# Patient Record
Sex: FEMALE | Race: WHITE | NOT HISPANIC OR LATINO
[De-identification: names, ages, dates, MRNs, and addresses within clinical notes are randomized per-mention and may not be internally consistent; named-entity substitution may affect disease eponyms.]

---

## 2017-01-11 ENCOUNTER — MEDICATION RENEWAL (OUTPATIENT)
Age: 82
End: 2017-01-11

## 2017-02-08 ENCOUNTER — MEDICATION RENEWAL (OUTPATIENT)
Age: 82
End: 2017-02-08

## 2017-04-28 ENCOUNTER — RX RENEWAL (OUTPATIENT)
Age: 82
End: 2017-04-28

## 2017-06-14 ENCOUNTER — APPOINTMENT (OUTPATIENT)
Dept: CARDIOLOGY | Facility: CLINIC | Age: 82
End: 2017-06-14

## 2017-06-14 ENCOUNTER — NON-APPOINTMENT (OUTPATIENT)
Age: 82
End: 2017-06-14

## 2017-06-14 VITALS — HEART RATE: 100 BPM

## 2017-06-14 VITALS
HEIGHT: 66 IN | DIASTOLIC BLOOD PRESSURE: 63 MMHG | HEART RATE: 107 BPM | BODY MASS INDEX: 22.34 KG/M2 | OXYGEN SATURATION: 99 % | SYSTOLIC BLOOD PRESSURE: 107 MMHG | WEIGHT: 139 LBS

## 2017-06-15 LAB
ALBUMIN SERPL ELPH-MCNC: 4.2 G/DL
ALP BLD-CCNC: 56 U/L
ALT SERPL-CCNC: 43 U/L
ANION GAP SERPL CALC-SCNC: 21 MMOL/L
AST SERPL-CCNC: 34 U/L
BASOPHILS # BLD AUTO: 0.02 K/UL
BASOPHILS NFR BLD AUTO: 0.2 %
BILIRUB SERPL-MCNC: 0.4 MG/DL
BUN SERPL-MCNC: 26 MG/DL
CALCIUM SERPL-MCNC: 10.2 MG/DL
CHLORIDE SERPL-SCNC: 104 MMOL/L
CHOLEST SERPL-MCNC: 124 MG/DL
CHOLEST/HDLC SERPL: 1.9 RATIO
CO2 SERPL-SCNC: 21 MMOL/L
CREAT SERPL-MCNC: 1.4 MG/DL
EOSINOPHIL # BLD AUTO: 0.05 K/UL
EOSINOPHIL NFR BLD AUTO: 0.6 %
ESTIMATED AVERAGE GLUCOSE: 131 MG/DL
GLUCOSE SERPL-MCNC: 108 MG/DL
HBA1C MFR BLD HPLC: 6.2 %
HCT VFR BLD CALC: 42 %
HDLC SERPL-MCNC: 65 MG/DL
HGB BLD-MCNC: 12.3 G/DL
IMM GRANULOCYTES NFR BLD AUTO: 0 %
LDLC SERPL CALC-MCNC: 37 MG/DL
LYMPHOCYTES # BLD AUTO: 1.65 K/UL
LYMPHOCYTES NFR BLD AUTO: 18.3 %
MAN DIFF?: NORMAL
MCHC RBC-ENTMCNC: 28.1 PG
MCHC RBC-ENTMCNC: 29.3 GM/DL
MCV RBC AUTO: 95.9 FL
MONOCYTES # BLD AUTO: 0.45 K/UL
MONOCYTES NFR BLD AUTO: 5 %
NEUTROPHILS # BLD AUTO: 6.86 K/UL
NEUTROPHILS NFR BLD AUTO: 75.9 %
PLATELET # BLD AUTO: 272 K/UL
POTASSIUM SERPL-SCNC: 4.5 MMOL/L
PROT SERPL-MCNC: 6.9 G/DL
RBC # BLD: 4.38 M/UL
RBC # FLD: 15.5 %
SODIUM SERPL-SCNC: 146 MMOL/L
T4 SERPL-MCNC: 8.8 UG/DL
TRIGL SERPL-MCNC: 110 MG/DL
TSH SERPL-ACNC: 2.74 UIU/ML
URATE SERPL-MCNC: 5.8 MG/DL
WBC # FLD AUTO: 9.03 K/UL

## 2017-09-27 ENCOUNTER — NON-APPOINTMENT (OUTPATIENT)
Age: 82
End: 2017-09-27

## 2017-09-27 ENCOUNTER — APPOINTMENT (OUTPATIENT)
Dept: CARDIOLOGY | Facility: CLINIC | Age: 82
End: 2017-09-27
Payer: MEDICARE

## 2017-09-27 VITALS
BODY MASS INDEX: 21.63 KG/M2 | OXYGEN SATURATION: 98 % | HEART RATE: 98 BPM | WEIGHT: 134 LBS | SYSTOLIC BLOOD PRESSURE: 134 MMHG | DIASTOLIC BLOOD PRESSURE: 70 MMHG

## 2017-09-27 PROCEDURE — 99214 OFFICE O/P EST MOD 30 MIN: CPT

## 2017-09-27 PROCEDURE — 93000 ELECTROCARDIOGRAM COMPLETE: CPT

## 2017-11-29 ENCOUNTER — MEDICATION RENEWAL (OUTPATIENT)
Age: 82
End: 2017-11-29

## 2017-12-05 ENCOUNTER — NON-APPOINTMENT (OUTPATIENT)
Age: 82
End: 2017-12-05

## 2017-12-05 ENCOUNTER — APPOINTMENT (OUTPATIENT)
Dept: CARDIOLOGY | Facility: CLINIC | Age: 82
End: 2017-12-05
Payer: MEDICARE

## 2017-12-05 VITALS
DIASTOLIC BLOOD PRESSURE: 74 MMHG | WEIGHT: 135 LBS | SYSTOLIC BLOOD PRESSURE: 161 MMHG | HEART RATE: 103 BPM | HEIGHT: 66 IN | OXYGEN SATURATION: 100 % | BODY MASS INDEX: 21.69 KG/M2

## 2017-12-05 VITALS — DIASTOLIC BLOOD PRESSURE: 78 MMHG | HEART RATE: 88 BPM | SYSTOLIC BLOOD PRESSURE: 148 MMHG

## 2017-12-05 PROCEDURE — 93000 ELECTROCARDIOGRAM COMPLETE: CPT

## 2017-12-05 PROCEDURE — 90662 IIV NO PRSV INCREASED AG IM: CPT

## 2017-12-05 PROCEDURE — G0008: CPT

## 2017-12-05 PROCEDURE — 99214 OFFICE O/P EST MOD 30 MIN: CPT

## 2017-12-06 LAB
ALBUMIN SERPL ELPH-MCNC: 4.3 G/DL
ALP BLD-CCNC: 50 U/L
ALT SERPL-CCNC: 18 U/L
ANION GAP SERPL CALC-SCNC: 18 MMOL/L
AST SERPL-CCNC: 22 U/L
BASOPHILS # BLD AUTO: 0.02 K/UL
BASOPHILS NFR BLD AUTO: 0.3 %
BILIRUB SERPL-MCNC: 0.4 MG/DL
BUN SERPL-MCNC: 24 MG/DL
CALCIUM SERPL-MCNC: 9.8 MG/DL
CHLORIDE SERPL-SCNC: 100 MMOL/L
CHOLEST SERPL-MCNC: 130 MG/DL
CHOLEST/HDLC SERPL: 1.7 RATIO
CO2 SERPL-SCNC: 26 MMOL/L
CREAT SERPL-MCNC: 0.87 MG/DL
EOSINOPHIL # BLD AUTO: 0.05 K/UL
EOSINOPHIL NFR BLD AUTO: 0.9 %
ESTIMATED AVERAGE GLUCOSE: 120 MG/DL
GLUCOSE SERPL-MCNC: 98 MG/DL
HBA1C MFR BLD HPLC: 5.8 %
HCT VFR BLD CALC: 38.1 %
HDLC SERPL-MCNC: 76 MG/DL
HGB BLD-MCNC: 11.6 G/DL
IMM GRANULOCYTES NFR BLD AUTO: 0 %
LDLC SERPL CALC-MCNC: 37 MG/DL
LYMPHOCYTES # BLD AUTO: 1.17 K/UL
LYMPHOCYTES NFR BLD AUTO: 19.9 %
MAN DIFF?: NORMAL
MCHC RBC-ENTMCNC: 29.7 PG
MCHC RBC-ENTMCNC: 30.4 GM/DL
MCV RBC AUTO: 97.7 FL
MONOCYTES # BLD AUTO: 0.39 K/UL
MONOCYTES NFR BLD AUTO: 6.6 %
NEUTROPHILS # BLD AUTO: 4.24 K/UL
NEUTROPHILS NFR BLD AUTO: 72.3 %
PLATELET # BLD AUTO: 226 K/UL
POTASSIUM SERPL-SCNC: 4.4 MMOL/L
PROT SERPL-MCNC: 7 G/DL
RBC # BLD: 3.9 M/UL
RBC # FLD: 15.3 %
SODIUM SERPL-SCNC: 144 MMOL/L
T4 SERPL-MCNC: 8.4 UG/DL
TRIGL SERPL-MCNC: 84 MG/DL
TSH SERPL-ACNC: 1.33 UIU/ML
URATE SERPL-MCNC: 5.4 MG/DL
WBC # FLD AUTO: 5.87 K/UL

## 2018-04-17 ENCOUNTER — APPOINTMENT (OUTPATIENT)
Dept: CARDIOLOGY | Facility: CLINIC | Age: 83
End: 2018-04-17
Payer: MEDICARE

## 2018-04-17 ENCOUNTER — NON-APPOINTMENT (OUTPATIENT)
Age: 83
End: 2018-04-17

## 2018-04-17 VITALS
HEART RATE: 96 BPM | BODY MASS INDEX: 21.21 KG/M2 | OXYGEN SATURATION: 99 % | WEIGHT: 132 LBS | HEIGHT: 66 IN | DIASTOLIC BLOOD PRESSURE: 70 MMHG | SYSTOLIC BLOOD PRESSURE: 144 MMHG

## 2018-04-17 PROCEDURE — 99214 OFFICE O/P EST MOD 30 MIN: CPT

## 2018-04-17 PROCEDURE — 93000 ELECTROCARDIOGRAM COMPLETE: CPT

## 2018-04-18 LAB
ALBUMIN SERPL ELPH-MCNC: 3.9 G/DL
ALP BLD-CCNC: 58 U/L
ALT SERPL-CCNC: 23 U/L
ANION GAP SERPL CALC-SCNC: 13 MMOL/L
AST SERPL-CCNC: 28 U/L
BASOPHILS # BLD AUTO: 0.02 K/UL
BASOPHILS NFR BLD AUTO: 0.3 %
BILIRUB SERPL-MCNC: 0.4 MG/DL
BUN SERPL-MCNC: 26 MG/DL
CALCIUM SERPL-MCNC: 10.4 MG/DL
CHLORIDE SERPL-SCNC: 103 MMOL/L
CHOLEST SERPL-MCNC: 121 MG/DL
CHOLEST/HDLC SERPL: 1.8 RATIO
CO2 SERPL-SCNC: 27 MMOL/L
CREAT SERPL-MCNC: 1.36 MG/DL
EOSINOPHIL # BLD AUTO: 0.11 K/UL
EOSINOPHIL NFR BLD AUTO: 1.7 %
ESTIMATED AVERAGE GLUCOSE: 128 MG/DL
GLUCOSE SERPL-MCNC: 85 MG/DL
HBA1C MFR BLD HPLC: 6.1 %
HCT VFR BLD CALC: 37.2 %
HDLC SERPL-MCNC: 67 MG/DL
HGB BLD-MCNC: 11.4 G/DL
IMM GRANULOCYTES NFR BLD AUTO: 0.2 %
LDLC SERPL CALC-MCNC: 40 MG/DL
LYMPHOCYTES # BLD AUTO: 1.22 K/UL
LYMPHOCYTES NFR BLD AUTO: 19.2 %
MAN DIFF?: NORMAL
MCHC RBC-ENTMCNC: 28.1 PG
MCHC RBC-ENTMCNC: 30.6 GM/DL
MCV RBC AUTO: 91.6 FL
MONOCYTES # BLD AUTO: 0.29 K/UL
MONOCYTES NFR BLD AUTO: 4.6 %
NEUTROPHILS # BLD AUTO: 4.71 K/UL
NEUTROPHILS NFR BLD AUTO: 74 %
PLATELET # BLD AUTO: 241 K/UL
POTASSIUM SERPL-SCNC: 5.1 MMOL/L
PROT SERPL-MCNC: 7.2 G/DL
RBC # BLD: 4.06 M/UL
RBC # FLD: 16 %
SODIUM SERPL-SCNC: 143 MMOL/L
T4 SERPL-MCNC: 7.8 UG/DL
TRIGL SERPL-MCNC: 72 MG/DL
TSH SERPL-ACNC: 4.44 UIU/ML
URATE SERPL-MCNC: 4.8 MG/DL
WBC # FLD AUTO: 6.36 K/UL

## 2018-07-28 ENCOUNTER — RX RENEWAL (OUTPATIENT)
Age: 83
End: 2018-07-28

## 2018-08-14 ENCOUNTER — NON-APPOINTMENT (OUTPATIENT)
Age: 83
End: 2018-08-14

## 2018-08-14 ENCOUNTER — APPOINTMENT (OUTPATIENT)
Dept: CARDIOLOGY | Facility: CLINIC | Age: 83
End: 2018-08-14
Payer: MEDICARE

## 2018-08-14 VITALS
HEIGHT: 66 IN | WEIGHT: 130 LBS | HEART RATE: 110 BPM | BODY MASS INDEX: 20.89 KG/M2 | DIASTOLIC BLOOD PRESSURE: 57 MMHG | SYSTOLIC BLOOD PRESSURE: 110 MMHG | OXYGEN SATURATION: 100 %

## 2018-08-14 VITALS — HEART RATE: 98 BPM

## 2018-08-14 PROCEDURE — 93000 ELECTROCARDIOGRAM COMPLETE: CPT

## 2018-08-14 PROCEDURE — 99214 OFFICE O/P EST MOD 30 MIN: CPT

## 2018-12-11 ENCOUNTER — APPOINTMENT (OUTPATIENT)
Dept: CARDIOLOGY | Facility: CLINIC | Age: 83
End: 2018-12-11
Payer: MEDICARE

## 2018-12-11 ENCOUNTER — NON-APPOINTMENT (OUTPATIENT)
Age: 83
End: 2018-12-11

## 2018-12-11 VITALS
BODY MASS INDEX: 19.61 KG/M2 | HEART RATE: 96 BPM | SYSTOLIC BLOOD PRESSURE: 137 MMHG | OXYGEN SATURATION: 96 % | WEIGHT: 122 LBS | HEIGHT: 66 IN | DIASTOLIC BLOOD PRESSURE: 75 MMHG

## 2018-12-11 PROCEDURE — 93000 ELECTROCARDIOGRAM COMPLETE: CPT

## 2018-12-11 PROCEDURE — 99214 OFFICE O/P EST MOD 30 MIN: CPT

## 2018-12-11 NOTE — REASON FOR VISIT
[FreeTextEntry1] : The patient comes in for followup of her asthma, hypertension, diabetes, hyperlipidemia, and mitral regurgitation. She has no new complaints. She denies any chest pains, shortness of breath, or palpitations. She does complain of feeling tired.

## 2018-12-11 NOTE — DISCUSSION/SUMMARY
[FreeTextEntry1] : The patient was examined. Her blood pressure was 137/75 and her pulse was 96. Her oxygen saturation was 96%. Her lungs were clear to auscultation. Cardiac exam was negative for  rubs or gallops.There was a 3/6 holosystolic murmur heard at the apex. Her EKG showed normal sinus rhythm, 1 APC,and poor R-wave progression. No acute changes were seen. She'll return in 4 months , or earlier if needed. She'll continue her current medications.

## 2018-12-11 NOTE — PHYSICAL EXAM
[General Appearance - Well Developed] : well developed [Normal Appearance] : normal appearance [Well Groomed] : well groomed [General Appearance - Well Nourished] : well nourished [No Deformities] : no deformities [General Appearance - In No Acute Distress] : no acute distress [Normal Conjunctiva] : the conjunctiva exhibited no abnormalities [Eyelids - No Xanthelasma] : the eyelids demonstrated no xanthelasmas [Normal Oral Mucosa] : normal oral mucosa [No Oral Pallor] : no oral pallor [No Oral Cyanosis] : no oral cyanosis [Normal Jugular Venous A Waves Present] : normal jugular venous A waves present [Normal Jugular Venous V Waves Present] : normal jugular venous V waves present [No Jugular Venous Rivera A Waves] : no jugular venous rivera A waves [Respiration, Rhythm And Depth] : normal respiratory rhythm and effort [Exaggerated Use Of Accessory Muscles For Inspiration] : no accessory muscle use [Auscultation Breath Sounds / Voice Sounds] : lungs were clear to auscultation bilaterally [Heart Rate And Rhythm] : heart rate and rhythm were normal [Heart Sounds] : normal S1 and S2 [Abdomen Soft] : soft [Abdomen Tenderness] : non-tender [Abdomen Mass (___ Cm)] : no abdominal mass palpated [Abnormal Walk] : normal gait [Gait - Sufficient For Exercise Testing] : the gait was sufficient for exercise testing [Nail Clubbing] : no clubbing of the fingernails [Cyanosis, Localized] : no localized cyanosis [Petechial Hemorrhages (___cm)] : no petechial hemorrhages [Skin Color & Pigmentation] : normal skin color and pigmentation [] : no rash [No Venous Stasis] : no venous stasis [Skin Lesions] : no skin lesions [No Skin Ulcers] : no skin ulcer [No Xanthoma] : no  xanthoma was observed [Oriented To Time, Place, And Person] : oriented to person, place, and time [Affect] : the affect was normal [Mood] : the mood was normal [No Anxiety] : not feeling anxious [FreeTextEntry1] : Incisional hernia left flank

## 2018-12-11 NOTE — REVIEW OF SYSTEMS
[Feeling Fatigued] : feeling fatigued [Incontinence] : incontinence [Tremor] : a tremor was seen [see HPI] : see HPI [Memory Lapses Or Loss] : memory lapses or loss [Negative] : Heme/Lymph [Shortness Of Breath] : no shortness of breath [Chest Pain] : no chest pain [Palpitations] : no palpitations

## 2019-04-29 ENCOUNTER — RX RENEWAL (OUTPATIENT)
Age: 84
End: 2019-04-29

## 2019-05-14 ENCOUNTER — APPOINTMENT (OUTPATIENT)
Dept: CARDIOLOGY | Facility: CLINIC | Age: 84
End: 2019-05-14
Payer: MEDICARE

## 2019-05-14 ENCOUNTER — NON-APPOINTMENT (OUTPATIENT)
Age: 84
End: 2019-05-14

## 2019-05-14 VITALS
BODY MASS INDEX: 19.61 KG/M2 | WEIGHT: 122 LBS | HEIGHT: 66 IN | DIASTOLIC BLOOD PRESSURE: 77 MMHG | HEART RATE: 90 BPM | SYSTOLIC BLOOD PRESSURE: 147 MMHG | OXYGEN SATURATION: 98 %

## 2019-05-14 DIAGNOSIS — Z85.3 PERSONAL HISTORY OF MALIGNANT NEOPLASM OF BREAST: ICD-10-CM

## 2019-05-14 DIAGNOSIS — Z87.39 PERSONAL HISTORY OF OTHER DISEASES OF THE MUSCULOSKELETAL SYSTEM AND CONNECTIVE TISSUE: ICD-10-CM

## 2019-05-14 DIAGNOSIS — N32.81 OVERACTIVE BLADDER: ICD-10-CM

## 2019-05-14 DIAGNOSIS — H91.93 UNSPECIFIED HEARING LOSS, BILATERAL: ICD-10-CM

## 2019-05-14 DIAGNOSIS — R26.89 OTHER ABNORMALITIES OF GAIT AND MOBILITY: ICD-10-CM

## 2019-05-14 PROCEDURE — 36415 COLL VENOUS BLD VENIPUNCTURE: CPT

## 2019-05-14 PROCEDURE — 99214 OFFICE O/P EST MOD 30 MIN: CPT

## 2019-05-14 PROCEDURE — 93000 ELECTROCARDIOGRAM COMPLETE: CPT

## 2019-05-14 NOTE — REASON FOR VISIT
[FreeTextEntry1] : The patient comes in for followup of her asthma, hypertension, diabetes, hyperlipidemia, and mitral regurgitation. She has no new complaints. She denies any chest pains, shortness of breath, or palpitations. She does complain of feeling tired. \par May 14, 2019: The patient today is complaining of problems with swallowing her metformin pill. She cuts it in half. I told her to put it in applesauce. She also is complaining of dry skin. She does use Dove soap. She denies any chest pains, shortness of breath, or palpitations

## 2019-05-14 NOTE — REVIEW OF SYSTEMS
[Feeling Fatigued] : feeling fatigued [Incontinence] : incontinence [see HPI] : see HPI [Tremor] : a tremor was seen [Memory Lapses Or Loss] : memory lapses or loss [Negative] : Heme/Lymph [Shortness Of Breath] : no shortness of breath [Chest Pain] : no chest pain [Palpitations] : no palpitations

## 2019-05-14 NOTE — PHYSICAL EXAM
[General Appearance - Well Developed] : well developed [Well Groomed] : well groomed [Normal Appearance] : normal appearance [General Appearance - Well Nourished] : well nourished [General Appearance - In No Acute Distress] : no acute distress [No Deformities] : no deformities [Eyelids - No Xanthelasma] : the eyelids demonstrated no xanthelasmas [Normal Conjunctiva] : the conjunctiva exhibited no abnormalities [No Oral Pallor] : no oral pallor [Normal Oral Mucosa] : normal oral mucosa [No Oral Cyanosis] : no oral cyanosis [Normal Jugular Venous A Waves Present] : normal jugular venous A waves present [Normal Jugular Venous V Waves Present] : normal jugular venous V waves present [No Jugular Venous Rivera A Waves] : no jugular venous rivera A waves [Exaggerated Use Of Accessory Muscles For Inspiration] : no accessory muscle use [Respiration, Rhythm And Depth] : normal respiratory rhythm and effort [Auscultation Breath Sounds / Voice Sounds] : lungs were clear to auscultation bilaterally [Heart Rate And Rhythm] : heart rate and rhythm were normal [Heart Sounds] : normal S1 and S2 [Abdomen Soft] : soft [Abdomen Tenderness] : non-tender [Abdomen Mass (___ Cm)] : no abdominal mass palpated [Abnormal Walk] : normal gait [Gait - Sufficient For Exercise Testing] : the gait was sufficient for exercise testing [Nail Clubbing] : no clubbing of the fingernails [Cyanosis, Localized] : no localized cyanosis [Petechial Hemorrhages (___cm)] : no petechial hemorrhages [Skin Color & Pigmentation] : normal skin color and pigmentation [] : no rash [No Venous Stasis] : no venous stasis [Skin Lesions] : no skin lesions [No Xanthoma] : no  xanthoma was observed [No Skin Ulcers] : no skin ulcer [Mood] : the mood was normal [Oriented To Time, Place, And Person] : oriented to person, place, and time [Affect] : the affect was normal [No Anxiety] : not feeling anxious [FreeTextEntry1] : Incisional hernia left flank

## 2019-05-15 LAB
ALBUMIN SERPL ELPH-MCNC: 4.2 G/DL
ALP BLD-CCNC: 60 U/L
ALT SERPL-CCNC: 16 U/L
ANION GAP SERPL CALC-SCNC: 14 MMOL/L
AST SERPL-CCNC: 18 U/L
BASOPHILS # BLD AUTO: 0.02 K/UL
BASOPHILS NFR BLD AUTO: 0.3 %
BILIRUB SERPL-MCNC: 0.4 MG/DL
BUN SERPL-MCNC: 28 MG/DL
CALCIUM SERPL-MCNC: 10 MG/DL
CHLORIDE SERPL-SCNC: 103 MMOL/L
CHOLEST SERPL-MCNC: 129 MG/DL
CHOLEST/HDLC SERPL: 1.9 RATIO
CO2 SERPL-SCNC: 27 MMOL/L
CREAT SERPL-MCNC: 1.09 MG/DL
EOSINOPHIL # BLD AUTO: 0 K/UL
EOSINOPHIL NFR BLD AUTO: 0 %
ESTIMATED AVERAGE GLUCOSE: 128 MG/DL
GLUCOSE SERPL-MCNC: 114 MG/DL
HBA1C MFR BLD HPLC: 6.1 %
HCT VFR BLD CALC: 37.7 %
HDLC SERPL-MCNC: 68 MG/DL
HGB BLD-MCNC: 11.2 G/DL
IMM GRANULOCYTES NFR BLD AUTO: 0.2 %
LDLC SERPL CALC-MCNC: 46 MG/DL
LYMPHOCYTES # BLD AUTO: 1.2 K/UL
LYMPHOCYTES NFR BLD AUTO: 19.5 %
MAN DIFF?: NORMAL
MCHC RBC-ENTMCNC: 27.8 PG
MCHC RBC-ENTMCNC: 29.7 GM/DL
MCV RBC AUTO: 93.5 FL
MONOCYTES # BLD AUTO: 0.32 K/UL
MONOCYTES NFR BLD AUTO: 5.2 %
NEUTROPHILS # BLD AUTO: 4.59 K/UL
NEUTROPHILS NFR BLD AUTO: 74.8 %
PLATELET # BLD AUTO: 234 K/UL
POTASSIUM SERPL-SCNC: 4.1 MMOL/L
PROT SERPL-MCNC: 6.8 G/DL
RBC # BLD: 4.03 M/UL
RBC # FLD: 14.9 %
SODIUM SERPL-SCNC: 144 MMOL/L
T4 SERPL-MCNC: 7.7 UG/DL
TRIGL SERPL-MCNC: 77 MG/DL
TSH SERPL-ACNC: 3.4 UIU/ML
WBC # FLD AUTO: 6.14 K/UL

## 2019-09-17 ENCOUNTER — NON-APPOINTMENT (OUTPATIENT)
Age: 84
End: 2019-09-17

## 2019-09-17 ENCOUNTER — APPOINTMENT (OUTPATIENT)
Dept: CARDIOLOGY | Facility: CLINIC | Age: 84
End: 2019-09-17
Payer: MEDICARE

## 2019-09-17 VITALS
OXYGEN SATURATION: 100 % | SYSTOLIC BLOOD PRESSURE: 112 MMHG | RESPIRATION RATE: 17 BRPM | DIASTOLIC BLOOD PRESSURE: 69 MMHG | WEIGHT: 115 LBS | BODY MASS INDEX: 18.48 KG/M2 | HEIGHT: 66 IN | HEART RATE: 89 BPM | TEMPERATURE: 98.1 F

## 2019-09-17 PROCEDURE — 99214 OFFICE O/P EST MOD 30 MIN: CPT

## 2019-09-17 PROCEDURE — 93000 ELECTROCARDIOGRAM COMPLETE: CPT

## 2019-09-17 NOTE — PHYSICAL EXAM
[General Appearance - Well Developed] : well developed [Well Groomed] : well groomed [Normal Appearance] : normal appearance [General Appearance - Well Nourished] : well nourished [No Deformities] : no deformities [Normal Conjunctiva] : the conjunctiva exhibited no abnormalities [General Appearance - In No Acute Distress] : no acute distress [Eyelids - No Xanthelasma] : the eyelids demonstrated no xanthelasmas [Normal Oral Mucosa] : normal oral mucosa [No Oral Pallor] : no oral pallor [No Oral Cyanosis] : no oral cyanosis [Normal Jugular Venous A Waves Present] : normal jugular venous A waves present [No Jugular Venous Rivera A Waves] : no jugular venous rivera A waves [Normal Jugular Venous V Waves Present] : normal jugular venous V waves present [Respiration, Rhythm And Depth] : normal respiratory rhythm and effort [Exaggerated Use Of Accessory Muscles For Inspiration] : no accessory muscle use [Auscultation Breath Sounds / Voice Sounds] : lungs were clear to auscultation bilaterally [Heart Rate And Rhythm] : heart rate and rhythm were normal [Heart Sounds] : normal S1 and S2 [Abdomen Soft] : soft [Abdomen Tenderness] : non-tender [Abdomen Mass (___ Cm)] : no abdominal mass palpated [Abnormal Walk] : normal gait [Gait - Sufficient For Exercise Testing] : the gait was sufficient for exercise testing [Cyanosis, Localized] : no localized cyanosis [Nail Clubbing] : no clubbing of the fingernails [Petechial Hemorrhages (___cm)] : no petechial hemorrhages [No Venous Stasis] : no venous stasis [Skin Color & Pigmentation] : normal skin color and pigmentation [] : no rash [No Skin Ulcers] : no skin ulcer [Skin Lesions] : no skin lesions [No Xanthoma] : no  xanthoma was observed [Affect] : the affect was normal [Oriented To Time, Place, And Person] : oriented to person, place, and time [Mood] : the mood was normal [No Anxiety] : not feeling anxious [FreeTextEntry1] : Incisional hernia left flank

## 2019-09-17 NOTE — DISCUSSION/SUMMARY
[FreeTextEntry1] : The patient was examined. Her blood pressure was 112/69 and her pulse was 89. Her oxygen saturation was 100%. Her lungs were clear to auscultation. Cardiac exam was negative for  rubs or gallops.There was a 3/6 holosystolic murmur heard at the apex. Her EKG showed normal sinus rhythm, and low voltage in the limb leads.  No acute changes were seen. She'll return in 4 months , or earlier if needed. She'll continue her current medications.  She will try Biotene for the dry mouth.  She was advised to get a humidifier in the winter months when she is in [place].  Is not necessary in [place].  She was told to try boost or Ensure.

## 2019-09-17 NOTE — REASON FOR VISIT
[FreeTextEntry1] : The patient comes in for followup of her asthma, hypertension, diabetes, hyperlipidemia, and mitral regurgitation. She has no new complaints. She denies any chest pains, shortness of breath, or palpitations. She does complain of feeling tired. \par May 14, 2019: The patient today is complaining of problems with swallowing her metformin pill. She cuts it in half. I told her to put it in applesauce. She also is complaining of dry skin. She does use Dove soap. She denies any chest pains, shortness of breath, or palpitations\par September 17, 2019: Patient complains of feeling fatigued and not sleeping well.  Complains of dry mouth.  She has a hard time walking.  She is losing weight.  Daughter reports that she does not eat well.  She denies any chest pains, shortness of breath, or palpitations.

## 2019-12-17 ENCOUNTER — NON-APPOINTMENT (OUTPATIENT)
Age: 84
End: 2019-12-17

## 2019-12-17 ENCOUNTER — APPOINTMENT (OUTPATIENT)
Dept: CARDIOLOGY | Facility: CLINIC | Age: 84
End: 2019-12-17
Payer: MEDICARE

## 2019-12-17 VITALS
HEIGHT: 66 IN | SYSTOLIC BLOOD PRESSURE: 164 MMHG | HEART RATE: 86 BPM | WEIGHT: 119 LBS | BODY MASS INDEX: 19.13 KG/M2 | DIASTOLIC BLOOD PRESSURE: 74 MMHG | OXYGEN SATURATION: 97 %

## 2019-12-17 PROCEDURE — G0008: CPT

## 2019-12-17 PROCEDURE — 93000 ELECTROCARDIOGRAM COMPLETE: CPT

## 2019-12-17 PROCEDURE — 90662 IIV NO PRSV INCREASED AG IM: CPT

## 2019-12-17 PROCEDURE — 99214 OFFICE O/P EST MOD 30 MIN: CPT

## 2019-12-17 NOTE — DISCUSSION/SUMMARY
[FreeTextEntry1] : The patient was examined. Her blood pressure was 164/74 and her pulse was 86. Her oxygen saturation was 97%. Her lungs were clear to auscultation. Cardiac exam was negative for  rubs or gallops.There was a 3/6 holosystolic murmur heard at the apex. Her EKG showed normal sinus rhythm, a left anterior margo block, and nonspecific ST and T wave changes.  No acute changes were seen. She'll return in 4 months , or earlier if needed. She'll continue her current medications., and we will add indapamide 1.25 mg She will try Biotene for the dry mouth.  She was advised to get a humidifier in the winter months when she is in [place].  Is not necessary in [place].

## 2019-12-17 NOTE — REASON FOR VISIT
[FreeTextEntry1] : The patient comes in for followup of her asthma, hypertension, diabetes, hyperlipidemia, and mitral regurgitation. She has no new complaints. She denies any chest pains, shortness of breath, or palpitations. She does complain of feeling tired. \par May 14, 2019: The patient today is complaining of problems with swallowing her metformin pill. She cuts it in half. I told her to put it in applesauce. She also is complaining of dry skin. She does use Dove soap. She denies any chest pains, shortness of breath, or palpitations\par September 17, 2019: Patient complains of feeling fatigued and not sleeping well.  Complains of dry mouth.  She has a hard time walking.  She is losing weight.  Daughter reports that she does not eat well.  She denies any chest pains, shortness of breath, or palpitations.\par December 17, 2019: The patient's biggest complaint is that she is tired. She denies any chest pains, shortness of breath, or palpitations. She does not do a lot of walking. Sometimes her ankles are swollen.

## 2019-12-17 NOTE — PHYSICAL EXAM
[Normal Appearance] : normal appearance [General Appearance - Well Developed] : well developed [Well Groomed] : well groomed [General Appearance - Well Nourished] : well nourished [No Deformities] : no deformities [General Appearance - In No Acute Distress] : no acute distress [Normal Conjunctiva] : the conjunctiva exhibited no abnormalities [Eyelids - No Xanthelasma] : the eyelids demonstrated no xanthelasmas [No Oral Pallor] : no oral pallor [Normal Oral Mucosa] : normal oral mucosa [No Oral Cyanosis] : no oral cyanosis [Normal Jugular Venous A Waves Present] : normal jugular venous A waves present [Normal Jugular Venous V Waves Present] : normal jugular venous V waves present [No Jugular Venous Rivera A Waves] : no jugular venous rivera A waves [Respiration, Rhythm And Depth] : normal respiratory rhythm and effort [Exaggerated Use Of Accessory Muscles For Inspiration] : no accessory muscle use [Auscultation Breath Sounds / Voice Sounds] : lungs were clear to auscultation bilaterally [Heart Rate And Rhythm] : heart rate and rhythm were normal [Heart Sounds] : normal S1 and S2 [Abdomen Soft] : soft [Abdomen Tenderness] : non-tender [Abdomen Mass (___ Cm)] : no abdominal mass palpated [Abnormal Walk] : normal gait [Gait - Sufficient For Exercise Testing] : the gait was sufficient for exercise testing [Nail Clubbing] : no clubbing of the fingernails [Cyanosis, Localized] : no localized cyanosis [Petechial Hemorrhages (___cm)] : no petechial hemorrhages [] : no rash [Skin Color & Pigmentation] : normal skin color and pigmentation [No Venous Stasis] : no venous stasis [No Skin Ulcers] : no skin ulcer [Skin Lesions] : no skin lesions [No Xanthoma] : no  xanthoma was observed [Oriented To Time, Place, And Person] : oriented to person, place, and time [Affect] : the affect was normal [No Anxiety] : not feeling anxious [Mood] : the mood was normal [FreeTextEntry1] : Incisional hernia left flank

## 2019-12-18 LAB
ALBUMIN SERPL ELPH-MCNC: 3.9 G/DL
ALP BLD-CCNC: 59 U/L
ALT SERPL-CCNC: 21 U/L
ANION GAP SERPL CALC-SCNC: 12 MMOL/L
AST SERPL-CCNC: 20 U/L
BASOPHILS # BLD AUTO: 0.03 K/UL
BASOPHILS NFR BLD AUTO: 0.5 %
BILIRUB SERPL-MCNC: 0.3 MG/DL
BUN SERPL-MCNC: 22 MG/DL
CALCIUM SERPL-MCNC: 9.8 MG/DL
CHLORIDE SERPL-SCNC: 103 MMOL/L
CHOLEST SERPL-MCNC: 114 MG/DL
CHOLEST/HDLC SERPL: 1.6 RATIO
CO2 SERPL-SCNC: 27 MMOL/L
CREAT SERPL-MCNC: 1.05 MG/DL
EOSINOPHIL # BLD AUTO: 0.17 K/UL
EOSINOPHIL NFR BLD AUTO: 2.6 %
ESTIMATED AVERAGE GLUCOSE: 134 MG/DL
GLUCOSE SERPL-MCNC: 96 MG/DL
HBA1C MFR BLD HPLC: 6.3 %
HCT VFR BLD CALC: 34.7 %
HDLC SERPL-MCNC: 71 MG/DL
HGB BLD-MCNC: 9.9 G/DL
IMM GRANULOCYTES NFR BLD AUTO: 0.3 %
LDLC SERPL CALC-MCNC: 31 MG/DL
LYMPHOCYTES # BLD AUTO: 1.21 K/UL
LYMPHOCYTES NFR BLD AUTO: 18.2 %
MAN DIFF?: NORMAL
MCHC RBC-ENTMCNC: 28 PG
MCHC RBC-ENTMCNC: 28.5 GM/DL
MCV RBC AUTO: 98 FL
MONOCYTES # BLD AUTO: 0.44 K/UL
MONOCYTES NFR BLD AUTO: 6.6 %
NEUTROPHILS # BLD AUTO: 4.77 K/UL
NEUTROPHILS NFR BLD AUTO: 71.8 %
PLATELET # BLD AUTO: 234 K/UL
POTASSIUM SERPL-SCNC: 4.3 MMOL/L
PROT SERPL-MCNC: 6.2 G/DL
RBC # BLD: 3.54 M/UL
RBC # FLD: 14.5 %
SODIUM SERPL-SCNC: 142 MMOL/L
T4 SERPL-MCNC: 7.9 UG/DL
TRIGL SERPL-MCNC: 59 MG/DL
TSH SERPL-ACNC: 1.43 UIU/ML
WBC # FLD AUTO: 6.64 K/UL

## 2020-05-19 ENCOUNTER — APPOINTMENT (OUTPATIENT)
Dept: CARDIOLOGY | Facility: CLINIC | Age: 85
End: 2020-05-19

## 2020-05-22 ENCOUNTER — RX RENEWAL (OUTPATIENT)
Age: 85
End: 2020-05-22

## 2020-06-11 ENCOUNTER — NON-APPOINTMENT (OUTPATIENT)
Age: 85
End: 2020-06-11

## 2020-06-11 ENCOUNTER — APPOINTMENT (OUTPATIENT)
Dept: CARDIOLOGY | Facility: CLINIC | Age: 85
End: 2020-06-11
Payer: MEDICARE

## 2020-06-11 VITALS
SYSTOLIC BLOOD PRESSURE: 130 MMHG | BODY MASS INDEX: 16.39 KG/M2 | WEIGHT: 102 LBS | HEIGHT: 66 IN | DIASTOLIC BLOOD PRESSURE: 60 MMHG | HEART RATE: 99 BPM | OXYGEN SATURATION: 97 %

## 2020-06-11 PROCEDURE — 93000 ELECTROCARDIOGRAM COMPLETE: CPT

## 2020-06-11 PROCEDURE — 99214 OFFICE O/P EST MOD 30 MIN: CPT

## 2020-06-11 NOTE — PHYSICAL EXAM
[Normal Appearance] : normal appearance [General Appearance - Well Developed] : well developed [Well Groomed] : well groomed [General Appearance - Well Nourished] : well nourished [General Appearance - In No Acute Distress] : no acute distress [Normal Conjunctiva] : the conjunctiva exhibited no abnormalities [No Deformities] : no deformities [Eyelids - No Xanthelasma] : the eyelids demonstrated no xanthelasmas [Normal Oral Mucosa] : normal oral mucosa [No Oral Pallor] : no oral pallor [Normal Jugular Venous V Waves Present] : normal jugular venous V waves present [Normal Jugular Venous A Waves Present] : normal jugular venous A waves present [No Oral Cyanosis] : no oral cyanosis [No Jugular Venous Rivera A Waves] : no jugular venous rivera A waves [Exaggerated Use Of Accessory Muscles For Inspiration] : no accessory muscle use [Respiration, Rhythm And Depth] : normal respiratory rhythm and effort [Auscultation Breath Sounds / Voice Sounds] : lungs were clear to auscultation bilaterally [Heart Sounds] : normal S1 and S2 [Abdomen Soft] : soft [Heart Rate And Rhythm] : heart rate and rhythm were normal [Abdomen Mass (___ Cm)] : no abdominal mass palpated [Abdomen Tenderness] : non-tender [Gait - Sufficient For Exercise Testing] : the gait was sufficient for exercise testing [Abnormal Walk] : normal gait [Nail Clubbing] : no clubbing of the fingernails [Cyanosis, Localized] : no localized cyanosis [Petechial Hemorrhages (___cm)] : no petechial hemorrhages [Skin Color & Pigmentation] : normal skin color and pigmentation [] : no rash [No Venous Stasis] : no venous stasis [Skin Lesions] : no skin lesions [Oriented To Time, Place, And Person] : oriented to person, place, and time [No Skin Ulcers] : no skin ulcer [No Xanthoma] : no  xanthoma was observed [No Anxiety] : not feeling anxious [Mood] : the mood was normal [Affect] : the affect was normal [FreeTextEntry1] : 3/6 holosystolic murmur at the apex

## 2020-06-11 NOTE — REASON FOR VISIT
[FreeTextEntry1] : The patient comes in for followup of her asthma, hypertension, diabetes, hyperlipidemia, and mitral regurgitation. She has no new complaints. She denies any chest pains, shortness of breath, or palpitations. She does complain of feeling tired. \par May 14, 2019: The patient today is complaining of problems with swallowing her metformin pill. She cuts it in half. I told her to put it in applesauce. She also is complaining of dry skin. She does use Dove soap. She denies any chest pains, shortness of breath, or palpitations\par September 17, 2019: Patient complains of feeling fatigued and not sleeping well.  Complains of dry mouth.  She has a hard time walking.  She is losing weight.  Daughter reports that she does not eat well.  She denies any chest pains, shortness of breath, or palpitations.\par December 17, 2019: The patient's biggest complaint is that she is tired. She denies any chest pains, shortness of breath, or palpitations. She does not do a lot of walking. Sometimes her ankles are swollen.\par June 11, 2020: The patient still has complaints of dyspnea on exertion fatigue and not sleeping well.  She denies any chest pain shortness of breath at rest or palpitations.  She has urinary incontinence and she is on a medicine for overactive bladder.  I discussed with the daughter about stopping the trapezium medication.  She does get muscle aches and I discussed with the daughter about stopping the rosuvastatin temporarily for 2 weeks to see if the muscle aches get better.  She does take an Advil twice a day for arthritis pains.  It does not seem to bother her stomach.

## 2020-06-11 NOTE — DISCUSSION/SUMMARY
[FreeTextEntry1] : The patient was examined. Her blood pressure was 130/60 and her pulse was 99. Her oxygen saturation was 97%. Her lungs were clear to auscultation. Cardiac exam was negative for  rubs or gallops.There was a 3/6 holosystolic murmur heard at the apex. Her EKG showed normal sinus rhythm, a left anterior margo block, and nonspecific ST and T wave changes.  No acute changes were seen. She'll return in 4 months , or earlier if needed. She'll continue her current medications, but she will stop the trospium chloride.

## 2020-06-17 ENCOUNTER — RX RENEWAL (OUTPATIENT)
Age: 85
End: 2020-06-17

## 2020-08-08 ENCOUNTER — NON-APPOINTMENT (OUTPATIENT)
Age: 85
End: 2020-08-08

## 2020-08-08 ENCOUNTER — RX RENEWAL (OUTPATIENT)
Age: 85
End: 2020-08-08

## 2020-10-08 ENCOUNTER — APPOINTMENT (OUTPATIENT)
Dept: CARDIOLOGY | Facility: CLINIC | Age: 85
End: 2020-10-08
Payer: MEDICARE

## 2020-10-08 ENCOUNTER — INPATIENT (INPATIENT)
Facility: HOSPITAL | Age: 85
LOS: 4 days | Discharge: HOME | DRG: 682 | End: 2020-10-13
Attending: INTERNAL MEDICINE | Admitting: INTERNAL MEDICINE
Payer: MEDICARE

## 2020-10-08 ENCOUNTER — NON-APPOINTMENT (OUTPATIENT)
Age: 85
End: 2020-10-08

## 2020-10-08 ENCOUNTER — LABORATORY RESULT (OUTPATIENT)
Age: 85
End: 2020-10-08

## 2020-10-08 VITALS
DIASTOLIC BLOOD PRESSURE: 78 MMHG | TEMPERATURE: 97.2 F | HEIGHT: 66 IN | HEART RATE: 78 BPM | SYSTOLIC BLOOD PRESSURE: 130 MMHG | WEIGHT: 101.4 LBS | OXYGEN SATURATION: 100 % | BODY MASS INDEX: 16.29 KG/M2 | RESPIRATION RATE: 17 BRPM

## 2020-10-08 VITALS
WEIGHT: 100.09 LBS | SYSTOLIC BLOOD PRESSURE: 145 MMHG | OXYGEN SATURATION: 98 % | HEIGHT: 63 IN | DIASTOLIC BLOOD PRESSURE: 78 MMHG | TEMPERATURE: 97 F | HEART RATE: 85 BPM | RESPIRATION RATE: 18 BRPM

## 2020-10-08 DIAGNOSIS — R55 SYNCOPE AND COLLAPSE: ICD-10-CM

## 2020-10-08 LAB
ALBUMIN SERPL ELPH-MCNC: 3.9 G/DL — SIGNIFICANT CHANGE UP (ref 3.3–5)
ALP SERPL-CCNC: 48 U/L — SIGNIFICANT CHANGE UP (ref 40–120)
ALT FLD-CCNC: 33 U/L — SIGNIFICANT CHANGE UP (ref 10–45)
ANION GAP SERPL CALC-SCNC: 16 MMOL/L — SIGNIFICANT CHANGE UP (ref 5–17)
APPEARANCE UR: ABNORMAL
AST SERPL-CCNC: 37 U/L — SIGNIFICANT CHANGE UP (ref 10–40)
BACTERIA # UR AUTO: ABNORMAL
BASOPHILS # BLD AUTO: 0.02 K/UL — SIGNIFICANT CHANGE UP (ref 0–0.2)
BASOPHILS NFR BLD AUTO: 0.5 % — SIGNIFICANT CHANGE UP (ref 0–2)
BILIRUB SERPL-MCNC: 0.3 MG/DL — SIGNIFICANT CHANGE UP (ref 0.2–1.2)
BILIRUB UR-MCNC: NEGATIVE — SIGNIFICANT CHANGE UP
BUN SERPL-MCNC: 54 MG/DL — HIGH (ref 7–23)
CALCIUM SERPL-MCNC: 10 MG/DL — SIGNIFICANT CHANGE UP (ref 8.4–10.5)
CHLORIDE SERPL-SCNC: 100 MMOL/L — SIGNIFICANT CHANGE UP (ref 96–108)
CO2 SERPL-SCNC: 23 MMOL/L — SIGNIFICANT CHANGE UP (ref 22–31)
COLOR SPEC: YELLOW — SIGNIFICANT CHANGE UP
CREAT SERPL-MCNC: 1.98 MG/DL — HIGH (ref 0.5–1.3)
DIFF PNL FLD: ABNORMAL
EOSINOPHIL # BLD AUTO: 0 K/UL — SIGNIFICANT CHANGE UP (ref 0–0.5)
EOSINOPHIL NFR BLD AUTO: 0 % — SIGNIFICANT CHANGE UP (ref 0–6)
EPI CELLS # UR: 9 /HPF — HIGH
GLUCOSE SERPL-MCNC: 125 MG/DL — HIGH (ref 70–99)
GLUCOSE UR QL: NEGATIVE — SIGNIFICANT CHANGE UP
HCT VFR BLD CALC: 30.9 % — LOW (ref 34.5–45)
HGB BLD-MCNC: 9.4 G/DL — LOW (ref 11.5–15.5)
HYALINE CASTS # UR AUTO: 2 /LPF — SIGNIFICANT CHANGE UP (ref 0–2)
IMM GRANULOCYTES NFR BLD AUTO: 0.5 % — SIGNIFICANT CHANGE UP (ref 0–1.5)
KETONES UR-MCNC: NEGATIVE — SIGNIFICANT CHANGE UP
LEUKOCYTE ESTERASE UR-ACNC: ABNORMAL
LYMPHOCYTES # BLD AUTO: 0.6 K/UL — LOW (ref 1–3.3)
LYMPHOCYTES # BLD AUTO: 14.5 % — SIGNIFICANT CHANGE UP (ref 13–44)
MCHC RBC-ENTMCNC: 30.4 GM/DL — LOW (ref 32–36)
MCHC RBC-ENTMCNC: 30.5 PG — SIGNIFICANT CHANGE UP (ref 27–34)
MCV RBC AUTO: 100.3 FL — HIGH (ref 80–100)
MONOCYTES # BLD AUTO: 0.25 K/UL — SIGNIFICANT CHANGE UP (ref 0–0.9)
MONOCYTES NFR BLD AUTO: 6 % — SIGNIFICANT CHANGE UP (ref 2–14)
NEUTROPHILS # BLD AUTO: 3.25 K/UL — SIGNIFICANT CHANGE UP (ref 1.8–7.4)
NEUTROPHILS NFR BLD AUTO: 78.5 % — HIGH (ref 43–77)
NITRITE UR-MCNC: POSITIVE
NRBC # BLD: 0 /100 WBCS — SIGNIFICANT CHANGE UP (ref 0–0)
PH UR: 6.5 — SIGNIFICANT CHANGE UP (ref 5–8)
PLATELET # BLD AUTO: 147 K/UL — LOW (ref 150–400)
POTASSIUM SERPL-MCNC: 4.3 MMOL/L — SIGNIFICANT CHANGE UP (ref 3.5–5.3)
POTASSIUM SERPL-SCNC: 4.3 MMOL/L — SIGNIFICANT CHANGE UP (ref 3.5–5.3)
PROT SERPL-MCNC: 6.4 G/DL — SIGNIFICANT CHANGE UP (ref 6–8.3)
PROT UR-MCNC: ABNORMAL
RBC # BLD: 3.08 M/UL — LOW (ref 3.8–5.2)
RBC # FLD: 13.5 % — SIGNIFICANT CHANGE UP (ref 10.3–14.5)
RBC CASTS # UR COMP ASSIST: 5 /HPF — HIGH (ref 0–4)
SODIUM SERPL-SCNC: 139 MMOL/L — SIGNIFICANT CHANGE UP (ref 135–145)
SP GR SPEC: 1.01 — SIGNIFICANT CHANGE UP (ref 1.01–1.02)
TROPONIN T, HIGH SENSITIVITY RESULT: 46 NG/L — SIGNIFICANT CHANGE UP (ref 0–51)
TROPONIN T, HIGH SENSITIVITY RESULT: 50 NG/L — SIGNIFICANT CHANGE UP (ref 0–51)
TSH SERPL-MCNC: 4.18 UIU/ML — SIGNIFICANT CHANGE UP (ref 0.27–4.2)
UROBILINOGEN FLD QL: NEGATIVE — SIGNIFICANT CHANGE UP
WBC # BLD: 4.14 K/UL — SIGNIFICANT CHANGE UP (ref 3.8–10.5)
WBC # FLD AUTO: 4.14 K/UL — SIGNIFICANT CHANGE UP (ref 3.8–10.5)
WBC UR QL: 11 /HPF — HIGH (ref 0–5)

## 2020-10-08 PROCEDURE — 99497 ADVNCD CARE PLAN 30 MIN: CPT | Mod: 25

## 2020-10-08 PROCEDURE — 70450 CT HEAD/BRAIN W/O DYE: CPT | Mod: 26

## 2020-10-08 PROCEDURE — 93010 ELECTROCARDIOGRAM REPORT: CPT

## 2020-10-08 PROCEDURE — 72170 X-RAY EXAM OF PELVIS: CPT | Mod: 26

## 2020-10-08 PROCEDURE — 73130 X-RAY EXAM OF HAND: CPT | Mod: 26,LT

## 2020-10-08 PROCEDURE — 73080 X-RAY EXAM OF ELBOW: CPT | Mod: 26,LT

## 2020-10-08 PROCEDURE — 71045 X-RAY EXAM CHEST 1 VIEW: CPT | Mod: 26

## 2020-10-08 PROCEDURE — 73060 X-RAY EXAM OF HUMERUS: CPT | Mod: 26,LT

## 2020-10-08 PROCEDURE — 73090 X-RAY EXAM OF FOREARM: CPT | Mod: 26,LT

## 2020-10-08 PROCEDURE — 72125 CT NECK SPINE W/O DYE: CPT | Mod: 26

## 2020-10-08 PROCEDURE — 99214 OFFICE O/P EST MOD 30 MIN: CPT | Mod: PD

## 2020-10-08 PROCEDURE — 99223 1ST HOSP IP/OBS HIGH 75: CPT

## 2020-10-08 PROCEDURE — 93000 ELECTROCARDIOGRAM COMPLETE: CPT | Mod: PD

## 2020-10-08 PROCEDURE — 99285 EMERGENCY DEPT VISIT HI MDM: CPT | Mod: CS

## 2020-10-08 PROCEDURE — 76377 3D RENDER W/INTRP POSTPROCES: CPT | Mod: 26

## 2020-10-08 PROCEDURE — 70486 CT MAXILLOFACIAL W/O DYE: CPT | Mod: 26

## 2020-10-08 RX ORDER — TETANUS TOXOID, REDUCED DIPHTHERIA TOXOID AND ACELLULAR PERTUSSIS VACCINE, ADSORBED 5; 2.5; 8; 8; 2.5 [IU]/.5ML; [IU]/.5ML; UG/.5ML; UG/.5ML; UG/.5ML
0.5 SUSPENSION INTRAMUSCULAR ONCE
Refills: 0 | Status: COMPLETED | OUTPATIENT
Start: 2020-10-08 | End: 2020-10-08

## 2020-10-08 RX ORDER — TROSPIUM CHLORIDE 20 MG/1
1 TABLET, FILM COATED ORAL
Qty: 0 | Refills: 0 | DISCHARGE

## 2020-10-08 RX ORDER — CEFTRIAXONE 500 MG/1
1000 INJECTION, POWDER, FOR SOLUTION INTRAMUSCULAR; INTRAVENOUS ONCE
Refills: 0 | Status: COMPLETED | OUTPATIENT
Start: 2020-10-08 | End: 2020-10-08

## 2020-10-08 RX ORDER — SODIUM CHLORIDE 9 MG/ML
1000 INJECTION INTRAMUSCULAR; INTRAVENOUS; SUBCUTANEOUS ONCE
Refills: 0 | Status: COMPLETED | OUTPATIENT
Start: 2020-10-08 | End: 2020-10-08

## 2020-10-08 RX ORDER — ACETAMINOPHEN 500 MG
975 TABLET ORAL ONCE
Refills: 0 | Status: COMPLETED | OUTPATIENT
Start: 2020-10-08 | End: 2020-10-08

## 2020-10-08 RX ADMIN — Medication 975 MILLIGRAM(S): at 16:00

## 2020-10-08 RX ADMIN — TETANUS TOXOID, REDUCED DIPHTHERIA TOXOID AND ACELLULAR PERTUSSIS VACCINE, ADSORBED 0.5 MILLILITER(S): 5; 2.5; 8; 8; 2.5 SUSPENSION INTRAMUSCULAR at 15:26

## 2020-10-08 RX ADMIN — SODIUM CHLORIDE 1000 MILLILITER(S): 9 INJECTION INTRAMUSCULAR; INTRAVENOUS; SUBCUTANEOUS at 18:34

## 2020-10-08 RX ADMIN — Medication 975 MILLIGRAM(S): at 15:26

## 2020-10-08 RX ADMIN — CEFTRIAXONE 100 MILLIGRAM(S): 500 INJECTION, POWDER, FOR SOLUTION INTRAMUSCULAR; INTRAVENOUS at 21:23

## 2020-10-08 NOTE — ED ADULT NURSE NOTE - DRUG PRE-SCREENING (DAST -1)
Reviewed picture of incision with Park Rodgers PA-C  Incision looked appropriate  Called patient to gather more details  Patient denies any fevers, chills, or drainage from incision  Patient will continue to follow up with Dr Jasmin Chacko on 9/24/18 at the [facility]  Patient is aware to call the office if she develops a fever, any drainage, dehiscence, and/or if the erythema spreads  Reviewed incision care with the patient  She was appreciative for the call  Statement Selected

## 2020-10-08 NOTE — H&P ADULT - NSICDXPASTSURGICALHX_GEN_ALL_CORE_FT
PAST SURGICAL HISTORY:  Renal calculi s/p stone extraction 57 yrs ago    S/P bladder repair Interstim device placement 2010    S/P lumpectomy of breast right breast with node removal

## 2020-10-08 NOTE — H&P ADULT - PROBLEM/PLAN-1
Arterial Line Procedure Note Risks, benefits, alternatives explained and patient agrees to proceed. Sterile prep with Chlorhexidine. Local with 0.5 mL 1% Lidocaine placed at insertion site. Right radial artery cannulated utilizing the sterile Seldinger technique. Catheter secured. Sterile dressing placed. DISPLAY PLAN FREE TEXT

## 2020-10-08 NOTE — ED ADULT NURSE REASSESSMENT NOTE - NS ED NURSE REASSESS COMMENT FT1
Received report from Lorna PADILLA in [facility]. Pt resting comfortably in bed, no c/o pain or discomfort. Comfort and safety measures maintained.

## 2020-10-08 NOTE — REVIEW OF SYSTEMS
[Feeling Fatigued] : feeling fatigued [Incontinence] : incontinence [Tremor] : a tremor was seen [Memory Lapses Or Loss] : memory lapses or loss [Negative] : Heme/Lymph [see HPI] : see HPI [Dyspnea on exertion] : dyspnea during exertion [Shortness Of Breath] : no shortness of breath [Chest Pain] : no chest pain [Palpitations] : no palpitations

## 2020-10-08 NOTE — H&P ADULT - NSHPLABSRESULTS_GEN_ALL_CORE
Labs personally reviewed : no leukocytosis, anemia hgb 9.4, thrombocytopenia plt 147. HSt delta neg 46 --> 50. CMP with jagruti scr 1.9 prev 0.9.   Imaging personally reviewed CXR KAYLIE.  XR pelvis no fx. XR elbow/humerus/forearm/hand no fx. CTH +supraorbital meningioma, +microvasc ischemic changes no acute bleed. CT c spine no fx/subluxation. CT max/fac: no fx.  EKG personally reviewed

## 2020-10-08 NOTE — H&P ADULT - PROBLEM SELECTOR PROBLEM 6
Type 2 diabetes mellitus without complication, without long-term current use of insulin Macrocytic anemia

## 2020-10-08 NOTE — H&P ADULT - NSHPSOCIALHISTORY_GEN_ALL_CORE
Social History:      Occupation: homemaker  Lives with: (  ) alone  ( x ) children   (  ) spouse   (  ) parents  (  ) other    Substance Use (street drugs): (x  ) never used  (  ) other:  Tobacco Usage:  ( x  ) never smoked   (   ) former smoker   (   ) current smoker  (     ) pack year  (        ) last cigarette date  Alcohol Usage: denies

## 2020-10-08 NOTE — H&P ADULT - PROBLEM SELECTOR PLAN 2
-s/p IVF in ER, repeat orthostatics improved  -c/w LR 75 cc/hr x 10 hrs and reassess in am  -encourage PO intake  -fall prec

## 2020-10-08 NOTE — REASON FOR VISIT
[FreeTextEntry1] : The patient comes in for followup of her asthma, hypertension, diabetes, hyperlipidemia, and mitral regurgitation. She has no new complaints. She denies any chest pains, shortness of breath, or palpitations. She does complain of feeling tired. \par May 14, 2019: The patient today is complaining of problems with swallowing her metformin pill. She cuts it in half. I told her to put it in applesauce. She also is complaining of dry skin. She does use Dove soap. She denies any chest pains, shortness of breath, or palpitations\par September 17, 2019: Patient complains of feeling fatigued and not sleeping well.  Complains of dry mouth.  She has a hard time walking.  She is losing weight.  Daughter reports that she does not eat well.  She denies any chest pains, shortness of breath, or palpitations.\par December 17, 2019: The patient's biggest complaint is that she is tired. She denies any chest pains, shortness of breath, or palpitations. She does not do a lot of walking. Sometimes her ankles are swollen.\par June 11, 2020: The patient still has complaints of dyspnea on exertion fatigue and not sleeping well.  She denies any chest pain shortness of breath at rest or palpitations.  She has urinary incontinence and she is on a medicine for overactive bladder.  I discussed with the daughter about stopping the trapezium medication.  She does get muscle aches and I discussed with the daughter about stopping the rosuvastatin temporarily for 2 weeks to see if the muscle aches get better.  She does take an Advil twice a day for arthritis pains.  It does not seem to bother her stomach.\par October 8, 2020: Patient complains of pains when she walks especially down the left leg

## 2020-10-08 NOTE — ED PROVIDER NOTE - PROGRESS NOTE DETAILS
Chaim PGY2: patient feeling better, imaging negative. Orthostatic hypotension (+). Awaiting UA result then likely dc.

## 2020-10-08 NOTE — H&P ADULT - PROBLEM SELECTOR PLAN 4
-in setting of infection/orthostatic hypotension, suspecting dehydration/pre-renal azotemia  -s/p IVF, c/w mgt noted above with IVF  -trend bmp  -avoid nephrotoxins  -renally dose meds.  -nonoliguric per pt.  -temporarily holding home ace-i, diuretics -in setting of infection/orthostatic hypotension, suspecting dehydration/pre-renal azotemia vs nsaid exposure  -s/p IVF, c/w mgt noted above with IVF  -trend bmp  -avoid nephrotoxins  -renally dose meds.  -nonoliguric per pt.  -temporarily holding home ace-i, diuretics

## 2020-10-08 NOTE — H&P ADULT - NSICDXPASTMEDICALHX_GEN_ALL_CORE_FT
PAST MEDICAL HISTORY:  Arthritis     Breast cancer     Diabetes mellitus type 2, noninsulin dependent     Diverticulitis     GERD (gastroesophageal reflux disease)     Glaucoma     Heart murmur     HTN (hypertension)     Hyperlipidemia     Renal calculus or stone     Urinary incontinence

## 2020-10-08 NOTE — H&P ADULT - ATTENDING COMMENTS
Care to be assumed by Dr. Sheng Baker at 8 am.  This patient was assigned to me by the hospitalist in charge; my involvement in this case has consisted of the initial history, physical, chart review, and management plan.  This patient was previously unknown to me.

## 2020-10-08 NOTE — ED PROVIDER NOTE - PMH
Arthritis    Breast cancer    Diabetes mellitus type 2, noninsulin dependent    Diverticulitis    GERD (gastroesophageal reflux disease)    Glaucoma    Heart murmur    HTN (hypertension)    Hyperlipidemia    Renal calculus or stone    Urinary incontinence

## 2020-10-08 NOTE — ED ADULT NURSE NOTE - OBJECTIVE STATEMENT
89 y.o F with PMH of heart murmur, arthritis, Breast CA, HLD, diverticulitis, HTN, DM, presents to the ED via EMS c.o fall. Pt. reports she was at doctors office waiting for daughter to pick her up when she tripped and fell with walker. Pt. has skin tear to L wrist, elbow and L shoulder. Pt. endorses slight pain to skin tears and L rib region. Dried blood noted to nostrils, and abrasion to R side of face. Pt. takes baby asp daily. Denies HA, CP, SOB, dizziness, urinary symptoms, weakness, numbness/tingling sensation. Pos. strength to all extremities. Pt. awake, alert and speaking in full coherent sentences. Safety and comfort provided. Family at bedside. EKG done. IV access obtained and pt. placed on CM.

## 2020-10-08 NOTE — ED PROVIDER NOTE - OBJECTIVE STATEMENT
89yo female HTN, DM on metformin, HLD, hypothyroid, depression p/w fall at doctor's office.  . Takes 81 mg aspirin    PCP: Veto Flores 89yo female HTN, DM on metformin, HLD, hypothyroid, depression p/w unwitnessed fall. Patient does not remember falling and is unsure if she had LOC. Has skin abrasions to L arm. Denies any pain, change in vision, weakness, numbness, abdominal pain, chest pain, headache. Takes 81 mg aspirin    PCP: Veto Flores (cards)

## 2020-10-08 NOTE — DISCUSSION/SUMMARY
[FreeTextEntry1] : The patient was examined. Her blood pressure was 130/78 and her pulse was 78. Her oxygen saturation was 100%. Her lungs were clear to auscultation. Cardiac exam was negative for rubs or gallops.There was a 3/6 holosystolic murmur heard at the apex. Her EKG showed normal sinus rhythm, a left anterior margo block, and nonspecific ST and T wave changes.  No acute changes were seen. She'll return in 4 months , or earlier if needed. She'll continue her current medications.

## 2020-10-08 NOTE — H&P ADULT - HISTORY OF PRESENT ILLNESS
89 F PMH T2DM, HTN, HLD, hypothyroidism, depression, p/w unwitnessed fall.    VS: 98.0, 79, 121/70, 18, 99% RA.  Orthostatic positive in ER.  Labs: no leukocytosis, anemia hgb 9.4, thrombocytopenia plt 147. HSt delta neg 46 --> 50. CMP with jagruti scr 1.9 prev 0.9. CXR KAYLIE.  XR pelvis no fx. XR elbow/humerus/forearm/hand no fx. CTH +supraorbital meningioma, +microvasc ischemic changes no acute bleed. CT c spine no fx/subluxation. CT max/fac: no fx. IN ER pt received IV ctx + IVF, tylenol prior to medicine team involvement. 89 F PMH T2DM, HTN, HLD, hypothyroidism, depression, p/w unwitnessed fall.  Pt states she was leaning on a wall, waiting for her daughter to pick her up.  She moved aside for someone to pass, and she lost her balance and fell to her L side.  She thinks she may have passed out, doesn't recall actually hitting the ground.  She denies cp, sob, f/c, n/v/d, dysuria, cough, palpitations.  She denies prodromal sx, denies dizziness or LH with standing. +bruising on Left arm/shoulder/side after fall, +facial bruising on L side. Denies visual changes.     VS: 98.0, 79, 121/70, 18, 99% RA.  Orthostatic positive in ER.  Labs: no leukocytosis, anemia hgb 9.4, thrombocytopenia plt 147. HSt delta neg 46 --> 50. CMP with jagruti scr 1.9 prev 0.9. CXR KAYLIE.  XR pelvis no fx. XR elbow/humerus/forearm/hand no fx. CTH +supraorbital meningioma, +microvasc ischemic changes no acute bleed. CT c spine no fx/subluxation. CT max/fac: no fx. IN ER pt received IV ctx + IVF, tylenol prior to medicine team involvement. 89 F PMH T2DM, HTN, HLD, hypothyroidism, depression, p/w unwitnessed fall.  Pt states she was leaning on a wall, waiting for her daughter to pick her up.  She moved aside for someone to pass, and she lost her balance and fell to her L side.  She thinks she may have passed out, doesn't recall actually hitting the ground.  She denies cp, sob, f/c, n/v/d, dysuria, cough, palpitations.  She denies prodromal sx, denies dizziness or LH with standing. +bruising on Left arm/shoulder/side after fall, +facial bruising on L side. Denies visual changes.  Call placed to prasanth Russo overnight, no answer.    VS: 98.0, 79, 121/70, 18, 99% RA.  Orthostatic positive in ER.  Labs: no leukocytosis, anemia hgb 9.4, thrombocytopenia plt 147. HSt delta neg 46 --> 50. CMP with jagruti scr 1.9 prev 0.9. CXR KAYLIE.  XR pelvis no fx. XR elbow/humerus/forearm/hand no fx. CTH +supraorbital meningioma, +microvasc ischemic changes no acute bleed. CT c spine no fx/subluxation. CT max/fac: no fx. IN ER pt received IV ctx + IVF, tylenol prior to medicine team involvement.

## 2020-10-08 NOTE — ED PROVIDER NOTE - PHYSICAL EXAMINATION
Physical Exam:  Gen: NAD, AOx3, non-toxic appearing  Head: mild bruising below L eye  HEENT: EOMI, PEERLA, normal conjunctiva, tongue midline, oral mucosa moist  Lung: CTAB, no respiratory distress, no wheezes/rhonchi/rales B/L, speaking in full sentences  CV: RRR, no murmurs, rubs or gallops, distal pulses 2+ b/l  Abd: soft, NT, ND, no guarding, no rigidity, no rebound tenderness, no CVA tenderness   MSK: no visible deformities, ROM normal in UE/LE, no back TTP  Neuro: CN II-XII intact, 5/5 strength b/l, no focal sensory or motor deficits  Skin: Warm, well perfused, no rash, no leg swelling, sheared skin injury to L hand, elbow and shoulder  Psych: normal affect, calm

## 2020-10-08 NOTE — ED PROVIDER NOTE - PSH
Renal calculi  s/p stone extraction 57 yrs ago  S/P bladder repair  Interstim device placement 2010  S/P lumpectomy of breast  right breast with node removal

## 2020-10-08 NOTE — H&P ADULT - ASSESSMENT
89 F PMH T2DM, HTN, HLD, hypothyroidism, depression, p/w unwitnessed fall, f/w orthostatic hypotension and ELISA in setting of UTI.

## 2020-10-08 NOTE — H&P ADULT - PROBLEM SELECTOR PLAN 3
-fall in setting of infection/orthostatic hypotension/dehydration/jagruti  -c/w mgt noted above  -trauma imaging reviewed; no acute fx  -asp, fall prec  -PT eval

## 2020-10-08 NOTE — ED PROVIDER NOTE - CLINICAL SUMMARY MEDICAL DECISION MAKING FREE TEXT BOX
89yo female multiple medical problems p/w unwitnessed fall and possible LOC while leaving MD's office. Non-focal neuro exam, skin latonia to L arm and bruising below L eye. EKG unremarkable. Concern for vasovagal syncope vs orthostatic hypotension. Low suspicion for cardiac vs infectious cause. CT, labs and speak with Dr. Flores concerning dispo. ZR

## 2020-10-08 NOTE — H&P ADULT - PROBLEM SELECTOR PLAN 6
-pt with mild anemia/thrombocytopenia  -denies bleeding per hx  -not on a/c or antiplatelet agents at home  -ddx includes but not limited to vitamin def, possible marrow issue such as ?MDS  -check b12, folate; tsh wnl  -consider nonurgent heme/onc eval if hgb/plt downtrends. can be performed in outpatient setting. need to further d/w family, call placed overnight without answer.

## 2020-10-08 NOTE — H&P ADULT - PROBLEM SELECTOR PLAN 1
-pt with +UA, denying sx now but with orthostatic hypotension/dehdyration/ELISA and fall seems reasonable to treat, fab given prior UTI  -s/p ceftriaxone empirically in ER.  prior ucx in our system from 2012 shows pan-sens ecoli/kleb, will c/w IV ctx 1 gram qdaily, f/u ucx

## 2020-10-08 NOTE — H&P ADULT - NSHPPHYSICALEXAM_GEN_ALL_CORE
PHYSICAL EXAM:  Vital Signs Last 24 Hrs  T(C): 36.7 (08 Oct 2020 22:30), Max: 36.8 (08 Oct 2020 15:08)  T(F): 98 (08 Oct 2020 22:30), Max: 98.3 (08 Oct 2020 15:08)  HR: 79 (08 Oct 2020 22:30) (75 - 85)  BP: 121/70 (08 Oct 2020 22:30) (121/70 - 156/88)  BP(mean): --  RR: 18 (08 Oct 2020 22:30) (12 - 20)  SpO2: 99% (08 Oct 2020 22:30) (97% - 99%)  GENERAL: NAD, elderly, frail.  HEAD:  +L facial ecchymosis.  EYES: EOMI, PERRLA, conjunctiva and sclera clear  ENMT: No tonsillar erythema, exudates, or enlargement; dry mucous membranes, Good dentition, No lesions  NECK: Supple, No JVD, Normal thyroid  CHEST/LUNG: Clear to percussion bilaterally; No rales, rhonchi, wheezing, or rubs no resp distress or acc. muscle usage.   HEART: Regular rate and rhythm; No murmurs, rubs, or gallops, no sig LE edema.   ABDOMEN: Soft, Nontender, Nondistended; Bowel sounds present, no rebound/guarding.  EXTREMITIES:  2+ Peripheral Pulses, No clubbing, cyanosis  LYMPH: No lymphadenopathy noted, no lymphangitis  SKIN: No rashes or lesions, +extensive L sided ecchymosis mostly on arms  NERVOUS SYSTEM:  Alert & Oriented X2-3, Good concentration; Motor Strength 5/5 B/L upper and lower extremities. no facial droop or dysarthria  PSYCH: no si no hi normal affect.

## 2020-10-09 DIAGNOSIS — E86.0 DEHYDRATION: ICD-10-CM

## 2020-10-09 DIAGNOSIS — N39.0 URINARY TRACT INFECTION, SITE NOT SPECIFIED: ICD-10-CM

## 2020-10-09 DIAGNOSIS — I95.1 ORTHOSTATIC HYPOTENSION: ICD-10-CM

## 2020-10-09 DIAGNOSIS — E11.9 TYPE 2 DIABETES MELLITUS WITHOUT COMPLICATIONS: ICD-10-CM

## 2020-10-09 DIAGNOSIS — Z71.89 OTHER SPECIFIED COUNSELING: ICD-10-CM

## 2020-10-09 DIAGNOSIS — R53.81 OTHER MALAISE: ICD-10-CM

## 2020-10-09 DIAGNOSIS — N17.9 ACUTE KIDNEY FAILURE, UNSPECIFIED: ICD-10-CM

## 2020-10-09 DIAGNOSIS — D53.9 NUTRITIONAL ANEMIA, UNSPECIFIED: ICD-10-CM

## 2020-10-09 DIAGNOSIS — Z29.9 ENCOUNTER FOR PROPHYLACTIC MEASURES, UNSPECIFIED: ICD-10-CM

## 2020-10-09 LAB
A1C WITH ESTIMATED AVERAGE GLUCOSE RESULT: 5.7 % — HIGH (ref 4–5.6)
ALBUMIN SERPL ELPH-MCNC: 3.4 G/DL — SIGNIFICANT CHANGE UP (ref 3.3–5)
ALBUMIN SERPL ELPH-MCNC: 4.3 G/DL
ALP BLD-CCNC: 54 U/L
ALP SERPL-CCNC: 40 U/L — SIGNIFICANT CHANGE UP (ref 40–120)
ALT FLD-CCNC: 30 U/L — SIGNIFICANT CHANGE UP (ref 10–45)
ALT SERPL-CCNC: 34 U/L
ANION GAP SERPL CALC-SCNC: 12 MMOL/L — SIGNIFICANT CHANGE UP (ref 5–17)
ANION GAP SERPL CALC-SCNC: 16 MMOL/L
AST SERPL-CCNC: 32 U/L — SIGNIFICANT CHANGE UP (ref 10–40)
AST SERPL-CCNC: 37 U/L
BASOPHILS # BLD AUTO: 0.01 K/UL — SIGNIFICANT CHANGE UP (ref 0–0.2)
BASOPHILS # BLD AUTO: 0.03 K/UL
BASOPHILS NFR BLD AUTO: 0.2 % — SIGNIFICANT CHANGE UP (ref 0–2)
BASOPHILS NFR BLD AUTO: 0.7 %
BILIRUB SERPL-MCNC: 0.2 MG/DL — SIGNIFICANT CHANGE UP (ref 0.2–1.2)
BILIRUB SERPL-MCNC: 0.3 MG/DL
BUN SERPL-MCNC: 45 MG/DL — HIGH (ref 7–23)
BUN SERPL-MCNC: 55 MG/DL
CALCIUM SERPL-MCNC: 9.4 MG/DL — SIGNIFICANT CHANGE UP (ref 8.4–10.5)
CALCIUM SERPL-MCNC: 9.9 MG/DL
CHLORIDE SERPL-SCNC: 104 MMOL/L — SIGNIFICANT CHANGE UP (ref 96–108)
CHLORIDE SERPL-SCNC: 97 MMOL/L
CHOLEST SERPL-MCNC: 92 MG/DL
CHOLEST/HDLC SERPL: 2.2 RATIO
CO2 SERPL-SCNC: 24 MMOL/L
CO2 SERPL-SCNC: 25 MMOL/L — SIGNIFICANT CHANGE UP (ref 22–31)
CREAT SERPL-MCNC: 1.81 MG/DL — HIGH (ref 0.5–1.3)
CREAT SERPL-MCNC: 2.05 MG/DL
EOSINOPHIL # BLD AUTO: 0 K/UL — SIGNIFICANT CHANGE UP (ref 0–0.5)
EOSINOPHIL # BLD AUTO: 0.05 K/UL
EOSINOPHIL NFR BLD AUTO: 0 % — SIGNIFICANT CHANGE UP (ref 0–6)
EOSINOPHIL NFR BLD AUTO: 1.2 %
ESTIMATED AVERAGE GLUCOSE: 114 MG/DL
ESTIMATED AVERAGE GLUCOSE: 117 MG/DL — HIGH (ref 68–114)
FOLATE SERPL-MCNC: 17 NG/ML — SIGNIFICANT CHANGE UP
GLUCOSE BLDC GLUCOMTR-MCNC: 100 MG/DL — HIGH (ref 70–99)
GLUCOSE BLDC GLUCOMTR-MCNC: 124 MG/DL — HIGH (ref 70–99)
GLUCOSE BLDC GLUCOMTR-MCNC: 164 MG/DL — HIGH (ref 70–99)
GLUCOSE BLDC GLUCOMTR-MCNC: 77 MG/DL — SIGNIFICANT CHANGE UP (ref 70–99)
GLUCOSE SERPL-MCNC: 76 MG/DL — SIGNIFICANT CHANGE UP (ref 70–99)
GLUCOSE SERPL-MCNC: 94 MG/DL
HBA1C MFR BLD HPLC: 5.6 %
HCT VFR BLD CALC: 27.2 % — LOW (ref 34.5–45)
HCT VFR BLD CALC: 33 %
HDLC SERPL-MCNC: 43 MG/DL
HGB BLD-MCNC: 10 G/DL
HGB BLD-MCNC: 8.6 G/DL — LOW (ref 11.5–15.5)
IMM GRANULOCYTES NFR BLD AUTO: 0.2 %
IMM GRANULOCYTES NFR BLD AUTO: 0.2 % — SIGNIFICANT CHANGE UP (ref 0–1.5)
LDLC SERPL CALC-MCNC: 28 MG/DL
LYMPHOCYTES # BLD AUTO: 0.63 K/UL — LOW (ref 1–3.3)
LYMPHOCYTES # BLD AUTO: 0.93 K/UL
LYMPHOCYTES # BLD AUTO: 12.8 % — LOW (ref 13–44)
LYMPHOCYTES NFR BLD AUTO: 22.7 %
MAGNESIUM SERPL-MCNC: 1.8 MG/DL — SIGNIFICANT CHANGE UP (ref 1.6–2.6)
MAN DIFF?: NORMAL
MCHC RBC-ENTMCNC: 30.3 GM/DL
MCHC RBC-ENTMCNC: 31.1 PG
MCHC RBC-ENTMCNC: 31.4 PG — SIGNIFICANT CHANGE UP (ref 27–34)
MCHC RBC-ENTMCNC: 31.6 GM/DL — LOW (ref 32–36)
MCV RBC AUTO: 102.5 FL
MCV RBC AUTO: 99.3 FL — SIGNIFICANT CHANGE UP (ref 80–100)
MONOCYTES # BLD AUTO: 0.26 K/UL
MONOCYTES # BLD AUTO: 0.34 K/UL — SIGNIFICANT CHANGE UP (ref 0–0.9)
MONOCYTES NFR BLD AUTO: 6.3 %
MONOCYTES NFR BLD AUTO: 6.9 % — SIGNIFICANT CHANGE UP (ref 2–14)
NEUTROPHILS # BLD AUTO: 2.82 K/UL
NEUTROPHILS # BLD AUTO: 3.92 K/UL — SIGNIFICANT CHANGE UP (ref 1.8–7.4)
NEUTROPHILS NFR BLD AUTO: 68.9 %
NEUTROPHILS NFR BLD AUTO: 79.9 % — HIGH (ref 43–77)
NRBC # BLD: 0 /100 WBCS — SIGNIFICANT CHANGE UP (ref 0–0)
PHOSPHATE SERPL-MCNC: 3.6 MG/DL — SIGNIFICANT CHANGE UP (ref 2.5–4.5)
PLATELET # BLD AUTO: 148 K/UL — LOW (ref 150–400)
PLATELET # BLD AUTO: 199 K/UL
POTASSIUM SERPL-MCNC: 3.7 MMOL/L — SIGNIFICANT CHANGE UP (ref 3.5–5.3)
POTASSIUM SERPL-SCNC: 3.7 MMOL/L — SIGNIFICANT CHANGE UP (ref 3.5–5.3)
POTASSIUM SERPL-SCNC: 4.3 MMOL/L
PROT SERPL-MCNC: 5.8 G/DL — LOW (ref 6–8.3)
PROT SERPL-MCNC: 6.4 G/DL
RBC # BLD: 2.74 M/UL — LOW (ref 3.8–5.2)
RBC # BLD: 3.22 M/UL
RBC # FLD: 13.3 % — SIGNIFICANT CHANGE UP (ref 10.3–14.5)
RBC # FLD: 13.4 %
SARS-COV-2 IGG SERPL QL IA: NEGATIVE — SIGNIFICANT CHANGE UP
SARS-COV-2 IGM SERPL IA-ACNC: <0.1 INDEX — SIGNIFICANT CHANGE UP
SARS-COV-2 RNA SPEC QL NAA+PROBE: SIGNIFICANT CHANGE UP
SODIUM SERPL-SCNC: 137 MMOL/L
SODIUM SERPL-SCNC: 141 MMOL/L — SIGNIFICANT CHANGE UP (ref 135–145)
T4 SERPL-MCNC: 8.5 UG/DL
TRIGL SERPL-MCNC: 109 MG/DL
TSH SERPL-ACNC: 6.08 UIU/ML
VIT B12 SERPL-MCNC: 288 PG/ML — SIGNIFICANT CHANGE UP (ref 232–1245)
WBC # BLD: 4.91 K/UL — SIGNIFICANT CHANGE UP (ref 3.8–10.5)
WBC # FLD AUTO: 4.1 K/UL
WBC # FLD AUTO: 4.91 K/UL — SIGNIFICANT CHANGE UP (ref 3.8–10.5)

## 2020-10-09 PROCEDURE — 93306 TTE W/DOPPLER COMPLETE: CPT | Mod: 26

## 2020-10-09 RX ORDER — CEFTRIAXONE 500 MG/1
1000 INJECTION, POWDER, FOR SOLUTION INTRAMUSCULAR; INTRAVENOUS EVERY 24 HOURS
Refills: 0 | Status: DISCONTINUED | OUTPATIENT
Start: 2020-10-09 | End: 2020-10-13

## 2020-10-09 RX ORDER — INSULIN LISPRO 100/ML
VIAL (ML) SUBCUTANEOUS AT BEDTIME
Refills: 0 | Status: DISCONTINUED | OUTPATIENT
Start: 2020-10-09 | End: 2020-10-13

## 2020-10-09 RX ORDER — GLUCAGON INJECTION, SOLUTION 0.5 MG/.1ML
1 INJECTION, SOLUTION SUBCUTANEOUS ONCE
Refills: 0 | Status: DISCONTINUED | OUTPATIENT
Start: 2020-10-09 | End: 2020-10-13

## 2020-10-09 RX ORDER — DEXTROSE 50 % IN WATER 50 %
25 SYRINGE (ML) INTRAVENOUS ONCE
Refills: 0 | Status: DISCONTINUED | OUTPATIENT
Start: 2020-10-09 | End: 2020-10-13

## 2020-10-09 RX ORDER — VENLAFAXINE HCL 75 MG
75 CAPSULE, EXT RELEASE 24 HR ORAL DAILY
Refills: 0 | Status: DISCONTINUED | OUTPATIENT
Start: 2020-10-09 | End: 2020-10-13

## 2020-10-09 RX ORDER — ROSUVASTATIN CALCIUM 5 MG/1
20 TABLET ORAL AT BEDTIME
Refills: 0 | Status: DISCONTINUED | OUTPATIENT
Start: 2020-10-09 | End: 2020-10-13

## 2020-10-09 RX ORDER — DEXTROSE 50 % IN WATER 50 %
15 SYRINGE (ML) INTRAVENOUS ONCE
Refills: 0 | Status: DISCONTINUED | OUTPATIENT
Start: 2020-10-09 | End: 2020-10-13

## 2020-10-09 RX ORDER — LACTOBACILLUS ACIDOPHILUS 100MM CELL
1 CAPSULE ORAL DAILY
Refills: 0 | Status: DISCONTINUED | OUTPATIENT
Start: 2020-10-09 | End: 2020-10-13

## 2020-10-09 RX ORDER — INSULIN LISPRO 100/ML
VIAL (ML) SUBCUTANEOUS
Refills: 0 | Status: DISCONTINUED | OUTPATIENT
Start: 2020-10-09 | End: 2020-10-13

## 2020-10-09 RX ORDER — ACETAMINOPHEN 500 MG
650 TABLET ORAL EVERY 6 HOURS
Refills: 0 | Status: DISCONTINUED | OUTPATIENT
Start: 2020-10-09 | End: 2020-10-13

## 2020-10-09 RX ORDER — CHOLECALCIFEROL (VITAMIN D3) 125 MCG
1000 CAPSULE ORAL DAILY
Refills: 0 | Status: DISCONTINUED | OUTPATIENT
Start: 2020-10-09 | End: 2020-10-13

## 2020-10-09 RX ORDER — LEVOTHYROXINE SODIUM 125 MCG
25 TABLET ORAL DAILY
Refills: 0 | Status: DISCONTINUED | OUTPATIENT
Start: 2020-10-09 | End: 2020-10-13

## 2020-10-09 RX ORDER — INFLUENZA VIRUS VACCINE 15; 15; 15; 15 UG/.5ML; UG/.5ML; UG/.5ML; UG/.5ML
0.5 SUSPENSION INTRAMUSCULAR ONCE
Refills: 0 | Status: COMPLETED | OUTPATIENT
Start: 2020-10-09 | End: 2020-10-13

## 2020-10-09 RX ORDER — DEXTROSE 50 % IN WATER 50 %
12.5 SYRINGE (ML) INTRAVENOUS ONCE
Refills: 0 | Status: DISCONTINUED | OUTPATIENT
Start: 2020-10-09 | End: 2020-10-13

## 2020-10-09 RX ORDER — SODIUM CHLORIDE 9 MG/ML
1000 INJECTION, SOLUTION INTRAVENOUS
Refills: 0 | Status: DISCONTINUED | OUTPATIENT
Start: 2020-10-09 | End: 2020-10-13

## 2020-10-09 RX ADMIN — Medication 75 MILLIGRAM(S): at 12:10

## 2020-10-09 RX ADMIN — SODIUM CHLORIDE 75 MILLILITER(S): 9 INJECTION, SOLUTION INTRAVENOUS at 02:45

## 2020-10-09 RX ADMIN — Medication 1 TABLET(S): at 12:10

## 2020-10-09 RX ADMIN — Medication 1000 UNIT(S): at 12:10

## 2020-10-09 RX ADMIN — CEFTRIAXONE 100 MILLIGRAM(S): 500 INJECTION, POWDER, FOR SOLUTION INTRAMUSCULAR; INTRAVENOUS at 21:59

## 2020-10-09 RX ADMIN — Medication 25 MICROGRAM(S): at 05:08

## 2020-10-09 RX ADMIN — ROSUVASTATIN CALCIUM 20 MILLIGRAM(S): 5 TABLET ORAL at 21:58

## 2020-10-09 RX ADMIN — Medication 1: at 17:59

## 2020-10-09 NOTE — PROGRESS NOTE ADULT - PROBLEM SELECTOR PLAN 6
-pt with mild anemia/thrombocytopenia  -denies bleeding per hx  -not on a/c or antiplatelet agents at home  -ddx includes but not limited to vitamin def, possible marrow issue such as ?MDS  -check b12, folate; tsh wnl  -consider nonurgent heme/onc eval if hgb/plt downtrends. can be performed in outpatient setting. need to further d/w family, -pt with mild anemia/thrombocytopenia  -denies bleeding per hx  -not on a/c or antiplatelet agents at home  -ddx includes but not limited to vitamin def, possible marrow issue such as ?MDS  -check b12, folate; tsh wnl  -consider nonurgent heme/onc eval if hgb/plt downtrends. can be performed in outpatient setting. need to further d/w family,  check guaiac

## 2020-10-09 NOTE — PHYSICAL THERAPY INITIAL EVALUATION ADULT - PRECAUTIONS/LIMITATIONS, REHAB EVAL
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 Can confirm with MR as clinically warranted. No intracranial hemorrhage/no known precautions/limitations

## 2020-10-09 NOTE — PHYSICAL THERAPY INITIAL EVALUATION ADULT - PERTINENT HX OF CURRENT PROBLEM, REHAB EVAL
Pt is a 89 y/o F with PMH of T2DM, HTN, & depression, presented to [facility] with unwitnessed fall. Pt stated she was leaning on a wall, waiting for daughter when someone passed by her and she lost her balance and fell to her L side. She thinks she may have passed out, doesn't recall actually hitting the ground. Presented with +bruising on Left arm/shoulder/side after fall, +facial bruising on L side. Orthostatic positive in ER.

## 2020-10-09 NOTE — CONSULT NOTE ADULT - ASSESSMENT
89 F PMH T2DM, HTN, HLD, hypothyroidism, depression, p/w unwitnessed fall. she lost her balance and fell to her L side.  She thinks she may have passed out, doesn't recall actually hitting the ground. denies dizziness or LH with standing. +bruising on Left arm/shoulder/side after fall, +facial bruising on L side. Denies visual changes.  XR elbow/humerus/forearm/hand no fx. CTH +supraorbital meningioma, +microvasc ischemic changes no acute bleed. CT c spine no fx/subluxation. CT max/fac: no fx. also with abn creatinine    1- ELISA suspected  2- HTN   3- anemia  4- hx renal calculi  5- UTI     elisa in setting of infection and orthostatic hypotension   ivf hydration   renal sono   rocephin 1 g iv qd   ucx   to have spep/ipep/bence jones

## 2020-10-09 NOTE — PROGRESS NOTE ADULT - SUBJECTIVE AND OBJECTIVE BOX
KOREY DIAZ  89y Female  MRN:[id number]    Patient is a 89y old  Female who presents with a chief complaint of fall, orthostatic hypotension, UTI (08 Oct 2020 23:02)    HPI:  89 F PMH T2DM, HTN, HLD, hypothyroidism, depression, p/w unwitnessed fall.  Pt states she was leaning on a wall, waiting for her daughter to pick her up.  She moved aside for someone to pass, and she lost her balance and fell to her L side.  She thinks she may have passed out, doesn't recall actually hitting the ground.  She denies cp, sob, f/c, n/v/d, dysuria, cough, palpitations.  She denies prodromal sx, denies dizziness or LH with standing. +bruising on Left arm/shoulder/side after fall, +facial bruising on L side. Denies visual changes.  Call placed to prasanth Russo overnight, no answer.    VS: 98.0, 79, 121/70, 18, 99% RA.  Orthostatic positive in ER.  Labs: no leukocytosis, anemia hgb 9.4, thrombocytopenia plt 147. HSt delta neg 46 --> 50. CMP with jagruti scr 1.9 prev 0.9. CXR KAYLIE.  XR pelvis no fx. XR elbow/humerus/forearm/hand no fx. CTH +supraorbital meningioma, +microvasc ischemic changes no acute bleed. CT c spine no fx/subluxation. CT max/fac: no fx. IN ER pt received IV ctx + IVF, tylenol prior to medicine team involvement.  (08 Oct 2020 23:02)      Patient seen and evaluated at bedside. No acute events overnight except as noted.    Interval HPI:  no acute events o/n     PAST MEDICAL & SURGICAL HISTORY:  Heart murmur    Arthritis    Renal calculus or stone    Urinary incontinence    Breast cancer    Glaucoma    GERD (gastroesophageal reflux disease)    Hyperlipidemia    Diverticulitis    Diabetes mellitus type 2, noninsulin dependent    HTN (hypertension)    S/P bladder repair  Interstim device placement     S/P lumpectomy of breast  right breast with node removal    Renal calculi  s/p stone extraction 57 yrs ago        REVIEW OF SYSTEMS:  as per hpi     VITALS:  Vital Signs Last 24 Hrs  T(C): 36.6 (09 Oct 2020 04:31), Max: 36.8 (08 Oct 2020 15:08)  T(F): 97.8 (09 Oct 2020 04:31), Max: 98.3 (08 Oct 2020 15:08)  HR: 82 (09 Oct 2020 04:31) (75 - 85)  BP: 133/69 (09 Oct 2020 04:31) (121/70 - 156/88)  BP(mean): --  RR: 18 (09 Oct 2020 04:31) (12 - 20)  SpO2: 96% (09 Oct 2020 04:31) (96% - 99%)  CAPILLARY BLOOD GLUCOSE      POCT Blood Glucose.: 77 mg/dL (09 Oct 2020 07:52)    I&O's Summary    08 Oct 2020 07:01  -  09 Oct 2020 07:00  --------------------------------------------------------  IN: 375 mL / OUT: 500 mL / NET: -125 mL    09 Oct 2020 07:01  -  09 Oct 2020 11:50  --------------------------------------------------------  IN: 360 mL / OUT: 0 mL / NET: 360 mL        PHYSICAL EXAM:  GENERAL: NAD, well-developed  HEAD:  Atraumatic, Normocephalic. +facial ecchymosis  EYES: EOMI, PERRLA, conjunctiva and sclera clear  NECK: Supple, No JVD  CHEST/LUNG: Clear to auscultation bilaterally; No wheeze  HEART: S1, S2;    ABDOMEN: Soft, Nontender, Nondistended; Bowel sounds present  EXTREMITIES:  2+ Peripheral Pulses, No clubbing, cyanosis, or edema  PSYCH: Normal affect  NEUROLOGY: AAOX3; non-focal  SKIN: No rashes or lesions.  +Skin tears     Consultant(s) Notes Reviewed:  [x ] YES  [ ] NO  Care Discussed with Consultants/Other Providers [ x] YES  [ ] NO    MEDS:  MEDICATIONS  (STANDING):  cefTRIAXone   IVPB 1000 milliGRAM(s) IV Intermittent every 24 hours  cholecalciferol 1000 Unit(s) Oral daily  dextrose 5%. 1000 milliLiter(s) (50 mL/Hr) IV Continuous <Continuous>  dextrose 50% Injectable 12.5 Gram(s) IV Push once  dextrose 50% Injectable 25 Gram(s) IV Push once  dextrose 50% Injectable 25 Gram(s) IV Push once  influenza   Vaccine 0.5 milliLiter(s) IntraMuscular once  insulin lispro (HumaLOG) corrective regimen sliding scale   SubCutaneous three times a day before meals  insulin lispro (HumaLOG) corrective regimen sliding scale   SubCutaneous at bedtime  lactated ringers. 1000 milliLiter(s) (75 mL/Hr) IV Continuous <Continuous>  lactobacillus acidophilus 1 Tablet(s) Oral daily  levothyroxine 25 MICROGram(s) Oral daily  rosuvastatin 20 milliGRAM(s) Oral at bedtime  venlafaxine XR. 75 milliGRAM(s) Oral daily    MEDICATIONS  (PRN):  acetaminophen   Tablet .. 650 milliGRAM(s) Oral every 6 hours PRN Temp greater or equal to 38C (100.4F), Mild Pain (1 - 3), Moderate Pain (4 - 6), Severe Pain (7 - 10)  dextrose 40% Gel 15 Gram(s) Oral once PRN Blood Glucose LESS THAN 70 milliGRAM(s)/deciliter  glucagon  Injectable 1 milliGRAM(s) IntraMuscular once PRN Glucose LESS THAN 70 milligrams/deciliter    ALLERGIES:  Keflex (Rash; Hives)      LABS:                        8.6    4.91  )-----------( 148      ( 09 Oct 2020 05:44 )             27.2     10-09    141  |  104  |  45<H>  ----------------------------<  76  3.7   |  25  |  1.81<H>    Ca    9.4      09 Oct 2020 05:45  Phos  3.6     10-09  Mg     1.8     10-09    TPro  5.8<L>  /  Alb  3.4  /  TBili  0.2  /  DBili  x   /  AST  32  /  ALT  30  /  AlkPhos  40  10-09          LIVER FUNCTIONS - ( 09 Oct 2020 05:45 )  Alb: 3.4 g/dL / Pro: 5.8 g/dL / ALK PHOS: 40 U/L / ALT: 30 U/L / AST: 32 U/L / GGT: x           Urinalysis Basic - ( 08 Oct 2020 18:28 )    Color: Yellow / Appearance: Slightly Turbid / S.014 / pH: x  Gluc: x / Ketone: Negative  / Bili: Negative / Urobili: Negative   Blood: x / Protein: 30 mg/dL / Nitrite: Positive   Leuk Esterase: Moderate / RBC: 5 /hpf / WBC 11 /HPF   Sq Epi: x / Non Sq Epi: 9 /hpf / Bacteria: Many      TSH: Thyroid Stimulating Hormone, Serum: 4.18 uIU/mL (10-08 @ 21:03)

## 2020-10-09 NOTE — PROGRESS NOTE ADULT - PROBLEM SELECTOR PLAN 2
-s/p IVF   monitor repeat  -encourage PO intake  -fall prec    syncope  head CT w/o acute pathology  tele  echo

## 2020-10-09 NOTE — PHYSICAL THERAPY INITIAL EVALUATION ADULT - ADDITIONAL COMMENTS
Pt reports living in a private house with her daughter. There are +2 steps to enter with +HR with a first floor set up. PTA, pt ambulates with a rolling walker and required some assistance at home. Pt states she is able to bath, go to the bathroom, & dress herself. Requires assistance with cooking, cleaning, shopping, etc.

## 2020-10-09 NOTE — PROGRESS NOTE ADULT - PROBLEM SELECTOR PLAN 8
Advanced care planning was discussed with patient and family.  Advanced care planning forms were reviewed and discussed as appropriate.  Differential diagnosis and plan of care discussed with patient after the evaluation.   Pain assessed and judicious use of narcotics when appropriate was discussed.  Importance of Fall prevention discussed.  Counseling on Smoking and Alcohol cessation was offered when appropriate.  Counseling on Diet, exercise, and medication compliance was done.   Approx 45 minutes spent.

## 2020-10-09 NOTE — PHYSICAL THERAPY INITIAL EVALUATION ADULT - DISCHARGE DISPOSITION, PT EVAL
Pt stated she was here Sunday 03/11 for head trauma from a fall-pt now has unexplained bruising around left eye that began tuesday-pt is on blood thinners     Lacey Hutson  03/14/18 [id number]     TBD pending functional evaluation

## 2020-10-09 NOTE — CONSULT NOTE ADULT - SUBJECTIVE AND OBJECTIVE BOX
[facility]  [phone]  NEPHROLOGY      INITIAL CONSULT NOTE  --------------------------------------------------------------------------------  HPI:      89 F PMH T2DM, HTN, HLD, hypothyroidism, depression, p/w unwitnessed fall. she lost her balance and fell to her L side.  She thinks she may have passed out, doesn't recall actually hitting the ground. denies dizziness or LH with standing. +bruising on Left arm/shoulder/side after fall, +facial bruising on L side. Denies visual changes.  XR elbow/humerus/forearm/hand no fx. CTH +supraorbital meningioma, +microvasc ischemic changes no acute bleed. CT c spine no fx/subluxation. CT max/fac: no fx. also with abn creatinine renal consult called.       PAST HISTORY  --------------------------------------------------------------------------------  PAST MEDICAL & SURGICAL HISTORY:  Heart murmur    Arthritis    Renal calculus or stone    Urinary incontinence    Breast cancer    Glaucoma    GERD (gastroesophageal reflux disease)    Hyperlipidemia    Diverticulitis    Diabetes mellitus type 2, noninsulin dependent    HTN (hypertension)    S/P bladder repair  Interstim device placement 2010    S/P lumpectomy of breast  right breast with node removal    Renal calculi  s/p stone extraction 57 yrs ago      FAMILY HISTORY:  No pertinent family history in first degree relatives of ckd       PAST SOCIAL HISTORY:  no tobacco   ALLERGIES & MEDICATIONS  --------------------------------------------------------------------------------  Allergies    Keflex (Rash; Hives)    Intolerances      Standing Inpatient Medications  cefTRIAXone   IVPB 1000 milliGRAM(s) IV Intermittent every 24 hours  cholecalciferol 1000 Unit(s) Oral daily  dextrose 5%. 1000 milliLiter(s) IV Continuous <Continuous>  dextrose 50% Injectable 12.5 Gram(s) IV Push once  dextrose 50% Injectable 25 Gram(s) IV Push once  dextrose 50% Injectable 25 Gram(s) IV Push once  influenza   Vaccine 0.5 milliLiter(s) IntraMuscular once  insulin lispro (HumaLOG) corrective regimen sliding scale   SubCutaneous three times a day before meals  insulin lispro (HumaLOG) corrective regimen sliding scale   SubCutaneous at bedtime  lactated ringers. 1000 milliLiter(s) IV Continuous <Continuous>  lactobacillus acidophilus 1 Tablet(s) Oral daily  levothyroxine 25 MICROGram(s) Oral daily  rosuvastatin 20 milliGRAM(s) Oral at bedtime  venlafaxine XR. 75 milliGRAM(s) Oral daily    PRN Inpatient Medications  acetaminophen   Tablet .. 650 milliGRAM(s) Oral every 6 hours PRN  dextrose 40% Gel 15 Gram(s) Oral once PRN  glucagon  Injectable 1 milliGRAM(s) IntraMuscular once PRN      REVIEW OF SYSTEMS  --------------------------------------------------------------------------------  Gen: No  fevers/chills   Skin: No rashes  Head/Eyes/Ears/Mouth: No headache; Normal hearing;  No sinus pain/discomfort  Respiratory: No dyspnea, cough, wheezing, hemoptysis  CV: No chest pain, or palp   GI: No abdominal pain, diarrhea, nausea, vomiting, melena  : No dysuria, decrease urination or hesitancy urinating  hematuria, nocturia  MSK:  +  joint pain/swelling; no back pain  Neuro: No dizziness/lightheadedness  also with no edema     All other systems were reviewed and are negative, except as noted.    VITALS/PHYSICAL EXAM  --------------------------------------------------------------------------------  T(C): 36.8 (10-09-20 @ 20:12), Max: 36.8 (10-09-20 @ 20:12)  HR: 75 (10-09-20 @ 20:12) (75 - 82)  BP: 145/67 (10-09-20 @ 20:12) (121/70 - 145/67)  RR: 18 (10-09-20 @ 20:12) (18 - 18)  SpO2: 97% (10-09-20 @ 20:12) (96% - 99%)  Wt(kg): --  Height (cm): 160 (10-08-20 @ 14:40)  Weight (kg): 45.4 (10-08-20 @ 14:40)  BMI (kg/m2): 17.7 (10-08-20 @ 14:40)  BSA (m2): 1.44 (10-08-20 @ 14:40)      10-08-20 @ 07:01  -  10-09-20 @ 07:00  --------------------------------------------------------  IN: 375 mL / OUT: 500 mL / NET: -125 mL    10-09-20 @ 07:01  -  10-09-20 @ 21:15  --------------------------------------------------------  IN: 720 mL / OUT: 850 mL / NET: -130 mL      Physical Exam:  	Gen: Non toxic comfortable appearing  [place]   	no jvd periorbital left ecchymosis   	Pulm: decrease bs  no rales or ronchi or wheezing  	CV: RRR, S1S2; no rub  	Back: No CVA tenderness  	Abd: +BS, soft, nontender/nondistended  	: No suprapubic tenderness  	UE: Warm, no cyanosis  no clubbing,  no edema  	LE: Warm, + areas of dressing and ecchymosis  	Neuro: alert and oriented. speech coherent   	Skin: Warm, no decrease skin turgor but ecchymosis multiple areas of body       LABS/STUDIES  --------------------------------------------------------------------------------              8.6    4.91  >-----------<  148      [10-09-20 @ 05:44]              27.2     141  |  104  |  45  ----------------------------<  76      [10-09-20 @ 05:45]  3.7   |  25  |  1.81        Ca     9.4     [10-09-20 @ 05:45]      Mg     1.8     [10-09-20 @ 05:45]      Phos  3.6     [10-09-20 @ 05:45]    TPro  5.8  /  Alb  3.4  /  TBili  0.2  /  DBili  x   /  AST  32  /  ALT  30  /  AlkPhos  40  [10-09-20 @ 05:45]          Creatinine Trend:  SCr 1.81 [10-09 @ 05:45]  SCr 1.98 [10-08 @ 15:55]    Urinalysis - [10-08-20 @ 18:28]      Color Yellow / Appearance Slightly Turbid / SG 1.014 / pH 6.5      Gluc Negative / Ketone Negative  / Bili Negative / Urobili Negative       Blood Small / Protein 30 mg/dL / Leuk Est Moderate / Nitrite Positive      RBC 5 / WBC 11 / Hyaline 2 / Gran  / Sq Epi  / Non Sq Epi 9 / Bacteria Many      TSH 4.18      [10-08-20 @ 21:03]

## 2020-10-09 NOTE — PROGRESS NOTE ADULT - PROBLEM SELECTOR PLAN 4
-in setting of infection/orthostatic hypotension, suspecting dehydration/pre-renal azotemia vs nsaid exposure  -s/p IVF, c/w mgt noted above with IVF  -trend bmp  -avoid nephrotoxins  -renally dose meds.  -nonoliguric per pt.  -temporarily holding home ace-i, diuretics -in setting of infection/orthostatic hypotension, suspecting dehydration/pre-renal azotemia vs nsaid exposure  -s/p IVF, c/w mgt noted above with IVF  -trend bmp  -avoid nephrotoxins  -renally dose meds.  -nonoliguric per pt.  -temporarily holding home ace-i, diuretics  renal consulted
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
## 2020-10-10 LAB
ANION GAP SERPL CALC-SCNC: 10 MMOL/L — SIGNIFICANT CHANGE UP (ref 5–17)
BUN SERPL-MCNC: 40 MG/DL — HIGH (ref 7–23)
CALCIUM SERPL-MCNC: 9.9 MG/DL — SIGNIFICANT CHANGE UP (ref 8.4–10.5)
CHLORIDE SERPL-SCNC: 103 MMOL/L — SIGNIFICANT CHANGE UP (ref 96–108)
CK SERPL-CCNC: 159 U/L — SIGNIFICANT CHANGE UP (ref 25–170)
CO2 SERPL-SCNC: 26 MMOL/L — SIGNIFICANT CHANGE UP (ref 22–31)
CREAT SERPL-MCNC: 1.98 MG/DL — HIGH (ref 0.5–1.3)
GLUCOSE BLDC GLUCOMTR-MCNC: 132 MG/DL — HIGH (ref 70–99)
GLUCOSE BLDC GLUCOMTR-MCNC: 183 MG/DL — HIGH (ref 70–99)
GLUCOSE BLDC GLUCOMTR-MCNC: 189 MG/DL — HIGH (ref 70–99)
GLUCOSE BLDC GLUCOMTR-MCNC: 196 MG/DL — HIGH (ref 70–99)
GLUCOSE SERPL-MCNC: 116 MG/DL — HIGH (ref 70–99)
HCT VFR BLD CALC: 28.8 % — LOW (ref 34.5–45)
HGB BLD-MCNC: 9 G/DL — LOW (ref 11.5–15.5)
MCHC RBC-ENTMCNC: 30.7 PG — SIGNIFICANT CHANGE UP (ref 27–34)
MCHC RBC-ENTMCNC: 31.3 GM/DL — LOW (ref 32–36)
MCV RBC AUTO: 98.3 FL — SIGNIFICANT CHANGE UP (ref 80–100)
NRBC # BLD: 0 /100 WBCS — SIGNIFICANT CHANGE UP (ref 0–0)
PLATELET # BLD AUTO: 147 K/UL — LOW (ref 150–400)
POTASSIUM SERPL-MCNC: 3.8 MMOL/L — SIGNIFICANT CHANGE UP (ref 3.5–5.3)
POTASSIUM SERPL-SCNC: 3.8 MMOL/L — SIGNIFICANT CHANGE UP (ref 3.5–5.3)
RBC # BLD: 2.93 M/UL — LOW (ref 3.8–5.2)
RBC # FLD: 13.2 % — SIGNIFICANT CHANGE UP (ref 10.3–14.5)
SODIUM SERPL-SCNC: 139 MMOL/L — SIGNIFICANT CHANGE UP (ref 135–145)
WBC # BLD: 4.35 K/UL — SIGNIFICANT CHANGE UP (ref 3.8–10.5)
WBC # FLD AUTO: 4.35 K/UL — SIGNIFICANT CHANGE UP (ref 3.8–10.5)

## 2020-10-10 PROCEDURE — 99223 1ST HOSP IP/OBS HIGH 75: CPT

## 2020-10-10 RX ORDER — SODIUM CHLORIDE 9 MG/ML
1000 INJECTION INTRAMUSCULAR; INTRAVENOUS; SUBCUTANEOUS
Refills: 0 | Status: COMPLETED | OUTPATIENT
Start: 2020-10-10 | End: 2020-10-11

## 2020-10-10 RX ADMIN — CEFTRIAXONE 100 MILLIGRAM(S): 500 INJECTION, POWDER, FOR SOLUTION INTRAMUSCULAR; INTRAVENOUS at 22:04

## 2020-10-10 RX ADMIN — ROSUVASTATIN CALCIUM 20 MILLIGRAM(S): 5 TABLET ORAL at 21:56

## 2020-10-10 RX ADMIN — Medication 1000 UNIT(S): at 11:43

## 2020-10-10 RX ADMIN — SODIUM CHLORIDE 50 MILLILITER(S): 9 INJECTION INTRAMUSCULAR; INTRAVENOUS; SUBCUTANEOUS at 16:55

## 2020-10-10 RX ADMIN — Medication 1 TABLET(S): at 11:43

## 2020-10-10 RX ADMIN — Medication 1: at 16:59

## 2020-10-10 RX ADMIN — Medication 25 MICROGRAM(S): at 05:04

## 2020-10-10 RX ADMIN — Medication 1: at 11:43

## 2020-10-10 RX ADMIN — Medication 75 MILLIGRAM(S): at 11:43

## 2020-10-10 NOTE — CONSULT NOTE ADULT - SUBJECTIVE AND OBJECTIVE BOX
Patient seen and evaluated @ 7am on 2 DSU  Chief Complaint: Fall with facial trauma    HPI:  89 F PMH T2DM, HTN, HLD, hypothyroidism, depression, p/w unwitnessed fall.  Pt states she was leaning on a wall, waiting for her daughter to pick her up.  She moved aside for someone to pass, and she lost her balance and fell to her L side.  She thinks she may have passed out, doesn't recall actually hitting the ground.  She denies cp, sob, f/c, n/v/d, dysuria, cough, palpitations.  She denies prodromal sx, denies dizziness or LH with standing. +bruising on Left arm/shoulder/side after fall, +facial bruising on L side. Denies visual changes.  Call placed to prasanth Russo overnight, no answer.    VS: 98.0, 79, 121/70, 18, 99% RA.  Orthostatic positive in ER.  Labs: no leukocytosis, anemia hgb 9.4, thrombocytopenia plt 147. HSt delta neg 46 --> 50. CMP with jagruti scr 1.9 prev 0.9. CXR KAYLIE.  XR pelvis no fx. XR elbow/humerus/forearm/hand no fx. CTH +supraorbital meningioma, +microvasc ischemic changes no acute bleed. CT c spine no fx/subluxation. CT max/fac: no fx. IN ER pt received IV ctx + IVF, tylenol prior to medicine team involvement.  (08 Oct 2020 23:02)    PMH:   Heart murmur  Arthritis  Renal calculus or stone  Urinary incontinence  Neurogenic bladder  Breast cancer  Glaucoma  GERD (gastroesophageal reflux disease)  Hyperlipidemia  Diverticulitis  Diabetes mellitus type 2, noninsulin dependent  HTN (hypertension)      PSH:   S/P bladder repair  S/P lumpectomy of breast  Renal calculi      Medications:   acetaminophen   Tablet .. 650 milliGRAM(s) Oral every 6 hours PRN  cefTRIAXone   IVPB 1000 milliGRAM(s) IV Intermittent every 24 hours  cholecalciferol 1000 Unit(s) Oral daily  dextrose 40% Gel 15 Gram(s) Oral once PRN  dextrose 5%. 1000 milliLiter(s) IV Continuous <Continuous>  dextrose 50% Injectable 12.5 Gram(s) IV Push once  dextrose 50% Injectable 25 Gram(s) IV Push once  dextrose 50% Injectable 25 Gram(s) IV Push once  glucagon  Injectable 1 milliGRAM(s) IntraMuscular once PRN  influenza   Vaccine 0.5 milliLiter(s) IntraMuscular once  insulin lispro (HumaLOG) corrective regimen sliding scale   SubCutaneous three times a day before meals  insulin lispro (HumaLOG) corrective regimen sliding scale   SubCutaneous at bedtime  lactated ringers. 1000 milliLiter(s) IV Continuous <Continuous>  lactobacillus acidophilus 1 Tablet(s) Oral daily  levothyroxine 25 MICROGram(s) Oral daily  rosuvastatin 20 milliGRAM(s) Oral at bedtime  venlafaxine XR. 75 milliGRAM(s) Oral daily    Allergies:  Keflex (Rash; Hives)    FAMILY HISTORY:  No pertinent family history in first degree relatives      Social History: , lives with adult daughter  Smoking: nonsmoker  Alcohol: no EtOH  Drugs: no illicit drug use    Review of Systems:    Constitutional: No fever, chills, fatigue, or changes in weight  HEENT: No blurry vision, eye pain, headache, runny nose, or sore throat  Respiratory: No shortness of breath, cough, or wheezing  Cardiovascular: No chest pain or palpitations  Gastrointestinal: No nausea, vomiting, diarrhea, or abdominal pain  Genitourinary: No dysuria or incontinence  Extremities: No lower extremity swelling  Neurologic: No focal findings  Lymphatic: No lymphadenopathy   Skin: No rash  Psychiatry: No anxiety or depression  10 point review of systems is otherwise negative except as mentioned above            Physical Exam:  T(C): 36.8 (10-09-20 @ 20:12), Max: 36.8 (10-09-20 @ 20:12)  HR: 76 (10-10-20 @ 05:02) (75 - 81)  BP: 126/70 (10-10-20 @ 05:02) (126/70 - 145/67)  RR: 18 (10-10-20 @ 05:02) (18 - 18)  SpO2: 93% (10-10-20 @ 05:02) (93% - 97%)  Wt(kg): --    10-09 @ 07:01  -  10-10 @ 07:00  --------------------------------------------------------  IN: 720 mL / OUT: 1150 mL / NET: -430 mL    10-10 @ 07:01  -  10-10 @ 11:03  --------------------------------------------------------  IN: 120 mL / OUT: 0 mL / NET: 120 mL      Daily     Daily     Appearance: Normal, well groomed, NAD  Eyes: PERRLA, EOMI, pink conjunctiva, no scleral icterus   HENT: left orbital ecchymosis  Cardiovascular: RRR, S1, S2, III/VI systolic murmur at apex; no edema; no JVD  Respiratory: Clear to auscultation bilaterally  Gastrointestinal: Soft, non-tender, non-distended, BS+  Musculoskeletal: No clubbing or joint deformity   Neurologic: No focal weakness  Lymphatic: No lymphadenopathy  Psychiatry: AAOx3 with appropriate mood and affect  Skin: No rashes, ecchymoses, or cyanosis, +skin tears    Cardiovascular Diagnostic Testing:  ECG: sinus 76 bpm, low voltage in limb leads    Echo: < from: Transthoracic Echocardiogram (10.09.20 @ 15:28) >  ------------------------------------------------------------------------  Dimensions:    Normal Values:  LA:     3.6    2.0 - 4.0 cm  Ao:     2.7    2.0 - 3.8 cm  SEPTUM: 1.0    0.6 - 1.2 cm  PWT:    0.8    0.6 -1.1 cm  LVIDd:  3.9    3.0 - 5.6 cm  LVIDs:  2.4    1.8 - 4.0 cm  Derived variables:  LVMI: 73 g/m2  RWT: 0.41  Fractional short: 38 %  EF (Mroan Rule): 63 %Doppler Peak Velocity (m/sec):  AoV=2.0  ------------------------------------------------------------------------  Observations:  Mitral Valve: Normal mitral valve. Mild mitral  regurgitation.  Aortic Valve/Aorta: Calcified trileaflet aortic valve with  decreased opening. Peak transaortic valve gradient equals  16 mm Hg, mean transaortic valve gradient equals 8 mm Hg,  estimated aortic valve area equals 1.7 sqcm (by continuity  equation), aortic valve velocity time integral equals 43  cm, consistent with mild aortic stenosis. Mild aortic  regurgitation.  Peak left ventricular outflow tract  gradient equals 3 mm Hg, mean gradient is equal to 2 mm Hg,  LVOT velocity time integral equals 19 cm.  Aortic Root: 2.7 cm.  LVOT diameter: 2.2 cm.  Left Atrium: Mildly dilated left atrium.  LA volume index =  41 cc/m2.  Left Ventricle: Normal left ventricular systolic function.  No segmental wall motion abnormalities. Normal left  ventricular internal dimensions and wall thickness.  Moderate diastolic dysfunction (Stage II).  Right Heart: Normal right atrium. Normal right ventricular  size and function. Normal tricuspid valve. Mild tricuspid  regurgitation. Normal pulmonic valve.  Pericardium/Pleura: Normal pericardium with no pericardial  effusion.  Hemodynamic: Estimated right atrial pressure is 8 mm Hg.  Estimated right ventricular systolic pressure equals 44 mm  Hg, assuming right atrial pressure equals 8 mm Hg,  consistent with mild pulmonary hypertension.  ------------------------------------------------------------------------  Conclusions:  1. Calcified trileaflet aortic valve with decreased  opening. Peak transaortic valve gradient equals 16 mm Hg,  mean transaortic valve gradient equals 8 mm Hg, estimated  aortic valve area equals 1.7 sqcm (by continuity equation),  aortic valve velocity time integral equals 43 cm,  consistent with mild aortic stenosis.  2. Mildly dilated left atrium.  LA volume index = 41 cc/m2.  3. Normal left ventricular internal dimensions and wall  thickness.  4. Normal left ventricular systolic function. No segmental  wall motion abnormalities.  5. Moderate diastolic dysfunction (Stage II).  *** No previous Echo exam.  ------------------------------------------------------------------------  Confirmed on  10/9/2020 - 17:47:14 by ERNESTO Felipe  ------------------------------------------------------------------------    < end of copied text >    Interpretation of Telemetry: NSR with APCs, PVCs    Labs:                        9.0    4.35  )-----------( 147      ( 10 Oct 2020 06:01 )             28.8     10-10    139  |  103  |  40<H>  ----------------------------<  116<H>  3.8   |  26  |  1.98<H>    Ca    9.9      10 Oct 2020 06:01  Phos  3.6     10-09  Mg     1.8     10-09    TPro  5.8<L>  /  Alb  3.4  /  TBili  0.2  /  DBili  x   /  AST  32  /  ALT  30  /  AlkPhos  40  10-09      CARDIAC MARKERS ( 10 Oct 2020 06:01 )  x     / x     / 159 U/L / x     / x          Thyroid Stimulating Hormone, Serum: 4.18 uIU/mL (10-08 @ 21:03)

## 2020-10-10 NOTE — PROGRESS NOTE ADULT - SUBJECTIVE AND OBJECTIVE BOX
[facility]   [phone]  Dr. Casey (covering for Dr. Escobar)  RENAL FOLLOW UP NOTE  --------------------------------------------------------------------------------  Patient seen and examined.  OOB to chair.  Denies dizziness/lightheadedness    PAST HISTORY  --------------------------------------------------------------------------------  No significant changes to PMH, PSH, FHx, SHx, unless otherwise noted    ALLERGIES & MEDICATIONS  --------------------------------------------------------------------------------  Allergies    Keflex (Rash; Hives)    Intolerances      Standing Inpatient Medications  cefTRIAXone   IVPB 1000 milliGRAM(s) IV Intermittent every 24 hours  cholecalciferol 1000 Unit(s) Oral daily  dextrose 5%. 1000 milliLiter(s) IV Continuous <Continuous>  dextrose 50% Injectable 12.5 Gram(s) IV Push once  dextrose 50% Injectable 25 Gram(s) IV Push once  dextrose 50% Injectable 25 Gram(s) IV Push once  influenza   Vaccine 0.5 milliLiter(s) IntraMuscular once  insulin lispro (HumaLOG) corrective regimen sliding scale   SubCutaneous three times a day before meals  insulin lispro (HumaLOG) corrective regimen sliding scale   SubCutaneous at bedtime  lactated ringers. 1000 milliLiter(s) IV Continuous <Continuous>  lactobacillus acidophilus 1 Tablet(s) Oral daily  levothyroxine 25 MICROGram(s) Oral daily  rosuvastatin 20 milliGRAM(s) Oral at bedtime  venlafaxine XR. 75 milliGRAM(s) Oral daily    PRN Inpatient Medications  acetaminophen   Tablet .. 650 milliGRAM(s) Oral every 6 hours PRN  dextrose 40% Gel 15 Gram(s) Oral once PRN  glucagon  Injectable 1 milliGRAM(s) IntraMuscular once PRN      FOCUSED REVIEW OF SYSTEMS  --------------------------------------------------------------------------------  denies fevers/rigors  denies CP/palpitations  denies SOB/cough  denies dysuria/hematuria/oliguria  denies N/V/abd pain      VITALS/PHYSICAL EXAM  --------------------------------------------------------------------------------  T(C): 37 (10-10-20 @ 12:44), Max: 37 (10-10-20 @ 12:44)  HR: 83 (10-10-20 @ 12:44) (75 - 83)  BP: 133/71 (10-10-20 @ 12:44) (126/70 - 145/67)  RR: 18 (10-10-20 @ 12:44) (18 - 18)  SpO2: 98% (10-10-20 @ 12:44) (93% - 98%)  Wt(kg): --  Height (cm): 160 (10-08-20 @ 14:40)  Weight (kg): 45.4 (10-08-20 @ 14:40)  BMI (kg/m2): 17.7 (10-08-20 @ 14:40)  BSA (m2): 1.44 (10-08-20 @ 14:40)      10-09-20 @ 07:01  -  10-10-20 @ 07:00  --------------------------------------------------------  IN: 720 mL / OUT: 1150 mL / NET: -430 mL    10-10-20 @ 07:01  -  10-10-20 @ 14:06  --------------------------------------------------------  IN: 357 mL / OUT: 900 mL / NET: -543 mL      Physical Exam:  	Gen: NAD, frail-appearing  	Pulm: CTA B/L  	CV: RRR, S1S2  	Abd: +BS, soft, nontender/nondistended  	: No suprapubic tenderness.  no gong          Extremity: No cyanosis, no edema  	Neuro: A&O x 3      LABS/STUDIES  --------------------------------------------------------------------------------              9.0    4.35  >-----------<  147      [10-10-20 @ 06:01]              28.8     139  |  103  |  40  ----------------------------<  116      [10-10-20 @ 06:01]  3.8   |  26  |  1.98        Ca     9.9     [10-10-20 @ 06:01]      Mg     1.8     [10-09-20 @ 05:45]      Phos  3.6     [10-09-20 @ 05:45]    TPro  5.8  /  Alb  3.4  /  TBili  0.2  /  DBili  x   /  AST  32  /  ALT  30  /  AlkPhos  40  [10-09-20 @ 05:45]              [10-10-20 @ 06:01]    eGFR if Non African American: 22 mL/min/1.73M2 (10-10 @ 06:01)  eGFR if African American: 25 mL/min/1.73M2 (10-10 @ 06:01)    Creatinine Trend:  SCr 1.98 [10-10 @ 06:01]  SCr 1.81 [10-09 @ 05:45]  SCr 1.98 [10-08 @ 15:55]              Urinalysis - [10-08-20 @ 18:28]      Color Yellow / Appearance Slightly Turbid / SG 1.014 / pH 6.5      Gluc Negative / Ketone Negative  / Bili Negative / Urobili Negative       Blood Small / Protein 30 mg/dL / Leuk Est Moderate / Nitrite Positive      RBC 5 / WBC 11 / Hyaline 2 / Gran  / Sq Epi  / Non Sq Epi 9 / Bacteria Many      TSH 4.18      [10-08-20 @ 21:03]

## 2020-10-10 NOTE — PROGRESS NOTE ADULT - SUBJECTIVE AND OBJECTIVE BOX
SUBJECTIVE / OVERNIGHT EVENTS: pt seen and examined       MEDICATIONS  (STANDING):  cefTRIAXone   IVPB 1000 milliGRAM(s) IV Intermittent every 24 hours  cholecalciferol 1000 Unit(s) Oral daily  dextrose 5%. 1000 milliLiter(s) (50 mL/Hr) IV Continuous <Continuous>  dextrose 50% Injectable 12.5 Gram(s) IV Push once  dextrose 50% Injectable 25 Gram(s) IV Push once  dextrose 50% Injectable 25 Gram(s) IV Push once  influenza   Vaccine 0.5 milliLiter(s) IntraMuscular once  insulin lispro (HumaLOG) corrective regimen sliding scale   SubCutaneous three times a day before meals  insulin lispro (HumaLOG) corrective regimen sliding scale   SubCutaneous at bedtime  lactated ringers. 1000 milliLiter(s) (75 mL/Hr) IV Continuous <Continuous>  lactobacillus acidophilus 1 Tablet(s) Oral daily  levothyroxine 25 MICROGram(s) Oral daily  rosuvastatin 20 milliGRAM(s) Oral at bedtime  sodium chloride 0.9%. 1000 milliLiter(s) (50 mL/Hr) IV Continuous <Continuous>  venlafaxine XR. 75 milliGRAM(s) Oral daily    MEDICATIONS  (PRN):  acetaminophen   Tablet .. 650 milliGRAM(s) Oral every 6 hours PRN Temp greater or equal to 38C (100.4F), Mild Pain (1 - 3), Moderate Pain (4 - 6), Severe Pain (7 - 10)  dextrose 40% Gel 15 Gram(s) Oral once PRN Blood Glucose LESS THAN 70 milliGRAM(s)/deciliter  glucagon  Injectable 1 milliGRAM(s) IntraMuscular once PRN Glucose LESS THAN 70 milligrams/deciliter    Vital Signs Last 24 Hrs  T(C): 36.9 (11 Oct 2020 13:06), Max: 36.9 (11 Oct 2020 13:06)  T(F): 98.4 (11 Oct 2020 13:06), Max: 98.4 (11 Oct 2020 13:06)  HR: 76 (11 Oct 2020 13:06) (71 - 83)  BP: 148/71 (11 Oct 2020 13:06) (127/68 - 148/71)  BP(mean): --  RR: 16 (11 Oct 2020 13:06) (16 - 16)  SpO2: 97% (11 Oct 2020 13:06) (97% - 99%)    CAPILLARY BLOOD GLUCOSE      POCT Blood Glucose.: 196 mg/dL (10 Oct 2020 21:28)  POCT Blood Glucose.: 183 mg/dL (10 Oct 2020 16:57)  POCT Blood Glucose.: 189 mg/dL (10 Oct 2020 11:39)  POCT Blood Glucose.: 132 mg/dL (10 Oct 2020 07:41)    I&O's Summary    09 Oct 2020 07:01  -  10 Oct 2020 07:00  --------------------------------------------------------  IN: 720 mL / OUT: 1150 mL / NET: -430 mL    10 Oct 2020 07:01  -  10 Oct 2020 23:14  --------------------------------------------------------  IN: 907 mL / OUT: 900 mL / NET: 7 mL        Constitutional: No fever, fatigue  Skin: No rash.  Eyes: No recent vision problems or eye pain.  ENT: No congestion, ear pain, or sore throat.  Cardiovascular: No chest pain or palpation.  Respiratory: No cough, shortness of breath, congestion, or wheezing.  Gastrointestinal: No abdominal pain, nausea, vomiting, or diarrhea.  Genitourinary: No dysuria.  Musculoskeletal: No joint swelling.  Neurologic: No headache.    PHYSICAL EXAM:  GENERAL: NAD  EYES: EOMI, PERRLA  NECK: Supple, No JVD  CHEST/LUNG: dec breath sounds at bases  HEART:  S1 , S2 +  ABDOMEN: soft , bs+  EXTREMITIES:  no edema  NEUROLOGY:alert awake      LABS:                        9.0    4.35  )-----------( 147      ( 10 Oct 2020 06:01 )             28.8     10-10    139  |  103  |  40<H>  ----------------------------<  116<H>  3.8   |  26  |  1.98<H>    Ca    9.9      10 Oct 2020 06:01  Phos  3.6     10-09  Mg     1.8     10-09    TPro  5.8<L>  /  Alb  3.4  /  TBili  0.2  /  DBili  x   /  AST  32  /  ALT  30  /  AlkPhos  40  10-09      CARDIAC MARKERS ( 10 Oct 2020 06:01 )  x     / x     / 159 U/L / x     / x              RADIOLOGY & ADDITIONAL TESTS:    Imaging Personally Reviewed:    Consultant(s) Notes Reviewed:      Care Discussed with Consultants/Other Providers:

## 2020-10-10 NOTE — PROGRESS NOTE ADULT - PROBLEM SELECTOR PLAN 2
as per patient, she recalls falling but not hitting the ground  ?syncope form orthostasis  patient with continue positive orthostatics after hydration  possibly venlaxifine induced-- will need to address as outpatient if for d/c tomorrow

## 2020-10-10 NOTE — PROGRESS NOTE ADULT - ASSESSMENT
89 F PMH T2DM, HTN, HLD, h/o urinary calculi, hypothyroidism, depression, p/w unwitnessed fall. she lost her balance and fell to her L side, with anemia and ELISA.

## 2020-10-10 NOTE — PROGRESS NOTE ADULT - PROBLEM SELECTOR PLAN 1
azotemia not improving with IVF  also noted with anemia  SPEP/UPEP/Bence Preston/immunofixation pending  renal ultrasound pending  possible UTI, UCx pending  dose meds for eGFR ~20-25  **if patient for discharge tomorrow, she will need close follow-up with Dr. Escobar within 2 weeks of discharge

## 2020-10-10 NOTE — CONSULT NOTE ADULT - ASSESSMENT
89 year-old woman with history as above presents with traumatic mechanical fall as outpatient.  No symptoms of UTI. Can transition to oral antibiotics to complete course.  Patient has low voltage ECG. Would consider TcPYP scan to screen for cardiac amyloidosis, but this can be pursued as outpatient.    Patient has family event and is requesting discharge tomorrow (Sunday, 10/11/2020).  No cardiovascular contraindication to discharge on 10/11/2020.

## 2020-10-11 DIAGNOSIS — R55 SYNCOPE AND COLLAPSE: ICD-10-CM

## 2020-10-11 LAB
-  AMIKACIN: SIGNIFICANT CHANGE UP
-  AMOXICILLIN/CLAVULANIC ACID: SIGNIFICANT CHANGE UP
-  AMPICILLIN/SULBACTAM: SIGNIFICANT CHANGE UP
-  AMPICILLIN: SIGNIFICANT CHANGE UP
-  AZTREONAM: SIGNIFICANT CHANGE UP
-  CEFAZOLIN: SIGNIFICANT CHANGE UP
-  CEFEPIME: SIGNIFICANT CHANGE UP
-  CEFOXITIN: SIGNIFICANT CHANGE UP
-  CEFTRIAXONE: SIGNIFICANT CHANGE UP
-  CIPROFLOXACIN: SIGNIFICANT CHANGE UP
-  ERTAPENEM: SIGNIFICANT CHANGE UP
-  GENTAMICIN: SIGNIFICANT CHANGE UP
-  IMIPENEM: SIGNIFICANT CHANGE UP
-  LEVOFLOXACIN: SIGNIFICANT CHANGE UP
-  MEROPENEM: SIGNIFICANT CHANGE UP
-  NITROFURANTOIN: SIGNIFICANT CHANGE UP
-  PIPERACILLIN/TAZOBACTAM: SIGNIFICANT CHANGE UP
-  TIGECYCLINE: SIGNIFICANT CHANGE UP
-  TOBRAMYCIN: SIGNIFICANT CHANGE UP
-  TRIMETHOPRIM/SULFAMETHOXAZOLE: SIGNIFICANT CHANGE UP
ANION GAP SERPL CALC-SCNC: 11 MMOL/L — SIGNIFICANT CHANGE UP (ref 5–17)
BUN SERPL-MCNC: 36 MG/DL — HIGH (ref 7–23)
CALCIUM SERPL-MCNC: 8.8 MG/DL — SIGNIFICANT CHANGE UP (ref 8.4–10.5)
CHLORIDE SERPL-SCNC: 102 MMOL/L — SIGNIFICANT CHANGE UP (ref 96–108)
CO2 SERPL-SCNC: 27 MMOL/L — SIGNIFICANT CHANGE UP (ref 22–31)
CREAT SERPL-MCNC: 1.64 MG/DL — HIGH (ref 0.5–1.3)
CULTURE RESULTS: SIGNIFICANT CHANGE UP
GLUCOSE BLDC GLUCOMTR-MCNC: 115 MG/DL — HIGH (ref 70–99)
GLUCOSE BLDC GLUCOMTR-MCNC: 158 MG/DL — HIGH (ref 70–99)
GLUCOSE BLDC GLUCOMTR-MCNC: 188 MG/DL — HIGH (ref 70–99)
GLUCOSE BLDC GLUCOMTR-MCNC: 214 MG/DL — HIGH (ref 70–99)
GLUCOSE SERPL-MCNC: 117 MG/DL — HIGH (ref 70–99)
METHOD TYPE: SIGNIFICANT CHANGE UP
ORGANISM # SPEC MICROSCOPIC CNT: SIGNIFICANT CHANGE UP
ORGANISM # SPEC MICROSCOPIC CNT: SIGNIFICANT CHANGE UP
POTASSIUM SERPL-MCNC: 3.1 MMOL/L — LOW (ref 3.5–5.3)
POTASSIUM SERPL-SCNC: 3.1 MMOL/L — LOW (ref 3.5–5.3)
PROT SERPL-MCNC: 5.9 G/DL — LOW (ref 6–8.3)
PROT SERPL-MCNC: 5.9 G/DL — LOW (ref 6–8.3)
SODIUM SERPL-SCNC: 140 MMOL/L — SIGNIFICANT CHANGE UP (ref 135–145)
SPECIMEN SOURCE: SIGNIFICANT CHANGE UP

## 2020-10-11 PROCEDURE — 76770 US EXAM ABDO BACK WALL COMP: CPT | Mod: 26

## 2020-10-11 PROCEDURE — 99233 SBSQ HOSP IP/OBS HIGH 50: CPT

## 2020-10-11 RX ORDER — SODIUM CHLORIDE 9 MG/ML
1000 INJECTION INTRAMUSCULAR; INTRAVENOUS; SUBCUTANEOUS
Refills: 0 | Status: DISCONTINUED | OUTPATIENT
Start: 2020-10-11 | End: 2020-10-13

## 2020-10-11 RX ORDER — POTASSIUM CHLORIDE 20 MEQ
40 PACKET (EA) ORAL ONCE
Refills: 0 | Status: COMPLETED | OUTPATIENT
Start: 2020-10-11 | End: 2020-10-11

## 2020-10-11 RX ORDER — SODIUM CHLORIDE 9 MG/ML
1000 INJECTION INTRAMUSCULAR; INTRAVENOUS; SUBCUTANEOUS
Refills: 0 | Status: DISCONTINUED | OUTPATIENT
Start: 2020-10-11 | End: 2020-10-11

## 2020-10-11 RX ADMIN — Medication 1: at 12:13

## 2020-10-11 RX ADMIN — Medication 40 MILLIEQUIVALENT(S): at 08:12

## 2020-10-11 RX ADMIN — Medication 25 MICROGRAM(S): at 06:39

## 2020-10-11 RX ADMIN — Medication 1: at 17:34

## 2020-10-11 RX ADMIN — ROSUVASTATIN CALCIUM 20 MILLIGRAM(S): 5 TABLET ORAL at 22:25

## 2020-10-11 RX ADMIN — CEFTRIAXONE 100 MILLIGRAM(S): 500 INJECTION, POWDER, FOR SOLUTION INTRAMUSCULAR; INTRAVENOUS at 22:25

## 2020-10-11 RX ADMIN — Medication 1000 UNIT(S): at 12:14

## 2020-10-11 RX ADMIN — Medication 1 TABLET(S): at 12:14

## 2020-10-11 RX ADMIN — Medication 75 MILLIGRAM(S): at 12:14

## 2020-10-11 RX ADMIN — SODIUM CHLORIDE 60 MILLILITER(S): 9 INJECTION INTRAMUSCULAR; INTRAVENOUS; SUBCUTANEOUS at 16:06

## 2020-10-11 NOTE — CHART NOTE - TREATMENT: THE FOLLOWING DIET HAS BEEN RECOMMENDED
Diet, DASH/TLC:   Sodium & Cholesterol Restricted  Consistent Carbohydrate {No Snacks} (CSTCHO) (10-09-20 @ 00:23) [Active]

## 2020-10-11 NOTE — DIETITIAN INITIAL EVALUATION ADULT. - ETIOLOGY
inability to meet estimated nutrient needs, advanced age chronic inability to meet estimated nutrient needs

## 2020-10-11 NOTE — DIETITIAN INITIAL EVALUATION ADULT. - DIET TYPE
Diet, DASH/TLC:   Sodium & Cholesterol Restricted  Consistent Carbohydrate {No Snacks} (CSTCHO) consistent carbohydrate (evening snack)/low sodium

## 2020-10-11 NOTE — DIETITIAN INITIAL EVALUATION ADULT. - OTHER INFO
Noted per chart pt forgetful at times. PTA, pt reports good appetite and PO intake. Reports getting delivery meals 2 x daily, lives with daughter. Reports intake of Ensure supplement 1 x daily. Pt endorses weight loss over the last couple of years, states she used to weight 190 pounds. Reports weight stable ~100 lbs over last year. Per Christin MANUEL pt with wt of 102 lb on 6/11. Endorses use of vitamin D3 PTA. NKFA. No chewing difficulty but c/o occasional choking with liquids - states she has "a bad flap." RN made aware.    Pt with HbA1c of 5.7% suggesting good glycemic control PTA. Confirms use of metformin PTA. States she does not check her FS at home, unsure if she owns a glucometer.    Pt denies any N/V, no documented BM in chart. Pt reports good appetite and PO intake of meals. Per flow sheets pt consuming >75% of meal trays. Pt open to receiving oral nutrition supplements while in house. Encouraged protein intake, reviewed sources.

## 2020-10-11 NOTE — PROGRESS NOTE ADULT - ASSESSMENT
89 year-old woman with history as above presents with traumatic mechanical fall as outpatient.  No symptoms of UTI. Can transition to oral antibiotics to complete course.  Patient has low voltage ECG. Would consider TcPYP scan to screen for cardiac amyloidosis, but this can be pursued as outpatient.  ELISA seen on admission, now with improving renal function with hydration.    Patient has family event and is requesting discharge tomorrow (Monday, 10/12/2020).  If orthostatics improve, and creatinine continues to improve, then no cardiovascular contraindication to discharge on 10/12/2020.

## 2020-10-11 NOTE — PROGRESS NOTE ADULT - SUBJECTIVE AND OBJECTIVE BOX
SUBJECTIVE / OVERNIGHT EVENTS: pt seen and examined     MEDICATIONS  (STANDING):  cefTRIAXone   IVPB 1000 milliGRAM(s) IV Intermittent every 24 hours  cholecalciferol 1000 Unit(s) Oral daily  dextrose 5%. 1000 milliLiter(s) (50 mL/Hr) IV Continuous <Continuous>  dextrose 50% Injectable 12.5 Gram(s) IV Push once  dextrose 50% Injectable 25 Gram(s) IV Push once  dextrose 50% Injectable 25 Gram(s) IV Push once  influenza   Vaccine 0.5 milliLiter(s) IntraMuscular once  insulin lispro (HumaLOG) corrective regimen sliding scale   SubCutaneous three times a day before meals  insulin lispro (HumaLOG) corrective regimen sliding scale   SubCutaneous at bedtime  lactated ringers. 1000 milliLiter(s) (75 mL/Hr) IV Continuous <Continuous>  lactobacillus acidophilus 1 Tablet(s) Oral daily  levothyroxine 25 MICROGram(s) Oral daily  rosuvastatin 20 milliGRAM(s) Oral at bedtime  sodium chloride 0.9%. 1000 milliLiter(s) (60 mL/Hr) IV Continuous <Continuous>  venlafaxine XR. 75 milliGRAM(s) Oral daily    MEDICATIONS  (PRN):  acetaminophen   Tablet .. 650 milliGRAM(s) Oral every 6 hours PRN Temp greater or equal to 38C (100.4F), Mild Pain (1 - 3), Moderate Pain (4 - 6), Severe Pain (7 - 10)  dextrose 40% Gel 15 Gram(s) Oral once PRN Blood Glucose LESS THAN 70 milliGRAM(s)/deciliter  glucagon  Injectable 1 milliGRAM(s) IntraMuscular once PRN Glucose LESS THAN 70 milligrams/deciliter    Vital Signs Last 24 Hrs  T(C): 36.9 (11 Oct 2020 13:06), Max: 36.9 (11 Oct 2020 13:06)  T(F): 98.4 (11 Oct 2020 13:06), Max: 98.4 (11 Oct 2020 13:06)  HR: 76 (11 Oct 2020 13:06) (71 - 83)  BP: 148/71 (11 Oct 2020 13:06) (127/68 - 148/71)  BP(mean): --  RR: 16 (11 Oct 2020 13:06) (16 - 16)  SpO2: 97% (11 Oct 2020 13:06) (97% - 99%)    Constitutional: No fever, fatigue  Skin: No rash.  Eyes: No recent vision problems or eye pain.  ENT: No congestion, ear pain, or sore throat.  Cardiovascular: No chest pain or palpation.  Respiratory: No cough, shortness of breath, congestion, or wheezing.  Gastrointestinal: No abdominal pain, nausea, vomiting, or diarrhea.  Genitourinary: No dysuria.  Musculoskeletal: No joint swelling.  Neurologic: No headache.    PHYSICAL EXAM:  GENERAL: NAD  EYES: EOMI, PERRLA  NECK: Supple, No JVD  CHEST/LUNG: dec breath sounds at bases  HEART:  S1 , S2 +  ABDOMEN: soft , bs+  EXTREMITIES:  no edema  NEUROLOGY:alert awake      LABS:  10-11    140  |  102  |  36<H>  ----------------------------<  117<H>  3.1<L>   |  27  |  1.64<H>    Ca    8.8      11 Oct 2020 05:40    TPro  5.9<L>  /  Alb      /  TBili      /  DBili      /  AST      /  ALT      /  AlkPhos      10-10    Creatinine Trend: 1.64 <--, 1.98 <--, 1.81 <--, 1.98 <--                        9.0    4.35  )-----------( 147      ( 10 Oct 2020 06:01 )             28.8     Urine Studies:  Urinalysis Basic - ( 08 Oct 2020 18:28 )    Color: Yellow / Appearance: Slightly Turbid / S.014 / pH:   Gluc:  / Ketone: Negative  / Bili: Negative / Urobili: Negative   Blood:  / Protein: 30 mg/dL / Nitrite: Positive   Leuk Esterase: Moderate / RBC: 5 /hpf / WBC 11 /HPF   Sq Epi:  / Non Sq Epi: 9 /hpf / Bacteria: Many        CARDIAC MARKERS ( 10 Oct 2020 06:01 )  x     / x     / 159 U/L / x     / x            LIVER FUNCTIONS - ( 10 Oct 2020 10:44 )  Alb: x     / Pro: 5.9 g/dL / ALK PHOS: x     / ALT: x     / AST: x     / GGT: x                     RADIOLOGY & ADDITIONAL TESTS:    Imaging Personally Reviewed:    Consultant(s) Notes Reviewed:      Care Discussed with Consultants/Other Providers:

## 2020-10-11 NOTE — DIETITIAN INITIAL EVALUATION ADULT. - ADD RECOMMEND
Add Glucerna Shake 240mls 2x daily (440kcals, 20g protein). Consider adding multivitamin + vitamin C to promote wound healing.  Malnutrition/BMI alert placed - provider notified. Will continue to monitor PO intake, labs, weights, BM, skin, clinical course.

## 2020-10-11 NOTE — DIETITIAN INITIAL EVALUATION ADULT. - PROBLEM SELECTOR PLAN 4
-in setting of infection/orthostatic hypotension, suspecting dehydration/pre-renal azotemia vs nsaid exposure  -s/p IVF, c/w mgt noted above with IVF  -trend bmp  -avoid nephrotoxins  -renally dose meds.  -nonoliguric per pt.  -temporarily holding home ace-i, diuretics

## 2020-10-11 NOTE — DIETITIAN INITIAL EVALUATION ADULT. - REASON INDICATOR FOR ASSESSMENT
Assessment warranted for BMI<19. Per chart pt is a 89 F PMH T2DM, HTN, HLD, hypothyroidism, depression, p/w unwitnessed fall, f/w orthostatic hypotension and ELISA in setting of UTI.

## 2020-10-11 NOTE — DIETITIAN INITIAL EVALUATION ADULT. - PERTINENT LABORATORY DATA
10-11 Glu 117 mg/dL<H> K+ 3.1 mmol/L<L> Cr  1.64 mg/dL<H> BUN 36 mg/dL<H> HbA1c 5.7%  CAPILLARY BLOOD GLUCOSE    POCT Blood Glucose.: 115 mg/dL (11 Oct 2020 07:39)  POCT Blood Glucose.: 196 mg/dL (10 Oct 2020 21:28)  POCT Blood Glucose.: 183 mg/dL (10 Oct 2020 16:57)  POCT Blood Glucose.: 189 mg/dL (10 Oct 2020 11:39)

## 2020-10-11 NOTE — DIETITIAN INITIAL EVALUATION ADULT. - PHYSICAL APPEARANCE
other (specify) per RN assessment pt with stage I to sacrum.  No noted edema per flow sheets.    Ht: 63 inches Wt: 100 pounds BMI: 17.7 kg/m2 IBW: 115 pounds (+/-10%) %IBW: 87% pt declined nutrition focused physical exam - visually pt presents with severe muscle loss to temples, clavicles. Moderate/severe fat loss to orbital fat pads, buccal area. per RN assessment pt with stage I to sacrum.  No noted edema per flow sheets.    Ht: 63 inches Wt: 100 pounds BMI: 17.7 kg/m2 IBW: 115 pounds (+/-10%) %IBW: 87%

## 2020-10-11 NOTE — PROGRESS NOTE ADULT - SUBJECTIVE AND OBJECTIVE BOX
[facility]   [phone]  Dr. Casey (covering for Dr. Escobar)  RENAL FOLLOW UP NOTE  --------------------------------------------------------------------------------  Patient seen and examined earlier, OOB to chair and eating lunch.  Denies dizziness/lightheadedness    PAST HISTORY  --------------------------------------------------------------------------------  No significant changes to PMH, PSH, FHx, SHx, unless otherwise noted    ALLERGIES & MEDICATIONS  --------------------------------------------------------------------------------  Allergies    Keflex (Rash; Hives)    Intolerances      Standing Inpatient Medications  cefTRIAXone   IVPB 1000 milliGRAM(s) IV Intermittent every 24 hours  cholecalciferol 1000 Unit(s) Oral daily  dextrose 5%. 1000 milliLiter(s) IV Continuous <Continuous>  dextrose 50% Injectable 12.5 Gram(s) IV Push once  dextrose 50% Injectable 25 Gram(s) IV Push once  dextrose 50% Injectable 25 Gram(s) IV Push once  influenza   Vaccine 0.5 milliLiter(s) IntraMuscular once  insulin lispro (HumaLOG) corrective regimen sliding scale   SubCutaneous three times a day before meals  insulin lispro (HumaLOG) corrective regimen sliding scale   SubCutaneous at bedtime  lactated ringers. 1000 milliLiter(s) IV Continuous <Continuous>  lactobacillus acidophilus 1 Tablet(s) Oral daily  levothyroxine 25 MICROGram(s) Oral daily  rosuvastatin 20 milliGRAM(s) Oral at bedtime  sodium chloride 0.9%. 1000 milliLiter(s) IV Continuous <Continuous>  venlafaxine XR. 75 milliGRAM(s) Oral daily    PRN Inpatient Medications  acetaminophen   Tablet .. 650 milliGRAM(s) Oral every 6 hours PRN  dextrose 40% Gel 15 Gram(s) Oral once PRN  glucagon  Injectable 1 milliGRAM(s) IntraMuscular once PRN      FOCUSED REVIEW OF SYSTEMS  --------------------------------------------------------------------------------  denies fevers/rigors  denies CP/palpitations  denies SOB/cough  denies dysuria/hematuria/oliguria  denies N/V/abd pain      VITALS/PHYSICAL EXAM  --------------------------------------------------------------------------------  T(C): 36.9 (10-11-20 @ 13:06), Max: 36.9 (10-11-20 @ 13:06)  HR: 76 (10-11-20 @ 13:06) (71 - 83)  BP: 148/71 (10-11-20 @ 13:06) (127/68 - 148/71)  RR: 16 (10-11-20 @ 13:06) (16 - 16)  SpO2: 97% (10-11-20 @ 13:06) (97% - 99%)  Wt(kg): --        10-10-20 @ 07:01  -  10-11-20 @ 07:00  --------------------------------------------------------  IN: 1217 mL / OUT: 900 mL / NET: 317 mL    10-11-20 @ 07:01  -  10-11-20 @ 13:57  --------------------------------------------------------  IN: 240 mL / OUT: 0 mL / NET: 240 mL      Physical Exam:  	Gen: NAD, frail-appearing  	Pulm: CTA B/L  	CV: RRR, S1S2  	Abd: +BS, soft, nontender/nondistended  	: No suprapubic tenderness.  no gong          Extremity: No cyanosis, no edema  	Neuro: A&O x 3        LABS/STUDIES  --------------------------------------------------------------------------------              9.0    4.35  >-----------<  147      [10-10-20 @ 06:01]              28.8     140  |  102  |  36  ----------------------------<  117      [10-11-20 @ 05:40]  3.1   |  27  |  1.64        Ca     8.8     [10-11-20 @ 05:40]    TPro  5.9  /  Alb  x   /  TBili  x   /  DBili  x   /  AST  x   /  ALT  x   /  AlkPhos  x   [10-10-20 @ 10:44]              [10-10-20 @ 06:01]    eGFR if Non African American: 27 mL/min/1.73M2 (10-11 @ 05:40)  eGFR if African American: 32 mL/min/1.73M2 (10-11 @ 05:40)    Creatinine Trend:  SCr 1.64 [10-11 @ 05:40]  SCr 1.98 [10-10 @ 06:01]  SCr 1.81 [10-09 @ 05:45]  SCr 1.98 [10-08 @ 15:55]              Urinalysis - [10-08-20 @ 18:28]      Color Yellow / Appearance Slightly Turbid / SG 1.014 / pH 6.5      Gluc Negative / Ketone Negative  / Bili Negative / Urobili Negative       Blood Small / Protein 30 mg/dL / Leuk Est Moderate / Nitrite Positive      RBC 5 / WBC 11 / Hyaline 2 / Gran  / Sq Epi  / Non Sq Epi 9 / Bacteria Many    Urine Protein 86      [10-11-20 @ 10:39]    TSH 4.18      [10-08-20 @ 21:03]

## 2020-10-11 NOTE — PROGRESS NOTE ADULT - SUBJECTIVE AND OBJECTIVE BOX
HPI:  Patient seen and examined at bedside on 3 DSU.  Orthostatic vitals positive, although patient asymptomatic.  Interval improvement in serum creatinine, though still above baseline.     Review Of Systems:           Respiratory: No shortness of breath, cough, or wheezing  Cardiovascular: No chest pain or palpitations  10 point review of systems is otherwise negative except as mentioned above        Medications:  acetaminophen   Tablet .. 650 milliGRAM(s) Oral every 6 hours PRN  cefTRIAXone   IVPB 1000 milliGRAM(s) IV Intermittent every 24 hours  cholecalciferol 1000 Unit(s) Oral daily  dextrose 40% Gel 15 Gram(s) Oral once PRN  dextrose 5%. 1000 milliLiter(s) IV Continuous <Continuous>  dextrose 50% Injectable 12.5 Gram(s) IV Push once  dextrose 50% Injectable 25 Gram(s) IV Push once  dextrose 50% Injectable 25 Gram(s) IV Push once  glucagon  Injectable 1 milliGRAM(s) IntraMuscular once PRN  influenza   Vaccine 0.5 milliLiter(s) IntraMuscular once  insulin lispro (HumaLOG) corrective regimen sliding scale   SubCutaneous three times a day before meals  insulin lispro (HumaLOG) corrective regimen sliding scale   SubCutaneous at bedtime  lactated ringers. 1000 milliLiter(s) IV Continuous <Continuous>  lactobacillus acidophilus 1 Tablet(s) Oral daily  levothyroxine 25 MICROGram(s) Oral daily  rosuvastatin 20 milliGRAM(s) Oral at bedtime  sodium chloride 0.9%. 1000 milliLiter(s) IV Continuous <Continuous>  venlafaxine XR. 75 milliGRAM(s) Oral daily    PAST MEDICAL & SURGICAL HISTORY:  Heart murmur    Arthritis    Renal calculus or stone    Urinary incontinence    Breast cancer    Glaucoma    GERD (gastroesophageal reflux disease)    Hyperlipidemia    Diverticulitis    Diabetes mellitus type 2, noninsulin dependent    HTN (hypertension)    S/P bladder repair  Interstim device placement     S/P lumpectomy of breast  right breast with node removal    Renal calculi  s/p stone extraction 57 yrs ago      Vitals:  T(C): 36.8 (10-11-20 @ 05:00), Max: 37 (10-10-20 @ 12:44)  HR: 71 (10-11-20 @ 05:00) (71 - 83)  BP: 127/68 (10-11-20 @ 05:00) (127/68 - 138/67)  BP(mean): --  RR: 16 (10-11-20 @ 05:00) (16 - 18)  SpO2: 99% (10-11-20 @ 05:00) (98% - 99%)  Wt(kg): --  Daily     Daily Weight in k.3 (11 Oct 2020 08:58)  I&O's Summary    10 Oct 2020 07:  -  11 Oct 2020 07:00  --------------------------------------------------------  IN: 1217 mL / OUT: 900 mL / NET: 317 mL    11 Oct 2020 07:  -  11 Oct 2020 12:41  --------------------------------------------------------  IN: 120 mL / OUT: 0 mL / NET: 120 mL        Physical Exam:  Appearance: Normal, well groomed, NAD  Eyes: PERRLA, EOMI, pink conjunctiva, no scleral icterus   HENT: left orbital ecchymosis  Cardiovascular: RRR, S1, S2, III/VI systolic murmur at apex; no edema; no JVD  Respiratory: Clear to auscultation bilaterally  Gastrointestinal: Soft, non-tender, non-distended, BS+  Musculoskeletal: No clubbing or joint deformity   Neurologic: No focal weakness  Lymphatic: No lymphadenopathy  Psychiatry: AAOx3 with appropriate mood and affect  Skin: No rashes, ecchymoses, or cyanosis, +skin tears (largest on left arm)                          9.0    4.35  )-----------( 147      ( 10 Oct 2020 06:01 )             28.8     10-11    140  |  102  |  36<H>  ----------------------------<  117<H>  3.1<L>   |  27  |  1.64<H>    Ca    8.8      11 Oct 2020 05:40    TPro  5.9<L>  /  Alb  x   /  TBili  x   /  DBili  x   /  AST  x   /  ALT  x   /  AlkPhos  x   10-10      CARDIAC MARKERS ( 10 Oct 2020 06:01 )  x     / x     / 159 U/L / x     / x

## 2020-10-11 NOTE — PROGRESS NOTE ADULT - PROBLEM SELECTOR PLAN 1
azotemia starting to improve s/p IVF  also noted with anemia  SPEP/UPEP/Bence Preston/immunofixation pending  renal ultrasound pending-- labs can be followed up as outpatient and renal U/S can also be performed as outpatient  on ceftriaxone for UTI  dose meds for eGFR ~20-25  **if patient for discharge tomorrow, she will need close follow-up with Dr. Escobar within 2 weeks of discharge

## 2020-10-11 NOTE — DIETITIAN INITIAL EVALUATION ADULT. - PROBLEM SELECTOR PLAN 8
-16 minutes spent clarifying advanced directives with patient at bedside  -At this time, patient demonstrates capacity with insight into condition, and chooses to be full code.   -All questions answered at bedside.  Pt will further discuss these issues with her daughter.

## 2020-10-11 NOTE — DIETITIAN INITIAL EVALUATION ADULT. - SIGNS/SYMPTOMS
pt with severe muscle loss, moderate/severe fat losses, increased needs 2/2 pressure injury/wounds BMI 17.7

## 2020-10-12 ENCOUNTER — TRANSCRIPTION ENCOUNTER (OUTPATIENT)
Age: 85
End: 2020-10-12

## 2020-10-12 LAB
% ALBUMIN: 58.5 % — SIGNIFICANT CHANGE UP
% ALPHA 1: 4.3 % — SIGNIFICANT CHANGE UP
% ALPHA 2: 12 % — SIGNIFICANT CHANGE UP
% BETA: 12.2 % — SIGNIFICANT CHANGE UP
% GAMMA: 13 % — SIGNIFICANT CHANGE UP
ALBUMIN SERPL ELPH-MCNC: 3.5 G/DL — LOW (ref 3.6–5.5)
ALBUMIN/GLOB SERPL ELPH: 1.5 RATIO — SIGNIFICANT CHANGE UP
ALPHA1 GLOB SERPL ELPH-MCNC: 0.3 G/DL — SIGNIFICANT CHANGE UP (ref 0.1–0.4)
ALPHA2 GLOB SERPL ELPH-MCNC: 0.7 G/DL — SIGNIFICANT CHANGE UP (ref 0.5–1)
ANION GAP SERPL CALC-SCNC: 19 MMOL/L — HIGH (ref 5–17)
ANION GAP SERPL CALC-SCNC: 9 MMOL/L — SIGNIFICANT CHANGE UP (ref 5–17)
B-GLOBULIN SERPL ELPH-MCNC: 0.7 G/DL — SIGNIFICANT CHANGE UP (ref 0.5–1)
BUN SERPL-MCNC: 34 MG/DL — HIGH (ref 7–23)
BUN SERPL-MCNC: 35 MG/DL — HIGH (ref 7–23)
CALCIUM SERPL-MCNC: 9 MG/DL — SIGNIFICANT CHANGE UP (ref 8.4–10.5)
CALCIUM SERPL-MCNC: 9.3 MG/DL — SIGNIFICANT CHANGE UP (ref 8.4–10.5)
CHLORIDE SERPL-SCNC: 105 MMOL/L — SIGNIFICANT CHANGE UP (ref 96–108)
CHLORIDE SERPL-SCNC: 98 MMOL/L — SIGNIFICANT CHANGE UP (ref 96–108)
CO2 SERPL-SCNC: 27 MMOL/L — SIGNIFICANT CHANGE UP (ref 22–31)
CO2 SERPL-SCNC: 27 MMOL/L — SIGNIFICANT CHANGE UP (ref 22–31)
CREAT SERPL-MCNC: 2.07 MG/DL — HIGH (ref 0.5–1.3)
CREAT SERPL-MCNC: 2.15 MG/DL — HIGH (ref 0.5–1.3)
GAMMA GLOBULIN: 0.8 G/DL — SIGNIFICANT CHANGE UP (ref 0.6–1.6)
GLUCOSE BLDC GLUCOMTR-MCNC: 108 MG/DL — HIGH (ref 70–99)
GLUCOSE BLDC GLUCOMTR-MCNC: 114 MG/DL — HIGH (ref 70–99)
GLUCOSE BLDC GLUCOMTR-MCNC: 156 MG/DL — HIGH (ref 70–99)
GLUCOSE BLDC GLUCOMTR-MCNC: 224 MG/DL — HIGH (ref 70–99)
GLUCOSE SERPL-MCNC: 102 MG/DL — HIGH (ref 70–99)
GLUCOSE SERPL-MCNC: 167 MG/DL — HIGH (ref 70–99)
INTERPRETATION SERPL IFE-IMP: SIGNIFICANT CHANGE UP
POTASSIUM SERPL-MCNC: 3.6 MMOL/L — SIGNIFICANT CHANGE UP (ref 3.5–5.3)
POTASSIUM SERPL-MCNC: 4.3 MMOL/L — SIGNIFICANT CHANGE UP (ref 3.5–5.3)
POTASSIUM SERPL-SCNC: 3.6 MMOL/L — SIGNIFICANT CHANGE UP (ref 3.5–5.3)
POTASSIUM SERPL-SCNC: 4.3 MMOL/L — SIGNIFICANT CHANGE UP (ref 3.5–5.3)
PROT PATTERN SERPL ELPH-IMP: SIGNIFICANT CHANGE UP
SODIUM SERPL-SCNC: 141 MMOL/L — SIGNIFICANT CHANGE UP (ref 135–145)
SODIUM SERPL-SCNC: 144 MMOL/L — SIGNIFICANT CHANGE UP (ref 135–145)

## 2020-10-12 PROCEDURE — 99233 SBSQ HOSP IP/OBS HIGH 50: CPT

## 2020-10-12 RX ADMIN — CEFTRIAXONE 100 MILLIGRAM(S): 500 INJECTION, POWDER, FOR SOLUTION INTRAMUSCULAR; INTRAVENOUS at 21:56

## 2020-10-12 RX ADMIN — ROSUVASTATIN CALCIUM 20 MILLIGRAM(S): 5 TABLET ORAL at 21:58

## 2020-10-12 RX ADMIN — Medication 75 MILLIGRAM(S): at 11:29

## 2020-10-12 RX ADMIN — Medication 1: at 17:25

## 2020-10-12 RX ADMIN — Medication 25 MICROGRAM(S): at 05:32

## 2020-10-12 RX ADMIN — Medication 1 TABLET(S): at 11:29

## 2020-10-12 RX ADMIN — Medication 1000 UNIT(S): at 11:29

## 2020-10-12 NOTE — PROGRESS NOTE ADULT - PROBLEM SELECTOR PLAN 1
iv fluids  monitor cr closely  renal f/u appreac  f/u repeat bmp today  if stable, ok to f/u as outpt

## 2020-10-12 NOTE — PROGRESS NOTE ADULT - SUBJECTIVE AND OBJECTIVE BOX
HPI:  89 F PMH T2DM, HTN, HLD, hypothyroidism, depression, p/w unwitnessed fall.  Pt states she was leaning on a wall, waiting for her daughter to pick her up.  She moved aside for someone to pass, and she lost her balance and fell to her L side.  She thinks she may have passed out, doesn't recall actually hitting the ground.  She denies cp, sob, f/c, n/v/d, dysuria, cough, palpitations.  She denies prodromal sx, denies dizziness or LH with standing. +bruising on Left arm/shoulder/side after fall, +facial bruising on L side. Denies visual changes.  Call placed to prasanth Russo overnight, no answer.    VS: 98.0, 79, 121/70, 18, 99% RA.  Orthostatic positive in ER.  Labs: no leukocytosis, anemia hgb 9.4, thrombocytopenia plt 147. HSt delta neg 46 --> 50. CMP with jagruti scr 1.9 prev 0.9. CXR KAYLIE.  XR pelvis no fx. XR elbow/humerus/forearm/hand no fx. CTH +supraorbital meningioma, +microvasc ischemic changes no acute bleed. CT c spine no fx/subluxation. CT max/fac: no fx. IN ER pt received IV ctx + IVF, tylenol prior to medicine team involvement.  (08 Oct 2020 23:02)    Review Of Systems:           Respiratory: No shortness of breath, cough, or wheezing  Cardiovascular: No chest pain or palpitations  10 point review of systems is otherwise negative except as mentioned above        Medications:  acetaminophen   Tablet .. 650 milliGRAM(s) Oral every 6 hours PRN  cefTRIAXone   IVPB 1000 milliGRAM(s) IV Intermittent every 24 hours  cholecalciferol 1000 Unit(s) Oral daily  dextrose 40% Gel 15 Gram(s) Oral once PRN  dextrose 5%. 1000 milliLiter(s) IV Continuous <Continuous>  dextrose 50% Injectable 12.5 Gram(s) IV Push once  dextrose 50% Injectable 25 Gram(s) IV Push once  dextrose 50% Injectable 25 Gram(s) IV Push once  glucagon  Injectable 1 milliGRAM(s) IntraMuscular once PRN  influenza   Vaccine 0.5 milliLiter(s) IntraMuscular once  insulin lispro (HumaLOG) corrective regimen sliding scale   SubCutaneous three times a day before meals  insulin lispro (HumaLOG) corrective regimen sliding scale   SubCutaneous at bedtime  lactated ringers. 1000 milliLiter(s) IV Continuous <Continuous>  lactobacillus acidophilus 1 Tablet(s) Oral daily  levothyroxine 25 MICROGram(s) Oral daily  rosuvastatin 20 milliGRAM(s) Oral at bedtime  sodium chloride 0.9%. 1000 milliLiter(s) IV Continuous <Continuous>  venlafaxine XR. 75 milliGRAM(s) Oral daily    PAST MEDICAL & SURGICAL HISTORY:  Heart murmur    Arthritis    Renal calculus or stone    Urinary incontinence    Breast cancer    Glaucoma    GERD (gastroesophageal reflux disease)    Hyperlipidemia    Diverticulitis    Diabetes mellitus type 2, noninsulin dependent    HTN (hypertension)    S/P bladder repair  Interstim device placement 2010    S/P lumpectomy of breast  right breast with node removal    Renal calculi  s/p stone extraction 57 yrs ago      Vitals:  T(C): 36.5 (10-12-20 @ 11:34), Max: 37.3 (10-11-20 @ 21:19)  HR: 80 (10-12-20 @ 11:34) (70 - 81)  BP: 164/76 (10-12-20 @ 11:34) (118/65 - 164/76)  BP(mean): --  RR: 18 (10-12-20 @ 11:34) (17 - 18)  SpO2: 99% (10-12-20 @ 11:34) (96% - 99%)  Wt(kg): --  Daily     Daily   I&O's Summary    11 Oct 2020 07:01  -  12 Oct 2020 07:00  --------------------------------------------------------  IN: 1140 mL / OUT: 1150 mL / NET: -10 mL    12 Oct 2020 07:01  -  12 Oct 2020 20:05  --------------------------------------------------------  IN: 660 mL / OUT: 0 mL / NET: 660 mL        Physical Exam:  Appearance: Normal, well groomed, NAD  Eyes: PERRLA, EOMI, pink conjunctiva, no scleral icterus   HENT: left orbital ecchymosis  Cardiovascular: RRR, S1, S2, III/VI systolic murmur at apex; no edema; no JVD  Respiratory: Clear to auscultation bilaterally  Gastrointestinal: Soft, non-tender, non-distended, BS+  Musculoskeletal: No clubbing or joint deformity   Neurologic: No focal weakness  Lymphatic: No lymphadenopathy  Psychiatry: AAOx3 with appropriate mood and affect  Skin: No rashes, ecchymoses, or cyanosis, +skin tears (largest on left arm)      10-12    144  |  98  |  34<H>  ----------------------------<  167<H>  4.3   |  27  |  2.15<H>    Ca    9.3      12 Oct 2020 14:39

## 2020-10-12 NOTE — DISCHARGE NOTE PROVIDER - NSDCFUADDAPPT_GEN_ALL_CORE_FT
- Repeat BMP with Dr. Brayden Flores within 1-3 days of discharge  -Follow-up Calcified supraorbital lesion on the left measuring 1.5 x 1.5 x 0.9 cm ( [id number]) consistent with a meningioma as an outpatient

## 2020-10-12 NOTE — DISCHARGE NOTE PROVIDER - CARE PROVIDER_API CALL
Brayden Flores  CARDIOLOGY  [address], [address]  [place] [address]  Phone: [phone]  Fax: [phone]  Follow Up Time: 1-3 days    Soumya Escobar  NEPHROLOGY  [address], [address]  [place] [address]  Phone: [phone]  Fax: [phone]  Follow Up Time: 1 week

## 2020-10-12 NOTE — DISCHARGE NOTE PROVIDER - HOSPITAL COURSE
89 F PMH T2DM, HTN, HLD, hypothyroidism, depression, p/w unwitnessed fall, f/w orthostatic hypotension and ELISA in setting of UTI.      Problem/Plan - 1:  ·  Problem: ELISA (acute kidney injury).  Plan: iv fluids  monitor cr closely  renal f/u appreac  Creatinine 1.87 (10/13/20)  Follow-up BMP within 3 days of d/c     Problem/Plan - 2:  ·  Problem: Orthostatic hypotension.  Plan: s/p iv fluids,  pt asymptomatic.      Problem/Plan - 3:  ·  Problem: Urinary tract infection without hematuria, site unspecified.  Plan: iv abx   cult noted   Rocephin completed     Problem/Plan - 4:  ·  Problem: Syncope.  Plan: likely sec  to orthostatic hypotension   echo done, f/u with Dr. Brayden Flores an outpatient       Discharge home with outpatient follow-up       89 F PMH T2DM, HTN, HLD, hypothyroidism, depression, p/w unwitnessed fall, f/w orthostatic hypotension and ELISA in setting of UTI.   CTH +supraorbital meningioma (f/u as an outpatient), +microvasc ischemic changes no acute bleed. CT c spine no fx/subluxation. CT max/fac: no fx.     Problem/Plan - 1:  ·  Problem: ELISA (acute kidney injury).  Plan: iv fluids  monitor cr closely  renal f/u appreac  Creatinine 1.87 (10/13/20)  Follow-up BMP within 3 days of d/c     Problem/Plan - 2:  ·  Problem: Orthostatic hypotension.  Plan: s/p iv fluids,  pt asymptomatic.      Problem/Plan - 3:  ·  Problem: Urinary tract infection without hematuria, site unspecified.  Plan: iv abx   cult noted   Rocephin completed     Problem/Plan - 4:  ·  Problem: Syncope.  Plan: likely sec  to orthostatic hypotension   echo done, f/u with Dr. Brayden Flores an outpatient       Discharge home with outpatient follow-up       89 F PMH T2DM, HTN, HLD, hypothyroidism, depression, p/w unwitnessed fall, f/w orthostatic hypotension and ELISA in setting of UTI.   CTH +supraorbital meningioma (f/u as an outpatient), +microvasc ischemic changes no acute bleed. CT c spine no fx/subluxation. CT max/fac: no fx.     Problem/Plan - 1:  ·  Problem: ELISA (acute kidney injury).  Plan: iv fluids  monitor cr closely  renal f/u appreac  Creatinine 1.87 (10/13/20)  Follow-up BMP within 3 days of d/c     Problem/Plan - 2:  ·  Problem: Orthostatic hypotension.  Plan: s/p iv fluids,  pt asymptomatic.      Problem/Plan - 3:  ·  Problem: Urinary tract infection without hematuria, site unspecified.  Plan: iv abx   cult noted   Rocephin completed     Problem/Plan - 4:  ·  Problem: Syncope.  Plan: likely sec  to orthostatic hypotension   echo done, f/u with Dr. Brayden Flores an outpatient       Discharge home with outpatient follow-up        Advanced care planning was discussed with patient and family.  Advanced care planning forms were reviewed and discussed as appropriate.  Differential diagnosis and plan of care discussed with patient after the evaluation.   Pain assessed and judicious use of narcotics when appropriate was discussed.  Importance of Fall prevention discussed.  Counseling on Smoking and Alcohol cessation was offered when appropriate.  Counseling on Diet, exercise, and medication compliance was done.   Approx 65 minutes spent.

## 2020-10-12 NOTE — CHART NOTE - NSCHARTNOTEFT_GEN_A_CORE
Called by RN to report family would like to speak to provider. Spoke with family at bedside CT head discussed and family was not aware of the result. Dr Patricio, attending called to clarify. As per attending report d/w patient in the ED. Family made aware and Dr Patricio will call family.          Phone: [phone]

## 2020-10-12 NOTE — DISCHARGE NOTE NURSING/CASE MANAGEMENT/SOCIAL WORK - PATIENT PORTAL LINK FT
You can access the FollowMyHealth Patient Portal offered by [facility] by registering at the following website: [email or web address]. By joining [employer]’s FollowMyHealth portal, you will also be able to view your health information using other applications (apps) compatible with our system.

## 2020-10-12 NOTE — PROGRESS NOTE ADULT - ASSESSMENT
89 year-old woman with history as above presents with traumatic mechanical fall as outpatient.  No symptoms of UTI. Can transition to oral antibiotics to complete course.  Patient has low voltage ECG. Would consider TcPYP scan to screen for cardiac amyloidosis, but this can be pursued as outpatient.  ELISA seen on admission. Follow-up with nephrology.    If orthostatics improve, and creatinine continues to improve, then no cardiovascular contraindication to discharge on 10/13/2020 with outpatient follow-up in place.

## 2020-10-12 NOTE — PROGRESS NOTE ADULT - SUBJECTIVE AND OBJECTIVE BOX
KOREY DIAZ  89y Female  MRN:[id number]    Patient is a 89y old  Female who presents with a chief complaint of fall, orthostatic hypotension, UTI (11 Oct 2020 13:57)    HPI:  89 F PMH T2DM, HTN, HLD, hypothyroidism, depression, p/w unwitnessed fall.  Pt states she was leaning on a wall, waiting for her daughter to pick her up.  She moved aside for someone to pass, and she lost her balance and fell to her L side.  She thinks she may have passed out, doesn't recall actually hitting the ground.  She denies cp, sob, f/c, n/v/d, dysuria, cough, palpitations.  She denies prodromal sx, denies dizziness or LH with standing. +bruising on Left arm/shoulder/side after fall, +facial bruising on L side. Denies visual changes.  Call placed to prasanth Russo overnight, no answer.    VS: 98.0, 79, 121/70, 18, 99% RA.  Orthostatic positive in ER.  Labs: no leukocytosis, anemia hgb 9.4, thrombocytopenia plt 147. HSt delta neg 46 --> 50. CMP with jagruti scr 1.9 prev 0.9. CXR KAYLIE.  XR pelvis no fx. XR elbow/humerus/forearm/hand no fx. CTH +supraorbital meningioma, +microvasc ischemic changes no acute bleed. CT c spine no fx/subluxation. CT max/fac: no fx. IN ER pt received IV ctx + IVF, tylenol prior to medicine team involvement.  (08 Oct 2020 23:02)      Patient seen and evaluated at bedside. No acute events overnight except as noted.    Interval HPI:  no events o/n    PAST MEDICAL & SURGICAL HISTORY:  Heart murmur    Arthritis    Renal calculus or stone    Urinary incontinence    Breast cancer    Glaucoma    GERD (gastroesophageal reflux disease)    Hyperlipidemia    Diverticulitis    Diabetes mellitus type 2, noninsulin dependent    HTN (hypertension)    S/P bladder repair  Interstim device placement 2010    S/P lumpectomy of breast  right breast with node removal    Renal calculi  s/p stone extraction 57 yrs ago        REVIEW OF SYSTEMS:  as per hpi      VITALS:  Vital Signs Last 24 Hrs  T(C): 36.5 (12 Oct 2020 11:34), Max: 37.3 (11 Oct 2020 21:19)  T(F): 97.7 (12 Oct 2020 11:34), Max: 99.2 (11 Oct 2020 21:19)  HR: 80 (12 Oct 2020 11:34) (70 - 81)  BP: 164/76 (12 Oct 2020 11:34) (118/65 - 164/76)  BP(mean): --  RR: 18 (12 Oct 2020 11:34) (17 - 18)  SpO2: 99% (12 Oct 2020 11:34) (96% - 99%)  CAPILLARY BLOOD GLUCOSE      POCT Blood Glucose.: 114 mg/dL (12 Oct 2020 12:05)  POCT Blood Glucose.: 108 mg/dL (12 Oct 2020 07:45)  POCT Blood Glucose.: 214 mg/dL (11 Oct 2020 21:57)  POCT Blood Glucose.: 188 mg/dL (11 Oct 2020 17:11)    I&O's Summary    11 Oct 2020 07:01  -  12 Oct 2020 07:00  --------------------------------------------------------  IN: 1140 mL / OUT: 1150 mL / NET: -10 mL    12 Oct 2020 07:01  -  12 Oct 2020 14:28  --------------------------------------------------------  IN: 660 mL / OUT: 0 mL / NET: 660 mL        PHYSICAL EXAM:  GENERAL: NAD, well-developed  HEAD:   , Normocephalic +facial ecchymosis   EYES: EOMI, PERRLA, conjunctiva and sclera clear  NECK: Supple, No JVD  CHEST/LUNG: Clear to auscultation bilaterally; No wheeze  HEART: S1, S2;   ABDOMEN: Soft, Nontender, Nondistended; Bowel sounds present  EXTREMITIES:  2+ Peripheral Pulses, No clubbing, cyanosis, or edema  PSYCH: Normal affect  NEUROLOGY: AAOX3; non-focal  SKIN: No rashes or lesions    Consultant(s) Notes Reviewed:  [x ] YES  [ ] NO  Care Discussed with Consultants/Other Providers [ x] YES  [ ] NO    MEDS:  MEDICATIONS  (STANDING):  cefTRIAXone   IVPB 1000 milliGRAM(s) IV Intermittent every 24 hours  cholecalciferol 1000 Unit(s) Oral daily  dextrose 5%. 1000 milliLiter(s) (50 mL/Hr) IV Continuous <Continuous>  dextrose 50% Injectable 12.5 Gram(s) IV Push once  dextrose 50% Injectable 25 Gram(s) IV Push once  dextrose 50% Injectable 25 Gram(s) IV Push once  influenza   Vaccine 0.5 milliLiter(s) IntraMuscular once  insulin lispro (HumaLOG) corrective regimen sliding scale   SubCutaneous three times a day before meals  insulin lispro (HumaLOG) corrective regimen sliding scale   SubCutaneous at bedtime  lactated ringers. 1000 milliLiter(s) (75 mL/Hr) IV Continuous <Continuous>  lactobacillus acidophilus 1 Tablet(s) Oral daily  levothyroxine 25 MICROGram(s) Oral daily  rosuvastatin 20 milliGRAM(s) Oral at bedtime  sodium chloride 0.9%. 1000 milliLiter(s) (60 mL/Hr) IV Continuous <Continuous>  venlafaxine XR. 75 milliGRAM(s) Oral daily    MEDICATIONS  (PRN):  acetaminophen   Tablet .. 650 milliGRAM(s) Oral every 6 hours PRN Temp greater or equal to 38C (100.4F), Mild Pain (1 - 3), Moderate Pain (4 - 6), Severe Pain (7 - 10)  dextrose 40% Gel 15 Gram(s) Oral once PRN Blood Glucose LESS THAN 70 milliGRAM(s)/deciliter  glucagon  Injectable 1 milliGRAM(s) IntraMuscular once PRN Glucose LESS THAN 70 milligrams/deciliter    ALLERGIES:  Keflex (Rash; Hives)      LABS:    10-12    141  |  105  |  35<H>  ----------------------------<  102<H>  3.6   |  27  |  2.07<H>    Ca    9.0      12 Oct 2020 04:58        cCulture Results:   >100,000 CFU/ml Escherichia coli <         from: Transthoracic Echocardiogram (10.09.20 @ 15:28) >  ----------------------------------      Conclusions:  1. Calcified trileaflet aortic valve with decreased  opening. Peak transaortic valve gradient equals 16 mm Hg,  mean transaortic valve gradient equals 8 mm Hg, estimated  aortic valve area equals 1.7 sqcm (by continuity equation),  aortic valve velocity time integral equals 43 cm,  consistent with mild aortic stenosis.  2. Mildly dilated left atrium.  LA volume index = 41 cc/m2.  3. Normal left ventricular internal dimensions and wall  thickness.  4. Normal left ventricular systolic function. No segmental  wall motion abnormalities.  5. Moderate diastolic dysfunction (Stage II).  *** No previous Echo exam.  ------------------------------------    < end of copied text >

## 2020-10-12 NOTE — PROGRESS NOTE ADULT - SUBJECTIVE AND OBJECTIVE BOX
[facility]   [phone]  RENAL FOLLOW UP NOTE  --------------------------------------------------------------------------------  Chief Complaint:    24 hour events/subjective:    seen earlier. states feels better overall no dizziness or lightheadedness     PAST HISTORY  --------------------------------------------------------------------------------  No significant changes to PMH, PSH, FHx, SHx, unless otherwise noted    ALLERGIES & MEDICATIONS  --------------------------------------------------------------------------------  Allergies    Keflex (Rash; Hives)    Intolerances      Standing Inpatient Medications  cefTRIAXone   IVPB 1000 milliGRAM(s) IV Intermittent every 24 hours  cholecalciferol 1000 Unit(s) Oral daily  dextrose 5%. 1000 milliLiter(s) IV Continuous <Continuous>  dextrose 50% Injectable 12.5 Gram(s) IV Push once  dextrose 50% Injectable 25 Gram(s) IV Push once  dextrose 50% Injectable 25 Gram(s) IV Push once  influenza   Vaccine 0.5 milliLiter(s) IntraMuscular once  insulin lispro (HumaLOG) corrective regimen sliding scale   SubCutaneous three times a day before meals  insulin lispro (HumaLOG) corrective regimen sliding scale   SubCutaneous at bedtime  lactated ringers. 1000 milliLiter(s) IV Continuous <Continuous>  lactobacillus acidophilus 1 Tablet(s) Oral daily  levothyroxine 25 MICROGram(s) Oral daily  rosuvastatin 20 milliGRAM(s) Oral at bedtime  sodium chloride 0.9%. 1000 milliLiter(s) IV Continuous <Continuous>  venlafaxine XR. 75 milliGRAM(s) Oral daily    PRN Inpatient Medications  acetaminophen   Tablet .. 650 milliGRAM(s) Oral every 6 hours PRN  dextrose 40% Gel 15 Gram(s) Oral once PRN  glucagon  Injectable 1 milliGRAM(s) IntraMuscular once PRN      REVIEW OF SYSTEMS  --------------------------------------------------------------------------------    Gen: denies  fevers/chills,  CVS: denies chest pain/palpitations  Resp: denies SOB/Cough  GI: Denies N/V/Abd pain  : Denies dysuria    All other systems were reviewed and are negative, except as noted.    VITALS/PHYSICAL EXAM  --------------------------------------------------------------------------------  T(C): 36.8 (10-12-20 @ 21:10), Max: 36.8 (10-12-20 @ 21:10)  HR: 78 (10-12-20 @ 21:10) (70 - 80)  BP: 127/68 (10-12-20 @ 21:10) (127/68 - 164/76)  RR: 18 (10-12-20 @ 21:10) (17 - 18)  SpO2: 90% (10-12-20 @ 21:10) (90% - 99%)  Wt(kg): --        10-11-20 @ 07:01  -  10-12-20 @ 07:00  --------------------------------------------------------  IN: 1140 mL / OUT: 1150 mL / NET: -10 mL    10-12-20 @ 07:01  -  10-12-20 @ 23:06  --------------------------------------------------------  IN: 900 mL / OUT: 0 mL / NET: 900 mL      Physical Exam:  	  Gen: Non toxic comfortable appearing  [place]   	no jvd periorbital left ecchymosis   	Pulm: decrease bs  no rales or ronchi or wheezing  	CV: RRR, S1S2; no rub  	Abd: +BS, soft, nontender/nondistended  	: No suprapubic tenderness  	UE: Warm, no cyanosis  no clubbing,  no edema  	LE: Warm, + areas of dressing and ecchymosis  	Neuro: alert and oriented. speech coherent   	Skin: Warm, no decrease skin turgor but ecchymosis multiple areas of body       LABS/STUDIES  --------------------------------------------------------------------------------    144  |  98  |  34  ----------------------------<  167      [10-12-20 @ 14:39]  4.3   |  27  |  2.15        Ca     9.3     [10-12-20 @ 14:39]            Creatinine Trend:  SCr 2.15 [10-12 @ 14:39]  SCr 2.07 [10-12 @ 04:58]  SCr 1.64 [10-11 @ 05:40]  SCr 1.98 [10-10 @ 06:01]  SCr 1.81 [10-09 @ 05:45]              Urinalysis - [10-08-20 @ 18:28]      Color Yellow / Appearance Slightly Turbid / SG 1.014 / pH 6.5      Gluc Negative / Ketone Negative  / Bili Negative / Urobili Negative       Blood Small / Protein 30 mg/dL / Leuk Est Moderate / Nitrite Positive      RBC 5 / WBC 11 / Hyaline 2 / Gran  / Sq Epi  / Non Sq Epi 9 / Bacteria Many    Urine Protein 86      [10-11-20 @ 10:39]    TSH 4.18      [10-08-20 @ 21:03]    Immunofixation Serum:   No Monoclonal Band Identified    Reference Range: None Detected      [10-10-20 @ 10:44]  SPEP Interpretation: Normal Electrophoresis Pattern      [10-10-20 @ 10:44]

## 2020-10-12 NOTE — DISCHARGE NOTE PROVIDER - NSDCCPCAREPLAN_GEN_ALL_CORE_FT
PRINCIPAL DISCHARGE DIAGNOSIS  Diagnosis: Syncope  Assessment and Plan of Treatment: HOME CARE INSTRUCTIONS  Have someone stay with you until you feel stable.  Do not drive, operate machinery, or play sports until your caregiver says it is okay.  Keep all follow-up appointments as directed by your caregiver.   Lie down right away if you start feeling like you might faint. Breathe deeply and steadily. Wait until all the symptoms have passed.Drink enough fluids to keep your urine clear or pale yellow.  If you are taking blood pressure or heart medicine, get up slowly, taking several minutes to sit and then stand. This can reduce dizziness.  SEEK IMMEDIATE MEDICAL CARE IF:  You have a severe headache.  You have unusual pain in the chest, abdomen, or back.  You are bleeding from the mouth or rectum, or you have black or tarry stool.  You have an irregular or very fast heartbeat.  You have pain with breathing.  You have repeated fainting or seizure-like jerking during an episode.  You faint when sitting or lying down.  You have confusion.  You have difficulty walking.  You have severe weakness.  You have vision problems.  If you fainted, call your local emergency services (_____________________). Do not drive yourself to the hospital        SECONDARY DISCHARGE DIAGNOSES  Diagnosis: ELISA (acute kidney injury)  Assessment and Plan of Treatment: Avoid medications toxic to the kidney   Follow-up with your Kidney Doctor    Diagnosis: Urinary tract infection without hematuria, site unspecified  Assessment and Plan of Treatment: HOME CARE INSTRUCTIONS  f you were prescribed antibiotics, take them exactly as your caregiver instructs you. Finish the medication even if you feel better after you have only taken some of the medication.  Drink enough water and fluids to keep your urine clear or pale yellow.  Avoid caffeine, tea, and carbonated beverages. They tend to irritate your bladder.  Empty your bladder often. Avoid holding urine for long periods of time.  Empty your bladder before and after sexual intercourse.  After a bowel movement, women should cleanse from front to back. Use each tissue only once.  SEEK MEDICAL CARE IF:  You have back pain.  You develop a fever.  Your symptoms do not begin to resolve within 3 days.  SEEK IMMEDIATE MEDICAL CARE IF:  You have severe back pain or lower abdominal pain.  You develop chills.  You have nausea or vomiting.  You have continued burning or discomfort with urination.

## 2020-10-12 NOTE — PROGRESS NOTE ADULT - ASSESSMENT
89 F PMH T2DM, HTN, HLD, hypothyroidism, depression, p/w unwitnessed fall. she lost her balance and fell to her L side.  She thinks she may have passed out, doesn't recall actually hitting the ground. denies dizziness or LH with standing. +bruising on Left arm/shoulder/side after fall, +facial bruising on L side. Denies visual changes.  XR elbow/humerus/forearm/hand no fx. CTH +supraorbital meningioma, +microvasc ischemic changes no acute bleed. CT c spine no fx/subluxation. CT max/fac: no fx. also with abn creatinine    1- ELISA suspected  2- HTN   3- anemia  4- hx renal calculi  5- UTI     elisa in setting of infection and orthostatic hypotension   ivf hydration   cr is still elevated  renal sono reveals atrophic kidney   rocephin 1 g iv qd   d/w pt son at bedside

## 2020-10-12 NOTE — DISCHARGE NOTE PROVIDER - NSDCMRMEDTOKEN_GEN_ALL_CORE_FT
benazepril 20 mg oral tablet: 1 tab(s) orally once a day  Crestor 20 mg oral tablet: 1 tab(s) orally once a day  Effexor XR 75 mg oral capsule, extended release: 1 cap(s) orally once a day  ibuprofen 200 mg oral capsule: 1 cap(s) orally every 6 hours, As Needed  indapamide 1.25 mg oral tablet: 1 tab(s) orally once a day (in the morning)  metFORMIN 1000 mg oral tablet: 1 tab(s) orally 2 times a day  Probiotic Formula oral capsule: 1 cap(s) orally once a day  Synthroid 25 mcg (0.025 mg) oral tablet: 1 tab(s) orally once a day  Vitamin D3 1000 intl units (25 mcg) oral capsule: 1 tab(s) orally once a day   Crestor 20 mg oral tablet: 1 tab(s) orally once a day  Effexor XR 75 mg oral capsule, extended release: 1 cap(s) orally once a day  Probiotic Formula oral capsule: 1 cap(s) orally once a day  Synthroid 25 mcg (0.025 mg) oral tablet: 1 tab(s) orally once a day  Vitamin D3 1000 intl units (25 mcg) oral capsule: 1 tab(s) orally once a day

## 2020-10-13 ENCOUNTER — TRANSCRIPTION ENCOUNTER (OUTPATIENT)
Age: 85
End: 2020-10-13

## 2020-10-13 VITALS — OXYGEN SATURATION: 98 % | TEMPERATURE: 98 F | HEART RATE: 82 BPM | RESPIRATION RATE: 18 BRPM

## 2020-10-13 LAB
ANION GAP SERPL CALC-SCNC: 9 MMOL/L — SIGNIFICANT CHANGE UP (ref 5–17)
BUN SERPL-MCNC: 37 MG/DL — HIGH (ref 7–23)
CALCIUM SERPL-MCNC: 9.2 MG/DL — SIGNIFICANT CHANGE UP (ref 8.4–10.5)
CHLORIDE SERPL-SCNC: 104 MMOL/L — SIGNIFICANT CHANGE UP (ref 96–108)
CO2 SERPL-SCNC: 26 MMOL/L — SIGNIFICANT CHANGE UP (ref 22–31)
CREAT SERPL-MCNC: 1.87 MG/DL — HIGH (ref 0.5–1.3)
GLUCOSE BLDC GLUCOMTR-MCNC: 151 MG/DL — HIGH (ref 70–99)
GLUCOSE BLDC GLUCOMTR-MCNC: 98 MG/DL — SIGNIFICANT CHANGE UP (ref 70–99)
GLUCOSE SERPL-MCNC: 86 MG/DL — SIGNIFICANT CHANGE UP (ref 70–99)
HCT VFR BLD CALC: 29.4 % — LOW (ref 34.5–45)
HGB BLD-MCNC: 9.5 G/DL — LOW (ref 11.5–15.5)
M PROTEIN 24H UR ELPH-MRATE: SIGNIFICANT CHANGE UP
MCHC RBC-ENTMCNC: 31.8 PG — SIGNIFICANT CHANGE UP (ref 27–34)
MCHC RBC-ENTMCNC: 32.3 GM/DL — SIGNIFICANT CHANGE UP (ref 32–36)
MCV RBC AUTO: 98.3 FL — SIGNIFICANT CHANGE UP (ref 80–100)
NRBC # BLD: 0 /100 WBCS — SIGNIFICANT CHANGE UP (ref 0–0)
PLATELET # BLD AUTO: 155 K/UL — SIGNIFICANT CHANGE UP (ref 150–400)
POTASSIUM SERPL-MCNC: 3.8 MMOL/L — SIGNIFICANT CHANGE UP (ref 3.5–5.3)
POTASSIUM SERPL-SCNC: 3.8 MMOL/L — SIGNIFICANT CHANGE UP (ref 3.5–5.3)
RBC # BLD: 2.99 M/UL — LOW (ref 3.8–5.2)
RBC # FLD: 13.6 % — SIGNIFICANT CHANGE UP (ref 10.3–14.5)
SODIUM SERPL-SCNC: 139 MMOL/L — SIGNIFICANT CHANGE UP (ref 135–145)
WBC # BLD: 5.36 K/UL — SIGNIFICANT CHANGE UP (ref 3.8–10.5)
WBC # FLD AUTO: 5.36 K/UL — SIGNIFICANT CHANGE UP (ref 3.8–10.5)

## 2020-10-13 PROCEDURE — 90471 IMMUNIZATION ADMIN: CPT

## 2020-10-13 PROCEDURE — 73090 X-RAY EXAM OF FOREARM: CPT

## 2020-10-13 PROCEDURE — 82607 VITAMIN B-12: CPT

## 2020-10-13 PROCEDURE — 72125 CT NECK SPINE W/O DYE: CPT

## 2020-10-13 PROCEDURE — 81001 URINALYSIS AUTO W/SCOPE: CPT

## 2020-10-13 PROCEDURE — 99285 EMERGENCY DEPT VISIT HI MDM: CPT | Mod: 25

## 2020-10-13 PROCEDURE — 90715 TDAP VACCINE 7 YRS/> IM: CPT

## 2020-10-13 PROCEDURE — 90686 IIV4 VACC NO PRSV 0.5 ML IM: CPT

## 2020-10-13 PROCEDURE — 73130 X-RAY EXAM OF HAND: CPT

## 2020-10-13 PROCEDURE — 76377 3D RENDER W/INTRP POSTPROCES: CPT

## 2020-10-13 PROCEDURE — 70450 CT HEAD/BRAIN W/O DYE: CPT

## 2020-10-13 PROCEDURE — 82962 GLUCOSE BLOOD TEST: CPT

## 2020-10-13 PROCEDURE — 70486 CT MAXILLOFACIAL W/O DYE: CPT

## 2020-10-13 PROCEDURE — 86334 IMMUNOFIX E-PHORESIS SERUM: CPT

## 2020-10-13 PROCEDURE — 80048 BASIC METABOLIC PNL TOTAL CA: CPT

## 2020-10-13 PROCEDURE — 83735 ASSAY OF MAGNESIUM: CPT

## 2020-10-13 PROCEDURE — 84165 PROTEIN E-PHORESIS SERUM: CPT

## 2020-10-13 PROCEDURE — 93306 TTE W/DOPPLER COMPLETE: CPT

## 2020-10-13 PROCEDURE — 87086 URINE CULTURE/COLONY COUNT: CPT

## 2020-10-13 PROCEDURE — 97116 GAIT TRAINING THERAPY: CPT

## 2020-10-13 PROCEDURE — 73080 X-RAY EXAM OF ELBOW: CPT

## 2020-10-13 PROCEDURE — 84443 ASSAY THYROID STIM HORMONE: CPT

## 2020-10-13 PROCEDURE — 71045 X-RAY EXAM CHEST 1 VIEW: CPT

## 2020-10-13 PROCEDURE — U0003: CPT

## 2020-10-13 PROCEDURE — 86325 OTHER IMMUNOELECTROPHORESIS: CPT

## 2020-10-13 PROCEDURE — 99233 SBSQ HOSP IP/OBS HIGH 50: CPT

## 2020-10-13 PROCEDURE — 80053 COMPREHEN METABOLIC PANEL: CPT

## 2020-10-13 PROCEDURE — 86769 SARS-COV-2 COVID-19 ANTIBODY: CPT

## 2020-10-13 PROCEDURE — 73060 X-RAY EXAM OF HUMERUS: CPT

## 2020-10-13 PROCEDURE — 93005 ELECTROCARDIOGRAM TRACING: CPT

## 2020-10-13 PROCEDURE — 76770 US EXAM ABDO BACK WALL COMP: CPT

## 2020-10-13 PROCEDURE — 84100 ASSAY OF PHOSPHORUS: CPT

## 2020-10-13 PROCEDURE — 84156 ASSAY OF PROTEIN URINE: CPT

## 2020-10-13 PROCEDURE — 84155 ASSAY OF PROTEIN SERUM: CPT

## 2020-10-13 PROCEDURE — 85027 COMPLETE CBC AUTOMATED: CPT

## 2020-10-13 PROCEDURE — 82746 ASSAY OF FOLIC ACID SERUM: CPT

## 2020-10-13 PROCEDURE — 82550 ASSAY OF CK (CPK): CPT

## 2020-10-13 PROCEDURE — 85025 COMPLETE CBC W/AUTO DIFF WBC: CPT

## 2020-10-13 PROCEDURE — 97161 PT EVAL LOW COMPLEX 20 MIN: CPT

## 2020-10-13 PROCEDURE — 87186 SC STD MICRODIL/AGAR DIL: CPT

## 2020-10-13 PROCEDURE — 84484 ASSAY OF TROPONIN QUANT: CPT

## 2020-10-13 PROCEDURE — 72170 X-RAY EXAM OF PELVIS: CPT

## 2020-10-13 PROCEDURE — 83036 HEMOGLOBIN GLYCOSYLATED A1C: CPT

## 2020-10-13 RX ORDER — BENAZEPRIL HYDROCHLORIDE 40 MG/1
1 TABLET ORAL
Qty: 0 | Refills: 0 | DISCHARGE

## 2020-10-13 RX ORDER — METFORMIN HYDROCHLORIDE 850 MG/1
1 TABLET ORAL
Qty: 0 | Refills: 0 | DISCHARGE

## 2020-10-13 RX ORDER — INDAPAMIDE 1.25 MG
1 TABLET ORAL
Qty: 0 | Refills: 0 | DISCHARGE

## 2020-10-13 RX ORDER — IBUPROFEN 200 MG
1 TABLET ORAL
Qty: 0 | Refills: 0 | DISCHARGE

## 2020-10-13 RX ADMIN — Medication 1: at 12:04

## 2020-10-13 RX ADMIN — Medication 75 MILLIGRAM(S): at 11:12

## 2020-10-13 RX ADMIN — INFLUENZA VIRUS VACCINE 0.5 MILLILITER(S): 15; 15; 15; 15 SUSPENSION INTRAMUSCULAR at 13:18

## 2020-10-13 RX ADMIN — Medication 1 TABLET(S): at 11:12

## 2020-10-13 RX ADMIN — Medication 1000 UNIT(S): at 11:11

## 2020-10-13 RX ADMIN — Medication 25 MICROGRAM(S): at 05:25

## 2020-10-13 NOTE — PROGRESS NOTE ADULT - PROBLEM SELECTOR PLAN 4
likely sec  to orthostatic hypotension   echo done  cards f/u apprec   PT    brain CT noted. incidental finding of meningioma. pt w/o any neuro c/o. no HA.  d/w pt and son at bedside.   no further w/u warranted at this time given pt w/o symptoms and declining any nsx intervention.    advised if develop HA or neuro complaints to have mri as outpt

## 2020-10-13 NOTE — PROGRESS NOTE ADULT - ATTENDING COMMENTS
pt stable for dc home today  outpt f/u with pmd 1 week    Advanced care planning was discussed with patient and family.  Advanced care planning forms were reviewed and discussed as appropriate.  Differential diagnosis and plan of care discussed with patient after the evaluation.   Pain assessed and judicious use of narcotics when appropriate was discussed.  Importance of Fall prevention discussed.  Counseling on Smoking and Alcohol cessation was offered when appropriate.  Counseling on Diet, exercise, and medication compliance was done.   Approx 45 minutes spent.

## 2020-10-13 NOTE — PROGRESS NOTE ADULT - SUBJECTIVE AND OBJECTIVE BOX
HPI:  Patient seen and examined at bedside on 2 DSU.    Review Of Systems:           Respiratory: No shortness of breath, cough, or wheezing  Cardiovascular: No chest pain or palpitations  10 point review of systems is otherwise negative except as mentioned above        Medications:  acetaminophen   Tablet .. 650 milliGRAM(s) Oral every 6 hours PRN  cholecalciferol 1000 Unit(s) Oral daily  dextrose 40% Gel 15 Gram(s) Oral once PRN  dextrose 5%. 1000 milliLiter(s) IV Continuous <Continuous>  dextrose 50% Injectable 12.5 Gram(s) IV Push once  dextrose 50% Injectable 25 Gram(s) IV Push once  dextrose 50% Injectable 25 Gram(s) IV Push once  glucagon  Injectable 1 milliGRAM(s) IntraMuscular once PRN  insulin lispro (HumaLOG) corrective regimen sliding scale   SubCutaneous three times a day before meals  insulin lispro (HumaLOG) corrective regimen sliding scale   SubCutaneous at bedtime  lactated ringers. 1000 milliLiter(s) IV Continuous <Continuous>  lactobacillus acidophilus 1 Tablet(s) Oral daily  levothyroxine 25 MICROGram(s) Oral daily  rosuvastatin 20 milliGRAM(s) Oral at bedtime  sodium chloride 0.9%. 1000 milliLiter(s) IV Continuous <Continuous>  venlafaxine XR. 75 milliGRAM(s) Oral daily    PAST MEDICAL & SURGICAL HISTORY:  Heart murmur    Arthritis    Renal calculus or stone    Urinary incontinence    Breast cancer    Glaucoma    GERD (gastroesophageal reflux disease)    Hyperlipidemia    Diverticulitis    Diabetes mellitus type 2, noninsulin dependent    HTN (hypertension)    S/P bladder repair  Interstim device placement     S/P lumpectomy of breast  right breast with node removal    Renal calculi  s/p stone extraction 57 yrs ago      Vitals:  T(C): 36.9 (10-13-20 @ 12:22), Max: 36.9 (10-13-20 @ 12:22)  HR: 82 (10-13-20 @ 12:22) (72 - 82)  BP: 153/77 (10-13-20 @ 05:05) (153/77 - 153/77)  BP(mean): --  RR: 18 (10-13-20 @ 12:22) (18 - 18)  SpO2: 98% (10-13-20 @ 12:22) (94% - 98%)  Wt(kg): --  Daily     Daily Weight in k.9 (13 Oct 2020 08:29)  I&O's Summary    12 Oct 2020 07:01  -  13 Oct 2020 07:00  --------------------------------------------------------  IN: 950 mL / OUT: 0 mL / NET: 950 mL    13 Oct 2020 07:01  -  13 Oct 2020 23:02  --------------------------------------------------------  IN: 177 mL / OUT: 800 mL / NET: -623 mL        Physical Exam:  Appearance: Normal, well groomed, NAD  Eyes: PERRLA, EOMI, pink conjunctiva, no scleral icterus   HENT: left orbital ecchymosis  Cardiovascular: RRR, S1, S2, III/VI systolic murmur at apex; no edema; no JVD  Respiratory: Clear to auscultation bilaterally  Gastrointestinal: Soft, non-tender, non-distended, BS+  Musculoskeletal: No clubbing or joint deformity   Neurologic: No focal weakness  Lymphatic: No lymphadenopathy  Psychiatry: AAOx3 with appropriate mood and affect  Skin: No rashes, ecchymoses, or cyanosis, +skin tears (largest on left arm)                          9.5    5.36  )-----------( 155      ( 13 Oct 2020 06:14 )             29.4     10-13    139  |  104  |  37<H>  ----------------------------<  86  3.8   |  26  |  1.87<H>    Ca    9.2      13 Oct 2020 06:14

## 2020-10-13 NOTE — PROGRESS NOTE ADULT - ASSESSMENT
89 year-old woman with history as above presents with traumatic mechanical fall as outpatient.  No symptoms of UTI. Can transition to oral antibiotics to complete course.  Patient has low voltage ECG. Would consider TcPYP scan to screen for cardiac amyloidosis, but this can be pursued as outpatient.  ELISA seen on admission. Follow-up with nephrology.    If orthostatics improve, and creatinine continues to improve, then no cardiovascular contraindication to discharge today with outpatient follow-up in place.

## 2020-10-13 NOTE — CHART NOTE - NSCHARTNOTEFT_GEN_A_CORE
Discharge Note:    Requested by Dr. Patricio to facilitate d/c home. V/s, labs, diagnostics reviewed, pt stable to d/c now. Medication reconciliation reviewed, revised, and resolved with Dr. Patricio who medically cleared w/ f/u as directed. Please refer to d/c note for hospital details.    Debbie Kwok, NP-c  [id number]

## 2020-10-13 NOTE — PROGRESS NOTE ADULT - REASON FOR ADMISSION
fall, orthostatic hypotension, UTI

## 2020-10-13 NOTE — PROGRESS NOTE ADULT - SUBJECTIVE AND OBJECTIVE BOX
KOREY DIAZ  89y Female  MRN:[id number]    Patient is a 89y old  Female who presents with a chief complaint of fall, orthostatic hypotension, UTI (11 Oct 2020 13:57)    HPI:  89 F PMH T2DM, HTN, HLD, hypothyroidism, depression, p/w unwitnessed fall.  Pt states she was leaning on a wall, waiting for her daughter to pick her up.  She moved aside for someone to pass, and she lost her balance and fell to her L side.  She thinks she may have passed out, doesn't recall actually hitting the ground.  She denies cp, sob, f/c, n/v/d, dysuria, cough, palpitations.  She denies prodromal sx, denies dizziness or LH with standing. +bruising on Left arm/shoulder/side after fall, +facial bruising on L side. Denies visual changes.  Call placed to prasanth Russo overnight, no answer.    VS: 98.0, 79, 121/70, 18, 99% RA.  Orthostatic positive in ER.  Labs: no leukocytosis, anemia hgb 9.4, thrombocytopenia plt 147. HSt delta neg 46 --> 50. CMP with jagruti scr 1.9 prev 0.9. CXR KAYLIE.  XR pelvis no fx. XR elbow/humerus/forearm/hand no fx. CTH +supraorbital meningioma, +microvasc ischemic changes no acute bleed. CT c spine no fx/subluxation. CT max/fac: no fx. IN ER pt received IV ctx + IVF, tylenol prior to medicine team involvement.  (08 Oct 2020 23:02)      Patient seen and evaluated at bedside. No acute events overnight except as noted.    Interval HPI:  no events o/n    PAST MEDICAL & SURGICAL HISTORY:  Heart murmur    Arthritis    Renal calculus or stone    Urinary incontinence    Breast cancer    Glaucoma    GERD (gastroesophageal reflux disease)    Hyperlipidemia    Diverticulitis    Diabetes mellitus type 2, noninsulin dependent    HTN (hypertension)    S/P bladder repair  Interstim device placement 2010    S/P lumpectomy of breast  right breast with node removal    Renal calculi  s/p stone extraction 57 yrs ago        REVIEW OF SYSTEMS:  as per hpi      VITALS:   Vital Signs Last 24 Hrs  T(C): 36.9 (13 Oct 2020 12:22), Max: 36.9 (13 Oct 2020 12:22)  T(F): 98.4 (13 Oct 2020 12:22), Max: 98.4 (13 Oct 2020 12:22)  HR: 82 (13 Oct 2020 12:22) (72 - 82)  BP: 153/77 (13 Oct 2020 05:05) (127/68 - 153/77)  BP(mean): --  RR: 18 (13 Oct 2020 12:22) (18 - 18)  SpO2: 98% (13 Oct 2020 12:22) (90% - 98%)      PHYSICAL EXAM:  GENERAL: NAD, well-developed  HEAD:   , Normocephalic +facial ecchymosis   EYES: EOMI, PERRLA, conjunctiva and sclera clear  NECK: Supple, No JVD  CHEST/LUNG: Clear to auscultation bilaterally; No wheeze  HEART: S1, S2;   ABDOMEN: Soft, Nontender, Nondistended; Bowel sounds present  EXTREMITIES:  2+ Peripheral Pulses, No clubbing, cyanosis, or edema  PSYCH: Normal affect  NEUROLOGY: AAOX3; non-focal  SKIN: No rashes or lesions    Consultant(s) Notes Reviewed:  [x ] YES  [ ] NO  Care Discussed with Consultants/Other Providers [ x] YES  [ ] NO    MEDS:   MEDICATIONS  (STANDING):  cholecalciferol 1000 Unit(s) Oral daily  dextrose 5%. 1000 milliLiter(s) (50 mL/Hr) IV Continuous <Continuous>  dextrose 50% Injectable 12.5 Gram(s) IV Push once  dextrose 50% Injectable 25 Gram(s) IV Push once  dextrose 50% Injectable 25 Gram(s) IV Push once  insulin lispro (HumaLOG) corrective regimen sliding scale   SubCutaneous three times a day before meals  insulin lispro (HumaLOG) corrective regimen sliding scale   SubCutaneous at bedtime  lactated ringers. 1000 milliLiter(s) (75 mL/Hr) IV Continuous <Continuous>  lactobacillus acidophilus 1 Tablet(s) Oral daily  levothyroxine 25 MICROGram(s) Oral daily  rosuvastatin 20 milliGRAM(s) Oral at bedtime  sodium chloride 0.9%. 1000 milliLiter(s) (60 mL/Hr) IV Continuous <Continuous>  venlafaxine XR. 75 milliGRAM(s) Oral daily    MEDICATIONS  (PRN):  acetaminophen   Tablet .. 650 milliGRAM(s) Oral every 6 hours PRN Temp greater or equal to 38C (100.4F), Mild Pain (1 - 3), Moderate Pain (4 - 6), Severe Pain (7 - 10)  dextrose 40% Gel 15 Gram(s) Oral once PRN Blood Glucose LESS THAN 70 milliGRAM(s)/deciliter  glucagon  Injectable 1 milliGRAM(s) IntraMuscular once PRN Glucose LESS THAN 70 milligrams/deciliter        ALLERGIES:  Keflex (Rash; Hives)      LABS:                         9.5    5.36  )-----------( 155      ( 13 Oct 2020 06:14 )             29.4   10-13    139  |  104  |  37<H>  ----------------------------<  86  3.8   |  26  |  1.87<H>    Ca    9.2      13 Oct 2020 06:14        cCulture Results:   >100,000 CFU/ml Escherichia coli <         from: Transthoracic Echocardiogram (10.09.20 @ 15:28) >  ----------------------------------      Conclusions:  1. Calcified trileaflet aortic valve with decreased  opening. Peak transaortic valve gradient equals 16 mm Hg,  mean transaortic valve gradient equals 8 mm Hg, estimated  aortic valve area equals 1.7 sqcm (by continuity equation),  aortic valve velocity time integral equals 43 cm,  consistent with mild aortic stenosis.  2. Mildly dilated left atrium.  LA volume index = 41 cc/m2.  3. Normal left ventricular internal dimensions and wall  thickness.  4. Normal left ventricular systolic function. No segmental  wall motion abnormalities.  5. Moderate diastolic dysfunction (Stage II).  *** No previous Echo exam.  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 Can confirm with MR as clinically warranted. No intracranial hemorrhage  MAXILLOFACIAL CT: No evidence of displaced fracture.  CERVICAL SPINE CT: No evidence of fracture or traumatic subluxation    < end of copied text >

## 2020-10-13 NOTE — PROGRESS NOTE ADULT - NUTRITIONAL ASSESSMENT
This patient has been assessed with a concern for Malnutrition and has been determined to have a diagnosis/diagnoses of Severe protein-calorie malnutrition and Underweight/BMI < 19.    This patient is being managed with:   Diet DASH/TLC-  Sodium & Cholesterol Restricted  Consistent Carbohydrate {No Snacks} (CSTCHO)  Entered: Oct  9 2020 12:18AM    

## 2020-10-15 ENCOUNTER — APPOINTMENT (OUTPATIENT)
Dept: CARDIOLOGY | Facility: CLINIC | Age: 85
End: 2020-10-15
Payer: MEDICARE

## 2020-10-15 ENCOUNTER — NON-APPOINTMENT (OUTPATIENT)
Age: 85
End: 2020-10-15

## 2020-10-15 VITALS
TEMPERATURE: 97.6 F | DIASTOLIC BLOOD PRESSURE: 78 MMHG | HEIGHT: 66 IN | RESPIRATION RATE: 16 BRPM | BODY MASS INDEX: 17.04 KG/M2 | OXYGEN SATURATION: 97 % | WEIGHT: 106 LBS | HEART RATE: 83 BPM | SYSTOLIC BLOOD PRESSURE: 132 MMHG

## 2020-10-15 DIAGNOSIS — J45.909 UNSPECIFIED ASTHMA, UNCOMPLICATED: ICD-10-CM

## 2020-10-15 PROCEDURE — 99214 OFFICE O/P EST MOD 30 MIN: CPT

## 2020-10-15 PROCEDURE — 93000 ELECTROCARDIOGRAM COMPLETE: CPT

## 2020-10-15 NOTE — REASON FOR VISIT
[FreeTextEntry1] : The patient comes in for followup of her asthma, hypertension, diabetes, hyperlipidemia, and mitral regurgitation. She has no new complaints. She denies any chest pains, shortness of breath, or palpitations. She does complain of feeling tired. \par May 14, 2019: The patient today is complaining of problems with swallowing her metformin pill. She cuts it in half. I told her to put it in applesauce. She also is complaining of dry skin. She does use Dove soap. She denies any chest pains, shortness of breath, or palpitations\par September 17, 2019: Patient complains of feeling fatigued and not sleeping well.  Complains of dry mouth.  She has a hard time walking.  She is losing weight.  Daughter reports that she does not eat well.  She denies any chest pains, shortness of breath, or palpitations.\par December 17, 2019: The patient's biggest complaint is that she is tired. She denies any chest pains, shortness of breath, or palpitations. She does not do a lot of walking. Sometimes her ankles are swollen.\par June 11, 2020: The patient still has complaints of dyspnea on exertion fatigue and not sleeping well.  She denies any chest pain shortness of breath at rest or palpitations.  She has urinary incontinence and she is on a medicine for overactive bladder.  I discussed with the daughter about stopping the trapezium medication.  She does get muscle aches and I discussed with the daughter about stopping the rosuvastatin temporarily for 2 weeks to see if the muscle aches get better.  She does take an Advil twice a day for arthritis pains.  It does not seem to bother her stomach.\par October 8, 2020: Patient complains of pains when she walks especially down the left leg\par October 15, 2020: The patient after leaving the office 1 week ago tripped and fell and lost consciousness.  He was not clear whether she had a syncopal attack.  She was hospitalized.  They found an E. coli UTI.  They found that she had orthostatic hypotension and stopped her blood pressure medicines.  She now comes for follow-up.  She is tired.  She denies any chest pains, shortness of breath, or palpitations.  She is still not taking any blood pressure medicines.  She is following up with a nephrologist.

## 2020-10-15 NOTE — PHYSICAL EXAM
[General Appearance - Well Developed] : well developed [Normal Appearance] : normal appearance [Well Groomed] : well groomed [General Appearance - Well Nourished] : well nourished [General Appearance - In No Acute Distress] : no acute distress [No Deformities] : no deformities [Normal Conjunctiva] : the conjunctiva exhibited no abnormalities [Eyelids - No Xanthelasma] : the eyelids demonstrated no xanthelasmas [No Oral Pallor] : no oral pallor [Normal Oral Mucosa] : normal oral mucosa [Normal Jugular Venous A Waves Present] : normal jugular venous A waves present [No Oral Cyanosis] : no oral cyanosis [Normal Jugular Venous V Waves Present] : normal jugular venous V waves present [No Jugular Venous Rivera A Waves] : no jugular venous rivera A waves [Exaggerated Use Of Accessory Muscles For Inspiration] : no accessory muscle use [Respiration, Rhythm And Depth] : normal respiratory rhythm and effort [Auscultation Breath Sounds / Voice Sounds] : lungs were clear to auscultation bilaterally [Heart Rate And Rhythm] : heart rate and rhythm were normal [Heart Sounds] : normal S1 and S2 [Abdomen Soft] : soft [Abdomen Tenderness] : non-tender [Abdomen Mass (___ Cm)] : no abdominal mass palpated [Nail Clubbing] : no clubbing of the fingernails [Abnormal Walk] : normal gait [Gait - Sufficient For Exercise Testing] : the gait was sufficient for exercise testing [Cyanosis, Localized] : no localized cyanosis [Petechial Hemorrhages (___cm)] : no petechial hemorrhages [] : no rash [Skin Color & Pigmentation] : normal skin color and pigmentation [No Venous Stasis] : no venous stasis [No Skin Ulcers] : no skin ulcer [Skin Lesions] : no skin lesions [Oriented To Time, Place, And Person] : oriented to person, place, and time [No Xanthoma] : no  xanthoma was observed [No Anxiety] : not feeling anxious [Mood] : the mood was normal [Affect] : the affect was normal [FreeTextEntry1] : Incisional hernia left flank

## 2020-10-15 NOTE — REVIEW OF SYSTEMS
[Feeling Fatigued] : feeling fatigued [Dyspnea on exertion] : dyspnea during exertion [Incontinence] : incontinence [see HPI] : see HPI [Tremor] : a tremor was seen [Memory Lapses Or Loss] : memory lapses or loss [Negative] : Heme/Lymph [Shortness Of Breath] : no shortness of breath [Chest Pain] : no chest pain [Palpitations] : no palpitations

## 2020-10-15 NOTE — DISCUSSION/SUMMARY
[FreeTextEntry1] : The patient was examined. Her blood pressure was 132/78 and her pulse was 83. Her oxygen saturation was 97%. Her lungs were clear to auscultation. Cardiac exam was negative for rubs or gallops.There was a 3/6 holosystolic murmur heard at the apex. Her EKG showed normal sinus rhythm, a first-degree heart block, and poor R wave progression.  No acute changes were seen. She'll return in 1 month , or earlier if needed. She'll continue her current medications.

## 2020-11-13 NOTE — DIETITIAN INITIAL EVALUATION ADULT. - PROBLEM SELECTOR PROBLEM 7
This nurse notified Dr. Soares of the new consult and he states he is already
aware of this patient at this time. Type 2 diabetes mellitus without complication, without long-term current use of insulin

## 2020-11-18 ENCOUNTER — RX RENEWAL (OUTPATIENT)
Age: 85
End: 2020-11-18

## 2020-11-19 ENCOUNTER — NON-APPOINTMENT (OUTPATIENT)
Age: 85
End: 2020-11-19

## 2020-11-19 ENCOUNTER — APPOINTMENT (OUTPATIENT)
Dept: CARDIOLOGY | Facility: CLINIC | Age: 85
End: 2020-11-19
Payer: MEDICARE

## 2020-11-19 VITALS
WEIGHT: 103 LBS | HEIGHT: 66 IN | RESPIRATION RATE: 17 BRPM | TEMPERATURE: 97.7 F | DIASTOLIC BLOOD PRESSURE: 76 MMHG | SYSTOLIC BLOOD PRESSURE: 157 MMHG | BODY MASS INDEX: 16.55 KG/M2 | HEART RATE: 82 BPM

## 2020-11-19 DIAGNOSIS — L89.90 PRESSURE ULCER OF UNSPECIFIED SITE, UNSPECIFIED STAGE: ICD-10-CM

## 2020-11-19 PROCEDURE — 99214 OFFICE O/P EST MOD 30 MIN: CPT

## 2020-11-19 PROCEDURE — 93000 ELECTROCARDIOGRAM COMPLETE: CPT

## 2020-11-21 NOTE — REASON FOR VISIT
[FreeTextEntry1] : The patient comes in for followup of her asthma, hypertension, diabetes, hyperlipidemia, and mitral regurgitation. She has no new complaints. She denies any chest pains, shortness of breath, or palpitations. She does complain of feeling tired. \par May 14, 2019: The patient today is complaining of problems with swallowing her metformin pill. She cuts it in half. I told her to put it in applesauce. She also is complaining of dry skin. She does use Dove soap. She denies any chest pains, shortness of breath, or palpitations\par September 17, 2019: Patient complains of feeling fatigued and not sleeping well.  Complains of dry mouth.  She has a hard time walking.  She is losing weight.  Daughter reports that she does not eat well.  She denies any chest pains, shortness of breath, or palpitations.\par December 17, 2019: The patient's biggest complaint is that she is tired. She denies any chest pains, shortness of breath, or palpitations. She does not do a lot of walking. Sometimes her ankles are swollen.\par June 11, 2020: The patient still has complaints of dyspnea on exertion fatigue and not sleeping well.  She denies any chest pain shortness of breath at rest or palpitations.  She has urinary incontinence and she is on a medicine for overactive bladder.  I discussed with the daughter about stopping the trapezium medication.  She does get muscle aches and I discussed with the daughter about stopping the rosuvastatin temporarily for 2 weeks to see if the muscle aches get better.  She does take an Advil twice a day for arthritis pains.  It does not seem to bother her stomach.\par October 8, 2020: Patient complains of pains when she walks especially down the left leg\par October 15, 2020: The patient after leaving the office 1 week ago tripped and fell and lost consciousness.  He was not clear whether she had a syncopal attack.  She was hospitalized.  They found an E. coli UTI.  They found that she had orthostatic hypotension and stopped her blood pressure medicines.  She now comes for follow-up.  She is tired.  She denies any chest pains, shortness of breath, or palpitations.  She is still not taking any blood pressure medicines.  She is following up with a nephrologist.\par November 19, 2020: The patient's daughter reports that she has the beginnings of a pressure sore that is slightly pussy.  The patient herself has no specific complaints referable to that.  She has no chest pains, shortness of breath, or palpitations.  Blood pressures measured at home have been generally lower than the blood pressure here.  She is not yet on any antihypertensives.  She has had no further near syncope or syncope episodes and no further falls.  She was getting physical therapy and is doing her own exercises now.

## 2020-11-21 NOTE — DISCUSSION/SUMMARY
[FreeTextEntry1] : The patient was examined. Her blood pressure was 157/76 and her pulse was 82. Her oxygen saturation was 97%. Her lungs were clear to auscultation. Cardiac exam was negative for rubs or gallops.There was a 3/6 holosystolic murmur heard at the apex. Her EKG showed normal sinus rhythm, a first-degree heart block, and poor R wave progression.  No acute changes were seen. She'll return in 2 months , or earlier if needed. She'll continue her current medications.  Because of the decubitus ulcer we will refer the patient to wound care specialist.  The patient's daughter was advised to get her an inner tube to sit on.

## 2020-11-21 NOTE — PHYSICAL EXAM
[General Appearance - Well Developed] : well developed [Normal Appearance] : normal appearance [Well Groomed] : well groomed [General Appearance - Well Nourished] : well nourished [No Deformities] : no deformities [General Appearance - In No Acute Distress] : no acute distress [Normal Conjunctiva] : the conjunctiva exhibited no abnormalities [Eyelids - No Xanthelasma] : the eyelids demonstrated no xanthelasmas [Normal Oral Mucosa] : normal oral mucosa [No Oral Pallor] : no oral pallor [No Oral Cyanosis] : no oral cyanosis [Normal Jugular Venous A Waves Present] : normal jugular venous A waves present [Normal Jugular Venous V Waves Present] : normal jugular venous V waves present [No Jugular Venous Rivera A Waves] : no jugular venous rivera A waves [Respiration, Rhythm And Depth] : normal respiratory rhythm and effort [Exaggerated Use Of Accessory Muscles For Inspiration] : no accessory muscle use [Auscultation Breath Sounds / Voice Sounds] : lungs were clear to auscultation bilaterally [Heart Rate And Rhythm] : heart rate and rhythm were normal [Heart Sounds] : normal S1 and S2 [Abdomen Soft] : soft [Abdomen Tenderness] : non-tender [Abdomen Mass (___ Cm)] : no abdominal mass palpated [Abnormal Walk] : normal gait [Gait - Sufficient For Exercise Testing] : the gait was sufficient for exercise testing [Nail Clubbing] : no clubbing of the fingernails [Cyanosis, Localized] : no localized cyanosis [Petechial Hemorrhages (___cm)] : no petechial hemorrhages [Skin Color & Pigmentation] : normal skin color and pigmentation [] : no rash [No Venous Stasis] : no venous stasis [Skin Lesions] : no skin lesions [No Skin Ulcers] : no skin ulcer [No Xanthoma] : no  xanthoma was observed [Oriented To Time, Place, And Person] : oriented to person, place, and time [Affect] : the affect was normal [Mood] : the mood was normal [No Anxiety] : not feeling anxious [FreeTextEntry1] : pressure sore

## 2020-11-21 NOTE — REVIEW OF SYSTEMS
[Feeling Fatigued] : feeling fatigued [Dyspnea on exertion] : dyspnea during exertion [Incontinence] : incontinence [Tremor] : a tremor was seen [see HPI] : see HPI [Memory Lapses Or Loss] : memory lapses or loss [Negative] : Heme/Lymph [Shortness Of Breath] : no shortness of breath [Chest Pain] : no chest pain [Palpitations] : no palpitations

## 2021-01-21 ENCOUNTER — EMERGENCY (EMERGENCY)
Facility: HOSPITAL | Age: 86
LOS: 1 days | Discharge: HOME | End: 2021-01-21
Attending: STUDENT IN AN ORGANIZED HEALTH CARE EDUCATION/TRAINING PROGRAM
Payer: MEDICARE

## 2021-01-21 VITALS
SYSTOLIC BLOOD PRESSURE: 174 MMHG | RESPIRATION RATE: 17 BRPM | DIASTOLIC BLOOD PRESSURE: 74 MMHG | TEMPERATURE: 99 F | HEART RATE: 85 BPM | OXYGEN SATURATION: 100 %

## 2021-01-21 VITALS
HEART RATE: 86 BPM | DIASTOLIC BLOOD PRESSURE: 86 MMHG | SYSTOLIC BLOOD PRESSURE: 168 MMHG | TEMPERATURE: 98 F | OXYGEN SATURATION: 99 % | HEIGHT: 63 IN | RESPIRATION RATE: 18 BRPM

## 2021-01-21 PROCEDURE — 73562 X-RAY EXAM OF KNEE 3: CPT | Mod: 26,LT

## 2021-01-21 PROCEDURE — 73562 X-RAY EXAM OF KNEE 3: CPT

## 2021-01-21 PROCEDURE — 70450 CT HEAD/BRAIN W/O DYE: CPT | Mod: 26,MH

## 2021-01-21 PROCEDURE — 73552 X-RAY EXAM OF FEMUR 2/>: CPT | Mod: 26,LT

## 2021-01-21 PROCEDURE — 12001 RPR S/N/AX/GEN/TRNK 2.5CM/<: CPT | Mod: GC

## 2021-01-21 PROCEDURE — 90471 IMMUNIZATION ADMIN: CPT

## 2021-01-21 PROCEDURE — 72125 CT NECK SPINE W/O DYE: CPT

## 2021-01-21 PROCEDURE — 73552 X-RAY EXAM OF FEMUR 2/>: CPT

## 2021-01-21 PROCEDURE — 73502 X-RAY EXAM HIP UNI 2-3 VIEWS: CPT | Mod: 26,LT

## 2021-01-21 PROCEDURE — 90715 TDAP VACCINE 7 YRS/> IM: CPT

## 2021-01-21 PROCEDURE — 99284 EMERGENCY DEPT VISIT MOD MDM: CPT | Mod: 25

## 2021-01-21 PROCEDURE — 70450 CT HEAD/BRAIN W/O DYE: CPT

## 2021-01-21 PROCEDURE — 73502 X-RAY EXAM HIP UNI 2-3 VIEWS: CPT

## 2021-01-21 PROCEDURE — 72125 CT NECK SPINE W/O DYE: CPT | Mod: 26,MH

## 2021-01-21 PROCEDURE — 99284 EMERGENCY DEPT VISIT MOD MDM: CPT | Mod: 25,GC

## 2021-01-21 PROCEDURE — 12001 RPR S/N/AX/GEN/TRNK 2.5CM/<: CPT

## 2021-01-21 RX ORDER — TETANUS TOXOID, REDUCED DIPHTHERIA TOXOID AND ACELLULAR PERTUSSIS VACCINE, ADSORBED 5; 2.5; 8; 8; 2.5 [IU]/.5ML; [IU]/.5ML; UG/.5ML; UG/.5ML; UG/.5ML
0.5 SUSPENSION INTRAMUSCULAR ONCE
Refills: 0 | Status: COMPLETED | OUTPATIENT
Start: 2021-01-21 | End: 2021-01-21

## 2021-01-21 RX ADMIN — TETANUS TOXOID, REDUCED DIPHTHERIA TOXOID AND ACELLULAR PERTUSSIS VACCINE, ADSORBED 0.5 MILLILITER(S): 5; 2.5; 8; 8; 2.5 SUSPENSION INTRAMUSCULAR at 06:53

## 2021-01-21 NOTE — ED PROVIDER NOTE - NSFOLLOWUPINSTRUCTIONS_ED_ALL_ED_FT
Laceration    A laceration is a cut that goes through all of the layers of the skin and into the tissue that is right under the skin. Some lacerations heal on their own. Others need to be closed with skin adhesive strips, skin glue, stitches (sutures), or staples. Proper laceration care minimizes the risk of infection and helps the laceration to heal better.  If non-absorbable stitches or staples have been placed, they must be taken out within the time frame instructed by your healthcare provider.    Take the staples on your head out in 7-10 days. Take the stitches in your arm out in 5-7 days. You can go to any urgent care, ER, or primary doctor to have them removed.    SEEK IMMEDIATE MEDICAL CARE IF YOU HAVE ANY OF THE FOLLOWING SYMPTOMS: swelling around the wound, worsening pain, drainage from the wound, red streaking going away from your wound, inability to move finger or toe near the laceration, or discoloration of skin near the laceration.

## 2021-01-21 NOTE — ED PROCEDURE NOTE - PROCEDURE DATE TIME, MLM
21-Jan-2021 07:24 A&P:   Labor: admit to L&D for scheduled rLTCS  -Pepcid/reglan/bicitra  -routine labs  -EFM/toco  -NPO, IV hydration  Fetal: cat 1 tracing, fetal status reassuring  GBS: neg  Anesthesia consult      plan per OR schedule  LOUISE Villasenor, PGY-1

## 2021-01-21 NOTE — ED PROVIDER NOTE - PHYSICAL EXAMINATION
gen: well appearing  Mentation: AAO x 3  psych: mood appropriate  ENT: airway patent  Eyes: conjunctivae clear bilaterally  Cardio: RRR, no m/r/g  Resp: normal BS b/l  GI: s/nt/nd  : no CVA tenderness  Neuro: sensation and motor function intact, CN 2-12 intact  Skin: +1 cm lac over left occipital region, 2 cm lack over left lateral forearm with adjacent skin tear  MSK: normal movement of all extremities  Lymph/Vasc: no LE edema gen: well appearing  Mentation: AAO x 3  psych: mood appropriate  ENT: airway patent  Eyes: conjunctivae clear bilaterally  Cardio: RRR, no m/r/g  Resp: normal BS b/l  GI: s/nt/nd  : no CVA tenderness  Neuro: sensation and motor function intact, CN 2-12 intact  Skin: +1 cm lac over left occipital region, 4 cm lack over left lateral forearm with adjacent skin tear  MSK: normal movement of all extremities  Lymph/Vasc: no LE edema

## 2021-01-21 NOTE — ED PROCEDURE NOTE - PROCEDURE ADDITIONAL DETAILS
2 staples in 2cm  left occipital scalp lac  3 stitches in 4 cm left forearm lac, 4 cc of 1% lido, 4-0 nylon stitches; devitalized skin avulsion trimmed off and placed xeroform dressing over area    both irrigated with NS

## 2021-01-21 NOTE — ED PROVIDER NOTE - OBJECTIVE STATEMENT
89 y/o HTN, HLD, DM. hypothyroidism, depression BIBEMS s/p mechanical fall. Patient baseline ambulatory with walker but this AM woke up to go to bathroom without walker and lost balance and fell. Hit back of head and side of body. No LOC or preceding symptoms. At this time not complaining of any pain. No HA, n/v, vision changes, light sensitivity

## 2021-01-21 NOTE — ED PROVIDER NOTE - CLINICAL SUMMARY MEDICAL DECISION MAKING FREE TEXT BOX
89 y/o F presenting s/p mechanical fall. CT head/neck to eval for ICH and c-spine fx. No midline tenderness, FROM of all extremities, but patient with slight pain with ROM of left leg. XR left hip, femur, knee although fx unlikely. Lac repair for forearm and scalp. Tdap if not up to date

## 2021-01-21 NOTE — ED PROVIDER NOTE - PROGRESS NOTE DETAILS
Felicitas Juarez, PGY-1: pt able to ambulate without difficulty; walks at home with walker. ready for DC home.

## 2021-01-21 NOTE — ED PROCEDURE NOTE - ATTENDING CONTRIBUTION TO CARE
Left forearm lacerations repaired with staples and sutures. Pt tolerated the procedure well. No complications.

## 2021-01-21 NOTE — ED PROVIDER NOTE - CARE PLAN
Principal Discharge DX:	Laceration of scalp, initial encounter  Secondary Diagnosis:	Laceration of left forearm, initial encounter

## 2021-01-21 NOTE — ED ADULT NURSE NOTE - OBJECTIVE STATEMENT
89y female presents to the ED via EMS, complaining of Fall. AOx4; gross neuro intact, PERRL. Patient endorses slipping in her house when going to the bathroom this morning, patient fell and hit her head and left arm on the floor. Denies LOC. Upon assessment, patient has a small non bleeding lac to the L side of her head and a non bleeding lac on her L forearm. Denies SOB, chest pain, dizziness, N/V/D, tingling, visual changes. Denies neck pain/tenderness on palpation but placed in C collar by EMS. Well appearing and denies any complaints at this time.

## 2021-01-21 NOTE — ED PROVIDER NOTE - PATIENT PORTAL LINK FT
You can access the FollowMyHealth Patient Portal offered by [facility] by registering at the following website: [email or web address]. By joining [facility]’s FollowMyHealth portal, you will also be able to view your health information using other applications (apps) compatible with our system.

## 2021-02-11 ENCOUNTER — APPOINTMENT (OUTPATIENT)
Dept: CARDIOLOGY | Facility: CLINIC | Age: 86
End: 2021-02-11

## 2021-04-15 ENCOUNTER — APPOINTMENT (OUTPATIENT)
Dept: CARDIOLOGY | Facility: CLINIC | Age: 86
End: 2021-04-15
Payer: MEDICARE

## 2021-04-15 ENCOUNTER — NON-APPOINTMENT (OUTPATIENT)
Age: 86
End: 2021-04-15

## 2021-04-15 VITALS
BODY MASS INDEX: 16.46 KG/M2 | TEMPERATURE: 97.8 F | SYSTOLIC BLOOD PRESSURE: 128 MMHG | WEIGHT: 102 LBS | HEART RATE: 97 BPM | OXYGEN SATURATION: 97 % | DIASTOLIC BLOOD PRESSURE: 70 MMHG

## 2021-04-15 PROCEDURE — 99214 OFFICE O/P EST MOD 30 MIN: CPT

## 2021-04-15 PROCEDURE — 93000 ELECTROCARDIOGRAM COMPLETE: CPT

## 2021-04-15 NOTE — REASON FOR VISIT
[FreeTextEntry1] : The patient comes in for followup of her asthma, hypertension, diabetes, hyperlipidemia, and mitral regurgitation. She has no new complaints. She denies any chest pains, shortness of breath, or palpitations. She does complain of feeling tired. \par May 14, 2019: The patient today is complaining of problems with swallowing her metformin pill. She cuts it in half. I told her to put it in applesauce. She also is complaining of dry skin. She does use Dove soap. She denies any chest pains, shortness of breath, or palpitations\par September 17, 2019: Patient complains of feeling fatigued and not sleeping well.  Complains of dry mouth.  She has a hard time walking.  She is losing weight.  Daughter reports that she does not eat well.  She denies any chest pains, shortness of breath, or palpitations.\par December 17, 2019: The patient's biggest complaint is that she is tired. She denies any chest pains, shortness of breath, or palpitations. She does not do a lot of walking. Sometimes her ankles are swollen.\par June 11, 2020: The patient still has complaints of dyspnea on exertion fatigue and not sleeping well.  She denies any chest pain shortness of breath at rest or palpitations.  She has urinary incontinence and she is on a medicine for overactive bladder.  I discussed with the daughter about stopping the trapezium medication.  She does get muscle aches and I discussed with the daughter about stopping the rosuvastatin temporarily for 2 weeks to see if the muscle aches get better.  She does take an Advil twice a day for arthritis pains.  It does not seem to bother her stomach.\par October 8, 2020: Patient complains of pains when she walks especially down the left leg\par October 15, 2020: The patient after leaving the office 1 week ago tripped and fell and lost consciousness.  He was not clear whether she had a syncopal attack.  She was hospitalized.  They found an E. coli UTI.  They found that she had orthostatic hypotension and stopped her blood pressure medicines.  She now comes for follow-up.  She is tired.  She denies any chest pains, shortness of breath, or palpitations.  She is still not taking any blood pressure medicines.  She is following up with a nephrologist.\par November 19, 2020: The patient's daughter reports that she has the beginnings of a pressure sore that is slightly pussy.  The patient herself has no specific complaints referable to that.  She has no chest pains, shortness of breath, or palpitations.  Blood pressures measured at home have been generally lower than the blood pressure here.  She is not yet on any antihypertensives.  She has had no further near syncope or syncope episodes and no further falls.  She was getting physical therapy and is doing her own exercises now.\par April 15, 2021: On January 21, 2021 the patient fell and hit her head.  She went to the [facility] emergency room and got stitches.  She eventually was cleared to go down to [place] and went down to [place] for 2 months.  She was confused at first but they may have forgotten to give her her antidepressants down there.  She is back to baseline now.  The daughter has noticed that she sweats around her head and neck after sleeping.  She also has a dry cough in the middle of the night.  She does have dyspnea on exertion but she is much less active.  She does walk with a walker.

## 2021-04-15 NOTE — PHYSICAL EXAM
[General Appearance - Well Developed] : well developed [Normal Appearance] : normal appearance [Well Groomed] : well groomed [General Appearance - Well Nourished] : well nourished [No Deformities] : no deformities [General Appearance - In No Acute Distress] : no acute distress [Normal Conjunctiva] : the conjunctiva exhibited no abnormalities [Eyelids - No Xanthelasma] : the eyelids demonstrated no xanthelasmas [Normal Oral Mucosa] : normal oral mucosa [No Oral Pallor] : no oral pallor [No Oral Cyanosis] : no oral cyanosis [Normal Jugular Venous A Waves Present] : normal jugular venous A waves present [Normal Jugular Venous V Waves Present] : normal jugular venous V waves present [No Jugular Venous Rivera A Waves] : no jugular venous rivera A waves [Respiration, Rhythm And Depth] : normal respiratory rhythm and effort [Exaggerated Use Of Accessory Muscles For Inspiration] : no accessory muscle use [Auscultation Breath Sounds / Voice Sounds] : lungs were clear to auscultation bilaterally [Heart Rate And Rhythm] : heart rate and rhythm were normal [Heart Sounds] : normal S1 and S2 [Abdomen Soft] : soft [Abdomen Tenderness] : non-tender [Abdomen Mass (___ Cm)] : no abdominal mass palpated [Nail Clubbing] : no clubbing of the fingernails [Cyanosis, Localized] : no localized cyanosis [Petechial Hemorrhages (___cm)] : no petechial hemorrhages [Skin Color & Pigmentation] : normal skin color and pigmentation [] : no rash [No Venous Stasis] : no venous stasis [Skin Lesions] : no skin lesions [No Skin Ulcers] : no skin ulcer [No Xanthoma] : no  xanthoma was observed [Oriented To Time, Place, And Person] : oriented to person, place, and time [Affect] : the affect was normal [Mood] : the mood was normal [No Anxiety] : not feeling anxious [FreeTextEntry1] : Slow, unsteady gait requiring a walker

## 2021-04-15 NOTE — DISCUSSION/SUMMARY
[FreeTextEntry1] : The patient was examined. Her blood pressure was 128/70 and her pulse was 97. Her oxygen saturation was 97%. Her lungs were clear to auscultation. Cardiac exam was negative for rubs or gallops.There was a 3/6 holosystolic murmur heard at the apex. Her EKG showed normal sinus rhythm, and a left anterior hemiblock..  No acute changes were seen. She'll return in 4 months , or earlier if needed. She'll continue her current medications.  Total time spent on the day of the encounter was 36  minutes which include face-to-face and non face-to-face times personally spent by the physician preparing to see the patient, obtaining  separately obtained history, performing a medically appropriate exam and evaluation, counseling, educating, talking to the family or caregivers, ordering medicines, ordering tests or procedures, referring and communicating with other healthcare foods professionals, and documenting clinical information in the electronic health record.

## 2021-05-14 ENCOUNTER — TRANSCRIPTION ENCOUNTER (OUTPATIENT)
Age: 86
End: 2021-05-14

## 2021-05-19 ENCOUNTER — EMERGENCY (EMERGENCY)
Facility: HOSPITAL | Age: 86
LOS: 1 days | Discharge: HOME | End: 2021-05-19
Attending: EMERGENCY MEDICINE
Payer: MEDICARE

## 2021-05-19 VITALS
TEMPERATURE: 98 F | RESPIRATION RATE: 20 BRPM | SYSTOLIC BLOOD PRESSURE: 172 MMHG | HEART RATE: 74 BPM | DIASTOLIC BLOOD PRESSURE: 88 MMHG | WEIGHT: 169.98 LBS | OXYGEN SATURATION: 97 % | HEIGHT: 63 IN

## 2021-05-19 LAB
ALBUMIN SERPL ELPH-MCNC: 4.2 G/DL — SIGNIFICANT CHANGE UP (ref 3.3–5)
ALP SERPL-CCNC: 67 U/L — SIGNIFICANT CHANGE UP (ref 40–120)
ALT FLD-CCNC: 85 U/L — HIGH (ref 10–45)
ANION GAP SERPL CALC-SCNC: 15 MMOL/L — SIGNIFICANT CHANGE UP (ref 5–17)
AST SERPL-CCNC: 89 U/L — HIGH (ref 10–40)
BASOPHILS # BLD AUTO: 0.02 K/UL — SIGNIFICANT CHANGE UP (ref 0–0.2)
BASOPHILS NFR BLD AUTO: 0.4 % — SIGNIFICANT CHANGE UP (ref 0–2)
BILIRUB SERPL-MCNC: 0.2 MG/DL — SIGNIFICANT CHANGE UP (ref 0.2–1.2)
BUN SERPL-MCNC: 33 MG/DL — HIGH (ref 7–23)
CALCIUM SERPL-MCNC: 10.1 MG/DL — SIGNIFICANT CHANGE UP (ref 8.4–10.5)
CHLORIDE SERPL-SCNC: 97 MMOL/L — SIGNIFICANT CHANGE UP (ref 96–108)
CO2 SERPL-SCNC: 24 MMOL/L — SIGNIFICANT CHANGE UP (ref 22–31)
CREAT SERPL-MCNC: 1.62 MG/DL — HIGH (ref 0.5–1.3)
EOSINOPHIL # BLD AUTO: 0 K/UL — SIGNIFICANT CHANGE UP (ref 0–0.5)
EOSINOPHIL NFR BLD AUTO: 0 % — SIGNIFICANT CHANGE UP (ref 0–6)
GLUCOSE SERPL-MCNC: 111 MG/DL — HIGH (ref 70–99)
HCT VFR BLD CALC: 32.8 % — LOW (ref 34.5–45)
HGB BLD-MCNC: 10 G/DL — LOW (ref 11.5–15.5)
IMM GRANULOCYTES NFR BLD AUTO: 0.2 % — SIGNIFICANT CHANGE UP (ref 0–1.5)
LYMPHOCYTES # BLD AUTO: 0.91 K/UL — LOW (ref 1–3.3)
LYMPHOCYTES # BLD AUTO: 16.5 % — SIGNIFICANT CHANGE UP (ref 13–44)
MAGNESIUM SERPL-MCNC: 1.8 MG/DL — SIGNIFICANT CHANGE UP (ref 1.6–2.6)
MCHC RBC-ENTMCNC: 28.9 PG — SIGNIFICANT CHANGE UP (ref 27–34)
MCHC RBC-ENTMCNC: 30.5 GM/DL — LOW (ref 32–36)
MCV RBC AUTO: 94.8 FL — SIGNIFICANT CHANGE UP (ref 80–100)
MONOCYTES # BLD AUTO: 0.36 K/UL — SIGNIFICANT CHANGE UP (ref 0–0.9)
MONOCYTES NFR BLD AUTO: 6.5 % — SIGNIFICANT CHANGE UP (ref 2–14)
NEUTROPHILS # BLD AUTO: 4.2 K/UL — SIGNIFICANT CHANGE UP (ref 1.8–7.4)
NEUTROPHILS NFR BLD AUTO: 76.4 % — SIGNIFICANT CHANGE UP (ref 43–77)
NRBC # BLD: 0 /100 WBCS — SIGNIFICANT CHANGE UP (ref 0–0)
PHOSPHATE SERPL-MCNC: 3.6 MG/DL — SIGNIFICANT CHANGE UP (ref 2.5–4.5)
PLATELET # BLD AUTO: 198 K/UL — SIGNIFICANT CHANGE UP (ref 150–400)
POTASSIUM SERPL-MCNC: 4.4 MMOL/L — SIGNIFICANT CHANGE UP (ref 3.5–5.3)
POTASSIUM SERPL-SCNC: 4.4 MMOL/L — SIGNIFICANT CHANGE UP (ref 3.5–5.3)
PROT SERPL-MCNC: 6.7 G/DL — SIGNIFICANT CHANGE UP (ref 6–8.3)
RBC # BLD: 3.46 M/UL — LOW (ref 3.8–5.2)
RBC # FLD: 15.2 % — HIGH (ref 10.3–14.5)
SODIUM SERPL-SCNC: 136 MMOL/L — SIGNIFICANT CHANGE UP (ref 135–145)
WBC # BLD: 5.5 K/UL — SIGNIFICANT CHANGE UP (ref 3.8–10.5)
WBC # FLD AUTO: 5.5 K/UL — SIGNIFICANT CHANGE UP (ref 3.8–10.5)

## 2021-05-19 PROCEDURE — 99284 EMERGENCY DEPT VISIT MOD MDM: CPT | Mod: GC

## 2021-05-19 RX ORDER — SODIUM CHLORIDE 9 MG/ML
1000 INJECTION, SOLUTION INTRAVENOUS ONCE
Refills: 0 | Status: COMPLETED | OUTPATIENT
Start: 2021-05-19 | End: 2021-05-19

## 2021-05-19 RX ADMIN — SODIUM CHLORIDE 1000 MILLILITER(S): 9 INJECTION, SOLUTION INTRAVENOUS at 23:00

## 2021-05-19 NOTE — ED ADULT TRIAGE NOTE - DOMESTIC TRAVEL HIGH RISK QUESTION
Patient was on a medication called Semprex -D 60 mg taken twice daily.  This medication has been discontinued.  She would like to know if you know of anything that would be similar to this either by prescription or over the counter that she could take in place of this.  This medication also helped her with her vertigo.  She can be reached at [phone].  Thank you  
Please advise    
Spoke with PT   
There is no exact substitute- would recommend she switch to Claritin- D or Zyrtec- D available OTC  
No

## 2021-05-19 NOTE — ED PROVIDER NOTE - NSFOLLOWUPINSTRUCTIONS_ED_ALL_ED_FT
You came to the emergency department with confusion. CT scan of your head and blood work did not show a cause of your symptoms, however urine tests shows signs of bacteria. You are being sent home with an antibiotics to treat for urinary tract infection. Should you develop fevers, back pain, or increased confusion please return to the emergency department immediately.

## 2021-05-19 NOTE — ED PROVIDER NOTE - PATIENT PORTAL LINK FT
You can access the FollowMyHealth Patient Portal offered by [facility] by registering at the following website: [email or web address]. By joining [employer]’s FollowMyHealth portal, you will also be able to view your health information using other applications (apps) compatible with our system.

## 2021-05-19 NOTE — ED PROVIDER NOTE - CARE PROVIDER_API CALL
Veto Flores (MD)  Cardiology; Internal Medicine  [address], [facility] [address]  [place] [address]  Phone: [phone]  Fax: [phone]  Follow Up Time:

## 2021-05-19 NOTE — ED ADULT NURSE REASSESSMENT NOTE - NS ED NURSE REASSESS COMMENT FT1
Straight catheterization procedure completed; patient tolerated procedure well. Sterility maintained throughout procedure. 400 ml of urine in the drainage bag. Samples collected and sent to lab.

## 2021-05-19 NOTE — ED PROVIDER NOTE - CONSTITUTIONAL, MLM
normal... Frail appearing, awake, alert, oriented to person, place, time/situation and in no apparent distress.

## 2021-05-19 NOTE — ED PROVIDER NOTE - CLINICAL SUMMARY MEDICAL DECISION MAKING FREE TEXT BOX
89F Hx of T2DM on metformin, HTN, HLD, hypothyroidism, and depression p/w AMS since last night. Daughter reports Sx similar to prior UTI. Labs, CT head

## 2021-05-19 NOTE — ED PROVIDER NOTE - ATTENDING CONTRIBUTION TO CARE
Patient is a 89 yo F with history of type 2 DM, HTN, hyperlipidemia, hypothyroidism, depression here for evaluation of AMS. Daughter at bedside, states patient is confused since last night. She did not know what to do when given her medications. Patient is alert and able to answer questions. Denies any chest pain, shortness of breath, nausea, vomiting, abdominal pain.     VS noted  Gen. elderly, no acute distress, Non toxic   HEENT: EOMI, mmm  Lungs: CTAB/L no C/ W /R   CVS: RRR   Abd; Soft non tender, non distended   Ext: no edema  Skin: no rash  Neuro AAOx3 non focal clear speech  a/p: confusion - r/o metabolic derangement, UTI. Plan for labs, u/a. Will check CT Head.   - Silvia ROBERSON

## 2021-05-19 NOTE — ED PROVIDER NOTE - OBJECTIVE STATEMENT
Pt is a 89 y/o F with PMHx of T2DM on metformin, HTN no longer on meds, HLD, hypothyroidism, and depression p/w AMS. As per daughter pt has not been herself since last night. Last night she was confused, asking what to do when her evening meds were given to her. Pt told her daughter she felt "foggy" today and daughter noticed she appeared weak with decreased PO intake. Pt is a 89 y/o F with PMHx of T2DM on metformin, HTN no longer on meds, HLD, hypothyroidism, and depression p/w AMS. As per daughter pt has not been herself since last night. Last night she was confused, asking what to do when her evening meds were given to her. Pt told her daughter she felt "foggy" today and daughter noticed she appeared weak with decreased PO intake. At baseline pt is AAOx3. Pt denies any symptoms at this time, but is a poor historian. Daughter endorses concern pt's symptoms could be dementia.

## 2021-05-20 VITALS
SYSTOLIC BLOOD PRESSURE: 179 MMHG | RESPIRATION RATE: 16 BRPM | OXYGEN SATURATION: 100 % | DIASTOLIC BLOOD PRESSURE: 82 MMHG | HEART RATE: 70 BPM

## 2021-05-20 LAB
APPEARANCE UR: CLEAR — SIGNIFICANT CHANGE UP
BACTERIA # UR AUTO: ABNORMAL
BILIRUB UR-MCNC: NEGATIVE — SIGNIFICANT CHANGE UP
COLOR SPEC: SIGNIFICANT CHANGE UP
DIFF PNL FLD: NEGATIVE — SIGNIFICANT CHANGE UP
EPI CELLS # UR: 0 /HPF — SIGNIFICANT CHANGE UP
GLUCOSE UR QL: NEGATIVE — SIGNIFICANT CHANGE UP
HYALINE CASTS # UR AUTO: 0 /LPF — SIGNIFICANT CHANGE UP (ref 0–2)
KETONES UR-MCNC: NEGATIVE — SIGNIFICANT CHANGE UP
LEUKOCYTE ESTERASE UR-ACNC: NEGATIVE — SIGNIFICANT CHANGE UP
NITRITE UR-MCNC: POSITIVE
PH UR: 7 — SIGNIFICANT CHANGE UP (ref 5–8)
PROT UR-MCNC: ABNORMAL
RBC CASTS # UR COMP ASSIST: 3 /HPF — SIGNIFICANT CHANGE UP (ref 0–4)
SP GR SPEC: 1.01 — LOW (ref 1.01–1.02)
UROBILINOGEN FLD QL: NEGATIVE — SIGNIFICANT CHANGE UP
WBC UR QL: 0 /HPF — SIGNIFICANT CHANGE UP (ref 0–5)

## 2021-05-20 PROCEDURE — 83735 ASSAY OF MAGNESIUM: CPT

## 2021-05-20 PROCEDURE — 36000 PLACE NEEDLE IN VEIN: CPT

## 2021-05-20 PROCEDURE — G1004: CPT

## 2021-05-20 PROCEDURE — 84100 ASSAY OF PHOSPHORUS: CPT

## 2021-05-20 PROCEDURE — 99284 EMERGENCY DEPT VISIT MOD MDM: CPT | Mod: 25

## 2021-05-20 PROCEDURE — 70450 CT HEAD/BRAIN W/O DYE: CPT | Mod: 26,ME

## 2021-05-20 PROCEDURE — 80053 COMPREHEN METABOLIC PANEL: CPT

## 2021-05-20 PROCEDURE — 87077 CULTURE AEROBIC IDENTIFY: CPT

## 2021-05-20 PROCEDURE — 87186 SC STD MICRODIL/AGAR DIL: CPT

## 2021-05-20 PROCEDURE — 87086 URINE CULTURE/COLONY COUNT: CPT

## 2021-05-20 PROCEDURE — 85025 COMPLETE CBC W/AUTO DIFF WBC: CPT

## 2021-05-20 PROCEDURE — 82962 GLUCOSE BLOOD TEST: CPT

## 2021-05-20 PROCEDURE — 70450 CT HEAD/BRAIN W/O DYE: CPT

## 2021-05-20 PROCEDURE — 81001 URINALYSIS AUTO W/SCOPE: CPT

## 2021-05-20 RX ADMIN — Medication 1 TABLET(S): at 01:46

## 2021-05-20 NOTE — ED ADULT NURSE NOTE - OBJECTIVE STATEMENT
89 y.o F presents to the ED from home for confusion. Patient is accompanied by daughter- as per daughter, the patient can normally get around the house with her walker but today she seemed weak; also the patient was slow to answer questions and more forgetful and confused than usual. Daughter states "when this happened in the past it was due to dehydration and UTI." Patient is awake and alert and speaking coherently- A&Ox3 upon assessment but states she has been feeling "foggy" and confused. Patient denies headache, dizziness, chest pain, abdominal pain, n/v/d, hematuria and dysuria but is also incontinent.

## 2021-06-24 NOTE — ED ADULT NURSE NOTE - NSFALLRSKASSESSDT_ED_ALL_ED
Epidural Block    Patient location during procedure: pre-op  Time Called: 2/18/2019 12:19 AM  Reason for Block:labor epidural  Staffing:  Performing  Anesthesiologist: Kenny Conklin MD  Preanesthetic Checklist  Completed: patient identified, risks, benefits, and alternatives discussed, timeout performed, consent obtained, airway assessed, oxygen available, suction available, emergency drugs available and hand hygiene performed  Procedure  Patient position: sitting  Prep: ChloraPrep  Patient monitoring: continuous pulse oximetry, heart rate and blood pressure  Approach: midline  Location: L3-L4  Injection technique: ANIRUDH saline  Number of Attempts:1  Needle  Needle type: Darin   Needle gauge: 18 G     Catheter in Space: 5  Assessment  Sensory level:  No complications               20-May-2021 00:25

## 2021-07-21 RX ORDER — LEVOTHYROXINE SODIUM 25 UG/1
25 TABLET ORAL DAILY
Qty: 90 | Refills: 3 | Status: ACTIVE | COMMUNITY
Start: 2017-12-05 | End: 1900-01-01

## 2021-07-31 ENCOUNTER — RX RENEWAL (OUTPATIENT)
Age: 86
End: 2021-07-31

## 2021-09-15 ENCOUNTER — LABORATORY RESULT (OUTPATIENT)
Age: 86
End: 2021-09-15

## 2021-09-15 ENCOUNTER — APPOINTMENT (OUTPATIENT)
Dept: CARDIOLOGY | Facility: CLINIC | Age: 86
End: 2021-09-15
Payer: MEDICARE

## 2021-09-15 ENCOUNTER — NON-APPOINTMENT (OUTPATIENT)
Age: 86
End: 2021-09-15

## 2021-09-15 VITALS
DIASTOLIC BLOOD PRESSURE: 60 MMHG | SYSTOLIC BLOOD PRESSURE: 99 MMHG | WEIGHT: 100 LBS | BODY MASS INDEX: 16.14 KG/M2 | OXYGEN SATURATION: 100 % | HEART RATE: 92 BPM

## 2021-09-15 PROCEDURE — 93000 ELECTROCARDIOGRAM COMPLETE: CPT

## 2021-09-15 PROCEDURE — 99214 OFFICE O/P EST MOD 30 MIN: CPT

## 2021-09-15 RX ORDER — INDAPAMIDE 1.25 MG/1
1.25 TABLET, FILM COATED ORAL DAILY
Qty: 90 | Refills: 3 | Status: DISCONTINUED | COMMUNITY
Start: 2019-12-17 | End: 2021-09-15

## 2021-09-15 NOTE — REVIEW OF SYSTEMS
[Feeling Fatigued] : feeling fatigued [Negative] : Heme/Lymph [SOB] : no shortness of breath [Chest Discomfort] : no chest discomfort [Palpitations] : no palpitations

## 2021-09-15 NOTE — PHYSICAL EXAM
[General Appearance - Well Developed] : well developed [Normal Appearance] : normal appearance [Well Groomed] : well groomed [General Appearance - Well Nourished] : well nourished [No Deformities] : no deformities [General Appearance - In No Acute Distress] : no acute distress [Normal Conjunctiva] : the conjunctiva exhibited no abnormalities [Eyelids - No Xanthelasma] : the eyelids demonstrated no xanthelasmas [Normal Oral Mucosa] : normal oral mucosa [No Oral Pallor] : no oral pallor [No Oral Cyanosis] : no oral cyanosis [Normal Jugular Venous A Waves Present] : normal jugular venous A waves present [Normal Jugular Venous V Waves Present] : normal jugular venous V waves present [No Jugular Venous Rivear A Waves] : no jugular venous rivera A waves [Respiration, Rhythm And Depth] : normal respiratory rhythm and effort [Exaggerated Use Of Accessory Muscles For Inspiration] : no accessory muscle use [Auscultation Breath Sounds / Voice Sounds] : lungs were clear to auscultation bilaterally [Heart Rate And Rhythm] : heart rate and rhythm were normal [Heart Sounds] : normal S1 and S2 [Abdomen Soft] : soft [Abdomen Tenderness] : non-tender [Abdomen Mass (___ Cm)] : no abdominal mass palpated [Nail Clubbing] : no clubbing of the fingernails [Cyanosis, Localized] : no localized cyanosis [Petechial Hemorrhages (___cm)] : no petechial hemorrhages [Skin Color & Pigmentation] : normal skin color and pigmentation [] : no rash [No Venous Stasis] : no venous stasis [Skin Lesions] : no skin lesions [No Skin Ulcers] : no skin ulcer [No Xanthoma] : no  xanthoma was observed [Oriented To Time, Place, And Person] : oriented to person, place, and time [Affect] : the affect was normal [Mood] : the mood was normal [No Anxiety] : not feeling anxious [FreeTextEntry1] : Slow, unsteady gait requiring a walker

## 2021-09-15 NOTE — REASON FOR VISIT
[FreeTextEntry1] : The patient comes in for followup of her asthma, hypertension, diabetes, hyperlipidemia, and mitral regurgitation. She has no new complaints. She denies any chest pains, shortness of breath, or palpitations. She does complain of feeling tired. \par May 14, 2019: The patient today is complaining of problems with swallowing her metformin pill. She cuts it in half. I told her to put it in applesauce. She also is complaining of dry skin. She does use Dove soap. She denies any chest pains, shortness of breath, or palpitations\par September 17, 2019: Patient complains of feeling fatigued and not sleeping well.  Complains of dry mouth.  She has a hard time walking.  She is losing weight.  Daughter reports that she does not eat well.  She denies any chest pains, shortness of breath, or palpitations.\par December 17, 2019: The patient's biggest complaint is that she is tired. She denies any chest pains, shortness of breath, or palpitations. She does not do a lot of walking. Sometimes her ankles are swollen.\par June 11, 2020: The patient still has complaints of dyspnea on exertion fatigue and not sleeping well.  She denies any chest pain shortness of breath at rest or palpitations.  She has urinary incontinence and she is on a medicine for overactive bladder.  I discussed with the daughter about stopping the trapezium medication.  She does get muscle aches and I discussed with the daughter about stopping the rosuvastatin temporarily for 2 weeks to see if the muscle aches get better.  She does take an Advil twice a day for arthritis pains.  It does not seem to bother her stomach.\par October 8, 2020: Patient complains of pains when she walks especially down the left leg\par October 15, 2020: The patient after leaving the office 1 week ago tripped and fell and lost consciousness.  He was not clear whether she had a syncopal attack.  She was hospitalized.  They found an E. coli UTI.  They found that she had orthostatic hypotension and stopped her blood pressure medicines.  She now comes for follow-up.  She is tired.  She denies any chest pains, shortness of breath, or palpitations.  She is still not taking any blood pressure medicines.  She is following up with a nephrologist.\par November 19, 2020: The patient's daughter reports that she has the beginnings of a pressure sore that is slightly pussy.  The patient herself has no specific complaints referable to that.  She has no chest pains, shortness of breath, or palpitations.  Blood pressures measured at home have been generally lower than the blood pressure here.  She is not yet on any antihypertensives.  She has had no further near syncope or syncope episodes and no further falls.  She was getting physical therapy and is doing her own exercises now.\par April 15, 2021: On January 21, 2021 the patient fell and hit her head.  She went to the [facility] emergency room and got stitches.  She eventually was cleared to go down to [place] and went down to [place] for 2 months.  She was confused at first but they may have forgotten to give her her antidepressants down there.  She is back to baseline now.  The daughter has noticed that she sweats around her head and neck after sleeping.  She also has a dry cough in the middle of the night.  She does have dyspnea on exertion but she is much less active.  She does walk with a walker.\par September 15, 2021: The patient complains of fatigue and poor memory.  She denies any chest pains, shortness of breath, or palpitations.  She did have a UTI which caused her to have increased confusion and she was hospitalized for time.  She is no longer taking anything for blood pressure.  Daughter reports that she has poor nutritional intake.

## 2021-09-15 NOTE — DISCUSSION/SUMMARY
[FreeTextEntry1] : The patient was examined. Her blood pressure was 99/60 and her pulse was 92. Her oxygen saturation was 100%. Her lungs were clear to auscultation. Cardiac exam was negative for rubs or gallops.There was a 3/6 holosystolic murmur heard at the apex. Her EKG showed normal sinus rhythm, and a left anterior hemiblock..  No acute changes were seen. She'll return in 4 months , or earlier if needed. She'll continue her current medications.  Total time spent on the day of the encounter was 36  minutes which include face-to-face and non face-to-face times personally spent by the physician preparing to see the patient, obtaining  separately obtained history, performing a medically appropriate exam and evaluation, counseling, educating, talking to the family or caregivers, ordering medicines, ordering tests or procedures, referring and communicating with other healthcare foods professionals, and documenting clinical information in the electronic health record.

## 2021-09-16 LAB
ALBUMIN SERPL ELPH-MCNC: 4.2 G/DL
ALP BLD-CCNC: 76 U/L
ALT SERPL-CCNC: 110 U/L
ANION GAP SERPL CALC-SCNC: 14 MMOL/L
AST SERPL-CCNC: 106 U/L
BASOPHILS # BLD AUTO: 0.03 K/UL
BASOPHILS NFR BLD AUTO: 0.5 %
BILIRUB SERPL-MCNC: 0.2 MG/DL
BUN SERPL-MCNC: 35 MG/DL
CALCIUM SERPL-MCNC: 10.3 MG/DL
CHLORIDE SERPL-SCNC: 101 MMOL/L
CHOLEST SERPL-MCNC: 96 MG/DL
CO2 SERPL-SCNC: 25 MMOL/L
CREAT SERPL-MCNC: 1.6 MG/DL
EOSINOPHIL # BLD AUTO: 0.16 K/UL
EOSINOPHIL NFR BLD AUTO: 2.4 %
ESTIMATED AVERAGE GLUCOSE: 117 MG/DL
GLUCOSE SERPL-MCNC: 194 MG/DL
HBA1C MFR BLD HPLC: 5.7 %
HCT VFR BLD CALC: 34.6 %
HDLC SERPL-MCNC: 49 MG/DL
HGB BLD-MCNC: 10.2 G/DL
IMM GRANULOCYTES NFR BLD AUTO: 0.3 %
LDLC SERPL CALC-MCNC: 25 MG/DL
LYMPHOCYTES # BLD AUTO: 1.85 K/UL
LYMPHOCYTES NFR BLD AUTO: 27.8 %
MAN DIFF?: NORMAL
MCHC RBC-ENTMCNC: 28.4 PG
MCHC RBC-ENTMCNC: 29.5 GM/DL
MCV RBC AUTO: 96.4 FL
MONOCYTES # BLD AUTO: 0.45 K/UL
MONOCYTES NFR BLD AUTO: 6.8 %
NEUTROPHILS # BLD AUTO: 4.14 K/UL
NEUTROPHILS NFR BLD AUTO: 62.2 %
NONHDLC SERPL-MCNC: 47 MG/DL
PLATELET # BLD AUTO: 220 K/UL
POTASSIUM SERPL-SCNC: 4.5 MMOL/L
PROT SERPL-MCNC: 6.4 G/DL
RBC # BLD: 3.59 M/UL
RBC # FLD: 14.8 %
SODIUM SERPL-SCNC: 140 MMOL/L
T3RU NFR SERPL: 1 TBI
T4 SERPL-MCNC: 9 UG/DL
TRIGL SERPL-MCNC: 113 MG/DL
TSH SERPL-ACNC: 4.72 UIU/ML
WBC # FLD AUTO: 6.65 K/UL

## 2021-10-28 ENCOUNTER — RX RENEWAL (OUTPATIENT)
Age: 86
End: 2021-10-28

## 2021-11-18 ENCOUNTER — APPOINTMENT (OUTPATIENT)
Dept: ORTHOPEDIC SURGERY | Facility: CLINIC | Age: 86
End: 2021-11-18
Payer: MEDICARE

## 2021-11-18 VITALS
OXYGEN SATURATION: 96 % | HEART RATE: 86 BPM | WEIGHT: 100 LBS | BODY MASS INDEX: 17.07 KG/M2 | HEIGHT: 64 IN | DIASTOLIC BLOOD PRESSURE: 68 MMHG | SYSTOLIC BLOOD PRESSURE: 104 MMHG

## 2021-11-18 DIAGNOSIS — M66.241 SPONTANEOUS RUPTURE OF EXTENSOR TENDONS, RIGHT HAND: ICD-10-CM

## 2021-11-18 DIAGNOSIS — M66.242 SPONTANEOUS RUPTURE OF EXTENSOR TENDONS, LEFT HAND: ICD-10-CM

## 2021-11-18 PROCEDURE — 99204 OFFICE O/P NEW MOD 45 MIN: CPT

## 2022-01-11 ENCOUNTER — LABORATORY RESULT (OUTPATIENT)
Age: 87
End: 2022-01-11

## 2022-01-11 DIAGNOSIS — N39.0 URINARY TRACT INFECTION, SITE NOT SPECIFIED: ICD-10-CM

## 2022-01-12 LAB
APPEARANCE: CLEAR
BILIRUBIN URINE: NEGATIVE
BLOOD URINE: NEGATIVE
COLOR: NORMAL
GLUCOSE QUALITATIVE U: NEGATIVE
KETONES URINE: NEGATIVE
LEUKOCYTE ESTERASE URINE: NEGATIVE
NITRITE URINE: NEGATIVE
PH URINE: 6.5
PROTEIN URINE: ABNORMAL
SPECIFIC GRAVITY URINE: >=1.03
UROBILINOGEN URINE: NORMAL

## 2022-01-13 LAB — BACTERIA UR CULT: NORMAL

## 2022-01-15 LAB — BACTERIA UR CULT: NORMAL

## 2022-01-19 ENCOUNTER — APPOINTMENT (OUTPATIENT)
Dept: CARDIOLOGY | Facility: CLINIC | Age: 87
End: 2022-01-19
Payer: MEDICARE

## 2022-01-19 ENCOUNTER — NON-APPOINTMENT (OUTPATIENT)
Age: 87
End: 2022-01-19

## 2022-01-19 VITALS
SYSTOLIC BLOOD PRESSURE: 207 MMHG | OXYGEN SATURATION: 97 % | BODY MASS INDEX: 18.61 KG/M2 | HEIGHT: 64 IN | HEART RATE: 76 BPM | DIASTOLIC BLOOD PRESSURE: 85 MMHG | WEIGHT: 109 LBS

## 2022-01-19 VITALS — SYSTOLIC BLOOD PRESSURE: 152 MMHG | DIASTOLIC BLOOD PRESSURE: 64 MMHG

## 2022-01-19 PROCEDURE — G0009: CPT

## 2022-01-19 PROCEDURE — 99214 OFFICE O/P EST MOD 30 MIN: CPT

## 2022-01-19 PROCEDURE — 90732 PPSV23 VACC 2 YRS+ SUBQ/IM: CPT

## 2022-01-19 PROCEDURE — 93000 ELECTROCARDIOGRAM COMPLETE: CPT

## 2022-01-19 RX ORDER — PNEUMOCOCCAL VACCINE POLYVALENT 25; 25; 25; 25; 25; 25; 25; 25; 25; 25; 25; 25; 25; 25; 25; 25; 25; 25; 25; 25; 25; 25; 25 UG/.5ML; UG/.5ML; UG/.5ML; UG/.5ML; UG/.5ML; UG/.5ML; UG/.5ML; UG/.5ML; UG/.5ML; UG/.5ML; UG/.5ML; UG/.5ML; UG/.5ML; UG/.5ML; UG/.5ML; UG/.5ML; UG/.5ML; UG/.5ML; UG/.5ML; UG/.5ML; UG/.5ML; UG/.5ML; UG/.5ML
25 INJECTION, SOLUTION INTRAMUSCULAR; SUBCUTANEOUS
Qty: 1 | Refills: 0 | Status: DISCONTINUED | COMMUNITY
Start: 2022-01-19 | End: 2022-01-19

## 2022-01-19 NOTE — DISCUSSION/SUMMARY
[FreeTextEntry1] : The patient was examined. Her blood pressure was 152/64 and her pulse was 76. Her oxygen saturation was 97%. Her lungs were clear to auscultation. Cardiac exam was negative for rubs or gallops.There was a 3/6 holosystolic murmur heard at the apex. Her EKG showed normal sinus rhythm, and a left anterior hemiblock..  No acute changes were seen. She'll return in 4 months , or earlier if needed. She'll continue her current medications.  Total time spent on the day of the encounter was 36 minutes which include face-to-face and non face-to-face times personally spent by the physician preparing to see the patient, obtaining  separately obtained history, performing a medically appropriate exam and evaluation, counseling, educating, talking to the family or caregivers, ordering medicines, ordering tests or procedures, referring and communicating with other healthcare professionals, and documenting clinical information in the electronic health record.  A pneumococcal twenty-three vaccine was administered today.

## 2022-01-19 NOTE — REASON FOR VISIT
[FreeTextEntry1] : The patient comes in for followup of her asthma, hypertension, diabetes, hyperlipidemia, and mitral regurgitation. She has no new complaints. She denies any chest pains, shortness of breath, or palpitations. She does complain of feeling tired. \par May 14, 2019: The patient today is complaining of problems with swallowing her metformin pill. She cuts it in half. I told her to put it in applesauce. She also is complaining of dry skin. She does use Dove soap. She denies any chest pains, shortness of breath, or palpitations\par September 17, 2019: Patient complains of feeling fatigued and not sleeping well.  Complains of dry mouth.  She has a hard time walking.  She is losing weight.  Daughter reports that she does not eat well.  She denies any chest pains, shortness of breath, or palpitations.\par December 17, 2019: The patient's biggest complaint is that she is tired. She denies any chest pains, shortness of breath, or palpitations. She does not do a lot of walking. Sometimes her ankles are swollen.\par June 11, 2020: The patient still has complaints of dyspnea on exertion fatigue and not sleeping well.  She denies any chest pain shortness of breath at rest or palpitations.  She has urinary incontinence and she is on a medicine for overactive bladder.  I discussed with the daughter about stopping the trapezium medication.  She does get muscle aches and I discussed with the daughter about stopping the rosuvastatin temporarily for 2 weeks to see if the muscle aches get better.  She does take an Advil twice a day for arthritis pains.  It does not seem to bother her stomach.\par October 8, 2020: Patient complains of pains when she walks especially down the left leg\par October 15, 2020: The patient after leaving the office 1 week ago tripped and fell and lost consciousness.  He was not clear whether she had a syncopal attack.  She was hospitalized.  They found an E. coli UTI.  They found that she had orthostatic hypotension and stopped her blood pressure medicines.  She now comes for follow-up.  She is tired.  She denies any chest pains, shortness of breath, or palpitations.  She is still not taking any blood pressure medicines.  She is following up with a nephrologist.\par November 19, 2020: The patient's daughter reports that she has the beginnings of a pressure sore that is slightly pussy.  The patient herself has no specific complaints referable to that.  She has no chest pains, shortness of breath, or palpitations.  Blood pressures measured at home have been generally lower than the blood pressure here.  She is not yet on any antihypertensives.  She has had no further near syncope or syncope episodes and no further falls.  She was getting physical therapy and is doing her own exercises now.\par April 15, 2021: On January 21, 2021 the patient fell and hit her head.  She went to the [facility] emergency room and got stitches.  She eventually was cleared to go down to [place] and went down to [place] for 2 months.  She was confused at first but they may have forgotten to give her her antidepressants down there.  She is back to baseline now.  The daughter has noticed that she sweats around her head and neck after sleeping.  She also has a dry cough in the middle of the night.  She does have dyspnea on exertion but she is much less active.  She does walk with a walker.\par September 15, 2021: The patient complains of fatigue and poor memory.  She denies any chest pains, shortness of breath, or palpitations.  She did have a UTI which caused her to have increased confusion and she was hospitalized for time.  She is no longer taking anything for blood pressure.  Daughter reports that she has poor nutritional intake.\par January 19, 2022: The patient had a change in mental status and was determined to have a urinary tract infection.  The first urine culture was polymicrobial and was felt to be contaminated.  She took Cipro for 5 days.  On day three she had a repeat culture which was negative.  She is much better.\par

## 2022-03-03 RX ORDER — AZTREONAM 2 G
1 VIAL (EA) INJECTION
Qty: 0 | Refills: 0 | DISCHARGE
Start: 2022-03-03 | End: 2022-03-09

## 2022-03-09 ENCOUNTER — INPATIENT (INPATIENT)
Facility: HOSPITAL | Age: 87
LOS: 6 days | Discharge: HOME HEALTH | DRG: 682 | End: 2022-03-16
Attending: INTERNAL MEDICINE | Admitting: INTERNAL MEDICINE
Payer: MEDICARE

## 2022-03-09 VITALS
RESPIRATION RATE: 16 BRPM | TEMPERATURE: 97 F | SYSTOLIC BLOOD PRESSURE: 119 MMHG | OXYGEN SATURATION: 98 % | WEIGHT: 100.09 LBS | DIASTOLIC BLOOD PRESSURE: 73 MMHG | HEIGHT: 62 IN | HEART RATE: 66 BPM

## 2022-03-09 DIAGNOSIS — N17.9 ACUTE KIDNEY FAILURE, UNSPECIFIED: ICD-10-CM

## 2022-03-09 LAB
ALBUMIN SERPL ELPH-MCNC: 4.2 G/DL — SIGNIFICANT CHANGE UP (ref 3.3–5)
ALP SERPL-CCNC: 88 U/L — SIGNIFICANT CHANGE UP (ref 40–120)
ALT FLD-CCNC: 43 U/L — SIGNIFICANT CHANGE UP (ref 10–45)
ANION GAP SERPL CALC-SCNC: 16 MMOL/L — SIGNIFICANT CHANGE UP (ref 5–17)
ANION GAP SERPL CALC-SCNC: 18 MMOL/L — HIGH (ref 5–17)
APPEARANCE UR: CLEAR — SIGNIFICANT CHANGE UP
AST SERPL-CCNC: 51 U/L — HIGH (ref 10–40)
BACTERIA # UR AUTO: NEGATIVE — SIGNIFICANT CHANGE UP
BASE EXCESS BLDV CALC-SCNC: -10.4 MMOL/L — LOW (ref -2–2)
BASE EXCESS BLDV CALC-SCNC: -7 MMOL/L — LOW (ref -2–2)
BASOPHILS # BLD AUTO: 0.03 K/UL — SIGNIFICANT CHANGE UP (ref 0–0.2)
BASOPHILS NFR BLD AUTO: 0.4 % — SIGNIFICANT CHANGE UP (ref 0–2)
BILIRUB SERPL-MCNC: 0.1 MG/DL — LOW (ref 0.2–1.2)
BILIRUB UR-MCNC: NEGATIVE — SIGNIFICANT CHANGE UP
BUN SERPL-MCNC: 53 MG/DL — HIGH (ref 7–23)
BUN SERPL-MCNC: 56 MG/DL — HIGH (ref 7–23)
CA-I SERPL-SCNC: 1.31 MMOL/L — SIGNIFICANT CHANGE UP (ref 1.15–1.33)
CA-I SERPL-SCNC: 1.45 MMOL/L — HIGH (ref 1.15–1.33)
CALCIUM SERPL-MCNC: 10.7 MG/DL — HIGH (ref 8.4–10.5)
CALCIUM SERPL-MCNC: 9.6 MG/DL — SIGNIFICANT CHANGE UP (ref 8.4–10.5)
CHLORIDE BLDV-SCNC: 101 MMOL/L — SIGNIFICANT CHANGE UP (ref 96–108)
CHLORIDE BLDV-SCNC: 94 MMOL/L — LOW (ref 96–108)
CHLORIDE SERPL-SCNC: 93 MMOL/L — LOW (ref 96–108)
CHLORIDE SERPL-SCNC: 99 MMOL/L — SIGNIFICANT CHANGE UP (ref 96–108)
CO2 BLDV-SCNC: 18 MMOL/L — LOW (ref 22–26)
CO2 BLDV-SCNC: 23 MMOL/L — SIGNIFICANT CHANGE UP (ref 22–26)
CO2 SERPL-SCNC: 16 MMOL/L — LOW (ref 22–31)
CO2 SERPL-SCNC: 19 MMOL/L — LOW (ref 22–31)
COLOR SPEC: SIGNIFICANT CHANGE UP
CREAT SERPL-MCNC: 3.05 MG/DL — HIGH (ref 0.5–1.3)
CREAT SERPL-MCNC: 3.3 MG/DL — HIGH (ref 0.5–1.3)
DIFF PNL FLD: ABNORMAL
EGFR: 13 ML/MIN/1.73M2 — LOW
EGFR: 14 ML/MIN/1.73M2 — LOW
EOSINOPHIL # BLD AUTO: 0 K/UL — SIGNIFICANT CHANGE UP (ref 0–0.5)
EOSINOPHIL NFR BLD AUTO: 0 % — SIGNIFICANT CHANGE UP (ref 0–6)
EPI CELLS # UR: 2 /HPF — SIGNIFICANT CHANGE UP
GAS PNL BLDV: 128 MMOL/L — LOW (ref 136–145)
GAS PNL BLDV: 128 MMOL/L — LOW (ref 136–145)
GAS PNL BLDV: SIGNIFICANT CHANGE UP
GLUCOSE BLDC GLUCOMTR-MCNC: 60 MG/DL — LOW (ref 70–99)
GLUCOSE BLDC GLUCOMTR-MCNC: 62 MG/DL — LOW (ref 70–99)
GLUCOSE BLDV-MCNC: 127 MG/DL — HIGH (ref 70–99)
GLUCOSE BLDV-MCNC: 78 MG/DL — SIGNIFICANT CHANGE UP (ref 70–99)
GLUCOSE SERPL-MCNC: 134 MG/DL — HIGH (ref 70–99)
GLUCOSE SERPL-MCNC: 58 MG/DL — LOW (ref 70–99)
GLUCOSE UR QL: NEGATIVE — SIGNIFICANT CHANGE UP
HCO3 BLDV-SCNC: 17 MMOL/L — LOW (ref 22–29)
HCO3 BLDV-SCNC: 21 MMOL/L — LOW (ref 22–29)
HCT VFR BLD CALC: 34.7 % — SIGNIFICANT CHANGE UP (ref 34.5–45)
HCT VFR BLDA CALC: 27 % — LOW (ref 34.5–46.5)
HCT VFR BLDA CALC: 32 % — LOW (ref 34.5–46.5)
HGB BLD CALC-MCNC: 10.6 G/DL — LOW (ref 11.7–16.1)
HGB BLD CALC-MCNC: 8.9 G/DL — LOW (ref 11.7–16.1)
HGB BLD-MCNC: 10.6 G/DL — LOW (ref 11.5–15.5)
HYALINE CASTS # UR AUTO: 0 /LPF — SIGNIFICANT CHANGE UP (ref 0–2)
IMM GRANULOCYTES NFR BLD AUTO: 0.4 % — SIGNIFICANT CHANGE UP (ref 0–1.5)
KETONES UR-MCNC: NEGATIVE — SIGNIFICANT CHANGE UP
LACTATE BLDV-MCNC: 4.3 MMOL/L — CRITICAL HIGH (ref 0.7–2)
LACTATE BLDV-MCNC: 4.6 MMOL/L — CRITICAL HIGH (ref 0.7–2)
LEUKOCYTE ESTERASE UR-ACNC: NEGATIVE — SIGNIFICANT CHANGE UP
LYMPHOCYTES # BLD AUTO: 1.67 K/UL — SIGNIFICANT CHANGE UP (ref 1–3.3)
LYMPHOCYTES # BLD AUTO: 22.9 % — SIGNIFICANT CHANGE UP (ref 13–44)
MAGNESIUM SERPL-MCNC: 2.2 MG/DL — SIGNIFICANT CHANGE UP (ref 1.6–2.6)
MCHC RBC-ENTMCNC: 28.6 PG — SIGNIFICANT CHANGE UP (ref 27–34)
MCHC RBC-ENTMCNC: 30.5 GM/DL — LOW (ref 32–36)
MCV RBC AUTO: 93.5 FL — SIGNIFICANT CHANGE UP (ref 80–100)
MONOCYTES # BLD AUTO: 0.46 K/UL — SIGNIFICANT CHANGE UP (ref 0–0.9)
MONOCYTES NFR BLD AUTO: 6.3 % — SIGNIFICANT CHANGE UP (ref 2–14)
NEUTROPHILS # BLD AUTO: 5.11 K/UL — SIGNIFICANT CHANGE UP (ref 1.8–7.4)
NEUTROPHILS NFR BLD AUTO: 70 % — SIGNIFICANT CHANGE UP (ref 43–77)
NITRITE UR-MCNC: NEGATIVE — SIGNIFICANT CHANGE UP
NRBC # BLD: 0 /100 WBCS — SIGNIFICANT CHANGE UP (ref 0–0)
OSMOLALITY UR: 272 MOS/KG — LOW (ref 300–900)
PCO2 BLDV: 42 MMHG — SIGNIFICANT CHANGE UP (ref 39–42)
PCO2 BLDV: 54 MMHG — HIGH (ref 39–42)
PH BLDV: 7.2 — LOW (ref 7.32–7.43)
PH BLDV: 7.21 — LOW (ref 7.32–7.43)
PH UR: 7 — SIGNIFICANT CHANGE UP (ref 5–8)
PHOSPHATE SERPL-MCNC: 4.4 MG/DL — SIGNIFICANT CHANGE UP (ref 2.5–4.5)
PLATELET # BLD AUTO: 228 K/UL — SIGNIFICANT CHANGE UP (ref 150–400)
PO2 BLDV: 31 MMHG — SIGNIFICANT CHANGE UP (ref 25–45)
PO2 BLDV: 47 MMHG — HIGH (ref 25–45)
POTASSIUM BLDV-SCNC: 4.3 MMOL/L — SIGNIFICANT CHANGE UP (ref 3.5–5.1)
POTASSIUM BLDV-SCNC: 5.2 MMOL/L — HIGH (ref 3.5–5.1)
POTASSIUM SERPL-MCNC: 4.4 MMOL/L — SIGNIFICANT CHANGE UP (ref 3.5–5.3)
POTASSIUM SERPL-MCNC: 4.8 MMOL/L — SIGNIFICANT CHANGE UP (ref 3.5–5.3)
POTASSIUM SERPL-SCNC: 4.4 MMOL/L — SIGNIFICANT CHANGE UP (ref 3.5–5.3)
POTASSIUM SERPL-SCNC: 4.8 MMOL/L — SIGNIFICANT CHANGE UP (ref 3.5–5.3)
POTASSIUM UR-SCNC: 32 MMOL/L — SIGNIFICANT CHANGE UP
PROT SERPL-MCNC: 7.1 G/DL — SIGNIFICANT CHANGE UP (ref 6–8.3)
PROT UR-MCNC: 100 — SIGNIFICANT CHANGE UP
RBC # BLD: 3.71 M/UL — LOW (ref 3.8–5.2)
RBC # FLD: 14.8 % — HIGH (ref 10.3–14.5)
RBC CASTS # UR COMP ASSIST: 5 /HPF — HIGH (ref 0–4)
SAO2 % BLDV: 44.4 % — LOW (ref 67–88)
SAO2 % BLDV: 78.7 % — SIGNIFICANT CHANGE UP (ref 67–88)
SARS-COV-2 RNA SPEC QL NAA+PROBE: SIGNIFICANT CHANGE UP
SODIUM SERPL-SCNC: 130 MMOL/L — LOW (ref 135–145)
SODIUM SERPL-SCNC: 131 MMOL/L — LOW (ref 135–145)
SODIUM UR-SCNC: 40 MMOL/L — SIGNIFICANT CHANGE UP
SP GR SPEC: 1.01 — SIGNIFICANT CHANGE UP (ref 1.01–1.02)
UROBILINOGEN FLD QL: NEGATIVE — SIGNIFICANT CHANGE UP
WBC # BLD: 7.3 K/UL — SIGNIFICANT CHANGE UP (ref 3.8–10.5)
WBC # FLD AUTO: 7.3 K/UL — SIGNIFICANT CHANGE UP (ref 3.8–10.5)
WBC UR QL: 1 /HPF — SIGNIFICANT CHANGE UP (ref 0–5)

## 2022-03-09 PROCEDURE — 70450 CT HEAD/BRAIN W/O DYE: CPT | Mod: 26,MA

## 2022-03-09 PROCEDURE — 71045 X-RAY EXAM CHEST 1 VIEW: CPT | Mod: 26

## 2022-03-09 PROCEDURE — 71250 CT THORAX DX C-: CPT | Mod: 26,MA

## 2022-03-09 PROCEDURE — 99285 EMERGENCY DEPT VISIT HI MDM: CPT

## 2022-03-09 PROCEDURE — 74176 CT ABD & PELVIS W/O CONTRAST: CPT | Mod: 26,MA

## 2022-03-09 RX ORDER — SODIUM CHLORIDE 9 MG/ML
500 INJECTION INTRAMUSCULAR; INTRAVENOUS; SUBCUTANEOUS ONCE
Refills: 0 | Status: COMPLETED | OUTPATIENT
Start: 2022-03-09 | End: 2022-03-09

## 2022-03-09 RX ORDER — SODIUM CHLORIDE 9 MG/ML
1000 INJECTION, SOLUTION INTRAVENOUS
Refills: 0 | Status: DISCONTINUED | OUTPATIENT
Start: 2022-03-09 | End: 2022-03-16

## 2022-03-09 RX ORDER — PIPERACILLIN AND TAZOBACTAM 4; .5 G/20ML; G/20ML
3.38 INJECTION, POWDER, LYOPHILIZED, FOR SOLUTION INTRAVENOUS EVERY 12 HOURS
Refills: 0 | Status: COMPLETED | OUTPATIENT
Start: 2022-03-09 | End: 2022-03-16

## 2022-03-09 RX ORDER — HEPARIN SODIUM 5000 [USP'U]/ML
5000 INJECTION INTRAVENOUS; SUBCUTANEOUS EVERY 12 HOURS
Refills: 0 | Status: DISCONTINUED | OUTPATIENT
Start: 2022-03-09 | End: 2022-03-16

## 2022-03-09 RX ORDER — INSULIN LISPRO 100/ML
VIAL (ML) SUBCUTANEOUS
Refills: 0 | Status: DISCONTINUED | OUTPATIENT
Start: 2022-03-09 | End: 2022-03-10

## 2022-03-09 RX ORDER — DEXTROSE 50 % IN WATER 50 %
25 SYRINGE (ML) INTRAVENOUS ONCE
Refills: 0 | Status: DISCONTINUED | OUTPATIENT
Start: 2022-03-09 | End: 2022-03-16

## 2022-03-09 RX ORDER — ROSUVASTATIN CALCIUM 5 MG/1
1 TABLET ORAL
Qty: 0 | Refills: 0 | DISCHARGE

## 2022-03-09 RX ORDER — VENLAFAXINE HCL 75 MG
75 CAPSULE, EXT RELEASE 24 HR ORAL DAILY
Refills: 0 | Status: DISCONTINUED | OUTPATIENT
Start: 2022-03-09 | End: 2022-03-16

## 2022-03-09 RX ORDER — GLUCAGON INJECTION, SOLUTION 0.5 MG/.1ML
1 INJECTION, SOLUTION SUBCUTANEOUS ONCE
Refills: 0 | Status: DISCONTINUED | OUTPATIENT
Start: 2022-03-09 | End: 2022-03-16

## 2022-03-09 RX ORDER — DEXTROSE 50 % IN WATER 50 %
15 SYRINGE (ML) INTRAVENOUS ONCE
Refills: 0 | Status: DISCONTINUED | OUTPATIENT
Start: 2022-03-09 | End: 2022-03-16

## 2022-03-09 RX ORDER — DEXTROSE 50 % IN WATER 50 %
12.5 SYRINGE (ML) INTRAVENOUS ONCE
Refills: 0 | Status: DISCONTINUED | OUTPATIENT
Start: 2022-03-09 | End: 2022-03-16

## 2022-03-09 RX ORDER — CEFTRIAXONE 500 MG/1
1000 INJECTION, POWDER, FOR SOLUTION INTRAMUSCULAR; INTRAVENOUS ONCE
Refills: 0 | Status: COMPLETED | OUTPATIENT
Start: 2022-03-09 | End: 2022-03-09

## 2022-03-09 RX ORDER — L.ACIDOPH/B.ANIMALIS/B.LONGUM 15B CELL
1 CAPSULE ORAL
Qty: 0 | Refills: 0 | DISCHARGE

## 2022-03-09 RX ORDER — ATORVASTATIN CALCIUM 80 MG/1
40 TABLET, FILM COATED ORAL AT BEDTIME
Refills: 0 | Status: DISCONTINUED | OUTPATIENT
Start: 2022-03-09 | End: 2022-03-16

## 2022-03-09 RX ORDER — PIPERACILLIN AND TAZOBACTAM 4; .5 G/20ML; G/20ML
3.38 INJECTION, POWDER, LYOPHILIZED, FOR SOLUTION INTRAVENOUS ONCE
Refills: 0 | Status: COMPLETED | OUTPATIENT
Start: 2022-03-09 | End: 2022-03-09

## 2022-03-09 RX ORDER — LEVOTHYROXINE SODIUM 125 MCG
25 TABLET ORAL DAILY
Refills: 0 | Status: DISCONTINUED | OUTPATIENT
Start: 2022-03-09 | End: 2022-03-16

## 2022-03-09 RX ORDER — SODIUM CHLORIDE 9 MG/ML
1000 INJECTION INTRAMUSCULAR; INTRAVENOUS; SUBCUTANEOUS ONCE
Refills: 0 | Status: COMPLETED | OUTPATIENT
Start: 2022-03-09 | End: 2022-03-09

## 2022-03-09 RX ORDER — CHOLECALCIFEROL (VITAMIN D3) 125 MCG
1 CAPSULE ORAL
Qty: 0 | Refills: 0 | DISCHARGE

## 2022-03-09 RX ADMIN — SODIUM CHLORIDE 1000 MILLILITER(S): 9 INJECTION INTRAMUSCULAR; INTRAVENOUS; SUBCUTANEOUS at 15:55

## 2022-03-09 RX ADMIN — CEFTRIAXONE 100 MILLIGRAM(S): 500 INJECTION, POWDER, FOR SOLUTION INTRAMUSCULAR; INTRAVENOUS at 14:58

## 2022-03-09 RX ADMIN — HEPARIN SODIUM 5000 UNIT(S): 5000 INJECTION INTRAVENOUS; SUBCUTANEOUS at 22:29

## 2022-03-09 RX ADMIN — PIPERACILLIN AND TAZOBACTAM 200 GRAM(S): 4; .5 INJECTION, POWDER, LYOPHILIZED, FOR SOLUTION INTRAVENOUS at 15:55

## 2022-03-09 RX ADMIN — SODIUM CHLORIDE 500 MILLILITER(S): 9 INJECTION INTRAMUSCULAR; INTRAVENOUS; SUBCUTANEOUS at 14:16

## 2022-03-09 NOTE — ED PROVIDER NOTE - PROGRESS NOTE DETAILS
case endorsed to  for admission. uptrending lactate 4.3>4.6 unclear origin. pt is well appearing with no change in mental status, abd soft NT ND, bili wnl, afebrile. Dr. Patricio will contact private GI pt has seen previously. pt stable for admission - Luis F Ugarte PA-C

## 2022-03-09 NOTE — ED ADULT NURSE REASSESSMENT NOTE - NS ED NURSE REASSESS COMMENT FT1
straight cath completed per MD order, sterile technique maintained, 2 RNs at bedside, patient tolerated well, successful on first attempt.

## 2022-03-09 NOTE — ED PROVIDER NOTE - CLINICAL SUMMARY MEDICAL DECISION MAKING FREE TEXT BOX
Attending MD Rodriguez: 89y F pmhx HTN, HLD, HLD, glaucoma, breast cancer, diverticulitis p/w urinary symptoms. pt presents accompanied by her daughter for generalized weakness and confusion.  Patient denies fever, chills, nausea, vomiting, or diarrhea. Patient denies chest pain, palpitations, SOB, or diaphoresis. Lungs clear, abd soft with mild lower abd tenderness to palpation, NTND, no LE edema.  Plan: infectious workup, IVF, labs, EKG, CT head and abd, CXR and re-eval.

## 2022-03-09 NOTE — H&P ADULT - HISTORY OF PRESENT ILLNESS
89y F pmhx HTN, HLD, DM, glaucoma, breast cancer, diverticulitis p/w urinary symptoms. pt presents accompanied by her daughter for generalized weakness and confusion (to year). pt had a UTI diagnosed last week, completed a 7-day course of bactrim. pt denies urinary sx but per daughter pt has frequent UTIs presenting with AMS. pt had several episodes of soft diarrhea daily this week, per daughter she appears dehydrated. pt denies fever, chills, abdominal pain, nausea, vomiting, cough, rhinorrhea, CP, SOB.   found to have ELISA

## 2022-03-09 NOTE — ED PROVIDER NOTE - OBJECTIVE STATEMENT
89y F pmhx HTN, HLD, HLD, glaucoma, breast cancer, diverticulitis p/w urinary symptoms. pt presents accompanied by her daughter for generalized weakness and confusion (to year). pt had a UTI diagnosed last week, completed a 7-day course of bactrim. pt denies urinary sx but per daughter pt has frequent UTIs presenting with AMS. pt had several episodes of soft diarrhea daily this week, per daughter she appears dehydrated. pt denies fever, chills, abdominal pain, nausea, vomiting, cough, rhinorrhea, CP, SOB  PMD Veto Flores

## 2022-03-09 NOTE — ED PROVIDER NOTE - ATTENDING CONTRIBUTION TO CARE
Attending MD Rodriguez:   I personally have seen and examined this patient.  Physician assistant note reviewed and agree on plan of care and except where noted.

## 2022-03-09 NOTE — ED ADULT NURSE NOTE - OBJECTIVE STATEMENT
89 y.o female A&Ox2 oriented to self and place, PMH type 2 DM, HLD, breast cancer left lymph node removal, HTN, pt presents to ED c/o weakness. per pt daughter at bedside pt was diagnosed with a UTI a week ago pt was taking bactrim and finished the medication this morning. per daughter pt was experiencing altered mental status and weakness that has not improved. as of the last few days the pt had increasing weakness, diarrhea and decreased PO intake. pt denies fevers, chills, headaches, N/V, sob, chest pain. upon assessment pt has blanchable red area to the sacral area. IV access established, cardiac monitor placed, safety and comfort provided.

## 2022-03-09 NOTE — H&P ADULT - NSHPPHYSICALEXAM_GEN_ALL_CORE
Vital Signs Last 24 Hrs  T(C): 36.4 (09 Mar 2022 18:24), Max: 36.4 (09 Mar 2022 14:17)  T(F): 97.6 (09 Mar 2022 18:24), Max: 97.6 (09 Mar 2022 18:24)  HR: 64 (09 Mar 2022 18:24) (60 - 66)  BP: 151/71 (09 Mar 2022 18:24) (119/73 - 161/60)  BP(mean): --  RR: 18 (09 Mar 2022 18:24) (16 - 18)  SpO2: 100% (09 Mar 2022 18:24) (98% - 100%)    PHYSICAL EXAM:  GENERAL: NAD, frail   HEAD:  Atraumatic, Normocephalic  EYES: EOMI, PERRLA, conjunctiva and sclera clear  NECK: Supple, No JVD  CHEST/LUNG: Clear to auscultation bilaterally; No wheeze  HEART: Regular rate and rhythm; No murmurs, rubs, or gallops  ABDOMEN: Soft, Nontender, Nondistended; Bowel sounds present  EXTREMITIES:  2+ Peripheral Pulses, No clubbing, cyanosis, or edema  PSYCH: AAOx2-3  NEUROLOGY: non-focal  SKIN: No rashes or lesions

## 2022-03-10 LAB
A1C WITH ESTIMATED AVERAGE GLUCOSE RESULT: 5.7 % — HIGH (ref 4–5.6)
ALBUMIN SERPL ELPH-MCNC: 3.7 G/DL — SIGNIFICANT CHANGE UP (ref 3.3–5)
ALP SERPL-CCNC: 72 U/L — SIGNIFICANT CHANGE UP (ref 40–120)
ALT FLD-CCNC: 40 U/L — SIGNIFICANT CHANGE UP (ref 10–45)
ANION GAP SERPL CALC-SCNC: 12 MMOL/L — SIGNIFICANT CHANGE UP (ref 5–17)
AST SERPL-CCNC: 42 U/L — HIGH (ref 10–40)
BASE EXCESS BLDV CALC-SCNC: -6.6 MMOL/L — LOW (ref -2–2)
BASE EXCESS BLDV CALC-SCNC: -8 MMOL/L — LOW (ref -2–2)
BILIRUB SERPL-MCNC: 0.2 MG/DL — SIGNIFICANT CHANGE UP (ref 0.2–1.2)
BUN SERPL-MCNC: 51 MG/DL — HIGH (ref 7–23)
CA-I SERPL-SCNC: 1.35 MMOL/L — HIGH (ref 1.15–1.33)
CALCIUM SERPL-MCNC: 9.5 MG/DL — SIGNIFICANT CHANGE UP (ref 8.4–10.5)
CHLORIDE BLDV-SCNC: 101 MMOL/L — SIGNIFICANT CHANGE UP (ref 96–108)
CHLORIDE SERPL-SCNC: 99 MMOL/L — SIGNIFICANT CHANGE UP (ref 96–108)
CO2 BLDV-SCNC: 20 MMOL/L — LOW (ref 22–26)
CO2 BLDV-SCNC: 22 MMOL/L — SIGNIFICANT CHANGE UP (ref 22–26)
CO2 SERPL-SCNC: 19 MMOL/L — LOW (ref 22–31)
CREAT SERPL-MCNC: 3.16 MG/DL — HIGH (ref 0.5–1.3)
CULTURE RESULTS: NO GROWTH — SIGNIFICANT CHANGE UP
EGFR: 13 ML/MIN/1.73M2 — LOW
ESTIMATED AVERAGE GLUCOSE: 117 MG/DL — HIGH (ref 68–114)
GAS PNL BLDV: 130 MMOL/L — LOW (ref 136–145)
GAS PNL BLDV: SIGNIFICANT CHANGE UP
GAS PNL BLDV: SIGNIFICANT CHANGE UP
GLUCOSE BLDC GLUCOMTR-MCNC: 166 MG/DL — HIGH (ref 70–99)
GLUCOSE BLDC GLUCOMTR-MCNC: 72 MG/DL — SIGNIFICANT CHANGE UP (ref 70–99)
GLUCOSE BLDC GLUCOMTR-MCNC: 75 MG/DL — SIGNIFICANT CHANGE UP (ref 70–99)
GLUCOSE BLDC GLUCOMTR-MCNC: 78 MG/DL — SIGNIFICANT CHANGE UP (ref 70–99)
GLUCOSE BLDC GLUCOMTR-MCNC: 80 MG/DL — SIGNIFICANT CHANGE UP (ref 70–99)
GLUCOSE BLDC GLUCOMTR-MCNC: 81 MG/DL — SIGNIFICANT CHANGE UP (ref 70–99)
GLUCOSE BLDC GLUCOMTR-MCNC: 83 MG/DL — SIGNIFICANT CHANGE UP (ref 70–99)
GLUCOSE BLDC GLUCOMTR-MCNC: 86 MG/DL — SIGNIFICANT CHANGE UP (ref 70–99)
GLUCOSE BLDC GLUCOMTR-MCNC: 86 MG/DL — SIGNIFICANT CHANGE UP (ref 70–99)
GLUCOSE BLDC GLUCOMTR-MCNC: 95 MG/DL — SIGNIFICANT CHANGE UP (ref 70–99)
GLUCOSE BLDV-MCNC: 85 MG/DL — SIGNIFICANT CHANGE UP (ref 70–99)
GLUCOSE SERPL-MCNC: 117 MG/DL — HIGH (ref 70–99)
HCO3 BLDV-SCNC: 18 MMOL/L — LOW (ref 22–29)
HCO3 BLDV-SCNC: 21 MMOL/L — LOW (ref 22–29)
HCT VFR BLD CALC: 32.7 % — LOW (ref 34.5–45)
HCT VFR BLDA CALC: 29 % — LOW (ref 34.5–46.5)
HGB BLD CALC-MCNC: 9.5 G/DL — LOW (ref 11.7–16.1)
HGB BLD-MCNC: 9.8 G/DL — LOW (ref 11.5–15.5)
LACTATE BLDV-MCNC: 2.2 MMOL/L — HIGH (ref 0.7–2)
LACTATE SERPL-SCNC: 2.3 MMOL/L — HIGH (ref 0.7–2)
MCHC RBC-ENTMCNC: 27.7 PG — SIGNIFICANT CHANGE UP (ref 27–34)
MCHC RBC-ENTMCNC: 30 GM/DL — LOW (ref 32–36)
MCV RBC AUTO: 92.4 FL — SIGNIFICANT CHANGE UP (ref 80–100)
NRBC # BLD: 0 /100 WBCS — SIGNIFICANT CHANGE UP (ref 0–0)
PCO2 BLDV: 40 MMHG — SIGNIFICANT CHANGE UP (ref 39–42)
PCO2 BLDV: 48 MMHG — HIGH (ref 39–42)
PH BLDV: 7.24 — LOW (ref 7.32–7.43)
PH BLDV: 7.27 — LOW (ref 7.32–7.43)
PLATELET # BLD AUTO: 186 K/UL — SIGNIFICANT CHANGE UP (ref 150–400)
PO2 BLDV: 37 MMHG — SIGNIFICANT CHANGE UP (ref 25–45)
PO2 BLDV: 43 MMHG — SIGNIFICANT CHANGE UP (ref 25–45)
POTASSIUM BLDV-SCNC: 4.1 MMOL/L — SIGNIFICANT CHANGE UP (ref 3.5–5.1)
POTASSIUM SERPL-MCNC: 4.2 MMOL/L — SIGNIFICANT CHANGE UP (ref 3.5–5.3)
POTASSIUM SERPL-SCNC: 4.2 MMOL/L — SIGNIFICANT CHANGE UP (ref 3.5–5.3)
PROT SERPL-MCNC: 6.4 G/DL — SIGNIFICANT CHANGE UP (ref 6–8.3)
RBC # BLD: 3.54 M/UL — LOW (ref 3.8–5.2)
RBC # FLD: 14.7 % — HIGH (ref 10.3–14.5)
SAO2 % BLDV: 59.9 % — LOW (ref 67–88)
SAO2 % BLDV: 77.9 % — SIGNIFICANT CHANGE UP (ref 67–88)
SODIUM SERPL-SCNC: 130 MMOL/L — LOW (ref 135–145)
SPECIMEN SOURCE: SIGNIFICANT CHANGE UP
WBC # BLD: 6.08 K/UL — SIGNIFICANT CHANGE UP (ref 3.8–10.5)
WBC # FLD AUTO: 6.08 K/UL — SIGNIFICANT CHANGE UP (ref 3.8–10.5)

## 2022-03-10 PROCEDURE — 99223 1ST HOSP IP/OBS HIGH 75: CPT

## 2022-03-10 PROCEDURE — 99222 1ST HOSP IP/OBS MODERATE 55: CPT | Mod: GC

## 2022-03-10 RX ORDER — DEXTROSE 10 % IN WATER 10 %
250 INTRAVENOUS SOLUTION INTRAVENOUS ONCE
Refills: 0 | Status: COMPLETED | OUTPATIENT
Start: 2022-03-10 | End: 2022-03-10

## 2022-03-10 RX ORDER — INSULIN LISPRO 100/ML
VIAL (ML) SUBCUTANEOUS EVERY 6 HOURS
Refills: 0 | Status: DISCONTINUED | OUTPATIENT
Start: 2022-03-10 | End: 2022-03-11

## 2022-03-10 RX ORDER — DEXTROSE 50 % IN WATER 50 %
25 SYRINGE (ML) INTRAVENOUS ONCE
Refills: 0 | Status: COMPLETED | OUTPATIENT
Start: 2022-03-10 | End: 2022-03-10

## 2022-03-10 RX ORDER — INSULIN LISPRO 100/ML
VIAL (ML) SUBCUTANEOUS
Refills: 0 | Status: DISCONTINUED | OUTPATIENT
Start: 2022-03-10 | End: 2022-03-10

## 2022-03-10 RX ORDER — DEXTROSE 50 % IN WATER 50 %
15 SYRINGE (ML) INTRAVENOUS ONCE
Refills: 0 | Status: COMPLETED | OUTPATIENT
Start: 2022-03-10 | End: 2022-03-10

## 2022-03-10 RX ORDER — SODIUM CHLORIDE 9 MG/ML
1000 INJECTION INTRAMUSCULAR; INTRAVENOUS; SUBCUTANEOUS
Refills: 0 | Status: DISCONTINUED | OUTPATIENT
Start: 2022-03-10 | End: 2022-03-11

## 2022-03-10 RX ADMIN — HEPARIN SODIUM 5000 UNIT(S): 5000 INJECTION INTRAVENOUS; SUBCUTANEOUS at 22:23

## 2022-03-10 RX ADMIN — HEPARIN SODIUM 5000 UNIT(S): 5000 INJECTION INTRAVENOUS; SUBCUTANEOUS at 11:22

## 2022-03-10 RX ADMIN — Medication 15 GRAM(S): at 01:50

## 2022-03-10 RX ADMIN — PIPERACILLIN AND TAZOBACTAM 25 GRAM(S): 4; .5 INJECTION, POWDER, LYOPHILIZED, FOR SOLUTION INTRAVENOUS at 22:22

## 2022-03-10 RX ADMIN — Medication 500 MILLILITER(S): at 03:59

## 2022-03-10 RX ADMIN — SODIUM CHLORIDE 100 MILLILITER(S): 9 INJECTION INTRAMUSCULAR; INTRAVENOUS; SUBCUTANEOUS at 11:47

## 2022-03-10 RX ADMIN — Medication 15 GRAM(S): at 02:38

## 2022-03-10 RX ADMIN — PIPERACILLIN AND TAZOBACTAM 25 GRAM(S): 4; .5 INJECTION, POWDER, LYOPHILIZED, FOR SOLUTION INTRAVENOUS at 11:22

## 2022-03-10 RX ADMIN — ATORVASTATIN CALCIUM 40 MILLIGRAM(S): 80 TABLET, FILM COATED ORAL at 22:23

## 2022-03-10 RX ADMIN — Medication 75 MILLIGRAM(S): at 11:29

## 2022-03-10 RX ADMIN — PIPERACILLIN AND TAZOBACTAM 25 GRAM(S): 4; .5 INJECTION, POWDER, LYOPHILIZED, FOR SOLUTION INTRAVENOUS at 00:30

## 2022-03-10 NOTE — SWALLOW BEDSIDE ASSESSMENT ADULT - ASR SWALLOW ASPIRATION MONITOR
Monitor for s/s aspiration/laryngeal penetration. If noted:  D/C p.o. intake, provide non-oral nutrition/hydration/meds, and contact this service @ [phone]/change of breathing pattern/cough/gurgly voice/fever/pneumonia/throat clearing/upper respiratory infection

## 2022-03-10 NOTE — PATIENT PROFILE ADULT - FALL HARM RISK - HARM RISK INTERVENTIONS

## 2022-03-10 NOTE — PHYSICAL THERAPY INITIAL EVALUATION ADULT - DISCHARGE DISPOSITION, PT EVAL
for balance, gait and strengthening, and assistance for functional activities, pt has HHA 6h/7d, and dtr assists with ADLs/home w/ home PT

## 2022-03-10 NOTE — CONSULT NOTE ADULT - ATTENDING COMMENTS
As above  89F with UTI/encephalopathy  Incidental finding of CDL without obstructive signs or symptoms  Discussed role for elective ERCP with stone removal with family, they would like to weight risks/benefits before making a decision    Thank you for this interesting consult.  Please call the advanced GI service with any questions or concerns.

## 2022-03-10 NOTE — SWALLOW BEDSIDE ASSESSMENT ADULT - ORAL PHASE
variable ~6-10s on cup sips; 1-2 on straw sips/Delayed oral transit time repetitive lingual movements; 5-6s/Delayed oral transit time

## 2022-03-10 NOTE — PATIENT PROFILE ADULT - HOME ACCESSIBILITY CONCERNS
stairs to enter home/stairs within home O-Z Flap Text: The defect edges were debeveled with a #15 scalpel blade.  Given the location of the defect, shape of the defect and the proximity to free margins an O-Z flap was deemed most appropriate.  Using a sterile surgical marker, an appropriate transposition flap was drawn incorporating the defect and placing the expected incisions within the relaxed skin tension lines where possible. The area thus outlined was incised deep to adipose tissue with a #15 scalpel blade.  The skin margins were undermined to an appropriate distance in all directions utilizing iris scissors.

## 2022-03-10 NOTE — CONSULT NOTE ADULT - ASSESSMENT
89y F pmhx HTN, HLD, DM, glaucoma, breast cancer, diverticulitis p/w urinary symptoms and encephalopathy - incidentally found to have choledocholithiasis on imaging.    Impression:  #Choledocholithiasis - without s/s of obstruction or cholangitis. Discussed risks/benefits with daughter and son at bedside including risk of stone causing pain, jaundice and infection.    Recommendation:  - family would like to f/u US that is ordered to re-eval GB and ducts and discuss regarding ERCP for stone removal  - no plan at this time pending family decision  - diet as tolerated  - trend CMP    Note not finalized until signed by attending.     Camelia Wong PGY-5  Gastroenterology Fellow  Pager #[phone] ([email or web address]) or [phone] (NS)  Available on [employer] Teams.  Please contact on-call GI fellow via answering service ([phone]) after 5pm and before 8am, and on weekends.           
89y F pmhx HTN, HLD, DM, glaucoma, breast cancer, diverticulitis p/w urinary symptoms. pt brought to ER by family for generalized weakness and confusion (to year)/AMS.  Incidentally noted to have CBD stone on CT with normal LFTs, mild tenderness in RUQ on exam    COVID negative    #AMS  -follow up cultures, on empiric Zosyn  -monitor clinically    #CBD stone without jaundice/hyperbilirubinemia or significant LFT elevation, though with mild tenderness in RUQ.  No evidence of GB inflammation on CT  -await US  -follow up cultures  -discussed with son at bedside, he would like to have Advanced Endoscopy input. Though given advanced age and clinically not cholangitic, expressing reluctance to have pt undergo ERCP which would require general anesthesia  -NO GI objection to PO diet after US (scheduled for today), monitor tolerance    Discussed with family at bedside, all questions answered  Discussed with Cardiology and Medicine attendings    Thank you for the courtesy of this consult.    Marek Cormier PA-C    [facility]  [phone]  After hours and weekend coverage [phone]    
89 year-old woman with recent UTI, treated with Bactrim, now admitted with altered mental status, seen to have ELISA, elevated lactate level, but clean UA.  Failure to thrive.  Poor PO intake.    Appreciate nephrology consultation.  GI consult - CBD stone noted, but unlikely that this is etiology of deterioration.     Discussed with patient's son at bedside.
89 year old Female pmhx HTN, HLD, DM, glaucoma, breast cancer, diverticulitis p/w AMS, weakness. PTA patient was in [place] with family and went to urgent care for confusion and noticed to have UTI, she was treated with 7 day course of bactrim, last dose was Saturday. Family also states she has had 4 UTI's over the last year. She was noticed with an abnormal creatinine, renal consult called.       1- ELISA on CKD  2- infection  3- lactic acidosis      ELISA suspected in setting of infection/interstitial nephritis secondary to bactrim.   lactic acidosis, received 1.5L, continue IVF NS @ 100 cc/hr  repeat VBG with lytes, improving. repeat 8pm  continue zosyn BID  blood and urine cx pending   CT A/P: Atrophic left kidney. A few small nonobstructing, bilateral renal calculi. No hydronephrosis. Bilateral renal cysts including a large parapelvic cyst on the right.  to have complete abd US  strict I/O  trend creatinine and electrolytes

## 2022-03-10 NOTE — CONSULT NOTE ADULT - ATTENDING COMMENTS
Seen, examined, and  agree with above as scribed by NP Ashok    pt with jagruti in setting of infection +/- bactrim   has + heart M as well   IVF today and then taper in am   cont abx as above   renal/abd sono in setting of renal calculi and large cyst to evaluate if uti due to calculi vs infected cyst  d/w pt family at bedside

## 2022-03-10 NOTE — PHYSICAL THERAPY INITIAL EVALUATION ADULT - ADDITIONAL COMMENTS
contd from above: pt had several episodes of soft diarrhea daily this week, per daughter she appears dehydrated. pt denies fever, chills, abdominal pain, nausea, vomiting, cough, rhinorrhea, CP, SOB; CT head: (-); CT chest: (-); CT abd: (-)    social history: son provided history, pt lives with daughter in private house, no stairs. pt ambulates with rolling walker; pt has HHA 6h/7 days a week, pt owns wheelchair; dtr assists with ADLs, stand by assist for ambulation

## 2022-03-10 NOTE — SWALLOW BEDSIDE ASSESSMENT ADULT - COMMENTS
>ELISA, likely 2/2 dehydrtaion->iv fluids; renal sono; bladder scan pending; renal consulted  recent UTI->UA neg; f/u cultures  >choledocholithiasis on CT; abd exam benign; no n/v; gi consulted; cont zosyn; US abd  >metabolic encephalopathy, likely 2/2 elisa; treat uti; supportive care; monitor  >DM; hypoglycemic->IV dextrose bolus administered    Dysphagia hx: unknown to this service; per d/w daughter Brittneyjarrett Hermosillo, patient is typically on regular diet; decreased intake when she has UTIs; for past 3 days has been refusing food due to mood and nausea; daughter has been giving her Boost and liquids x 3 days; new difficulty swallowing pills "not able to wash down"; also has had episodes of choking on liquids, especially water--as per daughter, MD said her "a flap in her throat is likely stuck and just blow out, don't inhale when that happens". Daughter denies pt h/o pneumonia. >ELISA, likely 2/2 dehydration->iv fluids; renal sono; bladder scan pending; renal consulted  recent UTI->UA neg; f/u cultures  >choledocholithiasis on CT; abd exam benign; no n/v; gi consulted; cont zosyn; US abd  >metabolic encephalopathy, likely 2/2 elisa; treat uti; supportive care; monitor  >DM; hypoglycemic->IV dextrose bolus administered  CT Brain: No acute hemorrhage, mass effect or extra-axial collections. Unchanged left frontal lobe extra-axial mass, likely representing a meningioma.  CT a/p and chest: No acute urinary tract pathology. Mild intra and extrahepatic biliary ductal dilatation with distal choledocholithiasis; lungs clear    Dysphagia hx: Unknown to this service; per d/w daughter Brittney Hermosillo, patient is typically on regular diet; decreased intake when she has UTIs; for past 3 days has been refusing food due to mood and nausea; daughter has been giving her Boost and liquids x 3 days; new difficulty swallowing pills "not able to wash down"; also has had episodes of choking on liquids, especially water--as per daughter, MD said her "a flap in her throat is likely stuck and just blow out, don't inhale when that happens". Daughter denies pt h/o pneumonia. On further d/w son at bedside, he states that patient has long standing h/o coughing when eating/drinking (years) and recent difficulty with pills.

## 2022-03-10 NOTE — SWALLOW BEDSIDE ASSESSMENT ADULT - SWALLOW EVAL: DIAGNOSIS
89 yo with recent UTI, ELISA, likely due to dehydration; AMS, decreased po intake, new difficulty swallowing pills. Patient AA+Ox2, some confusion, easily distracted; presents with mild oropharyngeal dysphagia which appears primarily due to cognitive impairment. Oral holding and piecemeal deglutition is evident on thin liquids. Mildly prolonged oral prep of food textures however functional for soft bite sized solids; repetitive lingual movements/tongue pumping is noted. No overt signs/symptoms of laryngeal penetration/aspiration across textures trialed. Given oral holding in setting of altered mental status, there may be increased risk for episodic penetration/aspiration. Full assist is recommended during all po intake to provide adequate verbal and tactile cueing to facilitate timely A-P transfer and swallow. Pills crushed or provided whole in applesauce, or alternatively in suspension form may be considered to facilitate medication administration.

## 2022-03-10 NOTE — SWALLOW BEDSIDE ASSESSMENT ADULT - SLP PERTINENT HISTORY OF CURRENT PROBLEM
89y F pmhx HTN, HLD, DM, glaucoma, breast cancer, diverticulitis p/w urinary symptoms. pt presents accompanied by her daughter for generalized weakness and confusion (to year). pt had a UTI diagnosed last week, completed a 7-day course of bactrim. pt denies urinary sx but per daughter pt has frequent UTIs presenting with AMS. pt had several episodes of soft diarrhea daily this week, per daughter she appears dehydrated. pt denies fever, chills, abdominal pain, nausea, vomiting, cough, rhinorrhea, CP, SOB.

## 2022-03-10 NOTE — CONSULT NOTE ADULT - SUBJECTIVE AND OBJECTIVE BOX
Chief Complaint:  Patient is a 89y old  Female who presents with a chief complaint of AMS (10 Mar 2022 12:58)      HPI: 89y F pmhx HTN, HLD, DM, glaucoma, breast cancer, diverticulitis p/w urinary symptoms. Patient presents accompanied by her daughter for generalized weakness and confusion. Per family at bedside, also with hallucinations. Patient had a UTI diagnosed last week, completed a 7-day course of bactrim. Patient currently denies any abd pain. Found to have ELISA. CT done and incidentally noted to have choledocholithiasis. Per family at bedside, stone is known however no abd imaging previously in system.      Allergies:  Keflex (Rash; Hives)      Home Medications:    Hospital Medications:  atorvastatin 40 milliGRAM(s) Oral at bedtime  dextrose 40% Gel 15 Gram(s) Oral once  dextrose 5%. 1000 milliLiter(s) IV Continuous <Continuous>  dextrose 5%. 1000 milliLiter(s) IV Continuous <Continuous>  dextrose 50% Injectable 25 Gram(s) IV Push once  dextrose 50% Injectable 12.5 Gram(s) IV Push once  dextrose 50% Injectable 25 Gram(s) IV Push once  dextrose 50% Injectable 12.5 Gram(s) IV Push once  glucagon  Injectable 1 milliGRAM(s) IntraMuscular once  heparin   Injectable 5000 Unit(s) SubCutaneous every 12 hours  insulin lispro (ADMELOG) corrective regimen sliding scale   SubCutaneous every 6 hours  levothyroxine 25 MICROGram(s) Oral daily  piperacillin/tazobactam IVPB.. 3.375 Gram(s) IV Intermittent every 12 hours  sodium chloride 0.9%. 1000 milliLiter(s) IV Continuous <Continuous>  venlafaxine XR. 75 milliGRAM(s) Oral daily      PMHX/PSHX:  HTN (hypertension)    Diabetes mellitus type 2, noninsulin dependent    Diverticulitis    Hyperlipidemia    GERD (gastroesophageal reflux disease)    Glaucoma    Breast cancer    Neurogenic bladder    Urinary incontinence    Renal calculus or stone    Arthritis    Heart murmur    Renal calculi    S/P lumpectomy of breast    S/P bladder repair        Family history:  No pertinent family history in first degree relatives        Social History:     ROS:     General:  No weight loss, fevers, chills, night sweats, fatigue  Eyes:  No vision changes, no yellowing of eyes   ENT:  No throat pain, runny nose  CV:  No chest pain, palpitations  Resp:  No SOB, cough, wheezing  GI:  See HPI  :  No burning with urination, no hematuria   Muscle:  No muscle pain, weakness  Neuro:  No numbness/tingling, memory problems  Psych:  No fatigue, insomnia, mood problems  Heme:  No easy bruisability  Skin:  No rash, itching       PHYSICAL EXAM:     GENERAL:  elderly, thin, no distress  HEENT:  NC/AT,  conjunctivae clear and pink  CHEST:  Full & symmetric excursion, no increased effort  HEART:  Regular rate  ABDOMEN:  Soft, non-tender, non-distended  EXTREMITIES:  no cyanosis, clubbing or edema  SKIN:  No rash/erythema  NEURO:  Alert, orientedx1    Vital Signs:  Vital Signs Last 24 Hrs  T(C): 37 (10 Mar 2022 12:48), Max: 37 (10 Mar 2022 12:48)  T(F): 98.6 (10 Mar 2022 12:48), Max: 98.6 (10 Mar 2022 12:48)  HR: 85 (10 Mar 2022 12:48) (64 - 85)  BP: 145/66 (10 Mar 2022 12:48) (135/79 - 151/78)  BP(mean): --  RR: 17 (10 Mar 2022 12:48) (17 - 18)  SpO2: 96% (10 Mar 2022 12:48) (93% - 100%)  Daily     Daily     LABS:                        9.8    6.08  )-----------( 186      ( 10 Mar 2022 06:52 )             32.7     03-10    130<L>  |  99  |  51<H>  ----------------------------<  117<H>  4.2   |  19<L>  |  3.16<H>    Ca    9.5      10 Mar 2022 06:46  Phos  4.4     03-09  Mg     2.2     03-09    TPro  6.4  /  Alb  3.7  /  TBili  0.2  /  DBili  x   /  AST  42<H>  /  ALT  40  /  AlkPhos  72  03-10    LIVER FUNCTIONS - ( 10 Mar 2022 06:46 )  Alb: 3.7 g/dL / Pro: 6.4 g/dL / ALK PHOS: 72 U/L / ALT: 40 U/L / AST: 42 U/L / GGT: x             Urinalysis Basic - ( 09 Mar 2022 14:31 )    Color: Light Yellow / Appearance: Clear / S.013 / pH: x  Gluc: x / Ketone: Negative  / Bili: Negative / Urobili: Negative   Blood: x / Protein: 100 / Nitrite: Negative   Leuk Esterase: Negative / RBC: 5 /hpf / WBC 1 /HPF   Sq Epi: x / Non Sq Epi: 2 /hpf / Bacteria: Negative          Imaging:    < from: CT Abdomen and Pelvis No Cont (22 @ 16:28) >  FINDINGS:  CHEST:  LUNGS AND LARGE AIRWAYS: Patent central airways. No pulmonary nodules or   parenchymal consolidation.  PLEURA: No pleural effusion.  VESSELS: Atherosclerotic changes of the aorta and coronary arteries.  HEART: Heart size is normal. No pericardial effusion.  MEDIASTINUM AND NANETTE: No lymphadenopathy.  CHEST WALL AND LOWER NECK: Within normal limits.    ABDOMEN AND PELVIS:  LIVER: Within normal limits.  BILE DUCTS: Mild intra and extrahepatic biliary ductal dilatation with   distal choledocholithiasis.  GALLBLADDER: Cholelithiasis. No pericholecystic inflammation.  SPLEEN: Within normal limits.  PANCREAS: Within normal limits.  ADRENALS: Within normal limits.  KIDNEYS/URETERS: Atrophic left kidney. A few small nonobstructing   bilateral renal calculi. No hydronephrosis. Bilateral renal cysts   including a large parapelvic cyst on the right.    BLADDER: Within normal limits.  REPRODUCTIVE ORGANS: Uterus and adnexa within normal limits.    BOWEL: No bowel obstruction. Colonic diverticulosis.  PERITONEUM: No ascites.  VESSELS: Atherosclerotic changes.  RETROPERITONEUM/LYMPH NODES: No lymphadenopathy.  ABDOMINAL WALL: Within normal limits.  BONES: Degenerative changes. Old left-sided rib fractures.    IMPRESSION:  No acute urinary tract pathology.    Mild intra and extrahepatic biliary ductal dilatation with distal   choledocholithiasis.    < end of copied text >        
Patient seen and evaluated @ 7am on 2 DSU  Chief Complaint: Altered mental status    HPI:  89y F pmhx HTN, HLD, DM, glaucoma, breast cancer, diverticulitis p/w urinary symptoms. pt presents accompanied by her daughter for generalized weakness and confusion (to year). pt had a UTI diagnosed last week, completed a 7-day course of bactrim. pt denies urinary sx but per daughter pt has frequent UTIs presenting with AMS. pt had several episodes of soft diarrhea daily this week, per daughter she appears dehydrated. pt denies fever, chills, abdominal pain, nausea, vomiting, cough, rhinorrhea, CP, SOB.   found to have ELISA    PMH:   Heart murmur  Arthritis  Renal calculus or stone  Urinary incontinence  Neurogenic bladder  Breast cancer  Glaucoma  GERD (gastroesophageal reflux disease)  Hyperlipidemia  Diverticulitis  Diabetes mellitus type 2, noninsulin dependent  HTN (hypertension)      PSH:   S/P bladder repair  S/P lumpectomy of breast  Renal calculi      Medications:   acetaminophen   Tablet .. 650 milliGRAM(s) Oral every 6 hours PRN  cefTRIAXone   IVPB 1000 milliGRAM(s) IV Intermittent every 24 hours  cholecalciferol 1000 Unit(s) Oral daily  dextrose 40% Gel 15 Gram(s) Oral once PRN  dextrose 5%. 1000 milliLiter(s) IV Continuous <Continuous>  dextrose 50% Injectable 12.5 Gram(s) IV Push once  dextrose 50% Injectable 25 Gram(s) IV Push once  dextrose 50% Injectable 25 Gram(s) IV Push once  glucagon  Injectable 1 milliGRAM(s) IntraMuscular once PRN  influenza   Vaccine 0.5 milliLiter(s) IntraMuscular once  insulin lispro (HumaLOG) corrective regimen sliding scale   SubCutaneous three times a day before meals  insulin lispro (HumaLOG) corrective regimen sliding scale   SubCutaneous at bedtime  lactated ringers. 1000 milliLiter(s) IV Continuous <Continuous>  lactobacillus acidophilus 1 Tablet(s) Oral daily  levothyroxine 25 MICROGram(s) Oral daily  rosuvastatin 20 milliGRAM(s) Oral at bedtime  venlafaxine XR. 75 milliGRAM(s) Oral daily    Allergies:  Keflex (Rash; Hives)    FAMILY HISTORY:  No pertinent family history in first degree relatives    Social History: , lives with adult daughter  Smoking: nonsmoker  Alcohol: no EtOH  Drugs: no illicit drug use    Review of Systems:  uanble to assess due to encephalopathy    Physical Exam:  T(C): 36.8 (10-09-20 @ 20:12), Max: 36.8 (10-09-20 @ 20:12)  HR: 76 (10-10-20 @ 05:02) (75 - 81)  BP: 126/70 (10-10-20 @ 05:02) (126/70 - 145/67)  RR: 18 (10-10-20 @ 05:02) (18 - 18)  SpO2: 93% (10-10-20 @ 05:02) (93% - 97%)  Wt(kg): --    10-09 @ 07:01  -  10-10 @ 07:00  --------------------------------------------------------  IN: 720 mL / OUT: 1150 mL / NET: -430 mL    10-10 @ 07:01  -  10-10 @ 11:03  --------------------------------------------------------  IN: 120 mL / OUT: 0 mL / NET: 120 mL      Daily     Daily     Appearance: Fraily elderly  Eyes: PERRLA, EOMI, pink conjunctiva, no scleral icterus   HENT: normal oral mucosa  Cardiovascular: RRR, S1, S2, III/VI systolic murmur at apex; no edema; no JVD  Respiratory: Clear to auscultation bilaterally  Gastrointestinal: Soft, non-tender, non-distended, BS+  Musculoskeletal: No clubbing or joint deformity   Neurologic: No focal weakness  Lymphatic: No lymphadenopathy  Psychiatry: AAOx3 with appropriate mood and affect  Skin: No rashes, ecchymoses, or cyanosis, +skin tears    Cardiovascular Diagnostic Testing:  ECG: sinus 76 bpm, low voltage in limb leads    Echo: < from: Transthoracic Echocardiogram (10.09.20 @ 15:28) >  ------------------------------------------------------------------------  Dimensions:    Normal Values:  LA:     3.6    2.0 - 4.0 cm  Ao:     2.7    2.0 - 3.8 cm  SEPTUM: 1.0    0.6 - 1.2 cm  PWT:    0.8    0.6 -1.1 cm  LVIDd:  3.9    3.0 - 5.6 cm  LVIDs:  2.4    1.8 - 4.0 cm  Derived variables:  LVMI: 73 g/m2  RWT: 0.41  Fractional short: 38 %  EF (Moran Rule): 63 %Doppler Peak Velocity (m/sec):  AoV=2.0  ------------------------------------------------------------------------  Observations:  Mitral Valve: Normal mitral valve. Mild mitral  regurgitation.  Aortic Valve/Aorta: Calcified trileaflet aortic valve with  decreased opening. Peak transaortic valve gradient equals  16 mm Hg, mean transaortic valve gradient equals 8 mm Hg,  estimated aortic valve area equals 1.7 sqcm (by continuity  equation), aortic valve velocity time integral equals 43  cm, consistent with mild aortic stenosis. Mild aortic  regurgitation.  Peak left ventricular outflow tract  gradient equals 3 mm Hg, mean gradient is equal to 2 mm Hg,  LVOT velocity time integral equals 19 cm.  Aortic Root: 2.7 cm.  LVOT diameter: 2.2 cm.  Left Atrium: Mildly dilated left atrium.  LA volume index =  41 cc/m2.  Left Ventricle: Normal left ventricular systolic function.  No segmental wall motion abnormalities. Normal left  ventricular internal dimensions and wall thickness.  Moderate diastolic dysfunction (Stage II).  Right Heart: Normal right atrium. Normal right ventricular  size and function. Normal tricuspid valve. Mild tricuspid  regurgitation. Normal pulmonic valve.  Pericardium/Pleura: Normal pericardium with no pericardial  effusion.  Hemodynamic: Estimated right atrial pressure is 8 mm Hg.  Estimated right ventricular systolic pressure equals 44 mm  Hg, assuming right atrial pressure equals 8 mm Hg,  consistent with mild pulmonary hypertension.  ------------------------------------------------------------------------  Conclusions:  1. Calcified trileaflet aortic valve with decreased  opening. Peak transaortic valve gradient equals 16 mm Hg,  mean transaortic valve gradient equals 8 mm Hg, estimated  aortic valve area equals 1.7 sqcm (by continuity equation),  aortic valve velocity time integral equals 43 cm,  consistent with mild aortic stenosis.  2. Mildly dilated left atrium.  LA volume index = 41 cc/m2.  3. Normal left ventricular internal dimensions and wall  thickness.  4. Normal left ventricular systolic function. No segmental  wall motion abnormalities.  5. Moderate diastolic dysfunction (Stage II).  *** No previous Echo exam.  ------------------------------------------------------------------------  Confirmed on  10/9/2020 - 17:47:14 by ERNESTO Felipe  ------------------------------------------------------------------------    < end of copied text >    Interpretation of Telemetry: NSR with APCs, PVCs    ABS/STUDIES  --------------------------------------------------------------------------------              9.8    6.08  >-----------<  186      [03-10-22 @ 06:52]              32.7     130  |  99  |  51  ----------------------------<  117      [03-10-22 @ 06:46]  4.2   |  19  |  3.16        Ca     9.5     [03-10-22 @ 06:46]      Mg     2.2     [03-09-22 @ 14:31]      Phos  4.4     [03-09-22 @ 14:31]    TPro  6.4  /  Alb  3.7  /  TBili  0.2  /  DBili  x   /  AST  42  /  ALT  40  /  AlkPhos  72  [03-10-22 @ 06:46]          Creatinine Trend:  SCr 3.16 [03-10 @ 06:46]  SCr 3.05 [03-09 @ 19:32]  SCr 3.30 [03-09 @ 14:31]    Urinalysis - [03-09-22 @ 14:31]      Color Light Yellow / Appearance Clear / SG 1.013 / pH 7.0      Gluc Negative / Ketone Negative  / Bili Negative / Urobili Negative       Blood Trace / Protein 100 / Leuk Est Negative / Nitrite Negative      RBC 5 / WBC 1 / Hyaline 0 / Gran  / Sq Epi  / Non Sq Epi 2 / Bacteria Negative    Urine Sodium 40      [03-09-22 @ 15:54]  Urine Potassium 32      [03-09-22 @ 15:54]  Urine Osmolality 272      [03-09-22 @ 16:21]        Immunofixation Serum:   No Monoclonal Band Identified    Reference Range: None Detected      [10-10-20 @ 10:44]  SPEP Interpretation: Normal Electrophoresis Pattern      [10-10-20 @ 10:44]  
    Patient is a 89y old  Female who presents with a chief complaint of AMS    HPI:   89y F pmhx HTN, HLD, DM, glaucoma, breast cancer, diverticulitis p/w urinary symptoms. pt presents accompanied by her daughter for generalized weakness and confusion (to year). pt had a UTI diagnosed last week, completed a 7-day course of bactrim. pt denies urinary sx but per daughter pt has frequent UTIs presenting with AMS. pt had several episodes of soft diarrhea daily this week, per daughter she appears dehydrated. pt denies fever, chills, abdominal pain, nausea, vomiting, cough, rhinorrhea, CP, SOB.   found to have ELISA (09 Mar 2022 19:41)    no nausea or vomiting, no fever +AMS  - underwent CT Head, CAP for AMS work-up  CT Head- no acute findings, +meningioma (unchanged)  CT CAP- no acute urinary tract pathology, mild intra+extrahepatic ductal dilation with distal CBD stone +gallstones, no pericholecystic inflammation  +diverticulosis    PAST MEDICAL & SURGICAL HISTORY:  HTN (hypertension)    Diabetes mellitus type 2, noninsulin dependent    Diverticulitis    Hyperlipidemia    GERD (gastroesophageal reflux disease)    Glaucoma    Breast cancer    Urinary incontinence    Renal calculus or stone    Arthritis    Heart murmur    Renal calculi  s/p stone extraction 57 yrs ago    S/P lumpectomy of breast  right breast with node removal    S/P bladder repair  Interstim device placement         Allergies  Keflex (Rash; Hives)        MEDICATIONS  (STANDING):  atorvastatin 40 milliGRAM(s) Oral at bedtime  dextrose 40% Gel 15 Gram(s) Oral once  dextrose 5%. 1000 milliLiter(s) (100 mL/Hr) IV Continuous <Continuous>  dextrose 5%. 1000 milliLiter(s) (50 mL/Hr) IV Continuous <Continuous>  dextrose 50% Injectable 25 Gram(s) IV Push once  dextrose 50% Injectable 12.5 Gram(s) IV Push once  dextrose 50% Injectable 25 Gram(s) IV Push once  dextrose 50% Injectable 12.5 Gram(s) IV Push once  glucagon  Injectable 1 milliGRAM(s) IntraMuscular once  heparin   Injectable 5000 Unit(s) SubCutaneous every 12 hours  insulin lispro (ADMELOG) corrective regimen sliding scale   SubCutaneous every 6 hours  levothyroxine 25 MICROGram(s) Oral daily  piperacillin/tazobactam IVPB.. 3.375 Gram(s) IV Intermittent every 12 hours  sodium chloride 0.9%. 1000 milliLiter(s) (100 mL/Hr) IV Continuous <Continuous>  venlafaxine XR. 75 milliGRAM(s) Oral daily    MEDICATIONS  (PRN):      Social History:  lives with daughter  no tobacco or ETOH    Family History   IBD (  ) Yes   ( X ) No  GI Malignancy (  )  Yes    ( X ) No    Advanced Directives: (    X ) None    (      ) DNR    (     ) DNI    (     ) Health Care Proxy:     Review of Systems:    General:  No wt loss, fevers, chills, night sweats  CV:  No pain, palpitations, +hypertension  Resp:  No dyspnea, cough, tachypnea, wheezing  GI:  see HPI  :  No pain, bleeding, incontinence, +frequent UTIs  Muscle:  No pain, weakness  Neuro:  No weakness, tingling, memory problems  Psych:  No fatigue, insomnia, mood problems, depression  Endocrine:  No polyuria, polydypsia, cold/heat intolerance  Heme:  No petechiae, ecchymosis, easy bruisability  Skin:  No rash, tattoos, scars, edema      Vital Signs Last 24 Hrs  T(C): 36.4 (10 Mar 2022 09:16), Max: 36.7 (09 Mar 2022 21:28)  T(F): 97.6 (10 Mar 2022 09:16), Max: 98.1 (09 Mar 2022 21:28)  HR: 74 (10 Mar 2022 09:16) (60 - 79)  BP: 151/78 (10 Mar 2022 09:16) (119/73 - 161/60)  BP(mean): --  RR: 17 (10 Mar 2022 09:16) (16 - 18)  SpO2: 97% (10 Mar 2022 09:16) (93% - 100%)    PHYSICAL EXAM:    Constitutional: NAD, well-developed elderly female. pleasant. son at bedside  Neck: No LAD, supple no JVD  Respiratory: cler b/l no accessory muscle use  Cardiovascular: S1 and S2, RRR, no M  Gastrointestinal: BS+, soft, ND +RUQ tenderness to deep palpation without rebound guarding or rigidity  +cloudy urine via Primafit urine collection device  Extremities: No peripheral edema, neg clubbing, cyanosis  Vascular: 2+ peripheral pulses  Neurological: alert and responsive, oriented to self and place; pleasantly confused  Psychiatric: Normal mood, normal affect  Skin: No rashes, anicteric        LABS:                        9.8    6.08  )-----------( 186      ( 10 Mar 2022 06:52 )             32.7     03-10    130<L>  |  99  |  51<H>  ----------------------------<  117<H>  4.2   |  19<L>  |  3.16<H>    Ca    9.5      10 Mar 2022 06:46  Phos  4.4     -  Mg     2.2     -    TPro  6.4  /  Alb  3.7  /  TBili  0.2  /  DBili  x   /  AST  42<H>  /  ALT  40  /  AlkPhos  72  03-10      Urinalysis Basic - ( 09 Mar 2022 14:31 )    Color: Light Yellow / Appearance: Clear / S.013 / pH: x  Gluc: x / Ketone: Negative  / Bili: Negative / Urobili: Negative   Blood: x / Protein: 100 / Nitrite: Negative   Leuk Esterase: Negative / RBC: 5 /hpf / WBC 1 /HPF   Sq Epi: x / Non Sq Epi: 2 /hpf / Bacteria: Negative    COVID-19 PCR (22 @ 14:31)   COVID-19 PCR: NotDetec:     RADIOLOGY & ADDITIONAL TESTS:    ACC: [id number] EXAM:  CT ABDOMEN AND PELVIS                        ACC: [id number] EXAM:  CT CHEST                        PROCEDURE DATE:  2022        INTERPRETATION:  CLINICAL INFORMATION: Urinary symptoms. Acute kidney   injury.    COMPARISON: None.    CONTRAST/COMPLICATIONS:  IV Contrast: NONE  Oral Contrast: NONE  Complications: None reported at time of study completion    PROCEDURE:  CT of the Chest, Abdomen and Pelvis was performed.  Sagittal and coronal reformats were performed.    FINDINGS:  CHEST:  LUNGS AND LARGE AIRWAYS: Patent central airways. No pulmonary nodules or   parenchymal consolidation.  PLEURA: No pleural effusion.  VESSELS: Atherosclerotic changes of the aorta and coronary arteries.  HEART: Heart size is normal. No pericardial effusion.  MEDIASTINUM AND NANETTE: No lymphadenopathy.  CHEST WALL AND LOWER NECK: Within normal limits.    ABDOMEN AND PELVIS:  LIVER: Within normal limits.  BILE DUCTS: Mild intra and extrahepatic biliary ductal dilatation with   distal choledocholithiasis.  GALLBLADDER: Cholelithiasis. No pericholecystic inflammation.  SPLEEN: Within normal limits.  PANCREAS: Within normal limits.  ADRENALS: Within normal limits.  KIDNEYS/URETERS: Atrophic left kidney. A few small nonobstructing   bilateral renal calculi. No hydronephrosis. Bilateral renal cysts   including a large parapelvic cyst on the right.    BLADDER: Within normal limits.  REPRODUCTIVE ORGANS: Uterus and adnexa within normal limits.    BOWEL: No bowel obstruction. Colonic diverticulosis.  PERITONEUM: No ascites.  VESSELS: Atherosclerotic changes.  RETROPERITONEUM/LYMPH NODES: No lymphadenopathy.  ABDOMINAL WALL: Within normal limits.  BONES: Degenerative changes. Old left-sided rib fractures.    IMPRESSION:  No acute urinary tract pathology.    Mild intra and extrahepatic biliary ductal dilatation with distal   choledocholithiasis.        --- End of Report ---    
[facility]  [phone]  NEPHROLOGY      INITIAL CONSULT NOTE  --------------------------------------------------------------------------------  HPI:    89 year old Female pmhx HTN, HLD, DM, glaucoma, breast cancer, diverticulitis p/w AMS, weakness. PTA patient was in [place] with family and went to urgent care for confusion and noticed to have UTI, she was treated with 7 day course of bactrim, last dose was Saturday. Family also states she has had 4 UTI's over the last year. She was noticed with an abnormal creatinine, renal consult called.     PAST HISTORY  --------------------------------------------------------------------------------  PAST MEDICAL & SURGICAL HISTORY:  HTN (hypertension)    Diabetes mellitus type 2, noninsulin dependent    Diverticulitis    Hyperlipidemia    GERD (gastroesophageal reflux disease)    Glaucoma    Breast cancer    Urinary incontinence    Renal calculus or stone    Arthritis    Heart murmur    Renal calculi  s/p stone extraction 57 yrs ago    S/P lumpectomy of breast  right breast with node removal    S/P bladder repair  Interstim device placement 2010      FAMILY HISTORY:    PAST SOCIAL HISTORY:  unable to obtain    ALLERGIES & MEDICATIONS  --------------------------------------------------------------------------------  Allergies    Keflex (Rash; Hives)    Intolerances      Standing Inpatient Medications  atorvastatin 40 milliGRAM(s) Oral at bedtime  dextrose 40% Gel 15 Gram(s) Oral once  dextrose 5%. 1000 milliLiter(s) IV Continuous <Continuous>  dextrose 5%. 1000 milliLiter(s) IV Continuous <Continuous>  dextrose 50% Injectable 25 Gram(s) IV Push once  dextrose 50% Injectable 12.5 Gram(s) IV Push once  dextrose 50% Injectable 25 Gram(s) IV Push once  dextrose 50% Injectable 12.5 Gram(s) IV Push once  glucagon  Injectable 1 milliGRAM(s) IntraMuscular once  heparin   Injectable 5000 Unit(s) SubCutaneous every 12 hours  insulin lispro (ADMELOG) corrective regimen sliding scale   SubCutaneous every 6 hours  levothyroxine 25 MICROGram(s) Oral daily  piperacillin/tazobactam IVPB.. 3.375 Gram(s) IV Intermittent every 12 hours  sodium chloride 0.9%. 1000 milliLiter(s) IV Continuous <Continuous>  venlafaxine XR. 75 milliGRAM(s) Oral daily    PRN Inpatient Medications      REVIEW OF SYSTEMS  --------------------------------------------------------------------------------    Patient is unable to give ROS    VITALS/PHYSICAL EXAM  --------------------------------------------------------------------------------  T(C): 37 (03-10-22 @ 12:48), Max: 37 (03-10-22 @ 12:48)  HR: 85 (03-10-22 @ 12:48) (64 - 85)  BP: 145/66 (03-10-22 @ 12:48) (135/79 - 151/78)  RR: 17 (03-10-22 @ 12:48) (17 - 18)  SpO2: 96% (03-10-22 @ 12:48) (93% - 100%)  Wt(kg): --  Height (cm): 157.5 (03-09-22 @ 12:57)  Weight (kg): 45.4 (03-09-22 @ 12:57)  BMI (kg/m2): 18.3 (03-09-22 @ 12:57)  BSA (m2): 1.42 (03-09-22 @ 12:57)      03-09-22 @ 07:01  -  03-10-22 @ 07:00  --------------------------------------------------------  IN: 450 mL / OUT: 201 mL / NET: 249 mL      Physical Exam:  	Gen: thin cachetic elderly  female   	Pulm: decrease bs  no rales or ronchi or wheezing  	CV: No JVD. RRR, S1S2; II/VI murmur  	Back: No CVA tenderness; no sacral edema  	Abd: +BS, soft, nontender/nondistended  	: No suprapubic tenderness  	UE: Warm, no cyanosis  no clubbing, no edema; no asterixis  	LE: Warm, no cyanosis  no clubbing, no edema  	Neuro: confused   	Skin: Warm, decrease skin turgor     LABS/STUDIES  --------------------------------------------------------------------------------              9.8    6.08  >-----------<  186      [03-10-22 @ 06:52]              32.7     130  |  99  |  51  ----------------------------<  117      [03-10-22 @ 06:46]  4.2   |  19  |  3.16        Ca     9.5     [03-10-22 @ 06:46]      Mg     2.2     [03-09-22 @ 14:31]      Phos  4.4     [03-09-22 @ 14:31]    TPro  6.4  /  Alb  3.7  /  TBili  0.2  /  DBili  x   /  AST  42  /  ALT  40  /  AlkPhos  72  [03-10-22 @ 06:46]          Creatinine Trend:  SCr 3.16 [03-10 @ 06:46]  SCr 3.05 [03-09 @ 19:32]  SCr 3.30 [03-09 @ 14:31]    Urinalysis - [03-09-22 @ 14:31]      Color Light Yellow / Appearance Clear / SG 1.013 / pH 7.0      Gluc Negative / Ketone Negative  / Bili Negative / Urobili Negative       Blood Trace / Protein 100 / Leuk Est Negative / Nitrite Negative      RBC 5 / WBC 1 / Hyaline 0 / Gran  / Sq Epi  / Non Sq Epi 2 / Bacteria Negative    Urine Sodium 40      [03-09-22 @ 15:54]  Urine Potassium 32      [03-09-22 @ 15:54]  Urine Osmolality 272      [03-09-22 @ 16:21]        Immunofixation Serum:   No Monoclonal Band Identified    Reference Range: None Detected      [10-10-20 @ 10:44]  SPEP Interpretation: Normal Electrophoresis Pattern      [10-10-20 @ 10:44]    < from: CT Abdomen and Pelvis No Cont (03.09.22 @ 16:28) >  ACC: [id number] EXAM:  CT ABDOMEN AND PELVIS                        ACC: [id number] EXAM:  CT CHEST                          PROCEDURE DATE:  03/09/2022          INTERPRETATION:  CLINICAL INFORMATION: Urinary symptoms. Acute kidney   injury.    COMPARISON: None.    CONTRAST/COMPLICATIONS:  IV Contrast: NONE  Oral Contrast: NONE  Complications: None reported at time of study completion    PROCEDURE:  CT of the Chest, Abdomen and Pelvis was performed.  Sagittal and coronal reformats were performed.    FINDINGS:  CHEST:  LUNGS AND LARGE AIRWAYS: Patent central airways. No pulmonary nodules or   parenchymal consolidation.  PLEURA: No pleural effusion.  VESSELS: Atherosclerotic changes of the aorta and coronary arteries.  HEART: Heart size is normal. No pericardial effusion.  MEDIASTINUM AND NANETTE: No lymphadenopathy.  CHEST WALL AND LOWER NECK: Within normal limits.    ABDOMEN AND PELVIS:  LIVER: Within normal limits.  BILE DUCTS: Mild intra and extrahepatic biliary ductal dilatation with   distal choledocholithiasis.  GALLBLADDER: Cholelithiasis. No pericholecystic inflammation.  SPLEEN: Within normal limits.  PANCREAS: Within normal limits.  ADRENALS: Within normal limits.  KIDNEYS/URETERS: Atrophic left kidney. A few small nonobstructing   bilateral renal calculi. No hydronephrosis. Bilateral renal cysts   including a large parapelvic cyst on the right.    BLADDER: Within normal limits.  REPRODUCTIVE ORGANS: Uterus and adnexa within normal limits.    BOWEL: No bowel obstruction. Colonic diverticulosis.  PERITONEUM: No ascites.  VESSELS: Atherosclerotic changes.  RETROPERITONEUM/LYMPH NODES: No lymphadenopathy.  ABDOMINAL WALL: Within normal limits.  BONES: Degenerative changes. Old left-sided rib fractures.    IMPRESSION:  No acute urinary tract pathology.    Mild intra and extrahepatic biliary ductal dilatation with distal   choledocholithiasis.        --- End of Report ---            TABITHA BHATT MD; Attending Radiologist  This document has been electronically signed. Mar  9 2022  4:54PM    < end of copied text >

## 2022-03-10 NOTE — SWALLOW BEDSIDE ASSESSMENT ADULT - SLP GENERAL OBSERVATIONS
Pt is AA+Ox2; answers simple questions however is easily distracted, playing with wrist band and blankets; Son states that she is not at baseline "she is oriented typically and not so confused". Son states she has been grabbing at imaginary things in the air and is saying that she has been out of the building with the MDs. Poor temporal orientation.

## 2022-03-10 NOTE — PROGRESS NOTE ADULT - ASSESSMENT
89 yo female h/o htn, chol, DM, breast ca, here with ELISA, metabolic encephalopathy    ELISA  likely 2/2 dehydrtaion  iv fluids  check renal sono  check bladder scan  renal consulted    recent UTI  UA neg  f/u cultures    choledocholithiasis on CT  abd exam benign  no n/v  gi consulted  cont zosyn  check US abd  check mrcp    metabolic encephalopathy  likely 2/2 elisa  treat elisa  supportive care  monitor    DM  hold metformin  iss  monitor fs  check a1c    dysphagia  f/u swallow eval    chol  cont statin    cont other home meds    dvt ppx      plan d/w pt and daughter at bedside    Advanced care planning was discussed with patient and family.  Advanced care planning forms were reviewed and discussed as appropriate.  Differential diagnosis and plan of care discussed with patient after the evaluation.   Pain assessed and judicious use of narcotics when appropriate was discussed.  Importance of Fall prevention discussed.  Counseling on Smoking and Alcohol cessation was offered when appropriate.  Counseling on Diet, exercise, and medication compliance was done.       Approx 55 minutes spent.   89 yo female h/o htn, chol, DM, breast ca, here with ELISA, metabolic encephalopathy    ELISA  likely 2/2 dehydrtaion  iv fluids  check renal sono  check bladder scan  renal consulted    recent UTI  UA neg  f/u cultures  cont zosyn empirically     choledocholithiasis on CT  abd exam benign  LFTs nl  no n/v  gi consulted  cont zosyn empirically  check US abd  diet as tolerated     metabolic encephalopathy  likely 2/2 elisa  treat elisa  supportive care  monitor    DM  hold metformin  iss  monitor fs  check a1c    dysphagia  f/u swallow eval    chol  cont statin    cont other home meds    dvt ppx      plan d/w pt and daughter at bedside    Advanced care planning was discussed with patient and family.  Advanced care planning forms were reviewed and discussed as appropriate.  Differential diagnosis and plan of care discussed with patient after the evaluation.   Pain assessed and judicious use of narcotics when appropriate was discussed.  Importance of Fall prevention discussed.  Counseling on Smoking and Alcohol cessation was offered when appropriate.  Counseling on Diet, exercise, and medication compliance was done.       Approx 55 minutes spent.

## 2022-03-10 NOTE — PHYSICAL THERAPY INITIAL EVALUATION ADULT - ACTIVE RANGE OF MOTION EXAMINATION, REHAB EVAL
right shoulder flex ~90 degrees; B finger contracted (right 3rd/4th digit,)/bilateral upper extremity Active ROM was WFL (within functional limits)/bilateral  lower extremity Active ROM was WFL (within functional limits)

## 2022-03-10 NOTE — SWALLOW BEDSIDE ASSESSMENT ADULT - SWALLOW EVAL: FEEDING ASSISTANCE
provide verbal cueing throughout feeding process; engage patient in holding utensil (or hand over hand); allow extra time in between sips/bites/dependent

## 2022-03-10 NOTE — PROGRESS NOTE ADULT - SUBJECTIVE AND OBJECTIVE BOX
KOREY DIAZ  89y Female  MRN:[id number]    Patient is a 89y old  Female who presents with a chief complaint of   HPI:   89y F pmhx HTN, HLD, DM, glaucoma, breast cancer, diverticulitis p/w urinary symptoms. pt presents accompanied by her daughter for generalized weakness and confusion (to year). pt had a UTI diagnosed last week, completed a 7-day course of bactrim. pt denies urinary sx but per daughter pt has frequent UTIs presenting with AMS. pt had several episodes of soft diarrhea daily this week, per daughter she appears dehydrated. pt denies fever, chills, abdominal pain, nausea, vomiting, cough, rhinorrhea, CP, SOB.   found to have ELISA (09 Mar 2022 19:41)      Patient seen and evaluated at bedside. No acute events overnight except as noted.    Interval HPI: no acute events o/n     PAST MEDICAL & SURGICAL HISTORY:  HTN (hypertension)    Diabetes mellitus type 2, noninsulin dependent    Diverticulitis    Hyperlipidemia    GERD (gastroesophageal reflux disease)    Glaucoma    Breast cancer    Urinary incontinence    Renal calculus or stone    Arthritis    Heart murmur    Renal calculi  s/p stone extraction 57 yrs ago    S/P lumpectomy of breast  right breast with node removal    S/P bladder repair  Interstim device placement         REVIEW OF SYSTEMS:  as per hpi     VITALS:  Vital Signs Last 24 Hrs  T(C): 36.4 (10 Mar 2022 09:16), Max: 36.7 (09 Mar 2022 21:28)  T(F): 97.6 (10 Mar 2022 09:16), Max: 98.1 (09 Mar 2022 21:28)  HR: 74 (10 Mar 2022 09:16) (60 - 79)  BP: 151/78 (10 Mar 2022 09:16) (119/73 - 161/60)  BP(mean): --  RR: 17 (10 Mar 2022 09:16) (16 - 18)  SpO2: 97% (10 Mar 2022 09:16) (93% - 100%)  CAPILLARY BLOOD GLUCOSE      POCT Blood Glucose.: 95 mg/dL (10 Mar 2022 07:44)  POCT Blood Glucose.: 166 mg/dL (10 Mar 2022 04:42)  POCT Blood Glucose.: 83 mg/dL (10 Mar 2022 03:00)  POCT Blood Glucose.: 86 mg/dL (10 Mar 2022 02:22)  POCT Blood Glucose.: 72 mg/dL (10 Mar 2022 02:03)  POCT Blood Glucose.: 78 mg/dL (10 Mar 2022 01:00)  POCT Blood Glucose.: 80 mg/dL (10 Mar 2022 00:42)  POCT Blood Glucose.: 75 mg/dL (10 Mar 2022 00:24)  POCT Blood Glucose.: 60 mg/dL (09 Mar 2022 23:19)  POCT Blood Glucose.: 62 mg/dL (09 Mar 2022 22:38)    I&O's Summary    09 Mar 2022 07:01  -  10 Mar 2022 07:00  --------------------------------------------------------  IN: 450 mL / OUT: 201 mL / NET: 249 mL        PHYSICAL EXAM:  GENERAL: NAD, frail   HEAD:  Atraumatic, Normocephalic  EYES: EOMI, PERRLA, conjunctiva and sclera clear  NECK: Supple, No JVD  CHEST/LUNG: Clear to auscultation bilaterally; No wheeze  HEART: S1, S2; No murmurs, rubs, or gallops  ABDOMEN: Soft, Nontender, Nondistended; Bowel sounds present  EXTREMITIES:  2+ Peripheral Pulses, No clubbing, cyanosis, or edema  PSYCH: Normal affect  NEUROLOGY: AAOX3; non-focal  SKIN: No rashes or lesions    Consultant(s) Notes Reviewed:  [x ] YES  [ ] NO  Care Discussed with Consultants/Other Providers [ x] YES  [ ] NO    MEDS:  MEDICATIONS  (STANDING):  atorvastatin 40 milliGRAM(s) Oral at bedtime  dextrose 40% Gel 15 Gram(s) Oral once  dextrose 5%. 1000 milliLiter(s) (100 mL/Hr) IV Continuous <Continuous>  dextrose 5%. 1000 milliLiter(s) (50 mL/Hr) IV Continuous <Continuous>  dextrose 50% Injectable 25 Gram(s) IV Push once  dextrose 50% Injectable 12.5 Gram(s) IV Push once  dextrose 50% Injectable 25 Gram(s) IV Push once  dextrose 50% Injectable 12.5 Gram(s) IV Push once  glucagon  Injectable 1 milliGRAM(s) IntraMuscular once  heparin   Injectable 5000 Unit(s) SubCutaneous every 12 hours  insulin lispro (ADMELOG) corrective regimen sliding scale   SubCutaneous every 6 hours  levothyroxine 25 MICROGram(s) Oral daily  piperacillin/tazobactam IVPB.. 3.375 Gram(s) IV Intermittent every 12 hours  sodium chloride 0.9%. 1000 milliLiter(s) (100 mL/Hr) IV Continuous <Continuous>  venlafaxine XR. 75 milliGRAM(s) Oral daily    MEDICATIONS  (PRN):    ALLERGIES:  Keflex (Rash; Hives)      LABS:                        9.8    6.08  )-----------( 186      ( 10 Mar 2022 06:52 )             32.7     03-10    130<L>  |  99  |  51<H>  ----------------------------<  117<H>  4.2   |  19<L>  |  3.16<H>    Ca    9.5      10 Mar 2022 06:46  Phos  4.4     -  Mg     2.2         TPro  6.4  /  Alb  3.7  /  TBili  0.2  /  DBili  x   /  AST  42<H>  /  ALT  40  /  AlkPhos  72  03-10          LIVER FUNCTIONS - ( 10 Mar 2022 06:46 )  Alb: 3.7 g/dL / Pro: 6.4 g/dL / ALK PHOS: 72 U/L / ALT: 40 U/L / AST: 42 U/L / GGT: x           Urinalysis Basic - ( 09 Mar 2022 14:31 )    Color: Light Yellow / Appearance: Clear / S.013 / pH: x  Gluc: x / Ketone: Negative  / Bili: Negative / Urobili: Negative   Blood: x / Protein: 100 / Nitrite: Negative   Leuk Esterase: Negative / RBC: 5 /hpf / WBC 1 /HPF   Sq Epi: x / Non Sq Epi: 2 /hpf / Bacteria: Negative     < from: CT Head No Cont (22 @ 16:32) >  IMPRESSION:    No acute hemorrhage, mass effect or extra-axial collections. Unchanged   left frontal lobe extra-axial mass, likely representing a meningioma.    --- End of Report ---      < end of copied text >  < from: CT Abdomen and Pelvis No Cont (03.09.22 @ 16:28) >  IMPRESSION:  No acute urinary tract pathology.    Mild intra and extrahepatic biliary ductal dilatation with distal   choledocholithiasis.        --- End of Report ---    < end of copied text >   KOREY DIAZ  89y Female  MRN:[id number]    Patient is a 89y old  Female who presents with a chief complaint of AMS, ELISA  HPI:   89y F pmhx HTN, HLD, DM, glaucoma, breast cancer, diverticulitis p/w urinary symptoms. pt presents accompanied by her daughter for generalized weakness and confusion (to year). pt had a UTI diagnosed last week, completed a 7-day course of bactrim. pt denies urinary sx but per daughter pt has frequent UTIs presenting with AMS. pt had several episodes of soft diarrhea daily this week, per daughter she appears dehydrated. pt denies fever, chills, abdominal pain, nausea, vomiting, cough, rhinorrhea, CP, SOB.   found to have ELISA (09 Mar 2022 19:41)      Patient seen and evaluated at bedside. No acute events overnight except as noted.    Interval HPI: no acute events o/n     PAST MEDICAL & SURGICAL HISTORY:  HTN (hypertension)    Diabetes mellitus type 2, noninsulin dependent    Diverticulitis    Hyperlipidemia    GERD (gastroesophageal reflux disease)    Glaucoma    Breast cancer    Urinary incontinence    Renal calculus or stone    Arthritis    Heart murmur    Renal calculi  s/p stone extraction 57 yrs ago    S/P lumpectomy of breast  right breast with node removal    S/P bladder repair  Interstim device placement         REVIEW OF SYSTEMS:  as per hpi     VITALS:  Vital Signs Last 24 Hrs  T(C): 36.4 (10 Mar 2022 09:16), Max: 36.7 (09 Mar 2022 21:28)  T(F): 97.6 (10 Mar 2022 09:16), Max: 98.1 (09 Mar 2022 21:28)  HR: 74 (10 Mar 2022 09:16) (60 - 79)  BP: 151/78 (10 Mar 2022 09:16) (119/73 - 161/60)  BP(mean): --  RR: 17 (10 Mar 2022 09:16) (16 - 18)  SpO2: 97% (10 Mar 2022 09:16) (93% - 100%)  CAPILLARY BLOOD GLUCOSE      POCT Blood Glucose.: 95 mg/dL (10 Mar 2022 07:44)  POCT Blood Glucose.: 166 mg/dL (10 Mar 2022 04:42)  POCT Blood Glucose.: 83 mg/dL (10 Mar 2022 03:00)  POCT Blood Glucose.: 86 mg/dL (10 Mar 2022 02:22)  POCT Blood Glucose.: 72 mg/dL (10 Mar 2022 02:03)  POCT Blood Glucose.: 78 mg/dL (10 Mar 2022 01:00)  POCT Blood Glucose.: 80 mg/dL (10 Mar 2022 00:42)  POCT Blood Glucose.: 75 mg/dL (10 Mar 2022 00:24)  POCT Blood Glucose.: 60 mg/dL (09 Mar 2022 23:19)  POCT Blood Glucose.: 62 mg/dL (09 Mar 2022 22:38)    I&O's Summary    09 Mar 2022 07:01  -  10 Mar 2022 07:00  --------------------------------------------------------  IN: 450 mL / OUT: 201 mL / NET: 249 mL        PHYSICAL EXAM:  GENERAL: NAD, frail   HEAD:  Atraumatic, Normocephalic  EYES: EOMI, PERRLA, conjunctiva and sclera clear  NECK: Supple, No JVD  CHEST/LUNG: Clear to auscultation bilaterally; No wheeze  HEART: S1, S2; No murmurs, rubs, or gallops  ABDOMEN: Soft, Nontender, Nondistended; Bowel sounds present  EXTREMITIES:  2+ Peripheral Pulses, No clubbing, cyanosis, or edema  PSYCH: Normal affect  NEUROLOGY: AAOX3; non-focal  SKIN: No rashes or lesions    Consultant(s) Notes Reviewed:  [x ] YES  [ ] NO  Care Discussed with Consultants/Other Providers [ x] YES  [ ] NO    MEDS:  MEDICATIONS  (STANDING):  atorvastatin 40 milliGRAM(s) Oral at bedtime  dextrose 40% Gel 15 Gram(s) Oral once  dextrose 5%. 1000 milliLiter(s) (100 mL/Hr) IV Continuous <Continuous>  dextrose 5%. 1000 milliLiter(s) (50 mL/Hr) IV Continuous <Continuous>  dextrose 50% Injectable 25 Gram(s) IV Push once  dextrose 50% Injectable 12.5 Gram(s) IV Push once  dextrose 50% Injectable 25 Gram(s) IV Push once  dextrose 50% Injectable 12.5 Gram(s) IV Push once  glucagon  Injectable 1 milliGRAM(s) IntraMuscular once  heparin   Injectable 5000 Unit(s) SubCutaneous every 12 hours  insulin lispro (ADMELOG) corrective regimen sliding scale   SubCutaneous every 6 hours  levothyroxine 25 MICROGram(s) Oral daily  piperacillin/tazobactam IVPB.. 3.375 Gram(s) IV Intermittent every 12 hours  sodium chloride 0.9%. 1000 milliLiter(s) (100 mL/Hr) IV Continuous <Continuous>  venlafaxine XR. 75 milliGRAM(s) Oral daily    MEDICATIONS  (PRN):    ALLERGIES:  Keflex (Rash; Hives)      LABS:                        9.8    6.08  )-----------( 186      ( 10 Mar 2022 06:52 )             32.7     03-10    130<L>  |  99  |  51<H>  ----------------------------<  117<H>  4.2   |  19<L>  |  3.16<H>    Ca    9.5      10 Mar 2022 06:46  Phos  4.4     03-  Mg     2.2     -    TPro  6.4  /  Alb  3.7  /  TBili  0.2  /  DBili  x   /  AST  42<H>  /  ALT  40  /  AlkPhos  72  03-10          LIVER FUNCTIONS - ( 10 Mar 2022 06:46 )  Alb: 3.7 g/dL / Pro: 6.4 g/dL / ALK PHOS: 72 U/L / ALT: 40 U/L / AST: 42 U/L / GGT: x           Urinalysis Basic - ( 09 Mar 2022 14:31 )    Color: Light Yellow / Appearance: Clear / S.013 / pH: x  Gluc: x / Ketone: Negative  / Bili: Negative / Urobili: Negative   Blood: x / Protein: 100 / Nitrite: Negative   Leuk Esterase: Negative / RBC: 5 /hpf / WBC 1 /HPF   Sq Epi: x / Non Sq Epi: 2 /hpf / Bacteria: Negative     < from: CT Head No Cont (22 @ 16:32) >  IMPRESSION:    No acute hemorrhage, mass effect or extra-axial collections. Unchanged   left frontal lobe extra-axial mass, likely representing a meningioma.    --- End of Report ---      < end of copied text >  < from: CT Abdomen and Pelvis No Cont (22 @ 16:28) >  IMPRESSION:  No acute urinary tract pathology.    Mild intra and extrahepatic biliary ductal dilatation with distal   choledocholithiasis.        --- End of Report ---    < end of copied text >

## 2022-03-10 NOTE — PHYSICAL THERAPY INITIAL EVALUATION ADULT - PERTINENT HX OF CURRENT PROBLEM, REHAB EVAL
89y F pmhx HTN, HLD, DM, glaucoma, breast cancer, diverticulitis p/w urinary symptoms. pt presents accompanied by her daughter for generalized weakness and confusion (to year). pt had a UTI diagnosed last week, completed a 7-day course of bactrim. pt denies urinary sx but per daughter pt has frequent UTIs presenting with AMS. contd below:

## 2022-03-10 NOTE — SWALLOW BEDSIDE ASSESSMENT ADULT - ORAL PREPARATORY PHASE
Within functional limits prolonged oral bolus formation; optimized on soft bite sized/Within functional limits

## 2022-03-11 LAB
ALBUMIN SERPL ELPH-MCNC: 2.5 G/DL — LOW (ref 3.3–5)
ALBUMIN SERPL ELPH-MCNC: 3.6 G/DL — SIGNIFICANT CHANGE UP (ref 3.3–5)
ALBUMIN SERPL ELPH-MCNC: 3.7 G/DL — SIGNIFICANT CHANGE UP (ref 3.3–5)
ALP SERPL-CCNC: 45 U/L — SIGNIFICANT CHANGE UP (ref 40–120)
ALP SERPL-CCNC: 66 U/L — SIGNIFICANT CHANGE UP (ref 40–120)
ALP SERPL-CCNC: 70 U/L — SIGNIFICANT CHANGE UP (ref 40–120)
ALT FLD-CCNC: 27 U/L — SIGNIFICANT CHANGE UP (ref 10–45)
ALT FLD-CCNC: 40 U/L — SIGNIFICANT CHANGE UP (ref 10–45)
ALT FLD-CCNC: 40 U/L — SIGNIFICANT CHANGE UP (ref 10–45)
ANION GAP SERPL CALC-SCNC: 12 MMOL/L — SIGNIFICANT CHANGE UP (ref 5–17)
ANION GAP SERPL CALC-SCNC: 13 MMOL/L — SIGNIFICANT CHANGE UP (ref 5–17)
ANION GAP SERPL CALC-SCNC: 9 MMOL/L — SIGNIFICANT CHANGE UP (ref 5–17)
APPEARANCE UR: CLEAR — SIGNIFICANT CHANGE UP
AST SERPL-CCNC: 27 U/L — SIGNIFICANT CHANGE UP (ref 10–40)
AST SERPL-CCNC: 37 U/L — SIGNIFICANT CHANGE UP (ref 10–40)
AST SERPL-CCNC: 38 U/L — SIGNIFICANT CHANGE UP (ref 10–40)
BACTERIA # UR AUTO: NEGATIVE — SIGNIFICANT CHANGE UP
BILIRUB SERPL-MCNC: 0.1 MG/DL — LOW (ref 0.2–1.2)
BILIRUB SERPL-MCNC: 0.2 MG/DL — SIGNIFICANT CHANGE UP (ref 0.2–1.2)
BILIRUB SERPL-MCNC: 0.2 MG/DL — SIGNIFICANT CHANGE UP (ref 0.2–1.2)
BILIRUB UR-MCNC: NEGATIVE — SIGNIFICANT CHANGE UP
BUN SERPL-MCNC: 34 MG/DL — HIGH (ref 7–23)
BUN SERPL-MCNC: 41 MG/DL — HIGH (ref 7–23)
BUN SERPL-MCNC: 42 MG/DL — HIGH (ref 7–23)
CALCIUM SERPL-MCNC: 6.2 MG/DL — CRITICAL LOW (ref 8.4–10.5)
CALCIUM SERPL-MCNC: 9 MG/DL — SIGNIFICANT CHANGE UP (ref 8.4–10.5)
CALCIUM SERPL-MCNC: 9.1 MG/DL — SIGNIFICANT CHANGE UP (ref 8.4–10.5)
CHLORIDE SERPL-SCNC: 104 MMOL/L — SIGNIFICANT CHANGE UP (ref 96–108)
CHLORIDE SERPL-SCNC: 105 MMOL/L — SIGNIFICANT CHANGE UP (ref 96–108)
CHLORIDE SERPL-SCNC: 116 MMOL/L — HIGH (ref 96–108)
CO2 SERPL-SCNC: 15 MMOL/L — LOW (ref 22–31)
CO2 SERPL-SCNC: 18 MMOL/L — LOW (ref 22–31)
CO2 SERPL-SCNC: 19 MMOL/L — LOW (ref 22–31)
COLOR SPEC: SIGNIFICANT CHANGE UP
CREAT SERPL-MCNC: 2.44 MG/DL — HIGH (ref 0.5–1.3)
CREAT SERPL-MCNC: 3.14 MG/DL — HIGH (ref 0.5–1.3)
CREAT SERPL-MCNC: 3.28 MG/DL — HIGH (ref 0.5–1.3)
DIFF PNL FLD: ABNORMAL
EGFR: 13 ML/MIN/1.73M2 — LOW
EGFR: 13 ML/MIN/1.73M2 — LOW
EGFR: 18 ML/MIN/1.73M2 — LOW
EOSINOPHIL NFR URNS MANUAL: NEGATIVE — SIGNIFICANT CHANGE UP
EPI CELLS # UR: 1 /HPF — SIGNIFICANT CHANGE UP
GLUCOSE BLDC GLUCOMTR-MCNC: 100 MG/DL — HIGH (ref 70–99)
GLUCOSE BLDC GLUCOMTR-MCNC: 143 MG/DL — HIGH (ref 70–99)
GLUCOSE BLDC GLUCOMTR-MCNC: 71 MG/DL — SIGNIFICANT CHANGE UP (ref 70–99)
GLUCOSE BLDC GLUCOMTR-MCNC: 93 MG/DL — SIGNIFICANT CHANGE UP (ref 70–99)
GLUCOSE BLDC GLUCOMTR-MCNC: 98 MG/DL — SIGNIFICANT CHANGE UP (ref 70–99)
GLUCOSE BLDC GLUCOMTR-MCNC: 99 MG/DL — SIGNIFICANT CHANGE UP (ref 70–99)
GLUCOSE SERPL-MCNC: 110 MG/DL — HIGH (ref 70–99)
GLUCOSE SERPL-MCNC: 94 MG/DL — SIGNIFICANT CHANGE UP (ref 70–99)
GLUCOSE SERPL-MCNC: 95 MG/DL — SIGNIFICANT CHANGE UP (ref 70–99)
GLUCOSE UR QL: NEGATIVE — SIGNIFICANT CHANGE UP
HYALINE CASTS # UR AUTO: 0 /LPF — SIGNIFICANT CHANGE UP (ref 0–7)
KETONES UR-MCNC: NEGATIVE — SIGNIFICANT CHANGE UP
LACTATE SERPL-SCNC: 1.1 MMOL/L — SIGNIFICANT CHANGE UP (ref 0.7–2)
LEUKOCYTE ESTERASE UR-ACNC: NEGATIVE — SIGNIFICANT CHANGE UP
MAGNESIUM SERPL-MCNC: 1.7 MG/DL — SIGNIFICANT CHANGE UP (ref 1.6–2.6)
NITRITE UR-MCNC: NEGATIVE — SIGNIFICANT CHANGE UP
PH UR: 7 — SIGNIFICANT CHANGE UP (ref 5–8)
PHOSPHATE SERPL-MCNC: 3.1 MG/DL — SIGNIFICANT CHANGE UP (ref 2.5–4.5)
POTASSIUM SERPL-MCNC: 2.8 MMOL/L — CRITICAL LOW (ref 3.5–5.3)
POTASSIUM SERPL-MCNC: 3.9 MMOL/L — SIGNIFICANT CHANGE UP (ref 3.5–5.3)
POTASSIUM SERPL-MCNC: 3.9 MMOL/L — SIGNIFICANT CHANGE UP (ref 3.5–5.3)
POTASSIUM SERPL-SCNC: 2.8 MMOL/L — CRITICAL LOW (ref 3.5–5.3)
POTASSIUM SERPL-SCNC: 3.9 MMOL/L — SIGNIFICANT CHANGE UP (ref 3.5–5.3)
POTASSIUM SERPL-SCNC: 3.9 MMOL/L — SIGNIFICANT CHANGE UP (ref 3.5–5.3)
PROT SERPL-MCNC: 4.3 G/DL — LOW (ref 6–8.3)
PROT SERPL-MCNC: 6.2 G/DL — SIGNIFICANT CHANGE UP (ref 6–8.3)
PROT SERPL-MCNC: 6.4 G/DL — SIGNIFICANT CHANGE UP (ref 6–8.3)
PROT UR-MCNC: ABNORMAL
RBC CASTS # UR COMP ASSIST: 2 /HPF — SIGNIFICANT CHANGE UP (ref 0–4)
SODIUM SERPL-SCNC: 135 MMOL/L — SIGNIFICANT CHANGE UP (ref 135–145)
SODIUM SERPL-SCNC: 136 MMOL/L — SIGNIFICANT CHANGE UP (ref 135–145)
SODIUM SERPL-SCNC: 140 MMOL/L — SIGNIFICANT CHANGE UP (ref 135–145)
SP GR SPEC: 1.01 — SIGNIFICANT CHANGE UP (ref 1.01–1.02)
UROBILINOGEN FLD QL: NEGATIVE — SIGNIFICANT CHANGE UP
WBC UR QL: 1 /HPF — SIGNIFICANT CHANGE UP (ref 0–5)

## 2022-03-11 PROCEDURE — 93306 TTE W/DOPPLER COMPLETE: CPT | Mod: 26

## 2022-03-11 PROCEDURE — 99233 SBSQ HOSP IP/OBS HIGH 50: CPT

## 2022-03-11 PROCEDURE — 99232 SBSQ HOSP IP/OBS MODERATE 35: CPT

## 2022-03-11 PROCEDURE — 76700 US EXAM ABDOM COMPLETE: CPT | Mod: 26

## 2022-03-11 RX ORDER — INSULIN LISPRO 100/ML
VIAL (ML) SUBCUTANEOUS
Refills: 0 | Status: DISCONTINUED | OUTPATIENT
Start: 2022-03-11 | End: 2022-03-16

## 2022-03-11 RX ORDER — INSULIN LISPRO 100/ML
VIAL (ML) SUBCUTANEOUS AT BEDTIME
Refills: 0 | Status: DISCONTINUED | OUTPATIENT
Start: 2022-03-11 | End: 2022-03-16

## 2022-03-11 RX ORDER — SODIUM CHLORIDE 9 MG/ML
1000 INJECTION INTRAMUSCULAR; INTRAVENOUS; SUBCUTANEOUS
Refills: 0 | Status: DISCONTINUED | OUTPATIENT
Start: 2022-03-11 | End: 2022-03-12

## 2022-03-11 RX ORDER — MAGNESIUM SULFATE 500 MG/ML
1 VIAL (ML) INJECTION ONCE
Refills: 0 | Status: COMPLETED | OUTPATIENT
Start: 2022-03-11 | End: 2022-03-11

## 2022-03-11 RX ADMIN — PIPERACILLIN AND TAZOBACTAM 25 GRAM(S): 4; .5 INJECTION, POWDER, LYOPHILIZED, FOR SOLUTION INTRAVENOUS at 22:58

## 2022-03-11 RX ADMIN — Medication 25 MICROGRAM(S): at 05:31

## 2022-03-11 RX ADMIN — Medication 75 MILLIGRAM(S): at 12:16

## 2022-03-11 RX ADMIN — HEPARIN SODIUM 5000 UNIT(S): 5000 INJECTION INTRAVENOUS; SUBCUTANEOUS at 10:36

## 2022-03-11 RX ADMIN — ATORVASTATIN CALCIUM 40 MILLIGRAM(S): 80 TABLET, FILM COATED ORAL at 21:16

## 2022-03-11 RX ADMIN — HEPARIN SODIUM 5000 UNIT(S): 5000 INJECTION INTRAVENOUS; SUBCUTANEOUS at 21:16

## 2022-03-11 RX ADMIN — Medication 100 GRAM(S): at 17:18

## 2022-03-11 RX ADMIN — PIPERACILLIN AND TAZOBACTAM 25 GRAM(S): 4; .5 INJECTION, POWDER, LYOPHILIZED, FOR SOLUTION INTRAVENOUS at 10:36

## 2022-03-11 NOTE — DIETITIAN INITIAL EVALUATION ADULT. - REASON FOR ADMISSION
Pt is 89 y/o MF with PMH as per chart: " Pt is 89 y/o MF with PMH as per chart: "89y F pmhx HTN, HLD, DM, glaucoma, breast cancer, diverticulitis p/w urinary symptoms and encephalopathy - incidentally found to have choledocholithiasis on imaging"

## 2022-03-11 NOTE — PROGRESS NOTE ADULT - SUBJECTIVE AND OBJECTIVE BOX
Chief Complaint:  Patient is a 89y old  Female who presents with a chief complaint of Pt is 89 y/o MF with PMH as per chart: " (11 Mar 2022 11:36)      Interval Events: no acute events overnight  - no abd pain, n/v, f/c      Hospital Medications:  atorvastatin 40 milliGRAM(s) Oral at bedtime  dextrose 40% Gel 15 Gram(s) Oral once  dextrose 5%. 1000 milliLiter(s) IV Continuous <Continuous>  dextrose 5%. 1000 milliLiter(s) IV Continuous <Continuous>  dextrose 50% Injectable 25 Gram(s) IV Push once  dextrose 50% Injectable 12.5 Gram(s) IV Push once  dextrose 50% Injectable 25 Gram(s) IV Push once  dextrose 50% Injectable 12.5 Gram(s) IV Push once  glucagon  Injectable 1 milliGRAM(s) IntraMuscular once  heparin   Injectable 5000 Unit(s) SubCutaneous every 12 hours  insulin lispro (ADMELOG) corrective regimen sliding scale   SubCutaneous every 6 hours  levothyroxine 25 MICROGram(s) Oral daily  magnesium sulfate  IVPB 1 Gram(s) IV Intermittent once  piperacillin/tazobactam IVPB.. 3.375 Gram(s) IV Intermittent every 12 hours  sodium chloride 0.9%. 1000 milliLiter(s) IV Continuous <Continuous>  venlafaxine XR. 75 milliGRAM(s) Oral daily      PMHX/PSHX:  HTN (hypertension)    Diabetes mellitus type 2, noninsulin dependent    Diverticulitis    Hyperlipidemia    GERD (gastroesophageal reflux disease)    Glaucoma    Breast cancer    Neurogenic bladder    Urinary incontinence    Renal calculus or stone    Arthritis    Heart murmur    Renal calculi    S/P lumpectomy of breast    S/P bladder repair            ROS:     General:  No weight loss, fevers, chills, night sweats, fatigue   Eyes:  No vision changes  ENT:  No sore throat, pain, runny nose  CV:  No chest pain, palpitations, dizziness   Resp:  No SOB, cough, wheezing  GI:  See HPI  :  No burning with urination, hematuria  Muscle:  No pain, weakness  Neuro:  No weakness/tingling, memory problems  Psych:  No fatigue, insomnia, mood problems, depression  Heme:  No easy bruisability  Skin:  No rash, edema      PHYSICAL EXAM:     GENERAL:  elderly, thin, no distress  HEENT:  NC/AT,  conjunctivae clear and pink  CHEST:  Full & symmetric excursion, no increased effort  HEART:  Regular rate  ABDOMEN:  Soft, non-tender, non-distended  EXTREMITIES:  no cyanosis, clubbing or edema  SKIN:  No rash/erythema  NEURO:  Alert, orientedx1  Vital Signs:  Vital Signs Last 24 Hrs  T(C): 36.6 (11 Mar 2022 09:24), Max: 36.8 (10 Mar 2022 16:19)  T(F): 97.8 (11 Mar 2022 09:24), Max: 98.3 (10 Mar 2022 16:19)  HR: 76 (11 Mar 2022 09:24) (67 - 89)  BP: 178/82 (11 Mar 2022 12:57) (106/63 - 178/82)  BP(mean): --  RR: 16 (11 Mar 2022 09:24) (16 - 17)  SpO2: 93% (11 Mar 2022 04:51) (93% - 97%)  Daily     Daily     LABS:                        9.8    6.08  )-----------( 186      ( 10 Mar 2022 06:52 )             32.7     03-11    135  |  104  |  41<H>  ----------------------------<  110<H>  3.9   |  18<L>  |  3.14<H>    Ca    9.1      11 Mar 2022 13:31  Phos  3.1     03-11  Mg     1.7     03-11    TPro  6.4  /  Alb  3.7  /  TBili  0.2  /  DBili  x   /  AST  38  /  ALT  40  /  AlkPhos  70  03-11    LIVER FUNCTIONS - ( 11 Mar 2022 13:31 )  Alb: 3.7 g/dL / Pro: 6.4 g/dL / ALK PHOS: 70 U/L / ALT: 40 U/L / AST: 38 U/L / GGT: x             Urinalysis Basic - ( 09 Mar 2022 14:31 )    Color: Light Yellow / Appearance: Clear / S.013 / pH: x  Gluc: x / Ketone: Negative  / Bili: Negative / Urobili: Negative   Blood: x / Protein: 100 / Nitrite: Negative   Leuk Esterase: Negative / RBC: 5 /hpf / WBC 1 /HPF   Sq Epi: x / Non Sq Epi: 2 /hpf / Bacteria: Negative          Imaging:

## 2022-03-11 NOTE — DIETITIAN INITIAL EVALUATION ADULT. - PHYSCIAL ASSESSMENT
90% IBW  Skin: no noted pressure injuries as per flowsheets; +skin lesions  Performed nutrition focused physical exam with pt's consent and noted xx signs of muscle/fat loss 90% IBW  Skin: no noted pressure injuries as per flowsheets; +skin lesions  Performed nutrition focused physical exam with pt's consent and noted severe signs of muscle/fat loss

## 2022-03-11 NOTE — DIETITIAN NUTRITION RISK NOTIFICATION - TREATMENT: THE FOLLOWING DIET HAS BEEN RECOMMENDED
Diet, Soft and Bite Sized:   For patients receiving Renal Replacement - No Protein Restr, No Conc K, No Conc Phos, Low Sodium (RENAL) (03-10-22 @ 13:45) [Active]

## 2022-03-11 NOTE — DIETITIAN INITIAL EVALUATION ADULT. - OTHER CALCULATIONS
Dosing wt 100 pounds used for calorie and protein needs calculations. Defer fluids for team per renal status

## 2022-03-11 NOTE — PROGRESS NOTE ADULT - ASSESSMENT
89 yo female h/o htn, chol, DM, breast ca, here with ELIAS, metabolic encephalopathy    ELISA  likely 2/2 dehydration vs bactrim  iv fluids as ordered   renal sono noted  renal f/u     recent UTI  UA neg  f/u cultures  cont zosyn empirically   urine looks very cloudy. will repeat UA     choledocholithiasis on CT  abd exam benign  LFTs nl  no n/v  gi consulted  cont zosyn empirically  US noted  conservative mngt for now  no plans for ERCP at this time   diet as tolerated     metabolic encephalopathy  likely 2/2 elisa  treat elisa  supportive care  monitor    DM  hold metformin  iss  monitor fs    dysphagia  swallow eval noted  soft diet     aortic stenosis  check echo to eval    chol  cont statin    cont other home meds    dvt ppx      plan d/w pt and daughter at bedside    Advanced care planning was discussed with patient and family.  Advanced care planning forms were reviewed and discussed as appropriate.  Differential diagnosis and plan of care discussed with patient after the evaluation.   Pain assessed and judicious use of narcotics when appropriate was discussed.  Importance of Fall prevention discussed.  Counseling on Smoking and Alcohol cessation was offered when appropriate.  Counseling on Diet, exercise, and medication compliance was done.       Approx 55 minutes spent.   89 yo female h/o htn, chol, DM, breast ca, here with ELISA, metabolic encephalopathy    ELISA on CKD  likely 2/2 dehydration vs bactrim  iv fluids as ordered   renal sono noted  renal f/u     recent UTI  UA neg  f/u cultures  cont zosyn empirically   urine looks very cloudy. will repeat UA     choledocholithiasis on CT  abd exam benign  LFTs nl  no n/v  gi consulted  cont zosyn empirically  US noted  conservative mngt for now  no plans for ERCP at this time   diet as tolerated     metabolic encephalopathy  likely 2/2 elisa  treat elisa  supportive care  monitor    DM  hold metformin  iss  monitor fs    dysphagia  swallow eval noted  soft diet     aortic stenosis  check echo to eval    chol  cont statin    cont other home meds    dvt ppx      plan d/w pt and daughter at bedside    Advanced care planning was discussed with patient and family.  Advanced care planning forms were reviewed and discussed as appropriate.  Differential diagnosis and plan of care discussed with patient after the evaluation.   Pain assessed and judicious use of narcotics when appropriate was discussed.  Importance of Fall prevention discussed.  Counseling on Smoking and Alcohol cessation was offered when appropriate.  Counseling on Diet, exercise, and medication compliance was done.       Approx 55 minutes spent.

## 2022-03-11 NOTE — DIETITIAN INITIAL EVALUATION ADULT. - REASON INDICATOR FOR ASSESSMENT
Pt seen for meeting BMI <19 kg/m2 criteria   Information obtained from EMR and pt.. Pt seen for meeting BMI <19 kg/m2 criteria   Information obtained from EMR and pt's daughter. pt lethargic, A&O X1

## 2022-03-11 NOTE — PROGRESS NOTE ADULT - SUBJECTIVE AND OBJECTIVE BOX
HPI:  Patient seen and examined at bedside on [address] [place].    Review Of Systems:           Respiratory: No shortness of breath, cough, or wheezing  Cardiovascular: No chest pain or palpitations  10 point review of systems is otherwise negative except as mentioned above        Medications:  atorvastatin 40 milliGRAM(s) Oral at bedtime  dextrose 40% Gel 15 Gram(s) Oral once  dextrose 5%. 1000 milliLiter(s) IV Continuous <Continuous>  dextrose 5%. 1000 milliLiter(s) IV Continuous <Continuous>  dextrose 50% Injectable 25 Gram(s) IV Push once  dextrose 50% Injectable 12.5 Gram(s) IV Push once  dextrose 50% Injectable 25 Gram(s) IV Push once  dextrose 50% Injectable 12.5 Gram(s) IV Push once  glucagon  Injectable 1 milliGRAM(s) IntraMuscular once  heparin   Injectable 5000 Unit(s) SubCutaneous every 12 hours  insulin lispro (ADMELOG) corrective regimen sliding scale   SubCutaneous three times a day before meals  insulin lispro (ADMELOG) corrective regimen sliding scale   SubCutaneous at bedtime  levothyroxine 25 MICROGram(s) Oral daily  piperacillin/tazobactam IVPB.. 3.375 Gram(s) IV Intermittent every 12 hours  sodium chloride 0.9%. 1000 milliLiter(s) IV Continuous <Continuous>  venlafaxine XR. 75 milliGRAM(s) Oral daily    PAST MEDICAL & SURGICAL HISTORY:  HTN (hypertension)    Diabetes mellitus type 2, noninsulin dependent    Diverticulitis    Hyperlipidemia    GERD (gastroesophageal reflux disease)    Glaucoma    Breast cancer    Urinary incontinence    Renal calculus or stone    Arthritis    Heart murmur    Renal calculi  s/p stone extraction 57 yrs ago    S/P lumpectomy of breast  right breast with node removal    S/P bladder repair  Interstim device placement 2010      Vitals:  T(C): 36.3 (03-11-22 @ 22:03), Max: 36.6 (03-11-22 @ 09:24)  HR: 73 (03-11-22 @ 22:03) (67 - 86)  BP: 173/80 (03-11-22 @ 22:03) (106/63 - 178/82)  BP(mean): --  RR: 15 (03-11-22 @ 22:03) (15 - 18)  SpO2: 93% (03-11-22 @ 22:03) (93% - 97%)  Wt(kg): --  Daily     Daily   I&O's Summary    10 Mar 2022 07:01  -  11 Mar 2022 07:00  --------------------------------------------------------  IN: 1250 mL / OUT: 1900 mL / NET: -650 mL    11 Mar 2022 07:01  -  11 Mar 2022 23:35  --------------------------------------------------------  IN: 240 mL / OUT: 601 mL / NET: -361 mL        Physical Exam:  Appearance: Fraily elderly  Eyes: PERRLA, EOMI, pink conjunctiva, no scleral icterus   HENT: normal oral mucosa  Cardiovascular: RRR, S1, S2, III/VI systolic murmur at apex; no edema; no JVD  Respiratory: Clear to auscultation bilaterally  Gastrointestinal: Soft, non-tender, non-distended, BS+  Musculoskeletal: No clubbing or joint deformity   Neurologic: No focal weakness  Lymphatic: No lymphadenopathy  Psychiatry: AAOx3 with appropriate mood and affect  Skin: No rashes, ecchymoses, or cyanosis, +skin tears                          9.8    6.08  )-----------( 186      ( 10 Mar 2022 06:52 )             32.7     03-11    135  |  104  |  41<H>  ----------------------------<  110<H>  3.9   |  18<L>  |  3.14<H>    Ca    9.1      11 Mar 2022 13:31  Phos  3.1     03-11  Mg     1.7     03-11    TPro  6.4  /  Alb  3.7  /  TBili  0.2  /  DBili  x   /  AST  38  /  ALT  40  /  AlkPhos  70  03-11      Cardiovascular Diagnostic Testing:  ECG: sinus 76 bpm, low voltage in limb leads    Echo: < from: Transthoracic Echocardiogram (10.09.20 @ 15:28) >  ------------------------------------------------------------------------  Dimensions:    Normal Values:  LA:     3.6    2.0 - 4.0 cm  Ao:     2.7    2.0 - 3.8 cm  SEPTUM: 1.0    0.6 - 1.2 cm  PWT:    0.8    0.6 -1.1 cm  LVIDd:  3.9    3.0 - 5.6 cm  LVIDs:  2.4    1.8 - 4.0 cm  Derived variables:  LVMI: 73 g/m2  RWT: 0.41  Fractional short: 38 %  EF (Moran Rule): 63 %Doppler Peak Velocity (m/sec):  AoV=2.0  ------------------------------------------------------------------------  Observations:  Mitral Valve: Normal mitral valve. Mild mitral  regurgitation.  Aortic Valve/Aorta: Calcified trileaflet aortic valve with  decreased opening. Peak transaortic valve gradient equals  16 mm Hg, mean transaortic valve gradient equals 8 mm Hg,  estimated aortic valve area equals 1.7 sqcm (by continuity  equation), aortic valve velocity time integral equals 43  cm, consistent with mild aortic stenosis. Mild aortic  regurgitation.  Peak left ventricular outflow tract  gradient equals 3 mm Hg, mean gradient is equal to 2 mm Hg,  LVOT velocity time integral equals 19 cm.  Aortic Root: 2.7 cm.  LVOT diameter: 2.2 cm.  Left Atrium: Mildly dilated left atrium.  LA volume index =  41 cc/m2.  Left Ventricle: Normal left ventricular systolic function.  No segmental wall motion abnormalities. Normal left  ventricular internal dimensions and wall thickness.  Moderate diastolic dysfunction (Stage II).  Right Heart: Normal right atrium. Normal right ventricular  size and function. Normal tricuspid valve. Mild tricuspid  regurgitation. Normal pulmonic valve.  Pericardium/Pleura: Normal pericardium with no pericardial  effusion.  Hemodynamic: Estimated right atrial pressure is 8 mm Hg.  Estimated right ventricular systolic pressure equals 44 mm  Hg, assuming right atrial pressure equals 8 mm Hg,  consistent with mild pulmonary hypertension.  ------------------------------------------------------------------------  Conclusions:  1. Calcified trileaflet aortic valve with decreased  opening. Peak transaortic valve gradient equals 16 mm Hg,  mean transaortic valve gradient equals 8 mm Hg, estimated  aortic valve area equals 1.7 sqcm (by continuity equation),  aortic valve velocity time integral equals 43 cm,  consistent with mild aortic stenosis.  2. Mildly dilated left atrium.  LA volume index = 41 cc/m2.  3. Normal left ventricular internal dimensions and wall  thickness.  4. Normal left ventricular systolic function. No segmental  wall motion abnormalities.  5. Moderate diastolic dysfunction (Stage II).  *** No previous Echo exam.  ------------------------------------------------------------------------  Confirmed on  10/9/2020 - 17:47:14 by ERNESTO Felipe  ------------------------------------------------------------------------    < end of copied text >    Interpretation of Telemetry: NSR with APCs, PVCs

## 2022-03-11 NOTE — PROGRESS NOTE ADULT - SUBJECTIVE AND OBJECTIVE BOX
[facility]   [phone]  RENAL FOLLOW UP NOTE  --------------------------------------------------------------------------------  Chief Complaint:    24 hour events/subjective:    Patient seen and examined  confused    PAST HISTORY  --------------------------------------------------------------------------------  No significant changes to PMH, PSH, FHx, SHx, unless otherwise noted    ALLERGIES & MEDICATIONS  --------------------------------------------------------------------------------  Allergies    Keflex (Rash; Hives)    Intolerances      Standing Inpatient Medications  atorvastatin 40 milliGRAM(s) Oral at bedtime  dextrose 40% Gel 15 Gram(s) Oral once  dextrose 5%. 1000 milliLiter(s) IV Continuous <Continuous>  dextrose 5%. 1000 milliLiter(s) IV Continuous <Continuous>  dextrose 50% Injectable 25 Gram(s) IV Push once  dextrose 50% Injectable 12.5 Gram(s) IV Push once  dextrose 50% Injectable 25 Gram(s) IV Push once  dextrose 50% Injectable 12.5 Gram(s) IV Push once  glucagon  Injectable 1 milliGRAM(s) IntraMuscular once  heparin   Injectable 5000 Unit(s) SubCutaneous every 12 hours  insulin lispro (ADMELOG) corrective regimen sliding scale   SubCutaneous every 6 hours  levothyroxine 25 MICROGram(s) Oral daily  magnesium sulfate  IVPB 1 Gram(s) IV Intermittent once  piperacillin/tazobactam IVPB.. 3.375 Gram(s) IV Intermittent every 12 hours  sodium chloride 0.9%. 1000 milliLiter(s) IV Continuous <Continuous>  venlafaxine XR. 75 milliGRAM(s) Oral daily    PRN Inpatient Medications      REVIEW OF SYSTEMS  --------------------------------------------------------------------------------    Patient is unable to give ROS    VITALS/PHYSICAL EXAM  --------------------------------------------------------------------------------  T(C): 36.6 (03-11-22 @ 09:24), Max: 36.8 (03-10-22 @ 16:19)  HR: 76 (03-11-22 @ 09:24) (67 - 89)  BP: 106/63 (03-11-22 @ 09:24) (106/63 - 159/68)  RR: 16 (03-11-22 @ 09:24) (16 - 17)  SpO2: 93% (03-11-22 @ 04:51) (93% - 97%)  Wt(kg): --  Height (cm): 157.5 (03-09-22 @ 12:57)  Weight (kg): 45.4 (03-09-22 @ 12:57)  BMI (kg/m2): 18.3 (03-09-22 @ 12:57)  BSA (m2): 1.42 (03-09-22 @ 12:57)      03-10-22 @ 07:01  -  03-11-22 @ 07:00  --------------------------------------------------------  IN: 1250 mL / OUT: 1900 mL / NET: -650 mL      Physical Exam:  	  	Gen: thin cachetic elderly  female   	Pulm: decrease bs  no rales or ronchi or wheezing  	CV: No JVD. RRR, S1S2; II/VI murmur  	Abd: +BS, soft, nontender/nondistended  	: No suprapubic tenderness  	UE: Warm, no cyanosis  no clubbing, no edema;   	LE: Warm, no cyanosis  no clubbing, no edema  	Neuro: confused   	Skin: Warm, decrease skin turgor     LABS/STUDIES  --------------------------------------------------------------------------------              9.8    6.08  >-----------<  186      [03-10-22 @ 06:52]              32.7     136  |  105  |  42  ----------------------------<  94      [03-11-22 @ 10:08]  3.9   |  19  |  3.28        Ca     9.0     [03-11-22 @ 10:08]      Mg     1.7     [03-11-22 @ 10:08]      Phos  3.1     [03-11-22 @ 10:08]    TPro  6.2  /  Alb  3.6  /  TBili  0.2  /  DBili  x   /  AST  37  /  ALT  40  /  AlkPhos  66  [03-11-22 @ 10:08]          Creatinine Trend:  SCr 3.28 [03-11 @ 10:08]  SCr 2.44 [03-11 @ 06:56]  SCr 3.16 [03-10 @ 06:46]  SCr 3.05 [03-09 @ 19:32]  SCr 3.30 [03-09 @ 14:31]              Urinalysis - [03-09-22 @ 14:31]      Color Light Yellow / Appearance Clear / SG 1.013 / pH 7.0      Gluc Negative / Ketone Negative  / Bili Negative / Urobili Negative       Blood Trace / Protein 100 / Leuk Est Negative / Nitrite Negative      RBC 5 / WBC 1 / Hyaline 0 / Gran  / Sq Epi  / Non Sq Epi 2 / Bacteria Negative    Urine Sodium 40      [03-09-22 @ 15:54]  Urine Potassium 32      [03-09-22 @ 15:54]  Urine Osmolality 272      [03-09-22 @ 16:21]      < from: US Abdomen Complete (US Abdomen Complete .) (03.11.22 @ 08:36) >  ACC: [id number] EXAM:  US ABDOMEN COMPLETE                          PROCEDURE DATE:  03/11/2022          INTERPRETATION:  CLINICAL INFORMATION: ELISA. Abnormal gallbladder on prior   CT scan.    COMPARISON: CT abdomen pelvis 3/9/2022 and ultrasound kidneys and bladder   10/11/2020    TECHNIQUE: Sonography of the abdomen.    FINDINGS:    Liver: Within normal limits.  Bile ducts: Redemonstration of intrahepatic and extrahepatic biliary   ductal dilatation. Common bile duct measures 10 mm with two echogenic   distal stones measuring up to 7 mm.  Gallbladder: Cholelithiasis. No significant wall thickening or   pericholecystic fluid. Negative sonographic Moran sign.  Pancreas: Visualized portions are within normal limits.  Spleen: 9.1 cm. Within normal limits.  Right kidney: 12.8 cm. Increased echogenicity. Multiple cysts, including   a large parapelvic cyst. No hydronephrosis.  Left kidney: 8.0 cm. Increased echogenicity. Mild hydronephrosis.   Urothelial thickening is noted. Lower pole cyst measuring 2.6 x 1.8 x 2.5   cm.  Ascites: None.  Aorta and IVC: Atherosclerotic changes of the visualized portion of the   aorta. Visualized portions of the IVC within normal limits.  Miscellaneous: Trace right pleural effusion.    IMPRESSION:  Redemonstration of intrahepatic and extrahepatic biliary ductal   dilatation with distal choledocholithiasis.    Cholelithiasis without sonographic evidence of acute cholecystitis.    Echogenic kidneys suggestive of renal parenchymal disease. Mild left   hydronephrosis with nonspecific urothelial thickening. Correlate with   urinalysis.    Trace right pleural effusion.    --- End of Report ---          TREVOR LORA MD; Resident Radiologist  This document has been electronically signed.  ALLEY ANTONIO MD; Attending Radiologist  This document has been electronically signed. Mar 11 2022  9:20AM    < end of copied text >

## 2022-03-11 NOTE — DIETITIAN INITIAL EVALUATION ADULT. - ORAL INTAKE PTA/DIET HISTORY
- usual intake per pt's daughter: Pt lethargic, discussed with pt's daughter at bedside; states pt lives with her, not following specific diet; confirms No known food allergies. Reports pt's appetite and PO intake decreased in last week, but improving now. states with age pt lost wt and PO intake. noted BMI 18.3. pt's daughter states pt takes Multivitamin and Iron supplements PTA.

## 2022-03-11 NOTE — PROGRESS NOTE ADULT - SUBJECTIVE AND OBJECTIVE BOX
INTERVAL HPI/OVERNIGHT EVENTS:  no nausea or vomiting  eating well with feeding/encouragement  no abdominal pain    pleasantly confused - daughter at bedside    MEDICATIONS  (STANDING):  atorvastatin 40 milliGRAM(s) Oral at bedtime  dextrose 40% Gel 15 Gram(s) Oral once  dextrose 5%. 1000 milliLiter(s) (100 mL/Hr) IV Continuous <Continuous>  dextrose 5%. 1000 milliLiter(s) (50 mL/Hr) IV Continuous <Continuous>  dextrose 50% Injectable 25 Gram(s) IV Push once  dextrose 50% Injectable 12.5 Gram(s) IV Push once  dextrose 50% Injectable 25 Gram(s) IV Push once  dextrose 50% Injectable 12.5 Gram(s) IV Push once  glucagon  Injectable 1 milliGRAM(s) IntraMuscular once  heparin   Injectable 5000 Unit(s) SubCutaneous every 12 hours  insulin lispro (ADMELOG) corrective regimen sliding scale   SubCutaneous every 6 hours  levothyroxine 25 MICROGram(s) Oral daily  magnesium sulfate  IVPB 1 Gram(s) IV Intermittent once  piperacillin/tazobactam IVPB.. 3.375 Gram(s) IV Intermittent every 12 hours  sodium chloride 0.9%. 1000 milliLiter(s) (60 mL/Hr) IV Continuous <Continuous>  venlafaxine XR. 75 milliGRAM(s) Oral daily    MEDICATIONS  (PRN):      Allergies  Keflex (Rash; Hives)      Review of Systems:  General:  No wt loss, fevers, chills, night sweats  CV:  No pain, palpitations, +hypertension  Resp:  No dyspnea, cough, tachypnea, wheezing  GI:  see HPI  :  No pain, bleeding, incontinence, +frequent UTIs  Muscle:  No pain, weakness  Neuro:  No weakness, tingling, memory problems  Psych:  No fatigue, insomnia, mood problems, depression  Endocrine:  No polyuria, polydypsia, cold/heat intolerance  Heme:  No petechiae, ecchymosis, easy bruisability  Skin:  No rash, tattoos, scars, edema      Vital Signs Last 24 Hrs  T(C): 36.6 (11 Mar 2022 09:24), Max: 36.8 (10 Mar 2022 16:19)  T(F): 97.8 (11 Mar 2022 09:24), Max: 98.3 (10 Mar 2022 16:19)  HR: 76 (11 Mar 2022 09:24) (67 - 89)  BP: 178/82 (11 Mar 2022 12:57) (106/63 - 178/82)  BP(mean): --  RR: 16 (11 Mar 2022 09:24) (16 - 17)  SpO2: 93% (11 Mar 2022 04:51) (93% - 97%)    PHYSICAL EXAM:  Constitutional: NAD, well-developed elderly female. pleasant, OOB to chair. daughter at bedside  Neck: No LAD, supple no JVD  Respiratory: cler b/l no accessory muscle use  Cardiovascular: S1 and S2, RRR, no M  Gastrointestinal: BS+, soft, ND NT no rebound guarding or rigidity +urine via Primafit urine collection device  Extremities: No peripheral edema, neg clubbing, cyanosis  Vascular: 2+ peripheral pulses  Neurological: alert and responsive, oriented to self and place; pleasantly confused  Psychiatric: Normal mood, normal affect  Skin: No rashes, anicteric      LABS:                        9.8    6.08  )-----------( 186      ( 10 Mar 2022 06:52 )             32.7     03-11    136  |  105  |  42<H>  ----------------------------<  94  3.9   |  19<L>  |  3.28<H>    Ca    9.0      11 Mar 2022 10:08  Phos  3.1     03-11  Mg     1.7     03-11    TPro  6.2  /  Alb  3.6  /  TBili  0.2  /  DBili  x   /  AST  37  /  ALT  40  /  AlkPhos  66  03-11    Culture - Blood (03.09.22 @ 17:00)   Specimen Source: .Blood Blood-Peripheral   Culture Results:   No growth to date.     Culture - Blood (03.09.22 @ 17:00)   Specimen Source: .Blood Blood-Peripheral   Culture Results:   No growth to date.   Culture - Urine (03.09.22 @ 17:04)   Specimen Source: Catheterized Catheterized   Culture Results:   No growth     RADIOLOGY & ADDITIONAL TESTS:    ACC: [id number] EXAM:  US ABDOMEN COMPLETE                          PROCEDURE DATE:  03/11/2022          INTERPRETATION:  CLINICAL INFORMATION: ELISA. Abnormal gallbladder on prior   CT scan.    COMPARISON: CT abdomen pelvis 3/9/2022 and ultrasound kidneys and bladder   10/11/2020    TECHNIQUE: Sonography of the abdomen.    FINDINGS:    Liver: Within normal limits.  Bile ducts: Redemonstration of intrahepatic and extrahepatic biliary   ductal dilatation. Common bile duct measures 10 mm with two echogenic   distal stones measuring up to 7 mm.  Gallbladder: Cholelithiasis. No significant wall thickening or   pericholecystic fluid. Negative sonographic Moran sign.  Pancreas: Visualized portions are within normal limits.  Spleen: 9.1 cm. Within normal limits.  Right kidney: 12.8 cm. Increased echogenicity. Multiple cysts, including   a large parapelvic cyst. No hydronephrosis.  Left kidney: 8.0 cm. Increased echogenicity. Mild hydronephrosis.   Urothelial thickening is noted. Lower pole cyst measuring 2.6 x 1.8 x 2.5   cm.  Ascites: None.  Aorta and IVC: Atherosclerotic changes of the visualized portion of the   aorta. Visualized portions of the IVC within normal limits.  Miscellaneous: Trace right pleural effusion.    IMPRESSION:  Redemonstration of intrahepatic and extrahepatic biliary ductal   dilatation with distal choledocholithiasis.    Cholelithiasis without sonographic evidence of acute cholecystitis.    Echogenic kidneys suggestive of renal parenchymal disease. Mild left   hydronephrosis with nonspecific urothelial thickening. Correlate with   urinalysis.

## 2022-03-11 NOTE — DIETITIAN INITIAL EVALUATION ADULT. - CONTINUE CURRENT NUTRITION CARE PLAN
Continue current diet regimen as appropriate: renal soft bite size. Defer diet/fluid consistencies to medical team/SLP recommendations. monitor glycemic levels, if indicated consider consistent CHO diet per history of DM./yes Continue current diet regimen as appropriate: renal soft bite size. Defer diet/fluid consistencies to medical team/SLP recommendations. monitor glycemic levels, if indicated consider consistent CHO diet per history of DM. Malnutrition sticker placed, RD to follow-up as per protocol/yes

## 2022-03-11 NOTE — DIETITIAN INITIAL EVALUATION ADULT. - OTHER INFO
- per speech therapist eval 3/10, recommended: soft bite sized/thin liquids  - per GI 3/10: pt with "Choledocholithiasis - without s/s of obstruction or cholangitis. Discussed risks/benefits with daughter and son at bedside including risk of stone causing pain, jaundice and infection. Recommendation: family would like to f/u US that is ordered to re-eval GB and ducts and discuss regarding ERCP for stone removal- no plan at this time pending family decision... diet as tolerated"  - per Nephrology 3/10: "ELISA suspected in setting of infection/interstitial nephritis secondary to bactrim.   lactic acidosis, received 1.5L, continue IVF NS @ 100 cc/hr. repeat VBG with lytes, improving. repeat 8pm  continue zosyn BID...  CT A/P: Atrophic left kidney. A few small nonobstructing, bilateral renal calculi. No hydronephrosis. Bilateral renal cysts including a large parapelvic cyst on the right... strict I/O, trend creatinine and electrolytes"     Pt reports .. appetite with .. PO intake in-house.    Denies ? nausea, vomiting, constipation, diarrhea. Last BM 3/10; Pt not currently on bowel regimen.     Per EMR, pt currently ordered for insulin lispro (ADMELOG) corrective regimen sliding scale , Levothyroxine, Mg sulfate, Na Cl @100 ml/hr continues IV in-house.  - Noted pt with history DM, last A1C 5.7% (3/10), POCT 71-98 (3/11) and BG 94 (3/11); on insulin in house; not on consistent CHO diet at this time, but will monitor if needed to adjust diet order.     Reports UBW xxx. Denies recent wt changes.   Dosing wt: 100 pounds (BMI 18.3, meeting category "underweight" ); dosing wt ? consistent with UBW.   Wt history per chart: 10/11/2020 previous RD note, 45.3 kg and was diagnosed with chronic severe protein calorie malnutrition      -  Food preferences explored and documented. Pt made aware RD remains available. - per speech therapist eval 3/10, recommended: soft bite sized/thin liquids  - per GI 3/10: pt with "Choledocholithiasis - without s/s of obstruction or cholangitis. Discussed risks/benefits with daughter and son at bedside including risk of stone causing pain, jaundice and infection. Recommendation: family would like to f/u US that is ordered to re-eval GB and ducts and discuss regarding ERCP for stone removal- no plan at this time pending family decision... diet as tolerated"  - per Nephrology 3/10: "ELIAS suspected in setting of infection/interstitial nephritis secondary to bactrim.   lactic acidosis, received 1.5L, continue IVF NS @ 100 cc/hr. repeat VBG with lytes, improving. repeat 8pm  continue zosyn BID...  CT A/P: Atrophic left kidney. A few small nonobstructing, bilateral renal calculi. No hydronephrosis. Bilateral renal cysts including a large parapelvic cyst on the right... strict I/O, trend creatinine and electrolytes"     - Pt with history DM, per pt's daughter, followed by family provider; not taking medication or checking glucose levels as it was well controlled. Last A1C 5.7% (3/10), POCT 71-98 (3/11) and BG 94 (3/11); on insulin in house; consistent CHO diet not indicated at this time per advanced age with poor PO intake. RD will monitor and adjust diet as needed    - Pt reports improving appetite with 25-50% PO intake in-house.    Denies pt with nausea, vomiting, constipation. Reports pt had diarrhea before, but improved. Last BM 3/10; Pt not currently on bowel regimen.     Per EMR, pt currently ordered for insulin lispro (ADMELOG) corrective regimen sliding scale , Levothyroxine, Mg sulfate, Na Cl @100 ml/hr continues IV in-house.    Reports  pounds PTA, states was 107 pounds in 01/2022; states wt has been stable in the last 2 years. gradual wt decline with age, BMI 18.3 (meeting category for "underweight").   - Dosing wt: 100 pounds (BMI 18.3, meeting category "underweight" ); dosing wt ? consistent with UBW.   - Wt history per chart: 10/11/2020 previous RD note, 45.3 kg and was diagnosed with chronic severe protein calorie malnutrition      - Encouraged adequate PO intake for meeting nutritional needs, improving nutritional status and healing and for preventing wt loss   - discussed Renal diet, and diet consistency per speech recs (soft bite size). Provided education on renal diet, education included importance of monitoring intake of foods high in potassium/phosphorous/sodium, review of foods high in these micronutrients as well as alternatives, monitoring fluid intake, and importance of adequate protein intake.   - Food preferences explored and documented. Pt made aware RD remains available.

## 2022-03-11 NOTE — PROGRESS NOTE ADULT - ASSESSMENT
89y F pmhx HTN, HLD, DM, glaucoma, breast cancer, diverticulitis p/w urinary symptoms. pt brought to ER by family for generalized weakness and confusion (to year)/AMS.  Incidentally noted to have CBD stone on CT with normal LFTs    COVID negative    #AMS  -follow up cultures, on empiric Zosyn  -monitor clinically    #CBD stone without jaundice/hyperbilirubinemia or significant LFT elevation, though with mild tenderness in RUQ.  No evidence of GB inflammation on CT  US 3/10 - distal CBD stone with intra+extra hepatic ductal dilation +GB stones, no e/o ac felisha  BCx NGTD  -[facility] team input approeciated  -discussed with family at bedside,  at this time they do not wish to pursue ERCP given advanced age and no evidence of biliary sepsis  -PO diet as tolerated    Discussed with family at bedside, all questions answered  Discussed with Cardiology and Medicine attendings    Please call over weekend prn with GI concerns   GI service : [phone]      Marek Cormier PA-C    [facility]  [phone]  After hours and weekend coverage [phone]       Essential hypertension Essential hypertension Essential hypertension Essential hypertension Essential hypertension

## 2022-03-11 NOTE — PROGRESS NOTE ADULT - SUBJECTIVE AND OBJECTIVE BOX
KOREY DIAZ  89y Female  MRN:[id number]    Patient is a 89y old  Female who presents with a chief complaint of AMS, ELISA  HPI:   89y F pmhx HTN, HLD, DM, glaucoma, breast cancer, diverticulitis p/w urinary symptoms. pt presents accompanied by her daughter for generalized weakness and confusion (to year). pt had a UTI diagnosed last week, completed a 7-day course of bactrim. pt denies urinary sx but per daughter pt has frequent UTIs presenting with AMS. pt had several episodes of soft diarrhea daily this week, per daughter she appears dehydrated. pt denies fever, chills, abdominal pain, nausea, vomiting, cough, rhinorrhea, CP, SOB.   found to have ELISA (09 Mar 2022 19:41)      Patient seen and evaluated at bedside. No acute events overnight except as noted.    Interval HPI: no acute events o/n     PAST MEDICAL & SURGICAL HISTORY:  HTN (hypertension)    Diabetes mellitus type 2, noninsulin dependent    Diverticulitis    Hyperlipidemia    GERD (gastroesophageal reflux disease)    Glaucoma    Breast cancer    Urinary incontinence    Renal calculus or stone    Arthritis    Heart murmur    Renal calculi  s/p stone extraction 57 yrs ago    S/P lumpectomy of breast  right breast with node removal    S/P bladder repair  Interstim device placement 2010        REVIEW OF SYSTEMS:  as per hpi     VITALS:  Vital Signs Last 24 Hrs  T(C): 36.6 (11 Mar 2022 09:24), Max: 37 (10 Mar 2022 12:48)  T(F): 97.8 (11 Mar 2022 09:24), Max: 98.6 (10 Mar 2022 12:48)  HR: 76 (11 Mar 2022 09:24) (67 - 89)  BP: 106/63 (11 Mar 2022 09:24) (106/63 - 159/68)  BP(mean): --  RR: 16 (11 Mar 2022 09:24) (16 - 17)  SpO2: 93% (11 Mar 2022 04:51) (93% - 97%)        PHYSICAL EXAM:  GENERAL: NAD, frail   HEAD:  Atraumatic, Normocephalic  EYES: EOMI, PERRLA, conjunctiva and sclera clear  NECK: Supple, No JVD  CHEST/LUNG: Clear to auscultation bilaterally; No wheeze  HEART: S1, S2; +murmur  ABDOMEN: Soft, Nontender, Nondistended; Bowel sounds present  EXTREMITIES:  2+ Peripheral Pulses, No clubbing, cyanosis, or edema  PSYCH: confused   NEUROLOGY: AAOX2-3; non-focal  SKIN: No rashes or lesions    Consultant(s) Notes Reviewed:  [x ] YES  [ ] NO  Care Discussed with Consultants/Other Providers [ x] YES  [ ] NO    MEDS:   MEDICATIONS  (STANDING):  atorvastatin 40 milliGRAM(s) Oral at bedtime  dextrose 40% Gel 15 Gram(s) Oral once  dextrose 5%. 1000 milliLiter(s) (100 mL/Hr) IV Continuous <Continuous>  dextrose 5%. 1000 milliLiter(s) (50 mL/Hr) IV Continuous <Continuous>  dextrose 50% Injectable 25 Gram(s) IV Push once  dextrose 50% Injectable 12.5 Gram(s) IV Push once  dextrose 50% Injectable 25 Gram(s) IV Push once  dextrose 50% Injectable 12.5 Gram(s) IV Push once  glucagon  Injectable 1 milliGRAM(s) IntraMuscular once  heparin   Injectable 5000 Unit(s) SubCutaneous every 12 hours  insulin lispro (ADMELOG) corrective regimen sliding scale   SubCutaneous every 6 hours  levothyroxine 25 MICROGram(s) Oral daily  magnesium sulfate  IVPB 1 Gram(s) IV Intermittent once  piperacillin/tazobactam IVPB.. 3.375 Gram(s) IV Intermittent every 12 hours  sodium chloride 0.9%. 1000 milliLiter(s) (100 mL/Hr) IV Continuous <Continuous>  venlafaxine XR. 75 milliGRAM(s) Oral daily    MEDICATIONS  (PRN):      ALLERGIES:  Keflex (Rash; Hives)      LABS:                                    9.8    6.08  )-----------( 186      ( 10 Mar 2022 06:52 )             32.7   03-11    136  |  105  |  42<H>  ----------------------------<  94  3.9   |  19<L>  |  3.28<H>    Ca    9.0      11 Mar 2022 10:08  Phos  3.1     03-11  Mg     1.7     03-11    TPro  6.2  /  Alb  3.6  /  TBili  0.2  /  DBili  x   /  AST  37  /  ALT  40  /  AlkPhos  66  03-11    < from: US Abdomen Complete (US Abdomen Complete .) (03.11.22 @ 08:36) >  IMPRESSION:  Redemonstration of intrahepatic and extrahepatic biliary ductal   dilatation with distal choledocholithiasis.    Cholelithiasis without sonographic evidence of acute cholecystitis.    Echogenic kidneys suggestive of renal parenchymal disease. Mild left   hydronephrosis with nonspecific urothelial thickening. Correlate with   urinalysis.    Trace right pleural effusion.    --- End of Report ---      < end of copied text >     < from: CT Head No Cont (03.09.22 @ 16:32) >  IMPRESSION:    No acute hemorrhage, mass effect or extra-axial collections. Unchanged   left frontal lobe extra-axial mass, likely representing a meningioma.    --- End of Report ---      < end of copied text >  < from: CT Abdomen and Pelvis No Cont (03.09.22 @ 16:28) >  IMPRESSION:  No acute urinary tract pathology.    Mild intra and extrahepatic biliary ductal dilatation with distal   choledocholithiasis.        --- End of Report ---    < end of copied text >

## 2022-03-11 NOTE — PROGRESS NOTE ADULT - ASSESSMENT
89 year-old woman with recent UTI, treated with Bactrim, now admitted with altered mental status, seen to have ELISA, elevated lactate level, but clean UA.  Failure to thrive.  Poor PO intake.    Appreciate nephrology consultation.  GI consult - CBD stone noted, but unlikely that this is etiology of deterioration.     I will be covered by Santi Coleman MD this weekend. Please call him with any acute cardiac concerns.

## 2022-03-11 NOTE — DIETITIAN INITIAL EVALUATION ADULT. - PERTINENT MEDS FT
MEDICATIONS  (STANDING):  atorvastatin 40 milliGRAM(s) Oral at bedtime  dextrose 40% Gel 15 Gram(s) Oral once  dextrose 5%. 1000 milliLiter(s) (100 mL/Hr) IV Continuous <Continuous>  dextrose 5%. 1000 milliLiter(s) (50 mL/Hr) IV Continuous <Continuous>  dextrose 50% Injectable 25 Gram(s) IV Push once  dextrose 50% Injectable 12.5 Gram(s) IV Push once  dextrose 50% Injectable 25 Gram(s) IV Push once  dextrose 50% Injectable 12.5 Gram(s) IV Push once  glucagon  Injectable 1 milliGRAM(s) IntraMuscular once  heparin   Injectable 5000 Unit(s) SubCutaneous every 12 hours  insulin lispro (ADMELOG) corrective regimen sliding scale   SubCutaneous every 6 hours  levothyroxine 25 MICROGram(s) Oral daily  magnesium sulfate  IVPB 1 Gram(s) IV Intermittent once  piperacillin/tazobactam IVPB.. 3.375 Gram(s) IV Intermittent every 12 hours  sodium chloride 0.9%. 1000 milliLiter(s) (100 mL/Hr) IV Continuous <Continuous>  venlafaxine XR. 75 milliGRAM(s) Oral daily    MEDICATIONS  (PRN):    Home Medications:  Bactrim  mg-160 mg oral tablet: 1 tab(s) orally every 12 hours.    **As per patient she finished with the antibiotic treatment this morning** (09 Mar 2022 18:52)  Effexor XR 75 mg oral capsule, extended release: 1 cap(s) orally once a day (09 Mar 2022 18:52)  ferrous sulfate 325 mg (65 mg elemental iron) oral tablet: 1 tab(s) orally once a day (09 Mar 2022 18:52)  metFORMIN 1000 mg oral tablet: 1 tab(s) orally 2 times a day (09 Mar 2022 18:52)  multivitamin: 1 tab(s) orally once a day (09 Mar 2022 18:52)  rosuvastatin 10 mg oral tablet: 1 tab(s) orally once a day (09 Mar 2022 18:52)  Synthroid 25 mcg (0.025 mg) oral tablet: 1 tab(s) orally once a day (09 Mar 2022 18:52)

## 2022-03-11 NOTE — PROGRESS NOTE ADULT - ASSESSMENT
89 year old Female pmhx HTN, HLD, DM, glaucoma, breast cancer, diverticulitis p/w AMS, weakness. PTA patient was in [place] with family and went to urgent care for confusion and noticed to have UTI, she was treated with 7 day course of bactrim, last dose was Saturday. Family also states she has had 4 UTI's over the last year. She was noticed with an abnormal creatinine, renal consult called.       1- ELISA on CKD  2- infection  3- lactic acidosis      ELISA suspected in setting of infection/interstitial nephritis secondary to bactrim.   creatinine remains elevated,  continue NS @ 60 ml/hr  non-oliguric  continue zosyn BID  cultures negative  CT A/P: Atrophic left kidney.   abd ultrasound with mild left hydro   strict I/O  trend creatinine and electrolytes

## 2022-03-11 NOTE — DIETITIAN INITIAL EVALUATION ADULT. - PERTINENT LABORATORY DATA
03-11 @ 10:08: Na 136, BUN 42<H>, Cr 3.28<H>, BG 94, K+ 3.9, Phos 3.1, Mg 1.7, Alk Phos 66, ALT/SGPT 40, AST/SGOT 37, HbA1c --  03-11 @ 06:56: Na 140, BUN 34<H>, Cr 2.44<H>, BG 95, K+ 2.8<LL>, Phos --, Mg --, Alk Phos 45, ALT/SGPT 27, AST/SGOT 27, HbA1c --    A1C with Estimated Average Glucose Result: 5.7 % (03-10-22 @ 12:17)    POCT Blood Glucose.: 98 mg/dL (03-11-22 @ 06:09)  POCT Blood Glucose.: 71 mg/dL (03-11-22 @ 05:15)  POCT Blood Glucose.: 93 mg/dL (03-11-22 @ 00:23)  POCT Blood Glucose.: 81 mg/dL (03-10-22 @ 18:10)  POCT Blood Glucose.: 86 mg/dL (03-10-22 @ 12:00)

## 2022-03-12 LAB
ALBUMIN SERPL ELPH-MCNC: 3.8 G/DL — SIGNIFICANT CHANGE UP (ref 3.3–5)
ALP SERPL-CCNC: 70 U/L — SIGNIFICANT CHANGE UP (ref 40–120)
ALT FLD-CCNC: 35 U/L — SIGNIFICANT CHANGE UP (ref 10–45)
ANION GAP SERPL CALC-SCNC: 14 MMOL/L — SIGNIFICANT CHANGE UP (ref 5–17)
AST SERPL-CCNC: 31 U/L — SIGNIFICANT CHANGE UP (ref 10–40)
BILIRUB SERPL-MCNC: 0.2 MG/DL — SIGNIFICANT CHANGE UP (ref 0.2–1.2)
BUN SERPL-MCNC: 33 MG/DL — HIGH (ref 7–23)
CALCIUM SERPL-MCNC: 9.1 MG/DL — SIGNIFICANT CHANGE UP (ref 8.4–10.5)
CHLORIDE SERPL-SCNC: 109 MMOL/L — HIGH (ref 96–108)
CO2 SERPL-SCNC: 16 MMOL/L — LOW (ref 22–31)
CREAT SERPL-MCNC: 2.85 MG/DL — HIGH (ref 0.5–1.3)
EGFR: 15 ML/MIN/1.73M2 — LOW
GLUCOSE BLDC GLUCOMTR-MCNC: 132 MG/DL — HIGH (ref 70–99)
GLUCOSE BLDC GLUCOMTR-MCNC: 152 MG/DL — HIGH (ref 70–99)
GLUCOSE BLDC GLUCOMTR-MCNC: 90 MG/DL — SIGNIFICANT CHANGE UP (ref 70–99)
GLUCOSE BLDC GLUCOMTR-MCNC: 95 MG/DL — SIGNIFICANT CHANGE UP (ref 70–99)
GLUCOSE SERPL-MCNC: 89 MG/DL — SIGNIFICANT CHANGE UP (ref 70–99)
MAGNESIUM SERPL-MCNC: 1.9 MG/DL — SIGNIFICANT CHANGE UP (ref 1.6–2.6)
POTASSIUM SERPL-MCNC: 3.7 MMOL/L — SIGNIFICANT CHANGE UP (ref 3.5–5.3)
POTASSIUM SERPL-SCNC: 3.7 MMOL/L — SIGNIFICANT CHANGE UP (ref 3.5–5.3)
PROT SERPL-MCNC: 6.6 G/DL — SIGNIFICANT CHANGE UP (ref 6–8.3)
SODIUM SERPL-SCNC: 139 MMOL/L — SIGNIFICANT CHANGE UP (ref 135–145)

## 2022-03-12 RX ORDER — HYDRALAZINE HCL 50 MG
5 TABLET ORAL ONCE
Refills: 0 | Status: COMPLETED | OUTPATIENT
Start: 2022-03-12 | End: 2022-03-12

## 2022-03-12 RX ORDER — LANOLIN ALCOHOL/MO/W.PET/CERES
3 CREAM (GRAM) TOPICAL AT BEDTIME
Refills: 0 | Status: DISCONTINUED | OUTPATIENT
Start: 2022-03-12 | End: 2022-03-16

## 2022-03-12 RX ORDER — HYDRALAZINE HCL 50 MG
25 TABLET ORAL ONCE
Refills: 0 | Status: COMPLETED | OUTPATIENT
Start: 2022-03-12 | End: 2022-03-12

## 2022-03-12 RX ADMIN — Medication 3 MILLIGRAM(S): at 21:19

## 2022-03-12 RX ADMIN — PIPERACILLIN AND TAZOBACTAM 25 GRAM(S): 4; .5 INJECTION, POWDER, LYOPHILIZED, FOR SOLUTION INTRAVENOUS at 22:52

## 2022-03-12 RX ADMIN — Medication 75 MILLIGRAM(S): at 11:23

## 2022-03-12 RX ADMIN — HEPARIN SODIUM 5000 UNIT(S): 5000 INJECTION INTRAVENOUS; SUBCUTANEOUS at 21:17

## 2022-03-12 RX ADMIN — ATORVASTATIN CALCIUM 40 MILLIGRAM(S): 80 TABLET, FILM COATED ORAL at 21:17

## 2022-03-12 RX ADMIN — PIPERACILLIN AND TAZOBACTAM 25 GRAM(S): 4; .5 INJECTION, POWDER, LYOPHILIZED, FOR SOLUTION INTRAVENOUS at 11:51

## 2022-03-12 RX ADMIN — Medication 25 MILLIGRAM(S): at 06:34

## 2022-03-12 RX ADMIN — Medication 25 MICROGRAM(S): at 05:10

## 2022-03-12 RX ADMIN — HEPARIN SODIUM 5000 UNIT(S): 5000 INJECTION INTRAVENOUS; SUBCUTANEOUS at 11:23

## 2022-03-12 RX ADMIN — Medication 5 MILLIGRAM(S): at 00:43

## 2022-03-12 RX ADMIN — Medication 1: at 17:26

## 2022-03-12 NOTE — PROGRESS NOTE ADULT - ASSESSMENT
89 yo female h/o htn, chol, DM, breast ca, here with ELISA, metabolic encephalopathy    ELISA on CKD  likely 2/2 dehydration vs bactrim  iv fluids as ordered   renal sono noted  renal f/u   creat slowly improving     recent UTI  UA neg  f/u cultures - neg  cont zosyn empirically     choledocholithiasis on CT  abd exam benign  LFTs nl  no n/v  gi consulted  cont zosyn empirically  US noted  conservative mngt for now  no plans for ERCP at this time   diet as tolerated     metabolic encephalopathy  likely 2/2 elisa  treat elisa  supportive care  monitor    DM  hold metformin  iss  monitor fs    dysphagia  swallow eval noted  soft diet     aortic stenosis  echo noted with progression to sev AS   cards f/u  consider structural heart eval     chol  cont statin    cont other home meds    dvt ppx      plan d/w pt and daughter at bedside    Advanced care planning was discussed with patient and family.  Advanced care planning forms were reviewed and discussed as appropriate.  Differential diagnosis and plan of care discussed with patient after the evaluation.   Pain assessed and judicious use of narcotics when appropriate was discussed.  Importance of Fall prevention discussed.  Counseling on Smoking and Alcohol cessation was offered when appropriate.  Counseling on Diet, exercise, and medication compliance was done.       Approx 55 minutes spent.

## 2022-03-12 NOTE — PROGRESS NOTE ADULT - SUBJECTIVE AND OBJECTIVE BOX
KOREY DIAZ  89y Female  MRN:[id number]    Patient is a 89y old  Female who presents with a chief complaint of AMS, ELISA  HPI:   89y F pmhx HTN, HLD, DM, glaucoma, breast cancer, diverticulitis p/w urinary symptoms. pt presents accompanied by her daughter for generalized weakness and confusion (to year). pt had a UTI diagnosed last week, completed a 7-day course of bactrim. pt denies urinary sx but per daughter pt has frequent UTIs presenting with AMS. pt had several episodes of soft diarrhea daily this week, per daughter she appears dehydrated. pt denies fever, chills, abdominal pain, nausea, vomiting, cough, rhinorrhea, CP, SOB.   found to have ELISA (09 Mar 2022 19:41)      Patient seen and evaluated at bedside. No acute events overnight except as noted.    Interval HPI: no acute events o/n     PAST MEDICAL & SURGICAL HISTORY:  HTN (hypertension)    Diabetes mellitus type 2, noninsulin dependent    Diverticulitis    Hyperlipidemia    GERD (gastroesophageal reflux disease)    Glaucoma    Breast cancer    Urinary incontinence    Renal calculus or stone    Arthritis    Heart murmur    Renal calculi  s/p stone extraction 57 yrs ago    S/P lumpectomy of breast  right breast with node removal    S/P bladder repair  Interstim device placement 2010        REVIEW OF SYSTEMS:  as per hpi     VITALS:   Vital Signs Last 24 Hrs  T(C): 37 (12 Mar 2022 20:35), Max: 37.1 (12 Mar 2022 00:30)  T(F): 98.6 (12 Mar 2022 20:35), Max: 98.7 (12 Mar 2022 00:30)  HR: 73 (12 Mar 2022 20:35) (66 - 82)  BP: 158/71 (12 Mar 2022 20:35) (148/68 - 180/73)  BP(mean): --  RR: 17 (12 Mar 2022 20:35) (15 - 17)  SpO2: 97% (12 Mar 2022 20:35) (93% - 98%)        PHYSICAL EXAM:  GENERAL: NAD, frail   HEAD:  Atraumatic, Normocephalic  EYES: EOMI, PERRLA, conjunctiva and sclera clear  NECK: Supple, No JVD  CHEST/LUNG: Clear to auscultation bilaterally; No wheeze  HEART: S1, S2; +murmur  ABDOMEN: Soft, Nontender, Nondistended; Bowel sounds present  EXTREMITIES:  2+ Peripheral Pulses, No clubbing, cyanosis, or edema  PSYCH: confused   NEUROLOGY: AAOX2-3; non-focal  SKIN: No rashes or lesions    Consultant(s) Notes Reviewed:  [x ] YES  [ ] NO  Care Discussed with Consultants/Other Providers [ x] YES  [ ] NO    MEDS:   MEDICATIONS  (STANDING):  atorvastatin 40 milliGRAM(s) Oral at bedtime  dextrose 40% Gel 15 Gram(s) Oral once  dextrose 5%. 1000 milliLiter(s) (100 mL/Hr) IV Continuous <Continuous>  dextrose 5%. 1000 milliLiter(s) (50 mL/Hr) IV Continuous <Continuous>  dextrose 50% Injectable 25 Gram(s) IV Push once  dextrose 50% Injectable 12.5 Gram(s) IV Push once  dextrose 50% Injectable 25 Gram(s) IV Push once  dextrose 50% Injectable 12.5 Gram(s) IV Push once  glucagon  Injectable 1 milliGRAM(s) IntraMuscular once  heparin   Injectable 5000 Unit(s) SubCutaneous every 12 hours  insulin lispro (ADMELOG) corrective regimen sliding scale   SubCutaneous three times a day before meals  insulin lispro (ADMELOG) corrective regimen sliding scale   SubCutaneous at bedtime  levothyroxine 25 MICROGram(s) Oral daily  melatonin 3 milliGRAM(s) Oral at bedtime  piperacillin/tazobactam IVPB.. 3.375 Gram(s) IV Intermittent every 12 hours  venlafaxine XR. 75 milliGRAM(s) Oral daily    MEDICATIONS  (PRN):      ALLERGIES:  Keflex (Rash; Hives)      LABS:                       03-12    139  |  109<H>  |  33<H>  ----------------------------<  89  3.7   |  16<L>  |  2.85<H>    Ca    9.1      12 Mar 2022 10:55  Phos  3.1     03-11  Mg     1.9     03-12    TPro  6.6  /  Alb  3.8  /  TBili  0.2  /  DBili  x   /  AST  31  /  ALT  35  /  AlkPhos  70  03-12      < from: US Abdomen Complete (US Abdomen Complete .) (03.11.22 @ 08:36) >  IMPRESSION:  Redemonstration of intrahepatic and extrahepatic biliary ductal   dilatation with distal choledocholithiasis.    Cholelithiasis without sonographic evidence of acute cholecystitis.    Echogenic kidneys suggestive of renal parenchymal disease. Mild left   hydronephrosis with nonspecific urothelial thickening. Correlate with   urinalysis.    Trace right pleural effusion.    --- End of Report ---      < end of copied text >     < from: CT Head No Cont (03.09.22 @ 16:32) >  IMPRESSION:    No acute hemorrhage, mass effect or extra-axial collections. Unchanged   left frontal lobe extra-axial mass, likely representing a meningioma.    --- End of Report ---      < end of copied text >  < from: CT Abdomen and Pelvis No Cont (03.09.22 @ 16:28) >  IMPRESSION:  No acute urinary tract pathology.    Mild intra and extrahepatic biliary ductal dilatation with distal   choledocholithiasis.        --- End of Report ---    < end of copied text >      < from: Transthoracic Echocardiogram (03.11.22 @ 14:18) >  ----------------------  Conclusions:  1. Calcified trileaflet aortic valve with decreased  opening. Peak transaortic valve gradient equals 37 mm Hg,  mean transaortic valve gradient equals 20 mm Hg, estimated  aortic valve area equals 0.9 sqcm (by continuity equation),  aortic valve velocity time integral equals 63 cm,  consistent with severe aortic stenosis.  2. Severely dilated left atrium.  LA volume index = 51  cc/m2.  3. Mild concentric left ventricular hypertrophy.  4. Normal left ventricular systolic function. No segmental  wall motion abnormalities.  5. Normal right ventricular size and function.  *** Compared with echocardiogram of 10/9/2020, aortic  stenosis has progressed.  -------------------------------------------------------------    < end of copied text >

## 2022-03-12 NOTE — PROGRESS NOTE ADULT - SUBJECTIVE AND OBJECTIVE BOX
[facility]   [phone]  RENAL FOLLOW UP NOTE covering for Dr. Escobar  --------------------------------------------------------------------------------  Chief Complaint:    24 hour events/subjective:    Patient seen and examined  Hungry patient son and daughter at bedside.     PAST HISTORY  --------------------------------------------------------------------------------  No significant changes to PMH, PSH, FHx, SHx, unless otherwise noted    ALLERGIES & MEDICATIONS  --------------------------------------------------------------------------------  Allergies    Keflex (Rash; Hives)    Intolerances      atorvastatin 40 milliGRAM(s) Oral at bedtime  dextrose 40% Gel 15 Gram(s) Oral once  dextrose 5%. 1000 milliLiter(s) IV Continuous <Continuous>  dextrose 5%. 1000 milliLiter(s) IV Continuous <Continuous>  dextrose 50% Injectable 25 Gram(s) IV Push once  dextrose 50% Injectable 12.5 Gram(s) IV Push once  dextrose 50% Injectable 25 Gram(s) IV Push once  dextrose 50% Injectable 12.5 Gram(s) IV Push once  glucagon  Injectable 1 milliGRAM(s) IntraMuscular once  heparin   Injectable 5000 Unit(s) SubCutaneous every 12 hours  insulin lispro (ADMELOG) corrective regimen sliding scale   SubCutaneous three times a day before meals  insulin lispro (ADMELOG) corrective regimen sliding scale   SubCutaneous at bedtime  levothyroxine 25 MICROGram(s) Oral daily  piperacillin/tazobactam IVPB.. 3.375 Gram(s) IV Intermittent every 12 hours  sodium chloride 0.9%. 1000 milliLiter(s) IV Continuous <Continuous>  venlafaxine XR. 75 milliGRAM(s) Oral daily      VITAL:  T(C): , Max: 37.1 (22 @ 00:30)  T(F): , Max: 98.7 (22 @ 00:30)  HR: 70 (22 @ 16:50)  BP: 157/81 (22 @ 16:50)  BP(mean): --  RR: 17 (22 @ 16:50)  SpO2: 96% (22 @ 16:50)  Wt(kg): --    22 @ 07:01  -  22 @ 07:00  --------------------------------------------------------  IN: 840 mL / OUT: 1051 mL / NET: -211 mL    22 @ 06:01  -  22 @ 18:09  --------------------------------------------------------  IN: 1180 mL / OUT: 400 mL / NET: 780 mL      Physical Exam:  	  	Gen: thin cachetic elderly  female   	Pulm: decrease bs  no rales or ronchi or wheezing  	CV: No JVD. RRR, S1S2; II/VI murmur  	Abd: +BS, soft, nontender/nondistended  	: No suprapubic tenderness, gong catheter  	UE: Warm, no cyanosis  no clubbing, no edema;   	LE: Warm, no cyanosis  no clubbing, no edema  	Neuro: confused   	Skin: Warm, decrease skin turgor       LABS:      Na(139)/K(3.7)/Cl(109)/HCO3(16)/BUN(33)/Cr(2.85)Glu(89)/Ca(9.1)/Mg(1.9)/PO4(--)     @ 10:55  Na(135)/K(3.9)/Cl(104)/HCO3(18)/BUN(41)/Cr(3.14)Glu(110)/Ca(9.1)/Mg(--)/PO4(--)     @ 13:31  Na(136)/K(3.9)/Cl(105)/HCO3(19)/BUN(42)/Cr(3.28)Glu(94)/Ca(9.0)/Mg(1.7)/PO4(3.1)     @ 10:08  Na(140)/K(2.8)/Cl(116)/HCO3(15)/BUN(34)/Cr(2.44)Glu(95)/Ca(6.2)/Mg(--)/PO4(--)     @ 06:56  Na(130)/K(4.2)/Cl(99)/HCO3(19)/BUN(51)/Cr(3.16)Glu(117)/Ca(9.5)/Mg(--)/PO4(--)    03-10 @ 06:46  Na(131)/K(4.8)/Cl(99)/HCO3(16)/BUN(53)/Cr(3.05)Glu(58)/Ca(9.6)/Mg(--)/PO4(--)     @ 19:32    Urinalysis Basic - ( 11 Mar 2022 14:45 )    Color: Light Yellow / Appearance: Clear / S.013 / pH: x  Gluc: x / Ketone: Negative  / Bili: Negative / Urobili: Negative   Blood: x / Protein: 30 mg/dL / Nitrite: Negative   Leuk Esterase: Negative / RBC: 2 /hpf / WBC 1 /HPF   Sq Epi: x / Non Sq Epi: 1 /hpf / Bacteria: Negative            ASSESSMENT/PLAN  89 year old lady with pmhx HTN, HLD, DM, glaucoma, breast cancer, diverticulitis p/w AMS, weakness. PTA patient was in [place] with family and went to urgent care for confusion and noticed to have UTI, she was treated with 7 day course of bactrim, last dose was Saturday. Family also states she has had 4 UTI's over the last year. She was noticed with an abnormal creatinine, nephrology was consulted.       1- ELISA on CKD stage 4   2- UTI  3- lactic acidosis  4- Proteinuria  5- high anion gap metabolic acidosis        ELISA suspected in setting of infection/interstitial nephritis secondary to bactrim.   creatinine improving to 2.85 mg/dl  discontinued NS  non-oliguric  continue zosyn BID  cultures negative  CT A/P: Atrophic left kidney.   abd ultrasound with mild left hydro   strict I/O  trend creatinine and electrolytes        Bela Villarreal DO

## 2022-03-13 LAB
ANION GAP SERPL CALC-SCNC: 12 MMOL/L — SIGNIFICANT CHANGE UP (ref 5–17)
BUN SERPL-MCNC: 30 MG/DL — HIGH (ref 7–23)
CALCIUM SERPL-MCNC: 9 MG/DL — SIGNIFICANT CHANGE UP (ref 8.4–10.5)
CHLORIDE SERPL-SCNC: 105 MMOL/L — SIGNIFICANT CHANGE UP (ref 96–108)
CO2 SERPL-SCNC: 19 MMOL/L — LOW (ref 22–31)
CREAT SERPL-MCNC: 2.6 MG/DL — HIGH (ref 0.5–1.3)
EGFR: 17 ML/MIN/1.73M2 — LOW
GLUCOSE BLDC GLUCOMTR-MCNC: 100 MG/DL — HIGH (ref 70–99)
GLUCOSE BLDC GLUCOMTR-MCNC: 126 MG/DL — HIGH (ref 70–99)
GLUCOSE BLDC GLUCOMTR-MCNC: 129 MG/DL — HIGH (ref 70–99)
GLUCOSE BLDC GLUCOMTR-MCNC: 137 MG/DL — HIGH (ref 70–99)
GLUCOSE SERPL-MCNC: 141 MG/DL — HIGH (ref 70–99)
HCT VFR BLD CALC: 30.5 % — LOW (ref 34.5–45)
HGB BLD-MCNC: 9.7 G/DL — LOW (ref 11.5–15.5)
MCHC RBC-ENTMCNC: 28 PG — SIGNIFICANT CHANGE UP (ref 27–34)
MCHC RBC-ENTMCNC: 31.8 GM/DL — LOW (ref 32–36)
MCV RBC AUTO: 88.2 FL — SIGNIFICANT CHANGE UP (ref 80–100)
NRBC # BLD: 0 /100 WBCS — SIGNIFICANT CHANGE UP (ref 0–0)
PLATELET # BLD AUTO: 169 K/UL — SIGNIFICANT CHANGE UP (ref 150–400)
POTASSIUM SERPL-MCNC: 3.5 MMOL/L — SIGNIFICANT CHANGE UP (ref 3.5–5.3)
POTASSIUM SERPL-SCNC: 3.5 MMOL/L — SIGNIFICANT CHANGE UP (ref 3.5–5.3)
RBC # BLD: 3.46 M/UL — LOW (ref 3.8–5.2)
RBC # FLD: 15.4 % — HIGH (ref 10.3–14.5)
SODIUM SERPL-SCNC: 136 MMOL/L — SIGNIFICANT CHANGE UP (ref 135–145)
WBC # BLD: 5.74 K/UL — SIGNIFICANT CHANGE UP (ref 3.8–10.5)
WBC # FLD AUTO: 5.74 K/UL — SIGNIFICANT CHANGE UP (ref 3.8–10.5)

## 2022-03-13 RX ORDER — AMLODIPINE BESYLATE 2.5 MG/1
5 TABLET ORAL ONCE
Refills: 0 | Status: COMPLETED | OUTPATIENT
Start: 2022-03-13 | End: 2022-03-13

## 2022-03-13 RX ADMIN — Medication 75 MILLIGRAM(S): at 11:27

## 2022-03-13 RX ADMIN — ATORVASTATIN CALCIUM 40 MILLIGRAM(S): 80 TABLET, FILM COATED ORAL at 21:19

## 2022-03-13 RX ADMIN — AMLODIPINE BESYLATE 5 MILLIGRAM(S): 2.5 TABLET ORAL at 11:45

## 2022-03-13 RX ADMIN — PIPERACILLIN AND TAZOBACTAM 25 GRAM(S): 4; .5 INJECTION, POWDER, LYOPHILIZED, FOR SOLUTION INTRAVENOUS at 11:27

## 2022-03-13 RX ADMIN — HEPARIN SODIUM 5000 UNIT(S): 5000 INJECTION INTRAVENOUS; SUBCUTANEOUS at 11:27

## 2022-03-13 RX ADMIN — Medication 3 MILLIGRAM(S): at 21:19

## 2022-03-13 RX ADMIN — Medication 25 MICROGRAM(S): at 06:27

## 2022-03-13 RX ADMIN — PIPERACILLIN AND TAZOBACTAM 25 GRAM(S): 4; .5 INJECTION, POWDER, LYOPHILIZED, FOR SOLUTION INTRAVENOUS at 23:31

## 2022-03-13 RX ADMIN — HEPARIN SODIUM 5000 UNIT(S): 5000 INJECTION INTRAVENOUS; SUBCUTANEOUS at 21:21

## 2022-03-13 NOTE — PROGRESS NOTE ADULT - ASSESSMENT
89 yo female h/o htn, chol, DM, breast ca, here with ELISA, metabolic encephalopathy    ELISA on CKD  likely 2/2 dehydration vs bactrim  iv fluids as ordered   renal sono noted  renal f/u   creat slowly improving   todays labs pending.     recent UTI  UA neg  f/u cultures - neg  cont zosyn empirically     choledocholithiasis on CT  abd exam benign  LFTs nl  no n/v  gi consulted  cont zosyn empirically  US noted  conservative mngt for now  no plans for ERCP at this time   diet as tolerated     metabolic encephalopathy  likely 2/2 elisa  treat elisa  supportive care  monitor    DM  hold metformin  iss  monitor fs    dysphagia  swallow eval noted  soft diet     aortic stenosis  echo noted with progression to sev AS   cards f/u  consider structural heart eval     chol  cont statin    cont other home meds    dvt ppx      plan d/w pt and son at bedside    Advanced care planning was discussed with patient and family.  Advanced care planning forms were reviewed and discussed as appropriate.  Differential diagnosis and plan of care discussed with patient after the evaluation.   Pain assessed and judicious use of narcotics when appropriate was discussed.  Importance of Fall prevention discussed.  Counseling on Smoking and Alcohol cessation was offered when appropriate.  Counseling on Diet, exercise, and medication compliance was done.       Approx 55 minutes spent.

## 2022-03-13 NOTE — PROGRESS NOTE ADULT - SUBJECTIVE AND OBJECTIVE BOX
KOREY DIAZ  89y Female  MRN:[id number]    Patient is a 89y old  Female who presents with a chief complaint of AMS, ELISA  HPI:   89y F pmhx HTN, HLD, DM, glaucoma, breast cancer, diverticulitis p/w urinary symptoms. pt presents accompanied by her daughter for generalized weakness and confusion (to year). pt had a UTI diagnosed last week, completed a 7-day course of bactrim. pt denies urinary sx but per daughter pt has frequent UTIs presenting with AMS. pt had several episodes of soft diarrhea daily this week, per daughter she appears dehydrated. pt denies fever, chills, abdominal pain, nausea, vomiting, cough, rhinorrhea, CP, SOB.   found to have ELISA (09 Mar 2022 19:41)      Patient seen and evaluated at bedside. No acute events overnight except as noted.    Interval HPI: no acute events o/n     PAST MEDICAL & SURGICAL HISTORY:  HTN (hypertension)    Diabetes mellitus type 2, noninsulin dependent    Diverticulitis    Hyperlipidemia    GERD (gastroesophageal reflux disease)    Glaucoma    Breast cancer    Urinary incontinence    Renal calculus or stone    Arthritis    Heart murmur    Renal calculi  s/p stone extraction 57 yrs ago    S/P lumpectomy of breast  right breast with node removal    S/P bladder repair  Interstim device placement 2010        REVIEW OF SYSTEMS:  as per hpi     VITALS:    Vital Signs Last 24 Hrs  T(C): 36.6 (13 Mar 2022 05:15), Max: 37 (12 Mar 2022 20:35)  T(F): 97.9 (13 Mar 2022 05:15), Max: 98.6 (12 Mar 2022 20:35)  HR: 70 (13 Mar 2022 05:15) (70 - 73)  BP: 177/72 (13 Mar 2022 06:33) (157/81 - 178/70)  BP(mean): --  RR: 17 (13 Mar 2022 05:15) (17 - 17)  SpO2: 98% (13 Mar 2022 05:15) (96% - 98%)      PHYSICAL EXAM:  GENERAL: NAD, frail   HEAD:  Atraumatic, Normocephalic  EYES: EOMI, PERRLA, conjunctiva and sclera clear  NECK: Supple, No JVD  CHEST/LUNG: Clear to auscultation bilaterally; No wheeze  HEART: S1, S2; +murmur  ABDOMEN: Soft, Nontender, Nondistended; Bowel sounds present  EXTREMITIES:  2+ Peripheral Pulses, No clubbing, cyanosis, or edema  PSYCH: confused   NEUROLOGY: AAOX2-3; non-focal  SKIN: No rashes or lesions    Consultant(s) Notes Reviewed:  [x ] YES  [ ] NO  Care Discussed with Consultants/Other Providers [ x] YES  [ ] NO    MEDS:   MEDICATIONS  (STANDING):  atorvastatin 40 milliGRAM(s) Oral at bedtime  dextrose 40% Gel 15 Gram(s) Oral once  dextrose 5%. 1000 milliLiter(s) (100 mL/Hr) IV Continuous <Continuous>  dextrose 5%. 1000 milliLiter(s) (50 mL/Hr) IV Continuous <Continuous>  dextrose 50% Injectable 25 Gram(s) IV Push once  dextrose 50% Injectable 12.5 Gram(s) IV Push once  dextrose 50% Injectable 25 Gram(s) IV Push once  dextrose 50% Injectable 12.5 Gram(s) IV Push once  glucagon  Injectable 1 milliGRAM(s) IntraMuscular once  heparin   Injectable 5000 Unit(s) SubCutaneous every 12 hours  insulin lispro (ADMELOG) corrective regimen sliding scale   SubCutaneous three times a day before meals  insulin lispro (ADMELOG) corrective regimen sliding scale   SubCutaneous at bedtime  levothyroxine 25 MICROGram(s) Oral daily  melatonin 3 milliGRAM(s) Oral at bedtime  piperacillin/tazobactam IVPB.. 3.375 Gram(s) IV Intermittent every 12 hours  venlafaxine XR. 75 milliGRAM(s) Oral daily    MEDICATIONS  (PRN):        ALLERGIES:  Keflex (Rash; Hives)      LABS:                      03-12    139  |  109<H>  |  33<H>  ----------------------------<  89  3.7   |  16<L>  |  2.85<H>    Ca    9.1      12 Mar 2022 10:55  Mg     1.9     03-12    TPro  6.6  /  Alb  3.8  /  TBili  0.2  /  DBili  x   /  AST  31  /  ALT  35  /  AlkPhos  70  03-12      < from: US Abdomen Complete (US Abdomen Complete .) (03.11.22 @ 08:36) >  IMPRESSION:  Redemonstration of intrahepatic and extrahepatic biliary ductal   dilatation with distal choledocholithiasis.    Cholelithiasis without sonographic evidence of acute cholecystitis.    Echogenic kidneys suggestive of renal parenchymal disease. Mild left   hydronephrosis with nonspecific urothelial thickening. Correlate with   urinalysis.    Trace right pleural effusion.    --- End of Report ---      < end of copied text >     < from: CT Head No Cont (03.09.22 @ 16:32) >  IMPRESSION:    No acute hemorrhage, mass effect or extra-axial collections. Unchanged   left frontal lobe extra-axial mass, likely representing a meningioma.    --- End of Report ---      < end of copied text >  < from: CT Abdomen and Pelvis No Cont (03.09.22 @ 16:28) >  IMPRESSION:  No acute urinary tract pathology.    Mild intra and extrahepatic biliary ductal dilatation with distal   choledocholithiasis.        --- End of Report ---    < end of copied text >      < from: Transthoracic Echocardiogram (03.11.22 @ 14:18) >  ----------------------  Conclusions:  1. Calcified trileaflet aortic valve with decreased  opening. Peak transaortic valve gradient equals 37 mm Hg,  mean transaortic valve gradient equals 20 mm Hg, estimated  aortic valve area equals 0.9 sqcm (by continuity equation),  aortic valve velocity time integral equals 63 cm,  consistent with severe aortic stenosis.  2. Severely dilated left atrium.  LA volume index = 51  cc/m2.  3. Mild concentric left ventricular hypertrophy.  4. Normal left ventricular systolic function. No segmental  wall motion abnormalities.  5. Normal right ventricular size and function.  *** Compared with echocardiogram of 10/9/2020, aortic  stenosis has progressed.  -------------------------------------------------------------    < end of copied text >

## 2022-03-13 NOTE — PROGRESS NOTE ADULT - ASSESSMENT
89 year old Female pmhx HTN, HLD, DM, glaucoma, breast cancer, diverticulitis p/w AMS, weakness. PTA patient was in [place] with family and went to urgent care for confusion and noticed to have UTI, she was treated with 7 day course of bactrim, last dose was Saturday. Family also states she has had 4 UTI's over the last year. She was noticed with an abnormal creatinine, renal consult called.       1- ELISA on CKD  2- infection  3- lactic acidosis      ELISA suspected in setting of infection/interstitial nephritis secondary to bactrim.   creatinine  beginning to improve  continue NS @ 60 ml/hr until am   non-oliguric  continue zosyn BID  cultures negative  CT A/P: Atrophic left kidney with  mild left hydro   strict I/O  trend creatinine and electrolytes

## 2022-03-13 NOTE — PROGRESS NOTE ADULT - SUBJECTIVE AND OBJECTIVE BOX
[facility]   [phone]  RENAL FOLLOW UP NOTE  --------------------------------------------------------------------------------  Chief Complaint:    24 hour events/subjective:    seen earlier.   son at bedside states pt is more coherent     PAST HISTORY  --------------------------------------------------------------------------------  No significant changes to PMH, PSH, FHx, SHx, unless otherwise noted    ALLERGIES & MEDICATIONS  --------------------------------------------------------------------------------  Allergies    Keflex (Rash; Hives)    Intolerances      Standing Inpatient Medications  atorvastatin 40 milliGRAM(s) Oral at bedtime  dextrose 40% Gel 15 Gram(s) Oral once  dextrose 5%. 1000 milliLiter(s) IV Continuous <Continuous>  dextrose 5%. 1000 milliLiter(s) IV Continuous <Continuous>  dextrose 50% Injectable 25 Gram(s) IV Push once  dextrose 50% Injectable 12.5 Gram(s) IV Push once  dextrose 50% Injectable 25 Gram(s) IV Push once  dextrose 50% Injectable 12.5 Gram(s) IV Push once  glucagon  Injectable 1 milliGRAM(s) IntraMuscular once  heparin   Injectable 5000 Unit(s) SubCutaneous every 12 hours  insulin lispro (ADMELOG) corrective regimen sliding scale   SubCutaneous three times a day before meals  insulin lispro (ADMELOG) corrective regimen sliding scale   SubCutaneous at bedtime  levothyroxine 25 MICROGram(s) Oral daily  melatonin 3 milliGRAM(s) Oral at bedtime  piperacillin/tazobactam IVPB.. 3.375 Gram(s) IV Intermittent every 12 hours  venlafaxine XR. 75 milliGRAM(s) Oral daily    PRN Inpatient Medications      REVIEW OF SYSTEMS  --------------------------------------------------------------------------------    Gen: denies chills,  CVS: denies chest pain/palpitations  Resp: denies SOB/Cough  GI: Denies N/V/Abd pain  : Denies dysuria      VITALS/PHYSICAL EXAM  --------------------------------------------------------------------------------  T(C): 37.1 (03-13-22 @ 21:00), Max: 37.1 (03-13-22 @ 21:00)  HR: 73 (03-13-22 @ 21:00) (70 - 77)  BP: 149/83 (03-13-22 @ 21:00) (139/81 - 178/70)  RR: 17 (03-13-22 @ 21:00) (17 - 17)  SpO2: 98% (03-13-22 @ 21:00) (98% - 98%)  Wt(kg): --        03-12-22 @ 06:01  -  03-13-22 @ 07:00  --------------------------------------------------------  IN: 1720 mL / OUT: 600 mL / NET: 1120 mL    03-13-22 @ 07:01  -  03-13-22 @ 21:07  --------------------------------------------------------  IN: 830 mL / OUT: 500 mL / NET: 330 mL      Physical Exam:  	    	Gen: thin cachetic elderly  female   	Pulm: decrease bs  no rales or ronchi or wheezing  	CV: No JVD. RRR, S1S2; II/VI murmur  	Abd: +BS, soft, nontender/nondistended  	: No suprapubic tenderness  	UE: Warm, no cyanosis  no clubbing, no edema;   	LE: Warm, no cyanosis  no clubbing, no edema  	Neuro: oriented to place and person       LABS/STUDIES  --------------------------------------------------------------------------------              9.7    5.74  >-----------<  169      [03-13-22 @ 18:04]              30.5     136  |  105  |  30  ----------------------------<  141      [03-13-22 @ 18:04]  3.5   |  19  |  2.60        Ca     9.0     [03-13-22 @ 18:04]      Mg     1.9     [03-12-22 @ 10:55]    TPro  6.6  /  Alb  3.8  /  TBili  0.2  /  DBili  x   /  AST  31  /  ALT  35  /  AlkPhos  70  [03-12-22 @ 10:55]          Creatinine Trend:  SCr 2.60 [03-13 @ 18:04]  SCr 2.85 [03-12 @ 10:55]  SCr 3.14 [03-11 @ 13:31]  SCr 3.28 [03-11 @ 10:08]  SCr 2.44 [03-11 @ 06:56]              Urinalysis - [03-11-22 @ 14:45]      Color Light Yellow / Appearance Clear / SG 1.013 / pH 7.0      Gluc Negative / Ketone Negative  / Bili Negative / Urobili Negative       Blood Trace / Protein 30 mg/dL / Leuk Est Negative / Nitrite Negative      RBC 2 / WBC 1 / Hyaline 0 / Gran  / Sq Epi  / Non Sq Epi 1 / Bacteria Negative    Urine Sodium 40      [03-09-22 @ 15:54]  Urine Potassium 32      [03-09-22 @ 15:54]  Urine Osmolality 272      [03-09-22 @ 16:21]

## 2022-03-14 LAB
ANION GAP SERPL CALC-SCNC: 12 MMOL/L — SIGNIFICANT CHANGE UP (ref 5–17)
BUN SERPL-MCNC: 28 MG/DL — HIGH (ref 7–23)
CALCIUM SERPL-MCNC: 9.1 MG/DL — SIGNIFICANT CHANGE UP (ref 8.4–10.5)
CHLORIDE SERPL-SCNC: 108 MMOL/L — SIGNIFICANT CHANGE UP (ref 96–108)
CO2 SERPL-SCNC: 18 MMOL/L — LOW (ref 22–31)
CREAT SERPL-MCNC: 2.55 MG/DL — HIGH (ref 0.5–1.3)
CULTURE RESULTS: SIGNIFICANT CHANGE UP
CULTURE RESULTS: SIGNIFICANT CHANGE UP
EGFR: 17 ML/MIN/1.73M2 — LOW
GLUCOSE BLDC GLUCOMTR-MCNC: 106 MG/DL — HIGH (ref 70–99)
GLUCOSE BLDC GLUCOMTR-MCNC: 152 MG/DL — HIGH (ref 70–99)
GLUCOSE BLDC GLUCOMTR-MCNC: 94 MG/DL — SIGNIFICANT CHANGE UP (ref 70–99)
GLUCOSE BLDC GLUCOMTR-MCNC: 99 MG/DL — SIGNIFICANT CHANGE UP (ref 70–99)
GLUCOSE SERPL-MCNC: 87 MG/DL — SIGNIFICANT CHANGE UP (ref 70–99)
POTASSIUM SERPL-MCNC: 3.4 MMOL/L — LOW (ref 3.5–5.3)
POTASSIUM SERPL-SCNC: 3.4 MMOL/L — LOW (ref 3.5–5.3)
SODIUM SERPL-SCNC: 138 MMOL/L — SIGNIFICANT CHANGE UP (ref 135–145)
SPECIMEN SOURCE: SIGNIFICANT CHANGE UP
SPECIMEN SOURCE: SIGNIFICANT CHANGE UP

## 2022-03-14 PROCEDURE — 99232 SBSQ HOSP IP/OBS MODERATE 35: CPT

## 2022-03-14 RX ORDER — POTASSIUM CHLORIDE 20 MEQ
20 PACKET (EA) ORAL ONCE
Refills: 0 | Status: COMPLETED | OUTPATIENT
Start: 2022-03-14 | End: 2022-03-14

## 2022-03-14 RX ORDER — POTASSIUM CHLORIDE 20 MEQ
40 PACKET (EA) ORAL ONCE
Refills: 0 | Status: DISCONTINUED | OUTPATIENT
Start: 2022-03-14 | End: 2022-03-14

## 2022-03-14 RX ADMIN — Medication 3 MILLIGRAM(S): at 21:34

## 2022-03-14 RX ADMIN — HEPARIN SODIUM 5000 UNIT(S): 5000 INJECTION INTRAVENOUS; SUBCUTANEOUS at 10:59

## 2022-03-14 RX ADMIN — Medication 75 MILLIGRAM(S): at 14:17

## 2022-03-14 RX ADMIN — Medication 25 MICROGRAM(S): at 05:21

## 2022-03-14 RX ADMIN — HEPARIN SODIUM 5000 UNIT(S): 5000 INJECTION INTRAVENOUS; SUBCUTANEOUS at 21:34

## 2022-03-14 RX ADMIN — PIPERACILLIN AND TAZOBACTAM 25 GRAM(S): 4; .5 INJECTION, POWDER, LYOPHILIZED, FOR SOLUTION INTRAVENOUS at 10:59

## 2022-03-14 RX ADMIN — PIPERACILLIN AND TAZOBACTAM 25 GRAM(S): 4; .5 INJECTION, POWDER, LYOPHILIZED, FOR SOLUTION INTRAVENOUS at 23:34

## 2022-03-14 RX ADMIN — Medication 1 TABLET(S): at 14:17

## 2022-03-14 RX ADMIN — Medication 20 MILLIEQUIVALENT(S): at 21:38

## 2022-03-14 RX ADMIN — Medication 1: at 17:58

## 2022-03-14 RX ADMIN — ATORVASTATIN CALCIUM 40 MILLIGRAM(S): 80 TABLET, FILM COATED ORAL at 21:34

## 2022-03-14 NOTE — PROGRESS NOTE ADULT - SUBJECTIVE AND OBJECTIVE BOX
[facility]   [phone]  RENAL FOLLOW UP NOTE  --------------------------------------------------------------------------------  Chief Complaint:    24 hour events/subjective:    Patient seen and examined.   denies sob, cp  altered mental status improving as per family    PAST HISTORY  --------------------------------------------------------------------------------  No significant changes to PMH, PSH, FHx, SHx, unless otherwise noted    ALLERGIES & MEDICATIONS  --------------------------------------------------------------------------------  Allergies    Keflex (Rash; Hives)    Intolerances      Standing Inpatient Medications  atorvastatin 40 milliGRAM(s) Oral at bedtime  dextrose 40% Gel 15 Gram(s) Oral once  dextrose 5%. 1000 milliLiter(s) IV Continuous <Continuous>  dextrose 5%. 1000 milliLiter(s) IV Continuous <Continuous>  dextrose 50% Injectable 25 Gram(s) IV Push once  dextrose 50% Injectable 12.5 Gram(s) IV Push once  dextrose 50% Injectable 25 Gram(s) IV Push once  dextrose 50% Injectable 12.5 Gram(s) IV Push once  glucagon  Injectable 1 milliGRAM(s) IntraMuscular once  heparin   Injectable 5000 Unit(s) SubCutaneous every 12 hours  insulin lispro (ADMELOG) corrective regimen sliding scale   SubCutaneous three times a day before meals  insulin lispro (ADMELOG) corrective regimen sliding scale   SubCutaneous at bedtime  levothyroxine 25 MICROGram(s) Oral daily  melatonin 3 milliGRAM(s) Oral at bedtime  Nephro-iain 1 Tablet(s) Oral daily  piperacillin/tazobactam IVPB.. 3.375 Gram(s) IV Intermittent every 12 hours  potassium chloride   Solution 20 milliEquivalent(s) Oral once  venlafaxine XR. 75 milliGRAM(s) Oral daily    PRN Inpatient Medications      REVIEW OF SYSTEMS  --------------------------------------------------------------------------------  able to answer some questions  CVS: denies chest pain/palpitations  Resp: denies SOB/Cough  : Denies dysuria/oliguria/hematuria    All other systems were reviewed and are negative, except as noted.    VITALS/PHYSICAL EXAM  --------------------------------------------------------------------------------  T(C): 36.4 (03-14-22 @ 14:14), Max: 37.1 (03-13-22 @ 21:00)  HR: 76 (03-14-22 @ 14:14) (73 - 77)  BP: 108/60 (03-14-22 @ 14:14) (108/60 - 164/83)  RR: 17 (03-14-22 @ 14:14) (17 - 17)  SpO2: 97% (03-14-22 @ 14:14) (93% - 98%)  Wt(kg): --        03-13-22 @ 07:01  -  03-14-22 @ 07:00  --------------------------------------------------------  IN: 1420 mL / OUT: 800 mL / NET: 620 mL    03-14-22 @ 07:01  -  03-14-22 @ 16:08  --------------------------------------------------------  IN: 240 mL / OUT: 0 mL / NET: 240 mL      Physical Exam:  	  	Gen: thin cachetic elderly  female   	Pulm: decrease bs  no rales or ronchi or wheezing  	CV: No JVD. RRR, S1S2; II/VI murmur  	Abd: +BS, soft, nontender/nondistended  	: No suprapubic tenderness  	UE: Warm, no cyanosis  no clubbing, no edema;   	LE: Warm, no cyanosis  no clubbing, no edema  	Neuro: more alert today, oriented to place and person     LABS/STUDIES  --------------------------------------------------------------------------------              9.7    5.74  >-----------<  169      [03-13-22 @ 18:04]              30.5     138  |  108  |  28  ----------------------------<  87      [03-14-22 @ 07:08]  3.4   |  18  |  2.55        Ca     9.1     [03-14-22 @ 07:08]            Creatinine Trend:  SCr 2.55 [03-14 @ 07:08]  SCr 2.60 [03-13 @ 18:04]  SCr 2.85 [03-12 @ 10:55]  SCr 3.14 [03-11 @ 13:31]  SCr 3.28 [03-11 @ 10:08]              Urinalysis - [03-11-22 @ 14:45]      Color Light Yellow / Appearance Clear / SG 1.013 / pH 7.0      Gluc Negative / Ketone Negative  / Bili Negative / Urobili Negative       Blood Trace / Protein 30 mg/dL / Leuk Est Negative / Nitrite Negative      RBC 2 / WBC 1 / Hyaline 0 / Gran  / Sq Epi  / Non Sq Epi 1 / Bacteria Negative    Urine Sodium 40      [03-09-22 @ 15:54]  Urine Potassium 32      [03-09-22 @ 15:54]  Urine Osmolality 272      [03-09-22 @ 16:21]

## 2022-03-14 NOTE — CHART NOTE - NSCHARTNOTEFT_GEN_A_CORE
Nutrition Follow Up Note  Patient seen for: malnutrition follow up   Chart reviewed, events noted.    "89y F pmhx HTN, HLD, DM, glaucoma, breast cancer, diverticulitis p/w urinary symptoms. pt presents accompanied by her daughter for generalized weakness and confusion (to year). pt had a UTI diagnosed last week, completed a 7-day course of bactrim. pt denies urinary sx but per daughter pt has frequent UTIs presenting with AMS. pt had several episodes of soft diarrhea daily this week, per daughter she appears dehydrated. pt denies fever, chills, abdominal pain, nausea, vomiting, cough, rhinorrhea, CP, SOB.   found to have ELISA"    Source: [] Patient       [x] EMR        [] RN        [x] Family at bedside- pt daughter at bedside       [] Other:    -If unable to interview patient: [] Trach/Vent/BiPAP  [] Disoriented/confused/inappropriate to interview    Diet Order:   Diet, Soft and Bite Sized:   For patients receiving Renal Replacement - No Protein Restr, No Conc K, No Conc Phos, Low Sodium (RENAL)  Supplement Feeding Modality:  Oral  Nepro Cans or Servings Per Day:  1       Frequency:  Two Times a day (03-14-22 @ 11:15) [Active]    - Is current order appropriate/adequate? [] Yes  []  No:     - PO intake :   [] >75%  Adequate    [] 50-75%  Fair       [] <50%  Poor  - Pt's daughter reports pt consuming ~25-50% meals, reports improvement ; 51-75% meal consumption as per flowsheets   - Pt daughter denies pt with difficulty chewing/ swallowing     - Nutrition-related concerns:  - pt with dysphagia, cont on soft diet per speech therapist carlton and recs  - ELISA on CKD; Nephro following; noted K+ 3.4<L>, 3/14   - choledocholithiasis on CT- no plans for ERCP at this time per MD 3/14 note; PO diet as tolerated per GI 3/14    GI:  Last BM 3/12, fecal incontinence as per flowsheets; Bowel Regimen? [] Yes   [x] No  - Monitor BM; if continue w/o BM, consider bowel regimen if feasible.     Weights:   Daily - none at this time   Bedscale wt 3/14 per RD visit, 56.7 kg  - Continue to monitor wt trends     Nutritionally Pertinent MEDICATIONS  (STANDING):  atorvastatin  dextrose 40% Gel  dextrose 5%.  dextrose 5%.  dextrose 50% Injectable  dextrose 50% Injectable  dextrose 50% Injectable  dextrose 50% Injectable  glucagon  Injectable  insulin lispro (ADMELOG) corrective regimen sliding scale  insulin lispro (ADMELOG) corrective regimen sliding scale  levothyroxine  Nephro-iain  piperacillin/tazobactam IVPB..  potassium chloride   Solution    Pertinent Labs: 03-14 @ 07:08: Na 138, BUN 28<H>, Cr 2.55<H>, BG 87, K+ 3.4<L>, Phos --, Mg --, Alk Phos --, ALT/SGPT --, AST/SGOT --, HbA1c --  03-13 @ 18:04: Na 136, BUN 30<H>, Cr 2.60<H>, <H>, K+ 3.5, Phos --, Mg --, Alk Phos --, ALT/SGPT --, AST/SGOT --, HbA1c --    A1C with Estimated Average Glucose Result: 5.7 % (03-10-22 @ 12:17)    Finger Sticks:  POCT Blood Glucose.: 106 mg/dL (03-14 @ 11:49)  POCT Blood Glucose.: 99 mg/dL (03-14 @ 08:37)  POCT Blood Glucose.: 126 mg/dL (03-13 @ 22:09)  POCT Blood Glucose.: 129 mg/dL (03-13 @ 17:17)    Skin per nursing documentation: no noted pressure injuries as per flowsheets   Edema: No noted edema as per flow sheets.     Estimated Needs:   [x] no change since previous assessment  [] recalculated:     Previous Nutrition Diagnosis: severe protein calorie malnutrition   Nutrition Diagnosis is: [x] ongoing  [] resolved [] not applicable     New Nutrition Diagnosis: [x] Not applicable    Nutrition Care Plan:  [x] In Progress  [] Achieved  [] Not applicable    Nutrition Interventions:     Education Provided:       [x] Yes:  [] No: Encouraged adequate PO intake for meeting nutritional needs, improving nutritional status and healing and for preventing wt loss        Recommendations:         1) Continue current diet order: Soft and Bite Sized, Renal diet; monitor PO intake      2) Nepro 2x daily (850 kcals, 38 g protein).      3) Nephro-iain daily     4) Provide assistance with meals as needed to promote adequate PO intake      5) Monitor labs: glucose levels related to history of DM ; monitor electrolytes per renal status     6) RD remains available to adjust diet as needed.    Monitoring and Evaluation:   Continue to monitor nutritional intake, tolerance to diet prescription, weights, labs, skin integrity    RD remains available upon request and will follow up per protocol  Kinga Roberts RD CDN #[phone]

## 2022-03-14 NOTE — PROGRESS NOTE ADULT - ASSESSMENT
89 yo female h/o htn, chol, DM, breast ca, here with ELISA, metabolic encephalopathy    ELISA on CKD  likely 2/2 dehydration vs bactrim  iv fluids as ordered   renal sono noted  renal f/u   creat slowly improving     recent UTI  UA neg  f/u cultures - neg  cont zosyn empirically.  plan 5 day course     choledocholithiasis on CT  abd exam benign  LFTs nl  no n/v  gi consulted  cont zosyn empirically  US noted  conservative mngt for now  no plans for ERCP at this time   diet as tolerated     metabolic encephalopathy  likely 2/2 elisa  treat elisa  supportive care  monitor    DM  hold metformin  iss  monitor fs    dysphagia  swallow eval noted  soft diet     aortic stenosis  echo noted with progression to sev AS   cards f/u  consider structural heart eval     chol  cont statin    cont other home meds    dvt ppx      plan d/w pt family at bedside    Advanced care planning was discussed with patient and family.  Advanced care planning forms were reviewed and discussed as appropriate.  Differential diagnosis and plan of care discussed with patient after the evaluation.   Pain assessed and judicious use of narcotics when appropriate was discussed.  Importance of Fall prevention discussed.  Counseling on Smoking and Alcohol cessation was offered when appropriate.  Counseling on Diet, exercise, and medication compliance was done.       Approx 55 minutes spent.

## 2022-03-14 NOTE — PROGRESS NOTE ADULT - ASSESSMENT
89 year old Female pmhx HTN, HLD, DM, glaucoma, breast cancer, diverticulitis p/w AMS, weakness. PTA patient was in [place] with family and went to urgent care for confusion and noticed to have UTI, she was treated with 7 day course of bactrim, last dose was Saturday. Family also states she has had 4 UTI's over the last year. She was noticed with an abnormal creatinine, renal consult called.       1- ELISA on CKD  2- infection  3- lactic acidosis      ELISA suspected in setting of infection/interstitial nephritis secondary to bactrim.   creatinine improving.  off IVF  non-oliguric  continue zosyn BID  cultures negative  hypokalemia, KCL 20 meq x 1 supplemented  CT A/P: Atrophic left kidney with  mild left hydro   strict I/O  trend creatinine and electrolytes daily

## 2022-03-14 NOTE — PROGRESS NOTE ADULT - SUBJECTIVE AND OBJECTIVE BOX
INTERVAL HPI/OVERNIGHT EVENTS:  appetite good  no nausea or vomiting  no abdominal pain  no fever or chills    MEDICATIONS  (STANDING):  atorvastatin 40 milliGRAM(s) Oral at bedtime  dextrose 40% Gel 15 Gram(s) Oral once  dextrose 5%. 1000 milliLiter(s) (50 mL/Hr) IV Continuous <Continuous>  dextrose 5%. 1000 milliLiter(s) (100 mL/Hr) IV Continuous <Continuous>  dextrose 50% Injectable 25 Gram(s) IV Push once  dextrose 50% Injectable 12.5 Gram(s) IV Push once  dextrose 50% Injectable 25 Gram(s) IV Push once  dextrose 50% Injectable 12.5 Gram(s) IV Push once  glucagon  Injectable 1 milliGRAM(s) IntraMuscular once  heparin   Injectable 5000 Unit(s) SubCutaneous every 12 hours  insulin lispro (ADMELOG) corrective regimen sliding scale   SubCutaneous three times a day before meals  insulin lispro (ADMELOG) corrective regimen sliding scale   SubCutaneous at bedtime  levothyroxine 25 MICROGram(s) Oral daily  melatonin 3 milliGRAM(s) Oral at bedtime  Nephro-iain 1 Tablet(s) Oral daily  piperacillin/tazobactam IVPB.. 3.375 Gram(s) IV Intermittent every 12 hours  potassium chloride   Solution 20 milliEquivalent(s) Oral once  venlafaxine XR. 75 milliGRAM(s) Oral daily    MEDICATIONS  (PRN):      Allergies  Keflex (Rash; Hives)      Review of Systems:  General:  No wt loss, fevers, chills, night sweats  CV:  No pain, palpitations, +hypertension  Resp:  No dyspnea, cough, tachypnea, wheezing  GI:  see HPI  :  No pain, bleeding, incontinence, +frequent UTIs  Muscle:  No pain, weakness  Neuro:  No weakness, tingling, memory problems  Psych:  No fatigue, insomnia, mood problems, depression  Endocrine:  No polyuria, polydypsia, cold/heat intolerance  Heme:  No petechiae, ecchymosis, easy bruisability  Skin:  No rash, tattoos, scars, edema      Vital Signs Last 24 Hrs  T(C): 36.8 (14 Mar 2022 09:51), Max: 37.1 (13 Mar 2022 21:00)  T(F): 98.3 (14 Mar 2022 09:51), Max: 98.8 (13 Mar 2022 21:00)  HR: 77 (14 Mar 2022 09:51) (73 - 77)  BP: 157/82 (14 Mar 2022 09:51) (139/81 - 164/83)  BP(mean): --  RR: 17 (14 Mar 2022 09:51) (17 - 17)  SpO2: 97% (14 Mar 2022 09:51) (93% - 98%)    PHYSICAL EXAM:  Constitutional: NAD, well-developed elderly female. pleasant sitting up in bed; daughter at bedside  Neck: No LAD, supple no JVD  Respiratory: clear b/l no accessory muscle use  Cardiovascular: S1 and S2, RRR, no M  Gastrointestinal: BS+, soft, ND NT no rebound guarding or rigidity   Extremities: No peripheral edema, neg clubbing, cyanosis  Vascular: 2+ peripheral pulses  Neurological: alert and responsive, oriented to self and place; pleasantly confused  Psychiatric: Normal mood, normal affect  Skin: No rashes, anicteric      LABS:                        9.7    5.74  )-----------( 169      ( 13 Mar 2022 18:04 )             30.5     03-14    138  |  108  |  28<H>  ----------------------------<  87  3.4<L>   |  18<L>  |  2.55<H>    Ca    9.1      14 Mar 2022 07:08          LIVER FUNCTIONS - ( 12 Mar 2022 10:55 )  Alb: 3.8 g/dL / Pro: 6.6 g/dL / ALK PHOS: 70 U/L / ALT: 35 U/L / AST: 31 U/L / GGT: x             RADIOLOGY & ADDITIONAL TESTS:

## 2022-03-14 NOTE — PROGRESS NOTE ADULT - CONVERSATION DETAILS
daughter is health care proxy  discussion had with daughter at bedside  overall care discussed  they are talking with other family members regarding dnr/i  currently full code

## 2022-03-14 NOTE — PROGRESS NOTE ADULT - SUBJECTIVE AND OBJECTIVE BOX
KOREY DIAZ  89y Female  MRN:[id number]    Patient is a 89y old  Female who presents with a chief complaint of AMS, ELISA  HPI:   89y F pmhx HTN, HLD, DM, glaucoma, breast cancer, diverticulitis p/w urinary symptoms. pt presents accompanied by her daughter for generalized weakness and confusion (to year). pt had a UTI diagnosed last week, completed a 7-day course of bactrim. pt denies urinary sx but per daughter pt has frequent UTIs presenting with AMS. pt had several episodes of soft diarrhea daily this week, per daughter she appears dehydrated. pt denies fever, chills, abdominal pain, nausea, vomiting, cough, rhinorrhea, CP, SOB.   found to have ELISA (09 Mar 2022 19:41)      Patient seen and evaluated at bedside. No acute events overnight except as noted.    Interval HPI: no acute events o/n     PAST MEDICAL & SURGICAL HISTORY:  HTN (hypertension)    Diabetes mellitus type 2, noninsulin dependent    Diverticulitis    Hyperlipidemia    GERD (gastroesophageal reflux disease)    Glaucoma    Breast cancer    Urinary incontinence    Renal calculus or stone    Arthritis    Heart murmur    Renal calculi  s/p stone extraction 57 yrs ago    S/P lumpectomy of breast  right breast with node removal    S/P bladder repair  Interstim device placement 2010        REVIEW OF SYSTEMS:  as per hpi     VITALS:   Vital Signs Last 24 Hrs  T(C): 36.8 (14 Mar 2022 09:51), Max: 37.1 (13 Mar 2022 21:00)  T(F): 98.3 (14 Mar 2022 09:51), Max: 98.8 (13 Mar 2022 21:00)  HR: 77 (14 Mar 2022 09:51) (73 - 77)  BP: 157/82 (14 Mar 2022 09:51) (139/81 - 164/83)  BP(mean): --  RR: 17 (14 Mar 2022 09:51) (17 - 17)  SpO2: 97% (14 Mar 2022 09:51) (93% - 98%)      PHYSICAL EXAM:  GENERAL: NAD, frail   HEAD:  Atraumatic, Normocephalic  EYES: EOMI, PERRLA, conjunctiva and sclera clear  NECK: Supple, No JVD  CHEST/LUNG: Clear to auscultation bilaterally; No wheeze  HEART: S1, S2; +murmur  ABDOMEN: Soft, Nontender, Nondistended; Bowel sounds present  EXTREMITIES:  2+ Peripheral Pulses, No clubbing, cyanosis, or edema  PSYCH: confused   NEUROLOGY: AAOX2-3; non-focal  SKIN: No rashes or lesions    Consultant(s) Notes Reviewed:  [x ] YES  [ ] NO  Care Discussed with Consultants/Other Providers [ x] YES  [ ] NO    MEDS:   MEDICATIONS  (STANDING):  atorvastatin 40 milliGRAM(s) Oral at bedtime  dextrose 40% Gel 15 Gram(s) Oral once  dextrose 5%. 1000 milliLiter(s) (50 mL/Hr) IV Continuous <Continuous>  dextrose 5%. 1000 milliLiter(s) (100 mL/Hr) IV Continuous <Continuous>  dextrose 50% Injectable 25 Gram(s) IV Push once  dextrose 50% Injectable 12.5 Gram(s) IV Push once  dextrose 50% Injectable 25 Gram(s) IV Push once  dextrose 50% Injectable 12.5 Gram(s) IV Push once  glucagon  Injectable 1 milliGRAM(s) IntraMuscular once  heparin   Injectable 5000 Unit(s) SubCutaneous every 12 hours  insulin lispro (ADMELOG) corrective regimen sliding scale   SubCutaneous three times a day before meals  insulin lispro (ADMELOG) corrective regimen sliding scale   SubCutaneous at bedtime  levothyroxine 25 MICROGram(s) Oral daily  melatonin 3 milliGRAM(s) Oral at bedtime  Nephro-iain 1 Tablet(s) Oral daily  piperacillin/tazobactam IVPB.. 3.375 Gram(s) IV Intermittent every 12 hours  potassium chloride   Solution 20 milliEquivalent(s) Oral once  venlafaxine XR. 75 milliGRAM(s) Oral daily    MEDICATIONS  (PRN):        ALLERGIES:  Keflex (Rash; Hives)      LABS:                                            9.7    5.74  )-----------( 169      ( 13 Mar 2022 18:04 )             30.5   03-14    138  |  108  |  28<H>  ----------------------------<  87  3.4<L>   |  18<L>  |  2.55<H>    Ca    9.1      14 Mar 2022 07:08        < from: US Abdomen Complete (US Abdomen Complete .) (03.11.22 @ 08:36) >  IMPRESSION:  Redemonstration of intrahepatic and extrahepatic biliary ductal   dilatation with distal choledocholithiasis.    Cholelithiasis without sonographic evidence of acute cholecystitis.    Echogenic kidneys suggestive of renal parenchymal disease. Mild left   hydronephrosis with nonspecific urothelial thickening. Correlate with   urinalysis.    Trace right pleural effusion.    --- End of Report ---      < end of copied text >     < from: CT Head No Cont (03.09.22 @ 16:32) >  IMPRESSION:    No acute hemorrhage, mass effect or extra-axial collections. Unchanged   left frontal lobe extra-axial mass, likely representing a meningioma.    --- End of Report ---      < end of copied text >  < from: CT Abdomen and Pelvis No Cont (03.09.22 @ 16:28) >  IMPRESSION:  No acute urinary tract pathology.    Mild intra and extrahepatic biliary ductal dilatation with distal   choledocholithiasis.        --- End of Report ---    < end of copied text >      < from: Transthoracic Echocardiogram (03.11.22 @ 14:18) >  ----------------------  Conclusions:  1. Calcified trileaflet aortic valve with decreased  opening. Peak transaortic valve gradient equals 37 mm Hg,  mean transaortic valve gradient equals 20 mm Hg, estimated  aortic valve area equals 0.9 sqcm (by continuity equation),  aortic valve velocity time integral equals 63 cm,  consistent with severe aortic stenosis.  2. Severely dilated left atrium.  LA volume index = 51  cc/m2.  3. Mild concentric left ventricular hypertrophy.  4. Normal left ventricular systolic function. No segmental  wall motion abnormalities.  5. Normal right ventricular size and function.  *** Compared with echocardiogram of 10/9/2020, aortic  stenosis has progressed.  -------------------------------------------------------------    < end of copied text >

## 2022-03-14 NOTE — PROGRESS NOTE ADULT - ASSESSMENT
89y F pmhx HTN, HLD, DM, glaucoma, breast cancer, diverticulitis p/w urinary symptoms. pt brought to ER by family for generalized weakness and confusion (to year)/AMS.  Incidentally noted to have CBD stone on CT with normal LFTs    COVID negative    #AMS  -follow up cultures, on empiric Zosyn  -monitor clinically    #CBD stone without jaundice/hyperbilirubinemia or significant LFT elevation, though with mild tenderness in RUQ.  No evidence of GB inflammation on CT  US 3/10 - distal CBD stone with intra+extra hepatic ductal dilation +GB stones, no e/o ac felisha  BCx NGTD  -Advanced Endoscopy team input noted  -discussed with family at bedside,  at this time they do not wish to pursue ERCP given advanced age and no evidence of biliary sepsis  -PO diet as tolerated  -defer Abx management to primary team; not cholangitic    Discussed with family at bedside, all questions answered  Discussed with Medicine attending    Will Sign off care. Please recall prn for GI concerns.  Thank You.    Marek Cormier PA-C    [facility]  [phone]  After hours and weekend coverage [phone]

## 2022-03-15 LAB
ANION GAP SERPL CALC-SCNC: 13 MMOL/L — SIGNIFICANT CHANGE UP (ref 5–17)
BUN SERPL-MCNC: 30 MG/DL — HIGH (ref 7–23)
CALCIUM SERPL-MCNC: 9.3 MG/DL — SIGNIFICANT CHANGE UP (ref 8.4–10.5)
CHLORIDE SERPL-SCNC: 109 MMOL/L — HIGH (ref 96–108)
CO2 SERPL-SCNC: 18 MMOL/L — LOW (ref 22–31)
CREAT SERPL-MCNC: 2.73 MG/DL — HIGH (ref 0.5–1.3)
EGFR: 16 ML/MIN/1.73M2 — LOW
GLUCOSE BLDC GLUCOMTR-MCNC: 104 MG/DL — HIGH (ref 70–99)
GLUCOSE BLDC GLUCOMTR-MCNC: 248 MG/DL — HIGH (ref 70–99)
GLUCOSE BLDC GLUCOMTR-MCNC: 94 MG/DL — SIGNIFICANT CHANGE UP (ref 70–99)
GLUCOSE BLDC GLUCOMTR-MCNC: 96 MG/DL — SIGNIFICANT CHANGE UP (ref 70–99)
GLUCOSE SERPL-MCNC: 87 MG/DL — SIGNIFICANT CHANGE UP (ref 70–99)
HCT VFR BLD CALC: 30 % — LOW (ref 34.5–45)
HGB BLD-MCNC: 9.5 G/DL — LOW (ref 11.5–15.5)
MCHC RBC-ENTMCNC: 28.4 PG — SIGNIFICANT CHANGE UP (ref 27–34)
MCHC RBC-ENTMCNC: 31.7 GM/DL — LOW (ref 32–36)
MCV RBC AUTO: 89.6 FL — SIGNIFICANT CHANGE UP (ref 80–100)
NRBC # BLD: 0 /100 WBCS — SIGNIFICANT CHANGE UP (ref 0–0)
PLATELET # BLD AUTO: 174 K/UL — SIGNIFICANT CHANGE UP (ref 150–400)
POTASSIUM SERPL-MCNC: 4 MMOL/L — SIGNIFICANT CHANGE UP (ref 3.5–5.3)
POTASSIUM SERPL-SCNC: 4 MMOL/L — SIGNIFICANT CHANGE UP (ref 3.5–5.3)
RBC # BLD: 3.35 M/UL — LOW (ref 3.8–5.2)
RBC # FLD: 15.7 % — HIGH (ref 10.3–14.5)
SODIUM SERPL-SCNC: 140 MMOL/L — SIGNIFICANT CHANGE UP (ref 135–145)
WBC # BLD: 5.56 K/UL — SIGNIFICANT CHANGE UP (ref 3.8–10.5)
WBC # FLD AUTO: 5.56 K/UL — SIGNIFICANT CHANGE UP (ref 3.8–10.5)

## 2022-03-15 RX ADMIN — Medication 3 MILLIGRAM(S): at 22:10

## 2022-03-15 RX ADMIN — Medication 1 TABLET(S): at 11:21

## 2022-03-15 RX ADMIN — HEPARIN SODIUM 5000 UNIT(S): 5000 INJECTION INTRAVENOUS; SUBCUTANEOUS at 22:09

## 2022-03-15 RX ADMIN — Medication 2: at 12:28

## 2022-03-15 RX ADMIN — ATORVASTATIN CALCIUM 40 MILLIGRAM(S): 80 TABLET, FILM COATED ORAL at 22:10

## 2022-03-15 RX ADMIN — PIPERACILLIN AND TAZOBACTAM 25 GRAM(S): 4; .5 INJECTION, POWDER, LYOPHILIZED, FOR SOLUTION INTRAVENOUS at 11:20

## 2022-03-15 RX ADMIN — Medication 25 MICROGRAM(S): at 05:38

## 2022-03-15 RX ADMIN — HEPARIN SODIUM 5000 UNIT(S): 5000 INJECTION INTRAVENOUS; SUBCUTANEOUS at 11:20

## 2022-03-15 RX ADMIN — PIPERACILLIN AND TAZOBACTAM 25 GRAM(S): 4; .5 INJECTION, POWDER, LYOPHILIZED, FOR SOLUTION INTRAVENOUS at 22:11

## 2022-03-15 RX ADMIN — Medication 75 MILLIGRAM(S): at 11:26

## 2022-03-15 NOTE — PROGRESS NOTE ADULT - SUBJECTIVE AND OBJECTIVE BOX
[facility]   [phone]  RENAL FOLLOW UP NOTE  --------------------------------------------------------------------------------  Chief Complaint:    24 hour events/subjective:    seen earlier   son at bedside  who states pt is alert today     PAST HISTORY  --------------------------------------------------------------------------------  No significant changes to PMH, PSH, FHx, SHx, unless otherwise noted    ALLERGIES & MEDICATIONS  --------------------------------------------------------------------------------  Allergies    Keflex (Rash; Hives)    Intolerances      Standing Inpatient Medications  atorvastatin 40 milliGRAM(s) Oral at bedtime  dextrose 40% Gel 15 Gram(s) Oral once  dextrose 5%. 1000 milliLiter(s) IV Continuous <Continuous>  dextrose 5%. 1000 milliLiter(s) IV Continuous <Continuous>  dextrose 50% Injectable 25 Gram(s) IV Push once  dextrose 50% Injectable 12.5 Gram(s) IV Push once  dextrose 50% Injectable 25 Gram(s) IV Push once  dextrose 50% Injectable 12.5 Gram(s) IV Push once  glucagon  Injectable 1 milliGRAM(s) IntraMuscular once  heparin   Injectable 5000 Unit(s) SubCutaneous every 12 hours  insulin lispro (ADMELOG) corrective regimen sliding scale   SubCutaneous at bedtime  insulin lispro (ADMELOG) corrective regimen sliding scale   SubCutaneous three times a day before meals  levothyroxine 25 MICROGram(s) Oral daily  melatonin 3 milliGRAM(s) Oral at bedtime  Nephro-iain 1 Tablet(s) Oral daily  piperacillin/tazobactam IVPB.. 3.375 Gram(s) IV Intermittent every 12 hours  venlafaxine XR. 75 milliGRAM(s) Oral daily    PRN Inpatient Medications      REVIEW OF SYSTEMS  --------------------------------------------------------------------------------    Gen: denies fevers/chills,  CVS: denies chest pain/palpitations  Resp: denies SOB/Cough  GI: Denies N/V/Abd pain  : Denies dysuria      VITALS/PHYSICAL EXAM  --------------------------------------------------------------------------------  T(C): 37.2 (03-15-22 @ 20:46), Max: 37.2 (03-15-22 @ 20:46)  HR: 71 (03-15-22 @ 20:46) (66 - 74)  BP: 164/77 (03-15-22 @ 20:46) (134/78 - 168/77)  RR: 18 (03-15-22 @ 20:46) (18 - 18)  SpO2: 98% (03-15-22 @ 20:46) (96% - 99%)  Wt(kg): --        03-14-22 @ 07:01  -  03-15-22 @ 07:00  --------------------------------------------------------  IN: 1080 mL / OUT: 650 mL / NET: 430 mL    03-15-22 @ 07:01  -  03-15-22 @ 20:49  --------------------------------------------------------  IN: 520 mL / OUT: 200 mL / NET: 320 mL      Physical Exam:  	  	  	Gen: thin cachetic elderly  female   	Pulm: decrease bs  no rales or ronchi or wheezing  	CV: No JVD. RRR, S1S2; II/VI murmur  	Abd: +BS, soft, nontender/nondistended  	: No suprapubic tenderness  	UE: Warm, no cyanosis  no clubbing, no edema;   	LE: Warm, no cyanosis  no clubbing, no edema  	Neuro: more alert today, oriented to place and person     LABS/STUDIES  --------------------------------------------------------------------------------              9.5    5.56  >-----------<  174      [03-15-22 @ 06:30]              30.0     140  |  109  |  30  ----------------------------<  87      [03-15-22 @ 06:28]  4.0   |  18  |  2.73        Ca     9.3     [03-15-22 @ 06:28]    Creatinine Trend:  SCr 2.73 [03-15 @ 06:28]  SCr 2.55 [03-14 @ 07:08]  SCr 2.60 [03-13 @ 18:04]  SCr 2.85 [03-12 @ 10:55]  SCr 3.14 [03-11 @ 13:31]              Urinalysis - [03-11-22 @ 14:45]      Color Light Yellow / Appearance Clear / SG 1.013 / pH 7.0      Gluc Negative / Ketone Negative  / Bili Negative / Urobili Negative       Blood Trace / Protein 30 mg/dL / Leuk Est Negative / Nitrite Negative      RBC 2 / WBC 1 / Hyaline 0 / Gran  / Sq Epi  / Non Sq Epi 1 / Bacteria Negative    Urine Sodium 40      [03-09-22 @ 15:54]  Urine Potassium 32      [03-09-22 @ 15:54]  Urine Osmolality 272      [03-09-22 @ 16:21]      < from: CT Abdomen and Pelvis No Cont (03.09.22 @ 16:28) >    ACC: [id number] EXAM:  CT ABDOMEN AND PELVIS                        ACC: [id number] EXAM:  CT CHEST                          PROCEDURE DATE:  03/09/2022          INTERPRETATION:  CLINICAL INFORMATION: Urinary symptoms. Acute kidney   injury.    COMPARISON: None.    CONTRAST/COMPLICATIONS:  IV Contrast: NONE  Oral Contrast: NONE  Complications: None reported at time of study completion    PROCEDURE:  CT of the Chest, Abdomen and Pelvis was performed.  Sagittal and coronal reformats were performed.    FINDINGS:  CHEST:  LUNGS AND LARGE AIRWAYS: Patent central airways. No pulmonary nodules or   parenchymal consolidation.  PLEURA: No pleural effusion.  VESSELS: Atherosclerotic changes of the aorta and coronary arteries.  HEART: Heart size is normal. No pericardial effusion.  MEDIASTINUM AND NANETTE: No lymphadenopathy.  CHEST WALL AND LOWER NECK: Within normal limits.    ABDOMEN AND PELVIS:  LIVER: Within normal limits.  BILE DUCTS: Mild intra and extrahepatic biliary ductal dilatation with   distal choledocholithiasis.  GALLBLADDER: Cholelithiasis. No pericholecystic inflammation.  SPLEEN: Within normal limits.  PANCREAS: Within normal limits.  ADRENALS: Within normal limits.  KIDNEYS/URETERS: Atrophic left kidney. A few small nonobstructing   bilateral renal calculi. No hydronephrosis. Bilateral renal cysts   including a large parapelvic cyst on the right.    BLADDER: Within normal limits.  REPRODUCTIVE ORGANS: Uterus and adnexa within normal limits.    BOWEL: No bowel obstruction. Colonic diverticulosis.  PERITONEUM: No ascites.  VESSELS: Atherosclerotic changes.  RETROPERITONEUM/LYMPH NODES: No lymphadenopathy.  ABDOMINAL WALL: Within normal limits.  BONES: Degenerative changes. Old left-sided rib fractures.    IMPRESSION:  No acute urinary tract pathology.    Mild intra and extrahepatic biliary ductal dilatation with distal   choledocholithiasis.    < end of copied text >

## 2022-03-15 NOTE — PROGRESS NOTE ADULT - SUBJECTIVE AND OBJECTIVE BOX
KOREY DIAZ  89y Female  MRN:[id number]    Patient is a 89y old  Female who presents with a chief complaint of AMS, ELISA  HPI:   89y F pmhx HTN, HLD, DM, glaucoma, breast cancer, diverticulitis p/w urinary symptoms. pt presents accompanied by her daughter for generalized weakness and confusion (to year). pt had a UTI diagnosed last week, completed a 7-day course of bactrim. pt denies urinary sx but per daughter pt has frequent UTIs presenting with AMS. pt had several episodes of soft diarrhea daily this week, per daughter she appears dehydrated. pt denies fever, chills, abdominal pain, nausea, vomiting, cough, rhinorrhea, CP, SOB.   found to have ELISA (09 Mar 2022 19:41)      Patient seen and evaluated at bedside. No acute events overnight except as noted.    Interval HPI: no acute events o/n     PAST MEDICAL & SURGICAL HISTORY:  HTN (hypertension)    Diabetes mellitus type 2, noninsulin dependent    Diverticulitis    Hyperlipidemia    GERD (gastroesophageal reflux disease)    Glaucoma    Breast cancer    Urinary incontinence    Renal calculus or stone    Arthritis    Heart murmur    Renal calculi  s/p stone extraction 57 yrs ago    S/P lumpectomy of breast  right breast with node removal    S/P bladder repair  Interstim device placement 2010        REVIEW OF SYSTEMS:  as per hpi     VITALS:   Vital Signs Last 24 Hrs  T(C): 36.3 (15 Mar 2022 08:50), Max: 36.7 (14 Mar 2022 16:50)  T(F): 97.4 (15 Mar 2022 08:50), Max: 98 (14 Mar 2022 16:50)  HR: 67 (15 Mar 2022 08:50) (66 - 80)  BP: 168/77 (15 Mar 2022 08:50) (108/60 - 168/77)  BP(mean): --  RR: 18 (15 Mar 2022 08:50) (17 - 18)  SpO2: 97% (15 Mar 2022 08:50) (96% - 98%)      PHYSICAL EXAM:  GENERAL: NAD, frail   HEAD:  Atraumatic, Normocephalic  EYES: EOMI, PERRLA, conjunctiva and sclera clear  NECK: Supple, No JVD  CHEST/LUNG: Clear to auscultation bilaterally; No wheeze  HEART: S1, S2; +murmur  ABDOMEN: Soft, Nontender, Nondistended; Bowel sounds present  EXTREMITIES:  2+ Peripheral Pulses, No clubbing, cyanosis, or edema  PSYCH: confused   NEUROLOGY: AAOX2-3; non-focal  SKIN: No rashes or lesions    Consultant(s) Notes Reviewed:  [x ] YES  [ ] NO  Care Discussed with Consultants/Other Providers [ x] YES  [ ] NO    MEDS:   MEDICATIONS  (STANDING):  atorvastatin 40 milliGRAM(s) Oral at bedtime  dextrose 40% Gel 15 Gram(s) Oral once  dextrose 5%. 1000 milliLiter(s) (50 mL/Hr) IV Continuous <Continuous>  dextrose 5%. 1000 milliLiter(s) (100 mL/Hr) IV Continuous <Continuous>  dextrose 50% Injectable 25 Gram(s) IV Push once  dextrose 50% Injectable 12.5 Gram(s) IV Push once  dextrose 50% Injectable 25 Gram(s) IV Push once  dextrose 50% Injectable 12.5 Gram(s) IV Push once  glucagon  Injectable 1 milliGRAM(s) IntraMuscular once  heparin   Injectable 5000 Unit(s) SubCutaneous every 12 hours  insulin lispro (ADMELOG) corrective regimen sliding scale   SubCutaneous three times a day before meals  insulin lispro (ADMELOG) corrective regimen sliding scale   SubCutaneous at bedtime  levothyroxine 25 MICROGram(s) Oral daily  melatonin 3 milliGRAM(s) Oral at bedtime  Nephro-iain 1 Tablet(s) Oral daily  piperacillin/tazobactam IVPB.. 3.375 Gram(s) IV Intermittent every 12 hours  venlafaxine XR. 75 milliGRAM(s) Oral daily    MEDICATIONS  (PRN):        ALLERGIES:  Keflex (Rash; Hives)      LABS:                                                                 9.5    5.56  )-----------( 174      ( 15 Mar 2022 06:30 )             30.0   03-15    140  |  109<H>  |  30<H>  ----------------------------<  87  4.0   |  18<L>  |  2.73<H>    Ca    9.3      15 Mar 2022 06:28          < from: US Abdomen Complete (US Abdomen Complete .) (03.11.22 @ 08:36) >  IMPRESSION:  Redemonstration of intrahepatic and extrahepatic biliary ductal   dilatation with distal choledocholithiasis.    Cholelithiasis without sonographic evidence of acute cholecystitis.    Echogenic kidneys suggestive of renal parenchymal disease. Mild left   hydronephrosis with nonspecific urothelial thickening. Correlate with   urinalysis.    Trace right pleural effusion.    --- End of Report ---      < end of copied text >     < from: CT Head No Cont (03.09.22 @ 16:32) >  IMPRESSION:    No acute hemorrhage, mass effect or extra-axial collections. Unchanged   left frontal lobe extra-axial mass, likely representing a meningioma.    --- End of Report ---      < end of copied text >  < from: CT Abdomen and Pelvis No Cont (03.09.22 @ 16:28) >  IMPRESSION:  No acute urinary tract pathology.    Mild intra and extrahepatic biliary ductal dilatation with distal   choledocholithiasis.        --- End of Report ---    < end of copied text >      < from: Transthoracic Echocardiogram (03.11.22 @ 14:18) >  ----------------------  Conclusions:  1. Calcified trileaflet aortic valve with decreased  opening. Peak transaortic valve gradient equals 37 mm Hg,  mean transaortic valve gradient equals 20 mm Hg, estimated  aortic valve area equals 0.9 sqcm (by continuity equation),  aortic valve velocity time integral equals 63 cm,  consistent with severe aortic stenosis.  2. Severely dilated left atrium.  LA volume index = 51  cc/m2.  3. Mild concentric left ventricular hypertrophy.  4. Normal left ventricular systolic function. No segmental  wall motion abnormalities.  5. Normal right ventricular size and function.  *** Compared with echocardiogram of 10/9/2020, aortic  stenosis has progressed.  -------------------------------------------------------------    < end of copied text >

## 2022-03-15 NOTE — PROGRESS NOTE ADULT - ASSESSMENT
89 year old Female pmhx HTN, HLD, DM, glaucoma, breast cancer, diverticulitis p/w AMS, weakness. PTA patient was in [place] with family and went to urgent care for confusion and noticed to have UTI, she was treated with 7 day course of bactrim, last dose was Saturday. Family also states she has had 4 UTI's over the last year. She was noticed with an abnormal creatinine, renal consult called.       1- ELISA on CKD  2- infection/UTI        ELISA suspected in setting of infection/interstitial nephritis secondary to bactrim.   creatinine improving.  non-oliguric  continue zosyn BID  cultures negative  hypokalemia k is better   CT A/P: Atrophic left kidney with  mild left hydro   renal calculi   strict I/O  trend creatinine and electrolytes daily

## 2022-03-15 NOTE — PROGRESS NOTE ADULT - ASSESSMENT
89 yo female h/o htn, chol, DM, breast ca, here with ELISA, metabolic encephalopathy    ELISA on CKD  likely 2/2 dehydration vs bactrim  iv fluids as ordered   renal sono noted  renal f/u   creat slowly improving     recent UTI  UA neg  f/u cultures - neg  cont zosyn empirically.  to finish tomorrow AM    choledocholithiasis on CT  abd exam benign  LFTs nl  no n/v  gi consulted  cont zosyn empirically  US noted  conservative mngt for now  no plans for ERCP at this time   diet as tolerated     metabolic encephalopathy  likely 2/2 elisa  treat elisa  supportive care  monitor    DM  hold metformin  iss  monitor fs    dysphagia  swallow eval noted  soft diet     aortic stenosis  echo noted with progression to sev AS   cards f/u  not candidate for intervention     chol  cont statin    cont other home meds    dvt ppx      plan d/w pt family at bedside  dc planning tomorrow     Advanced care planning was discussed with patient and family.  Advanced care planning forms were reviewed and discussed as appropriate.  Differential diagnosis and plan of care discussed with patient after the evaluation.   Pain assessed and judicious use of narcotics when appropriate was discussed.  Importance of Fall prevention discussed.  Counseling on Smoking and Alcohol cessation was offered when appropriate.  Counseling on Diet, exercise, and medication compliance was done.       Approx 55 minutes spent.

## 2022-03-16 ENCOUNTER — TRANSCRIPTION ENCOUNTER (OUTPATIENT)
Age: 87
End: 2022-03-16

## 2022-03-16 VITALS
OXYGEN SATURATION: 99 % | DIASTOLIC BLOOD PRESSURE: 76 MMHG | HEART RATE: 82 BPM | RESPIRATION RATE: 18 BRPM | SYSTOLIC BLOOD PRESSURE: 159 MMHG | TEMPERATURE: 98 F

## 2022-03-16 LAB
ANION GAP SERPL CALC-SCNC: 12 MMOL/L — SIGNIFICANT CHANGE UP (ref 5–17)
BUN SERPL-MCNC: 34 MG/DL — HIGH (ref 7–23)
CALCIUM SERPL-MCNC: 9.3 MG/DL — SIGNIFICANT CHANGE UP (ref 8.4–10.5)
CHLORIDE SERPL-SCNC: 108 MMOL/L — SIGNIFICANT CHANGE UP (ref 96–108)
CO2 SERPL-SCNC: 18 MMOL/L — LOW (ref 22–31)
CREAT SERPL-MCNC: 2.48 MG/DL — HIGH (ref 0.5–1.3)
EGFR: 18 ML/MIN/1.73M2 — LOW
GLUCOSE BLDC GLUCOMTR-MCNC: 284 MG/DL — HIGH (ref 70–99)
GLUCOSE SERPL-MCNC: 257 MG/DL — HIGH (ref 70–99)
POTASSIUM SERPL-MCNC: 4 MMOL/L — SIGNIFICANT CHANGE UP (ref 3.5–5.3)
POTASSIUM SERPL-SCNC: 4 MMOL/L — SIGNIFICANT CHANGE UP (ref 3.5–5.3)
SARS-COV-2 RNA SPEC QL NAA+PROBE: SIGNIFICANT CHANGE UP
SODIUM SERPL-SCNC: 138 MMOL/L — SIGNIFICANT CHANGE UP (ref 135–145)

## 2022-03-16 PROCEDURE — 76700 US EXAM ABDOM COMPLETE: CPT

## 2022-03-16 PROCEDURE — 84300 ASSAY OF URINE SODIUM: CPT

## 2022-03-16 PROCEDURE — 80053 COMPREHEN METABOLIC PANEL: CPT

## 2022-03-16 PROCEDURE — 99285 EMERGENCY DEPT VISIT HI MDM: CPT | Mod: 25

## 2022-03-16 PROCEDURE — 36415 COLL VENOUS BLD VENIPUNCTURE: CPT

## 2022-03-16 PROCEDURE — 92610 EVALUATE SWALLOWING FUNCTION: CPT

## 2022-03-16 PROCEDURE — 82803 BLOOD GASES ANY COMBINATION: CPT

## 2022-03-16 PROCEDURE — 84132 ASSAY OF SERUM POTASSIUM: CPT

## 2022-03-16 PROCEDURE — 71045 X-RAY EXAM CHEST 1 VIEW: CPT

## 2022-03-16 PROCEDURE — 84295 ASSAY OF SERUM SODIUM: CPT

## 2022-03-16 PROCEDURE — 70450 CT HEAD/BRAIN W/O DYE: CPT | Mod: MA

## 2022-03-16 PROCEDURE — 92526 ORAL FUNCTION THERAPY: CPT

## 2022-03-16 PROCEDURE — 85018 HEMOGLOBIN: CPT

## 2022-03-16 PROCEDURE — 87040 BLOOD CULTURE FOR BACTERIA: CPT

## 2022-03-16 PROCEDURE — 82962 GLUCOSE BLOOD TEST: CPT

## 2022-03-16 PROCEDURE — 82947 ASSAY GLUCOSE BLOOD QUANT: CPT

## 2022-03-16 PROCEDURE — 85027 COMPLETE CBC AUTOMATED: CPT

## 2022-03-16 PROCEDURE — 85025 COMPLETE CBC W/AUTO DIFF WBC: CPT

## 2022-03-16 PROCEDURE — 83605 ASSAY OF LACTIC ACID: CPT

## 2022-03-16 PROCEDURE — 87205 SMEAR GRAM STAIN: CPT

## 2022-03-16 PROCEDURE — 87086 URINE CULTURE/COLONY COUNT: CPT

## 2022-03-16 PROCEDURE — 81001 URINALYSIS AUTO W/SCOPE: CPT

## 2022-03-16 PROCEDURE — 83690 ASSAY OF LIPASE: CPT

## 2022-03-16 PROCEDURE — 84133 ASSAY OF URINE POTASSIUM: CPT

## 2022-03-16 PROCEDURE — U0005: CPT

## 2022-03-16 PROCEDURE — 71250 CT THORAX DX C-: CPT | Mod: MA

## 2022-03-16 PROCEDURE — 83036 HEMOGLOBIN GLYCOSYLATED A1C: CPT

## 2022-03-16 PROCEDURE — 84100 ASSAY OF PHOSPHORUS: CPT

## 2022-03-16 PROCEDURE — 85014 HEMATOCRIT: CPT

## 2022-03-16 PROCEDURE — 82435 ASSAY OF BLOOD CHLORIDE: CPT

## 2022-03-16 PROCEDURE — 96374 THER/PROPH/DIAG INJ IV PUSH: CPT

## 2022-03-16 PROCEDURE — U0003: CPT

## 2022-03-16 PROCEDURE — 97110 THERAPEUTIC EXERCISES: CPT

## 2022-03-16 PROCEDURE — 83935 ASSAY OF URINE OSMOLALITY: CPT

## 2022-03-16 PROCEDURE — 93306 TTE W/DOPPLER COMPLETE: CPT

## 2022-03-16 PROCEDURE — 82330 ASSAY OF CALCIUM: CPT

## 2022-03-16 PROCEDURE — 97116 GAIT TRAINING THERAPY: CPT

## 2022-03-16 PROCEDURE — 82565 ASSAY OF CREATININE: CPT

## 2022-03-16 PROCEDURE — 80048 BASIC METABOLIC PNL TOTAL CA: CPT

## 2022-03-16 PROCEDURE — 74176 CT ABD & PELVIS W/O CONTRAST: CPT | Mod: MA

## 2022-03-16 PROCEDURE — 97162 PT EVAL MOD COMPLEX 30 MIN: CPT

## 2022-03-16 PROCEDURE — 83735 ASSAY OF MAGNESIUM: CPT

## 2022-03-16 RX ORDER — AMLODIPINE BESYLATE 2.5 MG/1
1 TABLET ORAL
Qty: 30 | Refills: 0
Start: 2022-03-16 | End: 2022-04-14

## 2022-03-16 RX ORDER — AMLODIPINE BESYLATE 2.5 MG/1
5 TABLET ORAL DAILY
Refills: 0 | Status: DISCONTINUED | OUTPATIENT
Start: 2022-03-16 | End: 2022-03-16

## 2022-03-16 RX ORDER — METFORMIN HYDROCHLORIDE 850 MG/1
1 TABLET ORAL
Qty: 0 | Refills: 0 | DISCHARGE

## 2022-03-16 RX ORDER — HYDRALAZINE HCL 50 MG
25 TABLET ORAL ONCE
Refills: 0 | Status: COMPLETED | OUTPATIENT
Start: 2022-03-16 | End: 2022-03-16

## 2022-03-16 RX ADMIN — Medication 1 TABLET(S): at 12:41

## 2022-03-16 RX ADMIN — PIPERACILLIN AND TAZOBACTAM 25 GRAM(S): 4; .5 INJECTION, POWDER, LYOPHILIZED, FOR SOLUTION INTRAVENOUS at 10:52

## 2022-03-16 RX ADMIN — Medication 25 MILLIGRAM(S): at 08:50

## 2022-03-16 RX ADMIN — Medication 3: at 12:41

## 2022-03-16 RX ADMIN — Medication 25 MICROGRAM(S): at 05:49

## 2022-03-16 RX ADMIN — Medication 75 MILLIGRAM(S): at 12:41

## 2022-03-16 RX ADMIN — HEPARIN SODIUM 5000 UNIT(S): 5000 INJECTION INTRAVENOUS; SUBCUTANEOUS at 10:52

## 2022-03-16 NOTE — PROGRESS NOTE ADULT - SUBJECTIVE AND OBJECTIVE BOX
[facility]   [phone]  RENAL FOLLOW UP NOTE  --------------------------------------------------------------------------------  Chief Complaint:    24 hour events/subjective:    patient seen and examined.   waylon coon cp    PAST HISTORY  --------------------------------------------------------------------------------  No significant changes to PMH, PSH, FHx, SHx, unless otherwise noted    ALLERGIES & MEDICATIONS  --------------------------------------------------------------------------------  Allergies    Keflex (Rash; Hives)    Intolerances      Standing Inpatient Medications  amLODIPine   Tablet 5 milliGRAM(s) Oral daily  atorvastatin 40 milliGRAM(s) Oral at bedtime  dextrose 40% Gel 15 Gram(s) Oral once  dextrose 5%. 1000 milliLiter(s) IV Continuous <Continuous>  dextrose 5%. 1000 milliLiter(s) IV Continuous <Continuous>  dextrose 50% Injectable 25 Gram(s) IV Push once  dextrose 50% Injectable 12.5 Gram(s) IV Push once  dextrose 50% Injectable 25 Gram(s) IV Push once  dextrose 50% Injectable 12.5 Gram(s) IV Push once  glucagon  Injectable 1 milliGRAM(s) IntraMuscular once  heparin   Injectable 5000 Unit(s) SubCutaneous every 12 hours  insulin lispro (ADMELOG) corrective regimen sliding scale   SubCutaneous three times a day before meals  insulin lispro (ADMELOG) corrective regimen sliding scale   SubCutaneous at bedtime  levothyroxine 25 MICROGram(s) Oral daily  melatonin 3 milliGRAM(s) Oral at bedtime  Nephro-iain 1 Tablet(s) Oral daily  venlafaxine XR. 75 milliGRAM(s) Oral daily    PRN Inpatient Medications      REVIEW OF SYSTEMS  --------------------------------------------------------------------------------    Gen: denies fevers/chills,  CVS: denies chest pain/palpitations  Resp: denies SOB/Cough  GI: Denies N/V/Abd pain  : Denies dysuria    All other systems were reviewed and are negative, except as noted.    VITALS/PHYSICAL EXAM  --------------------------------------------------------------------------------  T(C): 36.7 (03-16-22 @ 08:35), Max: 37.2 (03-15-22 @ 20:46)  HR: 73 (03-16-22 @ 08:35) (63 - 74)  BP: 146/68 (03-16-22 @ 12:31) (146/68 - 196/74)  RR: 18 (03-16-22 @ 08:35) (18 - 18)  SpO2: 96% (03-16-22 @ 08:35) (96% - 99%)  Wt(kg): --        03-15-22 @ 07:01  -  03-16-22 @ 07:00  --------------------------------------------------------  IN: 1040 mL / OUT: 200 mL / NET: 840 mL    03-16-22 @ 07:01  -  03-16-22 @ 15:39  --------------------------------------------------------  IN: 240 mL / OUT: 1 mL / NET: 239 mL      Physical Exam:  	  	Gen: thin cachetic elderly  female, alert  	Pulm: decrease bs  no rales or ronchi or wheezing  	CV: No JVD. RRR, S1S2; II/VI murmur  	Abd: +BS, soft, nontender/nondistended  	: No suprapubic tenderness  	UE: Warm, no cyanosis  no clubbing, no edema;   	LE: Warm, no cyanosis  no clubbing, no edema    LABS/STUDIES  --------------------------------------------------------------------------------              9.5    5.56  >-----------<  174      [03-15-22 @ 06:30]              30.0     138  |  108  |  34  ----------------------------<  257      [03-16-22 @ 13:23]  4.0   |  18  |  2.48        Ca     9.3     [03-16-22 @ 13:23]            Creatinine Trend:  SCr 2.48 [03-16 @ 13:23]  SCr 2.73 [03-15 @ 06:28]  SCr 2.55 [03-14 @ 07:08]  SCr 2.60 [03-13 @ 18:04]  SCr 2.85 [03-12 @ 10:55]              Urinalysis - [03-11-22 @ 14:45]      Color Light Yellow / Appearance Clear / SG 1.013 / pH 7.0      Gluc Negative / Ketone Negative  / Bili Negative / Urobili Negative       Blood Trace / Protein 30 mg/dL / Leuk Est Negative / Nitrite Negative      RBC 2 / WBC 1 / Hyaline 0 / Gran  / Sq Epi  / Non Sq Epi 1 / Bacteria Negative    Urine Sodium 40      [03-09-22 @ 15:54]  Urine Potassium 32      [03-09-22 @ 15:54]  Urine Osmolality 272      [03-09-22 @ 16:21]

## 2022-03-16 NOTE — PROVIDER CONTACT NOTE (OTHER) - RECOMMENDATIONS
notify NP
dextrose injectable
dextrose injectable
NP notified
hydralazine was given in the past.

## 2022-03-16 NOTE — DISCHARGE NOTE PROVIDER - NSDCMRMEDTOKEN_GEN_ALL_CORE_FT
Bactrim  mg-160 mg oral tablet: 1 tab(s) orally every 12 hours.    **As per patient she finished with the antibiotic treatment this morning**  Effexor XR 75 mg oral capsule, extended release: 1 cap(s) orally once a day  ferrous sulfate 325 mg (65 mg elemental iron) oral tablet: 1 tab(s) orally once a day  metFORMIN 1000 mg oral tablet: 1 tab(s) orally 2 times a day  multivitamin: 1 tab(s) orally once a day  rosuvastatin 10 mg oral tablet: 1 tab(s) orally once a day  Synthroid 25 mcg (0.025 mg) oral tablet: 1 tab(s) orally once a day   amLODIPine 5 mg oral tablet: 1 tab(s) orally once a day  Effexor XR 75 mg oral capsule, extended release: 1 cap(s) orally once a day  ferrous sulfate 325 mg (65 mg elemental iron) oral tablet: 1 tab(s) orally once a day  multivitamin: 1 tab(s) orally once a day  rosuvastatin 10 mg oral tablet: 1 tab(s) orally once a day  Synthroid 25 mcg (0.025 mg) oral tablet: 1 tab(s) orally once a day

## 2022-03-16 NOTE — PROVIDER CONTACT NOTE (OTHER) - REASON
b/p elevated
Pt is hypertensive
BG 78 on admission to [address] [place] [address]
Pt is hypertensive
pt. continues to hypoglycemic
pt. hypertensive
pt is hypertensive
pt. hypertensive
pt. hypertensive

## 2022-03-16 NOTE — PROVIDER CONTACT NOTE (OTHER) - ASSESSMENT
pt denies headache or pain at the time. pt is asymptomatic. manual BP taken 196/74
pt. continues to hypoglycemic. RN administered glucose gel. RN gave pt. apple juice to drink after the gel. RN noticed pt. coughing after drinking apple juice,. RN stopped administering apple juice. after 15-30 mins, RN rechecked FS, which was 86. RN readministered glucose gel as per protocol. After 15-30 mins post gel, FS 83.
97.9, HR 70, 178/70 , 177/72 manual, RR 17, 98% RA. Pt has no c/o chest pain, SOB or discomfort
pt is sitting in bed not showing any signs of distress. pt states she feels fine has no chest pain or headache.
pt. was hypoglycemia in ED, and received juice PO. on arrival to [address] [place] [address], FS 78. no s/s of hypoglycemia. As per daughter, pt does not eat much at home.
BP: 179/77, HR 71
pt shows signs no signs of distress. VS otherwise stable
BP: 170/71, HR 69, temp: 97.5, O2 97%
BP: 180/73, HR 66, O2 97% on room air, temp 98.7, no signs or symptoms of discomfort

## 2022-03-16 NOTE — PROVIDER CONTACT NOTE (OTHER) - SITUATION
pt. hypertensive
Pt is hypertensive manual 178/82
pt. hypertensive
b/p elevated
pt is hypertensive
Pt is hypertensive 196/74
BG 78 on admission to [address] [place] [address]
pt. continues to hypoglycemic
pt. hypertensive

## 2022-03-16 NOTE — DISCHARGE NOTE PROVIDER - NSDCCPCAREPLAN_GEN_ALL_CORE_FT
PRINCIPAL DISCHARGE DIAGNOSIS  Diagnosis: Metabolic encephalopathy  Assessment and Plan of Treatment: confusion likely from Urinary infection and dehydration and kidney failure  Now improved      SECONDARY DISCHARGE DIAGNOSES  Diagnosis: Acute-on-chronic kidney injury  Assessment and Plan of Treatment: Avoid taking (NSAIDs) - (ex: Ibuprofen, Advil, Celebrex, Naprosyn)  Avoid taking any nephrotoxic agents (can harm kidneys) - Intravenous contrast for diagnostic testing, combination cold medications.  Have all medications adjusted for your renal function by your Health Care Provider.  Blood pressure control is important.  Take all medication as prescribed.      Diagnosis: Choledocholithiasis  Assessment and Plan of Treatment: Low fat diet  Follow up with gastro enterology if starts c/o abdominal pain    Diagnosis: Severe aortic stenosis  Assessment and Plan of Treatment: Follow up with cardiology    Diagnosis: Hypertension  Assessment and Plan of Treatment: Low salt diet  Activity as tolerated.  Take all medication as prescribed.  Follow up with your medical doctor for routine blood pressure monitoring at your next visit.  Notify your doctor if you have any of the following symptoms:   Dizziness, Lightheadedness, Blurry vision, Headache, Chest pain, Shortness of breath

## 2022-03-16 NOTE — PROGRESS NOTE ADULT - ASSESSMENT
89 yo female h/o htn, chol, DM, breast ca, here with ELISA, metabolic encephalopathy    ELISA on CKD  likely 2/2 dehydration vs bactrim  iv fluids as ordered   renal sono noted  renal f/u   creat slowly improving     recent UTI  UA neg  f/u cultures - neg  cont zosyn empirically.  to finish today    choledocholithiasis on CT  abd exam benign  LFTs nl  no n/v  gi consulted  cont zosyn empirically  US noted  conservative mngt for now  no plans for ERCP at this time   diet as tolerated     metabolic encephalopathy  likely 2/2 elisa  treat elisa  supportive care  monitor  improved    HTN  bp persistently elevated  will start norvasc 5 daily     DM  dc metformin  iss  monitor fs    dysphagia  swallow eval noted  soft diet     aortic stenosis  echo noted with progression to sev AS   cards f/u  not candidate for intervention     chol  cont statin    cont other home meds    dvt ppx      plan d/w pt family at bedside  dc planning      Advanced care planning was discussed with patient and family.  Advanced care planning forms were reviewed and discussed as appropriate.  Differential diagnosis and plan of care discussed with patient after the evaluation.   Pain assessed and judicious use of narcotics when appropriate was discussed.  Importance of Fall prevention discussed.  Counseling on Smoking and Alcohol cessation was offered when appropriate.  Counseling on Diet, exercise, and medication compliance was done.       Approx 55 minutes spent.

## 2022-03-16 NOTE — DISCHARGE NOTE NURSING/CASE MANAGEMENT/SOCIAL WORK - NSDCPEFALRISK_GEN_ALL_CORE
For information on Fall & Injury Prevention, visit: [email or web address] OR  [email or web address] OR  https://www.cdc.gov/steadi/patient.html

## 2022-03-16 NOTE — PROGRESS NOTE ADULT - NUTRITIONAL ASSESSMENT
This patient has been assessed with a concern for Malnutrition and has been determined to have a diagnosis/diagnoses of Severe protein-calorie malnutrition and Underweight (BMI < 19).    This patient is being managed with:   Diet Soft and Bite Sized-  For patients receiving Renal Replacement - No Protein Restr No Conc K No Conc Phos Low Sodium (RENAL)  Supplement Feeding Modality:  Oral  Nepro Cans or Servings Per Day:  1       Frequency:  Two Times a day  Entered: Mar 14 2022 11:15AM    
This patient has been assessed with a concern for Malnutrition and has been determined to have a diagnosis/diagnoses of Severe protein-calorie malnutrition and Underweight (BMI < 19).    This patient is being managed with:   Diet Soft and Bite Sized-  For patients receiving Renal Replacement - No Protein Restr No Conc K No Conc Phos Low Sodium (RENAL)  Entered: Mar 10 2022  1:44PM    
This patient has been assessed with a concern for Malnutrition and has been determined to have a diagnosis/diagnoses of Severe protein-calorie malnutrition and Underweight (BMI < 19).    This patient is being managed with:   Diet Soft and Bite Sized-  For patients receiving Renal Replacement - No Protein Restr No Conc K No Conc Phos Low Sodium (RENAL)  Entered: Mar 10 2022  1:44PM    
This patient has been assessed with a concern for Malnutrition and has been determined to have a diagnosis/diagnoses of Severe protein-calorie malnutrition and Underweight (BMI < 19).    This patient is being managed with:   Diet Soft and Bite Sized-  No Concentrated Potassium  Supplement Feeding Modality:  Oral  Nepro Cans or Servings Per Day:  1       Frequency:  Two Times a day  Entered: Mar 15 2022  4:34PM    
This patient has been assessed with a concern for Malnutrition and has been determined to have a diagnosis/diagnoses of Severe protein-calorie malnutrition and Underweight (BMI < 19).    This patient is being managed with:   Diet Soft and Bite Sized-  For patients receiving Renal Replacement - No Protein Restr No Conc K No Conc Phos Low Sodium (RENAL)  Supplement Feeding Modality:  Oral  Nepro Cans or Servings Per Day:  1       Frequency:  Two Times a day  Entered: Mar 14 2022 11:15AM

## 2022-03-16 NOTE — PROVIDER CONTACT NOTE (OTHER) - ACTION/TREATMENT ORDERED:
SHAQUILLE Billings aware. no interventions at this time will continue to monitor as the day progresses.
SHAQUILLE Clarke aware. will just monitor. no interventions at this time. safety maintained
As per SHAQUILLE Saleh administer dextrose gel, and recheck FS in 15-30 mins as per protocol. will follow protocol as per SHAQUILLE Solis. Will continue to monitor.
As per NP, no interventions @ this time. Recheck vitals @0800
NP notified, no interventions at this time, reassess BP @ 6:30
As per PA dextrose injectable on backorder, administer IV dextrose bolus. And keep pt. NPO as she has difficulty swallowing. will administer bolus and reassess FS after.
NP notifed, hydralazine given as ordered. will reassess BP
NP notified, will give medicine as ordered and reassess BP.
Janis Morrow NP aware- will order medication to give PO. Safety maintained. will continue to monitor

## 2022-03-16 NOTE — PROGRESS NOTE ADULT - ATTENDING COMMENTS
CBD stone in 89 yM with no clinical/lab evidence of cholangitis  agree with conservative mgmt
Seen, examined, and  agree with above as scribed by NP Ashok    cr still elevated suspect in setting of infection vs bactrim. cr is still elevated. is off bactrim. cont abx   d/w pt daughter and Dr. Patricio
Pt. seen, examined and d/w ZULLY Jara. Agree with above note.    Pt. is a 89y F pmhx HTN, HLD, DM, glaucoma, breast cancer, diverticulitis p/w urinary symptoms. pt brought to ER by family for generalized weakness and confusion (to year)/AMS.  Incidentally noted to have CBD stone on CT with normal LFTs    COVID negative    Non GI Issues:  #AMS  -follow up cultures, on empiric Zosyn  -monitor clinically    GI Issues:  1. CBD stone without jaundice/hyperbilirubinemia or significant LFT elevation, though with mild tenderness in RUQ.  No evidence of GB inflammation on CT  US 3/10 - distal CBD stone with intra+extra hepatic ductal dilation +GB stones, no e/o ac felisha  BCx NGTD  -discussed with family at bedside,  at this time they do not wish to pursue ERCP given advanced age and no evidence of biliary sepsis  -PO diet as tolerated    Hola Hernandez MD  [facility]
Seen, examined, and  agree with above as scribed by NP Ashok reyes improving   add norvasc 5 mg daily for htn
Seen, examined, and  agree with above as scribed by SHAQUILLE Pulido

## 2022-03-16 NOTE — PROVIDER CONTACT NOTE (OTHER) - BACKGROUND
pt. admitted for UTI/ELISA
+ UTI
pt. admitted for UTI/ELISA
pt was admitted for UTI.
admitted for UTI/ELISA
3/9 +UTI
pt admitted for UTI and acute renal failure.
pt was admitted for Acute renal failure, pmhx of HTN, HLD, breast cancer
UTI

## 2022-03-16 NOTE — DISCHARGE NOTE NURSING/CASE MANAGEMENT/SOCIAL WORK - PATIENT PORTAL LINK FT
You can access the FollowMyHealth Patient Portal offered by [facility] by registering at the following website: [email or web address]. By joining [email or web address] [email or web address] portal, you will also be able to view your health information using other applications (apps) compatible with our system.

## 2022-03-16 NOTE — DISCHARGE NOTE PROVIDER - DETAILS OF MALNUTRITION DIAGNOSIS/DIAGNOSES
This patient has been assessed with a concern for Malnutrition and was treated during this hospitalization for the following Nutrition diagnosis/diagnoses:     -  03/11/2022: Severe protein-calorie malnutrition   -  03/11/2022: Underweight (BMI < 19)

## 2022-03-16 NOTE — PROGRESS NOTE ADULT - PROVIDER SPECIALTY LIST ADULT
Gastroenterology
Gastroenterology
Nephrology
Cardiology
Gastroenterology
Internal Medicine
Nephrology
Nephrology
Internal Medicine

## 2022-03-16 NOTE — DISCHARGE NOTE NURSING/CASE MANAGEMENT/SOCIAL WORK - NSDCVIVACCINE_GEN_ALL_CORE_FT
influenza, injectable, quadrivalent, preservative free; 13-Oct-2020 13:18; Renetta Lyn (RN); [employer]; [id number] (Exp. Date: 30-Jun-2021); IntraMuscular; Deltoid Right.; 0.5 milliLiter(s); VIS (VIS Published: 15-Aug-2019, VIS Presented: 13-Oct-2020);   Tdap; 08-Oct-2020 15:26; Lorna Mcneal (RN); [employer]; [id number] (Exp. Date: 09-Dec-2021); IntraMuscular; Dorsogluteal Left.; 0.5 milliLiter(s); VIS (VIS Published: 09-May-2013, VIS Presented: 08-Oct-2020);   Tdap; 21-Jan-2021 06:53; Yrn Kurtz (RN); [employer]; [id number] (Exp. Date: 21-Jul-2022); IntraMuscular; Deltoid Right.; 0.5 milliLiter(s); VIS (VIS Published: 09-May-2013, VIS Presented: 21-Jan-2021);

## 2022-03-16 NOTE — PROGRESS NOTE ADULT - ASSESSMENT
89 year old Female pmhx HTN, HLD, DM, glaucoma, breast cancer, diverticulitis p/w AMS, weakness. PTA patient was in [place] with family and went to urgent care for confusion and noticed to have UTI, she was treated with 7 day course of bactrim, last dose was Saturday. Family also states she has had 4 UTI's over the last year. She was noticed with an abnormal creatinine, renal consult called.       1- ELISA on CKD  2- infection/UTI        ELISA suspected in setting of infection/interstitial nephritis secondary to bactrim.   creatinine improving.  non-oliguric  abx completed  cultures negative  CT A/P: Atrophic left kidney with  mild left hydro   renal calculi   strict I/O  trend creatinine 89 year old Female pmhx HTN, HLD, DM, glaucoma, breast cancer, diverticulitis p/w AMS, weakness. PTA patient was in [place] with family and went to urgent care for confusion and noticed to have UTI, she was treated with 7 day course of bactrim, last dose was Saturday. Family also states she has had 4 UTI's over the last year. She was noticed with an abnormal creatinine, renal consult called.       1- ELISA on CKD  2- infection/UTI        ELISA suspected in setting of infection/interstitial nephritis secondary to bactrim.   creatinine improving.  non-oliguric  abx completed  cultures negative  CT A/P: Atrophic left kidney with  mild left hydro   renal calculi   HTN this am, Hydralazine 25 mg po x 1 given  strict I/O  trend creatinine

## 2022-03-16 NOTE — PROGRESS NOTE ADULT - SUBJECTIVE AND OBJECTIVE BOX
KOREY DIAZ  89y Female  MRN:[id number]    Patient is a 89y old  Female who presents with a chief complaint of AMS, ELISA  HPI:   89y F pmhx HTN, HLD, DM, glaucoma, breast cancer, diverticulitis p/w urinary symptoms. pt presents accompanied by her daughter for generalized weakness and confusion (to year). pt had a UTI diagnosed last week, completed a 7-day course of bactrim. pt denies urinary sx but per daughter pt has frequent UTIs presenting with AMS. pt had several episodes of soft diarrhea daily this week, per daughter she appears dehydrated. pt denies fever, chills, abdominal pain, nausea, vomiting, cough, rhinorrhea, CP, SOB.   found to have ELISA (09 Mar 2022 19:41)      Patient seen and evaluated at bedside. No acute events overnight except as noted.    Interval HPI: no acute events o/n     PAST MEDICAL & SURGICAL HISTORY:  HTN (hypertension)    Diabetes mellitus type 2, noninsulin dependent    Diverticulitis    Hyperlipidemia    GERD (gastroesophageal reflux disease)    Glaucoma    Breast cancer    Urinary incontinence    Renal calculus or stone    Arthritis    Heart murmur    Renal calculi  s/p stone extraction 57 yrs ago    S/P lumpectomy of breast  right breast with node removal    S/P bladder repair  Interstim device placement 2010        REVIEW OF SYSTEMS:  as per hpi     VITALS:   Vital Signs Last 24 Hrs  T(C): 36.7 (16 Mar 2022 08:35), Max: 37.2 (15 Mar 2022 20:46)  T(F): 98 (16 Mar 2022 08:35), Max: 99 (15 Mar 2022 20:46)  HR: 73 (16 Mar 2022 08:35) (63 - 74)  BP: 196/74 (16 Mar 2022 08:35) (139/71 - 196/74)  BP(mean): --  RR: 18 (16 Mar 2022 08:35) (18 - 18)  SpO2: 96% (16 Mar 2022 08:35) (96% - 99%)      PHYSICAL EXAM:  GENERAL: NAD, frail   HEAD:  Atraumatic, Normocephalic  EYES: EOMI, PERRLA, conjunctiva and sclera clear  NECK: Supple, No JVD  CHEST/LUNG: Clear to auscultation bilaterally; No wheeze  HEART: S1, S2; +murmur  ABDOMEN: Soft, Nontender, Nondistended; Bowel sounds present  EXTREMITIES:  2+ Peripheral Pulses, No clubbing, cyanosis, or edema  PSYCH: confused   NEUROLOGY: AAOX2-3; non-focal  SKIN: No rashes or lesions    Consultant(s) Notes Reviewed:  [x ] YES  [ ] NO  Care Discussed with Consultants/Other Providers [ x] YES  [ ] NO    MEDS:   MEDICATIONS  (STANDING):  amLODIPine   Tablet 5 milliGRAM(s) Oral daily  atorvastatin 40 milliGRAM(s) Oral at bedtime  dextrose 40% Gel 15 Gram(s) Oral once  dextrose 5%. 1000 milliLiter(s) (50 mL/Hr) IV Continuous <Continuous>  dextrose 5%. 1000 milliLiter(s) (100 mL/Hr) IV Continuous <Continuous>  dextrose 50% Injectable 25 Gram(s) IV Push once  dextrose 50% Injectable 12.5 Gram(s) IV Push once  dextrose 50% Injectable 25 Gram(s) IV Push once  dextrose 50% Injectable 12.5 Gram(s) IV Push once  glucagon  Injectable 1 milliGRAM(s) IntraMuscular once  heparin   Injectable 5000 Unit(s) SubCutaneous every 12 hours  insulin lispro (ADMELOG) corrective regimen sliding scale   SubCutaneous at bedtime  insulin lispro (ADMELOG) corrective regimen sliding scale   SubCutaneous three times a day before meals  levothyroxine 25 MICROGram(s) Oral daily  melatonin 3 milliGRAM(s) Oral at bedtime  Nephro-iain 1 Tablet(s) Oral daily  venlafaxine XR. 75 milliGRAM(s) Oral daily    MEDICATIONS  (PRN):      ALLERGIES:  Keflex (Rash; Hives)      LABS:                                                                                      9.5    5.56  )-----------( 174      ( 15 Mar 2022 06:30 )             30.0   03-15    140  |  109<H>  |  30<H>  ----------------------------<  87  4.0   |  18<L>  |  2.73<H>    Ca    9.3      15 Mar 2022 06:28        < from: US Abdomen Complete (US Abdomen Complete .) (03.11.22 @ 08:36) >  IMPRESSION:  Redemonstration of intrahepatic and extrahepatic biliary ductal   dilatation with distal choledocholithiasis.    Cholelithiasis without sonographic evidence of acute cholecystitis.    Echogenic kidneys suggestive of renal parenchymal disease. Mild left   hydronephrosis with nonspecific urothelial thickening. Correlate with   urinalysis.    Trace right pleural effusion.    --- End of Report ---      < end of copied text >     < from: CT Head No Cont (03.09.22 @ 16:32) >  IMPRESSION:    No acute hemorrhage, mass effect or extra-axial collections. Unchanged   left frontal lobe extra-axial mass, likely representing a meningioma.    --- End of Report ---      < end of copied text >  < from: CT Abdomen and Pelvis No Cont (03.09.22 @ 16:28) >  IMPRESSION:  No acute urinary tract pathology.    Mild intra and extrahepatic biliary ductal dilatation with distal   choledocholithiasis.        --- End of Report ---    < end of copied text >      < from: Transthoracic Echocardiogram (03.11.22 @ 14:18) >  ----------------------  Conclusions:  1. Calcified trileaflet aortic valve with decreased  opening. Peak transaortic valve gradient equals 37 mm Hg,  mean transaortic valve gradient equals 20 mm Hg, estimated  aortic valve area equals 0.9 sqcm (by continuity equation),  aortic valve velocity time integral equals 63 cm,  consistent with severe aortic stenosis.  2. Severely dilated left atrium.  LA volume index = 51  cc/m2.  3. Mild concentric left ventricular hypertrophy.  4. Normal left ventricular systolic function. No segmental  wall motion abnormalities.  5. Normal right ventricular size and function.  *** Compared with echocardiogram of 10/9/2020, aortic  stenosis has progressed.  -------------------------------------------------------------    < end of copied text >

## 2022-03-16 NOTE — DISCHARGE NOTE PROVIDER - CARE PROVIDER_API CALL
Veto Flores (MD)  Cardiology; Internal Medicine  [address] [facility], [facility] [address]  [place] [address]  Phone: [phone]  Fax: [phone]  Follow Up Time: 1 week

## 2022-03-16 NOTE — PROVIDER CONTACT NOTE (OTHER) - NAME OF MD/NP/PA/DO NOTIFIED:
Ernesto Begum (NP)
Jean Billings, NP
NP Irma Saleh
Odalys, NP Medicine
ZULLY Calvert
Reji bone, NP
Linda Begum (SHAQUILLE)
Linda Begum (SHAQUILLE)
SHAQUILLE Yang

## 2022-03-16 NOTE — DISCHARGE NOTE PROVIDER - HOSPITAL COURSE
89y F pmhx HTN, HLD, DM, glaucoma, breast cancer, diverticulitis p/w urinary symptoms. pt presents accompanied by her daughter for generalized weakness and confusion (to year). pt had a UTI diagnosed last week, completed a 7-day course of bactrim. year. She was noticed with an abnormal creatinine, renal consult called. Creatinine Trend:  SCr 3.16 [03-10 @ 06:46]  SCr 3.05 [03-09 @ 19:32]  SCr 3.30 [03-09 @ 14:31]        1- ELISA on CKD  2- infection  3- lactic acidosis      ELISA suspected in setting of infection/interstitial nephritis secondary to bactrim.   lactic acidosis, received 1.5L, continue IVF NS @ 100 cc/hr  repeat VBG with lytes, improving. repeat 8pm  continue zosyn BID  blood and urine cx pending   CT A/P: Atrophic left kidney. A few small nonobstructing, bilateral renal calculi. No hydronephrosis. Bilateral renal cysts including a large parapelvic cyst on the right.   89y F pmhx HTN, HLD, DM, glaucoma, breast cancer, diverticulitis p/w urinary symptoms. pt presents accompanied by her daughter for generalized weakness and confusion (to year). pt had a UTI diagnosed last week, completed a 7-day course of bactrim. year. She was noticed with an abnormal creatinine, renal consult called. Creatinine~3.16: ELISA suspected in setting of infection/interstitial nephritis secondary to bactrim. Also   lactic acidosis, received 1.5L, continue IVF NS @ 100 cc/hr. CT A/P: Atrophic left kidney. A few small nonobstructing, bilateral renal calculi. No hydronephrosis. Bilateral renal cysts including a large parapelvic cyst on the right.  Received empiric Zosyn  for possible UTI . Noted to have choledocholithiasis on CT. abd exam benign. LFTs nl. no n/v. Gi consulted. No gall bladder wall thickening on US. conservative mgt. Noted to have severe AS, progressed from moderate. Not a candidate for surgical intervention.  improving. Mental status improved. Discharged home with PCP follow up in 1 week.

## 2022-03-16 NOTE — PROVIDER CONTACT NOTE (OTHER) - DATE AND TIME:
10-Mar-2022 01:30
12-Mar-2022 00:35
13-Mar-2022 06:34
16-Mar-2022 08:39
10-Mar-2022 03:10
12-Mar-2022 06:30
12-Mar-2022 12:20
12-Mar-2022 05:50
11-Mar-2022 12:50

## 2022-03-21 ENCOUNTER — NON-APPOINTMENT (OUTPATIENT)
Age: 87
End: 2022-03-21

## 2022-03-21 ENCOUNTER — LABORATORY RESULT (OUTPATIENT)
Age: 87
End: 2022-03-21

## 2022-03-21 ENCOUNTER — APPOINTMENT (OUTPATIENT)
Dept: CARDIOLOGY | Facility: CLINIC | Age: 87
End: 2022-03-21
Payer: MEDICARE

## 2022-03-21 VITALS — OXYGEN SATURATION: 96 % | HEIGHT: 64 IN | HEART RATE: 69 BPM | WEIGHT: 109 LBS | BODY MASS INDEX: 18.61 KG/M2

## 2022-03-21 VITALS — SYSTOLIC BLOOD PRESSURE: 138 MMHG | DIASTOLIC BLOOD PRESSURE: 62 MMHG

## 2022-03-21 PROCEDURE — 99214 OFFICE O/P EST MOD 30 MIN: CPT

## 2022-03-21 PROCEDURE — 93000 ELECTROCARDIOGRAM COMPLETE: CPT

## 2022-03-21 RX ORDER — MUPIROCIN 20 MG/G
2 OINTMENT TOPICAL
Qty: 22 | Refills: 0 | Status: ACTIVE | COMMUNITY
Start: 2021-12-14

## 2022-03-21 RX ORDER — SULFAMETHOXAZOLE AND TRIMETHOPRIM 800; 160 MG/1; MG/1
800-160 TABLET ORAL
Qty: 14 | Refills: 0 | Status: DISCONTINUED | COMMUNITY
Start: 2022-03-02 | End: 2022-03-21

## 2022-03-21 RX ORDER — NITROFURANTOIN (MONOHYDRATE/MACROCRYSTALS) 25; 75 MG/1; MG/1
100 CAPSULE ORAL
Qty: 14 | Refills: 0 | Status: ACTIVE | COMMUNITY
Start: 2022-03-09

## 2022-03-21 NOTE — REASON FOR VISIT
[FreeTextEntry1] : The patient comes in for followup of her asthma, hypertension, diabetes, hyperlipidemia, and mitral regurgitation. She has no new complaints. She denies any chest pains, shortness of breath, or palpitations. She does complain of feeling tired. \par May 14, 2019: The patient today is complaining of problems with swallowing her metformin pill. She cuts it in half. I told her to put it in applesauce. She also is complaining of dry skin. She does use Dove soap. She denies any chest pains, shortness of breath, or palpitations\par September 17, 2019: Patient complains of feeling fatigued and not sleeping well.  Complains of dry mouth.  She has a hard time walking.  She is losing weight.  Daughter reports that she does not eat well.  She denies any chest pains, shortness of breath, or palpitations.\par December 17, 2019: The patient's biggest complaint is that she is tired. She denies any chest pains, shortness of breath, or palpitations. She does not do a lot of walking. Sometimes her ankles are swollen.\par June 11, 2020: The patient still has complaints of dyspnea on exertion fatigue and not sleeping well.  She denies any chest pain shortness of breath at rest or palpitations.  She has urinary incontinence and she is on a medicine for overactive bladder.  I discussed with the daughter about stopping the trapezium medication.  She does get muscle aches and I discussed with the daughter about stopping the rosuvastatin temporarily for 2 weeks to see if the muscle aches get better.  She does take an Advil twice a day for arthritis pains.  It does not seem to bother her stomach.\par October 8, 2020: Patient complains of pains when she walks especially down the left leg\par October 15, 2020: The patient after leaving the office 1 week ago tripped and fell and lost consciousness.  He was not clear whether she had a syncopal attack.  She was hospitalized.  They found an E. coli UTI.  They found that she had orthostatic hypotension and stopped her blood pressure medicines.  She now comes for follow-up.  She is tired.  She denies any chest pains, shortness of breath, or palpitations.  She is still not taking any blood pressure medicines.  She is following up with a nephrologist.\par November 19, 2020: The patient's daughter reports that she has the beginnings of a pressure sore that is slightly pussy.  The patient herself has no specific complaints referable to that.  She has no chest pains, shortness of breath, or palpitations.  Blood pressures measured at home have been generally lower than the blood pressure here.  She is not yet on any antihypertensives.  She has had no further near syncope or syncope episodes and no further falls.  She was getting physical therapy and is doing her own exercises now.\par April 15, 2021: On January 21, 2021 the patient fell and hit her head.  She went to the [facility] emergency room and got stitches.  She eventually was cleared to go down to [place] and went down to [place] for 2 months.  She was confused at first but they may have forgotten to give her her antidepressants down there.  She is back to baseline now.  The daughter has noticed that she sweats around her head and neck after sleeping.  She also has a dry cough in the middle of the night.  She does have dyspnea on exertion but she is much less active.  She does walk with a walker.\par September 15, 2021: The patient complains of fatigue and poor memory.  She denies any chest pains, shortness of breath, or palpitations.  She did have a UTI which caused her to have increased confusion and she was hospitalized for time.  She is no longer taking anything for blood pressure.  Daughter reports that she has poor nutritional intake.\par January 19, 2022: The patient had a change in mental status and was determined to have a urinary tract infection.  The first urine culture was polymicrobial and was felt to be contaminated.  She took Cipro for 5 days.  On day three she had a repeat culture which was negative.  She is much better.\par March 21, 2022: The patient was just hospitalized with a change in mental status.  She was found to have dehydration and renal insufficiency as well as recurrent urinary tract infection.  Because of the renal insufficiency she was taken off her Metformin and that is the only change she is not taking Metformin.\par

## 2022-03-21 NOTE — DISCUSSION/SUMMARY
[FreeTextEntry1] : The patient was examined. Her blood pressure was 138/62 and her pulse was 69. Her oxygen saturation was 96%. Her lungs were clear to auscultation. Cardiac exam was negative for rubs or gallops.There was a 3/6 holosystolic murmur heard at the apex. Her EKG showed normal sinus rhythm, and a left anterior hemiblock..  No acute changes were seen. She'll return in 1 months, or earlier if needed. She'll continue her current medications.  Total time spent on the day of the encounter was 36 minutes which include face-to-face and non face-to-face times personally spent by the physician preparing to see the patient, obtaining  separately obtained history, performing a medically appropriate exam and evaluation, counseling, educating, talking to the family or caregivers, ordering medicines, ordering tests or procedures, referring and communicating with other healthcare professionals, and documenting clinical information in the electronic health record.  A pneumococcal twenty-three vaccine was administered today.

## 2022-03-21 NOTE — PHYSICAL EXAM
[General Appearance - Well Developed] : well developed [Normal Appearance] : normal appearance [Well Groomed] : well groomed [General Appearance - Well Nourished] : well nourished [No Deformities] : no deformities [General Appearance - In No Acute Distress] : no acute distress [Normal Conjunctiva] : the conjunctiva exhibited no abnormalities [Eyelids - No Xanthelasma] : the eyelids demonstrated no xanthelasmas [Normal Oral Mucosa] : normal oral mucosa [No Oral Pallor] : no oral pallor [Normal Jugular Venous A Waves Present] : normal jugular venous A waves present [No Oral Cyanosis] : no oral cyanosis [Normal Jugular Venous V Waves Present] : normal jugular venous V waves present [No Jugular Venous Rivera A Waves] : no jugular venous rivera A waves [Respiration, Rhythm And Depth] : normal respiratory rhythm and effort [Exaggerated Use Of Accessory Muscles For Inspiration] : no accessory muscle use [Auscultation Breath Sounds / Voice Sounds] : lungs were clear to auscultation bilaterally [Heart Rate And Rhythm] : heart rate and rhythm were normal [Heart Sounds] : normal S1 and S2 [Abdomen Soft] : soft [Abdomen Tenderness] : non-tender [Abdomen Mass (___ Cm)] : no abdominal mass palpated [Nail Clubbing] : no clubbing of the fingernails [Cyanosis, Localized] : no localized cyanosis [Petechial Hemorrhages (___cm)] : no petechial hemorrhages [Skin Color & Pigmentation] : normal skin color and pigmentation [] : no rash [No Venous Stasis] : no venous stasis [Skin Lesions] : no skin lesions [No Skin Ulcers] : no skin ulcer [No Xanthoma] : no  xanthoma was observed [Oriented To Time, Place, And Person] : oriented to person, place, and time [Affect] : the affect was normal [Mood] : the mood was normal [No Anxiety] : not feeling anxious [FreeTextEntry1] : Slow, unsteady gait requiring a walker

## 2022-03-22 LAB
ALBUMIN SERPL ELPH-MCNC: 4 G/DL
ALP BLD-CCNC: 74 U/L
ALT SERPL-CCNC: 35 U/L
ANION GAP SERPL CALC-SCNC: 14 MMOL/L
AST SERPL-CCNC: 40 U/L
BASOPHILS # BLD AUTO: 0.02 K/UL
BASOPHILS NFR BLD AUTO: 0.4 %
BILIRUB SERPL-MCNC: 0.2 MG/DL
BUN SERPL-MCNC: 39 MG/DL
CALCIUM SERPL-MCNC: 10.1 MG/DL
CHLORIDE SERPL-SCNC: 107 MMOL/L
CHOLEST SERPL-MCNC: 137 MG/DL
CO2 SERPL-SCNC: 20 MMOL/L
CREAT SERPL-MCNC: 1.88 MG/DL
EGFR: 25 ML/MIN/1.73M2
EOSINOPHIL # BLD AUTO: 0.01 K/UL
EOSINOPHIL NFR BLD AUTO: 0.2 %
ESTIMATED AVERAGE GLUCOSE: 123 MG/DL
GLUCOSE SERPL-MCNC: 192 MG/DL
HBA1C MFR BLD HPLC: 5.9 %
HCT VFR BLD CALC: 30.1 %
HDLC SERPL-MCNC: 69 MG/DL
HGB BLD-MCNC: 9.1 G/DL
IMM GRANULOCYTES NFR BLD AUTO: 0.2 %
LDLC SERPL CALC-MCNC: 54 MG/DL
LYMPHOCYTES # BLD AUTO: 1.21 K/UL
LYMPHOCYTES NFR BLD AUTO: 23.1 %
MAN DIFF?: NORMAL
MCHC RBC-ENTMCNC: 28.6 PG
MCHC RBC-ENTMCNC: 30.2 GM/DL
MCV RBC AUTO: 94.7 FL
MONOCYTES # BLD AUTO: 0.34 K/UL
MONOCYTES NFR BLD AUTO: 6.5 %
NEUTROPHILS # BLD AUTO: 3.65 K/UL
NEUTROPHILS NFR BLD AUTO: 69.6 %
NONHDLC SERPL-MCNC: 68 MG/DL
PLATELET # BLD AUTO: 227 K/UL
POTASSIUM SERPL-SCNC: 4.4 MMOL/L
PROT SERPL-MCNC: 6.5 G/DL
RBC # BLD: 3.18 M/UL
RBC # FLD: 17.4 %
SODIUM SERPL-SCNC: 141 MMOL/L
T3RU NFR SERPL: 1.1 TBI
T4 SERPL-MCNC: 8.5 UG/DL
TRIGL SERPL-MCNC: 72 MG/DL
TSH SERPL-ACNC: 5.8 UIU/ML
WBC # FLD AUTO: 5.24 K/UL

## 2022-04-12 ENCOUNTER — NON-APPOINTMENT (OUTPATIENT)
Age: 87
End: 2022-04-12

## 2022-04-12 ENCOUNTER — APPOINTMENT (OUTPATIENT)
Dept: CARDIOLOGY | Facility: CLINIC | Age: 87
End: 2022-04-12
Payer: MEDICARE

## 2022-04-12 VITALS
BODY MASS INDEX: 19.74 KG/M2 | WEIGHT: 115 LBS | OXYGEN SATURATION: 100 % | SYSTOLIC BLOOD PRESSURE: 178 MMHG | DIASTOLIC BLOOD PRESSURE: 66 MMHG | HEART RATE: 77 BPM

## 2022-04-12 VITALS — SYSTOLIC BLOOD PRESSURE: 162 MMHG | DIASTOLIC BLOOD PRESSURE: 78 MMHG

## 2022-04-12 DIAGNOSIS — R60.0 LOCALIZED EDEMA: ICD-10-CM

## 2022-04-12 DIAGNOSIS — R21 RASH AND OTHER NONSPECIFIC SKIN ERUPTION: ICD-10-CM

## 2022-04-12 PROCEDURE — 93000 ELECTROCARDIOGRAM COMPLETE: CPT

## 2022-04-12 PROCEDURE — 99214 OFFICE O/P EST MOD 30 MIN: CPT

## 2022-04-12 RX ORDER — CLOTRIMAZOLE AND BETAMETHASONE DIPROPIONATE 10; .5 MG/G; MG/G
1-0.05 CREAM TOPICAL TWICE DAILY
Qty: 1 | Refills: 3 | Status: ACTIVE | COMMUNITY
Start: 2022-04-12 | End: 1900-01-01

## 2022-04-26 ENCOUNTER — APPOINTMENT (OUTPATIENT)
Dept: CARDIOLOGY | Facility: CLINIC | Age: 87
End: 2022-04-26
Payer: MEDICARE

## 2022-04-26 ENCOUNTER — LABORATORY RESULT (OUTPATIENT)
Age: 87
End: 2022-04-26

## 2022-04-26 ENCOUNTER — NON-APPOINTMENT (OUTPATIENT)
Age: 87
End: 2022-04-26

## 2022-04-26 VITALS
HEART RATE: 86 BPM | DIASTOLIC BLOOD PRESSURE: 68 MMHG | SYSTOLIC BLOOD PRESSURE: 158 MMHG | WEIGHT: 123 LBS | BODY MASS INDEX: 21 KG/M2 | HEIGHT: 64 IN | OXYGEN SATURATION: 98 %

## 2022-04-26 VITALS
OXYGEN SATURATION: 98 % | RESPIRATION RATE: 17 BRPM | BODY MASS INDEX: 21 KG/M2 | DIASTOLIC BLOOD PRESSURE: 68 MMHG | WEIGHT: 123 LBS | HEART RATE: 86 BPM | SYSTOLIC BLOOD PRESSURE: 158 MMHG | HEIGHT: 64 IN

## 2022-04-26 DIAGNOSIS — I35.0 NONRHEUMATIC AORTIC (VALVE) STENOSIS: ICD-10-CM

## 2022-04-26 PROCEDURE — 99214 OFFICE O/P EST MOD 30 MIN: CPT

## 2022-04-26 PROCEDURE — 93000 ELECTROCARDIOGRAM COMPLETE: CPT

## 2022-04-26 NOTE — REVIEW OF SYSTEMS
[Feeling Fatigued] : feeling fatigued [Lower Ext Edema] : lower extremity edema [Negative] : Heme/Lymph [Dyspnea on exertion] : dyspnea during exertion [SOB] : no shortness of breath [Chest Discomfort] : no chest discomfort [Palpitations] : no palpitations [Rash] : no rash

## 2022-04-26 NOTE — PHYSICAL EXAM
[General Appearance - Well Developed] : well developed [Normal Appearance] : normal appearance [Well Groomed] : well groomed [General Appearance - Well Nourished] : well nourished [No Deformities] : no deformities [General Appearance - In No Acute Distress] : no acute distress [Normal Conjunctiva] : the conjunctiva exhibited no abnormalities [Eyelids - No Xanthelasma] : the eyelids demonstrated no xanthelasmas [Normal Oral Mucosa] : normal oral mucosa [No Oral Pallor] : no oral pallor [No Oral Cyanosis] : no oral cyanosis [Normal Jugular Venous A Waves Present] : normal jugular venous A waves present [Normal Jugular Venous V Waves Present] : normal jugular venous V waves present [No Jugular Venous Rivera A Waves] : no jugular venous rivera A waves [Respiration, Rhythm And Depth] : normal respiratory rhythm and effort [Exaggerated Use Of Accessory Muscles For Inspiration] : no accessory muscle use [Auscultation Breath Sounds / Voice Sounds] : lungs were clear to auscultation bilaterally [Abdomen Soft] : soft [Abdomen Tenderness] : non-tender [Abdomen Mass (___ Cm)] : no abdominal mass palpated [Nail Clubbing] : no clubbing of the fingernails [Cyanosis, Localized] : no localized cyanosis [Petechial Hemorrhages (___cm)] : no petechial hemorrhages [] : no ischemic changes [Skin Color & Pigmentation] : normal skin color and pigmentation [No Venous Stasis] : no venous stasis [Skin Lesions] : no skin lesions [No Skin Ulcers] : no skin ulcer [No Xanthoma] : no  xanthoma was observed [Oriented To Time, Place, And Person] : oriented to person, place, and time [Affect] : the affect was normal [Mood] : the mood was normal [No Anxiety] : not feeling anxious [FreeTextEntry1] : rash fungal

## 2022-04-26 NOTE — REASON FOR VISIT
[FreeTextEntry1] : The patient comes in for followup of her asthma, hypertension, diabetes, hyperlipidemia, and mitral regurgitation. She has no new complaints. She denies any chest pains, shortness of breath, or palpitations. She does complain of feeling tired. \par May 14, 2019: The patient today is complaining of problems with swallowing her metformin pill. She cuts it in half. I told her to put it in applesauce. She also is complaining of dry skin. She does use Dove soap. She denies any chest pains, shortness of breath, or palpitations\par September 17, 2019: Patient complains of feeling fatigued and not sleeping well.  Complains of dry mouth.  She has a hard time walking.  She is losing weight.  Daughter reports that she does not eat well.  She denies any chest pains, shortness of breath, or palpitations.\par December 17, 2019: The patient's biggest complaint is that she is tired. She denies any chest pains, shortness of breath, or palpitations. She does not do a lot of walking. Sometimes her ankles are swollen.\par June 11, 2020: The patient still has complaints of dyspnea on exertion fatigue and not sleeping well.  She denies any chest pain shortness of breath at rest or palpitations.  She has urinary incontinence and she is on a medicine for overactive bladder.  I discussed with the daughter about stopping the trapezium medication.  She does get muscle aches and I discussed with the daughter about stopping the rosuvastatin temporarily for 2 weeks to see if the muscle aches get better.  She does take an Advil twice a day for arthritis pains.  It does not seem to bother her stomach.\par October 8, 2020: Patient complains of pains when she walks especially down the left leg\par October 15, 2020: The patient after leaving the office 1 week ago tripped and fell and lost consciousness.  He was not clear whether she had a syncopal attack.  She was hospitalized.  They found an E. coli UTI.  They found that she had orthostatic hypotension and stopped her blood pressure medicines.  She now comes for follow-up.  She is tired.  She denies any chest pains, shortness of breath, or palpitations.  She is still not taking any blood pressure medicines.  She is following up with a nephrologist.\par November 19, 2020: The patient's daughter reports that she has the beginnings of a pressure sore that is slightly pussy.  The patient herself has no specific complaints referable to that.  She has no chest pains, shortness of breath, or palpitations.  Blood pressures measured at home have been generally lower than the blood pressure here.  She is not yet on any antihypertensives.  She has had no further near syncope or syncope episodes and no further falls.  She was getting physical therapy and is doing her own exercises now.\par April 15, 2021: On January 21, 2021 the patient fell and hit her head.  She went to the [facility] emergency room and got stitches.  She eventually was cleared to go down to [place] and went down to [place] for 2 months.  She was confused at first but they may have forgotten to give her her antidepressants down there.  She is back to baseline now.  The daughter has noticed that she sweats around her head and neck after sleeping.  She also has a dry cough in the middle of the night.  She does have dyspnea on exertion but she is much less active.  She does walk with a walker.\par September 15, 2021: The patient complains of fatigue and poor memory.  She denies any chest pains, shortness of breath, or palpitations.  She did have a UTI which caused her to have increased confusion and she was hospitalized for time.  She is no longer taking anything for blood pressure.  Daughter reports that she has poor nutritional intake.\par January 19, 2022: The patient had a change in mental status and was determined to have a urinary tract infection.  The first urine culture was polymicrobial and was felt to be contaminated.  She took Cipro for 5 days.  On day three she had a repeat culture which was negative.  She is much better.\par March 21, 2022: The patient was just hospitalized with a change in mental status.  She was found to have dehydration and renal insufficiency as well as recurrent urinary tract infection.  Because of the renal insufficiency she was taken off her Metformin and that is the only change she is not taking Metformin.\par   April 12, 2022: The patient has had a red rash on her chest and underneath her left breast.  Sometimes because of her urinary incontinence in the morning her nightgown is soaked.  She is sponge bath with Dial soap and other soap containing towelettes.  The rash is not itchy.  Her left leg is slightly swollen.  She is taking amlodipine 5 mg.\par April 26, 2022: The patient denies any chest pains, palpitations, or shortness of breath at rest.  She does have dyspnea on exertion.  Sometimes she has pain when walking in her right leg.  It is in the right thigh above the knee.

## 2022-04-26 NOTE — REVIEW OF SYSTEMS
[Feeling Fatigued] : feeling fatigued [Negative] : Heme/Lymph [Lower Ext Edema] : lower extremity edema [Rash] : rash [SOB] : no shortness of breath [Chest Discomfort] : no chest discomfort [Palpitations] : no palpitations

## 2022-04-26 NOTE — PHYSICAL EXAM
[General Appearance - Well Developed] : well developed [Normal Appearance] : normal appearance [Well Groomed] : well groomed [General Appearance - Well Nourished] : well nourished [No Deformities] : no deformities [General Appearance - In No Acute Distress] : no acute distress [Normal Conjunctiva] : the conjunctiva exhibited no abnormalities [Eyelids - No Xanthelasma] : the eyelids demonstrated no xanthelasmas [Normal Oral Mucosa] : normal oral mucosa [No Oral Pallor] : no oral pallor [No Oral Cyanosis] : no oral cyanosis [Normal Jugular Venous A Waves Present] : normal jugular venous A waves present [Normal Jugular Venous V Waves Present] : normal jugular venous V waves present [No Jugular Venous Rivera A Waves] : no jugular venous rivera A waves [Respiration, Rhythm And Depth] : normal respiratory rhythm and effort [Exaggerated Use Of Accessory Muscles For Inspiration] : no accessory muscle use [Auscultation Breath Sounds / Voice Sounds] : lungs were clear to auscultation bilaterally [Heart Rate And Rhythm] : heart rate and rhythm were normal [Heart Sounds] : normal S1 and S2 [Abdomen Soft] : soft [Abdomen Tenderness] : non-tender [Abdomen Mass (___ Cm)] : no abdominal mass palpated [Nail Clubbing] : no clubbing of the fingernails [Cyanosis, Localized] : no localized cyanosis [Petechial Hemorrhages (___cm)] : no petechial hemorrhages [] : no ischemic changes [Skin Color & Pigmentation] : normal skin color and pigmentation [No Venous Stasis] : no venous stasis [Skin Lesions] : no skin lesions [No Skin Ulcers] : no skin ulcer [No Xanthoma] : no  xanthoma was observed [Oriented To Time, Place, And Person] : oriented to person, place, and time [Affect] : the affect was normal [Mood] : the mood was normal [No Anxiety] : not feeling anxious [FreeTextEntry1] : rash fungal

## 2022-04-26 NOTE — DISCUSSION/SUMMARY
[FreeTextEntry1] : The patient was examined. Her blood pressure was 162/78 and her pulse was 77. Her oxygen saturation was 100%. Her lungs were clear to auscultation. Cardiac exam was negative for rubs or gallops.There was a 3/6 holosystolic murmur heard at the apex. Her EKG showed normal sinus rhythm, and a left anterior hemiblock..  No acute changes were seen. She'll return in 1 month, or earlier if needed. She'll continue her current medications.  We will try generic Lotrisone cream for the rash in case it is fungal.  If it does not work then she has to go to a dermatologist.  Total time spent on the day of the encounter was 36 minutes which include face-to-face and non face-to-face times personally spent by the physician preparing to see the patient, obtaining  separately obtained history, performing a medically appropriate exam and evaluation, counseling, educating, talking to the family or caregivers, ordering medicines, ordering tests or procedures, referring and communicating with other healthcare professionals, and documenting clinical information in the electronic health record.

## 2022-04-26 NOTE — REASON FOR VISIT
[FreeTextEntry1] : The patient comes in for followup of her asthma, hypertension, diabetes, hyperlipidemia, and mitral regurgitation. She has no new complaints. She denies any chest pains, shortness of breath, or palpitations. She does complain of feeling tired. \par May 14, 2019: The patient today is complaining of problems with swallowing her metformin pill. She cuts it in half. I told her to put it in applesauce. She also is complaining of dry skin. She does use Dove soap. She denies any chest pains, shortness of breath, or palpitations\par September 17, 2019: Patient complains of feeling fatigued and not sleeping well.  Complains of dry mouth.  She has a hard time walking.  She is losing weight.  Daughter reports that she does not eat well.  She denies any chest pains, shortness of breath, or palpitations.\par December 17, 2019: The patient's biggest complaint is that she is tired. She denies any chest pains, shortness of breath, or palpitations. She does not do a lot of walking. Sometimes her ankles are swollen.\par June 11, 2020: The patient still has complaints of dyspnea on exertion fatigue and not sleeping well.  She denies any chest pain shortness of breath at rest or palpitations.  She has urinary incontinence and she is on a medicine for overactive bladder.  I discussed with the daughter about stopping the trapezium medication.  She does get muscle aches and I discussed with the daughter about stopping the rosuvastatin temporarily for 2 weeks to see if the muscle aches get better.  She does take an Advil twice a day for arthritis pains.  It does not seem to bother her stomach.\par October 8, 2020: Patient complains of pains when she walks especially down the left leg\par October 15, 2020: The patient after leaving the office 1 week ago tripped and fell and lost consciousness.  He was not clear whether she had a syncopal attack.  She was hospitalized.  They found an E. coli UTI.  They found that she had orthostatic hypotension and stopped her blood pressure medicines.  She now comes for follow-up.  She is tired.  She denies any chest pains, shortness of breath, or palpitations.  She is still not taking any blood pressure medicines.  She is following up with a nephrologist.\par November 19, 2020: The patient's daughter reports that she has the beginnings of a pressure sore that is slightly pussy.  The patient herself has no specific complaints referable to that.  She has no chest pains, shortness of breath, or palpitations.  Blood pressures measured at home have been generally lower than the blood pressure here.  She is not yet on any antihypertensives.  She has had no further near syncope or syncope episodes and no further falls.  She was getting physical therapy and is doing her own exercises now.\par April 15, 2021: On January 21, 2021 the patient fell and hit her head.  She went to the [facility] emergency room and got stitches.  She eventually was cleared to go down to [place] and went down to [place] for 2 months.  She was confused at first but they may have forgotten to give her her antidepressants down there.  She is back to baseline now.  The daughter has noticed that she sweats around her head and neck after sleeping.  She also has a dry cough in the middle of the night.  She does have dyspnea on exertion but she is much less active.  She does walk with a walker.\par September 15, 2021: The patient complains of fatigue and poor memory.  She denies any chest pains, shortness of breath, or palpitations.  She did have a UTI which caused her to have increased confusion and she was hospitalized for time.  She is no longer taking anything for blood pressure.  Daughter reports that she has poor nutritional intake.\par January 19, 2022: The patient had a change in mental status and was determined to have a urinary tract infection.  The first urine culture was polymicrobial and was felt to be contaminated.  She took Cipro for 5 days.  On day three she had a repeat culture which was negative.  She is much better.\par March 21, 2022: The patient was just hospitalized with a change in mental status.  She was found to have dehydration and renal insufficiency as well as recurrent urinary tract infection.  Because of the renal insufficiency she was taken off her Metformin and that is the only change she is not taking Metformin.\par   April 12, 2022: The patient has had a red rash on her chest and underneath her left breast.  Sometimes because of her urinary incontinence in the morning her nightgown is soaked.  She is sponge bath with Dial soap and other soap containing towelettes.  The rash is not itchy.  Her left leg is slightly swollen.  She is taking amlodipine 5 mg.\par

## 2022-04-26 NOTE — DISCUSSION/SUMMARY
[FreeTextEntry1] : The patient was examined. Her blood pressure was 158/68 and her pulse was 86. Her oxygen saturation was 98%. Her lungs were clear to auscultation. Cardiac exam was negative for rubs or gallops.There was a 3/6 holosystolic murmur heard at the apex, and a 3/6 systolic ejection murmur in the aortic area her EKG showed atrial fibrillation with a left anterior hemiblock and poor R wave progression.. She'll return in 1 month, or earlier if needed. She'll continue her current medications.  We will try generic Lotrisone cream for the rash in case it is fungal.  If it does not work then she has to go to a dermatologist.  Total time spent on the day of the encounter was 36 minutes which include face-to-face and non face-to-face times personally spent by the physician preparing to see the patient, obtaining  separately obtained history, performing a medically appropriate exam and evaluation, counseling, educating, talking to the family or caregivers, ordering medicines, ordering tests or procedures, referring and communicating with other healthcare professionals, and documenting clinical information in the electronic health record.  Because of the newly diagnosed atrial fibrillation, we will start the patient on Eliquis 2-1/2 mg twice a day and metoprolol 25 mg daily.  Because of severe aortic stenosis we will refer the patient to the structural heart team for an evaluation.
BACK PAIN

## 2022-04-27 LAB
ALBUMIN SERPL ELPH-MCNC: 3.8 G/DL
ALP BLD-CCNC: 83 U/L
ALT SERPL-CCNC: 25 U/L
ANION GAP SERPL CALC-SCNC: 11 MMOL/L
AST SERPL-CCNC: 25 U/L
BASOPHILS # BLD AUTO: 0.02 K/UL
BASOPHILS NFR BLD AUTO: 0.3 %
BILIRUB SERPL-MCNC: <0.2 MG/DL
BUN SERPL-MCNC: 46 MG/DL
CALCIUM SERPL-MCNC: 9.4 MG/DL
CHLORIDE SERPL-SCNC: 105 MMOL/L
CHOLEST SERPL-MCNC: 130 MG/DL
CO2 SERPL-SCNC: 24 MMOL/L
CREAT SERPL-MCNC: 1.73 MG/DL
EGFR: 27 ML/MIN/1.73M2
EOSINOPHIL # BLD AUTO: 0.2 K/UL
EOSINOPHIL NFR BLD AUTO: 3.5 %
ESTIMATED AVERAGE GLUCOSE: 131 MG/DL
GLUCOSE SERPL-MCNC: 160 MG/DL
HBA1C MFR BLD HPLC: 6.2 %
HCT VFR BLD CALC: 30.8 %
HDLC SERPL-MCNC: 67 MG/DL
HGB BLD-MCNC: 9.1 G/DL
IMM GRANULOCYTES NFR BLD AUTO: 0.5 %
LDLC SERPL CALC-MCNC: 48 MG/DL
LYMPHOCYTES # BLD AUTO: 1.07 K/UL
LYMPHOCYTES NFR BLD AUTO: 18.5 %
MAN DIFF?: NORMAL
MCHC RBC-ENTMCNC: 29.2 PG
MCHC RBC-ENTMCNC: 29.5 GM/DL
MCV RBC AUTO: 98.7 FL
MONOCYTES # BLD AUTO: 0.56 K/UL
MONOCYTES NFR BLD AUTO: 9.7 %
NEUTROPHILS # BLD AUTO: 3.9 K/UL
NEUTROPHILS NFR BLD AUTO: 67.5 %
NONHDLC SERPL-MCNC: 63 MG/DL
PLATELET # BLD AUTO: 206 K/UL
POTASSIUM SERPL-SCNC: 4.5 MMOL/L
PROT SERPL-MCNC: 6.4 G/DL
RBC # BLD: 3.12 M/UL
RBC # FLD: 16.4 %
SODIUM SERPL-SCNC: 140 MMOL/L
T3RU NFR SERPL: 1.1 TBI
T4 SERPL-MCNC: 6.8 UG/DL
TRIGL SERPL-MCNC: 76 MG/DL
TSH SERPL-ACNC: 4.81 UIU/ML
WBC # FLD AUTO: 5.78 K/UL

## 2022-05-07 ENCOUNTER — INPATIENT (INPATIENT)
Facility: HOSPITAL | Age: 87
LOS: 3 days | Discharge: HOME HEALTH | DRG: 291 | End: 2022-05-11
Attending: INTERNAL MEDICINE | Admitting: INTERNAL MEDICINE
Payer: MEDICARE

## 2022-05-07 VITALS
WEIGHT: 115.08 LBS | TEMPERATURE: 98 F | OXYGEN SATURATION: 95 % | HEART RATE: 101 BPM | SYSTOLIC BLOOD PRESSURE: 161 MMHG | RESPIRATION RATE: 20 BRPM | HEIGHT: 62 IN | DIASTOLIC BLOOD PRESSURE: 73 MMHG

## 2022-05-07 DIAGNOSIS — R32 UNSPECIFIED URINARY INCONTINENCE: ICD-10-CM

## 2022-05-07 DIAGNOSIS — I48.0 PAROXYSMAL ATRIAL FIBRILLATION: ICD-10-CM

## 2022-05-07 DIAGNOSIS — R09.02 HYPOXEMIA: ICD-10-CM

## 2022-05-07 DIAGNOSIS — E03.9 HYPOTHYROIDISM, UNSPECIFIED: ICD-10-CM

## 2022-05-07 DIAGNOSIS — E78.5 HYPERLIPIDEMIA, UNSPECIFIED: ICD-10-CM

## 2022-05-07 DIAGNOSIS — N18.30 CHRONIC KIDNEY DISEASE, STAGE 3 UNSPECIFIED: ICD-10-CM

## 2022-05-07 DIAGNOSIS — Z29.9 ENCOUNTER FOR PROPHYLACTIC MEASURES, UNSPECIFIED: ICD-10-CM

## 2022-05-07 DIAGNOSIS — I10 ESSENTIAL (PRIMARY) HYPERTENSION: ICD-10-CM

## 2022-05-07 DIAGNOSIS — J96.01 ACUTE RESPIRATORY FAILURE WITH HYPOXIA: ICD-10-CM

## 2022-05-07 DIAGNOSIS — K92.2 GASTROINTESTINAL HEMORRHAGE, UNSPECIFIED: ICD-10-CM

## 2022-05-07 DIAGNOSIS — I50.9 HEART FAILURE, UNSPECIFIED: ICD-10-CM

## 2022-05-07 LAB
ALBUMIN SERPL ELPH-MCNC: 3.6 G/DL — SIGNIFICANT CHANGE UP (ref 3.3–5)
ALP SERPL-CCNC: 86 U/L — SIGNIFICANT CHANGE UP (ref 40–120)
ALT FLD-CCNC: 22 U/L — SIGNIFICANT CHANGE UP (ref 10–45)
ANION GAP SERPL CALC-SCNC: 15 MMOL/L — SIGNIFICANT CHANGE UP (ref 5–17)
AST SERPL-CCNC: 23 U/L — SIGNIFICANT CHANGE UP (ref 10–40)
BASOPHILS # BLD AUTO: 0.02 K/UL — SIGNIFICANT CHANGE UP (ref 0–0.2)
BASOPHILS NFR BLD AUTO: 0.2 % — SIGNIFICANT CHANGE UP (ref 0–2)
BILIRUB SERPL-MCNC: 0.4 MG/DL — SIGNIFICANT CHANGE UP (ref 0.2–1.2)
BUN SERPL-MCNC: 45 MG/DL — HIGH (ref 7–23)
CALCIUM SERPL-MCNC: 9.7 MG/DL — SIGNIFICANT CHANGE UP (ref 8.4–10.5)
CHLORIDE SERPL-SCNC: 104 MMOL/L — SIGNIFICANT CHANGE UP (ref 96–108)
CO2 SERPL-SCNC: 21 MMOL/L — LOW (ref 22–31)
CREAT SERPL-MCNC: 1.62 MG/DL — HIGH (ref 0.5–1.3)
EGFR: 30 ML/MIN/1.73M2 — LOW
EOSINOPHIL # BLD AUTO: 0 K/UL — SIGNIFICANT CHANGE UP (ref 0–0.5)
EOSINOPHIL NFR BLD AUTO: 0 % — SIGNIFICANT CHANGE UP (ref 0–6)
GLUCOSE SERPL-MCNC: 192 MG/DL — HIGH (ref 70–99)
HCT VFR BLD CALC: 33 % — LOW (ref 34.5–45)
HGB BLD-MCNC: 9.8 G/DL — LOW (ref 11.5–15.5)
IMM GRANULOCYTES NFR BLD AUTO: 0.7 % — SIGNIFICANT CHANGE UP (ref 0–1.5)
LYMPHOCYTES # BLD AUTO: 0.73 K/UL — LOW (ref 1–3.3)
LYMPHOCYTES # BLD AUTO: 8.5 % — LOW (ref 13–44)
MAGNESIUM SERPL-MCNC: 1.9 MG/DL — SIGNIFICANT CHANGE UP (ref 1.6–2.6)
MCHC RBC-ENTMCNC: 29.1 PG — SIGNIFICANT CHANGE UP (ref 27–34)
MCHC RBC-ENTMCNC: 29.7 GM/DL — LOW (ref 32–36)
MCV RBC AUTO: 97.9 FL — SIGNIFICANT CHANGE UP (ref 80–100)
MONOCYTES # BLD AUTO: 0.67 K/UL — SIGNIFICANT CHANGE UP (ref 0–0.9)
MONOCYTES NFR BLD AUTO: 7.8 % — SIGNIFICANT CHANGE UP (ref 2–14)
NEUTROPHILS # BLD AUTO: 7.14 K/UL — SIGNIFICANT CHANGE UP (ref 1.8–7.4)
NEUTROPHILS NFR BLD AUTO: 82.8 % — HIGH (ref 43–77)
NRBC # BLD: 0 /100 WBCS — SIGNIFICANT CHANGE UP (ref 0–0)
PHOSPHATE SERPL-MCNC: 4.1 MG/DL — SIGNIFICANT CHANGE UP (ref 2.5–4.5)
PLATELET # BLD AUTO: 201 K/UL — SIGNIFICANT CHANGE UP (ref 150–400)
POTASSIUM SERPL-MCNC: 5 MMOL/L — SIGNIFICANT CHANGE UP (ref 3.5–5.3)
POTASSIUM SERPL-SCNC: 5 MMOL/L — SIGNIFICANT CHANGE UP (ref 3.5–5.3)
PROT SERPL-MCNC: 6.9 G/DL — SIGNIFICANT CHANGE UP (ref 6–8.3)
RAPID RVP RESULT: SIGNIFICANT CHANGE UP
RBC # BLD: 3.37 M/UL — LOW (ref 3.8–5.2)
RBC # FLD: 15.4 % — HIGH (ref 10.3–14.5)
SARS-COV-2 RNA SPEC QL NAA+PROBE: SIGNIFICANT CHANGE UP
SODIUM SERPL-SCNC: 140 MMOL/L — SIGNIFICANT CHANGE UP (ref 135–145)
WBC # BLD: 8.62 K/UL — SIGNIFICANT CHANGE UP (ref 3.8–10.5)
WBC # FLD AUTO: 8.62 K/UL — SIGNIFICANT CHANGE UP (ref 3.8–10.5)

## 2022-05-07 PROCEDURE — 71045 X-RAY EXAM CHEST 1 VIEW: CPT | Mod: 26

## 2022-05-07 PROCEDURE — 93010 ELECTROCARDIOGRAM REPORT: CPT | Mod: GC

## 2022-05-07 PROCEDURE — 99285 EMERGENCY DEPT VISIT HI MDM: CPT | Mod: CS,GC

## 2022-05-07 PROCEDURE — 99223 1ST HOSP IP/OBS HIGH 75: CPT

## 2022-05-07 RX ORDER — APIXABAN 2.5 MG/1
2.5 TABLET, FILM COATED ORAL
Refills: 0 | Status: DISCONTINUED | OUTPATIENT
Start: 2022-05-07 | End: 2022-05-11

## 2022-05-07 RX ORDER — LEVOTHYROXINE SODIUM 125 MCG
25 TABLET ORAL DAILY
Refills: 0 | Status: DISCONTINUED | OUTPATIENT
Start: 2022-05-07 | End: 2022-05-11

## 2022-05-07 RX ORDER — IPRATROPIUM/ALBUTEROL SULFATE 18-103MCG
3 AEROSOL WITH ADAPTER (GRAM) INHALATION
Refills: 0 | Status: COMPLETED | OUTPATIENT
Start: 2022-05-07 | End: 2022-05-07

## 2022-05-07 RX ORDER — METOPROLOL TARTRATE 50 MG
25 TABLET ORAL DAILY
Refills: 0 | Status: DISCONTINUED | OUTPATIENT
Start: 2022-05-07 | End: 2022-05-11

## 2022-05-07 RX ORDER — PIPERACILLIN AND TAZOBACTAM 4; .5 G/20ML; G/20ML
3.38 INJECTION, POWDER, LYOPHILIZED, FOR SOLUTION INTRAVENOUS EVERY 12 HOURS
Refills: 0 | Status: DISCONTINUED | OUTPATIENT
Start: 2022-05-07 | End: 2022-05-10

## 2022-05-07 RX ORDER — FERROUS SULFATE 325(65) MG
325 TABLET ORAL DAILY
Refills: 0 | Status: DISCONTINUED | OUTPATIENT
Start: 2022-05-07 | End: 2022-05-11

## 2022-05-07 RX ORDER — PIPERACILLIN AND TAZOBACTAM 4; .5 G/20ML; G/20ML
3.38 INJECTION, POWDER, LYOPHILIZED, FOR SOLUTION INTRAVENOUS ONCE
Refills: 0 | Status: COMPLETED | OUTPATIENT
Start: 2022-05-07 | End: 2022-05-07

## 2022-05-07 RX ORDER — AMLODIPINE BESYLATE 2.5 MG/1
5 TABLET ORAL DAILY
Refills: 0 | Status: DISCONTINUED | OUTPATIENT
Start: 2022-05-07 | End: 2022-05-11

## 2022-05-07 RX ORDER — VENLAFAXINE HCL 75 MG
75 CAPSULE, EXT RELEASE 24 HR ORAL DAILY
Refills: 0 | Status: DISCONTINUED | OUTPATIENT
Start: 2022-05-07 | End: 2022-05-11

## 2022-05-07 RX ORDER — ATORVASTATIN CALCIUM 80 MG/1
40 TABLET, FILM COATED ORAL AT BEDTIME
Refills: 0 | Status: DISCONTINUED | OUTPATIENT
Start: 2022-05-07 | End: 2022-05-11

## 2022-05-07 RX ORDER — BUMETANIDE 0.25 MG/ML
1 INJECTION INTRAMUSCULAR; INTRAVENOUS ONCE
Refills: 0 | Status: COMPLETED | OUTPATIENT
Start: 2022-05-07 | End: 2022-05-07

## 2022-05-07 RX ORDER — FUROSEMIDE 40 MG
40 TABLET ORAL DAILY
Refills: 0 | Status: DISCONTINUED | OUTPATIENT
Start: 2022-05-07 | End: 2022-05-10

## 2022-05-07 RX ADMIN — ATORVASTATIN CALCIUM 40 MILLIGRAM(S): 80 TABLET, FILM COATED ORAL at 22:05

## 2022-05-07 RX ADMIN — Medication 3 MILLILITER(S): at 08:45

## 2022-05-07 RX ADMIN — APIXABAN 2.5 MILLIGRAM(S): 2.5 TABLET, FILM COATED ORAL at 18:40

## 2022-05-07 RX ADMIN — BUMETANIDE 1 MILLIGRAM(S): 0.25 INJECTION INTRAMUSCULAR; INTRAVENOUS at 09:30

## 2022-05-07 RX ADMIN — PIPERACILLIN AND TAZOBACTAM 200 GRAM(S): 4; .5 INJECTION, POWDER, LYOPHILIZED, FOR SOLUTION INTRAVENOUS at 15:13

## 2022-05-07 RX ADMIN — Medication 3 MILLILITER(S): at 09:00

## 2022-05-07 RX ADMIN — PIPERACILLIN AND TAZOBACTAM 25 GRAM(S): 4; .5 INJECTION, POWDER, LYOPHILIZED, FOR SOLUTION INTRAVENOUS at 23:11

## 2022-05-07 RX ADMIN — Medication 3 MILLILITER(S): at 08:30

## 2022-05-07 NOTE — PROGRESS NOTE ADULT - ASSESSMENT
89 y/o with pmhx of HTN, CKD, Afib, HLD, nonsmoker, remote hx of asthma who presents with 2 days of SOB. She woke up with sudden SOB out of her sleep/ She was diagnosed with AF 2 weeks ago and started on Eliquis and Metoprolol. Pt now with chf.     1- CKD III   2- CHF   3- a fib       her creatinine is overall better than last admission as well as improved over the recent office visit   she is in CHF as well   lasix 40 mg iv qd   metoprolol to continue   ? to continue with zosyn   strict I/O   d/w pt daughter at bedside when seen earlier

## 2022-05-07 NOTE — ED ADULT NURSE NOTE - OBJECTIVE STATEMENT
89 y f came to the ed c/o sob. patient states the sob woke her up in the middle of the night. breathing eventually improved so the patient went back to sleep but woke up again feeling extremely sob so she came to the ed. 2 weeks ago patient was diagnosed with afib and started on metoprolol and eliquis. patient has not had an echo since being diagnosed with afib. patient is a/xo3. denies fevers, chills, chest pain, sob. abdomen is soft and nontender. skin is warm and dry. DEPRESSION/SUICIDAL

## 2022-05-07 NOTE — H&P ADULT - RS GEN PE MLT RESP DETAILS PC
mild tachypnea/airway patent/breath sounds equal/diminished breath sounds, L/diminished breath sounds, R

## 2022-05-07 NOTE — H&P ADULT - PROBLEM SELECTOR PLAN 1
CXR shows pulmonary congestion and RLL opacity but may no symptoms to suggest pneumonia  Pulm and Cards consulted. F/u recommendations  Start Lasix 40mg IVP daily as pt is Lasix naive  Obtain TTE, BLLE Duplex to r/o DVT  Daily weights  Intake and output monitoring  Low sodium diet  1500mL fluid restriction

## 2022-05-07 NOTE — H&P ADULT - HISTORY OF PRESENT ILLNESS
89 y/o with pmhx of HTN, Afib, HLD, nonsmoker, remote hx of asthma who presents with 2 days of SOB. According to the patient and daughter who is at bedside, last night she woke up with sudden SOB out of her sleep. She usually does not have any orthopnea or PND. She denies any cough, dyspnea, chest pain or fever. No sick contacts. She denies lightheadedness or syncope. She has mild chronic leg swelling L always more than R, that was attributed to immobility which has worsened over the last few days. She was diagnosed with AF 2 weeks ago and started on Eliquis and Metoprolol. They were worried about side effects so decided to come to the ED. She usually stays in a chair and is incontinent to urine.

## 2022-05-07 NOTE — PATIENT PROFILE ADULT - FALL HARM RISK - HARM RISK INTERVENTIONS

## 2022-05-07 NOTE — ED ADULT NURSE NOTE - NSIMPLEMENTINTERV_GEN_ALL_ED
Implemented All Fall with Harm Risk Interventions:  Riley to call system. Call bell, personal items and telephone within reach. Instruct patient to call for assistance. Room bathroom lighting operational. Non-slip footwear when patient is off stretcher. Physically safe environment: no spills, clutter or unnecessary equipment. Stretcher in lowest position, wheels locked, appropriate side rails in place. Provide visual cue, wrist band, yellow gown, etc. Monitor gait and stability. Monitor for mental status changes and reorient to person, place, and time. Review medications for side effects contributing to fall risk. Reinforce activity limits and safety measures with patient and family. Provide visual clues: red socks.

## 2022-05-07 NOTE — H&P ADULT - NSHPLABSRESULTS_GEN_ALL_CORE
Normocytic Anemia  BUN/Cr 45/1.62 down from recent dc in February    BNP 5638    Trop negative but borderline      EKG personally reviewed: Sinus rhythm with 1st degree AVB, no signs of acute ischemia

## 2022-05-07 NOTE — PATIENT PROFILE ADULT - FALL HARM RISK - TYPE OF ASSESSMENT
Patient said a few days ago he had sinus congestion and body aches.  Yesterday he started with a cough.  Sometimes he has \"coughing fits\", he they has some shortness of breath.   Admission

## 2022-05-07 NOTE — ED ADULT TRIAGE NOTE - GLASGOW COMA SCALE: SCORE, MLM
Diagnosis:   1. 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    Vital Signs:   Vitals:    22 1023 22 1025   BP:  122/75   Pulse:  (!) 58   Temp:  97 °F (36.1 °C)   SpO2:  92%   Weight: 76.7 kg (169 lb 1.5 oz)         Allergies:  ALLERGIES:  No Known Allergies     Medications:  The medication list was reviewed. No changes noted.     ECOG:   ECOG [22 1000]   ECOG Performance Status 0       Distress Screening: Is this day one of cycle or a new regimen? No No, patient did not indicate any new concerns. Refer to most recent PHQ2/9 score and Distress screening reviewed from previous visit, no further interventions    Toxicity Assessment: Auditory/Ear  Tinnitus: None Present  Constitutional  Fatigue: None Present  Neurology  Numbness: Mild symptoms  Tingling: Mild symptoms    Additional Nursing Assessment: In addition to above toxicity assessment, this RN assessed physical condition.          Pre-Treatment: I have reviewed the following with the patient: Name of chemo drug, duration and route of infusion, Infusion/Drug volume and dose, and reportable infusion-related symptoms.     Treatment: Refer to [facility] and MAR for line assessment and medication administration, Chemotherapy has not ; double checked & verified by two practitioners, Appearance and physical integrity of drugs meets standard of drug monograph; double checked & verified by two practitioners, Rate set on infusion pump is in alignment with ordered rate; double checked & verified by two practitioners, Drugs were administered in proper sequencing, Blood return confirmed before, during and after treatment administered and Infusion pump used for non-vesicant drugs    Post Treatment: Treatment tolerated well; no adverse reaction    Oral Chemotherapy: No    Education: No new instructions needed    Next appointment scheduled: 22  Patient  instructed to call the office with any questions or concerns.    Patient Discharged: patient discharged to home per self, ambulatory, to home   15

## 2022-05-07 NOTE — PROGRESS NOTE ADULT - SUBJECTIVE AND OBJECTIVE BOX
seen and examined. daughter at bedside when seen. cr 1.6 full consult to follow.  seen and examined. daughter at bedside when seen. cr 1.6 full consult to follow.     [facility]  [phone]  NEPHROLOGY      INITIAL CONSULT NOTE  --------------------------------------------------------------------------------  HPI:    89 y/o with pmhx of HTN, CKD, Afib, HLD, nonsmoker, remote hx of asthma who presents with 2 days of SOB. According to the patient and daughter who is at bedside, last night she woke up with sudden SOB out of her sleep. She usually does not have any orthopnea or PND. She denies any cough, dyspnea, chest pain or fever. No sick contacts. She denies lightheadedness or syncope. She has mild chronic leg swelling L always more than R, that was attributed to immobility which has worsened over the last few days. She was diagnosed with AF 2 weeks ago and started on Eliquis and Metoprolol. They were worried about side effects so decided to come to the ED. She usually stays in a chair and is incontinent to urine.   renal consult called for CKD     PAST HISTORY  --------------------------------------------------------------------------------  PAST MEDICAL & SURGICAL HISTORY:  HTN (hypertension)    Diabetes mellitus type 2, noninsulin dependent    Diverticulitis    Hyperlipidemia    GERD (gastroesophageal reflux disease)    Glaucoma    Breast cancer    Urinary incontinence    Renal calculus or stone    Arthritis    Heart murmur    Renal calculi  s/p stone extraction 57 yrs ago    S/P lumpectomy of breast  right breast with node removal    S/P bladder repair  Interstim device placement 2010      FAMILY HISTORY:  No pertinent family history in first degree relatives      PAST SOCIAL HISTORY:    ALLERGIES & MEDICATIONS  --------------------------------------------------------------------------------  Allergies    Keflex (Rash; Hives)    Intolerances      Standing Inpatient Medications  amLODIPine   Tablet 5 milliGRAM(s) Oral daily  apixaban 2.5 milliGRAM(s) Oral two times a day  atorvastatin 40 milliGRAM(s) Oral at bedtime  ferrous    sulfate 325 milliGRAM(s) Oral daily  furosemide   Injectable 40 milliGRAM(s) IV Push daily  guaiFENesin Oral Liquid (Sugar-Free) 100 milliGRAM(s) Oral every 6 hours  levothyroxine 25 MICROGram(s) Oral daily  metoprolol succinate ER 25 milliGRAM(s) Oral daily  piperacillin/tazobactam IVPB.. 3.375 Gram(s) IV Intermittent every 12 hours  venlafaxine XR. 75 milliGRAM(s) Oral daily    PRN Inpatient Medications      REVIEW OF SYSTEMS  --------------------------------------------------------------------------------  Gen: No  fevers/chills   Skin: No rashes  Head/Eyes/Ears/Mouth: No headache; Normal hearing;  No sinus pain/discomfort, sore throat  Respiratory:  +  dyspnea, denies cough, wheezing, hemoptysis  CV: No chest pain, orthopnea +   GI: No abdominal pain, diarrhea, nausea, vomiting  : No dysuria, decrease urination or hesitancy urinating  hematuria  MSK: No joint pain/swelling; no back pain  Neuro: No dizziness/lightheadedness  also with no  =edema         VITALS/PHYSICAL EXAM  --------------------------------------------------------------------------------  T(C): 36.9 (05-07-22 @ 19:29), Max: 36.9 (05-07-22 @ 17:59)  HR: 65 (05-07-22 @ 19:29) (65 - 101)  BP: 146/69 (05-07-22 @ 19:29) (146/69 - 171/72)  RR: 19 (05-07-22 @ 19:29) (19 - 20)  SpO2: 97% (05-07-22 @ 19:29) (95% - 99%)  Wt(kg): --  Height (cm): 157.5 (05-07-22 @ 07:57)  Weight (kg): 52.2 (05-07-22 @ 07:57)  BMI (kg/m2): 21 (05-07-22 @ 07:57)  BSA (m2): 1.51 (05-07-22 @ 07:57)      05-07-22 @ 07:01  -  05-07-22 @ 23:48  --------------------------------------------------------  IN: 0 mL / OUT: 200 mL / NET: -200 mL      Physical Exam:  	Gen: Non toxic comfortable appearing   	+ JVD   	Pulm: decrease bs  no rales or ronchi or wheezing  	CV: RRR, S1S2; no rub  	Back: No CVA tenderness  	Abd: +BS, soft, nontender/nondistended  	: No suprapubic tenderness  	UE: Warm, no cyanosis  no clubbing,  no edema  	LE: Warm, no cyanosis  no clubbing,  + non pitting 1++  edema  	Neuro: alert and oriented. speech coherent   	Skin: Warm, no decrease skin turgor       LABS/STUDIES  --------------------------------------------------------------------------------              9.8    8.62  >-----------<  201      [05-07-22 @ 08:38]              33.0     140  |  104  |  45  ----------------------------<  192      [05-07-22 @ 08:38]  5.0   |  21  |  1.62        Ca     9.7     [05-07-22 @ 08:38]      Mg     1.9     [05-07-22 @ 08:38]      Phos  4.1     [05-07-22 @ 08:38]    TPro  6.9  /  Alb  3.6  /  TBili  0.4  /  DBili  x   /  AST  23  /  ALT  22  /  AlkPhos  86  [05-07-22 @ 08:38]          Creatinine Trend:  SCr 1.62 [05-07 @ 08:38]    Urinalysis - [03-11-22 @ 14:45]      Color Light Yellow / Appearance Clear / SG 1.013 / pH 7.0      Gluc Negative / Ketone Negative  / Bili Negative / Urobili Negative       Blood Trace / Protein 30 mg/dL / Leuk Est Negative / Nitrite Negative      RBC 2 / WBC 1 / Hyaline 0 / Gran  / Sq Epi  / Non Sq Epi 1 / Bacteria Negative          Immunofixation Serum:   No Monoclonal Band Identified    Reference Range: None Detected      [10-10-20 @ 10:44]  SPEP Interpretation: Normal Electrophoresis Pattern      [10-10-20 @ 10:44]      < from: Xray Chest 1 View AP/PA (05.07.22 @ 09:01) >    ACC: [id number] EXAM:  XR CHEST AP OR PA 1V                          PROCEDURE DATE:  05/07/2022          INTERPRETATION:  CLINICAL INFORMATION: Dyspnea.    TECHNIQUE: Single portable view of the chest.    COMPARISON: Chest x-ray from 3/9/2022.    FINDINGS:    Right lower lung patchy opacity.  No pneumothorax. New small left pleural effusion.  Heart size within normal limits.  No acute osseous abnormality.    IMPRESSION:    Right lower lung patchy opacity.  New small left pleural effusion.    < end of copied text >

## 2022-05-07 NOTE — H&P ADULT - TEMPERATURE IN CELSIUS (DEGREES C)
"  Preventive Care at the 6 Month Visit  Growth Measurements & Percentiles  Head Circumference: 18\" (45.7 cm) (97 %, Source: WHO (Boys, 0-2 years)) 97 %ile based on WHO (Boys, 0-2 years) head circumference-for-age data using vitals from 2017.   Weight: 20 lbs 11.5 oz / 9.4 kg (actual weight) 93 %ile based on WHO (Boys, 0-2 years) weight-for-age data using vitals from 2017.   Length: 2' 3.25\" / 69.2 cm 73 %ile based on WHO (Boys, 0-2 years) length-for-age data using vitals from 2017.   Weight for length: 94 %ile based on WHO (Boys, 0-2 years) weight-for-recumbent length data using vitals from 2017.    Your baby s next Preventive Check-up will be at 9 months of age    Development  At this age, your baby may:    roll over    sit with support or lean forward on his hands in a sitting position    put some weight on his legs when held up    play with his feet    laugh, squeal, blow bubbles, imitate sounds like a cough or a  raspberry  and try to make sounds    show signs of anxiety around strangers or if a parent leaves    be upset if a toy is taken away or lost.    Feeding Tips    Give your baby breast milk or formula until his first birthday.    If you have not already, you may introduce solid baby foods: cereal, fruits, vegetables and meats.  Avoid added sugar and salt.  Infants do not need juice, however, if you provide juice, offer no more than 4 oz per day using a cup.    Avoid cow milk and honey until 12 months of age.    You may need to give your baby a fluoride supplement if you have well water or a water softener.    To reduce your child's chance of developing peanut allergy, you can start introducing peanut-containing foods in small amounts around 6 months of age.  If your child has severe eczema, egg allergy or both, consult with your doctor first about possible allergy-testing and introduction of small amounts of peanut-containing foods at 4-6 months old.  Teething    While getting teeth, " your baby may drool and chew a lot. A teething ring can give comfort.    Gently clean your baby s gums and teeth after meals. Use a soft toothbrush or cloth with water or small amount of fluoridated tooth and gum cleanser.    Stools    Your baby s bowel movements may change.  They may occur less often, have a strong odor or become a different color if he is eating solid foods.    Sleep    Your baby may sleep about 10-14 hours a day.    Put your baby to bed while awake. Give your baby the same safe toy or blanket. This is called a  transition object.  Do not play with or have a lot of contact with your baby at nighttime.    Continue to put your baby to sleep on his back, even if he is able to roll over on his own.    At this age, some, but not all, babies are sleeping for longer stretches at night (6-8 hours), awakening 0-2 times at night.    If you put your baby to sleep with a pacifier, take the pacifier out after your baby falls asleep.    Your goal is to help your child learn to fall asleep without your aid--both at the beginning of the night and if he wakes during the night.  Try to decrease and eliminate any sleep-associations your child might have (breast feeding for comfort when not hungry, rocking the child to sleep in your arms).  Put your child down drowsy, but awake, and work to leave him in the crib when he wakes during the night.  All children wake during night sleep.  He will eventually be able to fall back to sleep alone.    Safety    Keep your baby out of the sun. If your baby is outside, use sunscreen with a SPF of more than 15. Try to put your baby under shade or an umbrella and put a hat on his or her head.    Do not use infant walkers. They can cause serious accidents and serve no useful purpose.    Childproof your house now, since your baby will soon scoot and crawl.  Put plugs in the outlets; cover any sharp furniture corners; take care of dangling cords (including window blinds), tablecloths  and hot liquids; and put berg on all stairways.    Do not let your baby get small objects such as toys, nuts, coins, etc. These items may cause choking.    Never leave your baby alone, not even for a few seconds.    Use a playpen or crib to keep your baby safe.    Do not hold your child while you are drinking or cooking with hot liquids.    Turn your hot water heater to less than 120 degrees Fahrenheit.    Keep all medicines, cleaning supplies, and poisons out of your baby s reach.    Call the poison control center (1-782.156.2988) if your baby swallows poison.    What to Know About Television    The first two years of life are critical during the growth and development of your child s brain. Your child needs positive contact with other children and adults. Too much television can have a negative effect on your child s brain development. This is especially true when your child is learning to talk and play with others. The American Academy of Pediatrics recommends no television for children age 2 or younger.    What Your Baby Needs    Play games such as  peek-a-guerra  and  so big  with your baby.    Talk to your baby and respond to his sounds. This will help stimulate speech.    Give your baby age-appropriate toys.    Read to your baby every night.    Your baby may have separation anxiety. This means he may get upset when a parent leaves. This is normal. Take some time to get out of the house occasionally.    Your baby does not understand the meaning of  no.  You will have to remove him from unsafe situations.    Babies fuss or cry because of a need or frustration. He is not crying to upset you or to be naughty.    Dental Care    Your pediatric provider will speak with you regarding the need for regular dental appointments for cleanings and check-ups after your child s first tooth appears.    Starting with the first tooth, you can brush with a small amount of fluoridated toothpaste (no more than pea size) once  daily.    (Your child may need a fluoride supplement if you have well water.)           36.7

## 2022-05-07 NOTE — ED PROVIDER NOTE - CLINICAL SUMMARY MEDICAL DECISION MAKING FREE TEXT BOX
89 y/o with pmhx of HTN, Afib, HLD, nonsmoker, remote hx of asthma who presents with 2 days of SOB. Satting 82-84% on RA, on 3L sating 96%. Notable wheezing and rhonchi noted     Plan   - Duonebs x3   - EKG  - CXR   -Bumex x1 89 y/o with pmhx of HTN, Afib, HLD, nonsmoker, remote hx of asthma who presents with 2 days of SOB. Satting 82-84% on RA, on 3L sating 96%. Notable wheezing and rhonchi noted. LE edema noted, with prominent AS murmur noted     Plan   - Duonebs x3   - EKG  - CXR   -Bumex x1  - Likely Admit

## 2022-05-07 NOTE — ED PROVIDER NOTE - OBJECTIVE STATEMENT
89 y/o with pmhx of HTN, Afib, HLD, nonsmoker, remote hx of asthma who presents with 2 days of SOB. Patient states SOB intially occurred on Thursday night at rest but subsided. This AM patient states that SOB woke her up from sleep. She denies any CP, cough, URI sxs. Daughter states miah patient LE are typically swollen but over the last few days have noticed increased swelling b/l but left more than right. Denies any sick contacts or recent travel    Was recently started on eliquis 2-3 weeks ago for new diagnosis of AFib.

## 2022-05-07 NOTE — ED PROVIDER NOTE - CONSTITUTIONAL, MLM
normal... Well, elderly, frail female, awake, alert, oriented to person, place, time/situation and in no apparent distress.

## 2022-05-08 DIAGNOSIS — J69.0 PNEUMONITIS DUE TO INHALATION OF FOOD AND VOMIT: ICD-10-CM

## 2022-05-08 LAB
ANION GAP SERPL CALC-SCNC: 11 MMOL/L — SIGNIFICANT CHANGE UP (ref 5–17)
BUN SERPL-MCNC: 47 MG/DL — HIGH (ref 7–23)
CALCIUM SERPL-MCNC: 9.1 MG/DL — SIGNIFICANT CHANGE UP (ref 8.4–10.5)
CHLORIDE SERPL-SCNC: 104 MMOL/L — SIGNIFICANT CHANGE UP (ref 96–108)
CO2 SERPL-SCNC: 26 MMOL/L — SIGNIFICANT CHANGE UP (ref 22–31)
CREAT SERPL-MCNC: 1.87 MG/DL — HIGH (ref 0.5–1.3)
EGFR: 25 ML/MIN/1.73M2 — LOW
GLUCOSE SERPL-MCNC: 141 MG/DL — HIGH (ref 70–99)
HCT VFR BLD CALC: 29.7 % — LOW (ref 34.5–45)
HGB BLD-MCNC: 9.1 G/DL — LOW (ref 11.5–15.5)
MCHC RBC-ENTMCNC: 29.4 PG — SIGNIFICANT CHANGE UP (ref 27–34)
MCHC RBC-ENTMCNC: 30.6 GM/DL — LOW (ref 32–36)
MCV RBC AUTO: 96.1 FL — SIGNIFICANT CHANGE UP (ref 80–100)
NRBC # BLD: 0 /100 WBCS — SIGNIFICANT CHANGE UP (ref 0–0)
PLATELET # BLD AUTO: 192 K/UL — SIGNIFICANT CHANGE UP (ref 150–400)
POTASSIUM SERPL-MCNC: 4.7 MMOL/L — SIGNIFICANT CHANGE UP (ref 3.5–5.3)
POTASSIUM SERPL-SCNC: 4.7 MMOL/L — SIGNIFICANT CHANGE UP (ref 3.5–5.3)
RBC # BLD: 3.09 M/UL — LOW (ref 3.8–5.2)
RBC # FLD: 15.2 % — HIGH (ref 10.3–14.5)
SODIUM SERPL-SCNC: 141 MMOL/L — SIGNIFICANT CHANGE UP (ref 135–145)
WBC # BLD: 7.18 K/UL — SIGNIFICANT CHANGE UP (ref 3.8–10.5)
WBC # FLD AUTO: 7.18 K/UL — SIGNIFICANT CHANGE UP (ref 3.8–10.5)

## 2022-05-08 PROCEDURE — 93306 TTE W/DOPPLER COMPLETE: CPT | Mod: 26

## 2022-05-08 RX ADMIN — PIPERACILLIN AND TAZOBACTAM 25 GRAM(S): 4; .5 INJECTION, POWDER, LYOPHILIZED, FOR SOLUTION INTRAVENOUS at 11:52

## 2022-05-08 RX ADMIN — Medication 325 MILLIGRAM(S): at 11:51

## 2022-05-08 RX ADMIN — Medication 100 MILLIGRAM(S): at 11:51

## 2022-05-08 RX ADMIN — Medication 40 MILLIGRAM(S): at 05:29

## 2022-05-08 RX ADMIN — APIXABAN 2.5 MILLIGRAM(S): 2.5 TABLET, FILM COATED ORAL at 05:29

## 2022-05-08 RX ADMIN — Medication 100 MILLIGRAM(S): at 05:29

## 2022-05-08 RX ADMIN — AMLODIPINE BESYLATE 5 MILLIGRAM(S): 2.5 TABLET ORAL at 05:29

## 2022-05-08 RX ADMIN — PIPERACILLIN AND TAZOBACTAM 25 GRAM(S): 4; .5 INJECTION, POWDER, LYOPHILIZED, FOR SOLUTION INTRAVENOUS at 22:49

## 2022-05-08 RX ADMIN — Medication 25 MICROGRAM(S): at 05:29

## 2022-05-08 RX ADMIN — APIXABAN 2.5 MILLIGRAM(S): 2.5 TABLET, FILM COATED ORAL at 17:25

## 2022-05-08 RX ADMIN — ATORVASTATIN CALCIUM 40 MILLIGRAM(S): 80 TABLET, FILM COATED ORAL at 22:49

## 2022-05-08 RX ADMIN — Medication 100 MILLIGRAM(S): at 17:25

## 2022-05-08 RX ADMIN — Medication 75 MILLIGRAM(S): at 11:51

## 2022-05-08 RX ADMIN — Medication 25 MILLIGRAM(S): at 05:29

## 2022-05-08 NOTE — PROGRESS NOTE ADULT - SUBJECTIVE AND OBJECTIVE BOX
KOREY DIAZ  89y Female  MRN:[id number]    Patient is a 89y old  Female who presents with a chief complaint of SOB (08 May 2022 08:24)    HPI:  89 y/o with pmhx of HTN, Afib, HLD, nonsmoker, remote hx of asthma who presents with 2 days of SOB. According to the patient and daughter who is at bedside, last night she woke up with sudden SOB out of her sleep. She usually does not have any orthopnea or PND. She denies any cough, dyspnea, chest pain or fever. No sick contacts. She denies lightheadedness or syncope. She has mild chronic leg swelling L always more than R, that was attributed to immobility which has worsened over the last few days. She was diagnosed with AF 2 weeks ago and started on Eliquis and Metoprolol. They were worried about side effects so decided to come to the ED. She usually stays in a chair and is incontinent to urine.   (07 May 2022 13:48)      Patient seen and evaluated at bedside. No acute events overnight except as noted.    Interval HPI: feels better     PAST MEDICAL & SURGICAL HISTORY:  HTN (hypertension)    Diabetes mellitus type 2, noninsulin dependent    Diverticulitis    Hyperlipidemia    GERD (gastroesophageal reflux disease)    Glaucoma    Breast cancer    Urinary incontinence    Renal calculus or stone    Arthritis    Heart murmur    Renal calculi  s/p stone extraction 57 yrs ago    S/P lumpectomy of breast  right breast with node removal    S/P bladder repair  Interstim device placement 2010        REVIEW OF SYSTEMS:  as per hpi     VITALS:  Vital Signs Last 24 Hrs  T(C): 36.5 (08 May 2022 11:29), Max: 36.9 (07 May 2022 17:59)  T(F): 97.7 (08 May 2022 11:29), Max: 98.4 (07 May 2022 17:59)  HR: 71 (08 May 2022 11:29) (65 - 75)  BP: 165/72 (08 May 2022 11:29) (146/69 - 171/72)  BP(mean): --  RR: 18 (08 May 2022 11:29) (18 - 20)  SpO2: 91% (08 May 2022 11:29) (91% - 97%)  CAPILLARY BLOOD GLUCOSE        I&O's Summary    07 May 2022 07:01  -  08 May 2022 07:00  --------------------------------------------------------  IN: 0 mL / OUT: 200 mL / NET: -200 mL        PHYSICAL EXAM:  GENERAL: NAD, well-developed  HEAD:  Atraumatic, Normocephalic  EYES: EOMI, PERRLA, conjunctiva and sclera clear  NECK: Supple, No JVD  CHEST/LUNG: coarse bs b/l  HEART: S1, S2;    ABDOMEN: Soft, Nontender, Nondistended; Bowel sounds present  EXTREMITIES:  2+ Peripheral Pulses, No clubbing, cyanosis, or edema  PSYCH: Normal affect  NEUROLOGY: AAOX3; non-focal  SKIN: No rashes or lesions    Consultant(s) Notes Reviewed:  [x ] YES  [ ] NO  Care Discussed with Consultants/Other Providers [ x] YES  [ ] NO    MEDS:  MEDICATIONS  (STANDING):  amLODIPine   Tablet 5 milliGRAM(s) Oral daily  apixaban 2.5 milliGRAM(s) Oral two times a day  atorvastatin 40 milliGRAM(s) Oral at bedtime  ferrous    sulfate 325 milliGRAM(s) Oral daily  furosemide   Injectable 40 milliGRAM(s) IV Push daily  guaiFENesin Oral Liquid (Sugar-Free) 100 milliGRAM(s) Oral every 6 hours  levothyroxine 25 MICROGram(s) Oral daily  metoprolol succinate ER 25 milliGRAM(s) Oral daily  piperacillin/tazobactam IVPB.. 3.375 Gram(s) IV Intermittent every 12 hours  venlafaxine XR. 75 milliGRAM(s) Oral daily    MEDICATIONS  (PRN):    ALLERGIES:  Keflex (Rash; Hives)      LABS:                        9.8    8.62  )-----------( 201      ( 07 May 2022 08:38 )             33.0     05-07    140  |  104  |  45<H>  ----------------------------<  192<H>  5.0   |  21<L>  |  1.62<H>    Ca    9.7      07 May 2022 08:38  Phos  4.1     05-07  Mg     1.9     05-07    TPro  6.9  /  Alb  3.6  /  TBili  0.4  /  DBili  x   /  AST  23  /  ALT  22  /  AlkPhos  86  05-07          LIVER FUNCTIONS - ( 07 May 2022 08:38 )  Alb: 3.6 g/dL / Pro: 6.9 g/dL / ALK PHOS: 86 U/L / ALT: 22 U/L / AST: 23 U/L / GGT: x

## 2022-05-08 NOTE — PROGRESS NOTE ADULT - ASSESSMENT
89 y/o with pmhx of HTN, CKD, Afib, HLD, nonsmoker, remote hx of asthma who presents with 2 days of SOB. She woke up with sudden SOB out of her sleep/ She was diagnosed with AF 2 weeks ago and started on Eliquis and Metoprolol. Pt now with chf.     1- CKD III   2- CHF   3- a fib       cr steady range   she is in CHF as well   lasix 40 mg iv qd   metoprolol to continue   ? to continue with zosyn   strict I/O   d/w pt daughter at bedside when seen earlier

## 2022-05-08 NOTE — PROGRESS NOTE ADULT - ASSESSMENT
Impression:  Pt is a 89 y/o woman with a h/o distant asthma - no issues for 30 years or more, HTN, DM, increased cholesterol, GERD, glaucoma, breast cancer, arthritis, renal insufficiency followed by Dr. Dewitt, aortic stenosis - followed by Dr. Veto Flores, recently diagnosed with new onset atrial fibrillation treated with metoprolol and eliquis as outpt.  Now appears to have developed pneumonia, it is unclear if she aspirated; however, she was in the hospital recently and need to cover for resistant pathogens.  She has no signs of acute bronchospasm, she may be somewhat mildly fluid overloaded with mild elevation in bnp, but need to be careful with aortic stenosis.      Recommendations:  Continue zosyn which was started in the ER, 3.327mg iv q12 day #2.  Await all cultures.  will repeat cxr in am tomorrow.    Supplemental 02 to keep sats greater than 90%  No need for systemic steroids.  Appreciate renal input.  Continue lasix 40mg ivss daily for now, await lytes, she is doing better, there may be a component of fluid overload  Continue robitussin qid to help mobilize mucus  Continue eliquis and meds for rate control  GI prophylaxis.           Suzi Ramírez MD  Pulmonary  [phone]

## 2022-05-08 NOTE — PROGRESS NOTE ADULT - SUBJECTIVE AND OBJECTIVE BOX
Follow-up Pulm Progress Note - [facility] [place]    Pt without complaints, no gurgling this am    Medications:  MEDICATIONS  (STANDING):  amLODIPine   Tablet 5 milliGRAM(s) Oral daily  apixaban 2.5 milliGRAM(s) Oral two times a day  atorvastatin 40 milliGRAM(s) Oral at bedtime  ferrous    sulfate 325 milliGRAM(s) Oral daily  furosemide   Injectable 40 milliGRAM(s) IV Push daily  guaiFENesin Oral Liquid (Sugar-Free) 100 milliGRAM(s) Oral every 6 hours  levothyroxine 25 MICROGram(s) Oral daily  metoprolol succinate ER 25 milliGRAM(s) Oral daily  piperacillin/tazobactam IVPB.. 3.375 Gram(s) IV Intermittent every 12 hours  venlafaxine XR. 75 milliGRAM(s) Oral daily    MEDICATIONS  (PRN):             Vital Signs Last 24 Hrs  T(C): 36.8 (08 May 2022 05:26), Max: 36.9 (07 May 2022 17:59)  T(F): 98.2 (08 May 2022 05:26), Max: 98.4 (07 May 2022 17:59)  HR: 75 (08 May 2022 05:26) (65 - 77)  BP: 168/73 (08 May 2022 05:26) (146/69 - 171/72)  BP(mean): 96 (07 May 2022 13:48) (96 - 96)  RR: 18 (08 May 2022 05:26) (18 - 20)  SpO2: 94% (08 May 2022 05:26) (92% - 99%)          05-07 @ 07:01  -  05-08 @ 07:00  --------------------------------------------------------  IN: 0 mL / OUT: 200 mL / NET: -200 mL          LABS:                        9.8    8.62  )-----------( 201      ( 07 May 2022 08:38 )             33.0     05-07    140  |  104  |  45<H>  ----------------------------<  192<H>  5.0   |  21<L>  |  1.62<H>    Ca    9.7      07 May 2022 08:38  Phos  4.1     05-07  Mg     1.9     05-07    TPro  6.9  /  Alb  3.6  /  TBili  0.4  /  DBili  x   /  AST  23  /  ALT  22  /  AlkPhos  86  05-07        CAPILLARY BLOOD GLUCOSE              Serum Pro-Brain Natriuretic Peptide: 5638 pg/mL (05-07-22 @ 08:38)      CULTURES:        Physical Examination:  Awake and alert  Normocephalic atraumatic  NECK: supple, normal range of motion, no use of accessory muscles  PULM: Clear to auscultation bilaterally, with bronchial bs  CVS: Regular rate and rhythm, no murmurs, rubs, or gallops  Abd:  soft, non tender  Extrem: No edema, improved

## 2022-05-08 NOTE — PROGRESS NOTE ADULT - SUBJECTIVE AND OBJECTIVE BOX
[facility]   [phone]  RENAL FOLLOW UP NOTE  --------------------------------------------------------------------------------  Chief Complaint:    24 hour events/subjective:    seen earlier daughter at bedside   states breathing is better     PAST HISTORY  --------------------------------------------------------------------------------  No significant changes to PMH, PSH, FHx, SHx, unless otherwise noted    ALLERGIES & MEDICATIONS  --------------------------------------------------------------------------------  Allergies    Keflex (Rash; Hives)    Intolerances      Standing Inpatient Medications  amLODIPine   Tablet 5 milliGRAM(s) Oral daily  apixaban 2.5 milliGRAM(s) Oral two times a day  atorvastatin 40 milliGRAM(s) Oral at bedtime  ferrous    sulfate 325 milliGRAM(s) Oral daily  furosemide   Injectable 40 milliGRAM(s) IV Push daily  guaiFENesin Oral Liquid (Sugar-Free) 100 milliGRAM(s) Oral every 6 hours  levothyroxine 25 MICROGram(s) Oral daily  metoprolol succinate ER 25 milliGRAM(s) Oral daily  piperacillin/tazobactam IVPB.. 3.375 Gram(s) IV Intermittent every 12 hours  venlafaxine XR. 75 milliGRAM(s) Oral daily    PRN Inpatient Medications      REVIEW OF SYSTEMS  --------------------------------------------------------------------------------    Gen: denies  fevers/chills,  CVS: denies chest pain/palpitations  Resp: denies SOB/Cough  GI: Denies N/V/Abd pain  : Denies dysuria      VITALS/PHYSICAL EXAM  --------------------------------------------------------------------------------  T(C): 37.4 (05-08-22 @ 20:51), Max: 37.4 (05-08-22 @ 20:51)  HR: 78 (05-08-22 @ 20:51) (71 - 78)  BP: 143/81 (05-08-22 @ 20:51) (143/81 - 168/73)  RR: 18 (05-08-22 @ 20:51) (18 - 18)  SpO2: 96% (05-08-22 @ 20:51) (91% - 96%)  Wt(kg): --  Height (cm): 157.5 (05-07-22 @ 07:57)  Weight (kg): 52.2 (05-07-22 @ 07:57)  BMI (kg/m2): 21 (05-07-22 @ 07:57)  BSA (m2): 1.51 (05-07-22 @ 07:57)      05-07-22 @ 07:01  -  05-08-22 @ 07:00  --------------------------------------------------------  IN: 0 mL / OUT: 200 mL / NET: -200 mL    05-08-22 @ 07:01  -  05-08-22 @ 21:00  --------------------------------------------------------  IN: 0 mL / OUT: 100 mL / NET: -100 mL      Physical Exam:  	    	Gen: Non toxic comfortable appearing   	+ JVD   	Pulm: decrease bs  no rales or ronchi or wheezing  	CV: IRR, S1S2; no rub  	Abd: +BS, soft, nontender/nondistended  	: No suprapubic tenderness  	UE: Warm, no cyanosis  no clubbing,  no edema  	LE: Warm, no cyanosis  no clubbing,  + non pitting 1++  edema  	Neuro: alert and oriented. speech coherent       LABS/STUDIES  --------------------------------------------------------------------------------              9.1    7.18  >-----------<  192      [05-08-22 @ 16:43]              29.7     141  |  104  |  47  ----------------------------<  141      [05-08-22 @ 16:43]  4.7   |  26  |  1.87        Ca     9.1     [05-08-22 @ 16:43]      Mg     1.9     [05-07-22 @ 08:38]      Phos  4.1     [05-07-22 @ 08:38]    TPro  6.9  /  Alb  3.6  /  TBili  0.4  /  DBili  x   /  AST  23  /  ALT  22  /  AlkPhos  86  [05-07-22 @ 08:38]          Creatinine Trend:  SCr 1.87 [05-08 @ 16:43]  SCr 1.62 [05-07 @ 08:38]

## 2022-05-09 LAB
ANION GAP SERPL CALC-SCNC: 14 MMOL/L — SIGNIFICANT CHANGE UP (ref 5–17)
BUN SERPL-MCNC: 49 MG/DL — HIGH (ref 7–23)
CALCIUM SERPL-MCNC: 9.3 MG/DL — SIGNIFICANT CHANGE UP (ref 8.4–10.5)
CHLORIDE SERPL-SCNC: 104 MMOL/L — SIGNIFICANT CHANGE UP (ref 96–108)
CO2 SERPL-SCNC: 23 MMOL/L — SIGNIFICANT CHANGE UP (ref 22–31)
CREAT SERPL-MCNC: 1.88 MG/DL — HIGH (ref 0.5–1.3)
EGFR: 25 ML/MIN/1.73M2 — LOW
GLUCOSE SERPL-MCNC: 117 MG/DL — HIGH (ref 70–99)
HCT VFR BLD CALC: 30.5 % — LOW (ref 34.5–45)
HGB BLD-MCNC: 9.4 G/DL — LOW (ref 11.5–15.5)
MCHC RBC-ENTMCNC: 29.1 PG — SIGNIFICANT CHANGE UP (ref 27–34)
MCHC RBC-ENTMCNC: 30.8 GM/DL — LOW (ref 32–36)
MCV RBC AUTO: 94.4 FL — SIGNIFICANT CHANGE UP (ref 80–100)
NRBC # BLD: 0 /100 WBCS — SIGNIFICANT CHANGE UP (ref 0–0)
PLATELET # BLD AUTO: 215 K/UL — SIGNIFICANT CHANGE UP (ref 150–400)
POTASSIUM SERPL-MCNC: 4 MMOL/L — SIGNIFICANT CHANGE UP (ref 3.5–5.3)
POTASSIUM SERPL-SCNC: 4 MMOL/L — SIGNIFICANT CHANGE UP (ref 3.5–5.3)
RBC # BLD: 3.23 M/UL — LOW (ref 3.8–5.2)
RBC # FLD: 15 % — HIGH (ref 10.3–14.5)
SODIUM SERPL-SCNC: 141 MMOL/L — SIGNIFICANT CHANGE UP (ref 135–145)
WBC # BLD: 6.72 K/UL — SIGNIFICANT CHANGE UP (ref 3.8–10.5)
WBC # FLD AUTO: 6.72 K/UL — SIGNIFICANT CHANGE UP (ref 3.8–10.5)

## 2022-05-09 PROCEDURE — 71250 CT THORAX DX C-: CPT | Mod: 26

## 2022-05-09 PROCEDURE — 71045 X-RAY EXAM CHEST 1 VIEW: CPT | Mod: 26

## 2022-05-09 PROCEDURE — 93970 EXTREMITY STUDY: CPT | Mod: 26

## 2022-05-09 RX ORDER — ASCORBIC ACID 60 MG
500 TABLET,CHEWABLE ORAL DAILY
Refills: 0 | Status: DISCONTINUED | OUTPATIENT
Start: 2022-05-09 | End: 2022-05-11

## 2022-05-09 RX ADMIN — APIXABAN 2.5 MILLIGRAM(S): 2.5 TABLET, FILM COATED ORAL at 17:29

## 2022-05-09 RX ADMIN — Medication 75 MILLIGRAM(S): at 12:08

## 2022-05-09 RX ADMIN — Medication 325 MILLIGRAM(S): at 12:06

## 2022-05-09 RX ADMIN — Medication 25 MICROGRAM(S): at 05:35

## 2022-05-09 RX ADMIN — Medication 500 MILLIGRAM(S): at 12:12

## 2022-05-09 RX ADMIN — Medication 40 MILLIGRAM(S): at 05:36

## 2022-05-09 RX ADMIN — PIPERACILLIN AND TAZOBACTAM 25 GRAM(S): 4; .5 INJECTION, POWDER, LYOPHILIZED, FOR SOLUTION INTRAVENOUS at 11:15

## 2022-05-09 RX ADMIN — APIXABAN 2.5 MILLIGRAM(S): 2.5 TABLET, FILM COATED ORAL at 05:35

## 2022-05-09 RX ADMIN — Medication 1 TABLET(S): at 12:12

## 2022-05-09 RX ADMIN — ATORVASTATIN CALCIUM 40 MILLIGRAM(S): 80 TABLET, FILM COATED ORAL at 22:02

## 2022-05-09 RX ADMIN — AMLODIPINE BESYLATE 5 MILLIGRAM(S): 2.5 TABLET ORAL at 05:36

## 2022-05-09 RX ADMIN — Medication 25 MILLIGRAM(S): at 05:35

## 2022-05-09 NOTE — GOALS OF CARE CONVERSATION - ADVANCED CARE PLANNING - CONVERSATION DETAILS
Spoke to patient regarding advance directives, patient's son was also present for conversation. Patient expressed that they had previously completed health care proxy form. Patient expressed that they have designated daughter Brittney as healthcare proxy and son Kevin as alternate. Patient expressed that they trust their health care proxy with all medical decisions.     Patient or family will provide copy of health care proxy form to medical team if necessary.     Regarding code status, patient's son expressed that patient would like to remain Full Code, acknowledged and agreed with understanding of what it meant to be Full Code status. Patient did not want to engage in this part of conversation, but son expressed that HCPs would like to determine care at time of care, will remain full code now. Family would agreed to inform medical team if interests change regarding code status. Provided patient video information on advance directives as well as contact information and will remain available for questions.

## 2022-05-09 NOTE — PROGRESS NOTE ADULT - SUBJECTIVE AND OBJECTIVE BOX
KOREY DIAZ  89y Female  MRN:[id number]    Patient is a 89y old  Female who presents with a chief complaint of SOB (08 May 2022 08:24)    HPI:  89 y/o with pmhx of HTN, Afib, HLD, nonsmoker, remote hx of asthma who presents with 2 days of SOB. According to the patient and daughter who is at bedside, last night she woke up with sudden SOB out of her sleep. She usually does not have any orthopnea or PND. She denies any cough, dyspnea, chest pain or fever. No sick contacts. She denies lightheadedness or syncope. She has mild chronic leg swelling L always more than R, that was attributed to immobility which has worsened over the last few days. She was diagnosed with AF 2 weeks ago and started on Eliquis and Metoprolol. They were worried about side effects so decided to come to the ED. She usually stays in a chair and is incontinent to urine.   (07 May 2022 13:48)      Patient seen and evaluated at bedside. No acute events overnight except as noted.    Interval HPI: feels better     PAST MEDICAL & SURGICAL HISTORY:  HTN (hypertension)    Diabetes mellitus type 2, noninsulin dependent    Diverticulitis    Hyperlipidemia    GERD (gastroesophageal reflux disease)    Glaucoma    Breast cancer    Urinary incontinence    Renal calculus or stone    Arthritis    Heart murmur    Renal calculi  s/p stone extraction 57 yrs ago    S/P lumpectomy of breast  right breast with node removal    S/P bladder repair  Interstim device placement 2010        REVIEW OF SYSTEMS:  as per hpi     VITALS:   Vital Signs Last 24 Hrs  T(C): 36.7 (09 May 2022 09:09), Max: 37.4 (08 May 2022 20:51)  T(F): 98.1 (09 May 2022 09:09), Max: 99.4 (08 May 2022 20:51)  HR: 84 (09 May 2022 09:09) (67 - 84)  BP: 122/67 (09 May 2022 09:09) (122/67 - 165/72)  BP(mean): --  RR: 18 (09 May 2022 09:09) (18 - 18)  SpO2: 95% (09 May 2022 09:09) (91% - 96%)      PHYSICAL EXAM:  GENERAL: NAD, well-developed  HEAD:  Atraumatic, Normocephalic  EYES: EOMI, PERRLA, conjunctiva and sclera clear  NECK: Supple, No JVD  CHEST/LUNG: coarse bs b/l  HEART: S1, S2;    ABDOMEN: Soft, Nontender, Nondistended; Bowel sounds present  EXTREMITIES:  2+ Peripheral Pulses, No clubbing, cyanosis, or edema  PSYCH: Normal affect  NEUROLOGY: AAOX3; non-focal  SKIN: No rashes or lesions    Consultant(s) Notes Reviewed:  [x ] YES  [ ] NO  Care Discussed with Consultants/Other Providers [ x] YES  [ ] NO    MEDS:   MEDICATIONS  (STANDING):  amLODIPine   Tablet 5 milliGRAM(s) Oral daily  apixaban 2.5 milliGRAM(s) Oral two times a day  atorvastatin 40 milliGRAM(s) Oral at bedtime  ferrous    sulfate 325 milliGRAM(s) Oral daily  furosemide   Injectable 40 milliGRAM(s) IV Push daily  guaiFENesin Oral Liquid (Sugar-Free) 100 milliGRAM(s) Oral every 6 hours  levothyroxine 25 MICROGram(s) Oral daily  metoprolol succinate ER 25 milliGRAM(s) Oral daily  piperacillin/tazobactam IVPB.. 3.375 Gram(s) IV Intermittent every 12 hours  venlafaxine XR. 75 milliGRAM(s) Oral daily    MEDICATIONS  (PRN):      ALLERGIES:  Keflex (Rash; Hives)      LABS:                                       9.4    6.72  )-----------( 215      ( 09 May 2022 07:24 )             30.5   05-09    141  |  104  |  49<H>  ----------------------------<  117<H>  4.0   |  23  |  1.88<H>    Ca    9.3      09 May 2022 07:28              < from: Transthoracic Echocardiogram (05.08.22 @ 14:37) >  --------------------  Conclusions:  1. Normal mitral valve. Mild mitral regurgitation.  2. Heavily calcified trileaflet aortic valve with decreased  opening. Peak transaortic valve gradient equals 41 mm Hg,  estimated aortic valve area equals 1 sqcm (by continuity  equation), aortic valve velocity time integral equals 70  cm, consistent with moderate aortic stenosis.  However the aortic valve appears heavily calcified and may  be closer to moderate-severe aortic stenosis.  Mild aortic  regurgitation.  3. Mildly dilated left atrium.  LA volume index = 39 cc/m2.  4. Normal left ventricular systolic function. No segmental  wall motion abnormalities.  5. Normal right ventricular size and function.  6. Normal pericardium with trace pericardial effusion.  7. Bilateral pleural effusions.  *** Compared with echocardiogram of 3/11/2022, no  significant changes noted.  ------------------------------------------------------    < end of copied text >

## 2022-05-09 NOTE — DIETITIAN INITIAL EVALUATION ADULT - NSFNSGIIOFT_GEN_A_CORE
last BM 5/6-pt refused prunes at this time -she want to see if the fresh fruit bowel she is eating for breakfast this morning with assist with BM

## 2022-05-09 NOTE — PROGRESS NOTE ADULT - SUBJECTIVE AND OBJECTIVE BOX
[facility]   [phone]  RENAL FOLLOW UP NOTE  --------------------------------------------------------------------------------  Chief Complaint:    24 hour events/subjective:    Patient seen and examined with Dr. Escobar  states sob is improved    PAST HISTORY  --------------------------------------------------------------------------------  No significant changes to PMH, PSH, FHx, SHx, unless otherwise noted    ALLERGIES & MEDICATIONS  --------------------------------------------------------------------------------  Allergies    Keflex (Rash; Hives)    Intolerances      Standing Inpatient Medications  amLODIPine   Tablet 5 milliGRAM(s) Oral daily  apixaban 2.5 milliGRAM(s) Oral two times a day  ascorbic acid 500 milliGRAM(s) Oral daily  atorvastatin 40 milliGRAM(s) Oral at bedtime  ferrous    sulfate 325 milliGRAM(s) Oral daily  furosemide   Injectable 40 milliGRAM(s) IV Push daily  guaiFENesin Oral Liquid (Sugar-Free) 100 milliGRAM(s) Oral every 6 hours  levothyroxine 25 MICROGram(s) Oral daily  metoprolol succinate ER 25 milliGRAM(s) Oral daily  Nephro-iain 1 Tablet(s) Oral daily  piperacillin/tazobactam IVPB.. 3.375 Gram(s) IV Intermittent every 12 hours  venlafaxine XR. 75 milliGRAM(s) Oral daily    PRN Inpatient Medications      REVIEW OF SYSTEMS  --------------------------------------------------------------------------------    Gen: denies fevers/chills,  CVS: denies chest pain/palpitations  Resp: denies SOB/Cough  GI: Denies N/V/Abd pain  : Denies dysuria/oliguria/hematuria    VITALS/PHYSICAL EXAM  --------------------------------------------------------------------------------  T(C): 36.7 (05-09-22 @ 09:09), Max: 37.4 (05-08-22 @ 20:51)  HR: 84 (05-09-22 @ 09:09) (67 - 84)  BP: 122/67 (05-09-22 @ 09:09) (122/67 - 165/70)  RR: 18 (05-09-22 @ 09:09) (18 - 18)  SpO2: 95% (05-09-22 @ 09:09) (95% - 96%)  Wt(kg): --        05-08-22 @ 07:01  -  05-09-22 @ 07:00  --------------------------------------------------------  IN: 480 mL / OUT: 1400 mL / NET: -920 mL    05-09-22 @ 07:01  -  05-09-22 @ 14:32  --------------------------------------------------------  IN: 240 mL / OUT: 850 mL / NET: -610 mL      Physical Exam:  	  	Gen: Non toxic comfortable appearing   	Pulm: decrease bs  no rales or ronchi or wheezing  	CV: no JVD. IRR, S1S2; no rub  	Abd: +BS, soft, nontender/nondistended  	: No suprapubic tenderness  	UE: Warm, no cyanosis  no clubbing,  no edema  	LE: Warm, no cyanosis  no clubbing, no edema    LABS/STUDIES  --------------------------------------------------------------------------------              9.4    6.72  >-----------<  215      [05-09-22 @ 07:24]              30.5     141  |  104  |  49  ----------------------------<  117      [05-09-22 @ 07:28]  4.0   |  23  |  1.88        Ca     9.3     [05-09-22 @ 07:28]            Creatinine Trend:  SCr 1.88 [05-09 @ 07:28]  SCr 1.87 [05-08 @ 16:43]  SCr 1.62 [05-07 @ 08:38]

## 2022-05-09 NOTE — DIETITIAN INITIAL EVALUATION ADULT - PERTINENT MEDS FT
MEDICATIONS  (STANDING):  amLODIPine   Tablet 5 milliGRAM(s) Oral daily  apixaban 2.5 milliGRAM(s) Oral two times a day  atorvastatin 40 milliGRAM(s) Oral at bedtime  ferrous    sulfate 325 milliGRAM(s) Oral daily  furosemide   Injectable 40 milliGRAM(s) IV Push daily  guaiFENesin Oral Liquid (Sugar-Free) 100 milliGRAM(s) Oral every 6 hours  levothyroxine 25 MICROGram(s) Oral daily  metoprolol succinate ER 25 milliGRAM(s) Oral daily  piperacillin/tazobactam IVPB.. 3.375 Gram(s) IV Intermittent every 12 hours  venlafaxine XR. 75 milliGRAM(s) Oral daily    MEDICATIONS  (PRN):

## 2022-05-09 NOTE — DIETITIAN NUTRITION RISK NOTIFICATION - TREATMENT: THE FOLLOWING DIET HAS BEEN RECOMMENDED
Diet, Consistent Carbohydrate w/Evening Snack:   DASH/TLC {Sodium & Cholesterol Restricted} (DASH)  1500mL Fluid Restriction ([id number])  Pieter(7 Gm Arginine/7 Gm Glut/1.2 Gm HMB     Qty per Day:  2  Supplement Feeding Modality:  Oral (05-09-22 @ 10:26) [Active]

## 2022-05-09 NOTE — PROGRESS NOTE ADULT - ASSESSMENT
ASSESSMENT:    89 year old woman with h/o distant asthma - no issues for more than 30 years. She also has a history of HTN, HLD, DM, CKD (Dr. Dewitt), breast cancer s/p lumpectomy and aortic stenosis (Dr. Veto Flores). She was recently diagnosed with new onset atrial fibrillation being treated with metoprolol and Eliquis.  Admitted with shortness of breath and "gurgling" in her chest. She has an isolated fever in the ER. CXR is suggestive of a right lower lobe pneumonia.  She has no bronchospasm. She may be mildly fluid overloaded in the setting of moderate and possibly severe aortic stenosis and atrial fibrillation    PLAN/RECOMMENDATIONS:    stable oxygenation currently on room air  continue zosyn - await cultures  chest CT to better evaluate the lung parenchyma and pleural space  procalcitonin level  observe off systemic steroids and nebs  mucinex 1200mg 2 times daily  cardiology follow-up  cardiac meds: norvasc/toprol XL/lipitor/lasix/eliquis  effexor XL    Will follow with you. Plan of care discussed with the patient and her daughter at bedside    Brayden Benavides MD, [facility]  [phone]  Pulmonary Medicine

## 2022-05-09 NOTE — PROGRESS NOTE ADULT - SUBJECTIVE AND OBJECTIVE BOX
[facility] - [facility] - PROGRESS NOTE    CHIEF COMPLAINT: abnormal CXR; dyspnea; hypoxemia; pneumonia; asthma    INTERVAL HISTORY: no shortness of breath or hypoxemia on room air; no cough, sputum production, hemoptysis, chest congestion or wheeze; no fevers, chills or sweats; no chest pain/pressure or palpitations; better appetite    REVIEW OF SYSTEMS:  Constitutional: As per interval history  HEENT: Within normal limits  CV: As per interval history  Resp: As per interval history  GI: Within normal limits   : Within normal limits  Musculoskeletal: Within normal limits  Skin: Within normal limits  Neurological: Within normal limits  Psychiatric: Within normal limits  Endocrine: Within normal limits  Hematologic/Lymphatic: Within normal limits  Allergic/Immunologic: Within normal limits    MEDICATIONS:     Pulmonary "  guaiFENesin Oral Liquid (Sugar-Free) 100 milliGRAM(s) Oral every 6 hours    Anti-microbials:  piperacillin/tazobactam IVPB.. 3.375 Gram(s) IV Intermittent every 12 hours    Cardiovascular:  amLODIPine   Tablet 5 milliGRAM(s) Oral daily  furosemide   Injectable 40 milliGRAM(s) IV Push daily  metoprolol succinate ER 25 milliGRAM(s) Oral daily    Other:  apixaban 2.5 milliGRAM(s) Oral two times a day  ascorbic acid 500 milliGRAM(s) Oral daily  atorvastatin 40 milliGRAM(s) Oral at bedtime  ferrous    sulfate 325 milliGRAM(s) Oral daily  levothyroxine 25 MICROGram(s) Oral daily  Nephro-iain 1 Tablet(s) Oral daily  venlafaxine XR. 75 milliGRAM(s) Oral daily      OBJECTIVE:    PHYSICAL EXAM:       ICU Vital Signs Last 24 Hrs  T(C): 37.1 (09 May 2022 15:45), Max: 37.4 (08 May 2022 20:51)  T(F): 98.7 (09 May 2022 15:45), Max: 99.4 (08 May 2022 20:51)  HR: 71 (09 May 2022 15:45) (67 - 84)  BP: 120/68 (09 May 2022 15:45) (120/68 - 165/70)  BP(mean): --  ABP: --  ABP(mean): --  RR: 18 (09 May 2022 15:45) (18 - 18)  SpO2: 96% (09 May 2022 15:45) (95% - 96%) on room air (?)     General: Awake. Alert. Cooperative. No distress. Appears stated age. Cachectic	  HEENT: Atraumatic. Bitemporal wasting. Anicteric. Normal oral mucosa. PERRL. EOMI.  Neck: Supple. Trachea midline. Thyroid without enlargement/tenderness/nodules. No carotid bruit. No JVD. Loss of supraclavicular fat pads	  Cardiovascular: Irregularly irregular rate and rhythm. S1 S2 normal. III/VI systolic murmur  Respiratory: Respirations unlabored. Bilateral rales. Kyphosis.  Abdomen: Soft. Non-tender. Non-distended. No organomegaly. No masses. Normal bowel sounds.  Extremities: Warm to touch. No clubbing or cyanosis. No pedal edema.  Pulses: 2+ peripheral pulses all extremities.	  Skin: Multiple ecchymoses especially on the legs  Lymph Nodes: Cervical, supraclavicular and axillary nodes normal  Neurological: Motor and sensory examination equal and normal. A and O x 3  Psychiatry: Appropriate mood and affect.    LABS:                          9.4    6.72  )-----------( 215      ( 09 May 2022 07:24 )             30.5     CBC    WBC  6.72 <==, 7.18 <==, 8.62 <==    Hemoglobin  9.4 <<==, 9.1 <<==, 9.8 <<==    Hematocrit  30.5 <==, 29.7 <==, 33.0 <==    Platelets  215 <==, 192 <==, 201 <==      141  |  104  |  49<H>  ----------------------------<  117<H>    05-09  4.0   |  23  |  1.88<H>      LYTES    sodium  141 <==, 141 <==, 140 <==    potassium   4.0 <==, 4.7 <==, 5.0 <==    chloride  104 <==, 104 <==, 104 <==    carbon dioxide  23 <==, 26 <==, 21 <==    =============================================================================================  RENAL FUNCTION:    Creatinine:   1.88  <<==, 1.87  <<==, 1.62  <<==    BUN:   49 <==, 47 <==, 45 <==    ============================================================================================    calcium   9.3 <==, 9.1 <==, 9.7 <==    phos   4.1 <==    mag   1.9 <==    ============================================================================================  LFTs    AST:   23 <==     ALT:  22  <==     AP:  86  <=    Bili:  0.4  <=    Serum Pro-Brain Natriuretic Peptide: 5638 pg/mL (05-07 @ 08:38)    CARDIAC MARKERS ( 07 May 2022 08:38 )  CPK x     /CKMB x     /CKMB Units x        troponin 51 ng/L    < from: Transthoracic Echocardiogram (05.08.22 @ 14:37) >    Patient name: KOREY DIAZ  YOB: 1929   Age: 89 (F)   MR#: [id number]  Study Date: 5/8/2022  Location: [address] [place], [place] [id number]: Gadiel Schwartz RDCS  Study quality: Technically fair  Referring Physician: Teo Monson MD  Blood Pressure: 165/72 mmHg  Height: 158 cm  Weight: 53 kg  BSA: 1.5 m2  Heart Rate: 70 mmHg  ------------------------------------------------------------------------  PROCEDURE: Transthoracic echocardiogram with 2-D, M-Mode  and complete spectral and color flow Doppler.  INDICATION: Cardiac murmur, unspecified (R01.1)  ------------------------------------------------------------------------  Dimensions:    Normal Values:  LA:     3.6    2.0 - 4.0 cm  Ao:     3.0    2.0 - 3.8 cm  SEPTUM: 1.0    0.6 -1.2 cm  PWT:    1.0    0.6 - 1.1 cm  LVIDd:  4.0    3.0 - 5.6 cm  LVIDs:  2.8    1.8 - 4.0 cm  Derived variables:  LVMI: 84 g/m2  RWT: 0.50  Fractional short: 30 %  EF (Moran Rule): 67 % Doppler Peak Velocity (m/sec):  AoV=3.2  ------------------------------------------------------------------------  Conclusions:  1. Normal mitral valve. Mild mitral regurgitation.  2. Heavily calcified trileaflet aortic valve with decreased  opening. Peak transaortic valve gradient equals 41 mm Hg,  estimated aortic valve area equals 1 sqcm (by continuity  equation), aortic valve velocity time integral equals 70  cm, consistent with moderate aortic stenosis.  However the aortic valve appears heavily calcified and may  be closer to moderate-severe aortic stenosis.  Mild aortic  regurgitation.  3. Mildly dilated left atrium.  LA volume index = 39 cc/m2.  4. Normal left ventricular systolic function. No segmental  wall motion abnormalities.  5. Normal right ventricular size and function.  6. Normal pericardium with trace pericardial effusion.  7. Bilateral pleural effusions.  *** Compared with echocardiogram of 3/11/2022, no  significant changes noted.  ------------------------------------------------------------------------  Confirmed on  5/8/2022 - 17:38:34 by Osito Argueta M.D.  ------------------------------------------------------------------------    MICROBIOLOGY:     Respiratory Viral Panel with COVID-19 by ERICK (05.07.22 @ 08:38)   Rapid RVP Result: [facility]   SARS-CoV-2: [facility]  This Respiratory Panel uses polymerase chain reaction (PCR) to detect for   adenovirus; coronavirus (HKU1, NL63, 229E, OC43); human metapneumovirus   (hMPV); human enterovirus/rhinovirus (Entero/RV); influenza A; influenza   A/H1; influenza A/H3; influenza A/H1-2009; influenza B; parainfluenza   viruses 1, 2, 3, 4; respiratory syncytial virus; Mycoplasma pneumoniae;   Chlamydophila pneumoniae; and SARS-CoV-2.       RADIOLOGY:  [x] Chest radiographs reviewed and interpreted by me    EXAM:  XR CHEST PORTABLE ROUTINE 1V                          PROCEDURE DATE:  05/09/2022      COMPARISON STUDY: 5/7/2022    Frontal expiratory view of the chest shows the heart to be similar in   size. Right axillary clips are again noted.    The lungs show partial clearing of the right base with smaller left   effusion and there is no evidence of pneumothorax nor right pleural   effusion.    IMPRESSION:  Partial basilar clearing.    Thank you for the courtesy of this referral.    WILLARD HARVEY MD; Attending Interventional Radiologist  This document has been electronically signed. May  9 2022  4:19PM  ---------------------------------------------------------------------------------------------------------------  EXAM:  XR CHEST AP OR PA 1V                          PROCEDURE DATE:  05/07/2022      FINDINGS:    Right lower lung patchy opacity.  No pneumothorax. New small left pleural effusion.  Heart size within normal limits.  No acute osseous abnormality.    IMPRESSION:    Right lower lung patchy opacity.  New small left pleural effusion.    BETITO ABDI MD; Resident Radiologist  This document has been electronically signed.  QUINN POWERS MD; Attending Interventional Radiologist  This document has been electronically signed. May  7 2022 10:12AM  ---------------------------------------------------------------------------------------------------------------  EXAM:  DUPLEX SCAN EXT VEINS LOWER BI                          PROCEDURE DATE:  05/09/2022      INTERPRETATION:  CLINICAL INFORMATION: Swelling    COMPARISON: None available.    TECHNIQUE: Duplex sonography of the BILATERAL LOWER extremity veins with   color and spectral Doppler, with and without compression.    FINDINGS:    RIGHT:  Normal compressibility of the RIGHT common femoral, femoral and popliteal   veins.  Lower femoral venous segment was less well visualized.  Doppler examination shows normal spontaneous and phasic flow.  Calf veins were not able to be diagnostically visualized.    LEFT: Popliteal fossa cyst measures 3.1 x 2.6 x 4 cm  Normal compressibility of the LEFT common femoral, femoral and popliteal   veins.  Lower femoral and popliteal venous segments were less well visualized.  Doppler examination shows normal spontaneous and phasic flow.  Calf veins were not able to be diagnostically visualized.    IMPRESSION:  No evidence of deep venous thrombosis in either lower extremity venous   segments able to be examined.     AUGUSTIN EASTMAN MD; Attending Radiologist  This document has been electronically signed. May  9 2022  3:31PM  ---------------------------------------------------------------------------------------------------------------

## 2022-05-09 NOTE — DIETITIAN INITIAL EVALUATION ADULT - ORAL INTAKE PTA/DIET HISTORY
spoke with pt and daughter at bedside.   oatmeal and fruit for breakfast, 1/2 muffin for snack, 1/2 peanut butter and jelly sandwich or leftovers for dinner. her dinner consists of meals from the [employer] and family. she lives with her daughter. she drinks Boost once in while.

## 2022-05-09 NOTE — PROGRESS NOTE ADULT - ASSESSMENT
89 y/o with pmhx of HTN, CKD, Afib, HLD, nonsmoker, remote hx of asthma who presents with 2 days of SOB. She woke up with sudden SOB out of her sleep/ She was diagnosed with AF 2 weeks ago and started on Eliquis and Metoprolol. Pt now with chf.     1- CKD III   2- CHF   3- a fib       creatinine steady  fluid status improved  lasix 40 mg iv qd   metoprolol to continue   zosyn 3.375 G BID   strict I/O   trend creatinine and electrolytes daily  d/w pt daughter at bedside when seen earlier

## 2022-05-09 NOTE — DIETITIAN INITIAL EVALUATION ADULT - PERTINENT LABORATORY DATA
05-09    141  |  104  |  49<H>  ----------------------------<  117<H>  4.0   |  23  |  1.88<H>    Ca    9.3      09 May 2022 07:28    A1C with Estimated Average Glucose Result: 5.7 % (03-10-22 @ 12:17)

## 2022-05-10 LAB
ANION GAP SERPL CALC-SCNC: 17 MMOL/L — SIGNIFICANT CHANGE UP (ref 5–17)
BUN SERPL-MCNC: 57 MG/DL — HIGH (ref 7–23)
CALCIUM SERPL-MCNC: 9.4 MG/DL — SIGNIFICANT CHANGE UP (ref 8.4–10.5)
CHLORIDE SERPL-SCNC: 103 MMOL/L — SIGNIFICANT CHANGE UP (ref 96–108)
CO2 SERPL-SCNC: 23 MMOL/L — SIGNIFICANT CHANGE UP (ref 22–31)
CREAT SERPL-MCNC: 1.98 MG/DL — HIGH (ref 0.5–1.3)
EGFR: 23 ML/MIN/1.73M2 — LOW
GLUCOSE BLDC GLUCOMTR-MCNC: 167 MG/DL — HIGH (ref 70–99)
GLUCOSE BLDC GLUCOMTR-MCNC: 254 MG/DL — HIGH (ref 70–99)
GLUCOSE SERPL-MCNC: 143 MG/DL — HIGH (ref 70–99)
MAGNESIUM SERPL-MCNC: 1.8 MG/DL — SIGNIFICANT CHANGE UP (ref 1.6–2.6)
PHOSPHATE SERPL-MCNC: 4.3 MG/DL — SIGNIFICANT CHANGE UP (ref 2.5–4.5)
POTASSIUM SERPL-MCNC: 3.9 MMOL/L — SIGNIFICANT CHANGE UP (ref 3.5–5.3)
POTASSIUM SERPL-SCNC: 3.9 MMOL/L — SIGNIFICANT CHANGE UP (ref 3.5–5.3)
PROCALCITONIN SERPL-MCNC: 0.41 NG/ML — HIGH (ref 0.02–0.1)
SODIUM SERPL-SCNC: 143 MMOL/L — SIGNIFICANT CHANGE UP (ref 135–145)

## 2022-05-10 PROCEDURE — 99223 1ST HOSP IP/OBS HIGH 75: CPT

## 2022-05-10 RX ORDER — FUROSEMIDE 40 MG
40 TABLET ORAL ONCE
Refills: 0 | Status: COMPLETED | OUTPATIENT
Start: 2022-05-10 | End: 2022-05-10

## 2022-05-10 RX ORDER — FUROSEMIDE 40 MG
20 TABLET ORAL
Refills: 0 | Status: DISCONTINUED | OUTPATIENT
Start: 2022-05-10 | End: 2022-05-11

## 2022-05-10 RX ADMIN — Medication 25 MICROGRAM(S): at 05:21

## 2022-05-10 RX ADMIN — APIXABAN 2.5 MILLIGRAM(S): 2.5 TABLET, FILM COATED ORAL at 05:20

## 2022-05-10 RX ADMIN — ATORVASTATIN CALCIUM 40 MILLIGRAM(S): 80 TABLET, FILM COATED ORAL at 22:05

## 2022-05-10 RX ADMIN — APIXABAN 2.5 MILLIGRAM(S): 2.5 TABLET, FILM COATED ORAL at 18:08

## 2022-05-10 RX ADMIN — Medication 40 MILLIGRAM(S): at 18:55

## 2022-05-10 RX ADMIN — Medication 25 MILLIGRAM(S): at 05:22

## 2022-05-10 RX ADMIN — Medication 20 MILLIGRAM(S): at 12:43

## 2022-05-10 RX ADMIN — Medication 500 MILLIGRAM(S): at 12:42

## 2022-05-10 RX ADMIN — Medication 1200 MILLIGRAM(S): at 18:08

## 2022-05-10 RX ADMIN — Medication 1200 MILLIGRAM(S): at 05:25

## 2022-05-10 RX ADMIN — Medication 75 MILLIGRAM(S): at 12:41

## 2022-05-10 RX ADMIN — Medication 40 MILLIGRAM(S): at 05:22

## 2022-05-10 RX ADMIN — PIPERACILLIN AND TAZOBACTAM 25 GRAM(S): 4; .5 INJECTION, POWDER, LYOPHILIZED, FOR SOLUTION INTRAVENOUS at 00:10

## 2022-05-10 RX ADMIN — Medication 1 TABLET(S): at 12:41

## 2022-05-10 RX ADMIN — AMLODIPINE BESYLATE 5 MILLIGRAM(S): 2.5 TABLET ORAL at 05:21

## 2022-05-10 RX ADMIN — Medication 325 MILLIGRAM(S): at 12:41

## 2022-05-10 NOTE — PHYSICAL THERAPY INITIAL EVALUATION ADULT - IMPAIRMENTS FOUND, PT EVAL
aerobic capacity/endurance/cognitive impairment/gait, locomotion, and balance/joint integrity and mobility/muscle strength

## 2022-05-10 NOTE — PHYSICAL THERAPY INITIAL EVALUATION ADULT - IMPAIRMENTS CONTRIBUTING TO GAIT DEVIATIONS, PT EVAL
decreased endurance , intermittent min unsteady vc to stand upright , pulse ox 96-98 % RA/impaired balance/impaired postural control/decreased strength

## 2022-05-10 NOTE — PHYSICAL THERAPY INITIAL EVALUATION ADULT - PERTINENT HX OF CURRENT PROBLEM, REHAB EVAL
CT CHEST 5/9/22 pulmonary edema , B pleural effusions , partial lower compressive atelectasis ; TTE 5/8/22 mild MR and AR, mod AS ; Doppler LE's 5/9/22 (-) ; COVID (not detetced) 5/7/22

## 2022-05-10 NOTE — PROGRESS NOTE ADULT - SUBJECTIVE AND OBJECTIVE BOX
KOREY DIAZ  89y Female  MRN:[id number]    Patient is a 89y old  Female who presents with a chief complaint of SOB (08 May 2022 08:24)    HPI:  89 y/o with pmhx of HTN, Afib, HLD, nonsmoker, remote hx of asthma who presents with 2 days of SOB. According to the patient and daughter who is at bedside, last night she woke up with sudden SOB out of her sleep. She usually does not have any orthopnea or PND. She denies any cough, dyspnea, chest pain or fever. No sick contacts. She denies lightheadedness or syncope. She has mild chronic leg swelling L always more than R, that was attributed to immobility which has worsened over the last few days. She was diagnosed with AF 2 weeks ago and started on Eliquis and Metoprolol. They were worried about side effects so decided to come to the ED. She usually stays in a chair and is incontinent to urine.   (07 May 2022 13:48)      Patient seen and evaluated at bedside. No acute events overnight except as noted.    Interval HPI: feels better     PAST MEDICAL & SURGICAL HISTORY:  HTN (hypertension)    Diabetes mellitus type 2, noninsulin dependent    Diverticulitis    Hyperlipidemia    GERD (gastroesophageal reflux disease)    Glaucoma    Breast cancer    Urinary incontinence    Renal calculus or stone    Arthritis    Heart murmur    Renal calculi  s/p stone extraction 57 yrs ago    S/P lumpectomy of breast  right breast with node removal    S/P bladder repair  Interstim device placement 2010        REVIEW OF SYSTEMS:  as per hpi     VITALS:   Vital Signs Last 24 Hrs  T(C): 37.1 (10 May 2022 16:06), Max: 37.1 (10 May 2022 16:06)  T(F): 98.8 (10 May 2022 16:06), Max: 98.8 (10 May 2022 16:06)  HR: 63 (10 May 2022 16:06) (63 - 71)  BP: 124/67 (10 May 2022 16:06) (121/69 - 152/72)  BP(mean): --  RR: 18 (10 May 2022 16:06) (18 - 18)  SpO2: 94% (10 May 2022 16:06) (94% - 98%)      PHYSICAL EXAM:  GENERAL: NAD, well-developed  HEAD:  Atraumatic, Normocephalic  EYES: EOMI, PERRLA, conjunctiva and sclera clear  NECK: Supple, No JVD  CHEST/LUNG: coarse bs b/l  HEART: S1, S2;    ABDOMEN: Soft, Nontender, Nondistended; Bowel sounds present  EXTREMITIES:  2+ Peripheral Pulses, No clubbing, cyanosis, or edema  PSYCH: Normal affect  NEUROLOGY: AAOX3; non-focal  SKIN: No rashes or lesions    Consultant(s) Notes Reviewed:  [x ] YES  [ ] NO  Care Discussed with Consultants/Other Providers [ x] YES  [ ] NO    MEDS:   MEDICATIONS  (STANDING):  amLODIPine   Tablet 5 milliGRAM(s) Oral daily  apixaban 2.5 milliGRAM(s) Oral two times a day  ascorbic acid 500 milliGRAM(s) Oral daily  atorvastatin 40 milliGRAM(s) Oral at bedtime  ferrous    sulfate 325 milliGRAM(s) Oral daily  furosemide    Tablet 20 milliGRAM(s) Oral <User Schedule>  guaiFENesin ER 1200 milliGRAM(s) Oral every 12 hours  levothyroxine 25 MICROGram(s) Oral daily  metoprolol succinate ER 25 milliGRAM(s) Oral daily  Nephro-iain 1 Tablet(s) Oral daily  venlafaxine XR. 75 milliGRAM(s) Oral daily    MEDICATIONS  (PRN):        ALLERGIES:  Keflex (Rash; Hives)      LABS:                                                         9.4    6.72  )-----------( 215      ( 09 May 2022 07:24 )             30.5   05-10    143  |  103  |  57<H>  ----------------------------<  143<H>  3.9   |  23  |  1.98<H>    Ca    9.4      10 May 2022 06:39  Phos  4.3     05-10  Mg     1.8     05-10                < from: Transthoracic Echocardiogram (05.08.22 @ 14:37) >  --------------------  Conclusions:  1. Normal mitral valve. Mild mitral regurgitation.  2. Heavily calcified trileaflet aortic valve with decreased  opening. Peak transaortic valve gradient equals 41 mm Hg,  estimated aortic valve area equals 1 sqcm (by continuity  equation), aortic valve velocity time integral equals 70  cm, consistent with moderate aortic stenosis.  However the aortic valve appears heavily calcified and may  be closer to moderate-severe aortic stenosis.  Mild aortic  regurgitation.  3. Mildly dilated left atrium.  LA volume index = 39 cc/m2.  4. Normal left ventricular systolic function. No segmental  wall motion abnormalities.  5. Normal right ventricular size and function.  6. Normal pericardium with trace pericardial effusion.  7. Bilateral pleural effusions.  *** Compared with echocardiogram of 3/11/2022, no  significant changes noted.  ------------------------------------------------------    < end of copied text >

## 2022-05-10 NOTE — PROGRESS NOTE ADULT - ASSESSMENT
89 y/o with pmhx of HTN, CKD, Afib, HLD, nonsmoker, remote hx of asthma who presents with 2 days of SOB. She woke up with sudden SOB out of her sleep/ She was diagnosed with AF 2 weeks ago and started on Eliquis and Metoprolol. Pt now with chf.     1- CKD III   2- CHF   3- a fib       creatinine steady  fluid status improved  change lasix to 20 mg every other day  metoprolol to continue   off abx  PT  strict I/O   trend creatinine and electrolytes daily   89 y/o with pmhx of HTN, CKD, Afib, HLD, nonsmoker, remote hx of asthma who presents with 2 days of SOB. She woke up with sudden SOB out of her sleep/ She was diagnosed with AF 2 weeks ago and started on Eliquis and Metoprolol. Pt now with chf.     1- CKD III   2- CHF   3- a fib       creatinine steady  fluid status improved  to receive additional lasix 40 mg today   metoprolol to continue   off abx  PT  strict I/O   trend creatinine and electrolytes daily

## 2022-05-10 NOTE — PROGRESS NOTE ADULT - SUBJECTIVE AND OBJECTIVE BOX
[facility]   [phone]  RENAL FOLLOW UP NOTE  --------------------------------------------------------------------------------  Chief Complaint:    24 hour events/subjective:    Patient seen and examined  waylon coon cp    PAST HISTORY  --------------------------------------------------------------------------------  No significant changes to PMH, PSH, FHx, SHx, unless otherwise noted    ALLERGIES & MEDICATIONS  --------------------------------------------------------------------------------  Allergies    Keflex (Rash; Hives)    Intolerances      Standing Inpatient Medications  amLODIPine   Tablet 5 milliGRAM(s) Oral daily  apixaban 2.5 milliGRAM(s) Oral two times a day  ascorbic acid 500 milliGRAM(s) Oral daily  atorvastatin 40 milliGRAM(s) Oral at bedtime  ferrous    sulfate 325 milliGRAM(s) Oral daily  furosemide    Tablet 20 milliGRAM(s) Oral <User Schedule>  guaiFENesin ER 1200 milliGRAM(s) Oral every 12 hours  levothyroxine 25 MICROGram(s) Oral daily  metoprolol succinate ER 25 milliGRAM(s) Oral daily  Nephro-iain 1 Tablet(s) Oral daily  venlafaxine XR. 75 milliGRAM(s) Oral daily    PRN Inpatient Medications      REVIEW OF SYSTEMS  --------------------------------------------------------------------------------    Gen: denies fevers/chills,  CVS: denies chest pain/palpitations  Resp: denies SOB/Cough  GI: Denies N/V/Abd pain  : Denies dysuria/oliguria/hematuria    VITALS/PHYSICAL EXAM  --------------------------------------------------------------------------------  T(C): 36.8 (05-10-22 @ 05:17), Max: 37.1 (05-09-22 @ 15:45)  HR: 68 (05-10-22 @ 10:22) (68 - 71)  BP: 128/71 (05-10-22 @ 10:22) (120/68 - 152/72)  RR: 18 (05-10-22 @ 10:22) (18 - 18)  SpO2: 98% (05-10-22 @ 10:22) (94% - 98%)  Wt(kg): --        05-09-22 @ 07:01  -  05-10-22 @ 07:00  --------------------------------------------------------  IN: 580 mL / OUT: 850 mL / NET: -270 mL      Physical Exam:  	  	Gen: Non toxic comfortable appearing   	Pulm: decrease bs  no rales or ronchi or wheezing  	CV: no JVD. IRR, S1S2; no rub  	Abd: +BS, soft, nontender/nondistended  	: No suprapubic tenderness  	UE: Warm, no cyanosis  no clubbing,  no edema  	LE: Warm, no cyanosis  no clubbing, no edema    LABS/STUDIES  --------------------------------------------------------------------------------              9.4    6.72  >-----------<  215      [05-09-22 @ 07:24]              30.5     143  |  103  |  57  ----------------------------<  143      [05-10-22 @ 06:39]  3.9   |  23  |  1.98        Ca     9.4     [05-10-22 @ 06:39]      Mg     1.8     [05-10-22 @ 06:39]      Phos  4.3     [05-10-22 @ 06:39]            Creatinine Trend:  SCr 1.98 [05-10 @ 06:39]  SCr 1.88 [05-09 @ 07:28]  SCr 1.87 [05-08 @ 16:43]  SCr 1.62 [05-07 @ 08:38]      < from: CT Chest No Cont (05.09.22 @ 20:10) >  ACC: [id number] EXAM:  CT CHEST                          PROCEDURE DATE:  05/09/2022          INTERPRETATION:  INDICATION: Pneumonia versus CHF    TECHNIQUE: A volumetric CT acquisition of the chest was obtained from the   thoracic inlet to the upperabdomen without the use of intravenous   contrast. Coronal and sagittal reconstructed images are provided.    COMPARISON: Chest CT 3/9/2022    FINDINGS:    Lungs/Airways/Pleura: The central airways are patent. There are small   pleural effusions, newfrom 3/9/2022 abdominal CT, with partial lower   lobe compressive atelectasis. There are diffuse peribronchial   peribronchovascular groundglass opacity with mild septal thickening,   likely pulmonary edema. There are few clusters of small nodules teresa   background of subsegmental bronchial impaction, likely due to small   airways disease.    Mediastinum/Lymph nodes: No thoracic adenopathy.    Heart and Vessels: Mild cardiomegaly. Extensive coronary arterial and   aortic valvular calcification ispresent. Hypodensity of the blood pool   in relation to left ventricular myocardium suggests underlying anemia.   The great vessels are normal in size. No pericardial effusion.    Upper Abdomen: Cholelithiasis. Partially imaged large right parapelvic  renal cyst. Extensive visceral artery calcification.    Osseous structures and Soft Tissues: Degenerative changes without   aggressive osseous lesions. Remote left posterior 11th rib fracture.    IMPRESSION:  Pulmonary edema with small pleural effusions and partial lower lobe   compressive atelectasis.    --- End of Report ---            ELISA BLACKBURN M.D., Attending Radiologist  This document has been electronically signed. May 10 2022  8:52AM    < end of copied text >

## 2022-05-10 NOTE — PROGRESS NOTE ADULT - SUBJECTIVE AND OBJECTIVE BOX
[facility] - [facility] - PROGRESS NOTE    CHIEF COMPLAINT: abnormal CXR; dyspnea; hypoxemia; pulmonary edema; pleural effusion; atelectasisasthma    INTERVAL HISTORY: no shortness of breath or hypoxemia on room air; walked a brief distance with physical therapy; no cough, sputum production, hemoptysis, chest congestion or wheeze; no fevers, chills or sweats; no chest pain/pressure or palpitations; better appetite; ECHO -> preserved LVEF - moderate-severe aortic stenosis - moderate diastolic dysfunction - bilateral pleural effusions; CT -> pulmonary edema with small pleural effusions and partial lower lobe compressive atelectasis.    REVIEW OF SYSTEMS:  Constitutional: As per interval history  HEENT: Within normal limits  CV: As per interval history  Resp: As per interval history  GI: Within normal limits   : Within normal limits  Musculoskeletal: Within normal limits  Skin: Within normal limits  Neurological: Within normal limits  Psychiatric: Within normal limits  Endocrine: Within normal limits  Hematologic/Lymphatic: Within normal limits  Allergic/Immunologic: Within normal limits    MEDICATIONS:     Pulmonary "  guaiFENesin ER 1200 milliGRAM(s) Oral every 12 hours    Anti-microbials:    Cardiovascular:  amLODIPine   Tablet 5 milliGRAM(s) Oral daily  furosemide    Tablet 20 milliGRAM(s) Oral <User Schedule>  metoprolol succinate ER 25 milliGRAM(s) Oral daily    Other:  apixaban 2.5 milliGRAM(s) Oral two times a day  ascorbic acid 500 milliGRAM(s) Oral daily  atorvastatin 40 milliGRAM(s) Oral at bedtime  ferrous    sulfate 325 milliGRAM(s) Oral daily  levothyroxine 25 MICROGram(s) Oral daily  Nephro-iain 1 Tablet(s) Oral daily  venlafaxine XR. 75 milliGRAM(s) Oral daily    OBJECTIVE:    POCT Blood Glucose.: 254 mg/dL (10 May 2022 12:42)  POCT Blood Glucose.: 167 mg/dL (10 May 2022 08:35)      PHYSICAL EXAM:       ICU Vital Signs Last 24 Hrs  T(C): 36.6 (10 May 2022 11:47), Max: 37.1 (09 May 2022 15:45)  T(F): 97.9 (10 May 2022 11:47), Max: 98.7 (09 May 2022 15:45)  HR: 65 (10 May 2022 11:47) (65 - 71)  BP: 121/69 (10 May 2022 11:47) (120/68 - 152/72)  BP(mean): --  ABP: --  ABP(mean): --  RR: 18 (10 May 2022 11:47) (18 - 18)  SpO2: 95% (10 May 2022 11:47) (94% - 98%) on room air at rest     General: Awake. Alert. Cooperative. No distress. Appears stated age. Cachectic. Sitting in the chair.	  HEENT: Atraumatic. Bitemporal wasting. Anicteric. Normal oral mucosa. PERRL. EOMI.  Neck: Supple. Trachea midline. Thyroid without enlargement/tenderness/nodules. No carotid bruit. No JVD. Loss of supraclavicular fat pads	  Cardiovascular: Irregularly irregular rate and rhythm. S1 S2 normal. III/VI systolic murmur  Respiratory: Respirations unlabored. Bilateral rales. Kyphosis.  Abdomen: Soft. Non-tender. Non-distended. No organomegaly. No masses. Normal bowel sounds.  Extremities: Warm to touch. No clubbing or cyanosis. No pedal edema.  Pulses: 2+ peripheral pulses all extremities.	  Skin: Multiple ecchymoses especially on the legs  Lymph Nodes: Cervical, supraclavicular and axillary nodes normal  Neurological: Motor and sensory examination equal and normal. A and O x 3  Psychiatry: Appropriate mood and affect.    LABS:                          9.4    6.72  )-----------( 215      ( 09 May 2022 07:24 )             30.5     CBC    WBC  6.72 <==, 7.18 <==, 8.62 <==    Hemoglobin  9.4 <<==, 9.1 <<==, 9.8 <<==    Hematocrit  30.5 <==, 29.7 <==, 33.0 <==    Platelets  215 <==, 192 <==, 201 <==      143  |  103  |  57<H>  ----------------------------<  143<H>    05-10  3.9   |  23  |  1.98<H>      LYTES    sodium  143 <==, 141 <==, 141 <==, 140 <==    potassium   3.9 <==, 4.0 <==, 4.7 <==, 5.0 <==    chloride  103 <==, 104 <==, 104 <==, 104 <==    carbon dioxide  23 <==, 23 <==, 26 <==, 21 <==    =============================================================================================  RENAL FUNCTION:    Creatinine:   1.98  <<==, 1.88  <<==, 1.87  <<==, 1.62  <<==    BUN:   57 <==, 49 <==, 47 <==, 45 <==    ============================================================================================    calcium   9.4 <==, 9.3 <==, 9.1 <==, 9.7 <==    phos   4.3 <==, 4.1 <==    mag   1.8 <==, 1.9 <==    ============================================================================================  LFTs    AST:   23 <==     ALT:  22  <==     AP:  86  <=    Bili:  0.4  <=    Procalcitonin, Serum: 0.41 ng/mL (05-10 @ 06:39)    Serum Pro-Brain Natriuretic Peptide: 5638 pg/mL (05-07 @ 08:38)    CARDIAC MARKERS ( 07 May 2022 08:38 )  CPK x     /CKMB x     /CKMB Units x        troponin 51 ng/L    < from: Transthoracic Echocardiogram (05.08.22 @ 14:37) >    Patient name: KOREY DIAZ  YOB: 1929   Age: 89 (F)   MR#: [id number]  Study Date: 5/8/2022  Location: [address] [place], [place] [id number]: Gadiel Schwartz [facility]  Study quality: Technically fair  Referring Physician: Teo Monson MD  Blood Pressure: 165/72 mmHg  Height: 158 cm  Weight: 53 kg  BSA: 1.5 m2  Heart Rate: 70 mmHg  ------------------------------------------------------------------------  PROCEDURE: Transthoracic echocardiogram with 2-D, M-Mode  and complete spectral and color flow Doppler.  INDICATION: Cardiac murmur, unspecified (R01.1)  ------------------------------------------------------------------------  Dimensions:    Normal Values:  LA:     3.6    2.0 - 4.0 cm  Ao:     3.0    2.0 - 3.8 cm  SEPTUM: 1.0    0.6 -1.2 cm  PWT:    1.0    0.6 - 1.1 cm  LVIDd:  4.0    3.0 - 5.6 cm  LVIDs:  2.8    1.8 - 4.0 cm  Derived variables:  LVMI: 84 g/m2  RWT: 0.50  Fractional short: 30 %  EF (Moran Rule): 67 %Doppler Peak Velocity (m/sec):  AoV=3.2  ------------------------------------------------------------------------  Observations:  Mitral Valve: Normal mitral valve. Mild mitral  regurgitation.  Aortic Valve/Aorta: Heavily calcified trileaflet aortic  valve with decreased opening. Peak transaortic valve  gradientequals 41 mm Hg, estimated aortic valve area  equals 1 sqcm (by continuity equation), aortic valve  velocity time integral equals 70 cm, consistent with  moderate aortic stenosis.  However the aortic valve appears heavily calcified and may  be closer to moderate-severe aortic stenosis.  Mild aortic  regurgitation.  Peak left ventricular outflow tract  gradient equals 3 mm Hg, LVOT velocity time integral equals  23 cm.  Aortic Root: 3 cm.  Left Atrium: Mildly dilated left atrium.  LA volume index =  39 cc/m2.  Left Ventricle: Normal left ventricular systolic function.  No segmental wall motion abnormalities. Normal left  ventricular internal dimensions and wall thicknesses.  Moderate diastolic dysfunction (Stage II).  Right Heart: Normal right atrium. Normal right ventricular  size and function. Normal tricuspid valve. Minimal  tricuspid regurgitation. Normal pulmonic valve. Minimal  pulmonic regurgitation.  Pericardium/Pleura: Normal pericardium with trace  pericardial effusion.  Bilateral pleural effusions.  Hemodynamic: Estimated right atrial pressure is 8 mm Hg.  Color Doppler demonstrates no evidence of a patent foramen  ovale.  ------------------------------------------------------------------------  Conclusions:  1. Normal mitral valve. Mild mitral regurgitation.  2. Heavily calcified trileaflet aortic valve with decreased  opening. Peak transaortic valve gradient equals 41 mm Hg,  estimated aortic valve area equals 1 sqcm (by continuity  equation), aortic valve velocity time integral equals 70  cm, consistent with moderate aortic stenosis.  However the aortic valve appears heavily calcified and may  be closer to moderate-severe aortic stenosis.  Mild aortic  regurgitation.  3. Mildly dilated left atrium.  LA volume index = 39 cc/m2.  4. Normal left ventricular systolic function. No segmental  wall motion abnormalities.  5. Normal right ventricular size and function.  6. Normal pericardium with trace pericardial effusion.  7. Bilateral pleural effusions.  *** Compared with echocardiogram of 3/11/2022, no  significant changes noted.  ------------------------------------------------------------------------  Confirmed on  5/8/2022 - 17:38:34 by Osito Argueta M.D.  ------------------------------------------------------------------------  ---------------------------------------------------------------------------------------------------------------  MICROBIOLOGY:     Respiratory Viral Panel with COVID-19 by ERICK (05.07.22 @ 08:38)   Rapid RVP Result: [facility]   SARS-CoV-2: [facility]  This Respiratory Panel uses polymerase chain reaction (PCR) to detect for   adenovirus; coronavirus (HKU1, NL63, 229E, OC43); human metapneumovirus   (hMPV); human enterovirus/rhinovirus (Entero/RV); influenza A; influenza   A/H1; influenza A/H3; influenza A/H1-2009; influenza B; parainfluenza   viruses 1, 2, 3, 4; respiratory syncytial virus; Mycoplasma pneumoniae;   Chlamydophila pneumoniae; and SARS-CoV-2.       RADIOLOGY:  [x] Chest radiographs reviewed and interpreted by me    EXAM:  CT CHEST                          PROCEDURE DATE:  05/09/2022      FINDINGS:    Lungs/Airways/Pleura: The central airways are patent. There are small   pleural effusions, newfrom 3/9/2022 abdominal CT, with partial lower   lobe compressive atelectasis. There are diffuse peribronchial   peribronchovascular groundglass opacity with mild septal thickening,   likely pulmonary edema. There are few clusters of small nodules teresa   background of subsegmental bronchial impaction, likely due to small   airways disease.    Mediastinum/Lymph nodes: No thoracic adenopathy.    Heart and Vessels: Mild cardiomegaly. Extensive coronary arterial and   aortic valvular calcification ispresent. Hypodensity of the blood pool   in relation to left ventricular myocardium suggests underlying anemia.   The great vessels are normal in size. No pericardial effusion.    Upper Abdomen: Cholelithiasis. Partially imaged large right parapelvic  renal cyst. Extensive visceral artery calcification.    Osseous structures and Soft Tissues: Degenerative changes without   aggressive osseous lesions. Remote left posterior 11th rib fracture.    IMPRESSION:  Pulmonary edema with small pleural effusions and partial lower lobe   compressive atelectasis.    ELISA BLACKBURN M.D., Attending Radiologist  This document has been electronically signed. May 10 2022  8:52AM  ---------------------------------------------------------------------------------------------------------------  EXAM:  XR CHEST PORTABLE ROUTINE 1V                          PROCEDURE DATE:  05/09/2022      COMPARISON STUDY: 5/7/2022    Frontal expiratory view of the chest shows the heart to be similar in   size. Right axillary clips are again noted.    The lungs show partial clearing of the right base with smaller left   effusion and there is no evidence of pneumothorax nor right pleural   effusion.    IMPRESSION:  Partial basilar clearing.    Thank you for the courtesy of this referral.    WILLARD HARVEY MD; Attending Interventional Radiologist  This document has been electronically signed. May  9 2022  4:19PM  ---------------------------------------------------------------------------------------------------------------  EXAM:  XR CHEST AP OR PA 1V                          PROCEDURE DATE:  05/07/2022      FINDINGS:    Right lower lung patchy opacity.  No pneumothorax. New small left pleural effusion.  Heart size within normal limits.  No acute osseous abnormality.    IMPRESSION:    Right lower lung patchy opacity.  New small left pleural effusion.    BETITO ABDI MD; Resident Radiologist  This document has been electronically signed.  QUINN POWERS MD; Attending Interventional Radiologist  This document has been electronically signed. May  7 2022 10:12AM  ---------------------------------------------------------------------------------------------------------------  EXAM:  DUPLEX SCAN EXT VEINS LOWER BI                          PROCEDURE DATE:  05/09/2022      FINDINGS:    RIGHT:  Normal compressibility of the RIGHT common femoral, femoral and popliteal   veins.  Lower femoral venous segment was less well visualized.  Doppler examination shows normal spontaneous and phasic flow.  Calf veins were not able to be diagnostically visualized.    LEFT: Popliteal fossa cyst measures 3.1 x 2.6 x 4 cm  Normal compressibility of the LEFT common femoral, femoral and popliteal   veins.  Lower femoral and popliteal venous segments were less well visualized.  Doppler examination shows normal spontaneous and phasic flow.  Calf veins were not able to be diagnostically visualized.    IMPRESSION:  No evidence of deep venous thrombosis in either lower extremity venous   segments able to be examined.    AUGUSTIN EASTMAN MD; Attending Radiologist  This document has been electronically signed. May  9 2022  3:31PM  ---------------------------------------------------------------------------------------------------------------

## 2022-05-10 NOTE — PHYSICAL THERAPY INITIAL EVALUATION ADULT - GAIT DEVIATIONS NOTED, PT EVAL
decreased grant/increased time in double stance/decreased step length/decreased stride length/decreased weight-shifting ability

## 2022-05-10 NOTE — CONSULT NOTE ADULT - SUBJECTIVE AND OBJECTIVE BOX
Patient seen and evaluated @ 9:30 am on [address]  Chief Complaint: shortness of breath    HPI:  89 y/o with pmhx of HTN, Afib, HLD, nonsmoker, remote hx of asthma who presents with 2 days of SOB. According to the patient and daughter who is at bedside, last night she woke up with sudden SOB out of her sleep. She usually does not have any orthopnea or PND. She denies any cough, dyspnea, chest pain or fever. No sick contacts. She denies lightheadedness or syncope. She has mild chronic leg swelling L always more than R, that was attributed to immobility which has worsened over the last few days. She was diagnosed with AF 2 weeks ago and started on Eliquis and Metoprolol. They were worried about side effects so decided to come to the ED. She usually stays in a chair and is incontinent to urine.   (07 May 2022 13:48)    PMH:   HTN (hypertension)    Diabetes mellitus type 2, noninsulin dependent    Diverticulitis    Hyperlipidemia    GERD (gastroesophageal reflux disease)    Glaucoma    Breast cancer    Neurogenic bladder    Urinary incontinence    Renal calculus or stone    Arthritis    Heart murmur      PSH:   Renal calculi    S/P lumpectomy of breast    S/P bladder repair      Medications:   amLODIPine   Tablet 5 milliGRAM(s) Oral daily  apixaban 2.5 milliGRAM(s) Oral two times a day  ascorbic acid 500 milliGRAM(s) Oral daily  atorvastatin 40 milliGRAM(s) Oral at bedtime  ferrous    sulfate 325 milliGRAM(s) Oral daily  furosemide    Tablet 20 milliGRAM(s) Oral <User Schedule>  guaiFENesin ER 1200 milliGRAM(s) Oral every 12 hours  levothyroxine 25 MICROGram(s) Oral daily  metoprolol succinate ER 25 milliGRAM(s) Oral daily  Nephro-iain 1 Tablet(s) Oral daily  venlafaxine XR. 75 milliGRAM(s) Oral daily    Allergies:  Keflex (Rash; Hives)    FAMILY HISTORY:  No pertinent family history in first degree relatives    Social History: , lives with adult daughter  Smoking: nonsmoker  Alcohol: no EtOH  Drugs: no illicit drug use    Review of Systems:    Constitutional: No fever, chills, fatigue, or changes in weight  HEENT: No blurry vision, eye pain, headache, runny nose, or sore throat  Respiratory: No shortness of breath, cough, or wheezing  Cardiovascular: No chest pain or palpitations  Gastrointestinal: No nausea, vomiting, diarrhea, or abdominal pain  Genitourinary: No dysuria or incontinence  Extremities: No lower extremity swelling  Neurologic: No focal findings  Lymphatic: No lymphadenopathy   Skin: No rash  Psychiatry: No anxiety or depression  10 point review of systems is otherwise negative except as mentioned above            Physical Exam:  T(C): 36.8 (05-10-22 @ 19:33), Max: 37.1 (05-10-22 @ 16:06)  HR: 67 (05-10-22 @ 19:33) (63 - 69)  BP: 129/73 (05-10-22 @ 19:33) (121/69 - 152/72)  RR: 18 (05-10-22 @ 19:33) (18 - 18)  SpO2: 97% (05-10-22 @ 19:33) (94% - 98%)  Wt(kg): --    05-09 @ 07:01  -  05-10 @ 07:00  --------------------------------------------------------  IN: 580 mL / OUT: 850 mL / NET: -270 mL    05-10 @ 07:01  -  05-10 @ 21:58  --------------------------------------------------------  IN: 720 mL / OUT: 600 mL / NET: 120 mL      Daily     Daily     Appearance: Normal, well groomed, NAD  Eyes: PERRLA, EOMI, pink conjunctiva, no scleral icterus   HENT: Normal oral mucosa  Cardiovascular: RRR, S1, S2, no murmur, rub, or gallop; no edema; no JVD  Respiratory: Clear to auscultation bilaterally  Gastrointestinal: Soft, non-tender, non-distended, BS+  Musculoskeletal: No clubbing or joint deformity   Neurologic: No focal weakness  Lymphatic: No lymphadenopathy  Psychiatry: AAOx2 with appropriate mood and affect  Skin: No rashes, ecchymoses, or cyanosis    Cardiovascular Diagnostic Testing:  ECG: sinus rhythm    Echo:  < from: Transthoracic Echocardiogram (05.08.22 @ 14:37) >  ------------------------------------------------------------------------  Dimensions:    Normal Values:  LA:     3.6    2.0 - 4.0 cm  Ao:     3.0    2.0 - 3.8 cm  SEPTUM: 1.0    0.6 -1.2 cm  PWT:    1.0    0.6 - 1.1 cm  LVIDd:  4.0    3.0 - 5.6 cm  LVIDs:  2.8    1.8 - 4.0 cm  Derived variables:  LVMI: 84 g/m2  RWT: 0.50  Fractional short: 30 %  EF (Moran Rule): 67 %Doppler Peak Velocity (m/sec):  AoV=3.2  ------------------------------------------------------------------------  Observations:  Mitral Valve: Normal mitral valve. Mild mitral  regurgitation.  Aortic Valve/Aorta: Heavily calcified trileaflet aortic  valve with decreased opening. Peak transaortic valve  gradientequals 41 mm Hg, estimated aortic valve area  equals 1 sqcm (by continuity equation), aortic valve  velocity time integral equals 70 cm, consistent with  moderate aortic stenosis.  However the aortic valve appears heavily calcified and may  be closerto moderate-severe aortic stenosis.  Mild aortic  regurgitation.  Peak left ventricular outflow tract  gradient equals 3 mm Hg, LVOT velocity time integral equals  23 cm.  Aortic Root: 3 cm.  Left Atrium: Mildly dilated left atrium.  LA volume index =  39 cc/m2.  Left Ventricle: Normal left ventricular systolic function.  No segmental wall motion abnormalities. Normal left  ventricular internal dimensions and wall thicknesses.  Moderate diastolic dysfunction (Stage II).  Right Heart: Normal rightatrium. Normal right ventricular  size and function. Normal tricuspid valve. Minimal  tricuspid regurgitation. Normal pulmonic valve. Minimal  pulmonic regurgitation.  Pericardium/Pleura: Normal pericardium with trace  pericardial effusion.  Bilateral pleural effusions.  Hemodynamic: Estimated right atrial pressure is 8 mm Hg.  Color Doppler demonstrates no evidence of a patent foramen  ovale.  ------------------------------------------------------------------------  Conclusions:  1. Normal mitral valve. Mild mitral regurgitation.  2. Heavily calcified trileaflet aortic valve with decreased  opening. Peak transaortic valve gradient equals 41 mm Hg,  estimated aortic valve area equals 1 sqcm (by continuity  equation), aortic valve velocity time integral equals 70  cm, consistent with moderate aortic stenosis.  However the aortic valve appears heavily calcified and may  be closer to moderate-severe aortic stenosis.  Mild aortic  regurgitation.  3. Mildly dilated left atrium.  LA volume index = 39 cc/m2.  4. Normal left ventricular systolic function. No segmental  wall motion abnormalities.  5. Normal right ventricular size and function.  6. Normal pericardium with trace pericardial effusion.  7. Bilateral pleural effusions.  *** Compared with echocardiogram of 3/11/2022, no  significant changes noted.  ------------------------------------------------------------------------  Confirmed on  5/8/2022 - 17:38:34 by Osito Argueta M.D.  ------------------------------------------------------------------------    < end of copied text >      Stress Testing:    Cath:    Interpretation of Telemetry:    Imaging:    Labs:                        9.4    6.72  )-----------( 215      ( 09 May 2022 07:24 )             30.5     05-10    143  |  103  |  57<H>  ----------------------------<  143<H>  3.9   |  23  |  1.98<H>    Ca    9.4      10 May 2022 06:39  Phos  4.3     05-10  Mg     1.8     05-10            Serum Pro-Brain Natriuretic Peptide: 5638 pg/mL (05-07 @ 08:38)        
[facility] - [facility]  CONSULT NOTE    CHIEF COMPLAINT:  shortness of breath.    HPI:  Pt is a 89 y/o woman with a h/o distant asthma - no issues for 30 years or more, HTN, DM, increased cholesterol, GERD, glaucoma, breast cancer, arthritis, renal insufficiency followed by Dr. Dewitt, aortic stenosis - followed by Dr. Veto Flores, recently diagnosed with new onset atrial fibrillation treated with metoprolol and eliquis as outpt.  She was recently hospitalized in March for a UTI and acute on chronic renal insufficiency due to Bactrim.  She lives with her daughter who helps take care of her.  She was in her usoh until last pm, when she woke up in the middle of the night short of breath, with mucus and gurgling in throat, called her daughter, calmed down and went back to sleep.  However this am it occurred again and came to the ER when found to be hypoxic with mild LE edema.  CXR is consistent with a right lower lobe pneumonia.    She is not coughing more than usual, denies any chest pain, sore throat, mucus production or wheezing.  Her daughter had a cold 2 weeks ago but patient did not catch it.  There was an issue of aspiration with thin liquids but daughter states has not been an issue recently.  She is feeling somewhat improved in the ER.    PMHX:  HTN (hypertension)    Diabetes mellitus type 2, noninsulin dependent    Diverticulitis    Hyperlipidemia    GERD (gastroesophageal reflux disease)    Glaucoma    Breast cancer    Neurogenic bladder    Urinary incontinence    Renal calculus or stone    Arthritis    Heart murmur        PSHX:  Renal calculi    S/P lumpectomy of breast    S/P bladder repair        FAMILY HISTORY:  No pertinent family history in first degree relatives        SOCIAL HISTORY:    Pulmonary Medications:       Antimicrobials:  piperacillin/tazobactam IVPB. 3.375 Gram(s) IV Intermittent once      Cardiology:  amLODIPine   Tablet 5 milliGRAM(s) Oral daily  furosemide   Injectable 40 milliGRAM(s) IV Push daily  metoprolol succinate ER 25 milliGRAM(s) Oral daily      Other:  apixaban 2.5 milliGRAM(s) Oral two times a day  atorvastatin 40 milliGRAM(s) Oral at bedtime  ferrous    sulfate 325 milliGRAM(s) Oral daily  levothyroxine 25 MICROGram(s) Oral daily  venlafaxine XR. 75 milliGRAM(s) Oral daily      Allergies    Keflex (Rash; Hives)    Intolerances        HOME MEDICATIONS:    REVIEW OF SYSTEMS: (Negative unless otherwise delineated)     Constitutional: No fevers, chills, sweats. weight loss, fatigue, weakness, malaise, lethargy  Eyes: No itching or discharge from the eyes, visual change, blurred vision, double vision, yellow sclerae, eye pain.  ENT: No ear pain, ear discharge, tinnitus, change in hearing, nasal congestion, runny nose, post nasal drip, epistaxis, sinus pain, sore throat, globus sensation, dental problems, oral ulcers/lesions  CV: No chest pain, chest pressure, chest discomfort, palpitations, lightheadedness/dizziness, syncope, lower extremity edema, inability to lay flat to sleep, awakening from sleep unable to sleep, claudication.  Resp: No dyspnea at rest, she has dyspnea on exertion as well as chest congestion - no wheeze, cough , or sputum production, No chest pain with respiration, hemoptysis  GI: No anorexia, nausea, vomiting, constipation, abdominal pain. blood in the stool, diarrhea, melena, change in bowel habits or stools, dysphagia, odynophagia, heartburn  : No burning on urination, urinary urgency, urinary frequency, urinary hesitancy, incontinence, blood in the urine, waking up at night to urinate, change in urine color, urinary retention.   Neurological: No headache, dizziness, syncope, paralysis, unsteady gait, muscle weakness, change in bowel or bladder control, numbness or tingling in the extremities, change in smell or taste, change in speech, tremor, memory change/loss, vertigo, frequent falls, confusion  MSK: No muscle pain, back pain, joint pain, joint stiffness, joint swelling   Hematologic: No anemia, bleeding, bruising, ecchymoses.   Lymphatics: No enlarged nodes, history of splenectomy  Psychiatric: No depression, anxiety, bipolar disorde, schizophrenia, hallucinations, suicidal/homicidal thoughts  Endocrinologic: No weight change, sweating, cold or heat intolerance, polyuria, polydipsia, polyphagia, abnormal hair growth, change in nails, tremor, neck pain or swelling.   Allergies: No hives, eczema, allergic rhinitis  Integumentary: No rash, new/growing/changing skin lesions, bruising, pruritis, decubiti                                                                                                                                                                                     OBJECTIVE:      ICU Vital Signs Last 24 Hrs  T(C): 36.7 (07 May 2022 13:48), Max: 36.7 (07 May 2022 07:57)  T(F): 98.1 (07 May 2022 13:48), Max: 98.1 (07 May 2022 07:57)  HR: 70 (07 May 2022 13:48) (70 - 101)  BP: 155/67 (07 May 2022 13:48) (155/67 - 161/73)  BP(mean): 96 (07 May 2022 13:48) (96 - 96)  ABP: --  ABP(mean): --  RR: 20 (07 May 2022 13:48) (20 - 20)  SpO2: 96% (07 May 2022 13:48) (95% - 99%)      I&O's Detail    Daily Height in cm: 157.48 (07 May 2022 07:57)    Daily   CAPILLARY BLOOD GLUCOSE          PHYSICAL EXAM:  General: Awake, alert, cooperative, no distress, appears stated age   Head: Atraumatic, normocephalic  Chest wall: No tenderness or deformity  Respiratory: Respirations unlabored; bronchial bs at both bases with occ rales, no wheeze or rhonchi  Cardiovascular: Regular rate and rhythm, S1 S2 normal, 3/6 pao  Abdomen: Soft, non-tender, non-distended. No organomegaly. No masses. Normal bowel sounds  Extremities: Warm to touch. No clubbing or cyanosis, 1+ edema  Neurological: Grossly intact A and O x 3  Psychiatry: Appropriate mood and affect.    LABS:                        9.8    8.62  )-----------( 201      ( 07 May 2022 08:38 )             33.0     05-07    140  |  104  |  45<H>  ----------------------------<  192<H>  5.0   |  21<L>  |  1.62<H>    Ca    9.7      07 May 2022 08:38  Phos  4.1     05-07  Mg     1.9     05-07    TPro  6.9  /  Alb  3.6  /  TBili  0.4  /  DBili  x   /  AST  23  /  ALT  22  /  AlkPhos  86  05-07      RADIOLOGY:  [x] Reviewed and interpreted by me  rad< from: Xray Chest 1 View AP/PA (05.07.22 @ 09:01) >  ACC: [id number] EXAM:  XR CHEST AP OR PA 1V                          PROCEDURE DATE:  05/07/2022          INTERPRETATION:  CLINICAL INFORMATION: Dyspnea.    TECHNIQUE: Single portable view of the chest.    COMPARISON: Chest x-ray from 3/9/2022.    FINDINGS:    Right lower lung patchy opacity.  No pneumothorax. New small left pleural effusion.  Heart size within normal limits.  No acute osseous abnormality.    IMPRESSION:    Right lower lung patchy opacity.  New small left pleural effusion.    ---End of Report ---          BETITO ABDI MD; Resident Radiologist  This document has been electronically signed.  QUINN POWERS MD; Attending Interventional Radiologist  This document has been electronically signed. May  7 2022 10:12AM    < end of copied text >

## 2022-05-10 NOTE — PHYSICAL THERAPY INITIAL EVALUATION ADULT - IMPAIRED TRANSFERS: SIT/STAND, REHAB EVAL
decreased endurance , sit-stand x2 at RW min of 1 mild unsteady/impaired balance/impaired postural control/decreased strength

## 2022-05-10 NOTE — PHYSICAL THERAPY INITIAL EVALUATION ADULT - DISCHARGE DISPOSITION, PT EVAL
to increase fxl mobility , strength, balance, endurance , fall prevntion education continued ; pt dtr Rafaela at b/s wish to speak with family before agreeing to home PT José Jackson informed and does not want Outpt PT if decline Home PT/home w/ assist/home w/ home PT

## 2022-05-10 NOTE — PHYSICAL THERAPY INITIAL EVALUATION ADULT - PRECAUTIONS/LIMITATIONS, REHAB EVAL
89 y/o woman with a h/o distant asthma - no issues for 30 years or more, HTN, DM, increased cholesterol, GERD, glaucoma, breast cancer, arthritis, renal insufficiency followed by Dr. Dewitt, aortic stenosis - followed by Dr. Veto Flores, recently diagnosed with new onset atrial fibrillation treated with metoprolol and eliquis as outpt.  She was recently hospitalized in March for a UTI and acute on chronic renal insufficiency due to Bactrim.  She lives with her daughter who helps take care of her.  She was in her usoh until last pm, when she woke up in the middle of the night short of breath, with mucus and gurgling in throat, called her daughter, calmed down and went back to sleep.  However this am it occurred again and came to the ER when found to be hypoxic with mild LE edema.  CXR is consistent with a right lower lobe pneumonia./aspiration precautions/fall precautions

## 2022-05-10 NOTE — PROGRESS NOTE ADULT - NS ATTEND AMEND GEN_ALL_CORE FT
cr steady   fluid status improving   change lasix to 20 mg po qod   d/w Dr. Patricio
pt with higher creatinine however, ct scan still reveals pulm edema to receive lasix 40 mg po x 1 today and cont higher doses of diuretics given pulm edema persists

## 2022-05-10 NOTE — PHYSICAL THERAPY INITIAL EVALUATION ADULT - GENERAL OBSERVATIONS, REHAB EVAL
pt received in bed nad all siderails up pt just finished w/ rn staff doing am care ; pt on 2 L nc o2 asked by imelda Woody to take off nc o2 for session and monitor O2 sats pt on 2 L 100 % pulse ox , at rest on RA 98 % and 96-98 % RA when amb w/ RW w/ assist below rn inform and pt remain off nc o2 , dtr Rafaela at b/s

## 2022-05-10 NOTE — CONSULT NOTE ADULT - ASSESSMENT
Impression:  Pt is a 89 y/o woman with a h/o distant asthma - no issues for 30 years or more, HTN, DM, increased cholesterol, GERD, glaucoma, breast cancer, arthritis, renal insufficiency followed by Dr. Dewitt, aortic stenosis - followed by Dr. Veto Flores, recently diagnosed with new onset atrial fibrillation treated with metoprolol and eliquis as outpt.  Now appears to have developed pneumonia, it is unclear if she aspirated; however, she was in the hospital recently and need to cover for resistant pathogens.  She has no signs of acute bronchospasm, she may be somewhat mildly fluid overloaded with mild elevation in bnp, but need to be careful with aortic stenosis.      Recommendations:  Continue zosyn which was started in the ER, 3.327mg iv q12 day #1.  Await all cultures  Supplemental 02 to keep sats greater than 90%  No need for systemic steroids.  Await renal input.  Continue lasix 40mg ivss daily for now, f/u lytes  Will add robitussin qid to help mobilize mucus  Continue eliquis and meds for rate control  GI prophylaxis.    Will repeat cxr in several days.    Discussed with daughter at bedside.  Thank you.      Suzi Ramírez MD  Pulmonary  [phone]  
89 year-old with dementia and moderate aortic stenosis admitted with shortness of breath and hypoxia.  She has responded well to diuretics and is now feeling better.  Appreciate pulmonary consultation, but I suspect much of her symptoms was secondary to heart failure with preserved ejection fraction. TTE shows moderate aortic stenosis. If AS progresses, patient would be a poor candidate for intervention on the aortic valve due to risk of worsening cognitive status.    Continue current therapies.  Keep O > I, K > 4.0, Mag > 2.0.    Discharge planning per primary team.

## 2022-05-10 NOTE — PHYSICAL THERAPY INITIAL EVALUATION ADULT - ADDITIONAL COMMENTS
pt lives in house with daughter 4 steps to enter w/ HR , pt uses rollator or rolling walker to amb w/ assist , assist adls ; pt has a medicaid HHA 40-42 hrs per week ; pt owns a transport w/c , B hearing aids ; dtr Brittney Hermosillo id as caregiver [phone] pt lives in house with daughter and grand dtr with 4 steps to enter w/ HR , pt uses rollator or rolling walker to amb w/ assist , assist adls ; pt has a medicaid HHA 40-42 hrs per week ; pt owns a rollator , rolling walker  transport w/c , B hearing aids , electric bed , electric recliner bring into stand , grab bar on bed , shower chair with grab bars in shower , per dtr Rafaela at b/s pt is never left alone if HHA not there then dtr or son there to assist ; dtr Brittney Hermosillo id as caregiver [phone]

## 2022-05-10 NOTE — PHYSICAL THERAPY INITIAL EVALUATION ADULT - PLANNED THERAPY INTERVENTIONS, PT EVAL
GOAL stair train : pt will negotiate 5 steps with HR cg of1 in 1-2 weeks/balance training/bed mobility training/gait training/strengthening/transfer training

## 2022-05-10 NOTE — PROGRESS NOTE ADULT - ASSESSMENT
ASSESSMENT:    89 year old woman with h/o distant asthma - no issues for more than 30 years. She also has a history of HTN, HLD, DM, CKD (Dr. Dewitt), breast cancer s/p lumpectomy and aortic stenosis (Dr. Veto Flores). She was recently diagnosed with new onset atrial fibrillation being treated with metoprolol and Eliquis.  Admitted with shortness of breath and "gurgling" in her chest. She had an isolated fever in the ER. CXR is suggestive of a right lower lobe pneumonia.  She has no bronchospasm. She may be mildly fluid overloaded in the setting of moderate and possibly severe aortic stenosis and atrial fibrillation    PLAN/RECOMMENDATIONS:    stable oxygenation currently on room air  ECHO -> preserved LVEF - moderate-severe aortic stenosis - moderate diastolic dysfunction - bilateral pleural effusions  CT -> pulmonary edema with small pleural effusions and partial lower lobe compressive atelectasis.  observe off systemic steroids and nebs  mucinex 1200mg 2 times daily  cardiology follow-up - does the patient need intervention on the aortic valve (?)  cardiac meds: norvasc/toprol XL/lipitor/lasix/eliquis  effexor XL    Will follow with you. Plan of care discussed with the patient and her son at bedside.     Brayden Benavides MD, [facility]  [phone]  Pulmonary Medicine         ASSESSMENT:    89 year old woman with h/o distant asthma - no issues for more than 30 years. She also has a history of HTN, HLD, DM, CKD (Dr. Dewitt), breast cancer s/p lumpectomy and aortic stenosis (Dr. Veto Flores). She was recently diagnosed with new onset atrial fibrillation being treated with metoprolol and Eliquis.  Admitted with shortness of breath and "gurgling" in her chest. She had an isolated fever in the ER. CXR is suggestive of a right lower lobe pneumonia.  She has no bronchospasm. She may be mildly fluid overloaded in the setting of moderate and possibly severe aortic stenosis and atrial fibrillation    PLAN/RECOMMENDATIONS:    stable oxygenation currently on room air  ECHO -> preserved LVEF - moderate-severe aortic stenosis - moderate diastolic dysfunction - bilateral pleural effusions  CT -> pulmonary edema with small pleural effusions and partial lower lobe compressive atelectasis  patient given an incentive spirometer and instructed in its use  observe off systemic steroids and nebs  mucinex 1200mg 2 times daily  cardiology follow-up - does the patient need intervention on the aortic valve (?)  diurese as tolerated by renal function and hemodynamics  cardiac meds: norvasc/toprol XL/lipitor/lasix/eliquis  effexor XL    Will follow with you. Plan of care discussed with the patient and her son at bedside as well as with Dr. Sheng Benavides MD, [facility]  [phone]  Pulmonary Medicine

## 2022-05-10 NOTE — PHYSICAL THERAPY INITIAL EVALUATION ADULT - ASR EQUIP NEEDS DISCH PT EVAL
pt owns a rollator , rolling walker  transport w/c , B hearing aids , electric bed , electric recliner bring into stand , grab bar on bed , shower chair with grab bars in shower , per dtr Rafaela at b/s pt is never left alone if HHA not there then dtr or son there to assist

## 2022-05-11 ENCOUNTER — TRANSCRIPTION ENCOUNTER (OUTPATIENT)
Age: 87
End: 2022-05-11

## 2022-05-11 VITALS — DIASTOLIC BLOOD PRESSURE: 49 MMHG | SYSTOLIC BLOOD PRESSURE: 130 MMHG

## 2022-05-11 LAB
ANION GAP SERPL CALC-SCNC: 14 MMOL/L — SIGNIFICANT CHANGE UP (ref 5–17)
BUN SERPL-MCNC: 53 MG/DL — HIGH (ref 7–23)
CALCIUM SERPL-MCNC: 9.5 MG/DL — SIGNIFICANT CHANGE UP (ref 8.4–10.5)
CHLORIDE SERPL-SCNC: 101 MMOL/L — SIGNIFICANT CHANGE UP (ref 96–108)
CO2 SERPL-SCNC: 26 MMOL/L — SIGNIFICANT CHANGE UP (ref 22–31)
CREAT SERPL-MCNC: 1.88 MG/DL — HIGH (ref 0.5–1.3)
EGFR: 25 ML/MIN/1.73M2 — LOW
GLUCOSE SERPL-MCNC: 114 MG/DL — HIGH (ref 70–99)
HCT VFR BLD CALC: 30.8 % — LOW (ref 34.5–45)
HGB BLD-MCNC: 9.5 G/DL — LOW (ref 11.5–15.5)
MAGNESIUM SERPL-MCNC: 1.9 MG/DL — SIGNIFICANT CHANGE UP (ref 1.6–2.6)
MCHC RBC-ENTMCNC: 29 PG — SIGNIFICANT CHANGE UP (ref 27–34)
MCHC RBC-ENTMCNC: 30.8 GM/DL — LOW (ref 32–36)
MCV RBC AUTO: 93.9 FL — SIGNIFICANT CHANGE UP (ref 80–100)
NRBC # BLD: 0 /100 WBCS — SIGNIFICANT CHANGE UP (ref 0–0)
PHOSPHATE SERPL-MCNC: 4.3 MG/DL — SIGNIFICANT CHANGE UP (ref 2.5–4.5)
PLATELET # BLD AUTO: 252 K/UL — SIGNIFICANT CHANGE UP (ref 150–400)
POTASSIUM SERPL-MCNC: 3.5 MMOL/L — SIGNIFICANT CHANGE UP (ref 3.5–5.3)
POTASSIUM SERPL-SCNC: 3.5 MMOL/L — SIGNIFICANT CHANGE UP (ref 3.5–5.3)
RBC # BLD: 3.28 M/UL — LOW (ref 3.8–5.2)
RBC # FLD: 14.6 % — HIGH (ref 10.3–14.5)
SODIUM SERPL-SCNC: 141 MMOL/L — SIGNIFICANT CHANGE UP (ref 135–145)
WBC # BLD: 6.84 K/UL — SIGNIFICANT CHANGE UP (ref 3.8–10.5)
WBC # FLD AUTO: 6.84 K/UL — SIGNIFICANT CHANGE UP (ref 3.8–10.5)

## 2022-05-11 PROCEDURE — 85025 COMPLETE CBC W/AUTO DIFF WBC: CPT

## 2022-05-11 PROCEDURE — 80048 BASIC METABOLIC PNL TOTAL CA: CPT

## 2022-05-11 PROCEDURE — 84100 ASSAY OF PHOSPHORUS: CPT

## 2022-05-11 PROCEDURE — 99285 EMERGENCY DEPT VISIT HI MDM: CPT

## 2022-05-11 PROCEDURE — 0225U NFCT DS DNA&RNA 21 SARSCOV2: CPT

## 2022-05-11 PROCEDURE — 96374 THER/PROPH/DIAG INJ IV PUSH: CPT

## 2022-05-11 PROCEDURE — 94640 AIRWAY INHALATION TREATMENT: CPT

## 2022-05-11 PROCEDURE — 85027 COMPLETE CBC AUTOMATED: CPT

## 2022-05-11 PROCEDURE — 36415 COLL VENOUS BLD VENIPUNCTURE: CPT

## 2022-05-11 PROCEDURE — 97162 PT EVAL MOD COMPLEX 30 MIN: CPT

## 2022-05-11 PROCEDURE — 93306 TTE W/DOPPLER COMPLETE: CPT

## 2022-05-11 PROCEDURE — 83735 ASSAY OF MAGNESIUM: CPT

## 2022-05-11 PROCEDURE — 93970 EXTREMITY STUDY: CPT

## 2022-05-11 PROCEDURE — 71250 CT THORAX DX C-: CPT

## 2022-05-11 PROCEDURE — 84145 PROCALCITONIN (PCT): CPT

## 2022-05-11 PROCEDURE — 84484 ASSAY OF TROPONIN QUANT: CPT

## 2022-05-11 PROCEDURE — 71045 X-RAY EXAM CHEST 1 VIEW: CPT

## 2022-05-11 PROCEDURE — 82962 GLUCOSE BLOOD TEST: CPT

## 2022-05-11 PROCEDURE — 80053 COMPREHEN METABOLIC PANEL: CPT

## 2022-05-11 PROCEDURE — 83880 ASSAY OF NATRIURETIC PEPTIDE: CPT

## 2022-05-11 RX ORDER — VENLAFAXINE HCL 75 MG
1 CAPSULE, EXT RELEASE 24 HR ORAL
Qty: 0 | Refills: 0 | DISCHARGE
Start: 2022-05-11

## 2022-05-11 RX ORDER — ASCORBIC ACID 60 MG
1 TABLET,CHEWABLE ORAL
Qty: 30 | Refills: 0
Start: 2022-05-11 | End: 2022-06-09

## 2022-05-11 RX ORDER — FUROSEMIDE 40 MG
40 TABLET ORAL ONCE
Refills: 0 | Status: COMPLETED | OUTPATIENT
Start: 2022-05-11 | End: 2022-05-11

## 2022-05-11 RX ORDER — VENLAFAXINE HCL 75 MG
1 CAPSULE, EXT RELEASE 24 HR ORAL
Qty: 0 | Refills: 0 | DISCHARGE

## 2022-05-11 RX ADMIN — Medication 25 MILLIGRAM(S): at 05:28

## 2022-05-11 RX ADMIN — Medication 75 MILLIGRAM(S): at 11:46

## 2022-05-11 RX ADMIN — APIXABAN 2.5 MILLIGRAM(S): 2.5 TABLET, FILM COATED ORAL at 18:10

## 2022-05-11 RX ADMIN — Medication 1 TABLET(S): at 11:46

## 2022-05-11 RX ADMIN — APIXABAN 2.5 MILLIGRAM(S): 2.5 TABLET, FILM COATED ORAL at 05:28

## 2022-05-11 RX ADMIN — Medication 325 MILLIGRAM(S): at 11:47

## 2022-05-11 RX ADMIN — Medication 500 MILLIGRAM(S): at 11:46

## 2022-05-11 RX ADMIN — Medication 25 MICROGRAM(S): at 05:28

## 2022-05-11 RX ADMIN — Medication 1200 MILLIGRAM(S): at 05:28

## 2022-05-11 RX ADMIN — AMLODIPINE BESYLATE 5 MILLIGRAM(S): 2.5 TABLET ORAL at 05:29

## 2022-05-11 RX ADMIN — Medication 40 MILLIGRAM(S): at 11:46

## 2022-05-11 NOTE — PROGRESS NOTE ADULT - PROBLEM SELECTOR PLAN 1
CXR shows pulmonary congestion    Pulm and Cards consulted.    improved Lasix 40mg IVP daily  - now change to po as ordered.  20  mg daily starting tomorrow  echo noted  Daily weights  Intake and output monitoring  Low sodium diet  1500mL fluid restriction
CXR shows pulmonary congestion    Pulm and Cards consulted.    cont Lasix 40mg IVP daily  - now change to po as ordered.  20mg every other day  echo noted  Daily weights  Intake and output monitoring  Low sodium diet  1500mL fluid restriction
CXR shows pulmonary congestion and RLL opacity but may no symptoms to suggest pneumonia  Pulm and Cards consulted.    cont Lasix 40mg IVP daily as pt is Lasix naive  Obtain TTE, BLLE Duplex to r/o DVT  Daily weights  Intake and output monitoring  Low sodium diet  1500mL fluid restriction
CXR shows pulmonary congestion and RLL opacity   Pulm and Cards consulted.    cont Lasix 40mg IVP daily    echo noted  Daily weights  Intake and output monitoring  Low sodium diet  1500mL fluid restriction

## 2022-05-11 NOTE — PROGRESS NOTE ADULT - PROVIDER SPECIALTY LIST ADULT
Nephrology
Nephrology
Pulmonology
Pulmonology
Nephrology
Pulmonology
Pulmonology
Nephrology
Internal Medicine

## 2022-05-11 NOTE — DISCHARGE NOTE PROVIDER - CARE PROVIDER_API CALL
Brayden Flores)  Cardiology; Internal Medicine  [address], [address]  [place] [address]  Phone: [phone]  Fax: [phone]  Follow Up Time: 1 week

## 2022-05-11 NOTE — PROGRESS NOTE ADULT - PROBLEM SELECTOR PLAN 4
continue Eliquis 2.5mg PO BID  tele monitor  echo noted   Metoprolol 25mg po daily
continue Eliquis 2.5mg PO BID  tele monitor  echo noted   Metoprolol 25mg po daily
continue Eliquis 2.5mg PO BID  telemetry monitoring  TTE as above  Metoprolol 25mg po daily
continue Eliquis 2.5mg PO BID  tele monitor  echo noted   Metoprolol 25mg po daily

## 2022-05-11 NOTE — DISCHARGE NOTE PROVIDER - NSDCFUSCHEDAPPT_GEN_ALL_CORE_FT
Veto Flores  [facility]  Cardio [address] [place] [address]  Scheduled Appointment: 05/18/2022    Veto Flores  [facility]  Cardio [address] [place] [address]  Scheduled Appointment: 05/24/2022

## 2022-05-11 NOTE — DISCHARGE NOTE NURSING/CASE MANAGEMENT/SOCIAL WORK - NSDCPEFALRISK_GEN_ALL_CORE
For information on Fall & Injury Prevention, visit: [email or web address] OR  [email or web address] OR  https://www.cdc.gov/steadi/patient.html

## 2022-05-11 NOTE — PROGRESS NOTE ADULT - PROBLEM SELECTOR PLAN 10
DVT ppx: On Eliquis  Diet: DASH  Iron for Iron Deficiency Anemia  continue Effexor  PT eval

## 2022-05-11 NOTE — DISCHARGE NOTE NURSING/CASE MANAGEMENT/SOCIAL WORK - PATIENT PORTAL LINK FT
You can access the FollowMyHealth Patient Portal offered by [facility] by registering at the following website: [email or web address]. By joining [employer]’s FollowMyHealth portal, you will also be able to view your health information using other applications (apps) compatible with our system.

## 2022-05-11 NOTE — DISCHARGE NOTE PROVIDER - HOSPITAL COURSE
89 y/o with pmhx of HTN, Afib, HLD, nonsmoker, remote hx of asthma who presents with 2 days of SOB    Nutritional Assessment:  Nutritional Assessment  This patient has been assessed with a concern for Malnutrition and has been determined to have a diagnosis/diagnoses of Severe protein-calorie malnutrition.    This patient is being managed with:   Diet Consistent Carbohydrate w/Evening Snack-  DASH/TLC {Sodium & Cholesterol Restricted} (DASH)  1500mL Fluid Restriction ([id number])  Pieter(7 Gm Arginine/7 Gm Glut/1.2 Gm HMB     Qty per Day:  2  Supplement Feeding Modality:  Oral  Entered: May  9 2022 10:26AM    New onset of congestive heart failure:   CXR shows pulmonary congestion    Pulm and Cards consulted.    improved Lasix 40mg IVP daily  - now change to po as ordered.  20  mg daily starting tomorrow  echo noted  Daily weights  Intake and output monitoring  Low sodium diet  1500mL fluid restriction.    Acute respiratory failure with hypoxia:  Cont supp o2  likely due to pulm vasc congestion   taper as tolerated  no pna on CT  dc abx.    Pneumonia, aspiration:   CT neg for PNA  pna ruled out  dc abx.    Problem: Paroxysmal atrial fibrillation.   Plan: continue Eliquis 2.5mg PO BID  tele monitor  echo noted   Metoprolol 25mg po daily.    Problem: Hypertension:   Continue amlodipine 5mg PO daily.      Problem: Hyperlipidemia.   ·  Plan: continue statin.      Problem: Hypothyroidism.   Continue synthyroid.      Problem: Urinary incontinence.    not on medications.    Problem: Stage 3 chronic kidney disease.   Cr improved since dc  Monitor daily  Renal on board, f/u recommendations.      Problem: Prophylactic measure.   DVT ppx: On Eliquis  Diet: DASH  Iron for Iron Deficiency Anemia  continue Effexor  PT eval.  PT Eval.    PT Octavia.  PT Eval.  PT Eval. 89 y/o with pmhx of HTN, Afib, HLD, nonsmoker, remote hx of asthma who presented  with 2 days of SOB.    Nutritional Assessment:  Nutritional Assessment  This patient has been assessed with a concern for Malnutrition and has been determined to have a diagnosis/diagnoses of Severe protein-calorie malnutrition.    This patient is being managed with:   Diet Consistent Carbohydrate w/Evening Snack-  DASH/TLC {Sodium & Cholesterol Restricted} (DASH)  1500mL Fluid Restriction ([id number])  Pieter(7 Gm Arginine/7 Gm Glut/1.2 Gm HMB     Qty per Day:  2  Supplement Feeding Modality:  Oral  Entered: May  9 2022 10:26AM    New onset of congestive heart failure:   CXR shows pulmonary congestion    Pulm and Cards consulted.    improved Lasix 40mg IVP daily  - now change to po as ordered.  20  mg daily starting tomorrow  echo noted  Daily weights  Intake and output monitoring  Low sodium diet  1500mL fluid restriction.    Acute respiratory failure with hypoxia:  Cont supp o2  likely due to pulm vasc congestion   taper as tolerated  no pna on CT  dc abx.    Pneumonia, aspiration:   CT neg for PNA  pna ruled out  dc abx.    Paroxysmal atrial fibrillation:  Plan: continue Eliquis 2.5mg PO BID  tele monitor  echo noted   Metoprolol 25mg po daily.    Hypertension:   Continue amlodipine 5mg PO daily.      Hyperlipidemia:   Continue statin.      Hypothyroidism:   Continue synthyroid.      Urinary incontinence:  Not on medications.   Stage 3 chronic kidney disease.   Cr improved since dc  Monitor daily  Renal on board, f/u recommendations.      Prophylactic measure:   DVT ppx: On Eliquis  Diet: DASH  Iron for Iron Deficiency Anemia  Continue Effexor  PT eval.    Pt medically cleared for discharge home today with daughter - who requested DC Home instead of to Rehab.

## 2022-05-11 NOTE — PROGRESS NOTE ADULT - NUTRITIONAL ASSESSMENT
This patient has been assessed with a concern for Malnutrition and has been determined to have a diagnosis/diagnoses of Severe protein-calorie malnutrition.    This patient is being managed with:   Diet Consistent Carbohydrate w/Evening Snack-  DASH/TLC {Sodium & Cholesterol Restricted} (DASH)  1500mL Fluid Restriction ([id number])  Pieter(7 Gm Arginine/7 Gm Glut/1.2 Gm HMB     Qty per Day:  2  Supplement Feeding Modality:  Oral  Entered: May  9 2022 10:26AM    
This patient has been assessed with a concern for Malnutrition and has been determined to have a diagnosis/diagnoses of Severe protein-calorie malnutrition.    This patient is being managed with:   Diet Consistent Carbohydrate w/Evening Snack-  DASH/TLC {Sodium & Cholesterol Restricted} (DASH)  1500mL Fluid Restriction ([id number])  Pieter(7 Gm Arginine/7 Gm Glut/1.2 Gm HMB     Qty per Day:  2  Supplement Feeding Modality:  Oral  Entered: May  9 2022 10:26AM    
This patient has been assessed with a concern for Malnutrition and has been determined to have a diagnosis/diagnoses of Severe protein-calorie malnutrition.    This patient is being managed with:   Diet Consistent Carbohydrate w/Evening Snack-  DASH/TLC {Sodium & Cholesterol Restricted} (DASH)  1500mL Fluid Restriction ([id number])  Pieter(7 Gm Arginine/7 Gm Glut/1.2 Gm HMB     Qty per Day:  2  Supplement Feeding Modality:  Oral  Entered: May  9 2022 10:26AM

## 2022-05-11 NOTE — PROGRESS NOTE ADULT - PROBLEM SELECTOR PLAN 2
on 3LNC  likely due to pulm vasc congestion and possible pna  taper as tolerated
cont supp o2  likely due to pulm vasc congestion and possible pna  taper as tolerated
cont supp o2  likely due to pulm vasc congestion   taper as tolerated  no pna on CT  dc abx
cont supp o2  likely due to pulm vasc congestion   taper as tolerated  no pna on CT  dc abx

## 2022-05-11 NOTE — PROGRESS NOTE ADULT - PROBLEM SELECTOR PROBLEM 1
New onset of congestive heart failure

## 2022-05-11 NOTE — PROGRESS NOTE ADULT - SUBJECTIVE AND OBJECTIVE BOX
[facility] - [facility] - PROGRESS NOTE    CHIEF COMPLAINT: abnormal CXR; dyspnea; hypoxemia; pulmonary edema; pleural effusion; atelectasisasthma    INTERVAL HISTORY: no shortness of breath or hypoxemia on room air; walked a brief distance with physical therapy; no cough, sputum production, hemoptysis, chest congestion or wheeze; no fevers, chills or sweats; no chest pain/pressure or palpitations; better appetite; ECHO -> preserved LVEF - moderate-severe aortic stenosis - moderate diastolic dysfunction - bilateral pleural effusions; CT -> pulmonary edema with small pleural effusions and partial lower lobe compressive atelectasis.    REVIEW OF SYSTEMS:  Constitutional: As per interval history  HEENT: Within normal limits  CV: As per interval history  Resp: As per interval history  GI: Within normal limits   : Within normal limits  Musculoskeletal: Within normal limits  Skin: Within normal limits  Neurological: Within normal limits  Psychiatric: Within normal limits  Endocrine: Within normal limits  Hematologic/Lymphatic: Within normal limits  Allergic/Immunologic: Within normal limits    MEDICATIONS:     Pulmonary "  guaiFENesin ER 1200 milliGRAM(s) Oral every 12 hours    Anti-microbials:    Cardiovascular:  amLODIPine   Tablet 5 milliGRAM(s) Oral daily  metoprolol succinate ER 25 milliGRAM(s) Oral daily    Other:  apixaban 2.5 milliGRAM(s) Oral two times a day  ascorbic acid 500 milliGRAM(s) Oral daily  atorvastatin 40 milliGRAM(s) Oral at bedtime  ferrous    sulfate 325 milliGRAM(s) Oral daily  levothyroxine 25 MICROGram(s) Oral daily  Nephro-iain 1 Tablet(s) Oral daily  venlafaxine XR. 75 milliGRAM(s) Oral daily    OBJECTIVE:    Daily Weight in k.5 (11 May 2022 07:24)    PHYSICAL EXAM:       ICU Vital Signs Last 24 Hrs  T(C): 36.7 (11 May 2022 09:29), Max: 37.1 (10 May 2022 16:06)  T(F): 98.1 (11 May 2022 09:29), Max: 98.8 (10 May 2022 16:06)  HR: 63 (11 May 2022 09:29) (59 - 67)  BP: 130/71 (11 May 2022 09:29) (124/67 - 149/64)  BP(mean): --  ABP: --  ABP(mean): --  RR: 18 (11 May 2022 09:29) (18 - 18)  SpO2: 95% (11 May 2022 09:29) (94% - 97%) on room air     General: Awake. Alert. Cooperative. No distress. Appears stated age. Cachectic. Sitting in the chair.	  HEENT: Atraumatic. Bitemporal wasting. Anicteric. Normal oral mucosa. PERRL. EOMI.  Neck: Supple. Trachea midline. Thyroid without enlargement/tenderness/nodules. No carotid bruit. No JVD. Loss of supraclavicular fat pads	  Cardiovascular: Irregularly irregular rate and rhythm. S1 S2 normal. III/VI systolic murmur  Respiratory: Respirations unlabored. rales. Kyphosis.  Abdomen: Soft. Non-tender. Non-distended. No organomegaly. No masses. Normal bowel sounds.  Extremities: Warm to touch. No clubbing or cyanosis. No pedal edema.  Pulses: 2+ peripheral pulses all extremities.	  Skin: Multiple ecchymoses especially on the legs  Lymph Nodes: Cervical, supraclavicular and axillary nodes normal  Neurological: Motor and sensory examination equal and normal. A and O x 3  Psychiatry: Appropriate mood and affect.    LABS:                          9.5    6.84  )-----------( 252      ( 11 May 2022 06:49 )             30.8     CBC    WBC  6.84 <==, 6.72 <==, 7.18 <==, 8.62 <==    Hemoglobin  9.5 <<==, 9.4 <<==, 9.1 <<==, 9.8 <<==    Hematocrit  30.8 <==, 30.5 <==, 29.7 <==, 33.0 <==    Platelets  252 <==, 215 <==, 192 <==, 201 <==      141  |  101  |  53<H>  ----------------------------<  114<H>    05-11  3.5   |  26  |  1.88<H>      LYTES    sodium  141 <==, 143 <==, 141 <==, 141 <==, 140 <==    potassium   3.5 <==, 3.9 <==, 4.0 <==, 4.7 <==, 5.0 <==    chloride  101 <==, 103 <==, 104 <==, 104 <==, 104 <==    carbon dioxide  26 <==, 23 <==, 23 <==, 26 <==, 21 <==    =============================================================================================  RENAL FUNCTION:    Creatinine:   1.88  <<==, 1.98  <<==, 1.88  <<==, 1.87  <<==, 1.62  <<==    BUN:   53 <==, 57 <==, 49 <==, 47 <==, 45 <==    ============================================================================================    calcium   9.5 <==, 9.4 <==, 9.3 <==, 9.1 <==, 9.7 <==    phos   4.3 <==, 4.3 <==, 4.1 <==    mag   1.9 <==, 1.8 <==, 1.9 <==    ============================================================================================  LFTs    AST:   23 <==     ALT:  22  <==     AP:  86  <=    Bili:  0.4  <=    Procalcitonin, Serum: 0.41 ng/mL (05-10 @ 06:39)    Serum Pro-Brain Natriuretic Peptide: 5638 pg/mL ( @ 08:38)    CARDIAC MARKERS ( 07 May 2022 08:38 )  CPK x     /CKMB x     /CKMB Units x        troponin 51 ng/L    < from: Transthoracic Echocardiogram (22 @ 14:37) >    Patient name: KOREY DIAZ  YOB: 1929   Age: 89 (F)   MR#: [id number]  Study Date: 2022  Location: [address] [place], [place] [id number]: Gadiel Schwartz RDCS  Study quality: Technically fair  Referring Physician: Teo Monson MD  Blood Pressure: 165/72 mmHg  Height: 158 cm  Weight: 53 kg  BSA: 1.5 m2  Heart Rate: 70 mmHg  ------------------------------------------------------------------------  PROCEDURE: Transthoracic echocardiogram with 2-D, M-Mode  and complete spectral and color flow Doppler.  INDICATION: Cardiac murmur, unspecified (R01.1)  ------------------------------------------------------------------------  Dimensions:    Normal Values:  LA:     3.6    2.0 - 4.0 cm  Ao:     3.0    2.0 - 3.8 cm  SEPTUM: 1.0    0.6 -1.2 cm  PWT:    1.0    0.6 - 1.1 cm  LVIDd:  4.0    3.0 - 5.6 cm  LVIDs:  2.8    1.8 - 4.0 cm  Derived variables:  LVMI: 84 g/m2  RWT: 0.50  Fractional short: 30 %  EF (Moran Rule): 67 %Doppler Peak Velocity (m/sec):  AoV=3.2  ------------------------------------------------------------------------  Observations:  Mitral Valve: Normal mitral valve. Mild mitral  regurgitation.  Aortic Valve/Aorta: Heavily calcified trileaflet aortic  valve with decreased opening. Peak transaortic valve  gradientequals 41 mm Hg, estimated aortic valve area  equals 1 sqcm (by continuity equation), aortic valve  velocity time integral equals 70 cm, consistent with  moderate aortic stenosis.  However the aortic valve appears heavily calcified and may  be closer to moderate-severe aortic stenosis.  Mild aortic  regurgitation.  Peak left ventricular outflow tract  gradient equals 3 mm Hg, LVOT velocity time integral equals  23 cm.  Aortic Root: 3 cm.  Left Atrium: Mildly dilated left atrium.  LA volume index =  39 cc/m2.  Left Ventricle: Normal left ventricular systolic function.  No segmental wall motion abnormalities. Normal left  ventricular internal dimensions and wall thicknesses.  Moderate diastolic dysfunction (Stage II).  Right Heart: Normal right atrium. Normal right ventricular  size and function. Normal tricuspid valve. Minimal  tricuspid regurgitation. Normal pulmonic valve. Minimal  pulmonic regurgitation.  Pericardium/Pleura: Normal pericardium with trace  pericardial effusion.  Bilateral pleural effusions.  Hemodynamic: Estimated right atrial pressure is 8 mm Hg.  Color Doppler demonstrates no evidence of a patent foramen  ovale.  ------------------------------------------------------------------------  Conclusions:  1. Normal mitral valve. Mild mitral regurgitation.  2. Heavily calcified trileaflet aortic valve with decreased  opening. Peak transaortic valve gradient equals 41 mm Hg,  estimated aortic valve area equals 1 sqcm (by continuity  equation), aortic valve velocity time integral equals 70  cm, consistent with moderate aortic stenosis.  However the aortic valve appears heavily calcified and may  be closer to moderate-severe aortic stenosis.  Mild aortic  regurgitation.  3. Mildly dilated left atrium.  LA volume index = 39 cc/m2.  4. Normal left ventricular systolic function. No segmental  wall motion abnormalities.  5. Normal right ventricular size and function.  6. Normal pericardium with trace pericardial effusion.  7. Bilateral pleural effusions.  *** Compared with echocardiogram of 3/11/2022, no  significant changes noted.  ------------------------------------------------------------------------  Confirmed on  2022 - 17:38:34 by Osito Argueta M.D.  ------------------------------------------------------------------------  ---------------------------------------------------------------------------------------------------------------  MICROBIOLOGY:     Respiratory Viral Panel with COVID-19 by ERICK (22 @ 08:38)   Rapid RVP Result: [facility]   SARS-CoV-2: [facility]  This Respiratory Panel uses polymerase chain reaction (PCR) to detect for   adenovirus; coronavirus (HKU1, NL63, 229E, OC43); human metapneumovirus   (hMPV); human enterovirus/rhinovirus (Entero/RV); influenza A; influenza   A/H1; influenza A/H3; influenza A/H1-2009; influenza B; parainfluenza   viruses 1, 2, 3, 4; respiratory syncytial virus; Mycoplasma pneumoniae;   Chlamydophila pneumoniae; and SARS-CoV-2.       RADIOLOGY:  [x] Chest radiographs reviewed and interpreted by me    EXAM:  CT CHEST                          PROCEDURE DATE:  2022      FINDINGS:    Lungs/Airways/Pleura: The central airways are patent. There are small   pleural effusions, newfrom 3/9/2022 abdominal CT, with partial lower   lobe compressive atelectasis. There are diffuse peribronchial   peribronchovascular groundglass opacity with mild septal thickening,   likely pulmonary edema. There are few clusters of small nodules teresa   background of subsegmental bronchial impaction, likely due to small   airways disease.    Mediastinum/Lymph nodes: No thoracic adenopathy.    Heart and Vessels: Mild cardiomegaly. Extensive coronary arterial and   aortic valvular calcification ispresent. Hypodensity of the blood pool   in relation to left ventricular myocardium suggests underlying anemia.   The great vessels are normal in size. No pericardial effusion.    Upper Abdomen: Cholelithiasis. Partially imaged large right parapelvic  renal cyst. Extensive visceral artery calcification.    Osseous structures and Soft Tissues: Degenerative changes without   aggressive osseous lesions. Remote left posterior 11th rib fracture.    IMPRESSION:  Pulmonary edema with small pleural effusions and partial lower lobe   compressive atelectasis.    ELISA BLACKBURN M.D., Attending Radiologist  This document has been electronically signed. May 10 2022  8:52AM  ---------------------------------------------------------------------------------------------------------------  EXAM:  XR CHEST PORTABLE ROUTINE 1V                          PROCEDURE DATE:  2022      COMPARISON STUDY: 2022    Frontal expiratory view of the chest shows the heart to be similar in   size. Right axillary clips are again noted.    The lungs show partial clearing of the right base with smaller left   effusion and there is no evidence of pneumothorax nor right pleural   effusion.    IMPRESSION:  Partial basilar clearing.    Thank you for the courtesy of this referral.    WILLARD HARVEY MD; Attending Interventional Radiologist  This document has been electronically signed. May  9 2022  4:19PM  ---------------------------------------------------------------------------------------------------------------  EXAM:  XR CHEST AP OR PA 1V                          PROCEDURE DATE:  2022      FINDINGS:    Right lower lung patchy opacity.  No pneumothorax. New small left pleural effusion.  Heart size within normal limits.  No acute osseous abnormality.    IMPRESSION:    Right lower lung patchy opacity.  New small left pleural effusion.    BETITO ABDI MD; Resident Radiologist  This document has been electronically signed.  QUINN POWERS MD; Attending Interventional Radiologist  This document has been electronically signed. May  7 2022 10:12AM  ---------------------------------------------------------------------------------------------------------------  EXAM:  DUPLEX SCAN EXT VEINS LOWER BI                          PROCEDURE DATE:  2022      FINDINGS:    RIGHT:  Normal compressibility of the RIGHT common femoral, femoral and popliteal   veins.  Lower femoral venous segment was less well visualized.  Doppler examination shows normal spontaneous and phasic flow.  Calf veins were not able to be diagnostically visualized.    LEFT: Popliteal fossa cyst measures 3.1 x 2.6 x 4 cm  Normal compressibility of the LEFT common femoral, femoral and popliteal   veins.  Lower femoral and popliteal venous segments were less well visualized.  Doppler examination shows normal spontaneous and phasic flow.  Calf veins were not able to be diagnostically visualized.    IMPRESSION:  No evidence of deep venous thrombosis in either lower extremity venous   segments able to be examined.    AUGUSTIN EASTMAN MD; Attending Radiologist  This document has been electronically signed. May  9 2022  3:31PM  ---------------------------------------------------------------------------------------------------------------

## 2022-05-11 NOTE — PROGRESS NOTE ADULT - SUBJECTIVE AND OBJECTIVE BOX
KOREY DIAZ  89y Female  MRN:[id number]    Patient is a 89y old  Female who presents with a chief complaint of SOB (08 May 2022 08:24)    HPI:  89 y/o with pmhx of HTN, Afib, HLD, nonsmoker, remote hx of asthma who presents with 2 days of SOB. According to the patient and daughter who is at bedside, last night she woke up with sudden SOB out of her sleep. She usually does not have any orthopnea or PND. She denies any cough, dyspnea, chest pain or fever. No sick contacts. She denies lightheadedness or syncope. She has mild chronic leg swelling L always more than R, that was attributed to immobility which has worsened over the last few days. She was diagnosed with AF 2 weeks ago and started on Eliquis and Metoprolol. They were worried about side effects so decided to come to the ED. She usually stays in a chair and is incontinent to urine.   (07 May 2022 13:48)      Patient seen and evaluated at bedside. No acute events overnight except as noted.    Interval HPI: feels better     PAST MEDICAL & SURGICAL HISTORY:  HTN (hypertension)    Diabetes mellitus type 2, noninsulin dependent    Diverticulitis    Hyperlipidemia    GERD (gastroesophageal reflux disease)    Glaucoma    Breast cancer    Urinary incontinence    Renal calculus or stone    Arthritis    Heart murmur    Renal calculi  s/p stone extraction 57 yrs ago    S/P lumpectomy of breast  right breast with node removal    S/P bladder repair  Interstim device placement 2010        REVIEW OF SYSTEMS:  as per hpi     VITALS:   Vital Signs Last 24 Hrs  T(C): 36.7 (11 May 2022 09:29), Max: 37.1 (10 May 2022 16:06)  T(F): 98.1 (11 May 2022 09:29), Max: 98.8 (10 May 2022 16:06)  HR: 63 (11 May 2022 09:29) (59 - 67)  BP: 130/71 (11 May 2022 09:29) (124/67 - 149/64)  BP(mean): --  RR: 18 (11 May 2022 09:29) (18 - 18)  SpO2: 95% (11 May 2022 09:29) (94% - 97%)      PHYSICAL EXAM:  GENERAL: NAD, well-developed  HEAD:  Atraumatic, Normocephalic  EYES: EOMI, PERRLA, conjunctiva and sclera clear  NECK: Supple, No JVD  CHEST/LUNG: coarse bs b/l  HEART: S1, S2;    ABDOMEN: Soft, Nontender, Nondistended; Bowel sounds present  EXTREMITIES:  2+ Peripheral Pulses, No clubbing, cyanosis, or edema  PSYCH: Normal affect  NEUROLOGY: AAOX3; non-focal  SKIN: No rashes or lesions    Consultant(s) Notes Reviewed:  [x ] YES  [ ] NO  Care Discussed with Consultants/Other Providers [ x] YES  [ ] NO    MEDS:   MEDICATIONS  (STANDING):  amLODIPine   Tablet 5 milliGRAM(s) Oral daily  apixaban 2.5 milliGRAM(s) Oral two times a day  ascorbic acid 500 milliGRAM(s) Oral daily  atorvastatin 40 milliGRAM(s) Oral at bedtime  ferrous    sulfate 325 milliGRAM(s) Oral daily  guaiFENesin ER 1200 milliGRAM(s) Oral every 12 hours  levothyroxine 25 MICROGram(s) Oral daily  metoprolol succinate ER 25 milliGRAM(s) Oral daily  Nephro-iain 1 Tablet(s) Oral daily  venlafaxine XR. 75 milliGRAM(s) Oral daily    MEDICATIONS  (PRN):        ALLERGIES:  Keflex (Rash; Hives)      LABS:                                           9.5    6.84  )-----------( 252      ( 11 May 2022 06:49 )             30.8   05-11    141  |  101  |  53<H>  ----------------------------<  114<H>  3.5   |  26  |  1.88<H>    Ca    9.5      11 May 2022 06:49  Phos  4.3     05-11  Mg     1.9     05-11                < from: Transthoracic Echocardiogram (05.08.22 @ 14:37) >  --------------------  Conclusions:  1. Normal mitral valve. Mild mitral regurgitation.  2. Heavily calcified trileaflet aortic valve with decreased  opening. Peak transaortic valve gradient equals 41 mm Hg,  estimated aortic valve area equals 1 sqcm (by continuity  equation), aortic valve velocity time integral equals 70  cm, consistent with moderate aortic stenosis.  However the aortic valve appears heavily calcified and may  be closer to moderate-severe aortic stenosis.  Mild aortic  regurgitation.  3. Mildly dilated left atrium.  LA volume index = 39 cc/m2.  4. Normal left ventricular systolic function. No segmental  wall motion abnormalities.  5. Normal right ventricular size and function.  6. Normal pericardium with trace pericardial effusion.  7. Bilateral pleural effusions.  *** Compared with echocardiogram of 3/11/2022, no  significant changes noted.  ------------------------------------------------------    < end of copied text >

## 2022-05-11 NOTE — PROGRESS NOTE ADULT - PROBLEM SELECTOR PLAN 9
Cr improved since dc  Monitor daily  Renal on board, f/u recommendations

## 2022-05-11 NOTE — DISCHARGE NOTE PROVIDER - NSDCCPCAREPLAN_GEN_ALL_CORE_FT
PRINCIPAL DISCHARGE DIAGNOSIS  Diagnosis: Congestive heart failure  Assessment and Plan of Treatment: Weigh yourself daily.  If you gain 3lbs in 3 days, or 5lbs in a week call your Health Care Provider.  Do not eat or drink foods containing more than 2000mg of salt (sodium) in your diet every day.  Call your Health Care Provider if you have any swelling or increased swelling in your feet, ankles, and/or stomach.  Take all of your medication as directed.  If you become dizzy call your Health Care Provider.  Follow up with your Cardiologist in 1 to 2 weeks.         SECONDARY DISCHARGE DIAGNOSES  Diagnosis: Hypoxia  Assessment and Plan of Treatment: Monitor for shortness of breath.  Seek medical attention for persistent shortness of breath.  Follow up with your Cardiologist in 1 to 2 weeks.     PRINCIPAL DISCHARGE DIAGNOSIS  Diagnosis: Congestive heart failure  Assessment and Plan of Treatment: Weigh yourself daily.  If you gain 3lbs in 3 days, or 5lbs in a week call your Health Care Provider.  Do not eat or drink foods containing more than 2000mg of salt (sodium) in your diet every day.  Call your Health Care Provider if you have any swelling or increased swelling in your feet, ankles, and/or stomach.  Take all of your medication as directed.  If you become dizzy call your Health Care Provider.  Follow up with your Cardiologist in 1 to 2 weeks.         SECONDARY DISCHARGE DIAGNOSES  Diagnosis: PAF (paroxysmal atrial fibrillation)  Assessment and Plan of Treatment: Continue Eliquis as prescribed.  Monitor for bleeding.  Follow up with your Cardiologist in 1 week.    Diagnosis: Hypothyroidism  Assessment and Plan of Treatment: Take your medication as prescribed.   Follow up with your medical doctor for routine blood  work monitoring, and to establish long term treatment goals.      Diagnosis: Hypertension  Assessment and Plan of Treatment: Take your medication as prescribed.  Follow up with your medical doctor for routine blood pressure monitoring, and to establish long term blood pressure treatment goals.  Low salt diet  Activity as tolerated.  Notify your doctor if you have any of the following symptoms:   Dizziness, Lightheadedness, Blurry vision, Headache, Chest pain, Shortness of breath      Diagnosis: Hyperlipidemia  Assessment and Plan of Treatment: Take your medication as prescribed.   Follow up with your medical doctor for routine blood  work monitoring, and to establish long term treatment goals.      Diagnosis: Hypoxia  Assessment and Plan of Treatment: Monitor for shortness of breath.  Seek medical attention for persistent shortness of breath.  Follow up with your Cardiologist in 1 to 2 weeks.

## 2022-05-11 NOTE — PROGRESS NOTE ADULT - PROBLEM SELECTOR PLAN 3
CT neg for PNA  pna ruled out  dc abx
cont zosyn as per pulm  f/u cult
cont zosyn as per pulm  f/u cult
CT neg for PNA  pna ruled out  dc abx

## 2022-05-11 NOTE — PROGRESS NOTE ADULT - ASSESSMENT
ASSESSMENT:    89 year old woman with h/o distant asthma - no issues for more than 30 years. She also has a history of HTN, HLD, DM, CKD (Dr. Dewitt), breast cancer s/p lumpectomy and aortic stenosis (Dr. Veto Flores). She was recently diagnosed with new onset atrial fibrillation being treated with metoprolol and Eliquis.  Admitted with shortness of breath and "gurgling" in her chest. She had an isolated fever in the ER. CXR is suggestive of a right lower lobe pneumonia.  She has no bronchospasm. She may be mildly fluid overloaded in the setting of moderate and possibly severe aortic stenosis and atrial fibrillation    PLAN/RECOMMENDATIONS:    stable oxygenation currently on room air  ECHO -> preserved LVEF - moderate-severe aortic stenosis - moderate diastolic dysfunction - bilateral pleural effusions  CT -> pulmonary edema with small pleural effusions and partial lower lobe compressive atelectasis  patient given an incentive spirometer and instructed in its use  observe off systemic steroids and nebs  mucinex 1200mg 2 times daily  incentive spirometry -> patient given the device and uses it well  cardiology follow-up   diurese as tolerated by renal function and hemodynamics  cardiac meds: norvasc/toprol XL/lipitor/eliquis  effexor XL    Will follow with you. Plan of care discussed with the patient and her son at bedside as well as with Dr. Patricio. No pulmonary objection to discharge. The patient ambulated occasionally around her house with an aide but rarely goes out. Home physical therapy seems reasonable.    Brayden Benavides MD, [facility]  [phone]  Pulmonary Medicine

## 2022-05-11 NOTE — DISCHARGE NOTE PROVIDER - NSDCMRMEDTOKEN_GEN_ALL_CORE_FT
amLODIPine 5 mg oral tablet: 1 tab(s) orally once a day  Effexor XR 75 mg oral capsule, extended release: 1 cap(s) orally once a day  Eliquis 2.5 mg oral tablet: 1 tab(s) orally 2 times a day  ferrous sulfate 325 mg (65 mg elemental iron) oral tablet: 1 tab(s) orally once a day  metoprolol succinate 25 mg oral tablet, extended release: 1 tab(s) orally once a day  rosuvastatin 10 mg oral tablet: 1 tab(s) orally once a day  Synthroid 25 mcg (0.025 mg) oral tablet: 1 tab(s) orally once a day   amLODIPine 5 mg oral tablet: 1 tab(s) orally once a day  ascorbic acid 500 mg oral tablet: 1 tab(s) orally once a day  Eliquis 2.5 mg oral tablet: 1 tab(s) orally 2 times a day  ferrous sulfate 325 mg (65 mg elemental iron) oral tablet: 1 tab(s) orally once a day  metoprolol succinate 25 mg oral tablet, extended release: 1 tab(s) orally once a day  multivitamin: 1 tab(s) orally once a day   Nephro-Ernie oral tablet: 1 tab(s) orally once a day   rosuvastatin 10 mg oral tablet: 1 tab(s) orally once a day  Synthroid 25 mcg (0.025 mg) oral tablet: 1 tab(s) orally once a day  venlafaxine 75 mg oral capsule, extended release: 1 cap(s) orally once a day   amLODIPine 5 mg oral tablet: 1 tab(s) orally once a day  ascorbic acid 500 mg oral tablet: 1 tab(s) orally once a day  Eliquis 2.5 mg oral tablet: 1 tab(s) orally 2 times a day  ferrous sulfate 325 mg (65 mg elemental iron) oral tablet: 1 tab(s) orally once a day  Lasix 40 mg oral tablet: 1 tab(s) orally once a day   metoprolol succinate 25 mg oral tablet, extended release: 1 tab(s) orally once a day  multivitamin: 1 tab(s) orally once a day   Nephro-Ernie oral tablet: 1 tab(s) orally once a day   rosuvastatin 10 mg oral tablet: 1 tab(s) orally once a day  Synthroid 25 mcg (0.025 mg) oral tablet: 1 tab(s) orally once a day  venlafaxine 75 mg oral capsule, extended release: 1 cap(s) orally once a day

## 2022-05-11 NOTE — DISCHARGE NOTE PROVIDER - CARE PROVIDERS DIRECT ADDRESSES
[email or web address]

## 2022-05-12 RX ORDER — FUROSEMIDE 40 MG
1 TABLET ORAL
Qty: 30 | Refills: 0
Start: 2022-05-12 | End: 2022-06-10

## 2022-05-17 ENCOUNTER — RX RENEWAL (OUTPATIENT)
Age: 87
End: 2022-05-17

## 2022-05-18 ENCOUNTER — NON-APPOINTMENT (OUTPATIENT)
Age: 87
End: 2022-05-18

## 2022-05-18 ENCOUNTER — APPOINTMENT (OUTPATIENT)
Dept: CARDIOLOGY | Facility: CLINIC | Age: 87
End: 2022-05-18
Payer: MEDICARE

## 2022-05-18 VITALS
BODY MASS INDEX: 20.43 KG/M2 | DIASTOLIC BLOOD PRESSURE: 63 MMHG | SYSTOLIC BLOOD PRESSURE: 148 MMHG | OXYGEN SATURATION: 100 % | WEIGHT: 119 LBS | HEART RATE: 53 BPM

## 2022-05-18 PROCEDURE — 93000 ELECTROCARDIOGRAM COMPLETE: CPT

## 2022-05-18 PROCEDURE — 99214 OFFICE O/P EST MOD 30 MIN: CPT

## 2022-05-18 NOTE — REVIEW OF SYSTEMS
[Feeling Fatigued] : feeling fatigued [Dyspnea on exertion] : dyspnea during exertion [Lower Ext Edema] : lower extremity edema [Negative] : Heme/Lymph [SOB] : no shortness of breath [Chest Discomfort] : no chest discomfort [Palpitations] : no palpitations [Rash] : no rash

## 2022-05-18 NOTE — DISCUSSION/SUMMARY
[FreeTextEntry1] : The patient was examined. Her blood pressure was 148/63 and her pulse was 53. Her oxygen saturation was 100%. Her lungs were clear to auscultation. Cardiac exam was negative for rubs or gallops.There was a 3/6 holosystolic murmur heard at the apex, and a 3/6 systolic ejection murmur in the aortic area her EKG showed sinus bradycardia with a first-degree heart block and a left anterior hemiblock.  She'll return in 2  months, or earlier if needed. She'll continue her current medications..  Total time spent on the day of the encounter was 36 minutes which include face-to-face and non face-to-face times personally spent by the physician preparing to see the patient, obtaining  separately obtained history, performing a medically appropriate exam and evaluation, counseling, educating, talking to the family or caregivers, ordering medicines, ordering tests or procedures, referring and communicating with other healthcare professionals, and documenting clinical information in the electronic health record.

## 2022-05-18 NOTE — REASON FOR VISIT
[FreeTextEntry1] : The patient comes in for followup of her asthma, hypertension, diabetes, hyperlipidemia, and mitral regurgitation. She has no new complaints. She denies any chest pains, shortness of breath, or palpitations. She does complain of feeling tired. \par May 14, 2019: The patient today is complaining of problems with swallowing her metformin pill. She cuts it in half. I told her to put it in applesauce. She also is complaining of dry skin. She does use Dove soap. She denies any chest pains, shortness of breath, or palpitations\par September 17, 2019: Patient complains of feeling fatigued and not sleeping well.  Complains of dry mouth.  She has a hard time walking.  She is losing weight.  Daughter reports that she does not eat well.  She denies any chest pains, shortness of breath, or palpitations.\par December 17, 2019: The patient's biggest complaint is that she is tired. She denies any chest pains, shortness of breath, or palpitations. She does not do a lot of walking. Sometimes her ankles are swollen.\par June 11, 2020: The patient still has complaints of dyspnea on exertion fatigue and not sleeping well.  She denies any chest pain shortness of breath at rest or palpitations.  She has urinary incontinence and she is on a medicine for overactive bladder.  I discussed with the daughter about stopping the trapezium medication.  She does get muscle aches and I discussed with the daughter about stopping the rosuvastatin temporarily for 2 weeks to see if the muscle aches get better.  She does take an Advil twice a day for arthritis pains.  It does not seem to bother her stomach.\par October 8, 2020: Patient complains of pains when she walks especially down the left leg\par October 15, 2020: The patient after leaving the office 1 week ago tripped and fell and lost consciousness.  He was not clear whether she had a syncopal attack.  She was hospitalized.  They found an E. coli UTI.  They found that she had orthostatic hypotension and stopped her blood pressure medicines.  She now comes for follow-up.  She is tired.  She denies any chest pains, shortness of breath, or palpitations.  She is still not taking any blood pressure medicines.  She is following up with a nephrologist.\par November 19, 2020: The patient's daughter reports that she has the beginnings of a pressure sore that is slightly pussy.  The patient herself has no specific complaints referable to that.  She has no chest pains, shortness of breath, or palpitations.  Blood pressures measured at home have been generally lower than the blood pressure here.  She is not yet on any antihypertensives.  She has had no further near syncope or syncope episodes and no further falls.  She was getting physical therapy and is doing her own exercises now.\par April 15, 2021: On January 21, 2021 the patient fell and hit her head.  She went to the [facility] emergency room and got stitches.  She eventually was cleared to go down to [place] and went down to [place] for 2 months.  She was confused at first but they may have forgotten to give her her antidepressants down there.  She is back to baseline now.  The daughter has noticed that she sweats around her head and neck after sleeping.  She also has a dry cough in the middle of the night.  She does have dyspnea on exertion but she is much less active.  She does walk with a walker.\par September 15, 2021: The patient complains of fatigue and poor memory.  She denies any chest pains, shortness of breath, or palpitations.  She did have a UTI which caused her to have increased confusion and she was hospitalized for time.  She is no longer taking anything for blood pressure.  Daughter reports that she has poor nutritional intake.\par January 19, 2022: The patient had a change in mental status and was determined to have a urinary tract infection.  The first urine culture was polymicrobial and was felt to be contaminated.  She took Cipro for 5 days.  On day three she had a repeat culture which was negative.  She is much better.\par March 21, 2022: The patient was just hospitalized with a change in mental status.  She was found to have dehydration and renal insufficiency as well as recurrent urinary tract infection.  Because of the renal insufficiency she was taken off her Metformin and that is the only change she is not taking Metformin.\par   April 12, 2022: The patient has had a red rash on her chest and underneath her left breast.  Sometimes because of her urinary incontinence in the morning her nightgown is soaked.  She is sponge bath with Dial soap and other soap containing towelettes.  The rash is not itchy.  Her left leg is slightly swollen.  She is taking amlodipine 5 mg.\par April 26, 2022: The patient denies any chest pains, palpitations, or shortness of breath at rest.  She does have dyspnea on exertion.  Sometimes she has pain when walking in her right leg.  It is in the right thigh above the knee.\par May 18, 2022: The patient was hospitalized at [facility] in [facility] approximately 1 week ago when she was discharged home.  At first they thought it was pneumonia but then they came to the conclusion that it was congestive heart failure.  She was sent home on Lasix 40 mg daily as well as vitamin C and Nephro-Ernie.  Patient's biggest complaint is fatigue.  She denies any chest pains, shortness of breath, or palpitations.

## 2022-05-20 ENCOUNTER — TRANSCRIPTION ENCOUNTER (OUTPATIENT)
Age: 87
End: 2022-05-20

## 2022-06-09 ENCOUNTER — NON-APPOINTMENT (OUTPATIENT)
Age: 87
End: 2022-06-09

## 2022-06-12 ENCOUNTER — INPATIENT (INPATIENT)
Facility: HOSPITAL | Age: 87
LOS: 18 days | Discharge: HOME HEALTH | DRG: 813 | End: 2022-07-01
Attending: STUDENT IN AN ORGANIZED HEALTH CARE EDUCATION/TRAINING PROGRAM | Admitting: STUDENT IN AN ORGANIZED HEALTH CARE EDUCATION/TRAINING PROGRAM
Payer: MEDICARE

## 2022-06-12 VITALS
RESPIRATION RATE: 20 BRPM | OXYGEN SATURATION: 100 % | TEMPERATURE: 98 F | DIASTOLIC BLOOD PRESSURE: 55 MMHG | HEART RATE: 60 BPM | SYSTOLIC BLOOD PRESSURE: 110 MMHG | WEIGHT: 111.99 LBS | HEIGHT: 62 IN

## 2022-06-12 PROCEDURE — 93010 ELECTROCARDIOGRAM REPORT: CPT | Mod: GC

## 2022-06-12 PROCEDURE — 99291 CRITICAL CARE FIRST HOUR: CPT | Mod: FT,CS,25,GC

## 2022-06-13 DIAGNOSIS — I10 ESSENTIAL (PRIMARY) HYPERTENSION: ICD-10-CM

## 2022-06-13 DIAGNOSIS — F41.9 ANXIETY DISORDER, UNSPECIFIED: ICD-10-CM

## 2022-06-13 DIAGNOSIS — Z29.9 ENCOUNTER FOR PROPHYLACTIC MEASURES, UNSPECIFIED: ICD-10-CM

## 2022-06-13 DIAGNOSIS — K92.2 GASTROINTESTINAL HEMORRHAGE, UNSPECIFIED: ICD-10-CM

## 2022-06-13 DIAGNOSIS — K21.9 GASTRO-ESOPHAGEAL REFLUX DISEASE WITHOUT ESOPHAGITIS: ICD-10-CM

## 2022-06-13 DIAGNOSIS — E11.9 TYPE 2 DIABETES MELLITUS WITHOUT COMPLICATIONS: ICD-10-CM

## 2022-06-13 DIAGNOSIS — I48.20 CHRONIC ATRIAL FIBRILLATION, UNSPECIFIED: ICD-10-CM

## 2022-06-13 DIAGNOSIS — I50.32 CHRONIC DIASTOLIC (CONGESTIVE) HEART FAILURE: ICD-10-CM

## 2022-06-13 DIAGNOSIS — I35.0 NONRHEUMATIC AORTIC (VALVE) STENOSIS: ICD-10-CM

## 2022-06-13 DIAGNOSIS — E03.9 HYPOTHYROIDISM, UNSPECIFIED: ICD-10-CM

## 2022-06-13 LAB
ALBUMIN SERPL ELPH-MCNC: 3 G/DL — LOW (ref 3.3–5)
ALBUMIN SERPL ELPH-MCNC: 3.4 G/DL — SIGNIFICANT CHANGE UP (ref 3.3–5)
ALP SERPL-CCNC: 59 U/L — SIGNIFICANT CHANGE UP (ref 40–120)
ALP SERPL-CCNC: 68 U/L — SIGNIFICANT CHANGE UP (ref 40–120)
ALT FLD-CCNC: 9 U/L — LOW (ref 10–45)
ALT FLD-CCNC: 9 U/L — LOW (ref 10–45)
ANION GAP SERPL CALC-SCNC: 10 MMOL/L — SIGNIFICANT CHANGE UP (ref 5–17)
ANION GAP SERPL CALC-SCNC: 13 MMOL/L — SIGNIFICANT CHANGE UP (ref 5–17)
ANISOCYTOSIS BLD QL: SLIGHT — SIGNIFICANT CHANGE UP
APTT BLD: 28.4 SEC — SIGNIFICANT CHANGE UP (ref 27.5–35.5)
APTT BLD: 31.3 SEC — SIGNIFICANT CHANGE UP (ref 27.5–35.5)
AST SERPL-CCNC: 14 U/L — SIGNIFICANT CHANGE UP (ref 10–40)
AST SERPL-CCNC: 17 U/L — SIGNIFICANT CHANGE UP (ref 10–40)
BASE EXCESS BLDV CALC-SCNC: -1 MMOL/L — SIGNIFICANT CHANGE UP (ref -2–2)
BASOPHILS # BLD AUTO: 0.08 K/UL — SIGNIFICANT CHANGE UP (ref 0–0.2)
BASOPHILS NFR BLD AUTO: 1 % — SIGNIFICANT CHANGE UP (ref 0–2)
BILIRUB SERPL-MCNC: 0.1 MG/DL — LOW (ref 0.2–1.2)
BILIRUB SERPL-MCNC: 0.3 MG/DL — SIGNIFICANT CHANGE UP (ref 0.2–1.2)
BLD GP AB SCN SERPL QL: NEGATIVE — SIGNIFICANT CHANGE UP
BLOOD GAS VENOUS - CREATININE: SIGNIFICANT CHANGE UP MG/DL (ref 0.5–1.3)
BUN SERPL-MCNC: 57 MG/DL — HIGH (ref 7–23)
BUN SERPL-MCNC: 62 MG/DL — HIGH (ref 7–23)
CA-I SERPL-SCNC: 1.27 MMOL/L — SIGNIFICANT CHANGE UP (ref 1.15–1.33)
CALCIUM SERPL-MCNC: 8.6 MG/DL — SIGNIFICANT CHANGE UP (ref 8.4–10.5)
CALCIUM SERPL-MCNC: 8.8 MG/DL — SIGNIFICANT CHANGE UP (ref 8.4–10.5)
CHLORIDE BLDV-SCNC: 105 MMOL/L — SIGNIFICANT CHANGE UP (ref 96–108)
CHLORIDE SERPL-SCNC: 102 MMOL/L — SIGNIFICANT CHANGE UP (ref 96–108)
CHLORIDE SERPL-SCNC: 107 MMOL/L — SIGNIFICANT CHANGE UP (ref 96–108)
CO2 BLDV-SCNC: 26 MMOL/L — SIGNIFICANT CHANGE UP (ref 22–26)
CO2 SERPL-SCNC: 22 MMOL/L — SIGNIFICANT CHANGE UP (ref 22–31)
CO2 SERPL-SCNC: 24 MMOL/L — SIGNIFICANT CHANGE UP (ref 22–31)
CREAT SERPL-MCNC: 1.76 MG/DL — HIGH (ref 0.5–1.3)
CREAT SERPL-MCNC: 1.93 MG/DL — HIGH (ref 0.5–1.3)
EGFR: 24 ML/MIN/1.73M2 — LOW
EGFR: 27 ML/MIN/1.73M2 — LOW
ELLIPTOCYTES BLD QL SMEAR: SLIGHT — SIGNIFICANT CHANGE UP
EOSINOPHIL # BLD AUTO: 0 K/UL — SIGNIFICANT CHANGE UP (ref 0–0.5)
EOSINOPHIL NFR BLD AUTO: 0 % — SIGNIFICANT CHANGE UP (ref 0–6)
FLUAV AG NPH QL: SIGNIFICANT CHANGE UP
FLUBV AG NPH QL: SIGNIFICANT CHANGE UP
GAS PNL BLDV: 134 MMOL/L — LOW (ref 136–145)
GAS PNL BLDV: SIGNIFICANT CHANGE UP
GAS PNL BLDV: SIGNIFICANT CHANGE UP
GIANT PLATELETS BLD QL SMEAR: PRESENT — SIGNIFICANT CHANGE UP
GLUCOSE BLDV-MCNC: 220 MG/DL — HIGH (ref 70–99)
GLUCOSE SERPL-MCNC: 166 MG/DL — HIGH (ref 70–99)
GLUCOSE SERPL-MCNC: 209 MG/DL — HIGH (ref 70–99)
HCO3 BLDV-SCNC: 25 MMOL/L — SIGNIFICANT CHANGE UP (ref 22–29)
HCT VFR BLD CALC: 16.6 % — CRITICAL LOW (ref 34.5–45)
HCT VFR BLD CALC: 23.2 % — LOW (ref 34.5–45)
HCT VFR BLD CALC: 27.3 % — LOW (ref 34.5–45)
HCT VFR BLDA CALC: 16 % — CRITICAL LOW (ref 34.5–46.5)
HGB BLD CALC-MCNC: 5.2 G/DL — CRITICAL LOW (ref 11.7–16.1)
HGB BLD-MCNC: 4.8 G/DL — CRITICAL LOW (ref 11.5–15.5)
HGB BLD-MCNC: 7.4 G/DL — LOW (ref 11.5–15.5)
HGB BLD-MCNC: 8.9 G/DL — LOW (ref 11.5–15.5)
INR BLD: 1.11 RATIO — SIGNIFICANT CHANGE UP (ref 0.88–1.16)
INR BLD: 1.35 RATIO — HIGH (ref 0.88–1.16)
LACTATE BLDV-MCNC: 1.5 MMOL/L — SIGNIFICANT CHANGE UP (ref 0.7–2)
LYMPHOCYTES # BLD AUTO: 1.47 K/UL — SIGNIFICANT CHANGE UP (ref 1–3.3)
LYMPHOCYTES # BLD AUTO: 18.7 % — SIGNIFICANT CHANGE UP (ref 13–44)
MACROCYTES BLD QL: SLIGHT — SIGNIFICANT CHANGE UP
MAGNESIUM SERPL-MCNC: 2 MG/DL — SIGNIFICANT CHANGE UP (ref 1.6–2.6)
MANUAL SMEAR VERIFICATION: SIGNIFICANT CHANGE UP
MCHC RBC-ENTMCNC: 28.9 GM/DL — LOW (ref 32–36)
MCHC RBC-ENTMCNC: 29.1 PG — SIGNIFICANT CHANGE UP (ref 27–34)
MCHC RBC-ENTMCNC: 29.6 PG — SIGNIFICANT CHANGE UP (ref 27–34)
MCHC RBC-ENTMCNC: 29.6 PG — SIGNIFICANT CHANGE UP (ref 27–34)
MCHC RBC-ENTMCNC: 31.9 GM/DL — LOW (ref 32–36)
MCHC RBC-ENTMCNC: 32.6 GM/DL — SIGNIFICANT CHANGE UP (ref 32–36)
MCV RBC AUTO: 102.5 FL — HIGH (ref 80–100)
MCV RBC AUTO: 90.7 FL — SIGNIFICANT CHANGE UP (ref 80–100)
MCV RBC AUTO: 91.3 FL — SIGNIFICANT CHANGE UP (ref 80–100)
MICROCYTES BLD QL: SIGNIFICANT CHANGE UP
MONOCYTES # BLD AUTO: 0.44 K/UL — SIGNIFICANT CHANGE UP (ref 0–0.9)
MONOCYTES NFR BLD AUTO: 5.6 % — SIGNIFICANT CHANGE UP (ref 2–14)
MYELOCYTES NFR BLD: 0.9 % — HIGH (ref 0–0)
NEUTROPHILS # BLD AUTO: 5.79 K/UL — SIGNIFICANT CHANGE UP (ref 1.8–7.4)
NEUTROPHILS NFR BLD AUTO: 72.9 % — SIGNIFICANT CHANGE UP (ref 43–77)
NEUTS BAND # BLD: 0.9 % — SIGNIFICANT CHANGE UP (ref 0–8)
NRBC # BLD: 0 /100 WBCS — SIGNIFICANT CHANGE UP (ref 0–0)
NRBC # BLD: 0 /100 WBCS — SIGNIFICANT CHANGE UP (ref 0–0)
OB PNL STL: POSITIVE
OTHER CELLS CSF MANUAL: 2.1 ML/DL — LOW (ref 18–22)
PCO2 BLDV: 47 MMHG — HIGH (ref 39–42)
PH BLDV: 7.33 — SIGNIFICANT CHANGE UP (ref 7.32–7.43)
PHOSPHATE SERPL-MCNC: 3.9 MG/DL — SIGNIFICANT CHANGE UP (ref 2.5–4.5)
PLAT MORPH BLD: NORMAL — SIGNIFICANT CHANGE UP
PLATELET # BLD AUTO: 139 K/UL — LOW (ref 150–400)
PLATELET # BLD AUTO: 153 K/UL — SIGNIFICANT CHANGE UP (ref 150–400)
PLATELET # BLD AUTO: 196 K/UL — SIGNIFICANT CHANGE UP (ref 150–400)
PO2 BLDV: 25 MMHG — SIGNIFICANT CHANGE UP (ref 25–45)
POIKILOCYTOSIS BLD QL AUTO: SLIGHT — SIGNIFICANT CHANGE UP
POLYCHROMASIA BLD QL SMEAR: SLIGHT — SIGNIFICANT CHANGE UP
POTASSIUM BLDV-SCNC: 4.5 MMOL/L — SIGNIFICANT CHANGE UP (ref 3.5–5.1)
POTASSIUM SERPL-MCNC: 4 MMOL/L — SIGNIFICANT CHANGE UP (ref 3.5–5.3)
POTASSIUM SERPL-MCNC: 4.3 MMOL/L — SIGNIFICANT CHANGE UP (ref 3.5–5.3)
POTASSIUM SERPL-SCNC: 4 MMOL/L — SIGNIFICANT CHANGE UP (ref 3.5–5.3)
POTASSIUM SERPL-SCNC: 4.3 MMOL/L — SIGNIFICANT CHANGE UP (ref 3.5–5.3)
PROT SERPL-MCNC: 5.7 G/DL — LOW (ref 6–8.3)
PROT SERPL-MCNC: 5.8 G/DL — LOW (ref 6–8.3)
PROTHROM AB SERPL-ACNC: 12.9 SEC — SIGNIFICANT CHANGE UP (ref 10.5–13.4)
PROTHROM AB SERPL-ACNC: 15.6 SEC — HIGH (ref 10.5–13.4)
RBC # BLD: 1.62 M/UL — LOW (ref 3.8–5.2)
RBC # BLD: 2.54 M/UL — LOW (ref 3.8–5.2)
RBC # BLD: 3.01 M/UL — LOW (ref 3.8–5.2)
RBC # FLD: 16.3 % — HIGH (ref 10.3–14.5)
RBC # FLD: 16.8 % — HIGH (ref 10.3–14.5)
RBC # FLD: 17.1 % — HIGH (ref 10.3–14.5)
RBC BLD AUTO: ABNORMAL
RH IG SCN BLD-IMP: POSITIVE — SIGNIFICANT CHANGE UP
RSV RNA NPH QL NAA+NON-PROBE: SIGNIFICANT CHANGE UP
SAO2 % BLDV: 28.5 % — LOW (ref 67–88)
SARS-COV-2 RNA SPEC QL NAA+PROBE: DETECTED
SCHISTOCYTES BLD QL AUTO: SLIGHT — SIGNIFICANT CHANGE UP
SODIUM SERPL-SCNC: 137 MMOL/L — SIGNIFICANT CHANGE UP (ref 135–145)
SODIUM SERPL-SCNC: 141 MMOL/L — SIGNIFICANT CHANGE UP (ref 135–145)
WBC # BLD: 7.84 K/UL — SIGNIFICANT CHANGE UP (ref 3.8–10.5)
WBC # BLD: 8.77 K/UL — SIGNIFICANT CHANGE UP (ref 3.8–10.5)
WBC # BLD: 9.45 K/UL — SIGNIFICANT CHANGE UP (ref 3.8–10.5)
WBC # FLD AUTO: 7.84 K/UL — SIGNIFICANT CHANGE UP (ref 3.8–10.5)
WBC # FLD AUTO: 8.77 K/UL — SIGNIFICANT CHANGE UP (ref 3.8–10.5)
WBC # FLD AUTO: 9.45 K/UL — SIGNIFICANT CHANGE UP (ref 3.8–10.5)

## 2022-06-13 PROCEDURE — 12345: CPT | Mod: NC,GC

## 2022-06-13 PROCEDURE — 99223 1ST HOSP IP/OBS HIGH 75: CPT | Mod: FS

## 2022-06-13 PROCEDURE — 71045 X-RAY EXAM CHEST 1 VIEW: CPT | Mod: 26

## 2022-06-13 PROCEDURE — 99223 1ST HOSP IP/OBS HIGH 75: CPT

## 2022-06-13 RX ORDER — ASCORBIC ACID 60 MG
500 TABLET,CHEWABLE ORAL DAILY
Refills: 0 | Status: DISCONTINUED | OUTPATIENT
Start: 2022-06-13 | End: 2022-07-01

## 2022-06-13 RX ORDER — METOPROLOL TARTRATE 50 MG
12.5 TABLET ORAL
Refills: 0 | Status: DISCONTINUED | OUTPATIENT
Start: 2022-06-13 | End: 2022-06-17

## 2022-06-13 RX ORDER — PANTOPRAZOLE SODIUM 20 MG/1
40 TABLET, DELAYED RELEASE ORAL
Refills: 0 | Status: DISCONTINUED | OUTPATIENT
Start: 2022-06-13 | End: 2022-06-25

## 2022-06-13 RX ORDER — PANTOPRAZOLE SODIUM 20 MG/1
40 TABLET, DELAYED RELEASE ORAL EVERY 12 HOURS
Refills: 0 | Status: DISCONTINUED | OUTPATIENT
Start: 2022-06-13 | End: 2022-06-13

## 2022-06-13 RX ORDER — PANTOPRAZOLE SODIUM 20 MG/1
80 TABLET, DELAYED RELEASE ORAL ONCE
Refills: 0 | Status: COMPLETED | OUTPATIENT
Start: 2022-06-13 | End: 2022-06-13

## 2022-06-13 RX ORDER — PROTHROMBIN COMPLEX CONCENTRATE (HUMAN) 25.5; 16.5; 24; 22; 22; 26 [IU]/ML; [IU]/ML; [IU]/ML; [IU]/ML; [IU]/ML; [IU]/ML
2000 POWDER, FOR SOLUTION INTRAVENOUS ONCE
Refills: 0 | Status: COMPLETED | OUTPATIENT
Start: 2022-06-13 | End: 2022-06-13

## 2022-06-13 RX ORDER — LEVOTHYROXINE SODIUM 125 MCG
25 TABLET ORAL DAILY
Refills: 0 | Status: DISCONTINUED | OUTPATIENT
Start: 2022-06-13 | End: 2022-07-01

## 2022-06-13 RX ORDER — VENLAFAXINE HCL 75 MG
75 CAPSULE, EXT RELEASE 24 HR ORAL DAILY
Refills: 0 | Status: DISCONTINUED | OUTPATIENT
Start: 2022-06-13 | End: 2022-07-01

## 2022-06-13 RX ORDER — ROSUVASTATIN CALCIUM 5 MG/1
10 TABLET ORAL AT BEDTIME
Refills: 0 | Status: DISCONTINUED | OUTPATIENT
Start: 2022-06-13 | End: 2022-07-01

## 2022-06-13 RX ADMIN — PANTOPRAZOLE SODIUM 80 MILLIGRAM(S): 20 TABLET, DELAYED RELEASE ORAL at 01:57

## 2022-06-13 RX ADMIN — Medication 12.5 MILLIGRAM(S): at 17:52

## 2022-06-13 RX ADMIN — ROSUVASTATIN CALCIUM 10 MILLIGRAM(S): 5 TABLET ORAL at 23:35

## 2022-06-13 RX ADMIN — Medication 1 TABLET(S): at 11:36

## 2022-06-13 RX ADMIN — Medication 25 MICROGRAM(S): at 11:33

## 2022-06-13 RX ADMIN — Medication 75 MILLIGRAM(S): at 12:30

## 2022-06-13 RX ADMIN — PROTHROMBIN COMPLEX CONCENTRATE (HUMAN) 400 INTERNATIONAL UNIT(S): 25.5; 16.5; 24; 22; 22; 26 POWDER, FOR SOLUTION INTRAVENOUS at 03:04

## 2022-06-13 RX ADMIN — Medication 500 MILLIGRAM(S): at 11:37

## 2022-06-13 NOTE — H&P ADULT - PROBLEM SELECTOR PLAN 1
Most likely 2/2 to eliquis initiation and underlying GI pathology. DDx includes Gastric ulcer vs. Duodenal ulcer vs. angiodysplasia (?hades syndrome) vs. diverticulosis vs. hemorrhoids vs. watermelon stomach.   - 06/13: 2 units pRBCs running, Pantoprazole 80mg,     Plan:  - c/w pantoprazole 40mg BID   - Maintain active type and screen   - Maintain 2 large bore IVs  - Transfuse to hemoglobin <7   - GI consult

## 2022-06-13 NOTE — PROGRESS NOTE ADULT - PROBLEM SELECTOR PLAN 6
Home: No home medications   - CANDE  - q6 hour finger sticks while NPO Home: No home medications   - CANDE  - CC Diet

## 2022-06-13 NOTE — H&P ADULT - PROBLEM SELECTOR PLAN 10
DVT/PE ppx: SCD's   Diet: NPO   Code: Full code,   - GOC with family most likely will opt for DNR/DNI, daughter wants to discuss with her siblings and her mother privately prior to completing the form for joint decision making.  Dispo:   - PT consult

## 2022-06-13 NOTE — H&P ADULT - PROBLEM SELECTOR PLAN 3
Known hx of aortic stenosis.  - 05/08: TTE EF 67%, AS moderate-severe systolic dysfunction, Moderate to severe AS velocity 70cm Peak, area 1cmPeak gradient 41     Plan:   - No acute issues   - Monitor fluid status closely  - STrict ins and outs   - Daily standing weights

## 2022-06-13 NOTE — PROGRESS NOTE ADULT - ATTENDING COMMENTS
This is a 89F w/ hx HTN, PAF on Eliquis,  CHF, Covid in May 2022, nonsmoker, remote hx of asthma presenting with BRBPR, Hb was 4.8. BP has been stable, not tachycardic. Receiving 2 units PRBCs. G evaluated, off AC    1. GI bleed, likely diverticular  2. Acute blood loss anemia  3. PAF, on Eliquis  4. Recent Covid, s/p Paxlovid  5. Chronic diastolic CHF    - GI plan appreciated, likely diverticular bleed, no active bleeding now. GI spoke to family- no intervention ( EGD/Colonoscopy). Afebrile, no abdominal pain. Initial Hb was 4.8, repeat Hb 7.4 after 2 units PRBC transfusion  - Off AC, CBC q 8hrs, c/w PPI, BP is 140/50, not tachycardic  - c/w metoprolol at low dose, hold diuretic, amlodipine  - PT eval  - GOC discussion This is a 89F w/ hx HTN, PAF on Eliquis,  CHF, Covid in May 2022, nonsmoker, remote hx of asthma presenting with BRBPR, Hb was 4.8. BP has been stable, not tachycardic. Receiving 2 units PRBCs. G evaluated, off AC    1. GI bleed, likely diverticular  2. Acute blood loss anemia  3. PAF, on Eliquis  4. Recent Covid, s/p Paxlovid  5. Chronic diastolic CHF    - GI plan appreciated, likely diverticular bleed, no active bleeding now. GI spoke to family- no intervention ( EGD/Colonoscopy). Afebrile, no abdominal pain. Initial Hb was 4.8, repeat Hb 7.4 after 2 units PRBC transfusion  - Off AC, CBC q 8hrs, c/w PPI, BP is 140/50, not tachycardic  - c/w metoprolol at low dose, hold diuretic, amlodipine  - No need of Covid treatment, not hyoxic, afebrile, CXR clear  - PT eval  - GOC discussion

## 2022-06-13 NOTE — PROGRESS NOTE ADULT - SUBJECTIVE AND OBJECTIVE BOX
Patient is a 89y old  Female who presents with a chief complaint of hypotension; anorexia; fatigue; hematochezia; anemia (13 Jun 2022 06:10)      SUBJECTIVE :      MEDICATIONS  (STANDING):  ascorbic acid 500 milliGRAM(s) Oral daily  levothyroxine 25 MICROGram(s) Oral daily  multivitamin 1 Tablet(s) Oral daily  pantoprazole  Injectable 40 milliGRAM(s) IV Push every 12 hours  rosuvastatin 10 milliGRAM(s) Oral at bedtime  venlafaxine XR. 75 milliGRAM(s) Oral daily    MEDICATIONS  (PRN):      CAPILLARY BLOOD GLUCOSE        I&O's Summary      PHYSICAL EXAM:  Vital Signs Last 24 Hrs  T(C): 36.4 (13 Jun 2022 05:30), Max: 36.6 (13 Jun 2022 05:14)  T(F): 97.6 (13 Jun 2022 05:30), Max: 97.9 (13 Jun 2022 05:14)  HR: 59 (13 Jun 2022 05:30) (58 - 60)  BP: 137/59 (13 Jun 2022 05:30) (110/55 - 140/56)  BP(mean): 80 (13 Jun 2022 05:30) (72 - 80)  RR: 16 (13 Jun 2022 05:30) (16 - 20)  SpO2: 100% (13 Jun 2022 05:30) (100% - 100%)    GENERAL: NAD, lying in bed comfortably  HEAD:  Atraumatic, Normocephalic  EYES: EOMI, PERRLA, conjunctiva and sclera clear  ENT: Moist mucous membranes  NECK: Supple, No JVD  CHEST/LUNG: Clear to auscultation bilaterally; No rales, rhonchi, wheezing, or rubs. Unlabored respirations  HEART: Regular rate and rhythm; Grade III-IV systolic crescendo decrescendo murmur w/o rubs, or gallops  ABDOMEN: Bowel sounds present; Soft, Nontender, Nondistended. No hepatomegaly  EXTREMITIES:  2+ Peripheral Pulses, brisk capillary refill. No clubbing, cyanosis, or edema  NERVOUS SYSTEM:  Alert & Oriented X2, speech clear. No deficits   MSK: FROM all 4 extremities, full and equal strength  SKIN: No rashes or lesions. Pale pallor    LABS:                        4.8    7.84  )-----------( 196      ( 13 Jun 2022 01:49 )             16.6     06-13    137  |  102  |  62<H>  ----------------------------<  209<H>  4.3   |  22  |  1.93<H>    Ca    8.6      13 Jun 2022 01:49    TPro  5.8<L>  /  Alb  3.4  /  TBili  0.1<L>  /  DBili  x   /  AST  17  /  ALT  9<L>  /  AlkPhos  68  06-13    PT/INR - ( 13 Jun 2022 01:49 )   PT: 15.6 sec;   INR: 1.35 ratio         PTT - ( 13 Jun 2022 01:49 )  PTT:31.3 sec            RADIOLOGY & ADDITIONAL TESTS:  Results Reviewed:   Imaging Personally Reviewed:  Electrocardiogram Personally Reviewed:    COORDINATION OF CARE:  Care Discussed with Consultants/Other Providers [Y/N]:  Prior or Outpatient Records Reviewed [Y/N]:   Patient is a 89y old  Female who presents with a chief complaint of hypotension; anorexia; fatigue; hematochezia; anemia (13 Jun 2022 06:10)      SUBJECTIVE : admitted for GI bleed. Pt seen and examined at bedside. AAOX3 this morning. Denies CP, SOB, fever/chills, nausea/vomiting, dysuria, hematuria.     MEDICATIONS  (STANDING):  ascorbic acid 500 milliGRAM(s) Oral daily  levothyroxine 25 MICROGram(s) Oral daily  multivitamin 1 Tablet(s) Oral daily  pantoprazole  Injectable 40 milliGRAM(s) IV Push every 12 hours  rosuvastatin 10 milliGRAM(s) Oral at bedtime  venlafaxine XR. 75 milliGRAM(s) Oral daily    MEDICATIONS  (PRN):      CAPILLARY BLOOD GLUCOSE        I&O's Summary      PHYSICAL EXAM:  Vital Signs Last 24 Hrs  T(C): 36.4 (13 Jun 2022 05:30), Max: 36.6 (13 Jun 2022 05:14)  T(F): 97.6 (13 Jun 2022 05:30), Max: 97.9 (13 Jun 2022 05:14)  HR: 59 (13 Jun 2022 05:30) (58 - 60)  BP: 137/59 (13 Jun 2022 05:30) (110/55 - 140/56)  BP(mean): 80 (13 Jun 2022 05:30) (72 - 80)  RR: 16 (13 Jun 2022 05:30) (16 - 20)  SpO2: 100% (13 Jun 2022 05:30) (100% - 100%)    GENERAL: NAD, lying in bed comfortably  HEAD:  Atraumatic, Normocephalic  EYES: EOMI, PERRLA, conjunctiva and sclera clear  ENT: Moist mucous membranes  NECK: Supple, No JVD  CHEST/LUNG: Clear to auscultation bilaterally; No rales, rhonchi, wheezing, or rubs. Unlabored respirations  HEART: Regular rate and rhythm; Grade III-IV systolic crescendo decrescendo murmur w/o rubs, or gallops  ABDOMEN: Bowel sounds present; Soft, Nontender, Nondistended. No hepatomegaly  EXTREMITIES:  2+ Peripheral Pulses, brisk capillary refill. No clubbing, cyanosis, or edema  NERVOUS SYSTEM:  Alert & Oriented X3, speech clear. No deficits   MSK: FROM all 4 extremities, full and equal strength  SKIN: No rashes or lesions. Pale pallor    LABS:                        4.8    7.84  )-----------( 196      ( 13 Jun 2022 01:49 )             16.6     06-13    137  |  102  |  62<H>  ----------------------------<  209<H>  4.3   |  22  |  1.93<H>    Ca    8.6      13 Jun 2022 01:49    TPro  5.8<L>  /  Alb  3.4  /  TBili  0.1<L>  /  DBili  x   /  AST  17  /  ALT  9<L>  /  AlkPhos  68  06-13    PT/INR - ( 13 Jun 2022 01:49 )   PT: 15.6 sec;   INR: 1.35 ratio         PTT - ( 13 Jun 2022 01:49 )  PTT:31.3 sec            RADIOLOGY & ADDITIONAL TESTS:  Results Reviewed:   Imaging Personally Reviewed:  Electrocardiogram Personally Reviewed:    COORDINATION OF CARE:  Care Discussed with Consultants/Other Providers [Y/N]:  Prior or Outpatient Records Reviewed [Y/N]:

## 2022-06-13 NOTE — ED PROVIDER NOTE - OBJECTIVE STATEMENT
89yo F w/ hx HTN, Afib, HLD, nonsmoker, remote hx of asthma presenting with decreased appetite. Pt has had decreased PO and low energy for 2d. Daughter reports pt looks pale. Pt has had loose BM for 2d, which have red clots in them. Pt has intermittent exertional sob. Pt recently started on Eliquis. No fever, cp, abd pain, n/v. No urinary symptoms or AMS. 89yo F w/ hx HTN, Afib, CHF, HLD, nonsmoker, remote hx of asthma presenting with decreased appetite. Pt has had decreased PO and low energy for 2d. Daughter reports pt looks pale. Pt has had loose BM for 2d, which have red clots in them. Pt has intermittent exertional sob. Pt recently started on Eliquis. No fever, cp, abd pain, n/v. No urinary symptoms or AMS.

## 2022-06-13 NOTE — H&P ADULT - NSHPPHYSICALEXAM_GEN_ALL_CORE
PHYSICAL EXAM:  GENERAL: NAD, lying in bed comfortably  HEAD:  Atraumatic, Normocephalic  EYES: EOMI, PERRLA, conjunctiva and sclera clear  ENT: Moist mucous membranes  NECK: Supple, No JVD  CHEST/LUNG: Clear to auscultation bilaterally; No rales, rhonchi, wheezing, or rubs. Unlabored respirations  HEART: Regular rate and rhythm; Grade III-IV systolic crescendo decrescendo murmur w/o rubs, or gallops  ABDOMEN: Bowel sounds present; Soft, Nontender, Nondistended. No hepatomegaly  EXTREMITIES:  2+ Peripheral Pulses, brisk capillary refill. No clubbing, cyanosis, or edema  NERVOUS SYSTEM:  Alert & Oriented X2, speech clear. No deficits   MSK: FROM all 4 extremities, full and equal strength  SKIN: No rashes or lesions. Pale pallor

## 2022-06-13 NOTE — H&P ADULT - ATTENDING COMMENTS
89F with PMH of HTN, HLD, Afib recently started on eliquis, CHF, hx of diverticulosis, presents to the ED with fatigue and decreased appetite as well as bloody BMs found to have Hgb 4.8 on admission. S/p Kcentra and ordered for 2units of pRBC. Will check CBC q8 for now, transfuse for Hgb <7. Protonix 40 BID. Maintain active T&S. GI consult emailed. Patient also found to be covid positive, however tested positive at home on 5/20 s/p course of paxlovid. Currently asymptomatic and not hypoxic. On 2L for comfort for shortness of breath. Will continue to monitor O2 sats. For Afib, will hold Eliquis in the setting of GI bleed. Rest of care per plan above.

## 2022-06-13 NOTE — CONSULT NOTE ADULT - ASSESSMENT
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 She has a history of HTN, HLD, DM, aortic stenosis, breast cancer s/p lumpectomy and CKD (Dr. Escobar). She was recently started on Eliquis and beta-blockers for atrial fibrillation. She was hospitalized in March with a UTI complicated by ELISA due to bactrim. She was admitted in May with dyspnea, hypoxemia, gurgling in her chest and leg swelling. She was initially treated with zosyn for a possible right lower lobe pneumonia seen on CXR until further evaluation revealed pulmonary edema with pleural effusions and atelectasis. ECHO -> preserved LVEF - moderate-severe aortic stenosis - moderate diastolic dysfunction - bilateral pleural effusions; CT -> pulmonary edema with small pleural effusions and partial lower lobe compressive atelectasis. She was diuresed and discharged on norvasc/toprol XL/lipitor/eliquis. The patient was diagnosed with COVID on May 22nd and was treated with a course of Paxlovid. She now returns to the ER with shortness of breath and weakness with minimal exertion. She has fatigue, malaise and pallor. She has been noted to have blood in her stools and black colored stools (after eating blueberries). Her daughter measured her blood pressure has 90/60 from a baseline of 120/90. She currently has no shortness of breath or hypoxemia on a 2lpm nasal canula. She has no cough, sputum production, chest congestion or wheeze. No fevers, chills or sweats. She has been found to be guaiac positive. RVP PCR remains positive for COVID. The patient has received Kcentra and 2 units PRBCs for severe anemia (4.8/16/6).    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 and respiratory muscle weakness limiting diaphragmatic excursion and chest wall expansion    PLAN/RECOMMENDATIONS:    oxygen supplementation to keep saturation greater than 92% - currently on a 2lpm nasal canula  observe off systemic steroids, antibiotics and pulmonary medications  head of bed elevation greater than 45 degrees at all times  incentive spirometry  await GI evaluation - would recommend pan-endoscopy to evaluate for the source of bleeding "unmasked" by Eliquis  NPO/IV fluids (watching closely for the development of CHF) - would give a dose of lasix if the patient needs further transfusions  check CBC following 2 units PRBCs - transfuse to Hgb > 7.0 in the absence of myocardial ischemia  IVP PPI  holding Eliquis, antihypertensives  glucose control  DVT prophylaxis - SCDs    Thank you for the courtesy of this referral. Plan of care discussed with the patient and her children at bedside.     Brayden Benavides MD, [facility]  [phone]  Pulmonary Medicine     ASSESSMENT:    89 year old gentlewoman, lifelong non-smoker, with history of quiescent asthma. She has a history of HTN, HLD, DM, aortic stenosis, breast cancer s/p lumpectomy and CKD (Dr. Escobar). She was recently started on Eliquis and beta-blockers for atrial fibrillation. She was hospitalized in March with a UTI complicated by ELISA due to bactrim. She was admitted in May with dyspnea, hypoxemia, gurgling in her chest and leg swelling. She was initially treated with zosyn for a possible right lower lobe pneumonia seen on CXR until further evaluation revealed pulmonary edema with pleural effusions and atelectasis. ECHO -> preserved LVEF - moderate-severe aortic stenosis - moderate diastolic dysfunction - bilateral pleural effusions; CT -> pulmonary edema with small pleural effusions and partial lower lobe compressive atelectasis. She was diuresed and discharged on norvasc/toprol XL/lipitor/eliquis. The patient was diagnosed with COVID on May 22nd and was treated with a course of Paxlovid. She now returns to the ER with shortness of breath and weakness with minimal exertion. She has fatigue, malaise and pallor. She has been noted to have blood in her stools and black colored stools (after eating blueberries). Her daughter measured her blood pressure has 90/60 from a baseline of 120/90. She currently has no shortness of breath or hypoxemia on a 2lpm nasal canula. She has no cough, sputum production, chest congestion or wheeze. No fevers, chills or sweats. She has been found to be guaiac positive. RVP PCR remains positive for COVID. The patient has received Kcentra and 2 units PRBCs for severe anemia (4.8/16/6).    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 and respiratory muscle weakness limiting diaphragmatic excursion and chest wall expansion  4) no evidence of bronchospasm or pulmonary edema/pleural effusions    nasal COVID PCR positivity more likely represents shedding of non-viable viral particles than ongoing infection in this immunocompetent patient    PLAN/RECOMMENDATIONS:    oxygen supplementation to keep saturation greater than 92% - currently on a 2lpm nasal canula  no indication for airborne or contact isolation - the patient was fist diagnosed with COVID on May 22nd  observe off systemic steroids, antibiotics and pulmonary medications  head of bed elevation greater than 45 degrees at all times  incentive spirometry  await GI evaluation - would recommend pan-endoscopy to evaluate for the source of bleeding "unmasked" by Eliquis  NPO/IV fluids (watching closely for the development of CHF) - would give a dose of lasix if the patient needs further transfusions  check CBC following 2 units PRBCs - transfuse to Hgb > 7.0 in the absence of myocardial ischemia  IVP PPI  holding Eliquis, antihypertensives  glucose control  DVT prophylaxis - SCDs    Thank you for the courtesy of this referral. Plan of care discussed with the patient and her children at bedside.     Brayden Benavides MD, [facility]  [phone]  Pulmonary Medicine     ASSESSMENT:    89 year old gentlewoman, lifelong non-smoker, with history of quiescent asthma. She has a history of HTN, HLD, DM, aortic stenosis, breast cancer s/p lumpectomy and CKD (Dr. Escobar). She was recently started on Eliquis and beta-blockers for atrial fibrillation. She was hospitalized in March with a UTI complicated by ELISA due to bactrim. She was admitted in May with dyspnea, hypoxemia, gurgling in her chest and leg swelling. She was initially treated with zosyn for a possible right lower lobe pneumonia seen on CXR until further evaluation revealed pulmonary edema with pleural effusions and atelectasis. ECHO -> preserved LVEF - moderate-severe aortic stenosis - moderate diastolic dysfunction - bilateral pleural effusions; CT -> pulmonary edema with small pleural effusions and partial lower lobe compressive atelectasis. She was diuresed and discharged on norvasc/toprol XL/lipitor/eliquis. The patient was diagnosed with COVID on May 22nd and was treated with a course of Paxlovid. She now returns to the ER with shortness of breath and weakness with minimal exertion. She has fatigue, malaise and pallor. She has been noted to have blood in her stools and black colored stools (after eating blueberries). Her daughter measured her blood pressure has 90/60 from a baseline of 120/90. She currently has no shortness of breath or hypoxemia on a 2lpm nasal canula. She has no cough, sputum production, chest congestion or wheeze. No fevers, chills or sweats. She has been found to be guaiac positive. RVP PCR remains positive for COVID. The patient has received Kcentra and 2 units PRBCs for severe anemia (4.8/16/6).    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 and respiratory muscle weakness limiting diaphragmatic excursion and chest wall expansion  4) no evidence of bronchospasm or pulmonary edema/pleural effusions    ELISA is due to intravascular volume depletion    nasal COVID PCR positivity more likely represents shedding of non-viable viral particles than ongoing infection in this immunocompetent patient    PLAN/RECOMMENDATIONS:    oxygen supplementation to keep saturation greater than 92% - currently on a 2lpm nasal canula  no indication for airborne or contact isolation - the patient was fist diagnosed with COVID on May 22nd  observe off systemic steroids, antibiotics and pulmonary medications  head of bed elevation greater than 45 degrees at all times  incentive spirometry  await GI evaluation - would recommend pan-endoscopy to evaluate for the source of bleeding "unmasked" by Eliquis  NPO/IV fluids (watching closely for the development of CHF) - would give a dose of lasix if the patient needs further transfusions  check CBC following 2 units PRBCs - transfuse to Hgb > 7.0 in the absence of myocardial ischemia  IVP PPI  holding Eliquis, antihypertensives  glucose control  DVT prophylaxis - SCDs    Thank you for the courtesy of this referral. Plan of care discussed with the patient and her children at bedside.     Brayden Benavides MD, [facility]  [phone]  Pulmonary Medicine

## 2022-06-13 NOTE — PROGRESS NOTE ADULT - PROBLEM SELECTOR PLAN 8
Good fetal movement  Heart palpations OB sent her to L&D low magnesium  No other concerns DVT/PE ppx: SCD's   Diet: NPO   Code: Full code,   - GOC with family most likely will opt for DNR/DNI, daughter wants to discuss with her siblings and her mother privately prior to completing the form for joint decision making.  Dispo:   - PT consult DVT/PE ppx: SCD's   Diet: CC Diet   Code: Full code,   - GOC with family most likely will opt for DNR/DNI, daughter wants to discuss with her siblings and her mother privately prior to completing the form for joint decision making.  Dispo:   - PT consult

## 2022-06-13 NOTE — H&P ADULT - PROBLEM SELECTOR PLAN 8
DVT/PE ppx: SCD's   Diet: NPO   Code: Full code,   - GOC with family most likely will opt for DNR/DNI, daughter wants to discuss with her siblings and her mother privately prior to completing the form for joint decision making.  Dispo:   - PT consult - c/w home venlafaxine 75mg qdaily

## 2022-06-13 NOTE — ED ADULT NURSE NOTE - OBJECTIVE STATEMENT
Pt is a 89y F PMH HTN, Afib, CHF, HLD, p/w decreased appetite and bloody stool x 2 days. Per daughter pt has had decreased PO intake, weakness, and low energy x 2 days. Pt has had loose BMs for two days with bright red clots. Endorsing intermittent dyspnea on exertion. Pt recently started on Eliquis. Denies fevers, chest pain, abd pain, n/v, urinary symptoms. A&Ox4, MOLINA, lungs clear, distal pulses intact, abdomen soft, skin intact. Side rails up for safety, call bell and personal items within reach, instructed to call for assistance, verbalizes understanding. Will continue to monitor.

## 2022-06-13 NOTE — H&P ADULT - HISTORY OF PRESENT ILLNESS
Patient is a 89yo F w/ hx HTN, Afib, CHF, AS, HLD, nonsmoker, remote hx of asthma presenting with loose stools, lethargy, fatigue, low blood pressure and bloody bowel movement. The patient started Eliquis at the end of April on 4/2022. The daughter noted on Friday that the patient developed loose stools that were normal in color. The patient subsequently was developing progressive fatigue, lethargy and decreased PO intake. Her daughter noted that her stools were a maroon color. On 6/12, the daughter noticed that the patient had low blood pressures SBP of 90s down from baseline of 120s. She checked her BM that morning and noted blood with blood clots in the toilet. At this time she decided to seek medical care in the ED for further evaluation.     Of note, the patient had COVID 2 weeks ago measured by at home test. RVP was + for COVID by PCR on admission.     In the ED:   Vitals: Temp 97.7, HR 60, /55, RR 20, O2 saturation 100%  Labs: Hemoglobin 4.8, Cr 1.93, COVID +   Imaging:  Cxr No acute pathology  Interventions: Given Kcentra, 2 units of pRBCs, Pantoprazole 80mg

## 2022-06-13 NOTE — ED PROVIDER NOTE - NSFOLLOWUPINSTRUCTIONS_ED_ALL_ED_FT
89yo F w/ hx HTN, Afib, HLD, nonsmoker, remote hx of asthma presenting with 2d of low energy, decreased po, pale, exertional dyspnea, bloody BM. PE shows dark red stool, daughter has picture of BM which shows clots. Pt recently started on Eliquis. Likely GIB. will get labs, type, protonix. likely transfuse

## 2022-06-13 NOTE — ED PROVIDER NOTE - PROGRESS NOTE DETAILS
Elio Pan- pt's Hb 4.8. Pt consented for blood. will reverse eliquis with Kcentra given mildly hypotensive, low Hb, likely active bleeding. Elio Pan- pt feeling a bit better, more interactive and less pale. spoke to pt and daughter about admission and plan for GI eval. pt agrees. spoke to hospitalist. Elio Pan- updated pt and daughter about covid+ result. pt had covid 2w ago, received antiviral, tested neg once.

## 2022-06-13 NOTE — CONSULT NOTE ADULT - SUBJECTIVE AND OBJECTIVE BOX
[facility] - [facility] - CONSULT NOTE    HPI:    PMHX:  HTN (hypertension)    Diabetes mellitus type 2, noninsulin dependent    Diverticulitis    Hyperlipidemia    GERD (gastroesophageal reflux disease)    Glaucoma    Breast cancer    Neurogenic bladder    Urinary incontinence    Renal calculus or stone    Arthritis    Heart murmur        PSHX:  Renal calculi    S/P lumpectomy of breast    S/P bladder repair        FAMILY HISTORY:  No pertinent family history in first degree relatives        SOCIAL HISTORY:    Pulmonary Medications:       Antimicrobials:      Cardiology:      Other:      Prn:  MEDICATIONS  (PRN):      Allergies    Keflex (Rash; Hives)    Intolerances        HOME MEDICATIONS: see  H & P    REVIEW OF SYSTEMS:  Constitutional: As per HPI  HEENT: Within normal limits  CV: As per HPI  Resp: As per HPI  GI: Within normal limits   : Within normal limits  Musculoskeletal: Within normal limits  Skin: Within normal limits  Neurological: Within normal limits  Psychiatric: Within normal limits  Endocrine: Within normal limits  Hematologic/Lymphatic: Within normal limits  Allergic/Immunologic: Within normal limits    [x] All other systems negative    OBJECTIVE:    I&O's Detail    Daily Height in cm: 157.48 (12 Jun 2022 22:29)    Daily       PHYSICAL EXAM:  ICU Vital Signs Last 24 Hrs  T(C): 35.8 (13 Jun 2022 01:37), Max: 36.5 (12 Jun 2022 22:29)  T(F): 96.4 (13 Jun 2022 01:37), Max: 97.7 (12 Jun 2022 22:29)  HR: 58 (13 Jun 2022 01:37) (58 - 60)  BP: 131/49 (13 Jun 2022 01:37) (110/55 - 131/49)  BP(mean): 72 (13 Jun 2022 01:37) (72 - 72)  ABP: --  ABP(mean): --  RR: 18 (13 Jun 2022 01:37) (18 - 20)  SpO2: 100% (13 Jun 2022 01:37) (100% - 100%)    General: Awake. Alert. Cooperative. No distress. Appears stated age 	  HEENT:   Atraumatic. Normocephalic. Anicteric. Normal oral mucosa. PERRL. EOMI.  Neck: Supple. Trachea midline. Thyroid without enlargement/tenderness/nodules. No carotid bruit. No JVD.	  Cardiovascular: Regular rate and rhythm. S1 S2 normal. No murmurs, rubs or gallops.  Respiratory: Respirations unlabored. Clear to auscultation and percussion bilaterally. No curvature.  Abdomen: Soft. Non-tender. Non-distended. No organomegaly. No masses. Normal bowel sounds.  Extremities: Warm to touch. No clubbing or cyanosis. No pedal edema.  Pulses: 2+ peripheral pulses all extremities.	  Skin: Normal skin color. No rashes or lesions. No ecchymoses. No cyanosis. Warm to touch.  Lymph Nodes: Cervical, supraclavicular and axillary nodes normal  Neurological: Motor and sensory examination equal and normal. A and O x 3  Psychiatry: Appropriate mood and affect.      LABS:                          4.8    7.84  )-----------( 196      ( 13 Jun 2022 01:49 )             16.6     CBC    WBC  7.84 <==    Hemoglobin  4.8 <<==    Hematocrit  16.6 <==    Platelets  196 <==      137  |  102  |  62<H>  ----------------------------<  209<H>    06-13  4.3   |  22  |  1.93<H>    LYTES    sodium  137 <==    potassium   4.3 <==    chloride  102 <==    carbon dioxide  22 <==    =============================================================================================  RENAL FUNCTION:    Creatinine:   1.93  <<==    BUN:   62 <==    ============================================================================================    calcium   8.6 <==    phos       mag       ============================================================================================  LFTs    AST:   17 <==     ALT:  9  <==     AP:  68  <=    Bili:  0.1  <=    PT/INR - ( 13 Jun 2022 01:49 )   PT: 15.6 sec;   INR: 1.35 ratio       PTT - ( 13 Jun 2022 01:49 )  PTT:31.3 sec    Venous Blood Gas:  06-13 @ 01:58  7.33/47/25/25/28.5  VBG Lactate: 1.5    < from: Transthoracic Echocardiogram (05.08.22 @ 14:37) >    Patient name: KOREY DIAZ  YOB: 1929   Age: 89 (F)   MR#: [id number]  Study Date: 5/8/2022  [id number]  Study quality: Technically fair  Referring Physician: Teo Monson MD  Blood Pressure: 165/72 mmHg  Height: 158 cm  Weight: 53 kg  BSA: 1.5 m2  Heart Rate: 70 mmHg  ------------------------------------------------------------------------  PROCEDURE: Transthoracic echocardiogram with 2-D, M-Mode  and complete spectral and color flow Doppler.  INDICATION: Cardiac murmur, unspecified (R01.1)  ------------------------------------------------------------------------  Dimensions:    Normal Values:  LA:     3.6    2.0 - 4.0 cm  Ao:     3.0    2.0 - 3.8 cm  SEPTUM: 1.0    0.6 -1.2 cm  PWT:    1.0    0.6 - 1.1 cm  LVIDd:  4.0    3.0 - 5.6 cm  LVIDs:  2.8    1.8 - 4.0 cm  Derived variables:  LVMI: 84 g/m2  RWT: 0.50  Fractional short: 30 %  EF (Moran Rule): 67 %Doppler Peak Velocity (m/sec):  AoV=3.2  ------------------------------------------------------------------------  Observations:  Mitral Valve: Normal mitral valve. Mild mitral  regurgitation.  Aortic Valve/Aorta: Heavily calcified trileaflet aortic  valve with decreased opening. Peak transaortic valve  gradientequals 41 mm Hg, estimated aortic valve area  equals 1 sqcm (by continuity equation), aortic valve  velocity time integral equals 70 cm, consistent with  moderate aortic stenosis.  However the aortic valve appears heavily calcified and may  be closerto moderate-severe aortic stenosis.  Mild aortic  regurgitation.  Peak left ventricular outflow tract  gradient equals 3 mm Hg, LVOT velocity time integral equals  23 cm.  Aortic Root: 3 cm.  Left Atrium: Mildly dilated left atrium.  LA volume index =  39 cc/m2.  Left Ventricle: Normal left ventricular systolic function.  No segmental wall motion abnormalities. Normal left  ventricular internal dimensions and wall thicknesses.  Moderate diastolic dysfunction (Stage II).  Right Heart: Normal rightatrium. Normal right ventricular  size and function. Normal tricuspid valve. Minimal  tricuspid regurgitation. Normal pulmonic valve. Minimal  pulmonic regurgitation.  Pericardium/Pleura: Normal pericardium with trace  pericardial effusion.  Bilateral pleural effusions.  Hemodynamic: Estimated right atrial pressure is 8 mm Hg.  Color Doppler demonstrates no evidence of a patent foramen  ovale.  ------------------------------------------------------------------------  Conclusions:  1. Normal mitral valve. Mild mitral regurgitation.  2. Heavily calcified trileaflet aortic valve with decreased  opening. Peak transaortic valve gradient equals 41 mm Hg,  estimated aortic valve area equals 1 sqcm (by continuity  equation), aortic valve velocity time integral equals 70  cm, consistent with moderate aortic stenosis.  However the aortic valve appears heavily calcified and may  be closer to moderate-severe aortic stenosis.  Mild aortic  regurgitation.  3. Mildly dilated left atrium.  LA volume index = 39 cc/m2.  4. Normal left ventricular systolic function. No segmental  wall motion abnormalities.  5. Normal right ventricular size and function.  6. Normal pericardium with trace pericardial effusion.  7. Bilateral pleural effusions.  *** Compared with echocardiogram of 3/11/2022, no  significant changes noted.  ------------------------------------------------------------------------  Confirmed on  5/8/2022 - 17:38:34 by Osito Argueta M.D.  ------------------------------------------------------------------------  ---------------------------------------------------------------------------------------------------------------    MICROBIOLOGY:     Flu With COVID-19 By ERICK (06.13.22 @ 01:51)   SARS-CoV-2 Result: Detected  This Respiratory Panel uses polymerase chain reaction (PCR) to detect for   influenza A; influenza B; respiratory syncytial virus; and SARS-CoV-2.   This test was validated by [employer] and is in use under the FDA   Emergency Use Authorization (EUA) for clinical labs CLIA-certified to   perform high complexity testing. Test results should be correlated with   clinical presentation, patient history, and epidemiology.   Influenza A Result: NotDetec   Influenza B Result: NotDetec   Resp Syn Virus Result: NotDetec       RADIOLOGY:  [x ] Chest radiographs reviewed and interpreted by me    < from: CT Chest No Cont (05.09.22 @ 20:10) >    EXAM:  CT CHEST                          PROCEDURE DATE:  05/09/2022      FINDINGS:    Lungs/Airways/Pleura: The central airways are patent. There are small   pleural effusions, new from 3/9/2022 abdominal CT, with partial lower   lobe compressive atelectasis. There are diffuse peribronchial and  peribronchovascular groundglass opacity with mild septal thickening,   likely pulmonary edema. There are few clusters of small nodules on a  background of subsegmental bronchial impaction, likely due to small   airways disease.    Mediastinum/Lymph nodes: No thoracic adenopathy.    Heart and Vessels: Mild cardiomegaly. Extensive coronary arterial and   aortic valvular calcification is present. Hypodensity of the blood pool   in relation to left ventricular myocardium suggests underlying anemia.   The great vessels are normal in size. No pericardial effusion.    Upper Abdomen: Cholelithiasis. Partially imaged large right parapelvic  renal cyst. Extensive visceral artery calcification.    Osseous structures and Soft Tissues: Degenerative changes without   aggressive osseous lesions. Remote left posterior 11th rib fracture.    IMPRESSION:  Pulmonary edema with small pleural effusions and partial lower lobe   compressive atelectasis.    ELISA BLACKBURN M.D., Attending Radiologist  This document has been electronically signed. May 10 2022  8:52AM  ---------------------------------------------------------------------------------------------------------------  < from: [facility] Duplex Lower Ext Vein Scan, Bilat (05.09.22 @ 15:03) >    EXAM:  DUPLEX SCAN EXT VEINS LOWER BI                          PROCEDURE DATE:  05/09/2022      IMPRESSION:  No evidence of deep venous thrombosis in either lower extremity venous   segments able to be examined.     AUGUSTIN EASTMAN MD; Attending Radiologist  This document has been electronically signed. May  9 2022  3:31PM  ---------------------------------------------------------------------------------------------------------------               [facility] - [facility] - CONSULT NOTE    HPI: 89 year old gentlewoman, lifelong non-smoker, with history of quiescent asthma. She has a history of HTN, HLD, DM, aortic stenosis, breast cancer s/p lumpectomy and CKD (Dr. Escobar). She was recently started on Eliquis and beta-blockers for atrial fibrillation. She was hospitalized in March with a UTI complicated by ELISA due to bactrim. She was admitted in May with dyspnea, hypoxemia, gurgling in her chest and leg swelling. She was initially treated with zosyn for a possible right lower lobe pneumonia seen on CXR until further evaluation revealed pulmonary edema with pleural effusions and atelectasis. ECHO -> preserved LVEF - moderate-severe aortic stenosis - moderate diastolic dysfunction - bilateral pleural effusions; CT -> pulmonary edema with small pleural effusions and partial lower lobe compressive atelectasis. She was diuresed and discharged on norvasc/toprol XL/lipitor/eliquis. The patient was diagnosed with COVID on May 22nd and was treated with a course of Paxlovid. She now returns to the ER with shortness of breath and weakness with minimal exertion. She has fatigue, malaise and pallor. She has been noted to have blood in her stools and black colored stools (after eating blueberries). Her daughter measured her blood pressure has 90/60 from a baseline of 120/90. She currently has no shortness of breath or hypoxemia on a 2lpm nasal canula. She has no cough, sputum production, chest congestion or wheeze. No fevers, chills or sweats. She has been found to be guaiac positive. RVP PCR remains positive for COVID. The patient has received Kcentra and 2 units PRBCs for severe anemia (4.8/16/6). Asked to evaluate    PMHX:  Asthma  Hypertension  Hyperlipidemia  Diabetes mellitus  Aortic stenosis  Atrial fibrillation  GERD (gastroesophageal reflux disease)  Breast cancer  Diverticulitis  Glaucoma  Neurogenic bladder with incontinence  Renal calculus  Arthritis    PSHX:  Lumpectomy of breast  Bladder repair    FAMILY HISTORY:  No pertinent family history in first degree relatives    SOCIAL HISTORY:  lifelong non-smoker; lives at home with her daughter    Pulmonary Medications:       Antimicrobials:      Cardiology:      Other:      Prn:  MEDICATIONS  (PRN):      Allergies    Keflex (Rash; Hives)    HOME MEDICATIONS: see  H & P    REVIEW OF SYSTEMS:  Constitutional: As per HPI  HEENT: Within normal limits  CV: As per HPI  Resp: As per HPI  GI: Within normal limits   : Within normal limits  Musculoskeletal: Within normal limits  Skin: Within normal limits  Neurological: Within normal limits  Psychiatric: Within normal limits  Endocrine: Within normal limits  Hematologic/Lymphatic: Within normal limits  Allergic/Immunologic: Within normal limits    [x] All other systems negative    OBJECTIVE:    Daily Height in cm: 157.48 (12 Jun 2022 22:29)      PHYSICAL EXAM:  ICU Vital Signs Last 24 Hrs  T(C): 35.8 (13 Jun 2022 01:37), Max: 36.5 (12 Jun 2022 22:29)  T(F): 96.4 (13 Jun 2022 01:37), Max: 97.7 (12 Jun 2022 22:29)  HR: 58 (13 Jun 2022 01:37) (58 - 60)  BP: 131/49 (13 Jun 2022 01:37) (110/55 - 131/49)  BP(mean): 72 (13 Jun 2022 01:37) (72 - 72)  ABP: --  ABP(mean): --  RR: 18 (13 Jun 2022 01:37) (18 - 20)  SpO2: 100% (13 Jun 2022 01:37) (100% - 100%) on 2lpm nasal canula    General: Awake. Alert. Cooperative. No distress. Appears stated age 	  HEENT:   Atraumatic. Normocephalic. Anicteric. Normal oral mucosa. PERRL. EOMI.  Neck: Supple. Trachea midline. Thyroid without enlargement/tenderness/nodules. No carotid bruit. No JVD.	  Cardiovascular: Regular rate and rhythm. S1 S2 normal. No murmurs, rubs or gallops.  Respiratory: Respirations unlabored. Clear to auscultation and percussion bilaterally. No curvature.  Abdomen: Soft. Non-tender. Non-distended. No organomegaly. No masses. Normal bowel sounds.  Extremities: Warm to touch. No clubbing or cyanosis. No pedal edema.  Pulses: 2+ peripheral pulses all extremities.	  Skin: Normal skin color. No rashes or lesions. No ecchymoses. No cyanosis. Warm to touch.  Lymph Nodes: Cervical, supraclavicular and axillary nodes normal  Neurological: Motor and sensory examination equal and normal. A and O x 3  Psychiatry: Appropriate mood and affect.      LABS:                          4.8    7.84  )-----------( 196      ( 13 Jun 2022 01:49 )             16.6     CBC    WBC  7.84 <==    Hemoglobin  4.8 <<==    Hematocrit  16.6 <==    Platelets  196 <==      137  |  102  |  62<H>  ----------------------------<  209<H>    06-13  4.3   |  22  |  1.93<H>    LYTES    sodium  137 <==    potassium   4.3 <==    chloride  102 <==    carbon dioxide  22 <==    =============================================================================================  RENAL FUNCTION:    Creatinine:   1.93  <<==    BUN:   62 <==    ============================================================================================    calcium   8.6 <==    phos       mag       ============================================================================================  LFTs    AST:   17 <==     ALT:  9  <==     AP:  68  <=    Bili:  0.1  <=    PT/INR - ( 13 Jun 2022 01:49 )   PT: 15.6 sec;   INR: 1.35 ratio       PTT - ( 13 Jun 2022 01:49 )  PTT:31.3 sec    Venous Blood Gas:  06-13 @ 01:58  7.33/47/25/25/28.5  VBG Lactate: 1.5    < from: Transthoracic Echocardiogram (05.08.22 @ 14:37) >    Patient name: KOREY DIAZ  YOB: 1929   Age: 89 (F)   MR#: [id number]  Study Date: 5/8/2022  Location: [address] [place], [place] [id number]: [facility]  Study quality: Technically fair  Referring Physician: Teo Monson MD  Blood Pressure: 165/72 mmHg  Height: 158 cm  Weight: 53 kg  BSA: 1.5 m2  Heart Rate: 70 mmHg  ------------------------------------------------------------------------  PROCEDURE: Transthoracic echocardiogram with 2-D, M-Mode  and complete spectral and color flow Doppler.  INDICATION: Cardiac murmur, unspecified (R01.1)  ------------------------------------------------------------------------  Dimensions:    Normal Values:  LA:     3.6    2.0 - 4.0 cm  Ao:     3.0    2.0 - 3.8 cm  SEPTUM: 1.0    0.6 -1.2 cm  PWT:    1.0    0.6 - 1.1 cm  LVIDd:  4.0    3.0 - 5.6 cm  LVIDs:  2.8    1.8 - 4.0 cm  Derived variables:  LVMI: 84 g/m2  RWT: 0.50  Fractional short: 30 %  EF (Moran Rule): 67 %Doppler Peak Velocity (m/sec):  AoV=3.2  ------------------------------------------------------------------------  Observations:  Mitral Valve: Normal mitral valve. Mild mitral  regurgitation.  Aortic Valve/Aorta: Heavily calcified trileaflet aortic  valve with decreased opening. Peak transaortic valve  gradientequals 41 mm Hg, estimated aortic valve area  equals 1 sqcm (by continuity equation), aortic valve  velocity time integral equals 70 cm, consistent with  moderate aortic stenosis.  However the aortic valve appears heavily calcified and may  be closerto moderate-severe aortic stenosis.  Mild aortic  regurgitation.  Peak left ventricular outflow tract  gradient equals 3 mm Hg, LVOT velocity time integral equals  23 cm.  Aortic Root: 3 cm.  Left Atrium: Mildly dilated left atrium.  LA volume index =  39 cc/m2.  Left Ventricle: Normal left ventricular systolic function.  No segmental wall motion abnormalities. Normal left  ventricular internal dimensions and wall thicknesses.  Moderate diastolic dysfunction (Stage II).  Right Heart: Normal rightatrium. Normal right ventricular  size and function. Normal tricuspid valve. Minimal  tricuspid regurgitation. Normal pulmonic valve. Minimal  pulmonic regurgitation.  Pericardium/Pleura: Normal pericardium with trace  pericardial effusion.  Bilateral pleural effusions.  Hemodynamic: Estimated right atrial pressure is 8 mm Hg.  Color Doppler demonstrates no evidence of a patent foramen  ovale.  ------------------------------------------------------------------------  Conclusions:  1. Normal mitral valve. Mild mitral regurgitation.  2. Heavily calcified trileaflet aortic valve with decreased  opening. Peak transaortic valve gradient equals 41 mm Hg,  estimated aortic valve area equals 1 sqcm (by continuity  equation), aortic valve velocity time integral equals 70  cm, consistent with moderate aortic stenosis.  However the aortic valve appears heavily calcified and may  be closer to moderate-severe aortic stenosis.  Mild aortic  regurgitation.  3. Mildly dilated left atrium.  LA volume index = 39 cc/m2.  4. Normal left ventricular systolic function. No segmental  wall motion abnormalities.  5. Normal right ventricular size and function.  6. Normal pericardium with trace pericardial effusion.  7. Bilateral pleural effusions.  *** Compared with echocardiogram of 3/11/2022, no  significant changes noted.  ------------------------------------------------------------------------  Confirmed on  5/8/2022 - 17:38:34 by Osito Argueta M.D.  ------------------------------------------------------------------------  ---------------------------------------------------------------------------------------------------------------    MICROBIOLOGY:     Flu With COVID-19 By ERICK (06.13.22 @ 01:51)   SARS-CoV-2 Result: Detected  This Respiratory Panel uses polymerase chain reaction (PCR) to detect for   influenza A; influenza B; respiratory syncytial virus; and SARS-CoV-2.   This test was validated by [employer] and is in use under the FDA   Emergency Use Authorization (EUA) for clinical labs CLIA-certified to   perform high complexity testing. Test results should be correlated with   clinical presentation, patient history, and epidemiology.   Influenza A Result: [facility]   Influenza B Result: NotDete   Resp Syn Virus Result: NotDete       RADIOLOGY:  [x ] Chest radiographs reviewed and interpreted by me    < from: Xray Chest 1 View- PORTABLE-Urgent (Xray Chest 1 View- PORTABLE-Urgent .) (06.13.22 @ 01:49) >    ******PRELIMINARY REPORT******      ACC: [id number] EXAM:  XR CHEST PORTABLE URGENT 1V                          PROCEDURE DATE:  06/13/2022      ******PRELIMINARY REPORT******      INTERPRETATION:  CLINICAL INFORMATION: Shortness of breath.    TECHNIQUE: Frontal radiograph of the chest.    COMPARISON: CT chest and chest x-ray 5/9/2022    FINDINGS:  The lungs are clear.  No pleural effusion or pneumothorax.  Cardiomediastinal silhouette size is within normal limits.  No acute osseous abnormality.    IMPRESSION:  No evidence of acute pulmonary disease.    ******PRELIMINARY REPORT******      SCARLET JALLOH MD; Resident Radiology  ---------------------------------------------------------------------------------------------------------------  < from: CT Chest No Cont (05.09.22 @ 20:10) >    EXAM:  CT CHEST                          PROCEDURE DATE:  05/09/2022      FINDINGS:    Lungs/Airways/Pleura: The central airways are patent. There are small   pleural effusions, new from 3/9/2022 abdominal CT, with partial lower   lobe compressive atelectasis. There are diffuse peribronchial and  peribronchovascular groundglass opacity with mild septal thickening,   likely pulmonary edema. There are few clusters of small nodules on a  background of subsegmental bronchial impaction, likely due to small   airways disease.    Mediastinum/Lymph nodes: No thoracic adenopathy.    Heart and Vessels: Mild cardiomegaly. Extensive coronary arterial and   aortic valvular calcification is present. Hypodensity of the blood pool   in relation to left ventricular myocardium suggests underlying anemia.   The great vessels are normal in size. No pericardial effusion.    Upper Abdomen: Cholelithiasis. Partially imaged large right parapelvic  renal cyst. Extensive visceral artery calcification.    Osseous structures and Soft Tissues: Degenerative changes without   aggressive osseous lesions. Remote left posterior 11th rib fracture.    IMPRESSION:  Pulmonary edema with small pleural effusions and partial lower lobe   compressive atelectasis.    ELISA BLACKBURN M.D., Attending Radiologist  This document has been electronically signed. May 10 2022  8:52AM  ---------------------------------------------------------------------------------------------------------------  < from: [facility] Duplex Lower Ext Vein Scan, Bakari (05.09.22 @ 15:03) >    EXAM:  DUPLEX SCAN EXT VEINS LOWER BI                          PROCEDURE DATE:  05/09/2022      IMPRESSION:  No evidence of deep venous thrombosis in either lower extremity venous   segments able to be examined.     AUGUSTIN EASTMAN MD; Attending Radiologist  This document has been electronically signed. May  9 2022  3:31PM  ---------------------------------------------------------------------------------------------------------------               [facility] - [facility] - CONSULT NOTE    HPI: 89 year old gentlewoman, lifelong non-smoker, with history of quiescent asthma. She has a history of HTN, HLD, DM, aortic stenosis, breast cancer s/p lumpectomy and CKD (Dr. Escobar). She was recently started on Eliquis and beta-blockers for atrial fibrillation. She was hospitalized in March with a UTI complicated by ELISA due to bactrim. She was admitted in May with dyspnea, hypoxemia, gurgling in her chest and leg swelling. She was initially treated with zosyn for a possible right lower lobe pneumonia seen on CXR until further evaluation revealed pulmonary edema with pleural effusions and atelectasis. ECHO -> preserved LVEF - moderate-severe aortic stenosis - moderate diastolic dysfunction - bilateral pleural effusions; CT -> pulmonary edema with small pleural effusions and partial lower lobe compressive atelectasis. She was diuresed and discharged on norvasc/toprol XL/lipitor/eliquis. The patient was diagnosed with COVID on May 22nd and was treated with a course of Paxlovid. She now returns to the ER with shortness of breath and weakness with minimal exertion. She has fatigue, malaise and pallor. She has been noted to have blood in her stools and black colored stools (after eating blueberries). Her daughter measured her blood pressure has 90/60 from a baseline of 120/90. She currently has no shortness of breath or hypoxemia on a 2lpm nasal canula. She has no cough, sputum production, chest congestion or wheeze. No fevers, chills or sweats. She has been found to be guaiac positive. RVP PCR remains positive for COVID. The patient has received Kcentra and 2 units PRBCs for severe anemia (4.8/16/6). Asked to evaluate    PMHX:  Asthma  Hypertension  Hyperlipidemia  Diabetes mellitus  Aortic stenosis  Atrial fibrillation  GERD (gastroesophageal reflux disease)  Breast cancer  Diverticulitis  Glaucoma  Neurogenic bladder with incontinence  Renal calculus  Arthritis    PSHX:  Lumpectomy of breast  Bladder repair    FAMILY HISTORY:  No pertinent family history in first degree relatives    SOCIAL HISTORY:  lifelong non-smoker; lives at home with her daughter    Pulmonary Medications:       Antimicrobials:      Cardiology:      Other:      Prn:  MEDICATIONS  (PRN):      Allergies    Keflex (Rash; Hives)    HOME MEDICATIONS: see  H & P    REVIEW OF SYSTEMS:  Constitutional: As per HPI  HEENT: Within normal limits  CV: As per HPI  Resp: As per HPI  GI: Within normal limits   : Within normal limits  Musculoskeletal: Within normal limits  Skin: Within normal limits  Neurological: Within normal limits  Psychiatric: Within normal limits  Endocrine: Within normal limits  Hematologic/Lymphatic: Within normal limits  Allergic/Immunologic: Within normal limits    [x] All other systems negative    OBJECTIVE:    Daily Height in cm: 157.48 (12 Jun 2022 22:29)      PHYSICAL EXAM:  ICU Vital Signs Last 24 Hrs  T(C): 35.8 (13 Jun 2022 01:37), Max: 36.5 (12 Jun 2022 22:29)  T(F): 96.4 (13 Jun 2022 01:37), Max: 97.7 (12 Jun 2022 22:29)  HR: 58 (13 Jun 2022 01:37) (58 - 60)  BP: 131/49 (13 Jun 2022 01:37) (110/55 - 131/49)  BP(mean): 72 (13 Jun 2022 01:37) (72 - 72)  ABP: --  ABP(mean): --  RR: 18 (13 Jun 2022 01:37) (18 - 20)  SpO2: 100% (13 Jun 2022 01:37) (100% - 100%) on 2lpm nasal canula    General: Awake. Alert. Cooperative. No distress. Appears stated age 	  HEENT:   Atraumatic. Normocephalic. Anicteric. Normal oral mucosa. PERRL. EOMI.  Neck: Supple. Trachea midline. Thyroid without enlargement/tenderness/nodules. No carotid bruit. No JVD.	  Cardiovascular: Regular rate and rhythm. S1 S2 normal. No murmurs, rubs or gallops.  Respiratory: Respirations unlabored. Clear to auscultation and percussion bilaterally. No curvature.  Abdomen: Soft. Non-tender. Non-distended. No organomegaly. No masses. Normal bowel sounds.  Extremities: Warm to touch. No clubbing or cyanosis. No pedal edema.  Pulses: 2+ peripheral pulses all extremities.	  Skin: Normal skin color. No rashes or lesions. No ecchymoses. No cyanosis. Warm to touch.  Lymph Nodes: Cervical, supraclavicular and axillary nodes normal  Neurological: Motor and sensory examination equal and normal. A and O x 3  Psychiatry: Appropriate mood and affect.      LABS:                          4.8    7.84  )-----------( 196      ( 13 Jun 2022 01:49 )             16.6     CBC    WBC  7.84 <==    Hemoglobin  4.8 <<==    Hematocrit  16.6 <==    Platelets  196 <==      137  |  102  |  62<H>  ----------------------------<  209<H>    06-13  4.3   |  22  |  1.93<H>    LYTES    sodium  137 <==    potassium   4.3 <==    chloride  102 <==    carbon dioxide  22 <==    =============================================================================================  RENAL FUNCTION:    Creatinine:   1.93  <<==    BUN:   62 <==    ============================================================================================    calcium   8.6 <==    phos       mag       ============================================================================================  LFTs    AST:   17 <==     ALT:  9  <==     AP:  68  <=    Bili:  0.1  <=    PT/INR - ( 13 Jun 2022 01:49 )   PT: 15.6 sec;   INR: 1.35 ratio       PTT - ( 13 Jun 2022 01:49 )  PTT:31.3 sec    Venous Blood Gas:  06-13 @ 01:58  7.33/47/25/25/28.5  VBG Lactate: 1.5    < from: Transthoracic Echocardiogram (05.08.22 @ 14:37) >    Patient name: KOREY DIAZ  YOB: 1929   Age: 89 (F)   MR#: [id number]  Study Date: 5/8/2022  Location: [address] [place], [place] [id number]: Gadiel Schwartz [facility]  Study quality: Technically fair  Referring Physician: Teo Monson MD  Blood Pressure: 165/72 mmHg  Height: 158 cm  Weight: 53 kg  BSA: 1.5 m2  Heart Rate: 70 mmHg  ------------------------------------------------------------------------  PROCEDURE: Transthoracic echocardiogram with 2-D, M-Mode  and complete spectral and color flow Doppler.  INDICATION: Cardiac murmur, unspecified (R01.1)  ------------------------------------------------------------------------  Dimensions:    Normal Values:  LA:     3.6    2.0 - 4.0 cm  Ao:     3.0    2.0 - 3.8 cm  SEPTUM: 1.0    0.6 -1.2 cm  PWT:    1.0    0.6 - 1.1 cm  LVIDd:  4.0    3.0 - 5.6 cm  LVIDs:  2.8    1.8 - 4.0 cm  Derived variables:  LVMI: 84 g/m2  RWT: 0.50  Fractional short: 30 %  EF (Moran Rule): 67 %Doppler Peak Velocity (m/sec):  AoV=3.2  ------------------------------------------------------------------------  Observations:  Mitral Valve: Normal mitral valve. Mild mitral  regurgitation.  Aortic Valve/Aorta: Heavily calcified trileaflet aortic  valve with decreased opening. Peak transaortic valve  gradientequals 41 mm Hg, estimated aortic valve area  equals 1 sqcm (by continuity equation), aortic valve  velocity time integral equals 70 cm, consistent with  moderate aortic stenosis.  However the aortic valve appears heavily calcified and may  be closerto moderate-severe aortic stenosis.  Mild aortic  regurgitation.  Peak left ventricular outflow tract  gradient equals 3 mm Hg, LVOT velocity time integral equals  23 cm.  Aortic Root: 3 cm.  Left Atrium: Mildly dilated left atrium.  LA volume index =  39 cc/m2.  Left Ventricle: Normal left ventricular systolic function.  No segmental wall motion abnormalities. Normal left  ventricular internal dimensions and wall thicknesses.  Moderate diastolic dysfunction (Stage II).  Right Heart: Normal rightatrium. Normal right ventricular  size and function. Normal tricuspid valve. Minimal  tricuspid regurgitation. Normal pulmonic valve. Minimal  pulmonic regurgitation.  Pericardium/Pleura: Normal pericardium with trace  pericardial effusion.  Bilateral pleural effusions.  Hemodynamic: Estimated right atrial pressure is 8 mm Hg.  Color Doppler demonstrates no evidence of a patent foramen  ovale.  ------------------------------------------------------------------------  Conclusions:  1. Normal mitral valve. Mild mitral regurgitation.  2. Heavily calcified trileaflet aortic valve with decreased  opening. Peak transaortic valve gradient equals 41 mm Hg,  estimated aortic valve area equals 1 sqcm (by continuity  equation), aortic valve velocity time integral equals 70  cm, consistent with moderate aortic stenosis.  However the aortic valve appears heavily calcified and may  be closer to moderate-severe aortic stenosis.  Mild aortic  regurgitation.  3. Mildly dilated left atrium.  LA volume index = 39 cc/m2.  4. Normal left ventricular systolic function. No segmental  wall motion abnormalities.  5. Normal right ventricular size and function.  6. Normal pericardium with trace pericardial effusion.  7. Bilateral pleural effusions.  *** Compared with echocardiogram of 3/11/2022, no  significant changes noted.  ------------------------------------------------------------------------  Confirmed on  5/8/2022 - 17:38:34 by Osito Argueta M.D.  ------------------------------------------------------------------------  ---------------------------------------------------------------------------------------------------------------    MICROBIOLOGY:     Flu With COVID-19 By ERCIK (06.13.22 @ 01:51)   SARS-CoV-2 Result: Detected  This Respiratory Panel uses polymerase chain reaction (PCR) to detect for   influenza A; influenza B; respiratory syncytial virus; and SARS-CoV-2.   This test was validated by [employer] and is in use under the FDA   Emergency Use Authorization (EUA) for clinical labs CLIA-certified to   perform high complexity testing. Test results should be correlated with   clinical presentation, patient history, and epidemiology.   Influenza A Result: NotDete   Influenza B Result: NotDete   Resp Syn Virus Result: NotDetec       RADIOLOGY:  [x ] Chest radiographs reviewed and interpreted by me    EXAM:  XR CHEST PORTABLE URGENT 1V                          PROCEDURE DATE:  06/13/2022      INTERPRETATION:  CLINICAL INFORMATION: Shortness of breath.    TECHNIQUE: Frontal radiograph of the chest.    COMPARISON: CT chest and chest x-ray 5/9/2022    FINDINGS:  The lungs are clear.  No pleural effusion or pneumothorax.  Cardiomediastinal silhouette size is within normal limits.  No acute osseous abnormality.    IMPRESSION:  No evidence of acute pulmonary disease.    SCARLET JALLOH MD; Resident Radiology  This document has been electronically signed.  DIANE OBREGON MD; Attending Radiologist  This document has been electronically signed. Jun 13 2022  1:19PM  ---------------------------------------------------------------------------------------------------------------  < from: CT Chest No Cont (05.09.22 @ 20:10) >    EXAM:  CT CHEST                          PROCEDURE DATE:  05/09/2022      FINDINGS:    Lungs/Airways/Pleura: The central airways are patent. There are small   pleural effusions, new from 3/9/2022 abdominal CT, with partial lower   lobe compressive atelectasis. There are diffuse peribronchial and  peribronchovascular groundglass opacity with mild septal thickening,   likely pulmonary edema. There are few clusters of small nodules on a  background of subsegmental bronchial impaction, likely due to small   airways disease.    Mediastinum/Lymph nodes: No thoracic adenopathy.    Heart and Vessels: Mild cardiomegaly. Extensive coronary arterial and   aortic valvular calcification is present. Hypodensity of the blood pool   in relation to left ventricular myocardium suggests underlying anemia.   The great vessels are normal in size. No pericardial effusion.    Upper Abdomen: Cholelithiasis. Partially imaged large right parapelvic  renal cyst. Extensive visceral artery calcification.    Osseous structures and Soft Tissues: Degenerative changes without   aggressive osseous lesions. Remote left posterior 11th rib fracture.    IMPRESSION:  Pulmonary edema with small pleural effusions and partial lower lobe   compressive atelectasis.    ELISA BLACKBURN M.D., Attending Radiologist  This document has been electronically signed. May 10 2022  8:52AM  ---------------------------------------------------------------------------------------------------------------  < from: [facility] Duplex Lower Ext Vein Scan, Bakari (05.09.22 @ 15:03) >    EXAM:  DUPLEX SCAN EXT VEINS LOWER BI                          PROCEDURE DATE:  05/09/2022      IMPRESSION:  No evidence of deep venous thrombosis in either lower extremity venous   segments able to be examined.     AUGUSTIN EASTMAN MD; Attending Radiologist  This document has been electronically signed. May  9 2022  3:31PM  ---------------------------------------------------------------------------------------------------------------               [facility] - [facility] - CONSULT NOTE    HPI: 89 year old gentlewoman, lifelong non-smoker, with history of quiescent asthma. She has a history of HTN, HLD, DM, aortic stenosis, breast cancer s/p lumpectomy and CKD (Dr. Escobar). She was recently started on Eliquis and beta-blockers for atrial fibrillation. She was hospitalized in March with a UTI complicated by ELISA due to bactrim. She was admitted in May with dyspnea, hypoxemia, gurgling in her chest and leg swelling. She was initially treated with zosyn for a possible right lower lobe pneumonia seen on CXR until further evaluation revealed pulmonary edema with pleural effusions and atelectasis. ECHO -> preserved LVEF - moderate-severe aortic stenosis - moderate diastolic dysfunction - bilateral pleural effusions; CT -> pulmonary edema with small pleural effusions and partial lower lobe compressive atelectasis. She was diuresed and discharged on norvasc/toprol XL/lipitor/eliquis. The patient was diagnosed with COVID on May 22nd and was treated with a course of Paxlovid. She now returns to the ER with shortness of breath and weakness with minimal exertion. She has fatigue, malaise and pallor. She has been noted to have blood in her stools and black colored stools (after eating blueberries). Her daughter measured her blood pressure has 90/60 from a baseline of 120/90. She currently has no shortness of breath or hypoxemia on a 2lpm nasal canula. She has no cough, sputum production, chest congestion or wheeze. No fevers, chills or sweats. She has been found to be guaiac positive. RVP PCR remains positive for COVID. The patient has received Kcentra and 2 units PRBCs for severe anemia (4.8/16/6). Asked to evaluate    PMHX:  Asthma  Hypertension  Hyperlipidemia  Diabetes mellitus  Aortic stenosis  Atrial fibrillation  GERD (gastroesophageal reflux disease)  Breast cancer  Diverticulitis  Glaucoma  Neurogenic bladder with incontinence  Renal calculus  Arthritis    PSHX:  Lumpectomy of breast  Bladder repair    FAMILY HISTORY:  No pertinent family history in first degree relatives    SOCIAL HISTORY:  lifelong non-smoker; lives at home with her daughter    Pulmonary Medications:       Antimicrobials:      Cardiology:      Other:      Prn:  MEDICATIONS  (PRN):      Allergies    Keflex (Rash; Hives)    HOME MEDICATIONS: see  H & P    REVIEW OF SYSTEMS:  Constitutional: As per HPI  HEENT: Within normal limits  CV: As per HPI  Resp: As per HPI  GI: hematochezia  : Within normal limits  Musculoskeletal: Within normal limits  Skin: Within normal limits  Neurological: Within normal limits  Psychiatric: Within normal limits  Endocrine: Within normal limits  Hematologic/Lymphatic: Within normal limits  Allergic/Immunologic: Within normal limits    [x] All other systems negative    OBJECTIVE:    Daily Height in cm: 157.48 (12 Jun 2022 22:29)      PHYSICAL EXAM:  ICU Vital Signs Last 24 Hrs  T(C): 35.8 (13 Jun 2022 01:37), Max: 36.5 (12 Jun 2022 22:29)  T(F): 96.4 (13 Jun 2022 01:37), Max: 97.7 (12 Jun 2022 22:29)  HR: 58 (13 Jun 2022 01:37) (58 - 60)  BP: 131/49 (13 Jun 2022 01:37) (110/55 - 131/49)  BP(mean): 72 (13 Jun 2022 01:37) (72 - 72)  ABP: --  ABP(mean): --  RR: 18 (13 Jun 2022 01:37) (18 - 20)  SpO2: 100% (13 Jun 2022 01:37) (100% - 100%) on 2lpm nasal canula    General: Awake. Alert. Cooperative. No distress. Appears stated age 	  HEENT:  Atraumatic. Normocephalic. Anicteric. Normal oral mucosa. PERRL. EOMI. Pale conjunctiva.  Neck: Supple. Trachea midline. Thyroid without enlargement/tenderness/nodules. No carotid bruit. No JVD.	  Cardiovascular: Regular rate and rhythm. S1 S2 normal. III/VI systolic murmur  Respiratory: Respirations unlabored. Clear to auscultation and percussion bilaterally. Kyphosis.  Abdomen: Soft. Non-tender. Non-distended. No organomegaly. No masses. Normal bowel sounds.  Extremities: Warm to touch. No clubbing or cyanosis. No pedal edema. Bilateral SCDs.  Pulses: 2+ peripheral pulses all extremities.	  Skin: Normal skin color. No rashes or lesions. No ecchymoses. No cyanosis. Warm to touch.  Lymph Nodes: Cervical, supraclavicular and axillary nodes normal  Neurological: Motor and sensory examination equal and normal. A and O x 3  Psychiatry: Appropriate mood and affect.      LABS:                          4.8    7.84  )-----------( 196      ( 13 Jun 2022 01:49 )             16.6     CBC    WBC  7.84 <==    Hemoglobin  4.8 <<==    Hematocrit  16.6 <==    Platelets  196 <==      137  |  102  |  62<H>  ----------------------------<  209<H>    06-13  4.3   |  22  |  1.93<H>    LYTES    sodium  137 <==    potassium   4.3 <==    chloride  102 <==    carbon dioxide  22 <==    =============================================================================================  RENAL FUNCTION:    Creatinine:   1.93  <<==    BUN:   62 <==    ============================================================================================    calcium   8.6 <==    phos       mag       ============================================================================================  LFTs    AST:   17 <==     ALT:  9  <==     AP:  68  <=    Bili:  0.1  <=    PT/INR - ( 13 Jun 2022 01:49 )   PT: 15.6 sec;   INR: 1.35 ratio       PTT - ( 13 Jun 2022 01:49 )  PTT:31.3 sec    Venous Blood Gas:  06-13 @ 01:58  7.33/47/25/25/28.5  VBG Lactate: 1.5    < from: Transthoracic Echocardiogram (05.08.22 @ 14:37) >    Patient name: KOREY DIAZ  YOB: 1929   Age: 89 (F)   MR#: [id number]  Study Date: 5/8/2022  [id number] Gadiel Schwartz RDCS  Study quality: Technically fair  Referring Physician: Teo Monson MD  Blood Pressure: 165/72 mmHg  Height: 158 cm  Weight: 53 kg  BSA: 1.5 m2  Heart Rate: 70 mmHg  ------------------------------------------------------------------------  PROCEDURE: Transthoracic echocardiogram with 2-D, M-Mode  and complete spectral and color flow Doppler.  INDICATION: Cardiac murmur, unspecified (R01.1)  ------------------------------------------------------------------------  Dimensions:    Normal Values:  LA:     3.6    2.0 - 4.0 cm  Ao:     3.0    2.0 - 3.8 cm  SEPTUM: 1.0    0.6 -1.2 cm  PWT:    1.0    0.6 - 1.1 cm  LVIDd:  4.0    3.0 - 5.6 cm  LVIDs:  2.8    1.8 - 4.0 cm  Derived variables:  LVMI: 84 g/m2  RWT: 0.50  Fractional short: 30 %  EF (Moran Rule): 67 %Doppler Peak Velocity (m/sec):  AoV=3.2  ------------------------------------------------------------------------  Observations:  Mitral Valve: Normal mitral valve. Mild mitral  regurgitation.  Aortic Valve/Aorta: Heavily calcified trileaflet aortic  valve with decreased opening. Peak transaortic valve  gradientequals 41 mm Hg, estimated aortic valve area  equals 1 sqcm (by continuity equation), aortic valve  velocity time integral equals 70 cm, consistent with  moderate aortic stenosis.  However the aortic valve appears heavily calcified and may  be closerto moderate-severe aortic stenosis.  Mild aortic  regurgitation.  Peak left ventricular outflow tract  gradient equals 3 mm Hg, LVOT velocity time integral equals  23 cm.  Aortic Root: 3 cm.  Left Atrium: Mildly dilated left atrium.  LA volume index =  39 cc/m2.  Left Ventricle: Normal left ventricular systolic function.  No segmental wall motion abnormalities. Normal left  ventricular internal dimensions and wall thicknesses.  Moderate diastolic dysfunction (Stage II).  Right Heart: Normal rightatrium. Normal right ventricular  size and function. Normal tricuspid valve. Minimal  tricuspid regurgitation. Normal pulmonic valve. Minimal  pulmonic regurgitation.  Pericardium/Pleura: Normal pericardium with trace  pericardial effusion.  Bilateral pleural effusions.  Hemodynamic: Estimated right atrial pressure is 8 mm Hg.  Color Doppler demonstrates no evidence of a patent foramen  ovale.  ------------------------------------------------------------------------  Conclusions:  1. Normal mitral valve. Mild mitral regurgitation.  2. Heavily calcified trileaflet aortic valve with decreased  opening. Peak transaortic valve gradient equals 41 mm Hg,  estimated aortic valve area equals 1 sqcm (by continuity  equation), aortic valve velocity time integral equals 70  cm, consistent with moderate aortic stenosis.  However the aortic valve appears heavily calcified and may  be closer to moderate-severe aortic stenosis.  Mild aortic  regurgitation.  3. Mildly dilated left atrium.  LA volume index = 39 cc/m2.  4. Normal left ventricular systolic function. No segmental  wall motion abnormalities.  5. Normal right ventricular size and function.  6. Normal pericardium with trace pericardial effusion.  7. Bilateral pleural effusions.  *** Compared with echocardiogram of 3/11/2022, no  significant changes noted.  ------------------------------------------------------------------------  Confirmed on  5/8/2022 - 17:38:34 by Osito Argueta M.D.  ------------------------------------------------------------------------  ---------------------------------------------------------------------------------------------------------------    MICROBIOLOGY:     Flu With COVID-19 By ERICK (06.13.22 @ 01:51)   SARS-CoV-2 Result: Detected  This Respiratory Panel uses polymerase chain reaction (PCR) to detect for   influenza A; influenza B; respiratory syncytial virus; and SARS-CoV-2.   This test was validated by [employer] and is in use under the FDA   Emergency Use Authorization (EUA) for clinical labs CLIA-certified to   perform high complexity testing. Test results should be correlated with   clinical presentation, patient history, and epidemiology.   [facility]   Influenza B Result: NotDete   Resp Syn Virus Result: NotDete       RADIOLOGY:  [x ] Chest radiographs reviewed and interpreted by me    EXAM:  XR CHEST PORTABLE URGENT 1V                          PROCEDURE DATE:  06/13/2022      INTERPRETATION:  CLINICAL INFORMATION: Shortness of breath.    TECHNIQUE: Frontal radiograph of the chest.    COMPARISON: CT chest and chest x-ray 5/9/2022    FINDINGS:  The lungs are clear.  No pleural effusion or pneumothorax.  Cardiomediastinal silhouette size is within normal limits.  No acute osseous abnormality.    IMPRESSION:  No evidence of acute pulmonary disease.    SCARLET JALLOH MD; Resident Radiology  This document has been electronically signed.  DIANE OBREGON MD; Attending Radiologist  This document has been electronically signed. Jun 13 2022  1:19PM  ---------------------------------------------------------------------------------------------------------------  < from: CT Chest No Cont (05.09.22 @ 20:10) >    EXAM:  CT CHEST                          PROCEDURE DATE:  05/09/2022      FINDINGS:    Lungs/Airways/Pleura: The central airways are patent. There are small   pleural effusions, new from 3/9/2022 abdominal CT, with partial lower   lobe compressive atelectasis. There are diffuse peribronchial and  peribronchovascular groundglass opacity with mild septal thickening,   likely pulmonary edema. There are few clusters of small nodules on a  background of subsegmental bronchial impaction, likely due to small   airways disease.    Mediastinum/Lymph nodes: No thoracic adenopathy.    Heart and Vessels: Mild cardiomegaly. Extensive coronary arterial and   aortic valvular calcification is present. Hypodensity of the blood pool   in relation to left ventricular myocardium suggests underlying anemia.   The great vessels are normal in size. No pericardial effusion.    Upper Abdomen: Cholelithiasis. Partially imaged large right parapelvic  renal cyst. Extensive visceral artery calcification.    Osseous structures and Soft Tissues: Degenerative changes without   aggressive osseous lesions. Remote left posterior 11th rib fracture.    IMPRESSION:  Pulmonary edema with small pleural effusions and partial lower lobe   compressive atelectasis.    ELISA BLACKBURN M.D., Attending Radiologist  This document has been electronically signed. May 10 2022  8:52AM  ---------------------------------------------------------------------------------------------------------------  < from: [facility] Duplex Lower Ext Vein Scan, Bilraza (05.09.22 @ 15:03) >    EXAM:  DUPLEX SCAN EXT VEINS LOWER BI                          PROCEDURE DATE:  05/09/2022      IMPRESSION:  No evidence of deep venous thrombosis in either lower extremity venous   segments able to be examined.     AUGUSTIN EASTMAN MD; Attending Radiologist  This document has been electronically signed. May  9 2022  3:31PM  ---------------------------------------------------------------------------------------------------------------

## 2022-06-13 NOTE — CONSULT NOTE ADULT - NS ATTEND AMEND GEN_ALL_CORE FT
89 y F adm with rectal bldg, poor energy and Hb of 4.8  which mp to 7.4g after 1 u of PRBC.No further episodes of BPR.  agree with family decision for supportive care only

## 2022-06-13 NOTE — ED PROVIDER NOTE - PHYSICAL EXAMINATION
General appearance: pale, appears tired, NAD, conversant, afebrile    Eyes: pale conjunctiva, anicteric sclerae, ROD, EOMI   HENT: Atraumatic; oropharynx clear, MMM and no ulcerations, no pharyngeal erythema or exudate   Neck: Trachea midline; Full range of motion, supple   Pulm: CTA bl, normal respiratory effort and no intercostal retractions, normal work of breathing   CV: RRR, No murmurs, rubs, or gallops.    Abdomen: Soft, non-tender, non-distended; no guarding or rebound   : dark stools with red tinge seen on rectal exam. chaperone Dr. Gore   Extremities: No peripheral edema or extremity lymphadenopathy. 5/5 strength in all four extremities.   Skin: Dry, normal temperature, turgor and texture; no rash, ulcers or subcutaneous nodules   Psych: Appropriate affect, cooperative; alert and oriented to person, place and time

## 2022-06-13 NOTE — H&P ADULT - NSHPREVIEWOFSYSTEMS_GEN_ALL_CORE
REVIEW OF SYSTEMS:    CONSTITUTIONAL: + weakness, No fevers or chills  EYES/ENT: No visual changes;  No vertigo or throat pain   NECK: No pain or stiffness  RESPIRATORY: No cough, wheezing, hemoptysis; No shortness of breath  CARDIOVASCULAR: No chest pain or palpitations  GASTROINTESTINAL: No abdominal or epigastric pain. No nausea, vomiting, or hematemesis; + diarrhea or No constipation. + melena or + hematochezia.  GENITOURINARY: No dysuria, frequency or hematuria  NEUROLOGICAL: No numbness or weakness  SKIN: No itching, burning, rashes, or lesions   All other review of systems is negative unless indicated above. REVIEW OF SYSTEMS:    CONSTITUTIONAL: + weakness, No fevers or chills  EYES: No visual changes; no blurry vision  ENT: No vertigo or throat pain   NECK: No pain or stiffness  RESPIRATORY: No cough, wheezing, hemoptysis; No shortness of breath  CARDIOVASCULAR: No chest pain or palpitations  GASTROINTESTINAL: No abdominal or epigastric pain. No nausea, vomiting, or hematemesis; + diarrhea or No constipation. + melena or + hematochezia.  GENITOURINARY: No dysuria, frequency or hematuria  NEUROLOGICAL: No numbness or weakness  SKIN: No itching, burning, rashes, or lesions   HEMATOLOGIC: no nose bleeding, no gum bleeding  All other review of systems is negative unless indicated above.

## 2022-06-13 NOTE — H&P ADULT - NSHPLABSRESULTS_GEN_ALL_CORE
4.8    7.84  )-----------( 196      ( 13 Jun 2022 01:49 )             16.6     06-13-22 @ 01:49    137  |  102  |  62<H>             --------------------------< 209<H>     4.3  |  22  | 1.93<H>    eGFR AA: --  eGFR N-AA: --    Calcium: 8.6  Phosphorus: --  Magnesium: --    AST: 17    ALT: 9<L>  AlkPhos: 68  Protein: 5.8<L>  Albumin: 3.4  TBili: 0.1<L>  D-Bili: -- 4.8    7.84  )-----------( 196      ( 13 Jun 2022 01:49 )             16.6     06-13-22 @ 01:49    137  |  102  |  62<H>             --------------------------< 209<H>     4.3  |  22  | 1.93<H>    eGFR AA: --  eGFR N-AA: --    Calcium: 8.6  Phosphorus: --  Magnesium: --    AST: 17    ALT: 9<L>  AlkPhos: 68  Protein: 5.8<L>  Albumin: 3.4  TBili: 0.1<L>  D-Bili: --    EKG, imaging, labs reviewed.

## 2022-06-13 NOTE — H&P ADULT - ASSESSMENT
Patient is a 89yo F w/ hx HTN, Afib, CHF, HLD, nonsmoker, remote hx of asthma presenting with loose stools, lethargy, fatigue, low blood pressure and blood bowel movement found to have a hemoglobin of 4.8 most likely 2/2 to GI bleed from eliquis.  DDx includes Gastric ulcer vs. Duodenal ulcer vs. angiodysplasia (?hades syndrome) vs. diverticulosis vs. hemorrhoids vs. watermelon stomach.

## 2022-06-13 NOTE — CONSULT NOTE ADULT - SUBJECTIVE AND OBJECTIVE BOX
Patient is a 89y old  Female who presents with a chief complaint of gi bleed (13 Jun 2022 07:13)      HPI:  Patient is a 89yo F w/ hx HTN, Afib, CHF, AS, HLD, nonsmoker, remote hx of asthma presenting with loose stools, lethargy, fatigue, low blood pressure and bloody bowel movement. The patient started Eliquis at the end of April on 4/2022. The daughter noted on Friday that the patient developed loose stools that were normal in color. The patient subsequently was developing progressive fatigue, lethargy and decreased PO intake. Her daughter noted that her stools were a maroon color. On 6/12, the daughter noticed that the patient had low blood pressures SBP of 90s down from baseline of 120s. She checked her BM that morning and noted blood with blood clots in the toilet. At this time she decided to seek medical care in the ED for further evaluation.     Of note, the patient had COVID 2 weeks ago measured by at home test. RVP was + for COVID by PCR on admission.   Family states pt received Paxlovid rx - was taken on AC during Paxlovid treatment then restarted "few days" after Rx completed  started on AC end of 4/2022 2/2 PAF    no further episodes of rectal bleeding, no BM since admission  red/maroon stools with clots at home  s/p 1 unit PRBcs overnight - 2nd unit this AM  no abdominal pain, nausea or vomiting  no previous episodes of GI bleed  last colonoscopy ~10-15 years ago - reported poor prep despite completing PO prep; rec no further screening colonoscopy given advanced age  no personal or FH of colon CA  +diverticulosis on CT 3/2022    In the ED:   Vitals: Temp 97.7, HR 60, /55, RR 20, O2 saturation 100%  Labs: Hemoglobin 4.8, Cr 1.93, COVID +   Imaging:  Cxr No acute pathology  Interventions: Given Kcentra, 2 units of pRBCs, Pantoprazole 80mg    (13 Jun 2022 06:24)    Note : last admission 3/2022 found to have CBD stone (incidental finding) without elevated LFTs/cholangitis- family opted NOT to pursue invasive testing/procedures    PAST MEDICAL & SURGICAL HISTORY:  HTN (hypertension)  Diabetes mellitus type 2, noninsulin dependent  Diverticulitis  Hyperlipidemia  GERD (gastroesophageal reflux disease)  Glaucoma  Breast cancer  Urinary incontinence  Renal calculus or stone  Arthritis  Heart murmur  Renal calculi  s/p stone extraction 57 yrs ago  S/P lumpectomy of breast  right breast with node removal  S/P bladder repair  Interstim device placement 2010        Allergies  Keflex (Rash; Hives)      MEDICATIONS  (STANDING):  ascorbic acid 500 milliGRAM(s) Oral daily  levothyroxine 25 MICROGram(s) Oral daily  metoprolol tartrate 12.5 milliGRAM(s) Oral two times a day  multivitamin 1 Tablet(s) Oral daily  pantoprazole  Injectable 40 milliGRAM(s) IV Push every 12 hours  rosuvastatin 10 milliGRAM(s) Oral at bedtime  venlafaxine XR. 75 milliGRAM(s) Oral daily    MEDICATIONS  (PRN):      Social History:  lives with family      Family History   IBD (  ) Yes   ( X ) No  GI Malignancy (  )  Yes    (X  ) No    Advanced Directives: ( X    ) None    (      ) DNR    (     ) DNI    (     ) Health Care Proxy:     Review of Systems:    General:  No wt loss, fevers, chills, night sweats,fatigue,   CV:  No pain, palpitations, +AF  Resp:  No dyspnea, cough, tachypnea, wheezing  GI:  see HPI  :  No pain, bleeding +hx kidney stones +incontinence s/p bladder repair  Muscle:  No pain, weakness +arthritis  Neuro:  No focal weakness, tingling, memory problems  Psych:  No fatigue, insomnia, mood problems, depression  Endocrine:  No polyuria, polydypsia, cold/heat intolerance  Heme:  No petechiae, ecchymosis, easy bruisability  Skin:  No rash, tattoos, scars, edema      Vital Signs Last 24 Hrs  T(C): 37.1 (13 Jun 2022 11:30), Max: 37.1 (13 Jun 2022 11:30)  T(F): 98.7 (13 Jun 2022 11:30), Max: 98.7 (13 Jun 2022 11:30)  HR: 62 (13 Jun 2022 11:30) (58 - 63)  BP: 140/50 (13 Jun 2022 11:30) (110/55 - 149/59)  BP(mean): 80 (13 Jun 2022 05:30) (72 - 80)  RR: 18 (13 Jun 2022 11:30) (16 - 20)  SpO2: 99% (13 Jun 2022 11:30) (98% - 100%)    PHYSICAL EXAM:    Constitutional: NAD, well-developed elderly WF  alert and cooperative, son at bedside  Neck: No LAD, supple no JVD  Respiratory: clear b/l no accessory muscle use  Cardiovascular: S1 and S2, RRR, no M  Gastrointestinal: BS+, soft, NT/ND, neg HSM  Extremities: No peripheral edema, neg clubbing, cyanosis  +healing ecchymoses on skin +fragile skin  Vascular: 2+ peripheral pulses  Neurological: A/O x 3, no focal deficits  Psychiatric: Normal mood, normal affect  Skin: No rashes +healing ecchymoses on skin +fragile         LABS:                        7.4    8.77  )-----------( 153      ( 13 Jun 2022 13:16 )             23.2   s/p 1 unit PRBCs  Hemoglobin: 4.8 g/dL (06.13.22 @ 01:49)   Hemoglobin: 9.5 g/dL (05.11.22 @ 06:49) - baseline    06-13    141  |  107  |  57<H>  ----------------------------<  166<H>  4.0   |  24  |  1.76<H>    Ca    8.8      13 Jun 2022 13:16  Phos  3.9     06-13  Mg     2.0     06-13    TPro  5.7<L>  /  Alb  3.0<L>  /  TBili  0.3  /  DBili  x   /  AST  14  /  ALT  9<L>  /  AlkPhos  59  06-13    PT/INR - ( 13 Jun 2022 13:16 )   PT: 12.9 sec;   INR: 1.11 ratio     PTT - ( 13 Jun 2022 13:16 )  PTT:28.4 sec     Flu With COVID-19 By ERICK (06.13.22 @ 01:51)   SARS-CoV-2 Result: Detected:  Influenza A Result: NotDete   Influenza B Result: NotDetec   Resp Syn Virus Result: NotDete         RADIOLOGY & ADDITIONAL TESTS:

## 2022-06-13 NOTE — ED PROVIDER NOTE - CLINICAL SUMMARY MEDICAL DECISION MAKING FREE TEXT BOX
89yo F w/ hx HTN, Afib, HLD, nonsmoker, remote hx of asthma presenting with 2d of low energy, decreased po, pale, exertional dyspnea, bloody BM. PE shows dark red stool, daughter has picture of BM which shows clots. Pt recently started on Eliquis. Likely GIB. will get labs, type, protonix. likely transfuse

## 2022-06-13 NOTE — ED PROVIDER NOTE - ATTENDING CONTRIBUTION TO CARE
Seamus Gore MD:   I personally saw the patient and performed a substantive portion of the visit including all aspects of the medical decision making.    MDM: 89 F w/ Hx of AFib on Eliquis since April 2022, CHF on BB and lasix since May 22, HLD, HTN, who comes in for lethargy x 2 days, decreased PO, hypotension and diarrhea x 4 days with bloody stool x2. No associated abd pain.   Exam w/ no abd ttp, (+) pallor. BP stable prior to treatments.   Work-up for GI bleed. Tx w/ Protonix. Pt on DOAC. Benign abdominal exam, stable vitals. Will monitor closely for hemodynamic changes or active episodes of GI bleed in the ED.  No emergent transfusion at this time, but may need transfusion based on H/H, symptoms, or if patient has hemodynamic changes or significant episodes of repeat bleeding. May require reversal of DOAC if clinical status worsens. Risks and benefits discussed with patient and family member, daughter. Seamus Gore MD:   I personally saw the patient and performed a substantive portion of the visit including all aspects of the medical decision making.    MDM: 89 F w/ Hx of AFib on Eliquis since April 2022, CHF on BB and lasix since May 22, HLD, HTN, who comes in for lethargy x 2 days, decreased PO, hypotension and diarrhea x 4 days with bloody stool x2. No associated abd pain.   Exam w/ no abd ttp, (+) pallor. BP stable prior to treatments.   Work-up for GI bleed. Tx w/ Protonix. Pt on DOAC. Benign abdominal exam, stable vitals. Will monitor closely for hemodynamic changes or active episodes of GI bleed in the ED.  No emergent transfusion at this time, but may need transfusion based on H/H, symptoms, or if patient has hemodynamic changes or significant episodes of repeat bleeding.   Patient would benefit from reversal of DOAC given pt with episode of hypotension at home, and evidence of active bleed on examination and Hb of 4. Risks and benefits discussed with patient and family member, daughter, agreed with plan of care. Seamus Gore MD:   I personally saw the patient and performed a substantive portion of the visit including all aspects of the medical decision making.    MDM: 89 F w/ Hx of AFib on Eliquis since April 2022, CHF on BB and lasix since May 22, HLD, HTN, who comes in for lethargy x 2 days, decreased PO, hypotension and diarrhea x 4 days with bloody stool x2. No associated abd pain.   Exam w/ no abd ttp, (+) pallor. BP stable prior to treatments.   Work-up for GI bleed. Tx w/ Protonix. Pt on DOAC. Benign abdominal exam, stable vitals. Will monitor closely for hemodynamic changes or active episodes of GI bleed in the ED.  No emergent transfusion at this time, but may need transfusion based on H/H, symptoms, or if patient has hemodynamic changes or significant episodes of repeat bleeding.   Patient would benefit from reversal of DOAC given pt with episode of hypotension at home, and evidence of active bleed on examination and Hb of 4. Risks and benefits discussed with patient and family member, daughter, agreed with plan of care.  GI consulted. Stable while in the ED.  The patient will need to be admitted to the hospital.   I had a discussion with the accepting physician regarding the initial presentation, diagnostic studies, treatments given in the ED, and current plan of care.   The patient was accepted by and endorsed to the medicine team. Seamus Gore MD:   I personally saw the patient and performed a substantive portion of the visit including all aspects of the medical decision making.    MDM: 89 F w/ Hx of AFib on Eliquis since April 2022, CHF on BB and lasix since May 22, HLD, HTN, who comes in for lethargy x 2 days, decreased PO, hypotension and diarrhea x 4 days with bloody stool x2. No associated abd pain.   Exam w/ no abd ttp, (+) pallor. BP stable prior to treatments.   Work-up for GI bleed. Tx w/ Protonix. Pt on DOAC. Benign abdominal exam, stable vitals. Will monitor closely for hemodynamic changes or active episodes of GI bleed in the ED.  No emergent transfusion at this time, but may need transfusion based on H/H, symptoms, or if patient has hemodynamic changes or significant episodes of repeat bleeding.   Patient would benefit from reversal of DOAC given pt with episode of hypotension at home, and evidence of active bleed on examination and Hb of 4. Risks and benefits discussed with patient and family member, daughter, agreed with plan of care.  GI consulted. Stable while in the ED.  The patient will need to be admitted to the hospital for GIB and symptomatic severe anemia.   I had a discussion with the accepting physician regarding the initial presentation, diagnostic studies, treatments given in the ED, and current plan of care.   The patient was accepted by and endorsed to the medicine team.

## 2022-06-13 NOTE — PROGRESS NOTE ADULT - PROBLEM SELECTOR PLAN 1
Most likely 2/2 to eliquis initiation and underlying GI pathology. DDx includes Gastric ulcer vs. Duodenal ulcer vs. angiodysplasia (?hades syndrome) vs. diverticulosis vs. hemorrhoids vs. watermelon stomach.   - 06/13: 2 units pRBCs running, Pantoprazole 80mg,     Plan:  - c/w pantoprazole 40mg BID   - Maintain active type and screen   - Maintain 2 large bore IVs  - Transfuse to hemoglobin <7   - GI consult Most likely 2/2 to eliquis initiation and underlying GI pathology. DDx includes Gastric ulcer vs. Duodenal ulcer vs. angiodysplasia (?hades syndrome) vs. diverticulosis vs. hemorrhoids vs. watermelon stomach.   s/p 2 units pRBCs running, Pantoprazole 80mg   family does not want to persue invasive interventions including colonoscopy    Plan:  - c/w pantoprazole 40mg BID   - Maintain active type and screen   - Maintain 2 large bore IVs  - Transfuse to hemoglobin <7   - GI consult  - cbcq6

## 2022-06-13 NOTE — PROGRESS NOTE ADULT - PROBLEM SELECTOR PLAN 2
No evidence of volume overload.   - Will assess need for lasix after second transfusion Currently euvolemic   - Will assess need for lasix daily

## 2022-06-14 ENCOUNTER — TRANSCRIPTION ENCOUNTER (OUTPATIENT)
Age: 87
End: 2022-06-14

## 2022-06-14 DIAGNOSIS — N18.4 CHRONIC KIDNEY DISEASE, STAGE 4 (SEVERE): ICD-10-CM

## 2022-06-14 LAB
ALBUMIN SERPL ELPH-MCNC: 3 G/DL — LOW (ref 3.3–5)
ALP SERPL-CCNC: 62 U/L — SIGNIFICANT CHANGE UP (ref 40–120)
ALT FLD-CCNC: 7 U/L — LOW (ref 10–45)
ANION GAP SERPL CALC-SCNC: 13 MMOL/L — SIGNIFICANT CHANGE UP (ref 5–17)
APPEARANCE UR: ABNORMAL
AST SERPL-CCNC: 13 U/L — SIGNIFICANT CHANGE UP (ref 10–40)
BACTERIA # UR AUTO: ABNORMAL
BILIRUB SERPL-MCNC: 0.3 MG/DL — SIGNIFICANT CHANGE UP (ref 0.2–1.2)
BILIRUB UR-MCNC: NEGATIVE — SIGNIFICANT CHANGE UP
BUN SERPL-MCNC: 50 MG/DL — HIGH (ref 7–23)
CALCIUM SERPL-MCNC: 8.9 MG/DL — SIGNIFICANT CHANGE UP (ref 8.4–10.5)
CHLORIDE SERPL-SCNC: 107 MMOL/L — SIGNIFICANT CHANGE UP (ref 96–108)
CO2 SERPL-SCNC: 22 MMOL/L — SIGNIFICANT CHANGE UP (ref 22–31)
COLOR SPEC: SIGNIFICANT CHANGE UP
CREAT SERPL-MCNC: 1.56 MG/DL — HIGH (ref 0.5–1.3)
DIFF PNL FLD: ABNORMAL
EGFR: 31 ML/MIN/1.73M2 — LOW
EPI CELLS # UR: 2 /HPF — SIGNIFICANT CHANGE UP
GLUCOSE SERPL-MCNC: 205 MG/DL — HIGH (ref 70–99)
GLUCOSE UR QL: ABNORMAL
HCT VFR BLD CALC: 21.4 % — LOW (ref 34.5–45)
HCT VFR BLD CALC: 25.2 % — LOW (ref 34.5–45)
HGB BLD-MCNC: 6.7 G/DL — CRITICAL LOW (ref 11.5–15.5)
HGB BLD-MCNC: 8.1 G/DL — LOW (ref 11.5–15.5)
HYALINE CASTS # UR AUTO: 3 /LPF — HIGH (ref 0–2)
KETONES UR-MCNC: NEGATIVE — SIGNIFICANT CHANGE UP
LEUKOCYTE ESTERASE UR-ACNC: ABNORMAL
MAGNESIUM SERPL-MCNC: 2 MG/DL — SIGNIFICANT CHANGE UP (ref 1.6–2.6)
MCHC RBC-ENTMCNC: 29.2 PG — SIGNIFICANT CHANGE UP (ref 27–34)
MCHC RBC-ENTMCNC: 29.4 PG — SIGNIFICANT CHANGE UP (ref 27–34)
MCHC RBC-ENTMCNC: 31.3 GM/DL — LOW (ref 32–36)
MCHC RBC-ENTMCNC: 32.1 GM/DL — SIGNIFICANT CHANGE UP (ref 32–36)
MCV RBC AUTO: 91 FL — SIGNIFICANT CHANGE UP (ref 80–100)
MCV RBC AUTO: 93.9 FL — SIGNIFICANT CHANGE UP (ref 80–100)
NITRITE UR-MCNC: POSITIVE
NRBC # BLD: 0 /100 WBCS — SIGNIFICANT CHANGE UP (ref 0–0)
NRBC # BLD: 0 /100 WBCS — SIGNIFICANT CHANGE UP (ref 0–0)
PH UR: 5.5 — SIGNIFICANT CHANGE UP (ref 5–8)
PHOSPHATE SERPL-MCNC: 4 MG/DL — SIGNIFICANT CHANGE UP (ref 2.5–4.5)
PLATELET # BLD AUTO: 146 K/UL — LOW (ref 150–400)
PLATELET # BLD AUTO: 160 K/UL — SIGNIFICANT CHANGE UP (ref 150–400)
POTASSIUM SERPL-MCNC: 3.8 MMOL/L — SIGNIFICANT CHANGE UP (ref 3.5–5.3)
POTASSIUM SERPL-SCNC: 3.8 MMOL/L — SIGNIFICANT CHANGE UP (ref 3.5–5.3)
PROT SERPL-MCNC: 5.6 G/DL — LOW (ref 6–8.3)
PROT UR-MCNC: ABNORMAL
RBC # BLD: 2.28 M/UL — LOW (ref 3.8–5.2)
RBC # BLD: 2.77 M/UL — LOW (ref 3.8–5.2)
RBC # FLD: 17.4 % — HIGH (ref 10.3–14.5)
RBC # FLD: 17.6 % — HIGH (ref 10.3–14.5)
RBC CASTS # UR COMP ASSIST: 26 /HPF — HIGH (ref 0–4)
SODIUM SERPL-SCNC: 142 MMOL/L — SIGNIFICANT CHANGE UP (ref 135–145)
SP GR SPEC: 1.01 — SIGNIFICANT CHANGE UP (ref 1.01–1.02)
UROBILINOGEN FLD QL: NEGATIVE — SIGNIFICANT CHANGE UP
WBC # BLD: 6.86 K/UL — SIGNIFICANT CHANGE UP (ref 3.8–10.5)
WBC # BLD: 8.81 K/UL — SIGNIFICANT CHANGE UP (ref 3.8–10.5)
WBC # FLD AUTO: 6.86 K/UL — SIGNIFICANT CHANGE UP (ref 3.8–10.5)
WBC # FLD AUTO: 8.81 K/UL — SIGNIFICANT CHANGE UP (ref 3.8–10.5)
WBC UR QL: 2 /HPF — SIGNIFICANT CHANGE UP (ref 0–5)

## 2022-06-14 PROCEDURE — 99233 SBSQ HOSP IP/OBS HIGH 50: CPT | Mod: GC

## 2022-06-14 PROCEDURE — 99222 1ST HOSP IP/OBS MODERATE 55: CPT

## 2022-06-14 RX ORDER — LANOLIN ALCOHOL/MO/W.PET/CERES
3 CREAM (GRAM) TOPICAL ONCE
Refills: 0 | Status: COMPLETED | OUTPATIENT
Start: 2022-06-14 | End: 2022-06-14

## 2022-06-14 RX ORDER — LANOLIN ALCOHOL/MO/W.PET/CERES
3 CREAM (GRAM) TOPICAL AT BEDTIME
Refills: 0 | Status: DISCONTINUED | OUTPATIENT
Start: 2022-06-14 | End: 2022-07-01

## 2022-06-14 RX ADMIN — Medication 12.5 MILLIGRAM(S): at 05:06

## 2022-06-14 RX ADMIN — PANTOPRAZOLE SODIUM 40 MILLIGRAM(S): 20 TABLET, DELAYED RELEASE ORAL at 05:06

## 2022-06-14 RX ADMIN — Medication 500 MILLIGRAM(S): at 15:36

## 2022-06-14 RX ADMIN — Medication 12.5 MILLIGRAM(S): at 17:34

## 2022-06-14 RX ADMIN — Medication 25 MICROGRAM(S): at 05:06

## 2022-06-14 RX ADMIN — ROSUVASTATIN CALCIUM 10 MILLIGRAM(S): 5 TABLET ORAL at 21:38

## 2022-06-14 RX ADMIN — Medication 3 MILLIGRAM(S): at 00:20

## 2022-06-14 RX ADMIN — Medication 1 TABLET(S): at 15:36

## 2022-06-14 RX ADMIN — Medication 75 MILLIGRAM(S): at 15:36

## 2022-06-14 RX ADMIN — Medication 3 MILLIGRAM(S): at 21:38

## 2022-06-14 NOTE — PROGRESS NOTE ADULT - ATTENDING COMMENTS
This is a 89F w/ hx HTN, PAF on Eliquis,  CHF, Covid in May 2022, nonsmoker, remote hx of asthma presenting with BRBPR, Hb was 4.8. BP has been stable, not tachycardic. Receiving 2 units PRBCs. G evaluated, off AC    1. LGI bleed, likely diverticular  2. Acute blood loss anemia  3. PAF, on Eliquis  4. Recent Covid, s/p Paxlovid  5. Chronic diastolic CHF    - Stable Hb at 8.1, post transfusion 2 units PRBCs. had 1 BM w clots, VSS  GI f/u plan noted- If recurrent or Brisk bleed, will do bleeding scan. Family decided no EGD/colonoscopy for now.  - Off AC, CBC q 8-12hrs, c/w PPI  - Cardiology- Dr Brayden Flores is aware, off AC  - c/w metoprolol at low dose, hold diuretic, amlodipine  - No need of Covid treatment, not hypoxic, afebrile, CXR clear  - PT eval  - GOC discussion.

## 2022-06-14 NOTE — PROGRESS NOTE ADULT - ASSESSMENT
89yo F w/ hx HTN, Afib (recent dx 4/2022), CHF, HLD, nonsmoker, remote hx of asthma presenting with loose stools, lethargy, fatigue, low blood pressure and blood bowel movement found to have a hemoglobin of 4.8 most likely 2/2 to GI bleed- likely diverticular bleed given history/presentation    recent COVID + s/p Paxlovid rx; COVID PCR + on admission - defer to primary team    Acute GI blood loss anemia; likely s/p diverticular bleeding given history/presentation. pt and family aware there is a risk of occult lesion as well. No invasive evaluation given advanced age, pt previously not able to prep adequately on previous colonoscopies and no further active GI bleeding  Risk of recurrent diverticular bleeding episodes reviewed with pt and family  -monitor BMs; expect next few BMs to be dark 2/2 passage of retained/ "old" blood from GI tract  -off AC 2/2 risk of recurrent bleeding  -monitor CBC  -PO diet; advance as tolerated  -monitor clinically  -PO PPI  -IF has recurrent/brisk GI bleeding/ HD instability then please obtain urgent nuclear bleeding scan to help localize source/site of bleeding    Discussed with pt and family at bedside  Discussed with House staff and Medicine attending      Marek Cormier PA-C    [facility]  [phone]  After hours and weekend coverage [phone]

## 2022-06-14 NOTE — PROGRESS NOTE ADULT - PROBLEM SELECTOR PLAN 5
Hold home amlodipine 5mg given normotensive and active GI bleeding. Hx of afib:   - Will switch metoprolol 25mg ER to 12.5mg tartrate BID   - Hold AC given bleeding

## 2022-06-14 NOTE — DISCHARGE NOTE PROVIDER - PROVIDER TOKENS
PROVIDER:[TOKEN:[[id number]],FOLLOWUP:[1 week],ESTABLISHEDPATIENT:[T]] PROVIDER:[TOKEN:[[id number]],FOLLOWUP:[1 week],ESTABLISHEDPATIENT:[T]],PROVIDER:[[id number]],FOLLOWUP:[2 weeks],ESTABLISHEDPATIENT:[T]] PROVIDER:[TOKEN:[[id number]],FOLLOWUP:[1 week],ESTABLISHEDPATIENT:[T]],PROVIDER:[TOKEN:[[id number]],FOLLOWUP:[2 weeks],ESTABLISHEDPATIENT:[T]],PROVIDER:[TOKEN:[[id number]],FOLLOWUP:[1 month],ESTABLISHEDPATIENT:[T]]

## 2022-06-14 NOTE — DISCHARGE NOTE PROVIDER - HOSPITAL COURSE
89F w/ hx HTN, A fib recently started on Eliquis,  CHF, remote hx of asthma presenting with BRBPR with Hgb was 4.8 s/p 2 UPRBC with adequate response. Remained hemodynamically stable throughout. GI was consulted and family opted for conservative management. Eliquis was discontinued. Pt continued to have stable Hgb at 8 with no further bleed noted.     Was COVID + at home ~2 weeks before admission and tested + again on admission. Was saturating well on RA.     Pt is medically optimized for discharge with close follow-up with cardiology and PCP. 89F w/ hx HTN, A fib recently started on Eliquis,  CHF, remote hx of asthma presenting with BRBPR with Hgb was 4.8 s/p 2 UPRBC with adequate response. Remained hemodynamically stable throughout.  Of note, was COVID + at home ~2 weeks before admission s/p treatment and tested + again on admission. Was saturating well on RA at time of admission and without any URI symptoms.    Patient was noted to have dark red blood per rectum during hospitalization. She received total 6 units pRBC during admission. Gi was consulted and patient underwent tagged nuclear scan which revealed bleeding in proximal ascending and mid ascending colon. IR was consulted but famiyl declined angio and embolization due to risk of ELISA.     GI was consulted and patient underwent colonoscopy, however noted to have poor prep and unable to find bleeding source, and tehrefore no intervention performed. PT was consulted and recommended home PT with reinstatement of health aide. Prior to discharge, patient hemoglobin remained above 7 and family agreed to have close OP GI follow up as GI without any additional intervention. Patient to follow-up closely with PCP/Cards and to avoid eliquis/AC in future given risk of bleeding.  Patient will also follow-up regarding starting amiodarone and restarting lasix as an outpatient.     At this time, patient is medically optimized for discharge to home. 89F w/ hx HTN, A fib recently started on Eliquis,  CHF, remote hx of asthma presenting with BRBPR with Hgb was 4.8 s/p 2 UPRBC with adequate response. Remained hemodynamically stable throughout.  Of note, was COVID + at home ~2 weeks before admission s/p treatment and tested + again on admission. Was saturating well on RA at time of admission and without any URI symptoms.    Patient was noted to have dark red blood per rectum during hospitalization. She received total 6 units pRBC during admission. Gi was consulted and patient underwent tagged nuclear scan which revealed bleeding in proximal ascending and mid ascending colon. IR was consulted but baljeetiyl declined angio and embolization due to risk of ELISA.     GI was consulted and patient underwent colonoscopy, however noted to have poor prep and unable to find bleeding source, and therefore no intervention performed. PT was consulted and recommended home PT with reinstatement of health aide.     Patient underwent and EGD and repeat colonoscopy.  EGD showed mild gastritis and antral biopsies were negative for H. Pylori. Colonoscopy showed poor preparation, however diverticulosis in the sigmoid colon and in the descending colon, a rectal ulcer, a 10 mm polyp in the transverse colon and two bleeding colonic angiodysplastic lesions were seen. They angiodysplastic lesions were successfully treated with bipolar cautery.     Throughout the hospital course patient received 10 units of packed rbcs.     Prior to discharge, patient hemoglobin remained above 7 and family agreed to have close OP GI follow up as GI without any additional intervention. Patient to follow-up closely with PCP/Cards and to avoid eliquis/AC in future given risk of bleeding.  Patient will also follow-up regarding starting amiodarone as an outpatient.     At this time, patient is medically optimized for discharge to home.

## 2022-06-14 NOTE — PROGRESS NOTE ADULT - PROBLEM SELECTOR PLAN 1
Most likely 2/2 to eliquis initiation and underlying GI pathology. DDx includes Gastric ulcer vs. Duodenal ulcer vs. angiodysplasia (?hades syndrome) vs. diverticulosis vs. hemorrhoids vs. watermelon stomach.   s/p 2 units pRBCs running, Pantoprazole 80mg   family does not want to persue invasive interventions including colonoscopy    Plan:  - c/w pantoprazole 40mg BID   - Maintain active type and screen   - Maintain 2 large bore IVs  - Transfuse to hemoglobin <7   - GI consult  - cbcq6 Most likely 2/2 to eliquis initiation and underlying GI pathology. DDx includes diverticulitis vs Gastric ulcer vs. Duodenal ulcer vs. angiodysplasia (?hades syndrome) vs. diverticulosis vs. hemorrhoids   s/p 2 units pRBCs running, Pantoprazole 80mg   family does not want to persue invasive interventions including colonoscopy    Plan:  - c/w pantoprazole 40mg BID   - Maintain active type and screen   - Maintain 2 large bore IVs  - Transfuse to hemoglobin <7   - GI following  - cbcq12

## 2022-06-14 NOTE — DISCHARGE NOTE PROVIDER - CARE PROVIDER_API CALL
Brayden Flores)  Cardiology; Internal Medicine  [address] [facility], [address]  [place] [address]  Phone: [phone]  Fax: [phone]  Established Patient  Follow Up Time: 1 week   Brayden Flores (MD)  Cardiology; Internal Medicine  [facility] [facility], [address]  [place] [address]  Phone: [phone]  Fax: [phone]  Established Patient  Follow Up Time: 1 week    Vicki Mejia  GASTROENTEROLOGY  [address] [facility], [facility] [address]  [place], [place] [id number]  Phone: [phone]  Fax: [phone]  Established Patient  Follow Up Time: 2 weeks   Brayden Flores (MD)  Cardiology; Internal Medicine  [address] [facility], [address]  [place] [address]  Phone: [phone]  Fax: [phone]  Established Patient  Follow Up Time: 1 week    Vicki Mejia  GASTROENTEROLOGY  [address] [facility], [address]  [place], [place] [id number]  Phone: [phone]  Fax: [phone]  Established Patient  Follow Up Time: 2 weeks    Soumya Escobar ()  Nephrology  [address] [facility], [address]  [place] [address]  Phone: [phone]  Fax: [phone]  Established Patient  Follow Up Time: 1 month

## 2022-06-14 NOTE — PROGRESS NOTE ADULT - SUBJECTIVE AND OBJECTIVE BOX
INTERVAL HPI/OVERNIGHT EVENTS:  episode of "dark red" blood per rectum overnight    s/p 2 units PRBCs yesterday- no further requirement overnight  tolerating full liquids  no nausea or vomiting      MEDICATIONS  (STANDING):  ascorbic acid 500 milliGRAM(s) Oral daily  levothyroxine 25 MICROGram(s) Oral daily  melatonin 3 milliGRAM(s) Oral at bedtime  metoprolol tartrate 12.5 milliGRAM(s) Oral two times a day  multivitamin 1 Tablet(s) Oral daily  pantoprazole    Tablet 40 milliGRAM(s) Oral before breakfast  rosuvastatin 10 milliGRAM(s) Oral at bedtime  venlafaxine XR. 75 milliGRAM(s) Oral daily    MEDICATIONS  (PRN):      Allergies  Keflex (Rash; Hives)    Review of Systems:  General:  No wt loss, fevers, chills, night sweats  CV:  No pain, palpitations, +AF  Resp:  No dyspnea, cough, tachypnea, wheezing  GI:  see HPI  :  No pain, bleeding +hx kidney stones +incontinence s/p bladder repair  Muscle:  No pain, weakness +arthritis  Neuro:  No focal weakness, tingling, memory problems  Psych:  No fatigue, insomnia, mood problems, depression  Endocrine:  No polyuria, polydypsia, cold/heat intolerance  Heme:  No petechiae, ecchymosis, easy bruisability  Skin:  No rash, tattoos, scars, edema      Vital Signs Last 24 Hrs  T(C): 36.3 (14 Jun 2022 04:41), Max: 36.5 (13 Jun 2022 20:42)  T(F): 97.3 (14 Jun 2022 04:41), Max: 97.7 (13 Jun 2022 20:42)  HR: 66 (14 Jun 2022 04:41) (64 - 73)  BP: 137/62 (14 Jun 2022 04:41) (137/62 - 152/60)  BP(mean): --  RR: 18 (14 Jun 2022 04:41) (18 - 18)  SpO2: 97% (14 Jun 2022 04:41) (92% - 98%)    PHYSICAL EXAM:    Constitutional: NAD, well-developed elderly WF  resting comfortably; Arousable and responsive, son at bedside  Neck: No LAD, supple no JVD  Respiratory: clear b/l no accessory muscle use  Cardiovascular: S1 and S2, RRR,  +murmur  Gastrointestinal: BS+, soft, NT/ND, neg HSM  Extremities: No peripheral edema, neg clubbing, cyanosis  +healing ecchymoses on skin +fragile skin  Vascular: 2+ peripheral pulses  Neurological: A/O x 3, no focal deficits  Psychiatric: Normal mood, normal affect  Skin: No rashes +healing ecchymoses on skin +fragile       LABS:                        8.1    8.81  )-----------( 160      ( 14 Jun 2022 06:00 )             25.2   Hemoglobin: 8.9 g/dL (06.13.22 @ 16:36)   s/p 1 unit PRBCs transfused  Hemoglobin: 7.4 g/dL (06.13.22 @ 13:16)   s/p 1 unit PRBCs transfused  Hemoglobin: 4.8 g/dL (06.13.22 @ 01:49)     06-14    142  |  107  |  50<H>  ----------------------------<  205<H>  3.8   |  22  |  1.56<H>    Ca    8.9      14 Jun 2022 06:00  Phos  4.0     06-14  Mg     2.0     06-14    TPro  5.6<L>  /  Alb  3.0<L>  /  TBili  0.3  /  DBili  x   /  AST  13  /  ALT  7<L>  /  AlkPhos  62  06-14    PT/INR - ( 13 Jun 2022 13:16 )   PT: 12.9 sec;   INR: 1.11 ratio    PTT - ( 13 Jun 2022 13:16 )  PTT:28.4 sec      RADIOLOGY & ADDITIONAL TESTS:

## 2022-06-14 NOTE — CONSULT NOTE ADULT - ASSESSMENT
89 year-old with dementia and moderate aortic stenosis admitted with symptomatic anemia. She was recently started on Eliquis for thromboembolic prophylaxis in the setting of atrial fibrillation.  Noted to have anemia with hemoglobin < 5 g/dL.  Noted to continue test positive for Covid-19 (first positive on 5/22).    Symptoms and ELISA are likely due to her anemia, and not due to her aortic stenosis or Covid-19.  Transfuse PRBC as needed to maintain hemoglobin > 8 g/dL.    Agree with holding anticoagulation at this time.  Conservative management per GI.  Monitor H/H.

## 2022-06-14 NOTE — PROGRESS NOTE ADULT - SUBJECTIVE AND OBJECTIVE BOX
[facility] - [facility]GRESS [facility]    CHIEF COMPLAINT: COVID-19 infection; dyspnea; asthma; bilateral pleural effusions; atelectasis; hypotension; anorexia; fatigue; hematochezia; anemia    INTERVAL HISTORY: no further rectal bleeding; hemodynamically stable; good response to 2 units PRBCs; family deferring an invasive evaluation of the source of GI tract bleeding as the patient has tolerated the prep poorly in the past; shortness of breath overnight without hypoxemia attributed to anxiety; no cough, sputum production, hemoptysis, chest congestion or wheeze; no fevers, chills or sweats; no chest pain/pressure or palpitations;      REVIEW OF SYSTEMS:  Constitutional: As per interval history  HEENT: Within normal limits  CV: As per interval history  Resp: As per interval history  GI: hematochezia  : Within normal limits  Musculoskeletal: Within normal limits  Skin: Within normal limits  Neurological: Within normal limits  Psychiatric: Within normal limits  Endocrine: Within normal limits  Hematologic/Lymphatic: Within normal limits  Allergic/Immunologic: Within normal limits    MEDICATIONS:     Pulmonary "    Anti-microbials:    Cardiovascular:  metoprolol tartrate 12.5 milliGRAM(s) Oral two times a day    Other:  ascorbic acid 500 milliGRAM(s) Oral daily  levothyroxine 25 MICROGram(s) Oral daily  melatonin 3 milliGRAM(s) Oral at bedtime  multivitamin 1 Tablet(s) Oral daily  pantoprazole    Tablet 40 milliGRAM(s) Oral before breakfast  rosuvastatin 10 milliGRAM(s) Oral at bedtime  venlafaxine XR. 75 milliGRAM(s) Oral daily    OBJECTIVE:    I&O's Detail    2022 07:01  -  2022 07:00  --------------------------------------------------------  IN:  Total IN: 0 mL    OUT:    Voided (mL): 700 mL  Total OUT: 700 mL    Total NET: -700 mL      PHYSICAL EXAM:       ICU Vital Signs Last 24 Hrs  T(C): 36.3 (2022 04:41), Max: 37.1 (2022 11:30)  T(F): 97.3 (2022 04:41), Max: 98.7 (2022 11:30)  HR: 66 (2022 04:41) (62 - 73)  BP: 137/62 (2022 04:41) (137/62 - 152/60)  BP(mean): --  ABP: --  ABP(mean): --  RR: 18 (2022 04:41) (18 - 18)  SpO2: 97% (2022 04:41) (92% - 99%) on room air       General: Awake. Alert. Cooperative. No distress. Appears stated age 	  HEENT:  Atraumatic. Normocephalic. Anicteric. Normal oral mucosa. PERRL. EOMI. Pale conjunctiva.  Neck: Supple. Trachea midline. Thyroid without enlargement/tenderness/nodules. No carotid bruit. No JVD.	  Cardiovascular: Regular rate and rhythm. S1 S2 normal. III/VI systolic murmur  Respiratory: Respirations unlabored. Clear to auscultation and percussion bilaterally. Kyphosis.  Abdomen: Soft. Non-tender. Non-distended. No organomegaly. No masses. Normal bowel sounds.  Extremities: Warm to touch. No clubbing or cyanosis. No pedal edema. Bilateral SCDs.  Pulses: 2+ peripheral pulses all extremities.	  Skin: Normal skin color. No rashes or lesions. No ecchymoses. No cyanosis. Warm to touch.  Lymph Nodes: Cervical, supraclavicular and axillary nodes normal  Neurological: Motor and sensory examination equal and normal. A and O x 3  Psychiatry: Appropriate mood and affect.    LABS:                          8.1    8.81  )-----------( 160      ( 2022 06:00 )             25.2     CBC    WBC  8.81 <==, 9.45 <==, 8.77 <==, 7.84 <==    Hemoglobin  8.1 <<==, 8.9 <<==, 7.4 <<==, 4.8 <<==    Hematocrit  25.2 <==, 27.3 <==, 23.2 <==, 16.6 <==    Platelets  160 <==, 139 <==, 153 <==, 196 <==      142  |  107  |  50<H>  ----------------------------<  205<H>    06-14  3.8   |  22  |  1.56<H>      LYTES    sodium  142 <==, 141 <==, 137 <==    potassium   3.8 <==, 4.0 <==, 4.3 <==    chloride  107 <==, 107 <==, 102 <==    carbon dioxide  22 <==, 24 <==, 22 <==    =============================================================================================  RENAL FUNCTION:    Creatinine:   1.56  <<==, 1.76  <<==, 1.93  <<==    BUN:   50 <==, 57 <==, 62 <==    ============================================================================================    calcium   8.9 <==, 8.8 <==, 8.6 <==    phos   4.0 <==, 3.9 <==    mag   2.0 <==, 2.0 <==    ============================================================================================  LFTs    AST:   13 <== , 14 <== , 17 <==     ALT:  7  <== , 9  <== , 9  <==     AP:  62  <=, 59  <=, 68  <=    Bili:  0.3  <=, 0.3  <=, 0.1  <=    PT/INR - ( 2022 13:16 )   PT: 12.9 sec;   INR: 1.11 ratio       PTT - ( 2022 13:16 )  PTT:28.4 sec    Venous Blood Gas:   @ 01:58  7.33/47/25/25/28.5  VBG Lactate: 1.5    < from: Transthoracic Echocardiogram (22 @ 14:37) >    Patient name: KOREY DIAZ  YOB: 1929   Age: 89 (F)   MR#: [id number]  Study Date: 2022  Location: [address] [place], [place] [id number]: [facility]  Study quality: Technically fair  Referring Physician: Teo Monson MD  Blood Pressure: 165/72 mmHg  Height: 158 cm  Weight: 53 kg  BSA: 1.5 m2  Heart Rate: 70 mmHg  ------------------------------------------------------------------------  PROCEDURE: Transthoracic echocardiogram with 2-D, M-Mode  and complete spectral and color flow Doppler.  INDICATION: Cardiac murmur, unspecified (R01.1)  ------------------------------------------------------------------------  Dimensions:    Normal Values:  LA:     3.6    2.0 - 4.0 cm  Ao:     3.0    2.0 - 3.8 cm  SEPTUM: 1.0    0.6 -1.2 cm  PWT:    1.0    0.6 - 1.1 cm  LVIDd:  4.0    3.0 - 5.6 cm  LVIDs:  2.8    1.8 - 4.0 cm  Derived variables:  LVMI: 84 g/m2  RWT: 0.50  Fractional short: 30 %  EF (Moran Rule): 67 %Doppler Peak Velocity (m/sec):  AoV=3.2  ------------------------------------------------------------------------  Observations:  Mitral Valve: Normal mitral valve. Mild mitral  regurgitation.  Aortic Valve/Aorta: Heavily calcified trileaflet aortic  valve with decreased opening. Peak transaortic valve  gradientequals 41 mm Hg, estimated aortic valve area  equals 1 sqcm (by continuity equation), aortic valve  velocity time integral equals 70 cm, consistent with  moderate aortic stenosis.  However the aortic valve appears heavily calcified and may  be closerto moderate-severe aortic stenosis.  Mild aortic  regurgitation.  Peak left ventricular outflow tract  gradient equals 3 mm Hg, LVOT velocity time integral equals  23 cm.  Aortic Root: 3 cm.  Left Atrium: Mildly dilated left atrium.  LA volume index =  39 cc/m2.  Left Ventricle: Normal left ventricular systolic function.  No segmental wall motion abnormalities. Normal left  ventricular internal dimensions and wall thicknesses.  Moderate diastolic dysfunction (Stage II).  Right Heart: Normal rightatrium. Normal right ventricular  size and function. Normal tricuspid valve. Minimal  tricuspid regurgitation. Normal pulmonic valve. Minimal  pulmonic regurgitation.  Pericardium/Pleura: Normal pericardium with trace  pericardial effusion.  Bilateral pleural effusions.  Hemodynamic: Estimated right atrial pressure is 8 mm Hg.  Color Doppler demonstrates no evidence of a patent foramen  ovale.  ------------------------------------------------------------------------  Conclusions:  1. Normal mitral valve. Mild mitral regurgitation.  2. Heavily calcified trileaflet aortic valve with decreased  opening. Peak transaortic valve gradient equals 41 mm Hg,  estimated aortic valve area equals 1 sqcm (by continuity  equation), aortic valve velocity time integral equals 70  cm, consistent with moderate aortic stenosis.  However the aortic valve appears heavily calcified and may  be closer to moderate-severe aortic stenosis.  Mild aortic  regurgitation.  3. Mildly dilated left atrium.  LA volume index = 39 cc/m2.  4. Normal left ventricular systolic function. No segmental  wall motion abnormalities.  5. Normal right ventricular size and function.  6. Normal pericardium with trace pericardial effusion.  7. Bilateral pleural effusions.  *** Compared with echocardiogram of 3/11/2022, no  significant changes noted.  ------------------------------------------------------------------------  Confirmed on  2022 - 17:38:34 by Osito Argueta M.D.  ------------------------------------------------------------------------  ---------------------------------------------------------------------------------------------------------------    MICROBIOLOGY:     Flu With COVID-19 By ERICK (22 @ 01:51)   SARS-CoV-2 Result: Detected  This Respiratory Panel uses polymerase chain reaction (PCR) to detect for   influenza A; influenza B; respiratory syncytial virus; and SARS-CoV-2.   This test was validated by [employer] and is in use under the FDA   Emergency Use Authorization (EUA) for clinical labs CLIA-certified to   perform high complexity testing. Test results should be correlated with   clinical presentation, patient history, and epidemiology.   Influenza A Result: NotDetec   Influenza B Result: NotDetec   Resp Syn Virus Result: NotDetec     Urinalysis Basic - ( 2022 00:46 )    Color: Light Yellow / Appearance: Slightly Turbid / S.014 / pH: x  Gluc: x / Ketone: Negative  / Bili: Negative / Urobili: Negative   Blood: x / Protein: Trace / Nitrite: Positive   Leuk Esterase: Moderate / RBC: 26 /hpf / WBC 2 /HPF   Sq Epi: x / Non Sq Epi: 2 /hpf / Bacteria: Moderate      RADIOLOGY:  [x] Chest radiographs reviewed and interpreted by me    RADIOLOGY:  [x ] Chest radiographs reviewed and interpreted by me    EXAM:  XR CHEST PORTABLE URGENT 1V                          PROCEDURE DATE:  2022      INTERPRETATION:  CLINICAL INFORMATION: Shortness of breath.    TECHNIQUE: Frontal radiograph of the chest.    COMPARISON: CT chest and chest x-ray 2022    FINDINGS:  The lungs are clear.  No pleural effusion or pneumothorax.  Cardiomediastinal silhouette size is within normal limits.  No acute osseous abnormality.    IMPRESSION:  No evidence of acute pulmonary disease.    SCARLET JALLOH MD; Resident Radiology  This document has been electronically signed.  DIANE OBREGON MD; Attending Radiologist  This document has been electronically signed. 2022  1:19PM  ---------------------------------------------------------------------------------------------------------------  < from: CT Chest No Cont (22 @ 20:10) >    EXAM:  CT CHEST                          PROCEDURE DATE:  2022      FINDINGS:    Lungs/Airways/Pleura: The central airways are patent. There are small   pleural effusions, new from 3/9/2022 abdominal CT, with partial lower   lobe compressive atelectasis. There are diffuse peribronchial and  peribronchovascular groundglass opacity with mild septal thickening,   likely pulmonary edema. There are few clusters of small nodules on a  background of subsegmental bronchial impaction, likely due to small   airways disease.    Mediastinum/Lymph nodes: No thoracic adenopathy.    Heart and Vessels: Mild cardiomegaly. Extensive coronary arterial and   aortic valvular calcification is present. Hypodensity of the blood pool   in relation to left ventricular myocardium suggests underlying anemia.   The great vessels are normal in size. No pericardial effusion.    Upper Abdomen: Cholelithiasis. Partially imaged large right parapelvic  renal cyst. Extensive visceral artery calcification.    Osseous structures and Soft Tissues: Degenerative changes without   aggressive osseous lesions. Remote left posterior 11th rib fracture.    IMPRESSION:  Pulmonary edema with small pleural effusions and partial lower lobe   compressive atelectasis.    ELISA BLACKBURN M.D., Attending Radiologist  This document has been electronically signed. May 10 2022  8:52AM  ---------------------------------------------------------------------------------------------------------------  < from: [facility] Duplex Lower Ext Vein Scan, Bilraza (22 @ 15:03) >    EXAM:  DUPLEX SCAN EXT VEINS LOWER BI                          PROCEDURE DATE:  2022      IMPRESSION:  No evidence of deep venous thrombosis in either lower extremity venous   segments able to be examined.     AUGUSTIN EASTMAN MD; Attending Radiologist  This document has been electronically signed. May  9 2022  3:31PM  ---------------------------------------------------------------------------------------------------------------

## 2022-06-14 NOTE — PROGRESS NOTE ADULT - ASSESSMENT
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 She has a history of HTN, HLD, DM, aortic stenosis, breast cancer s/p lumpectomy and CKD (Dr. Escobar). She was recently started on Eliquis and beta-blockers for atrial fibrillation. She was hospitalized in March with a UTI complicated by ELISA due to bactrim. She was admitted in May with dyspnea, hypoxemia, gurgling in her chest and leg swelling. She was initially treated with zosyn for a possible right lower lobe pneumonia seen on CXR until further evaluation revealed pulmonary edema with pleural effusions and atelectasis. ECHO -> preserved LVEF - moderate-severe aortic stenosis - moderate diastolic dysfunction - bilateral pleural effusions; CT -> pulmonary edema with small pleural effusions and partial lower lobe compressive atelectasis. She was diuresed and discharged on norvasc/toprol XL/lipitor/eliquis. The patient was diagnosed with COVID on May 22nd and was treated with a course of Paxlovid. She now returns to the ER with shortness of breath and weakness with minimal exertion. She has fatigue, malaise and pallor. She has been noted to have blood in her stools and black colored stools (after eating blueberries). Her daughter measured her blood pressure has 90/60 from a baseline of 120/90. She currently has no shortness of breath or hypoxemia on a 2lpm nasal canula. She has no cough, sputum production, chest congestion or wheeze. No fevers, chills or sweats. She has been found to be guaiac positive. RVP PCR remains positive for COVID. The patient has received Kcentra and 2 units PRBCs for severe anemia (4.8/16/6).     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 and respiratory muscle weakness limiting diaphragmatic excursion and chest wall expansion  4) no evidence of bronchospasm or pulmonary edema/pleural effusions    ELISA is due to intravascular volume depletion    nasal COVID PCR positivity more likely represents shedding of non-viable viral particles than ongoing infection in this immunocompetent patient    PLAN/RECOMMENDATIONS:    stable oxygenation on room air  no indication for airborne or contact isolation - the patient was fist diagnosed with COVID on May 22nd  observe off systemic steroids, antibiotics and pulmonary medications  head of bed elevation greater than 45 degrees at all times  incentive spirometry  GI evaluation noted     no further rectal bleeding    hemodynamically stable    good response to 2 units PRBCs    family deferring an invasive evaluation of the source of GI tract bleeding as the patient has tolerated the prep poorly in the past    holding A/C    advance diet    urgent nuclear bleeding scan for recurrent and brisk GI bleed   cardiac meds: lopressor/crestror  glucose control  DVT prophylaxis - SCDs  venlafaxine XR/melatonin at bedtime    Will follow with you. Plan of care discussed with the patient and her daughter at bedside    Brayden Benavides MD, [facility]  [phone]  Pulmonary Medicine

## 2022-06-14 NOTE — DISCHARGE NOTE PROVIDER - NSDCMRMEDTOKEN_GEN_ALL_CORE_FT
amLODIPine 5 mg oral tablet: 1 tab(s) orally once a day  ascorbic acid 500 mg oral tablet: 1 tab(s) orally once a day  Eliquis 2.5 mg oral tablet: 1 tab(s) orally 2 times a day  ferrous sulfate 325 mg (65 mg elemental iron) oral tablet: 1 tab(s) orally once a day  Lasix 40 mg oral tablet: 1 tab(s) orally once a day   metoprolol succinate 25 mg oral tablet, extended release: 1 tab(s) orally once a day  multivitamin: 1 tab(s) orally once a day   Nephro-Ernie oral tablet: 1 tab(s) orally once a day   rosuvastatin 10 mg oral tablet: 1 tab(s) orally once a day  Synthroid 25 mcg (0.025 mg) oral tablet: 1 tab(s) orally once a day  venlafaxine 75 mg oral capsule, extended release: 1 cap(s) orally once a day   ascorbic acid 500 mg oral tablet: 1 tab(s) orally once a day  ferrous sulfate 325 mg (65 mg elemental iron) oral tablet: 1 tab(s) orally once a day  Metoprolol Tartrate 25 mg oral tablet: 1 tab(s) orally 2 times a day   multivitamin: 1 tab(s) orally once a day   Nephro-Ernie oral tablet: 1 tab(s) orally once a day   pantoprazole 40 mg oral delayed release tablet: 1 tab(s) orally once a day (before a meal)  rosuvastatin 10 mg oral tablet: 1 tab(s) orally once a day  senna leaf extract oral tablet: 2 tab(s) orally once a day (at bedtime)  Synthroid 25 mcg (0.025 mg) oral tablet: 1 tab(s) orally once a day  venlafaxine 75 mg oral capsule, extended release: 1 cap(s) orally once a day   ascorbic acid 500 mg oral tablet: 1 tab(s) orally once a day  ferrous sulfate 325 mg (65 mg elemental iron) oral tablet: 1 tab(s) orally once a day  furosemide 40 mg oral tablet: 1 tab(s) orally once a day  Metoprolol Tartrate 25 mg oral tablet: 1 tab(s) orally 2 times a day   multivitamin: 1 tab(s) orally once a day   Nephro-Ernie oral tablet: 1 tab(s) orally once a day   pantoprazole 40 mg oral delayed release tablet: 1 tab(s) orally once a day (before a meal)  rosuvastatin 10 mg oral tablet: 1 tab(s) orally once a day  senna leaf extract oral tablet: 2 tab(s) orally once a day (at bedtime)  Synthroid 25 mcg (0.025 mg) oral tablet: 1 tab(s) orally once a day  venlafaxine 75 mg oral capsule, extended release: 1 cap(s) orally once a day

## 2022-06-14 NOTE — DISCHARGE NOTE PROVIDER - CARE PROVIDERS DIRECT ADDRESSES
[email or web address]

## 2022-06-14 NOTE — PROGRESS NOTE ADULT - PROBLEM SELECTOR PLAN 4
Hx of afib:   - Will switch metoprolol 25mg ER to 12.5mg tartrate BID   - Hold AC given bleeding SCr baseline: 1.88   Currently better than baseline    Monitor SCr  Monitor I&O   Renally dose meds

## 2022-06-14 NOTE — DISCHARGE NOTE PROVIDER - NSDCFUADDAPPT_GEN_ALL_CORE_FT
Please stop taking eliquis on discharge.     Please follow-up with your cardiologist within 1-2 weeks after discharge.     Please follow-up with your PCP for the management of your chronic conditions.  Please stop taking eliquis on discharge.     Please follow-up with your cardiologist within 1-2 weeks after discharge.     Please follow-up with your PCP for the management of your chronic conditions.     Please follow-up with [facility] upon discharge.   [facility]  [phone]  Please stop taking Eliquis on discharge.     Please follow-up with your cardiologist within 1-2 weeks after discharge.     Please follow-up with your PCP for the management of your chronic conditions.     Please follow-up with [facility] upon discharge.   [facility]  [phone]

## 2022-06-14 NOTE — PROGRESS NOTE ADULT - PROBLEM SELECTOR PLAN 8
DVT/PE ppx: SCD's   Diet: CC Diet   Code: Full code,   - GOC with family most likely will opt for DNR/DNI, daughter wants to discuss with her siblings and her mother privately prior to completing the form for joint decision making.  Dispo:   - PT consult - PPI as above

## 2022-06-14 NOTE — PROGRESS NOTE ADULT - PROBLEM SELECTOR PLAN 6
Home: No home medications   - CANDE  - CC Diet Hold home amlodipine 5mg given normotensive and active GI bleeding.

## 2022-06-14 NOTE — DISCHARGE NOTE PROVIDER - NSDCCPCAREPLAN_GEN_ALL_CORE_FT
PRINCIPAL DISCHARGE DIAGNOSIS  Diagnosis: GI bleed  Assessment and Plan of Treatment: You were brought in with low hemoglobin count and was having bloody stools at home. You received two units of blood transfusions with good response and your blood pressure and heart rate remained stable. We managed you conservatively with monitoring and blood transfusions according to your family's wishes. Eliquis will be discontinued on discharge. Please follow-up with your cardiologist and your PCP for the managment of your chronic conditions.      SECONDARY DISCHARGE DIAGNOSES  Diagnosis: Stage 4 chronic kidney disease  Assessment and Plan of Treatment: You have a history of chronic kidney disease however your kidney function remained at baseline since admission.     PRINCIPAL DISCHARGE DIAGNOSIS  Diagnosis: GI bleed  Assessment and Plan of Treatment: You were brought in with low hemoglobin count and was having bloody stools at home. You received six units of blood transfusions with good response and your blood pressure and heart rate remained stable. We managed you conservatively with monitoring and blood transfusions according to you and your family's wishes. You received a tagged nuclear scan which showed bleeding in the R-side of the large intestine/colon (ascending colon). You received a colonoscopy and no active source of bleeding was found. You are to follow-up closely with outpatient GI team, Cardiology, and your PCP. Eliquis will be discontinued on discharge.   IF you re-experience multiple, prolonged episodes of bright blood in stool, please contact your PCP and return to the hospital for ongoing treatment and management.      SECONDARY DISCHARGE DIAGNOSES  Diagnosis: Stage 4 chronic kidney disease  Assessment and Plan of Treatment: You have a history of chronic kidney disease however your kidney function remained at baseline since admission.    Diagnosis: Chronic atrial fibrillation  Assessment and Plan of Treatment: You recently started taking Eliquis which may have contributed to your lower GI bleed. You were followed by your Cardiology team while hospitalized. You are instructed to STOP taking eliquis upon discharge. You will follow-up outpatient with regard to possibly starting a new medication - Amiodarone, and if you should restart taking Lasix in the future. You are to STOP taking amlodipine (5mg once a day) upon discharge as your blood pressure was within normal limits during this admission. You will follow-up with your PCP to re-check your blood pressure and determine if you need to restart this medication.   You are to CONTINUE to take metoprolol tartrate however you will now take it TWICE a day (20mg twice a day) upon discharge. Please follow closely with Dr. Flores (Cardiology) upon discharge.     PRINCIPAL DISCHARGE DIAGNOSIS  Diagnosis: GI bleed  Assessment and Plan of Treatment: You were brought in with low hemoglobin count and was having bloody stools at home. You received six units of blood transfusions with good response and your blood pressure and heart rate remained stable. We managed you conservatively with monitoring and blood transfusions according to you and your family's wishes. You received a tagged nuclear scan which showed bleeding in the R-side of the large intestine/colon (ascending colon). You received a colonoscopy and no active source of bleeding was found. You are to follow-up closely with outpatient GI team, Cardiology, and your PCP. Eliquis will be discontinued on discharge.   IF you re-experience multiple, prolonged episodes of bright blood in stool, please contact your PCP and return to the hospital for ongoing treatment and management.      SECONDARY DISCHARGE DIAGNOSES  Diagnosis: Stage 4 chronic kidney disease  Assessment and Plan of Treatment: You have a history of chronic kidney disease however your kidney function remained at baseline since admission.    Diagnosis: Chronic atrial fibrillation  Assessment and Plan of Treatment: You recently started taking Eliquis which may have contributed to your lower GI bleed. You were followed by your Cardiology team while hospitalized. You are instructed to STOP taking eliquis upon discharge. You will follow-up outpatient with regard to possibly starting a new medication - Amiodarone. You are to STOP taking amlodipine (5mg once a day) upon discharge as your blood pressure was within normal limits during this admission. You will follow-up with your PCP to re-check your blood pressure and determine if you need to restart this medication.   You are to CONTINUE to take metoprolol tartrate however you will now take it TWICE a day (20mg twice a day) upon discharge. Please follow closely with Dr. Flores (Cardiology) upon discharge.     PRINCIPAL DISCHARGE DIAGNOSIS  Diagnosis: GI bleed  Assessment and Plan of Treatment: You were brought in with low hemoglobin count and was having bloody stools at home. You received ten units of blood transfusions with good response and your blood pressure and heart rate remained stable. We managed you conservatively with monitoring and blood transfusions according to you and your family's wishes. You received a tagged nuclear scan which showed bleeding in the R-side of the large intestine/colon (ascending colon). Your initial colonoscopy showed no active source of bleeding was found. After you had a large bloody bowel movement you underwent an upper endoscopy and repeat colonoscopy. Your upper endoscopy showed some mild gastritis, irritation. Your repeat colonoscopy showed two bleeding colonic angiodysplastic lesions which were cauterized. After this treatment your hemoglobin remained stable.   You are to follow-up closely with outpatient GI team, Cardiology, and your PCP. Eliquis will be discontinued on discharge. Continue taking your senna and miralax.  Please try to avoid straining. If you re-experience multiple, prolonged episodes of bright blood in stool, please contact your PCP and return to the hospital for ongoing treatment and management.      SECONDARY DISCHARGE DIAGNOSES  Diagnosis: Stage 4 chronic kidney disease  Assessment and Plan of Treatment: You have a history of chronic kidney disease however your kidney function remained at baseline since admission.    Diagnosis: Chronic atrial fibrillation  Assessment and Plan of Treatment: You recently started taking Eliquis which may have contributed to your lower GI bleed. You were followed by your Cardiology team while hospitalized. You are instructed to STOP taking eliquis upon discharge. You will follow-up outpatient with regard to possibly starting a new medication - Amiodarone.   You are to CONTINUE to take metoprolol tartrate however you will now take it TWICE a day (25mg twice a day) upon discharge. Please follow closely with Dr. Flores (Cardiology) upon discharge.    Diagnosis: Hypertension  Assessment and Plan of Treatment: You are to STOP taking amlodipine (5mg once a day) upon discharge.  You will continue to take Lasix 40mg daily. You will follow-up with your PCP to re-check your blood pressure for further management of your Blood pressure.

## 2022-06-14 NOTE — DISCHARGE NOTE PROVIDER - NSDCFUSCHEDAPPT_GEN_ALL_CORE_FT
Veto Flores  [facility] Physician [facility]  Cardio [address] [facility]  Scheduled Appointment: 07/18/2022

## 2022-06-14 NOTE — PROGRESS NOTE ADULT - SUBJECTIVE AND OBJECTIVE BOX
Patient is a 89y old  Female who presents with a chief complaint of gi bleed (2022 15:01)      SUBJECTIVE :      MEDICATIONS  (STANDING):  ascorbic acid 500 milliGRAM(s) Oral daily  levothyroxine 25 MICROGram(s) Oral daily  melatonin 3 milliGRAM(s) Oral at bedtime  metoprolol tartrate 12.5 milliGRAM(s) Oral two times a day  multivitamin 1 Tablet(s) Oral daily  pantoprazole    Tablet 40 milliGRAM(s) Oral before breakfast  rosuvastatin 10 milliGRAM(s) Oral at bedtime  venlafaxine XR. 75 milliGRAM(s) Oral daily    MEDICATIONS  (PRN):      CAPILLARY BLOOD GLUCOSE        I&O's Summary    2022 07:01  -  2022 07:00  --------------------------------------------------------  IN: 0 mL / OUT: 700 mL / NET: -700 mL        PHYSICAL EXAM:  Vital Signs Last 24 Hrs  T(C): 36.3 (2022 04:41), Max: 37.1 (2022 11:30)  T(F): 97.3 (2022 04:41), Max: 98.7 (2022 11:30)  HR: 66 (2022 04:41) (62 - 73)  BP: 137/62 (2022 04:41) (124/55 - 152/60)  BP(mean): --  RR: 18 (2022 04:41) (18 - 18)  SpO2: 97% (2022 04:41) (92% - 100%)    GENERAL: NAD, lying in bed comfortably  HEAD:  Atraumatic, Normocephalic  EYES: EOMI, conjunctiva and sclera clear  ENT: Moist mucous membranes  NECK: Supple, No JVD  CHEST/LUNG: Clear to auscultation bilaterally; No rales, rhonchi, wheezing, or rubs. Unlabored respirations  HEART: Regular rate and rhythm; Grade III-IV systolic crescendo decrescendo murmur w/o rubs, or gallops  ABDOMEN: Bowel sounds present; Soft, Nontender, Nondistended. No hepatomegaly  EXTREMITIES:  2+ Peripheral Pulses, brisk capillary refill. No clubbing, cyanosis, or edema  NERVOUS SYSTEM:  Alert & Oriented X3, speech clear. No deficits   MSK: FROM all 4 extremities, full and equal strength  SKIN: No rashes or lesions. Pale pallor    LABS:                        8.1    8.81  )-----------( 160      ( 2022 06:00 )             25.2     06-14    142  |  107  |  50<H>  ----------------------------<  205<H>  3.8   |  22  |  1.56<H>    Ca    8.9      2022 06:00  Phos  4.0     06-14  Mg     2.0     06-14    TPro  5.6<L>  /  Alb  3.0<L>  /  TBili  0.3  /  DBili  x   /  AST  13  /  ALT  7<L>  /  AlkPhos  62  06-14    PT/INR - ( 2022 13:16 )   PT: 12.9 sec;   INR: 1.11 ratio         PTT - ( 2022 13:16 )  PTT:28.4 sec      Urinalysis Basic - ( 2022 00:46 )    Color: Light Yellow / Appearance: Slightly Turbid / S.014 / pH: x  Gluc: x / Ketone: Negative  / Bili: Negative / Urobili: Negative   Blood: x / Protein: Trace / Nitrite: Positive   Leuk Esterase: Moderate / RBC: 26 /hpf / WBC 2 /HPF   Sq Epi: x / Non Sq Epi: 2 /hpf / Bacteria: Moderate          RADIOLOGY & ADDITIONAL TESTS:  Results Reviewed:   Imaging Personally Reviewed:  Electrocardiogram Personally Reviewed:    COORDINATION OF CARE:  Care Discussed with Consultants/Other Providers [Y/N]:  Prior or Outpatient Records Reviewed [Y/N]:   Patient is a 89y old  Female who presents with a chief complaint of gi bleed (2022 15:01)      SUBJECTIVE : Had dark red blood per rectum ON with VSS and Hgb stable. Daughter at bedside notes that pt was agitated ON given poor sleep and change in sorroundings. Pt AAOx2 this am. Easily arousable although sleepy. Did not offer any further complaints. Denied any pain.       MEDICATIONS  (STANDING):  ascorbic acid 500 milliGRAM(s) Oral daily  levothyroxine 25 MICROGram(s) Oral daily  melatonin 3 milliGRAM(s) Oral at bedtime  metoprolol tartrate 12.5 milliGRAM(s) Oral two times a day  multivitamin 1 Tablet(s) Oral daily  pantoprazole    Tablet 40 milliGRAM(s) Oral before breakfast  rosuvastatin 10 milliGRAM(s) Oral at bedtime  venlafaxine XR. 75 milliGRAM(s) Oral daily    MEDICATIONS  (PRN):      CAPILLARY BLOOD GLUCOSE        I&O's Summary    2022 07:01  -  2022 07:00  --------------------------------------------------------  IN: 0 mL / OUT: 700 mL / NET: -700 mL        PHYSICAL EXAM:  Vital Signs Last 24 Hrs  T(C): 36.3 (2022 04:41), Max: 37.1 (2022 11:30)  T(F): 97.3 (2022 04:41), Max: 98.7 (2022 11:30)  HR: 66 (2022 04:41) (62 - 73)  BP: 137/62 (2022 04:41) (124/55 - 152/60)  BP(mean): --  RR: 18 (2022 04:41) (18 - 18)  SpO2: 97% (2022 04:41) (92% - 100%)    GENERAL: NAD, lying in bed comfortably  HEAD:  Atraumatic, Normocephalic  EYES: EOMI, conjunctiva and sclera clear  ENT: Moist mucous membranes  NECK: Supple, No JVD  CHEST/LUNG: Clear to auscultation bilaterally; No rales, rhonchi, wheezing, or rubs. Unlabored respirations  HEART: Regular rate and rhythm; Grade III-IV systolic crescendo decrescendo murmur w/o rubs, or gallops  ABDOMEN: Bowel sounds present; Soft, Nontender, Nondistended. No hepatomegaly  EXTREMITIES:  2+ Peripheral Pulses, brisk capillary refill. No clubbing, cyanosis, or edema  NERVOUS SYSTEM:  Alert & Oriented X2-3, speech clear. No deficits   MSK: FROM all 4 extremities, full and equal strength  SKIN: No rashes or lesions. Pale pallor    LABS:                        8.1    8.81  )-----------( 160      ( 2022 06:00 )             25.2     06-14    142  |  107  |  50<H>  ----------------------------<  205<H>  3.8   |  22  |  1.56<H>    Ca    8.9      2022 06:00  Phos  4.0     06-14  Mg     2.0     06-14    TPro  5.6<L>  /  Alb  3.0<L>  /  TBili  0.3  /  DBili  x   /  AST  13  /  ALT  7<L>  /  AlkPhos  62  06-14    PT/INR - ( 2022 13:16 )   PT: 12.9 sec;   INR: 1.11 ratio         PTT - ( 2022 13:16 )  PTT:28.4 sec      Urinalysis Basic - ( 2022 00:46 )    Color: Light Yellow / Appearance: Slightly Turbid / S.014 / pH: x  Gluc: x / Ketone: Negative  / Bili: Negative / Urobili: Negative   Blood: x / Protein: Trace / Nitrite: Positive   Leuk Esterase: Moderate / RBC: 26 /hpf / WBC 2 /HPF   Sq Epi: x / Non Sq Epi: 2 /hpf / Bacteria: Moderate          RADIOLOGY & ADDITIONAL TESTS:  Results Reviewed:   Imaging Personally Reviewed:  Electrocardiogram Personally Reviewed:    COORDINATION OF CARE:  Care Discussed with Consultants/Other Providers [Y/N]:  Prior or Outpatient Records Reviewed [Y/N]:

## 2022-06-14 NOTE — CONSULT NOTE ADULT - SUBJECTIVE AND OBJECTIVE BOX
Patient seen and evaluated @ 9am on PICU  Chief Complaint: symptomatic anemia    HPI:  Patient is a 89yo F w/ hx HTN, Afib, CHF, AS, HLD, nonsmoker, remote hx of asthma presenting with loose stools, lethargy, fatigue, low blood pressure and bloody bowel movement. The patient started Eliquis at the end of April on 4/2022. The daughter noted on Friday that the patient developed loose stools that were normal in color. The patient subsequently was developing progressive fatigue, lethargy and decreased PO intake. Her daughter noted that her stools were a maroon color. On 6/12, the daughter noticed that the patient had low blood pressures SBP of 90s down from baseline of 120s. She checked her BM that morning and noted blood with blood clots in the toilet. At this time she decided to seek medical care in the ED for further evaluation.     Of note, the patient had COVID 2 weeks ago measured by at home test. RVP was + for COVID by PCR on admission.     In the ED:   Vitals: Temp 97.7, HR 60, /55, RR 20, O2 saturation 100%  Labs: Hemoglobin 4.8, Cr 1.93, COVID +   Imaging:  Cxr No acute pathology  Interventions: Given Kcentra, 2 units of pRBCs, Pantoprazole 80mg    (13 Jun 2022 06:24)    PMH:   HTN (hypertension)    Diabetes mellitus type 2, noninsulin dependent    Diverticulitis    Hyperlipidemia    GERD (gastroesophageal reflux disease)    Glaucoma    Breast cancer    Neurogenic bladder    Urinary incontinence    Renal calculus or stone    Arthritis    Heart murmur      PSH:   Renal calculi    S/P lumpectomy of breast    S/P bladder repair      Medications:   ascorbic acid 500 milliGRAM(s) Oral daily  levothyroxine 25 MICROGram(s) Oral daily  melatonin 3 milliGRAM(s) Oral at bedtime  metoprolol tartrate 12.5 milliGRAM(s) Oral two times a day  multivitamin 1 Tablet(s) Oral daily  pantoprazole    Tablet 40 milliGRAM(s) Oral before breakfast  rosuvastatin 10 milliGRAM(s) Oral at bedtime  venlafaxine XR. 75 milliGRAM(s) Oral daily    Allergies:  Keflex (Rash; Hives)    FAMILY HISTORY:  No pertinent family history in first degree relatives    Social History: , lives with adult daughter  Smoking: nonsmoker  Alcohol: no EtOH  Drugs: no illicit drug use    Review of Systems:    Constitutional: No fever, chills, fatigue, or changes in weight  HEENT: No blurry vision, eye pain, headache, runny nose, or sore throat  Respiratory: No shortness of breath, cough, or wheezing  Cardiovascular: No chest pain or palpitations  Gastrointestinal: No nausea, vomiting, diarrhea, or abdominal pain  Genitourinary: No dysuria or incontinence  Extremities: No lower extremity swelling  Neurologic: No focal findings  Lymphatic: No lymphadenopathy   Skin: No rash  Psychiatry: No anxiety or depression  10 point review of systems is otherwise negative except as mentioned above            Physical Exam:  T(C): 36.6 (06-14-22 @ 22:26), Max: 36.8 (06-14-22 @ 20:35)  HR: 68 (06-14-22 @ 22:26) (65 - 76)  BP: 124/58 (06-14-22 @ 22:26) (107/55 - 145/77)  RR: 18 (06-14-22 @ 22:26) (18 - 18)  SpO2: 96% (06-14-22 @ 22:26) (92% - 97%)  Wt(kg): --    06-13 @ 07:01  -  06-14 @ 07:00  --------------------------------------------------------  IN: 0 mL / OUT: 700 mL / NET: -700 mL    06-14 @ 07:01  -  06-14 @ 23:09  --------------------------------------------------------  IN: 120 mL / OUT: 800 mL / NET: -680 mL      Daily     Daily     Appearance: Normal, well groomed, NAD  Eyes: PERRLA, EOMI, pink conjunctiva, no scleral icterus   HENT: Normal oral mucosa  Cardiovascular: RRR, S1, S2, no murmur, rub, or gallop; no edema; no JVD  Respiratory: Clear to auscultation bilaterally  Gastrointestinal: Soft, non-tender, non-distended, BS+  Musculoskeletal: No clubbing or joint deformity   Neurologic: No focal weakness  Lymphatic: No lymphadenopathy  Psychiatry: AAOx2 with appropriate mood and affect  Skin: No rashes, ecchymoses, or cyanosis    Cardiovascular Diagnostic Testing:  ECG: sinus rhythm    Echo:  < from: Transthoracic Echocardiogram (05.08.22 @ 14:37) >  ------------------------------------------------------------------------  Dimensions:    Normal Values:  LA:     3.6    2.0 - 4.0 cm  Ao:     3.0    2.0 - 3.8 cm  SEPTUM: 1.0    0.6 -1.2 cm  PWT:    1.0    0.6 - 1.1 cm  LVIDd:  4.0    3.0 - 5.6 cm  LVIDs:  2.8    1.8 - 4.0 cm  Derived variables:  LVMI: 84 g/m2  RWT: 0.50  Fractional short: 30 %  EF (Moran Rule): 67 %Doppler Peak Velocity (m/sec):  AoV=3.2  ------------------------------------------------------------------------  Observations:  Mitral Valve: Normal mitral valve. Mild mitral  regurgitation.  Aortic Valve/Aorta: Heavily calcified trileaflet aortic  valve with decreased opening. Peak transaortic valve  gradientequals 41 mm Hg, estimated aortic valve area  equals 1 sqcm (by continuity equation), aortic valve  velocity time integral equals 70 cm, consistent with  moderate aortic stenosis.  However the aortic valve appears heavily calcified and may  be closerto moderate-severe aortic stenosis.  Mild aortic  regurgitation.  Peak left ventricular outflow tract  gradient equals 3 mm Hg, LVOT velocity time integral equals  23 cm.  Aortic Root: 3 cm.  Left Atrium: Mildly dilated left atrium.  LA volume index =  39 cc/m2.  Left Ventricle: Normal left ventricular systolic function.  No segmental wall motion abnormalities. Normal left  ventricular internal dimensions and wall thicknesses.  Moderate diastolic dysfunction (Stage II).  Right Heart: Normal rightatrium. Normal right ventricular  size and function. Normal tricuspid valve. Minimal  tricuspid regurgitation. Normal pulmonic valve. Minimal  pulmonic regurgitation.  Pericardium/Pleura: Normal pericardium with trace  pericardial effusion.  Bilateral pleural effusions.  Hemodynamic: Estimated right atrial pressure is 8 mm Hg.  Color Doppler demonstrates no evidence of a patent foramen  ovale.  ------------------------------------------------------------------------  Conclusions:  1. Normal mitral valve. Mild mitral regurgitation.  2. Heavily calcified trileaflet aortic valve with decreased  opening. Peak transaortic valve gradient equals 41 mm Hg,  estimated aortic valve area equals 1 sqcm (by continuity  equation), aortic valve velocity time integral equals 70  cm, consistent with moderate aortic stenosis.  However the aortic valve appears heavily calcified and may  be closer to moderate-severe aortic stenosis.  Mild aortic  regurgitation.  3. Mildly dilated left atrium.  LA volume index = 39 cc/m2.  4. Normal left ventricular systolic function. No segmental  wall motion abnormalities.  5. Normal right ventricular size and function.  6. Normal pericardium with trace pericardial effusion.  7. Bilateral pleural effusions.  *** Compared with echocardiogram of 3/11/2022, no  significant changes noted.  ------------------------------------------------------------------------  Confirmed on  5/8/2022 - 17:38:34 by Osito Argueta M.D.  ------------------------------------------------------------------------    < end of copied text >      Imaging:    Labs:                        6.7    6.86  )-----------( 146      ( 14 Jun 2022 20:59 )             21.4     06-14    142  |  107  |  50<H>  ----------------------------<  205<H>  3.8   |  22  |  1.56<H>    Ca    8.9      14 Jun 2022 06:00  Phos  4.0     06-14  Mg     2.0     06-14    TPro  5.6<L>  /  Alb  3.0<L>  /  TBili  0.3  /  DBili  x   /  AST  13  /  ALT  7<L>  /  AlkPhos  62  06-14    PT/INR - ( 13 Jun 2022 13:16 )   PT: 12.9 sec;   INR: 1.11 ratio         PTT - ( 13 Jun 2022 13:16 )  PTT:28.4 sec

## 2022-06-14 NOTE — DISCHARGE NOTE PROVIDER - NSDCACTIVITY_GEN_ALL_CORE
as tolerated as tolerated/Do not drive or operate machinery/Do not make important decisions Surgeon/Pathologist Verbiage (Will Incorporate Name Of Surgeon From Intro If Not Blank): operated in two distinct and integrated capacities as the surgeon and pathologist.

## 2022-06-14 NOTE — DISCHARGE NOTE PROVIDER - NSFOLLOWUPCLINICS_GEN_ALL_ED_FT
Gastroenterology at [facility]  Gastroenterology  [address] [place] [id number]  Phone: [phone]  Fax:

## 2022-06-14 NOTE — PROGRESS NOTE ADULT - ASSESSMENT
89yo F w/ hx HTN, Afib, CHF, HLD, nonsmoker, remote hx of asthma presenting with loose stools, lethargy, fatigue, low blood pressure and blood bowel movement found to have a hemoglobin of 4.8 most likely 2/2 to GI bleed from eliquis.  DDx includes Gastric ulcer vs. Duodenal ulcer vs. angiodysplasia (?hades syndrome) vs. diverticulosis vs. hemorrhoids vs. watermelon stomach.  89yo F w/ hx HTN, Afib, CHF, HLD, CKD Stage IV nonsmoker, remote hx of asthma presenting with loose stools, lethargy, fatigue, low blood pressure and blood bowel movement found to have a hemoglobin of 4.8 most likely 2/2 to GI bleed from eliquis.  DDx includes diverticulitis vs Gastric ulcer vs. Duodenal ulcer vs. angiodysplasia (?hades syndrome) vs. diverticulosis vs. hemorrhoids. Family does not wish to pursue invasive interventions

## 2022-06-14 NOTE — DISCHARGE NOTE PROVIDER - NSDCCPTREATMENT_GEN_ALL_CORE_FT
PRINCIPAL PROCEDURE  Procedure: Colonoscopy  Findings and Treatment: Findings:       The perianal and digital rectal examinations were normal. Pertinent negatives include normal        sphincter tone.       Multiple small and large-mouthed diverticula were found in the sigmoid colon, descending        colon and left colon. There was narrowing of the colon in association with the diverticular        opening. There was evidence of diverticular spasm. There was evidence of an impacted        diverticulum. There was evidence of past bleeding from the diverticular opening.       No additional abnormalities were found on retroflexion.       Two sessile, non-bleeding polyps were found in the sigmoid colon and transverse colon. The        polyps were 4 mm in size.                                                                                                        Impression:          - Preparation of the colon was inadequate, despite multiple attempts to clear                        the colon, presence of solid stool limited visualisation.                       - No evidence of active gi bleeding, old blood was seen throughout the colon,                        lightened on the right colon suggesting bleeding from left sided                        diverticulosis.                       - Severe diverticulosis in the sigmoid colon, in the descending colon and in                        the left colon. There was narrowing of the colon in association with the                        diverticular opening. There was evidence of diverticular spasm. There was                        evidence of an impacted diverticulum. There was evidence of past bleeding                        from the diverticular opening.                       - Two 4 mm, non-bleeding polyps in the sigmoid colon and in the transverse                        colon.                       - No specimens collected      SECONDARY PROCEDURE  Procedure: Tc-99m-RBC study for bleeding  Findings and Treatment: INTERPRETATION:  RADIOPHARMACEUTICAL: 21 mCi Tc-99m labeled autologous   red blood cells, I.V.  CLINICAL STATEMENT: 89-year-old female with bright red blood per rectum   referred for localization of bleeding site.  TECHNIQUE:  Dynamic images of the anterior abdomen/pelvis were obtained   for 2 hours following administration of radiopharmaceutical. Static   images of the abdomen/pelvis and caudal images were obtained immediately   thereafter.  FINDINGS: Immediately after administration of the labeled RBC, there is   extravasation of activity in the right lower quadrant of the abdomen   which moved both in the antegrade and retrograde fashion compatible with   active bleeding likely in the proximal ascending colon.  IMPRESSION: Abnormal GI bleeding scan.  Active bleeding site in the proximal ascending colon.

## 2022-06-15 LAB
ALBUMIN SERPL ELPH-MCNC: 2.8 G/DL — LOW (ref 3.3–5)
ALP SERPL-CCNC: 61 U/L — SIGNIFICANT CHANGE UP (ref 40–120)
ALT FLD-CCNC: 8 U/L — LOW (ref 10–45)
ANION GAP SERPL CALC-SCNC: 12 MMOL/L — SIGNIFICANT CHANGE UP (ref 5–17)
AST SERPL-CCNC: 15 U/L — SIGNIFICANT CHANGE UP (ref 10–40)
BILIRUB SERPL-MCNC: 0.4 MG/DL — SIGNIFICANT CHANGE UP (ref 0.2–1.2)
BUN SERPL-MCNC: 43 MG/DL — HIGH (ref 7–23)
CALCIUM SERPL-MCNC: 8.7 MG/DL — SIGNIFICANT CHANGE UP (ref 8.4–10.5)
CHLORIDE SERPL-SCNC: 108 MMOL/L — SIGNIFICANT CHANGE UP (ref 96–108)
CO2 SERPL-SCNC: 23 MMOL/L — SIGNIFICANT CHANGE UP (ref 22–31)
CREAT SERPL-MCNC: 1.58 MG/DL — HIGH (ref 0.5–1.3)
EGFR: 31 ML/MIN/1.73M2 — LOW
GLUCOSE SERPL-MCNC: 161 MG/DL — HIGH (ref 70–99)
HCT VFR BLD CALC: 24.4 % — LOW (ref 34.5–45)
HCT VFR BLD CALC: 25.3 % — LOW (ref 34.5–45)
HGB BLD-MCNC: 7.7 G/DL — LOW (ref 11.5–15.5)
HGB BLD-MCNC: 8 G/DL — LOW (ref 11.5–15.5)
MAGNESIUM SERPL-MCNC: 1.9 MG/DL — SIGNIFICANT CHANGE UP (ref 1.6–2.6)
MCHC RBC-ENTMCNC: 28.9 PG — SIGNIFICANT CHANGE UP (ref 27–34)
MCHC RBC-ENTMCNC: 29.2 PG — SIGNIFICANT CHANGE UP (ref 27–34)
MCHC RBC-ENTMCNC: 31.6 GM/DL — LOW (ref 32–36)
MCHC RBC-ENTMCNC: 31.6 GM/DL — LOW (ref 32–36)
MCV RBC AUTO: 91.7 FL — SIGNIFICANT CHANGE UP (ref 80–100)
MCV RBC AUTO: 92.3 FL — SIGNIFICANT CHANGE UP (ref 80–100)
NRBC # BLD: 0 /100 WBCS — SIGNIFICANT CHANGE UP (ref 0–0)
NRBC # BLD: 0 /100 WBCS — SIGNIFICANT CHANGE UP (ref 0–0)
PHOSPHATE SERPL-MCNC: 3.3 MG/DL — SIGNIFICANT CHANGE UP (ref 2.5–4.5)
PLATELET # BLD AUTO: 157 K/UL — SIGNIFICANT CHANGE UP (ref 150–400)
PLATELET # BLD AUTO: 158 K/UL — SIGNIFICANT CHANGE UP (ref 150–400)
POTASSIUM SERPL-MCNC: 4 MMOL/L — SIGNIFICANT CHANGE UP (ref 3.5–5.3)
POTASSIUM SERPL-SCNC: 4 MMOL/L — SIGNIFICANT CHANGE UP (ref 3.5–5.3)
PROT SERPL-MCNC: 5.4 G/DL — LOW (ref 6–8.3)
RBC # BLD: 2.66 M/UL — LOW (ref 3.8–5.2)
RBC # BLD: 2.74 M/UL — LOW (ref 3.8–5.2)
RBC # FLD: 16.6 % — HIGH (ref 10.3–14.5)
RBC # FLD: 16.9 % — HIGH (ref 10.3–14.5)
SODIUM SERPL-SCNC: 143 MMOL/L — SIGNIFICANT CHANGE UP (ref 135–145)
WBC # BLD: 6.96 K/UL — SIGNIFICANT CHANGE UP (ref 3.8–10.5)
WBC # BLD: 8.17 K/UL — SIGNIFICANT CHANGE UP (ref 3.8–10.5)
WBC # FLD AUTO: 6.96 K/UL — SIGNIFICANT CHANGE UP (ref 3.8–10.5)
WBC # FLD AUTO: 8.17 K/UL — SIGNIFICANT CHANGE UP (ref 3.8–10.5)

## 2022-06-15 PROCEDURE — 99233 SBSQ HOSP IP/OBS HIGH 50: CPT

## 2022-06-15 PROCEDURE — 78278 ACUTE GI BLOOD LOSS IMAGING: CPT | Mod: 26

## 2022-06-15 PROCEDURE — 99233 SBSQ HOSP IP/OBS HIGH 50: CPT | Mod: FS

## 2022-06-15 PROCEDURE — 99231 SBSQ HOSP IP/OBS SF/LOW 25: CPT

## 2022-06-15 RX ADMIN — Medication 75 MILLIGRAM(S): at 16:06

## 2022-06-15 RX ADMIN — Medication 500 MILLIGRAM(S): at 16:06

## 2022-06-15 RX ADMIN — ROSUVASTATIN CALCIUM 10 MILLIGRAM(S): 5 TABLET ORAL at 21:28

## 2022-06-15 RX ADMIN — PANTOPRAZOLE SODIUM 40 MILLIGRAM(S): 20 TABLET, DELAYED RELEASE ORAL at 06:28

## 2022-06-15 RX ADMIN — Medication 12.5 MILLIGRAM(S): at 06:27

## 2022-06-15 RX ADMIN — Medication 1 TABLET(S): at 16:06

## 2022-06-15 RX ADMIN — Medication 25 MICROGRAM(S): at 06:27

## 2022-06-15 RX ADMIN — Medication 12.5 MILLIGRAM(S): at 17:08

## 2022-06-15 RX ADMIN — Medication 3 MILLIGRAM(S): at 21:28

## 2022-06-15 NOTE — PHYSICAL THERAPY INITIAL EVALUATION ADULT - GAIT TRAINING, PT EVAL
Can you see if this patient can get in with Dr. Romeo please?  
GOAL: Pt will ambulate 100 feet w/ supervision, w/use of appropriate assistive device in 4 weeks

## 2022-06-15 NOTE — PHYSICAL THERAPY INITIAL EVALUATION ADULT - REFERRING PHYSICIAN, REHAB EVAL
Patria Patel. Consult- PT evaluate and treat, Activity- increase as tolerated, Activity- OOB to chair

## 2022-06-15 NOTE — PHYSICAL THERAPY INITIAL EVALUATION ADULT - PERTINENT HX OF CURRENT PROBLEM, REHAB EVAL
89yo F w/ hx HTN, Afib, CHF, HLD, CKD Stage IV nonsmoker, remote hx of asthma presenting with loose stools, lethargy, fatigue, low blood pressure and BRBPR with clots, found to have a hemoglobin of 4.8, likely 2/2 to GI bleed from eliquis.  DDx includes diverticulitis vs Gastric ulcer vs. Duodenal ulcer vs. angiodysplasia (?hades syndrome) vs. diverticulosis vs. hemorrhoids. Family does not wish to pursue invasive interventions.

## 2022-06-15 NOTE — PROGRESS NOTE ADULT - ASSESSMENT
89yo F w/ hx HTN, Afib (recent dx 4/2022), CHF, HLD, nonsmoker, remote hx of asthma presenting with loose stools, lethargy, fatigue, low blood pressure and blood bowel movement found to have a hemoglobin of 4.8 most likely 2/2 to GI bleed- likely diverticular bleed given history/presentation    recent COVID + s/p Paxlovid rx; COVID PCR + on admission - defer to primary team    Acute GI blood loss anemia; likely 2/2  diverticular bleeding given history/presentation.   No invasive evaluation given advanced age, pt previously not able to prep adequately on previous colonoscopies; pt and family aware there is a risk of occult lesion as well.   Risk of recurrent diverticular bleeding episodes reviewed with pt and family  -monitor BMs; expect next few BMs to be dark 2/2 passage of retained/ "old" blood from GI tract  -off AC 2/2 risk of recurrent bleeding  -monitor CBC; transfuse PRN  -monitor clinically  -PO PPI  -Given recurrent/brisk GI bleeding/ HD instability  ordered urgent nuclear bleeding scan to help localize source/site of bleeding  -needs gong placed for bleeding scan (ordered)l can d/c once bleeding scan completed  -If + bleeding scan, consult IR to evaluate for angio +/- embolization    Discussed with pt and family at bedside  Discussed with House staff and Medicine attending      Marek Cormier PA-C    [facility]  [phone]  After hours and weekend coverage [phone]

## 2022-06-15 NOTE — PROGRESS NOTE ADULT - ASSESSMENT
89yo F w/ hx HTN, Afib, CHF, HLD, CKD Stage IV nonsmoker, remote hx of asthma presenting with loose stools, lethargy, fatigue, low blood pressure and blood bowel movement found to have a hemoglobin of 4.8 most likely 2/2 to GI bleed from eliquis.  DDx includes diverticulitis vs Gastric ulcer vs. Duodenal ulcer vs. angiodysplasia (?hades syndrome) vs. diverticulosis vs. hemorrhoids. Family does not wish to pursue invasive interventions

## 2022-06-15 NOTE — PHYSICAL THERAPY INITIAL EVALUATION ADULT - ACTIVE RANGE OF MOTION EXAMINATION, REHAB EVAL
except b/l shoulder limited ~0-100 degrees/bilateral upper extremity Active ROM was WFL (within functional limits)/bilateral  lower extremity Active ROM was WFL (within functional limits)

## 2022-06-15 NOTE — CONSULT NOTE ADULT - ASSESSMENT
Interventional Radiology    Evaluate for Procedure: Angiogram and embolization for GI bleed.    HPI: 89y Female with hx of diverticular bleed found to have proximal ascending colon on nuclear scan. IR consulted for angiogram and embolization.  Eliquis on hold for 48hrs.  S/p 3 units of PRBC transfusion thus far.        Allergies: Keflex (Rash; Hives)    Medications (Abx/Cardiac/Anticoagulation/Blood Products)    metoprolol tartrate: 12.5 milliGRAM(s) Oral (06-15 @ 17:08)    Data:    T(C): 36.8  HR: 66  BP: 151/66  RR: 18  SpO2: 99%    -WBC 6.96 / HgB 7.7 / Hct 24.4 / Plt 158  -Na 143 / Cl 108 / BUN 43 / Glucose 161  -K 4.0 / CO2 23 / Cr 1.58  -ALT 8 / Alk Phos 61 / T.Bili 0.4  -INR 1.11 / PTT 28.4    General : Awake and alert. Defers discussion to son.        Assessment/Plan:  89y Female with hx of diverticular bleed found to have proximal ascending colon on nuclear scan. IR consulted for angiogram and embolization.    - Spoke with son at bedside, explained that she is at risk for renal injury from further imaging with CTA and angiogram. And embolization can lead to bowel ischemia.  - Son understand the risk of the procedure.  - He wants to discuss with GI team and his siblings again about colonoscopy.   - Will defer angiogram at this time.  - D/w ZULLY carroll (GI).  - D/w Dr. Salmon.

## 2022-06-15 NOTE — PROGRESS NOTE ADULT - SUBJECTIVE AND OBJECTIVE BOX
KOREY DIAZ  89y  MRN: [id number]    Patient is a 89y old  Female who presents with a chief complaint of gi bleed (2022 17:53)      Subjective: no events ON. Denies fever, CP, SOB, abn pain, N/V, dysuria. Tolerating diet.      MEDICATIONS  (STANDING):  ascorbic acid 500 milliGRAM(s) Oral daily  levothyroxine 25 MICROGram(s) Oral daily  melatonin 3 milliGRAM(s) Oral at bedtime  metoprolol tartrate 12.5 milliGRAM(s) Oral two times a day  multivitamin 1 Tablet(s) Oral daily  pantoprazole    Tablet 40 milliGRAM(s) Oral before breakfast  rosuvastatin 10 milliGRAM(s) Oral at bedtime  venlafaxine XR. 75 milliGRAM(s) Oral daily    MEDICATIONS  (PRN):      Objective:    Vitals: Vital Signs Last 24 Hrs  T(C): 36.8 (06-15-22 @ 06:24), Max: 36.8 (22 @ 20:35)  T(F): 98.2 (06-15-22 @ 06:24), Max: 98.2 (22 @ 20:35)  HR: 69 (06-15-22 @ 06:24) (65 - 76)  BP: 145/66 (06-15-22 @ 06:24) (107/55 - 160/55)  BP(mean): --  RR: 18 (06-15-22 @ 06:24) (18 - 18)  SpO2: 100% (06-15-22 @ 06:24) (92% - 100%)            I&O's Summary    2022 07:01  -  15 Dario 2022 07:00  --------------------------------------------------------  IN: 240 mL / OUT: 1200 mL / NET: -960 mL        PHYSICAL EXAM:  GENERAL: NAD  HEAD:  Atraumatic, Normocephalic  EYES: EOMI, conjunctiva and sclera clear  CHEST/LUNG: Clear to percussion bilaterally; No rales, rhonchi, wheezing, or rubs  HEART: Regular rate and rhythm; No murmurs, rubs, or gallops  ABDOMEN: Soft, Nontender, Nondistended;   SKIN: No rashes or lesions  NERVOUS SYSTEM:  Alert & Oriented X3, no focal deficit    LABS:      142  |  107  |  50<H>  ----------------------------<  205<H>  3.8   |  22  |  1.56<H>      141  |  107  |  57<H>  ----------------------------<  166<H>  4.0   |  24  |  1.76<H>      137  |  102  |  62<H>  ----------------------------<  209<H>  4.3   |  22  |  1.93<H>    Ca    8.9      2022 06:00  Ca    8.8      2022 13:16  Ca    8.6      2022 01:49  Phos  4.0     -  Mg     2.0     -14    TPro  5.6<L>  /  Alb  3.0<L>  /  TBili  0.3  /  DBili  x   /  AST  13  /  ALT  7<L>  /  AlkPhos  62  06-14  TPro  5.7<L>  /  Alb  3.0<L>  /  TBili  0.3  /  DBili  x   /  AST  14  /  ALT  9<L>  /  AlkPhos  59  06-13  TPro  5.8<L>  /  Alb  3.4  /  TBili  0.1<L>  /  DBili  x   /  AST  17  /  ALT  9<L>  /  AlkPhos  68  06-13      PT/INR - ( 2022 13:16 )   PT: 12.9 sec;   INR: 1.11 ratio         PTT - ( 2022 13:16 )  PTT:28.4 sec              Urinalysis Basic - ( 2022 00:46 )    Color: Light Yellow / Appearance: Slightly Turbid / S.014 / pH: x  Gluc: x / Ketone: Negative  / Bili: Negative / Urobili: Negative   Blood: x / Protein: Trace / Nitrite: Positive   Leuk Esterase: Moderate / RBC: 26 /hpf / WBC 2 /HPF   Sq Epi: x / Non Sq Epi: 2 /hpf / Bacteria: Moderate                        6.7    6.86  )-----------( 146      ( 2022 20:59 )             21.4                         8.1    8.81  )-----------( 160      ( 2022 06:00 )             25.2                         8.9    9.45  )-----------( 139      ( 2022 16:36 )             27.3     CAPILLARY BLOOD GLUCOSE

## 2022-06-15 NOTE — PROGRESS NOTE ADULT - SUBJECTIVE AND OBJECTIVE BOX
[facility] - PROGRESS NOTE    CHIEF COMPLAINT: COVID-19 infection; dyspnea; asthma; bilateral pleural effusions; atelectasis; hypotension; anorexia; fatigue; hematochezia; anemia    INTERVAL HISTORY: maroon colored and bright red blood per rectum while ambulating with physical therapy earlier - required a third unit of PRBCs overnight for a dropping H/H - bleeding scan ordered by GI; hemodynamically stable; family deferring an invasive evaluation of the source of GI tract bleeding as the patient has tolerated the prep poorly in the past; no shortness of breath or hypoxemia on room air; no cough, sputum production, hemoptysis, chest congestion or wheeze; no fevers, chills or sweats; no chest pain/pressure or palpitations;    REVIEW OF SYSTEMS:  Constitutional: As per interval history  HEENT: Within normal limits  CV: As per interval history  Resp: As per interval history  GI: hematochezia  : Within normal limits  Musculoskeletal: Within normal limits  Skin: Within normal limits  Neurological: Within normal limits  Psychiatric: Within normal limits  Endocrine: Within normal limits  Hematologic/Lymphatic: Within normal limits  Allergic/Immunologic: Within normal limits    MEDICATIONS:     Pulmonary "    Anti-microbials:    Cardiovascular:  metoprolol tartrate 12.5 milliGRAM(s) Oral two times a day    Other:  ascorbic acid 500 milliGRAM(s) Oral daily  levothyroxine 25 MICROGram(s) Oral daily  melatonin 3 milliGRAM(s) Oral at bedtime  multivitamin 1 Tablet(s) Oral daily  pantoprazole    Tablet 40 milliGRAM(s) Oral before breakfast  rosuvastatin 10 milliGRAM(s) Oral at bedtime  venlafaxine XR. 75 milliGRAM(s) Oral daily    OBJECTIVE:    I&O's Detail    2022 07:01  -  15 Dario 2022 07:00  --------------------------------------------------------  IN:    Oral Fluid: 240 mL  Total IN: 240 mL    OUT:    Voided (mL): 1200 mL  Total OUT: 1200 mL    Total NET: -960 mL    PHYSICAL EXAM:       ICU Vital Signs Last 24 Hrs  T(C): 36.6 (15 Dario 2022 12:06), Max: 36.8 (2022 20:35)  T(F): 97.9 (15 Dario 2022 12:06), Max: 98.2 (2022 20:35)  HR: 68 (15 Dario 2022 12:06) (62 - 76)  BP: 101/56 (15 Dario 2022 12:06) (101/56 - 160/55)  BP(mean): --  ABP: --  ABP(mean): --  RR: 18 (15 Dario 2022 12:06) (18 - 18)  SpO2: 97% (15 Dario 2022 12:06) (96% - 100%) on room air     General: Awake. Alert. Cooperative. No distress. Appears stated age. Sitting in the chair.	  HEENT:  Atraumatic. Normocephalic. Anicteric. Normal oral mucosa. PERRL. EOMI. Pale conjunctiva.  Neck: Supple. Trachea midline. Thyroid without enlargement/tenderness/nodules. No carotid bruit. No JVD.	  Cardiovascular: Regular rate and rhythm. S1 S2 normal. III/VI systolic murmur  Respiratory: Respirations unlabored. Clear to auscultation and percussion bilaterally. Kyphosis.  Abdomen: Soft. Non-tender. Non-distended. No organomegaly. No masses. Normal bowel sounds.  Extremities: Warm to touch. No clubbing or cyanosis. No pedal edema. Bilateral SCDs.  Pulses: 2+ peripheral pulses all extremities.	  Skin: Normal skin color. No rashes or lesions. No ecchymoses. No cyanosis. Warm to touch.  Lymph Nodes: Cervical, supraclavicular and axillary nodes normal  Neurological: Motor and sensory examination equal and normal. A and O x 3  Psychiatry: Appropriate mood and affect.    LABS:                          8.0    8.17  )-----------( 157      ( 15 Dario 2022 07:43 )             25.3     CBC    WBC  8.17 <==, 6.86 <==, 8.81 <==, 9.45 <==, 8.77 <==, 7.84 <==    Hemoglobin  8.0 <<==, 6.7 <<==, 8.1 <<==, 8.9 <<==, 7.4 <<==, 4.8 <<==    Hematocrit  25.3 <==, 21.4 <==, 25.2 <==, 27.3 <==, 23.2 <==, 16.6 <==    Platelets  157 <==, 146 <==, 160 <==, 139 <==, 153 <==, 196 <==      143  |  108  |  43<H>  ----------------------------<  161<H>    06-15  4.0   |  23  |  1.58<H>      LYTES    sodium  143 <==, 142 <==, 141 <==, 137 <==    potassium   4.0 <==, 3.8 <==, 4.0 <==, 4.3 <==    chloride  108 <==, 107 <==, 107 <==, 102 <==    carbon dioxide  23 <==, 22 <==, 24 <==, 22 <==    =============================================================================================  RENAL FUNCTION:    Creatinine:   1.58  <<==, 1.56  <<==, 1.76  <<==, 1.93  <<==    BUN:   43 <==, 50 <==, 57 <==, 62 <==    ============================================================================================    calcium   8.7 <==, 8.9 <==, 8.8 <==, 8.6 <==    phos   3.3 <==, 4.0 <==, 3.9 <==    mag   1.9 <==, 2.0 <==, 2.0 <==    ============================================================================================  LFTs    AST:   15 <== , 13 <== , 14 <== , 17 <==     ALT:  8  <== , 7  <== , 9  <== , 9  <==     AP:  61  <=, 62  <=, 59  <=, 68  <=    Bili:  0.4  <=, 0.3  <=, 0.3  <=, 0.1  <=    PT/INR - ( 2022 13:16 )   PT: 12.9 sec;   INR: 1.11 ratio      PTT - ( 2022 13:16 )  PTT:28.4 sec    Venous Blood Gas:   @ 01:58  7.33/47/25/25/28.5  VBG Lactate: 1.5    < from: Transthoracic Echocardiogram (22 @ 14:37) >    Patient name: KOREY DIAZ  YOB: 1929   Age: 89 (F)   MR#: [id number]  Study Date: 2022  Location: [address] [place], [place] [id number]: Gadiel Schwartz [facility]  Study quality: Technically fair  Referring Physician: Teo Monson MD  Blood Pressure: 165/72 mmHg  Height: 158 cm  Weight: 53 kg  BSA: 1.5 m2  Heart Rate: 70 mmHg  ------------------------------------------------------------------------  PROCEDURE: Transthoracic echocardiogram with 2-D, M-Mode  and complete spectral and color flow Doppler.  INDICATION: Cardiac murmur, unspecified (R01.1)  ------------------------------------------------------------------------  Dimensions:    Normal Values:  LA:     3.6    2.0 - 4.0 cm  Ao:     3.0    2.0 - 3.8 cm  SEPTUM: 1.0    0.6 -1.2 cm  PWT:    1.0    0.6 - 1.1 cm  LVIDd:  4.0    3.0 - 5.6 cm  LVIDs:  2.8    1.8 - 4.0 cm  Derived variables:  LVMI: 84 g/m2  RWT: 0.50  Fractional short: 30 %  EF (Moran Rule): 67 %Doppler Peak Velocity (m/sec):  AoV=3.2  ------------------------------------------------------------------------  Observations:  Mitral Valve: Normal mitral valve. Mild mitral  regurgitation.  Aortic Valve/Aorta: Heavily calcified trileaflet aortic  valve with decreased opening. Peak transaortic valve  gradientequals 41 mm Hg, estimated aortic valve area  equals 1 sqcm (by continuity equation), aortic valve  velocity time integral equals 70 cm, consistent with  moderate aortic stenosis.  However the aortic valve appears heavily calcified and may  be closerto moderate-severe aortic stenosis.  Mild aortic  regurgitation.  Peak left ventricular outflow tract  gradient equals 3 mm Hg, LVOT velocity time integral equals  23 cm.  Aortic Root: 3 cm.  Left Atrium: Mildly dilated left atrium.  LA volume index =  39 cc/m2.  Left Ventricle: Normal left ventricular systolic function.  No segmental wall motion abnormalities. Normal left  ventricular internal dimensions and wall thicknesses.  Moderate diastolic dysfunction (Stage II).  Right Heart: Normal rightatrium. Normal right ventricular  size and function. Normal tricuspid valve. Minimal  tricuspid regurgitation. Normal pulmonic valve. Minimal  pulmonic regurgitation.  Pericardium/Pleura: Normal pericardium with trace  pericardial effusion.  Bilateral pleural effusions.  Hemodynamic: Estimated right atrial pressure is 8 mm Hg.  Color Doppler demonstrates no evidence of a patent foramen  ovale.  ------------------------------------------------------------------------  Conclusions:  1. Normal mitral valve. Mild mitral regurgitation.  2. Heavily calcified trileaflet aortic valve with decreased  opening. Peak transaortic valve gradient equals 41 mm Hg,  estimated aortic valve area equals 1 sqcm (by continuity  equation), aortic valve velocity time integral equals 70  cm, consistent with moderate aortic stenosis.  However the aortic valve appears heavily calcified and may  be closer to moderate-severe aortic stenosis.  Mild aortic  regurgitation.  3. Mildly dilated left atrium.  LA volume index = 39 cc/m2.  4. Normal left ventricular systolic function. No segmental  wall motion abnormalities.  5. Normal right ventricular size and function.  6. Normal pericardium with trace pericardial effusion.  7. Bilateral pleural effusions.  *** Compared with echocardiogram of 3/11/2022, no  significant changes noted.  ------------------------------------------------------------------------  Confirmed on  2022 - 17:38:34 by Osito Argueta M.D.  ------------------------------------------------------------------------  ---------------------------------------------------------------------------------------------------------------    MICROBIOLOGY:     Flu With COVID-19 By ERICK (22 @ 01:51)   SARS-CoV-2 Result: Detected  This Respiratory Panel uses polymerase chain reaction (PCR) to detect for   influenza A; influenza B; respiratory syncytial virus; and SARS-CoV-2.   This test was validated by [employer] and is in use under the FDA   Emergency Use Authorization (EUA) for clinical labs CLIA-certified to   perform high complexity testing. Test results should be correlated with   clinical presentation, patient history, and epidemiology.   Influenza A Result: [facility]   Influenza B Result: [facility]   Resp Syn Virus Result: [facility]     Urinalysis Basic - ( 2022 00:46 )    Color: Light Yellow / Appearance: Slightly Turbid / S.014 / pH: x  Gluc: x / Ketone: Negative  / Bili: Negative / Urobili: Negative   Blood: x / Protein: Trace / Nitrite: Positive   Leuk Esterase: Moderate / RBC: 26 /hpf / WBC 2 /HPF   Sq Epi: x / Non Sq Epi: 2 /hpf / Bacteria: Moderate    RADIOLOGY:  [x] Chest radiographs reviewed and interpreted by me    EXAM:  XR CHEST PORTABLE URGENT 1V                          PROCEDURE DATE:  2022      INTERPRETATION:  CLINICAL INFORMATION: Shortness of breath.    TECHNIQUE: Frontal radiograph of the chest.    COMPARISON: CT chest and chest x-ray 2022    FINDINGS:  The lungs are clear.  No pleural effusion or pneumothorax.  Cardiomediastinal silhouette size is within normal limits.  No acute osseous abnormality.    IMPRESSION:  No evidence of acute pulmonary disease.    SCARLET JALLOH MD; Resident Radiology  This document has been electronically signed.  DIANE OBREGON MD; Attending Radiologist  This document has been electronically signed. 2022  1:19PM  ---------------------------------------------------------------------------------------------------------------  < from: CT Chest No Cont (22 @ 20:10) >    EXAM:  CT CHEST                          PROCEDURE DATE:  2022      FINDINGS:    Lungs/Airways/Pleura: The central airways are patent. There are small   pleural effusions, new from 3/9/2022 abdominal CT, with partial lower   lobe compressive atelectasis. There are diffuse peribronchial and  peribronchovascular groundglass opacity with mild septal thickening,   likely pulmonary edema. There are few clusters of small nodules on a  background of subsegmental bronchial impaction, likely due to small   airways disease.    Mediastinum/Lymph nodes: No thoracic adenopathy.    Heart and Vessels: Mild cardiomegaly. Extensive coronary arterial and   aortic valvular calcification is present. Hypodensity of the blood pool   in relation to left ventricular myocardium suggests underlying anemia.   The great vessels are normal in size. No pericardial effusion.    Upper Abdomen: Cholelithiasis. Partially imaged large right parapelvic  renal cyst. Extensive visceral artery calcification.    Osseous structures and Soft Tissues: Degenerative changes without   aggressive osseous lesions. Remote left posterior 11th rib fracture.    IMPRESSION:  Pulmonary edema with small pleural effusions and partial lower lobe   compressive atelectasis.    ELISA BLACKBURN M.D., Attending Radiologist  This document has been electronically signed. May 10 2022  8:52AM  ---------------------------------------------------------------------------------------------------------------  < from: [facility] Duplex Lower Ext Vein Scan, Bilat (22 @ 15:03) >    EXAM:  DUPLEX SCAN EXT VEINS LOWER BI                          PROCEDURE DATE:  2022      IMPRESSION:  No evidence of deep venous thrombosis in either lower extremity venous   segments able to be examined.     AUGUSTIN EASTMAN MD; Attending Radiologist  This document has been electronically signed. May  9 2022  3:31PM  ---------------------------------------------------------------------------------------------------------------

## 2022-06-15 NOTE — PROGRESS NOTE ADULT - ATTENDING COMMENTS
This is a 89F w/ hx HTN, PAF on Eliquis,  CHF, Covid in May 2022, nonsmoker, remote hx of asthma presenting with BRBPR, Hb was 4.8. BP has been stable, not tachycardic. Receiving 2 units PRBCs. G evaluated, off AC    1. LGI bleed, likely diverticular  2. Acute blood loss anemia  3. PAF, Moderate to Severe AS, off Eliquis now  4. Recent Covid, s/p Paxlovid  5. Chronic diastolic CHF    - Hb dropped to 6.7 last night, s/p 1 PRBCS, Hb at 8.1, had 1 BM w clots, VSS  GI f/u plan noted- bleeding scan ordered. Family decided no EGD/colonoscopy for now.  - Off AC, CBC q 12hrs, c/w PPI  - Cardiology plan appreciated, off AC, Echo Moderate to severe AS- Dr Brayden Flores is aware, off AC  - c/w metoprolol at low dose, hold diuretic, amlodipine  - No need of Covid treatment, not hypoxic, afebrile, CXR clear  - PT eval  - GOC discussion in progress

## 2022-06-15 NOTE — PROGRESS NOTE ADULT - PROBLEM SELECTOR PLAN 1
DESI Jeff  Authorized by: Chasity Davis MD   Location details: right thumb  Splint type: thumb spica  Supplies used: Ortho-Glass  Post-procedure: The splinted body part was neurovascularly unchanged following the procedure.  Patient tolerance: Patient tolerated the procedure well with no immediate complications        Labs Reviewed - No data to display       Imaging Results    None     .img     Medical Decision Making:   History:   Old  Most likely 2/2 to eliquis initiation and underlying GI pathology. DDx includes diverticulitis vs Gastric ulcer vs. Duodenal ulcer vs. angiodysplasia (?hades syndrome) vs. diverticulosis vs. hemorrhoids   s/p 2 units pRBCs running, Pantoprazole 80mg   family does not want to persue invasive interventions including colonoscopy    Plan:  - c/w pantoprazole 40mg BID   - Maintain active type and screen   - Maintain 2 large bore IVs  - Transfuse to hemoglobin <7   - GI following  - cbcq12 DESI Jeff  09/12/20 2034

## 2022-06-15 NOTE — PHYSICAL THERAPY INITIAL EVALUATION ADULT - PLANNED THERAPY INTERVENTIONS, PT EVAL
Goal Established:  Time Frame: 2 weeks  Goal: Patient will negotiate up and down 5 steps with rail and CGA x1./balance training/bed mobility training/gait training/transfer training

## 2022-06-15 NOTE — PROGRESS NOTE ADULT - SUBJECTIVE AND OBJECTIVE BOX
INTERVAL HPI/OVERNIGHT EVENTS:  drop in Hgb overnight - without associated BM  s/p 1 unit PRBCs overnight  this AM +episode of red/maroon blood per rectum x 1; now with another episode  no CP, SOB or dizziness  no abdominal pain  daughter at bedside    MEDICATIONS  (STANDING):  ascorbic acid 500 milliGRAM(s) Oral daily  levothyroxine 25 MICROGram(s) Oral daily  melatonin 3 milliGRAM(s) Oral at bedtime  metoprolol tartrate 12.5 milliGRAM(s) Oral two times a day  multivitamin 1 Tablet(s) Oral daily  pantoprazole    Tablet 40 milliGRAM(s) Oral before breakfast  rosuvastatin 10 milliGRAM(s) Oral at bedtime  venlafaxine XR. 75 milliGRAM(s) Oral daily      Allergies  Keflex (Rash; Hives)      Review of Systems:  General:  No wt loss, fevers, chills, night sweats  CV:  No pain, palpitations, +AF  Resp:  No dyspnea, cough, tachypnea, wheezing  GI:  see HPI  :  No pain, bleeding +hx kidney stones +incontinence s/p bladder repair  Muscle:  No pain, weakness +arthritis  Neuro:  No focal weakness, tingling, memory problems  Psych:  No fatigue, insomnia, mood problems, depression  Endocrine:  No polyuria, polydypsia, cold/heat intolerance  Heme:  No petechiae, ecchymosis, easy bruisability  Skin:  No rash, tattoos, scars, edema      Vital Signs Last 24 Hrs  T(C): 36.6 (15 Dario 2022 12:06), Max: 36.8 (14 Jun 2022 20:35)  T(F): 97.9 (15 Dario 2022 12:06), Max: 98.2 (14 Jun 2022 20:35)  HR: 68 (15 Dario 2022 12:06) (62 - 76)  BP: 101/56 (15 Dario 2022 12:06) (101/56 - 160/55)  BP(mean): --  RR: 18 (15 Dario 2022 12:06) (18 - 18)  SpO2: 97% (15 Dario 2022 12:06) (92% - 100%)    PHYSICAL EXAM:  Constitutional: NAD, well-developed elderly WF  OOB to chair alert and responsive. daughter at bedside  Neck: No LAD, supple no JVD  Respiratory: clear b/l no accessory muscle use  Cardiovascular: S1 and S2, RRR,  +murmur  Gastrointestinal: BS+, soft, NT/ND, neg HSM  Extremities: No peripheral edema, neg clubbing, cyanosis  +healing ecchymoses on skin +fragile skin  Vascular: 2+ peripheral pulses  Neurological: A/O x 3, no focal deficits  Psychiatric: Normal mood, normal affect  Skin: No rashes +healing ecchymoses on skin +fragile     LABS:                        8.0    8.17  )-----------( 157      ( 15 Dario 2022 07:43 )             25.3   s/p 1 unit PRBCs transfused  Hemoglobin: 6.7 g/dL (06.14.22 @ 20:59)   Hemoglobin: 8.1 g/dL (06.14.22 @ 06:00)   Hemoglobin: 8.9 g/dL (06.13.22 @ 16:36)     06-15    143  |  108  |  43<H>  ----------------------------<  161<H>  4.0   |  23  |  1.58<H>    Ca    8.7      15 Dario 2022 07:26  Phos  3.3     06-15  Mg     1.9     06-15    TPro  5.4<L>  /  Alb  2.8<L>  /  TBili  0.4  /  DBili  x   /  AST  15  /  ALT  8<L>  /  AlkPhos  61  06-15    PT/INR - ( 13 Jun 2022 13:16 )   PT: 12.9 sec;   INR: 1.11 ratio    PTT - ( 13 Jun 2022 13:16 )  PTT:28.4 sec          RADIOLOGY & ADDITIONAL TESTS:

## 2022-06-15 NOTE — PROGRESS NOTE ADULT - ASSESSMENT
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 She has a history of HTN, HLD, DM, aortic stenosis, breast cancer s/p lumpectomy and CKD (Dr. Escobar). She was recently started on Eliquis and beta-blockers for atrial fibrillation. She was hospitalized in March with a UTI complicated by ELISA due to bactrim. She was admitted in May with dyspnea, hypoxemia, gurgling in her chest and leg swelling. She was initially treated with zosyn for a possible right lower lobe pneumonia seen on CXR until further evaluation revealed pulmonary edema with pleural effusions and atelectasis. ECHO -> preserved LVEF - moderate-severe aortic stenosis - moderate diastolic dysfunction - bilateral pleural effusions; CT -> pulmonary edema with small pleural effusions and partial lower lobe compressive atelectasis. She was diuresed and discharged on norvasc/toprol XL/lipitor/eliquis. The patient was diagnosed with COVID on May 22nd and was treated with a course of Paxlovid. She now returns to the ER with shortness of breath and weakness with minimal exertion. She has fatigue, malaise and pallor. She has been noted to have blood in her stools and black colored stools (after eating blueberries). Her daughter measured her blood pressure has 90/60 from a baseline of 120/90. She currently has no shortness of breath or hypoxemia on a 2lpm nasal canula. She has no cough, sputum production, chest congestion or wheeze. No fevers, chills or sweats. She has been found to be guaiac positive. RVP PCR remains positive for COVID. The patient has received Kcentra and 2 units PRBCs for severe anemia (4.8/16/6).     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 and respiratory muscle weakness limiting diaphragmatic excursion and chest wall expansion  4) no evidence of bronchospasm or pulmonary edema/pleural effusions    ELISA is due to intravascular volume depletion    nasal COVID PCR positivity more likely represents shedding of non-viable viral particles than ongoing infection in this immunocompetent patient    PLAN/RECOMMENDATIONS:    stable oxygenation on room air  no indication for airborne or contact isolation - the patient was fist diagnosed with COVID on May 22nd  observe off systemic steroids, antibiotics and pulmonary medications  head of bed elevation greater than 45 degrees at all times  incentive spirometry  GI evaluation noted     recurrent hematochezia    hemodynamically stable    good response to 3 units PRBCs    family deferring an invasive evaluation of the source of GI tract bleeding as the patient has tolerated the prep poorly in the past -> bleeding scan    holding A/C    NPO  cardiac meds: lopressor/crestror  glucose control  DVT prophylaxis - SCDs  venlafaxine XR/melatonin at bedtime    Will follow with you. Plan of care discussed with the patient and her daughter at bedside and the nursing staff.    Brayden Benavides MD, [facility]  [phone]  Pulmonary Medicine

## 2022-06-15 NOTE — PHYSICAL THERAPY INITIAL EVALUATION ADULT - LIVES WITH, PROFILE
Pt lives with daughter in home with 4-5 stairs to entrance (both entrances with B/l HR).  Prior to admission pt required assist for transfers, ambulation, and ADL's from children or private HHA (40 hrs/week). Pt ambulates household distances with RW and assist/supervision prior to admission./children

## 2022-06-16 ENCOUNTER — TRANSCRIPTION ENCOUNTER (OUTPATIENT)
Age: 87
End: 2022-06-16

## 2022-06-16 LAB
-  AMIKACIN: SIGNIFICANT CHANGE UP
-  AMOXICILLIN/CLAVULANIC ACID: SIGNIFICANT CHANGE UP
-  AMPICILLIN/SULBACTAM: SIGNIFICANT CHANGE UP
-  AMPICILLIN: SIGNIFICANT CHANGE UP
-  AZTREONAM: SIGNIFICANT CHANGE UP
-  CEFAZOLIN: SIGNIFICANT CHANGE UP
-  CEFEPIME: SIGNIFICANT CHANGE UP
-  CEFOXITIN: SIGNIFICANT CHANGE UP
-  CEFTRIAXONE: SIGNIFICANT CHANGE UP
-  CIPROFLOXACIN: SIGNIFICANT CHANGE UP
-  ERTAPENEM: SIGNIFICANT CHANGE UP
-  GENTAMICIN: SIGNIFICANT CHANGE UP
-  IMIPENEM: SIGNIFICANT CHANGE UP
-  LEVOFLOXACIN: SIGNIFICANT CHANGE UP
-  MEROPENEM: SIGNIFICANT CHANGE UP
-  NITROFURANTOIN: SIGNIFICANT CHANGE UP
-  PIPERACILLIN/TAZOBACTAM: SIGNIFICANT CHANGE UP
-  TIGECYCLINE: SIGNIFICANT CHANGE UP
-  TOBRAMYCIN: SIGNIFICANT CHANGE UP
-  TRIMETHOPRIM/SULFAMETHOXAZOLE: SIGNIFICANT CHANGE UP
ANION GAP SERPL CALC-SCNC: 10 MMOL/L — SIGNIFICANT CHANGE UP (ref 5–17)
BUN SERPL-MCNC: 45 MG/DL — HIGH (ref 7–23)
CALCIUM SERPL-MCNC: 8.5 MG/DL — SIGNIFICANT CHANGE UP (ref 8.4–10.5)
CHLORIDE SERPL-SCNC: 108 MMOL/L — SIGNIFICANT CHANGE UP (ref 96–108)
CO2 SERPL-SCNC: 22 MMOL/L — SIGNIFICANT CHANGE UP (ref 22–31)
CREAT SERPL-MCNC: 1.71 MG/DL — HIGH (ref 0.5–1.3)
CULTURE RESULTS: SIGNIFICANT CHANGE UP
EGFR: 28 ML/MIN/1.73M2 — LOW
GLUCOSE SERPL-MCNC: 119 MG/DL — HIGH (ref 70–99)
HCT VFR BLD CALC: 21.9 % — LOW (ref 34.5–45)
HCT VFR BLD CALC: 23.8 % — LOW (ref 34.5–45)
HGB BLD-MCNC: 6.8 G/DL — CRITICAL LOW (ref 11.5–15.5)
HGB BLD-MCNC: 7.8 G/DL — LOW (ref 11.5–15.5)
MAGNESIUM SERPL-MCNC: 1.8 MG/DL — SIGNIFICANT CHANGE UP (ref 1.6–2.6)
MCHC RBC-ENTMCNC: 28.6 PG — SIGNIFICANT CHANGE UP (ref 27–34)
MCHC RBC-ENTMCNC: 30 PG — SIGNIFICANT CHANGE UP (ref 27–34)
MCHC RBC-ENTMCNC: 31.1 GM/DL — LOW (ref 32–36)
MCHC RBC-ENTMCNC: 32.8 GM/DL — SIGNIFICANT CHANGE UP (ref 32–36)
MCV RBC AUTO: 91.5 FL — SIGNIFICANT CHANGE UP (ref 80–100)
MCV RBC AUTO: 92 FL — SIGNIFICANT CHANGE UP (ref 80–100)
METHOD TYPE: SIGNIFICANT CHANGE UP
NRBC # BLD: 0 /100 WBCS — SIGNIFICANT CHANGE UP (ref 0–0)
NRBC # BLD: 0 /100 WBCS — SIGNIFICANT CHANGE UP (ref 0–0)
ORGANISM # SPEC MICROSCOPIC CNT: SIGNIFICANT CHANGE UP
ORGANISM # SPEC MICROSCOPIC CNT: SIGNIFICANT CHANGE UP
PHOSPHATE SERPL-MCNC: 3.6 MG/DL — SIGNIFICANT CHANGE UP (ref 2.5–4.5)
PLATELET # BLD AUTO: 153 K/UL — SIGNIFICANT CHANGE UP (ref 150–400)
PLATELET # BLD AUTO: 166 K/UL — SIGNIFICANT CHANGE UP (ref 150–400)
POTASSIUM SERPL-MCNC: 3.9 MMOL/L — SIGNIFICANT CHANGE UP (ref 3.5–5.3)
POTASSIUM SERPL-SCNC: 3.9 MMOL/L — SIGNIFICANT CHANGE UP (ref 3.5–5.3)
RBC # BLD: 2.38 M/UL — LOW (ref 3.8–5.2)
RBC # BLD: 2.6 M/UL — LOW (ref 3.8–5.2)
RBC # FLD: 15.6 % — HIGH (ref 10.3–14.5)
RBC # FLD: 16.6 % — HIGH (ref 10.3–14.5)
SODIUM SERPL-SCNC: 140 MMOL/L — SIGNIFICANT CHANGE UP (ref 135–145)
SPECIMEN SOURCE: SIGNIFICANT CHANGE UP
WBC # BLD: 5.99 K/UL — SIGNIFICANT CHANGE UP (ref 3.8–10.5)
WBC # BLD: 6.43 K/UL — SIGNIFICANT CHANGE UP (ref 3.8–10.5)
WBC # FLD AUTO: 5.99 K/UL — SIGNIFICANT CHANGE UP (ref 3.8–10.5)
WBC # FLD AUTO: 6.43 K/UL — SIGNIFICANT CHANGE UP (ref 3.8–10.5)

## 2022-06-16 PROCEDURE — 99232 SBSQ HOSP IP/OBS MODERATE 35: CPT | Mod: FS

## 2022-06-16 PROCEDURE — 99233 SBSQ HOSP IP/OBS HIGH 50: CPT

## 2022-06-16 RX ORDER — SOD SULF/SODIUM/NAHCO3/KCL/PEG
4000 SOLUTION, RECONSTITUTED, ORAL ORAL ONCE
Refills: 0 | Status: DISCONTINUED | OUTPATIENT
Start: 2022-06-16 | End: 2022-06-16

## 2022-06-16 RX ORDER — SOD SULF/SODIUM/NAHCO3/KCL/PEG
1000 SOLUTION, RECONSTITUTED, ORAL ORAL EVERY 6 HOURS
Refills: 0 | Status: COMPLETED | OUTPATIENT
Start: 2022-06-16 | End: 2022-06-16

## 2022-06-16 RX ORDER — POLYETHYLENE GLYCOL 3350 17 G/17G
17 POWDER, FOR SOLUTION ORAL
Refills: 0 | Status: COMPLETED | OUTPATIENT
Start: 2022-06-16 | End: 2022-06-17

## 2022-06-16 RX ADMIN — Medication 75 MILLIGRAM(S): at 12:34

## 2022-06-16 RX ADMIN — Medication 1000 MILLILITER(S): at 17:15

## 2022-06-16 RX ADMIN — PANTOPRAZOLE SODIUM 40 MILLIGRAM(S): 20 TABLET, DELAYED RELEASE ORAL at 05:32

## 2022-06-16 RX ADMIN — Medication 12.5 MILLIGRAM(S): at 17:15

## 2022-06-16 RX ADMIN — Medication 3 MILLIGRAM(S): at 22:34

## 2022-06-16 RX ADMIN — ROSUVASTATIN CALCIUM 10 MILLIGRAM(S): 5 TABLET ORAL at 22:34

## 2022-06-16 RX ADMIN — Medication 500 MILLIGRAM(S): at 12:34

## 2022-06-16 RX ADMIN — Medication 1 TABLET(S): at 12:34

## 2022-06-16 RX ADMIN — Medication 1000 MILLILITER(S): at 22:34

## 2022-06-16 RX ADMIN — Medication 12.5 MILLIGRAM(S): at 05:33

## 2022-06-16 RX ADMIN — Medication 25 MICROGRAM(S): at 05:32

## 2022-06-16 RX ADMIN — POLYETHYLENE GLYCOL 3350 17 GRAM(S): 17 POWDER, FOR SOLUTION ORAL at 22:34

## 2022-06-16 RX ADMIN — POLYETHYLENE GLYCOL 3350 17 GRAM(S): 17 POWDER, FOR SOLUTION ORAL at 14:42

## 2022-06-16 RX ADMIN — POLYETHYLENE GLYCOL 3350 17 GRAM(S): 17 POWDER, FOR SOLUTION ORAL at 17:19

## 2022-06-16 NOTE — PROGRESS NOTE ADULT - ASSESSMENT
89 year-old with dementia and moderate aortic stenosis admitted with symptomatic anemia. She was recently started on Eliquis for thromboembolic prophylaxis in the setting of atrial fibrillation.  Noted to have anemia with hemoglobin < 5 g/dL.  Noted to continue test positive for Covid-19 (first positive on 5/22).    Symptoms and ELISA are likely due to her anemia, and not due to her aortic stenosis or Covid-19.  Transfuse PRBC as needed to maintain hemoglobin > 8 g/dL.    Agree with holding anticoagulation at this time.  GI offering colonoscopy.    Patient is not capable of 4 METS at baseline. Recent echo as above.  Patient is without acute coronary syndrome, decompensated heart failure, dangerous arrhythmia, or critical valvular stenosis. No further cardiac testing is necessary prior to proceeding with colonoscopy in the hopes of treating an acute and life threatening bleed.

## 2022-06-16 NOTE — PROGRESS NOTE ADULT - SUBJECTIVE AND OBJECTIVE BOX
INTERVAL HPI/OVERNIGHT EVENTS:  recurrent rectal bleeding overnight/this AM  receiving additional 1 unit PRBCs this AM for hgb 6.8  no abdominal pain  no CP or SOB    MEDICATIONS  (STANDING):  ascorbic acid 500 milliGRAM(s) Oral daily  levothyroxine 25 MICROGram(s) Oral daily  melatonin 3 milliGRAM(s) Oral at bedtime  metoprolol tartrate 12.5 milliGRAM(s) Oral two times a day  multivitamin 1 Tablet(s) Oral daily  pantoprazole    Tablet 40 milliGRAM(s) Oral before breakfast  rosuvastatin 10 milliGRAM(s) Oral at bedtime  venlafaxine XR. 75 milliGRAM(s) Oral daily    MEDICATIONS  (PRN):      Allergies  Keflex (Rash; Hives)      Review of Systems:  General:  No wt loss, fevers, chills, night sweats  CV:  No pain, palpitations, +AF  Resp:  No dyspnea, cough, tachypnea, wheezing  GI:  see HPI  :  No pain, bleeding +hx kidney stones +incontinence s/p bladder repair  Muscle:  No pain, weakness +arthritis  Neuro:  No focal weakness, tingling, memory problems  Psych:  No fatigue, insomnia, mood problems, depression  Endocrine:  No polyuria, polydypsia, cold/heat intolerance  Heme:  No petechiae, ecchymosis, easy bruisability  Skin:  No rash, tattoos, scars, edema      Vital Signs Last 24 Hrs  T(C): 36.7 (16 Jun 2022 09:00), Max: 36.9 (16 Jun 2022 00:07)  T(F): 98.1 (16 Jun 2022 09:00), Max: 98.4 (16 Jun 2022 00:07)  HR: 64 (16 Jun 2022 09:00) (60 - 68)  BP: 131/65 (16 Jun 2022 09:00) (101/56 - 151/66)  BP(mean): --  RR: 18 (16 Jun 2022 09:00) (18 - 18)  SpO2: 97% (16 Jun 2022 09:00) (97% - 99%)    PHYSICAL EXAM:  Constitutional: NAD, well-developed elderly WF  +pallor  PRBCs transfusion in progress  son at bedside  Neck: No LAD, supple no JVD  Respiratory: clear b/l no accessory muscle use  Cardiovascular: S1 and S2, RRR,  +murmur  Gastrointestinal: BS+, soft, NT/ND, neg HSM  Extremities: No peripheral edema, neg clubbing, cyanosis  +healing ecchymoses on skin +fragile skin  Vascular: 2+ peripheral pulses  Neurological: A/O x 3, no focal deficits  Psychiatric: Normal mood, normal affect  Skin: No rashes +fragile skin +pallor    LABS:                        6.8    6.43  )-----------( 166      ( 16 Jun 2022 06:20 )             21.9   Hemoglobin: 7.7 g/dL (06.15.22 @ 16:17)   Hemoglobin: 8.0 g/dL (06.15.22 @ 07:43) \  s/p PRBCs transfused  Hemoglobin: 6.7 g/dL (06.14.22 @ 20:59)     06-16    140  |  108  |  45<H>  ----------------------------<  119<H>  3.9   |  22  |  1.71<H>    Ca    8.5      16 Jun 2022 06:20  Phos  3.6     06-16  Mg     1.8     06-16    TPro  5.4<L>  /  Alb  2.8<L>  /  TBili  0.4  /  DBili  x   /  AST  15  /  ALT  8<L>  /  AlkPhos  61  06-15      RADIOLOGY & ADDITIONAL TESTS:  ACC: [id number] EXAM:  NM GI BLEEDING IMG                          PROCEDURE DATE:  06/15/2022          INTERPRETATION:  RADIOPHARMACEUTICAL: 21 mCi Tc-99m labeled autologous   red blood cells, I.V.    CLINICAL STATEMENT: 89-year-old female with bright red blood per rectum   referred for localization of bleeding site.    TECHNIQUE:  Dynamic images of the anterior abdomen/pelvis were obtained   for 2 hours following administration of radiopharmaceutical. Static   images of the abdomen/pelvis and caudal images were obtained immediately   thereafter.    FINDINGS: Immediately after administration of the labeled RBC, there is   extravasation of activity in the right lower quadrant of the abdomen   which moved both in the antegrade and retrograde fashion compatible with   active bleeding likely in the proximal ascending colon.    IMPRESSION: Abnormal GI bleeding scan.    Active bleeding site in the proximal ascending colon.    Dr. Argenis Barnes was notified of these results at 3:54 PM on 6/15/2022    --- End of Report ---

## 2022-06-16 NOTE — PROGRESS NOTE ADULT - ASSESSMENT
89yo F w/ hx HTN, Afib (recent dx 4/2022), CHF, HLD, nonsmoker, remote hx of asthma presenting with loose stools, lethargy, fatigue, low blood pressure and blood bowel movement found to have a hemoglobin of 4.8 most likely 2/2 to GI bleed- likely diverticular bleed given history/presentation    recent COVID + 5/20 s/p Paxlovid rx; COVID PCR + on admission  -continue off contact isolation per Infection Control Dept      Acute GI blood loss anemia; likely 2/2  diverticular bleeding given history/presentation.   +NM GI bleed scan (proximal AC)  IR input appreciated - increased risk of renal injury with IV contrast load required for angio  Recurrent/ongoing rectal bleeding with continued transfusion requirement  -transfuse as ordered  -monitor CBC and BMs  -prep today, NPO after MN for colonoscopy 6/17  -Recommend keep off AC 2/2 risk of recurrent bleeding  -PO PPI  -no need for pre-procedure COVID PCR testing 2/2 recent covid infection (5/20) s/p Paxlovid rx    Discussed with pt and family at bedside  Discussed with House staff and Medicine attending      Marek Cormier PA-C    [facility]  [phone]  After hours and weekend coverage [phone]

## 2022-06-16 NOTE — PROGRESS NOTE ADULT - SUBJECTIVE AND OBJECTIVE BOX
KOREY DIAZ  89y  MRN: [id number]    Patient is a 89y old  Female who presents with a chief complaint of gi bleed (15 Dario 2022 12:22)      Subjective: no events ON. Denies fever, CP, SOB, abn pain, N/V, dysuria. Tolerating diet.      MEDICATIONS  (STANDING):  ascorbic acid 500 milliGRAM(s) Oral daily  levothyroxine 25 MICROGram(s) Oral daily  melatonin 3 milliGRAM(s) Oral at bedtime  metoprolol tartrate 12.5 milliGRAM(s) Oral two times a day  multivitamin 1 Tablet(s) Oral daily  pantoprazole    Tablet 40 milliGRAM(s) Oral before breakfast  rosuvastatin 10 milliGRAM(s) Oral at bedtime  venlafaxine XR. 75 milliGRAM(s) Oral daily    MEDICATIONS  (PRN):      Objective:    Vitals: Vital Signs Last 24 Hrs  T(C): 36.5 (06-16-22 @ 08:21), Max: 36.9 (06-16-22 @ 00:07)  T(F): 97.7 (06-16-22 @ 08:21), Max: 98.4 (06-16-22 @ 00:07)  HR: 66 (06-16-22 @ 08:21) (60 - 68)  BP: 143/65 (06-16-22 @ 08:21) (101/56 - 151/66)  BP(mean): --  RR: 18 (06-16-22 @ 08:21) (18 - 18)  SpO2: 97% (06-16-22 @ 08:21) (97% - 99%)            I&O's Summary    15 Dario 2022 07:01  -  16 Jun 2022 07:00  --------------------------------------------------------  IN: 80 mL / OUT: 400 mL / NET: -320 mL        PHYSICAL EXAM:  GENERAL: NAD  HEAD:  Atraumatic, Normocephalic  EYES: EOMI, conjunctiva and sclera clear  CHEST/LUNG: Clear to percussion bilaterally; No rales, rhonchi, wheezing, or rubs  HEART: Regular rate and rhythm; No murmurs, rubs, or gallops  ABDOMEN: Soft, Nontender, Nondistended;   SKIN: No rashes or lesions  NERVOUS SYSTEM:  Alert & Oriented X3, no focal deficit    LABS:  06-16    140  |  108  |  45<H>  ----------------------------<  119<H>  3.9   |  22  |  1.71<H>  06-15    143  |  108  |  43<H>  ----------------------------<  161<H>  4.0   |  23  |  1.58<H>  06-14    142  |  107  |  50<H>  ----------------------------<  205<H>  3.8   |  22  |  1.56<H>    Ca    8.5      16 Jun 2022 06:20  Ca    8.7      15 Dario 2022 07:26  Ca    8.9      14 Jun 2022 06:00  Phos  3.6     06-16  Mg     1.8     06-16    TPro  5.4<L>  /  Alb  2.8<L>  /  TBili  0.4  /  DBili  x   /  AST  15  /  ALT  8<L>  /  AlkPhos  61  06-15  TPro  5.6<L>  /  Alb  3.0<L>  /  TBili  0.3  /  DBili  x   /  AST  13  /  ALT  7<L>  /  AlkPhos  62  06-14  TPro  5.7<L>  /  Alb  3.0<L>  /  TBili  0.3  /  DBili  x   /  AST  14  /  ALT  9<L>  /  AlkPhos  59  06-13                                              6.8    6.43  )-----------( 166      ( 16 Jun 2022 06:20 )             21.9                         7.7    6.96  )-----------( 158      ( 15 Dario 2022 16:17 )             24.4                         8.0    8.17  )-----------( 157      ( 15 Dario 2022 07:43 )             25.3     CAPILLARY BLOOD GLUCOSE

## 2022-06-16 NOTE — PROGRESS NOTE ADULT - ASSESSMENT
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 She has a history of HTN, HLD, DM, aortic stenosis, breast cancer s/p lumpectomy and CKD (Dr. Escobar). She was recently started on Eliquis and beta-blockers for atrial fibrillation. She was hospitalized in March with a UTI complicated by ELISA due to bactrim. She was admitted in May with dyspnea, hypoxemia, gurgling in her chest and leg swelling. She was initially treated with zosyn for a possible right lower lobe pneumonia seen on CXR until further evaluation revealed pulmonary edema with pleural effusions and atelectasis. ECHO -> preserved LVEF - moderate-severe aortic stenosis - moderate diastolic dysfunction - bilateral pleural effusions; CT -> pulmonary edema with small pleural effusions and partial lower lobe compressive atelectasis. She was diuresed and discharged on norvasc/toprol XL/lipitor/eliquis. The patient was diagnosed with COVID on May 22nd and was treated with a course of Paxlovid. She now returns to the ER with shortness of breath and weakness with minimal exertion. She has fatigue, malaise and pallor. She has been noted to have blood in her stools and black colored stools (after eating blueberries). Her daughter measured her blood pressure has 90/60 from a baseline of 120/90. She currently has no shortness of breath or hypoxemia on a 2lpm nasal canula. She has no cough, sputum production, chest congestion or wheeze. No fevers, chills or sweats. She has been found to be guaiac positive. RVP PCR remains positive for COVID. The patient has received Kcentra and 2 units PRBCs for severe anemia (4.8/16/6).     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 and respiratory muscle weakness limiting diaphragmatic excursion and chest wall expansion  4) no evidence of bronchospasm or pulmonary edema/pleural effusions    ELISA is due to intravascular volume depletion    nasal COVID PCR positivity more likely represents shedding of non-viable viral particles than ongoing infection in this immunocompetent patient    PLAN/RECOMMENDATIONS:    stable oxygenation on room air  no indication for airborne or contact isolation - the patient was fist diagnosed with COVID on May 22nd  observe off systemic steroids, antibiotics and pulmonary medications  head of bed elevation greater than 45 degrees at all times  incentive spirometry  GI evaluation noted     ongoing hematochezia - has now received 4 units of PRBCs - bleeding scan positive for active bleeding in the ascending colon -> felt to be at high rish for bowel ischemia and worsening renal function with an interventional radiology procedure -> scheduled for colonoscopy tomorrow    hemodynamically stable    has now required 4 units PRBCs    holding A/C    NPO/bowel prep    protonix 40mg daily  cardiac meds: lopressor/crestror  glucose control  DVT prophylaxis - SCDs  venlafaxine XR/melatonin at bedtime  synthroid    Will follow with you. Plan of care discussed with the patient and her son at bedside and with the GI team    Brayden Benavides MD, [facility]  [phone]  Pulmonary Medicine

## 2022-06-16 NOTE — PROGRESS NOTE ADULT - PROBLEM SELECTOR PLAN 1
Most likely 2/2 to eliquis initiation and underlying GI pathology. DDx includes diverticulitis vs Gastric ulcer vs. Duodenal ulcer vs. angiodysplasia (?hades syndrome) vs. diverticulosis vs. hemorrhoids   s/p 2 units pRBCs running, Pantoprazole 80mg   family does not want to persue invasive interventions including colonoscopy    Plan:  - c/w pantoprazole 40mg BID   - Maintain active type and screen   - Maintain 2 large bore IVs  - Transfuse to hemoglobin <7   - GI following  - cbcq12

## 2022-06-16 NOTE — PROGRESS NOTE ADULT - SUBJECTIVE AND OBJECTIVE BOX
[facility] - PROGRESS NOTE    CHIEF COMPLAINT: COVID-19 infection; dyspnea; asthma; bilateral pleural effusions; atelectasis; hypotension; anorexia; fatigue; hematochezia; anemia    INTERVAL HISTORY: ongoing hematochezia - has no received 4 units of PRBCs- bleeding scan positive for active bleeding in the ascending colon -> felt to be at high rish for bowel ischemia and worsening renal function with an interventional radiology procedure -> scheduled for colonoscopy tomorrow; hemodynamically stable; family deferring an invasive evaluation of the source of GI tract bleeding as the patient has tolerated the prep poorly in the past; no shortness of breath or hypoxemia on room air; no cough, sputum production, hemoptysis, chest congestion or wheeze; no fevers, chills or sweats; no chest pain/pressure or palpitations;    REVIEW OF SYSTEMS:  Constitutional: As per interval history  HEENT: Within normal limits  CV: As per interval history  Resp: As per interval history  GI: hematochezia  : Within normal limits  Musculoskeletal: Within normal limits  Skin: Within normal limits  Neurological: Within normal limits  Psychiatric: Within normal limits  Endocrine: Within normal limits  Hematologic/Lymphatic: anemia  Allergic/Immunologic: Within normal limits    MEDICATIONS:     Pulmonary "    Anti-microbials:    Cardiovascular:  metoprolol tartrate 12.5 milliGRAM(s) Oral two times a day    Other:  ascorbic acid 500 milliGRAM(s) Oral daily  levothyroxine 25 MICROGram(s) Oral daily  melatonin 3 milliGRAM(s) Oral at bedtime  multivitamin 1 Tablet(s) Oral daily  pantoprazole    Tablet 40 milliGRAM(s) Oral before breakfast  polyethylene glycol 3350 17 Gram(s) Oral <User Schedule>  polyethylene glycol/electrolyte Solution 1000 milliLiter(s) Oral every 6 hours  rosuvastatin 10 milliGRAM(s) Oral at bedtime  venlafaxine XR. 75 milliGRAM(s) Oral daily    OBJECTIVE:    I&O's Detail    15 Dario 2022 07:01  -  2022 07:00  --------------------------------------------------------  IN:    Oral Fluid: 80 mL  Total IN: 80 mL    OUT:    Indwelling Catheter - Urethral (mL): 400 mL  Total OUT: 400 mL    Total NET: -320 mL      2022 07:01  -  2022 14:31  --------------------------------------------------------  IN:    Oral Fluid: 100 mL  Total IN: 100 mL    OUT:    Voided (mL): 300 mL  Total OUT: 300 mL    Total NET: -200 mL    PHYSICAL EXAM:       ICU Vital Signs Last 24 Hrs  T(C): 36.6 (2022 12:26), Max: 36.9 (2022 00:07)  T(F): 97.9 (:), Max: 98.4 (2022 00:07)  HR: 66 (2022 12:) (60 - 67)  BP: 136/55 (2022 12:) (121/50 - 151/66)  BP(mean): --  ABP: --  ABP(mean): --  RR: 18 (2022 12:26) (18 - 18)  SpO2: 98% (2022 12:) (97% - 99%) on room air     General: Awake. Alert. Cooperative. No distress. Appears stated age. Sitting in the chair.	  HEENT:  Atraumatic. Normocephalic. Anicteric. Normal oral mucosa. PERRL. EOMI. Pale conjunctiva.  Neck: Supple. Trachea midline. Thyroid without enlargement/tenderness/nodules. No carotid bruit. No JVD.	  Cardiovascular: Regular rate and rhythm. S1 S2 normal. III/VI systolic murmur  Respiratory: Respirations unlabored. Clear to auscultation and percussion bilaterally. Kyphosis.  Abdomen: Soft. Non-tender. Non-distended. No organomegaly. No masses. Normal bowel sounds.  Extremities: Warm to touch. No clubbing or cyanosis. No pedal edema. Bilateral SCDs.  Pulses: 2+ peripheral pulses all extremities.	  Skin: Normal skin color. No rashes or lesions. No ecchymoses. No cyanosis. Warm to touch.  Lymph Nodes: Cervical, supraclavicular and axillary nodes normal  Neurological: Motor and sensory examination equal and normal. A and O x 3  Psychiatry: Appropriate mood and affect.    LABS:                          6.8    6.43  )-----------( 166      ( 2022 06:20 )             21.9     CBC    WBC  6.43 <==, 6.96 <==, 8.17 <==, 6.86 <==, 8.81 <==, 9.45 <==, 8.77 <==    Hemoglobin  6.8 <<==, 7.7 <<==, 8.0 <<==, 6.7 <<==, 8.1 <<==, 8.9 <<==, 7.4 <<==, 4.8 <<==    Hematocrit  21.9 <==, 24.4 <==, 25.3 <==, 21.4 <==, 25.2 <==, 27.3 <==, 23.2 <== 16.6 <==    Platelets  166 <==, 158 <==, 157 <==, 146 <==, 160 <==, 139 <==, 153 <==      140  |  108  |  45<H>  ----------------------------<  119<H>    06-16  3.9   |  22  |  1.71<H>      LYTES    sodium  140 <==, 143 <==, 142 <==, 141 <==, 137 <==    potassium   3.9 <==, 4.0 <==, 3.8 <==, 4.0 <==, 4.3 <==    chloride  108 <==, 108 <==, 107 <==, 107 <==, 102 <==    carbon dioxide  22 <==, 23 <==, 22 <==, 24 <==, 22 <==    =============================================================================================  RENAL FUNCTION:    Creatinine:   1.71  <<==, 1.58  <<==, 1.56  <<==, 1.76  <<==, 1.93  <<==    BUN:   45 <==, 43 <==, 50 <==, 57 <==, 62 <==    ============================================================================================    calcium   8.5 <==, 8.7 <==, 8.9 <==, 8.8 <==, 8.6 <==    phos   3.6 <==, 3.3 <==, 4.0 <==, 3.9 <==    mag   1.8 <==, 1.9 <==, 2.0 <==, 2.0 <==    ============================================================================================  LFTs    AST:   15 <== , 13 <== , 14 <== , 17 <==     ALT:  8  <== , 7  <== , 9  <== , 9  <==     AP:  61  <=, 62  <=, 59  <=, 68  <=    Bili:  0.4  <=, 0.3  <=, 0.3  <=, 0.1  <=      PT/INR - ( 2022 13:16 )   PT: 12.9 sec;   INR: 1.11 ratio      PT/INR - ( 2022 13:16 )   PT: 12.9 sec;   INR: 1.11 ratio      PTT - ( 2022 13:16 )  PTT:28.4 sec    Venous Blood Gas:   @ 01:58  7.33/47/25/25/28.5  VBG Lactate: 1.5    < from: Transthoracic Echocardiogram (22 @ 14:37) >    Patient name: KOREY DIAZ  YOB: 1929   Age: 89 (F)   MR#: [id number]  Study Date: 2022  Location: [address] [place], [place] [id number]: [facility]  Study quality: Technically fair  Referring Physician: Teo Monson MD  Blood Pressure: 165/72 mmHg  Height: 158 cm  Weight: 53 kg  BSA: 1.5 m2  Heart Rate: 70 mmHg  ------------------------------------------------------------------------  PROCEDURE: Transthoracic echocardiogram with 2-D, M-Mode  and complete spectral and color flow Doppler.  INDICATION: Cardiac murmur, unspecified (R01.1)  ------------------------------------------------------------------------  Dimensions:    Normal Values:  LA:     3.6    2.0 - 4.0 cm  Ao:     3.0    2.0 - 3.8 cm  SEPTUM: 1.0    0.6 -1.2 cm  PWT:    1.0    0.6 - 1.1 cm  LVIDd:  4.0    3.0 - 5.6 cm  LVIDs:  2.8    1.8 - 4.0 cm  Derived variables:  LVMI: 84 g/m2  RWT: 0.50  Fractional short: 30 %  EF (Moran Rule): 67 %Doppler Peak Velocity (m/sec):  AoV=3.2  ------------------------------------------------------------------------  Observations:  Mitral Valve: Normal mitral valve. Mild mitral  regurgitation.  Aortic Valve/Aorta: Heavily calcified trileaflet aortic  valve with decreased opening. Peak transaortic valve  gradientequals 41 mm Hg, estimated aortic valve area  equals 1 sqcm (by continuity equation), aortic valve  velocity time integral equals 70 cm, consistent with  moderate aortic stenosis.  However the aortic valve appears heavily calcified and may  be closerto moderate-severe aortic stenosis.  Mild aortic  regurgitation.  Peak left ventricular outflow tract  gradient equals 3 mm Hg, LVOT velocity time integral equals  23 cm.  Aortic Root: 3 cm.  Left Atrium: Mildly dilated left atrium.  LA volume index =  39 cc/m2.  Left Ventricle: Normal left ventricular systolic function.  No segmental wall motion abnormalities. Normal left  ventricular internal dimensions and wall thicknesses.  Moderate diastolic dysfunction (Stage II).  Right Heart: Normal rightatrium. Normal right ventricular  size and function. Normal tricuspid valve. Minimal  tricuspid regurgitation. Normal pulmonic valve. Minimal  pulmonic regurgitation.  Pericardium/Pleura: Normal pericardium with trace  pericardial effusion.  Bilateral pleural effusions.  Hemodynamic: Estimated right atrial pressure is 8 mm Hg.  Color Doppler demonstrates no evidence of a patent foramen  ovale.  ------------------------------------------------------------------------  Conclusions:  1. Normal mitral valve. Mild mitral regurgitation.  2. Heavily calcified trileaflet aortic valve with decreased  opening. Peak transaortic valve gradient equals 41 mm Hg,  estimated aortic valve area equals 1 sqcm (by continuity  equation), aortic valve velocity time integral equals 70  cm, consistent with moderate aortic stenosis.  However the aortic valve appears heavily calcified and may  be closer to moderate-severe aortic stenosis.  Mild aortic  regurgitation.  3. Mildly dilated left atrium.  LA volume index = 39 cc/m2.  4. Normal left ventricular systolic function. No segmental  wall motion abnormalities.  5. Normal right ventricular size and function.  6. Normal pericardium with trace pericardial effusion.  7. Bilateral pleural effusions.  *** Compared with echocardiogram of 3/11/2022, no  significant changes noted.  ------------------------------------------------------------------------  Confirmed on  2022 - 17:38:34 by Osito Argueta M.D.  ------------------------------------------------------------------------  ---------------------------------------------------------------------------------------------------------------    MICROBIOLOGY:     Flu With COVID-19 By ERICK (22 @ 01:51)   SARS-CoV-2 Result: Detected  This Respiratory Panel uses polymerase chain reaction (PCR) to detect for   influenza A; influenza B; respiratory syncytial virus; and SARS-CoV-2.   This test was validated by [employer] and is in use under the FDA   Emergency Use Authorization (EUA) for clinical labs CLIA-certified to   perform high complexity testing. Test results should be correlated with   clinical presentation, patient history, and epidemiology.   Influenza A Result: NotDetec   Influenza B Result: NotDetec   Resp Syn Virus Result: NotDetec     Urinalysis Basic - ( 2022 00:46 )    Color: Light Yellow / Appearance: Slightly Turbid / S.014 / pH: x  Gluc: x / Ketone: Negative  / Bili: Negative / Urobili: Negative   Blood: x / Protein: Trace / Nitrite: Positive   Leuk Esterase: Moderate / RBC: 26 /hpf / WBC 2 /HPF   Sq Epi: x / Non Sq Epi: 2 /hpf / Bacteria: Moderate    RADIOLOGY:  [x] Chest radiographs reviewed and interpreted by me    EXAM:  XR CHEST PORTABLE URGENT 1V                          PROCEDURE DATE:  2022      INTERPRETATION:  CLINICAL INFORMATION: Shortness of breath.    TECHNIQUE: Frontal radiograph of the chest.    COMPARISON: CT chest and chest x-ray 2022    FINDINGS:  The lungs are clear.  No pleural effusion or pneumothorax.  Cardiomediastinal silhouette size is within normal limits.  No acute osseous abnormality.    IMPRESSION:  No evidence of acute pulmonary disease.    SCARLET JALLOH MD; Resident Radiology  This document has been electronically signed.  DIANE OBREGON MD; Attending Radiologist  This document has been electronically signed. 2022  1:19PM  ---------------------------------------------------------------------------------------------------------------  EXAM:  NM GI BLEEDING IMG                          PROCEDURE DATE:  06/15/2022      IMPRESSION: Abnormal GI bleeding scan.    Active bleeding site in the proximal ascending colon.    Dr. Argenis Barnes was notified of these results at 3:54 PM on 6/15/2022    MELVIN THOMPSON MD; Attending Nuclear Medicine  This document has been electronically signed. Dario 15 2022  4:03PM  ---------------------------------------------------------------------------------------------------------------  EXAM:  CT CHEST                          PROCEDURE DATE:  2022      FINDINGS:    Lungs/Airways/Pleura: The central airways are patent. There are small   pleural effusions, new from 3/9/2022 abdominal CT, with partial lower   lobe compressive atelectasis. There are diffuse peribronchial and  peribronchovascular groundglass opacity with mild septal thickening,   likely pulmonary edema. There are few clusters of small nodules on a  background of subsegmental bronchial impaction, likely due to small   airways disease.    Mediastinum/Lymph nodes: No thoracic adenopathy.    Heart and Vessels: Mild cardiomegaly. Extensive coronary arterial and   aortic valvular calcification is present. Hypodensity of the blood pool   in relation to left ventricular myocardium suggests underlying anemia.   The great vessels are normal in size. No pericardial effusion.    Upper Abdomen: Cholelithiasis. Partially imaged large right parapelvic  renal cyst. Extensive visceral artery calcification.    Osseous structures and Soft Tissues: Degenerative changes without   aggressive osseous lesions. Remote left posterior 11th rib fracture.    IMPRESSION:  Pulmonary edema with small pleural effusions and partial lower lobe   compressive atelectasis.    ELISA BLACKBURN M.D., Attending Radiologist  This document has been electronically signed. May 10 2022  8:52AM  ---------------------------------------------------------------------------------------------------------------  EXAM:  DUPLEX SCAN EXT VEINS LOWER BI                          PROCEDURE DATE:  2022      IMPRESSION:  No evidence of deep venous thrombosis in either lower extremity venous   segments able to be examined.     AUGUSTIN EASTMAN MD; Attending Radiologist  This document has been electronically signed. May  9 2022  3:31PM  ---------------------------------------------------------------------------------------------------------------

## 2022-06-16 NOTE — PROGRESS NOTE ADULT - ATTENDING COMMENTS
This is a 89F w/ hx HTN, PAF on Eliquis,  CHF, Covid in May 2022, nonsmoker, remote hx of asthma presenting with BRBPR, Hb was 4.8. BP has been stable, not tachycardic. Receiving 2 units PRBCs. G evaluated, off AC    1. LGI bleed, likely diverticular/AVM/neoplasm  2. Acute blood loss anemia  3. PAF, Moderate to Severe AS, off Eliquis now  4. Recent Covid, s/p Paxlovid  5. Chronic diastolic CHF    - Hb dropped to 6.7 last night, s/p 1 PRBCS, Hb at 8.1, had 1 BM w clots, VSS  GI f/u plan noted- colonoscopy tomorrow, colon prep today. Bleeding scan showed R colon bleeding source. Initial plan was conservative tretment with PRBC transfusions. Hb has been dropping, 6.8 this am. Getting 1 unit PRBC todat  - Off AC, CBC q 8hrs, c/w PPI PO  - Cardiology clearance requested. off AC, Echo Moderate to severe AS, off AC  - c/w metoprolol at low dose, hold diuretic, amlodipine  - GOC discussion in progress . This is a 89F w/ hx HTN, PAF on Eliquis,  CHF, Covid in May 2022, nonsmoker, remote hx of asthma presenting with BRBPR, Hb was 4.8. BP has been stable, not tachycardic. Receiving 2 units PRBCs. G evaluated, off AC    1. LGI bleed, likely diverticular/AVM/neoplasm  2. Acute blood loss anemia  3. PAF, Moderate to Severe AS, off Eliquis now  4. Recent Covid, s/p Paxlovid  5. Chronic diastolic CHF    - Hb dropped to 6.7 last night, s/p 1 PRBCS, Hb at 8.1, had 1 BM w clots, VSS  GI f/u plan noted- colonoscopy tomorrow, colon prep today. Bleeding scan showed R colon bleeding source. Initial plan was conservative tretment with PRBC transfusions. Hb has been dropping, 6.8 this am. Getting 1 unit PRBC todat  - Off AC, CBC q 8hrs, c/w PPI PO  - Cardiology clearance requested. off AC, Echo Moderate to severe AS, off AC  - c/w metoprolol at low dose, hold diuretic, amlodipine  - GOC discussion in progress.    ** spoke to Son at bedside

## 2022-06-16 NOTE — PROGRESS NOTE ADULT - SUBJECTIVE AND OBJECTIVE BOX
HPI:  Patient seen and examined at bedside in PICU.  Ongoing blood transfusion.    Review Of Systems:           Respiratory: No shortness of breath, cough, or wheezing  Cardiovascular: No chest pain or palpitations  10 point review of systems is otherwise negative except as mentioned above        Medications:  ascorbic acid 500 milliGRAM(s) Oral daily  levothyroxine 25 MICROGram(s) Oral daily  melatonin 3 milliGRAM(s) Oral at bedtime  metoprolol tartrate 12.5 milliGRAM(s) Oral two times a day  multivitamin 1 Tablet(s) Oral daily  pantoprazole    Tablet 40 milliGRAM(s) Oral before breakfast  polyethylene glycol 3350 17 Gram(s) Oral <User Schedule>  polyethylene glycol/electrolyte Solution 1000 milliLiter(s) Oral every 6 hours  rosuvastatin 10 milliGRAM(s) Oral at bedtime  venlafaxine XR. 75 milliGRAM(s) Oral daily    PAST MEDICAL & SURGICAL HISTORY:  HTN (hypertension)      Diabetes mellitus type 2, noninsulin dependent      Diverticulitis      Hyperlipidemia      GERD (gastroesophageal reflux disease)      Glaucoma      Breast cancer      Urinary incontinence      Renal calculus or stone      Arthritis      Heart murmur      Renal calculi  s/p stone extraction 57 yrs ago      S/P lumpectomy of breast  right breast with node removal      S/P bladder repair  Interstim device placement 2010        Vitals:  T(C): 36.9 (06-16-22 @ 20:23), Max: 36.9 (06-16-22 @ 00:07)  HR: 66 (06-16-22 @ 20:23) (60 - 67)  BP: 146/67 (06-16-22 @ 20:23) (128/63 - 155/68)  BP(mean): --  RR: 18 (06-16-22 @ 20:23) (18 - 18)  SpO2: 100% (06-16-22 @ 20:23) (97% - 100%)  Wt(kg): --  Daily     Daily   I&O's Summary    15 Dario 2022 07:01  -  16 Jun 2022 07:00  --------------------------------------------------------  IN: 80 mL / OUT: 400 mL / NET: -320 mL    16 Jun 2022 07:01  -  16 Jun 2022 23:39  --------------------------------------------------------  IN: 760 mL / OUT: 300 mL / NET: 460 mL        Physical Exam:  Appearance: Fraily elderly  Eyes: PERRLA, EOMI, pink conjunctiva, no scleral icterus   HENT: normal oral mucosa  Cardiovascular: RRR, S1, S2, III/VI systolic murmur at apex; no edema; no JVD  Respiratory: Clear to auscultation bilaterally  Gastrointestinal: Soft, non-tender, non-distended, BS+  Musculoskeletal: No clubbing or joint deformity   Neurologic: No focal weakness  Lymphatic: No lymphadenopathy  Psychiatry: AAOx3 with appropriate mood and affect  Skin: No rashes, ecchymoses, or cyanosis                          7.8    5.99  )-----------( 153      ( 16 Jun 2022 15:15 )             23.8     06-16    140  |  108  |  45<H>  ----------------------------<  119<H>  3.9   |  22  |  1.71<H>    Ca    8.5      16 Jun 2022 06:20  Phos  3.6     06-16  Mg     1.8     06-16    TPro  5.4<L>  /  Alb  2.8<L>  /  TBili  0.4  /  DBili  x   /  AST  15  /  ALT  8<L>  /  AlkPhos  61  06-15      Cardiovascular Diagnostic Testing:  ECG: sinus 76 bpm, low voltage in limb leads    Echo: < from: Transthoracic Echocardiogram (10.09.20 @ 15:28) >  ------------------------------------------------------------------------  Dimensions:    Normal Values:  LA:     3.6    2.0 - 4.0 cm  Ao:     2.7    2.0 - 3.8 cm  SEPTUM: 1.0    0.6 - 1.2 cm  PWT:    0.8    0.6 -1.1 cm  LVIDd:  3.9    3.0 - 5.6 cm  LVIDs:  2.4    1.8 - 4.0 cm  Derived variables:  LVMI: 73 g/m2  RWT: 0.41  Fractional short: 38 %  EF (Moran Rule): 63 %Doppler Peak Velocity (m/sec):  AoV=2.0  ------------------------------------------------------------------------  Observations:  Mitral Valve: Normal mitral valve. Mild mitral  regurgitation.  Aortic Valve/Aorta: Calcified trileaflet aortic valve with  decreased opening. Peak transaortic valve gradient equals  16 mm Hg, mean transaortic valve gradient equals 8 mm Hg,  estimated aortic valve area equals 1.7 sqcm (by continuity  equation), aortic valve velocity time integral equals 43  cm, consistent with mild aortic stenosis. Mild aortic  regurgitation.  Peak left ventricular outflow tract  gradient equals 3 mm Hg, mean gradient is equal to 2 mm Hg,  LVOT velocity time integral equals 19 cm.  Aortic Root: 2.7 cm.  LVOT diameter: 2.2 cm.  Left Atrium: Mildly dilated left atrium.  LA volume index =  41 cc/m2.  Left Ventricle: Normal left ventricular systolic function.  No segmental wall motion abnormalities. Normal left  ventricular internal dimensions and wall thickness.  Moderate diastolic dysfunction (Stage II).  Right Heart: Normal right atrium. Normal right ventricular  size and function. Normal tricuspid valve. Mild tricuspid  regurgitation. Normal pulmonic valve.  Pericardium/Pleura: Normal pericardium with no pericardial  effusion.  Hemodynamic: Estimated right atrial pressure is 8 mm Hg.  Estimated right ventricular systolic pressure equals 44 mm  Hg, assuming right atrial pressure equals 8 mm Hg,  consistent with mild pulmonary hypertension.  ------------------------------------------------------------------------  Conclusions:  1. Calcified trileaflet aortic valve with decreased  opening. Peak transaortic valve gradient equals 16 mm Hg,  mean transaortic valve gradient equals 8 mm Hg, estimated  aortic valve area equals 1.7 sqcm (by continuity equation),  aortic valve velocity time integral equals 43 cm,  consistent with mild aortic stenosis.  2. Mildly dilated left atrium.  LA volume index = 41 cc/m2.  3. Normal left ventricular internal dimensions and wall  thickness.  4. Normal left ventricular systolic function. No segmental  wall motion abnormalities.  5. Moderate diastolic dysfunction (Stage II).  *** No previous Echo exam.  ------------------------------------------------------------------------  Confirmed on  10/9/2020 - 17:47:14 by ERNESTO Felipe  ------------------------------------------------------------------------    < end of copied text >    Interpretation of Telemetry: NSR with APCs, PVCs

## 2022-06-17 LAB
ANION GAP SERPL CALC-SCNC: 9 MMOL/L — SIGNIFICANT CHANGE UP (ref 5–17)
BUN SERPL-MCNC: 38 MG/DL — HIGH (ref 7–23)
CALCIUM SERPL-MCNC: 8.7 MG/DL — SIGNIFICANT CHANGE UP (ref 8.4–10.5)
CHLORIDE SERPL-SCNC: 113 MMOL/L — HIGH (ref 96–108)
CO2 SERPL-SCNC: 23 MMOL/L — SIGNIFICANT CHANGE UP (ref 22–31)
CREAT SERPL-MCNC: 1.64 MG/DL — HIGH (ref 0.5–1.3)
EGFR: 29 ML/MIN/1.73M2 — LOW
GLUCOSE SERPL-MCNC: 180 MG/DL — HIGH (ref 70–99)
HCT VFR BLD CALC: 23.2 % — LOW (ref 34.5–45)
HCT VFR BLD CALC: 25.2 % — LOW (ref 34.5–45)
HCT VFR BLD CALC: 26.1 % — LOW (ref 34.5–45)
HGB BLD-MCNC: 7.4 G/DL — LOW (ref 11.5–15.5)
HGB BLD-MCNC: 8 G/DL — LOW (ref 11.5–15.5)
HGB BLD-MCNC: 8.4 G/DL — LOW (ref 11.5–15.5)
MAGNESIUM SERPL-MCNC: 1.9 MG/DL — SIGNIFICANT CHANGE UP (ref 1.6–2.6)
MCHC RBC-ENTMCNC: 29 PG — SIGNIFICANT CHANGE UP (ref 27–34)
MCHC RBC-ENTMCNC: 29.4 PG — SIGNIFICANT CHANGE UP (ref 27–34)
MCHC RBC-ENTMCNC: 29.6 PG — SIGNIFICANT CHANGE UP (ref 27–34)
MCHC RBC-ENTMCNC: 31.7 GM/DL — LOW (ref 32–36)
MCHC RBC-ENTMCNC: 31.9 GM/DL — LOW (ref 32–36)
MCHC RBC-ENTMCNC: 32.2 GM/DL — SIGNIFICANT CHANGE UP (ref 32–36)
MCV RBC AUTO: 91.3 FL — SIGNIFICANT CHANGE UP (ref 80–100)
MCV RBC AUTO: 91.9 FL — SIGNIFICANT CHANGE UP (ref 80–100)
MCV RBC AUTO: 92.1 FL — SIGNIFICANT CHANGE UP (ref 80–100)
NRBC # BLD: 0 /100 WBCS — SIGNIFICANT CHANGE UP (ref 0–0)
PHOSPHATE SERPL-MCNC: 3.4 MG/DL — SIGNIFICANT CHANGE UP (ref 2.5–4.5)
PLATELET # BLD AUTO: 166 K/UL — SIGNIFICANT CHANGE UP (ref 150–400)
PLATELET # BLD AUTO: 175 K/UL — SIGNIFICANT CHANGE UP (ref 150–400)
PLATELET # BLD AUTO: 190 K/UL — SIGNIFICANT CHANGE UP (ref 150–400)
POTASSIUM SERPL-MCNC: 4.1 MMOL/L — SIGNIFICANT CHANGE UP (ref 3.5–5.3)
POTASSIUM SERPL-SCNC: 4.1 MMOL/L — SIGNIFICANT CHANGE UP (ref 3.5–5.3)
RBC # BLD: 2.52 M/UL — LOW (ref 3.8–5.2)
RBC # BLD: 2.76 M/UL — LOW (ref 3.8–5.2)
RBC # BLD: 2.84 M/UL — LOW (ref 3.8–5.2)
RBC # FLD: 15.3 % — HIGH (ref 10.3–14.5)
RBC # FLD: 16.1 % — HIGH (ref 10.3–14.5)
RBC # FLD: 16.1 % — HIGH (ref 10.3–14.5)
SODIUM SERPL-SCNC: 144 MMOL/L — SIGNIFICANT CHANGE UP (ref 135–145)
WBC # BLD: 6.19 K/UL — SIGNIFICANT CHANGE UP (ref 3.8–10.5)
WBC # BLD: 6.34 K/UL — SIGNIFICANT CHANGE UP (ref 3.8–10.5)
WBC # BLD: 7.12 K/UL — SIGNIFICANT CHANGE UP (ref 3.8–10.5)
WBC # FLD AUTO: 6.19 K/UL — SIGNIFICANT CHANGE UP (ref 3.8–10.5)
WBC # FLD AUTO: 6.34 K/UL — SIGNIFICANT CHANGE UP (ref 3.8–10.5)
WBC # FLD AUTO: 7.12 K/UL — SIGNIFICANT CHANGE UP (ref 3.8–10.5)

## 2022-06-17 PROCEDURE — 99233 SBSQ HOSP IP/OBS HIGH 50: CPT

## 2022-06-17 PROCEDURE — 45378 DIAGNOSTIC COLONOSCOPY: CPT

## 2022-06-17 RX ORDER — SODIUM CHLORIDE 9 MG/ML
500 INJECTION INTRAMUSCULAR; INTRAVENOUS; SUBCUTANEOUS
Refills: 0 | Status: DISCONTINUED | OUTPATIENT
Start: 2022-06-17 | End: 2022-06-18

## 2022-06-17 RX ORDER — METOPROLOL TARTRATE 50 MG
12.5 TABLET ORAL ONCE
Refills: 0 | Status: COMPLETED | OUTPATIENT
Start: 2022-06-17 | End: 2022-06-17

## 2022-06-17 RX ORDER — METOPROLOL TARTRATE 50 MG
25 TABLET ORAL
Refills: 0 | Status: DISCONTINUED | OUTPATIENT
Start: 2022-06-17 | End: 2022-07-01

## 2022-06-17 RX ADMIN — POLYETHYLENE GLYCOL 3350 17 GRAM(S): 17 POWDER, FOR SOLUTION ORAL at 09:10

## 2022-06-17 RX ADMIN — Medication 1 TABLET(S): at 12:05

## 2022-06-17 RX ADMIN — PANTOPRAZOLE SODIUM 40 MILLIGRAM(S): 20 TABLET, DELAYED RELEASE ORAL at 05:10

## 2022-06-17 RX ADMIN — Medication 25 MICROGRAM(S): at 05:10

## 2022-06-17 RX ADMIN — Medication 75 MILLIGRAM(S): at 12:04

## 2022-06-17 RX ADMIN — ROSUVASTATIN CALCIUM 10 MILLIGRAM(S): 5 TABLET ORAL at 22:30

## 2022-06-17 RX ADMIN — Medication 12.5 MILLIGRAM(S): at 05:10

## 2022-06-17 RX ADMIN — Medication 500 MILLIGRAM(S): at 12:05

## 2022-06-17 RX ADMIN — Medication 3 MILLIGRAM(S): at 22:30

## 2022-06-17 RX ADMIN — POLYETHYLENE GLYCOL 3350 17 GRAM(S): 17 POWDER, FOR SOLUTION ORAL at 05:10

## 2022-06-17 RX ADMIN — Medication 25 MILLIGRAM(S): at 16:51

## 2022-06-17 RX ADMIN — Medication 12.5 MILLIGRAM(S): at 10:36

## 2022-06-17 NOTE — PROGRESS NOTE ADULT - ASSESSMENT
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 She has a history of HTN, HLD, DM, aortic stenosis, breast cancer s/p lumpectomy and CKD (Dr. Escobar). She was recently started on Eliquis and beta-blockers for atrial fibrillation. She was hospitalized in March with a UTI complicated by ELISA due to bactrim. She was admitted in May with dyspnea, hypoxemia, gurgling in her chest and leg swelling. She was initially treated with zosyn for a possible right lower lobe pneumonia seen on CXR until further evaluation revealed pulmonary edema with pleural effusions and atelectasis. ECHO -> preserved LVEF - moderate-severe aortic stenosis - moderate diastolic dysfunction - bilateral pleural effusions; CT -> pulmonary edema with small pleural effusions and partial lower lobe compressive atelectasis. She was diuresed and discharged on norvasc/toprol XL/lipitor/eliquis. The patient was diagnosed with COVID on May 22nd and was treated with a course of Paxlovid. She now returns to the ER with shortness of breath and weakness with minimal exertion. She has fatigue, malaise and pallor. She has been noted to have blood in her stools and black colored stools (after eating blueberries). Her daughter measured her blood pressure has 90/60 from a baseline of 120/90. She currently has no shortness of breath or hypoxemia on a 2lpm nasal canula. She has no cough, sputum production, chest congestion or wheeze. No fevers, chills or sweats. She has been found to be guaiac positive. RVP PCR remains positive for COVID. The patient has received Kcentra and 2 units PRBCs for severe anemia (4.8/16/6).     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 and respiratory muscle weakness limiting diaphragmatic excursion and chest wall expansion  4) no evidence of bronchospasm or pulmonary edema/pleural effusions    ELISA is due to intravascular volume depletion    nasal COVID PCR positivity more likely represents shedding of non-viable viral particles than ongoing infection in this immunocompetent patient    PLAN/RECOMMENDATIONS:    stable oxygenation on room air  no indication for airborne or contact isolation - the patient was first diagnosed with COVID on May 22nd  observe off systemic steroids, antibiotics and pulmonary medications  head of bed elevation greater than 45 degrees at all times  incentive spirometry  GI evaluation noted     ongoing hematochezia - has now received 4 units of PRBCs - bleeding scan positive for active bleeding in the ascending colon -> felt to be at high rish for bowel ischemia and worsening renal function with an interventional radiology procedure -> scheduled for colonoscopy today    hemodynamically stable    has now required 4 units PRBCs    holding A/C    protonix 40mg daily  cardiac meds: lopressor/crestor  glucose control  DVT prophylaxis - SCDs  venlafaxine XR/melatonin at bedtime  synthroid    Will follow with you. Plan of care discussed with the patient and her son at bedside and with the GI team    The patient's respiratory status remains stable. Dr. Yeyo Costello will be covering our service for the weekend. Please call [phone] with questions or clinical changes. Thank you.      Brayden Benavides MD, [facility]  [phone]  Pulmonary Medicine

## 2022-06-17 NOTE — PROGRESS NOTE ADULT - PROBLEM SELECTOR PLAN 1
Most likely 2/2 to eliquis initiation and underlying GI pathology. DDx includes diverticulitis vs Gastric ulcer vs. Duodenal ulcer vs. angiodysplasia (?hades syndrome) vs. diverticulosis vs. hemorrhoids   s/p 2 units pRBCs running, Pantoprazole 80mg   family does not want to persue invasive interventions including colonoscopy    Plan:  - c/w pantoprazole 40mg BID   - Maintain active type and screen   - Maintain 2 large bore IVs  - Transfuse to hemoglobin <7   - GI following  - cbcq12 Most likely 2/2 to eliquis initiation and underlying GI pathology. Pending colonoscopy today for intervention.     Plan:  - c/w pantoprazole 40mg BID   - Maintain active type and screen   - Maintain 2 large bore IVs  - Transfuse to hemoglobin <7 --> pt with regular pRBCs transusions as hgb low.  - GI following  - cbc q12

## 2022-06-17 NOTE — PROGRESS NOTE ADULT - SUBJECTIVE AND OBJECTIVE BOX
[facility] - PROGRESS NOTE    CHIEF COMPLAINT: COVID-19 infection; dyspnea; asthma; bilateral pleural effusions; atelectasis; hypotension; anorexia; fatigue; hematochezia; anemia    INTERVAL HISTORY: ongoing hematochezia - has now received 4 units of PRBCs- bleeding scan positive for active bleeding in the ascending colon -> felt to be at high rish for bowel ischemia and worsening renal function with an interventional radiology procedure -> tolerated a miralax based bowel prep and awaiting colonoscopy; hemodynamically stable; no shortness of breath or hypoxemia on room air; no cough, sputum production, hemoptysis, chest congestion or wheeze; no fevers, chills or sweats; no chest pain/pressure or palpitations;    REVIEW OF SYSTEMS:  Constitutional: As per interval history  HEENT: Within normal limits  CV: As per interval history  Resp: As per interval history  GI: hematochezia  : Within normal limits  Musculoskeletal: Within normal limits  Skin: Within normal limits  Neurological: Within normal limits  Psychiatric: Within normal limits  Endocrine: Within normal limits  Hematologic/Lymphatic: anemia  Allergic/Immunologic: Within normal limits    MEDICATIONS:     Pulmonary "    Anti-microbials:    Cardiovascular:  metoprolol tartrate 12.5 milliGRAM(s) Oral two times a day    Other:  ascorbic acid 500 milliGRAM(s) Oral daily  levothyroxine 25 MICROGram(s) Oral daily  melatonin 3 milliGRAM(s) Oral at bedtime  multivitamin 1 Tablet(s) Oral daily  pantoprazole    Tablet 40 milliGRAM(s) Oral before breakfast  polyethylene glycol 3350 17 Gram(s) Oral <User Schedule>  rosuvastatin 10 milliGRAM(s) Oral at bedtime  venlafaxine XR. 75 milliGRAM(s) Oral daily    OBJECTIVE:    I&O's Detail    2022 07:01  -  2022 07:00  --------------------------------------------------------  IN:    Oral Fluid: 460 mL    PRBCs (Packed Red Blood Cells): 300 mL  Total IN: 760 mL    OUT:    Voided (mL): 300 mL  Total OUT: 300 mL    Total NET: 460 mL    PHYSICAL EXAM:       ICU Vital Signs Last 24 Hrs  T(C): 36.7 (2022 04:33), Max: 36.9 (2022 20:23)  T(F): 98 (2022 04:33), Max: 98.5 (2022 20:23)  HR: 73 (2022 04:33) (64 - 73)  BP: 152/72 (2022 04:33) (131/65 - 155/68)  BP(mean): --  ABP: --  ABP(mean): --  RR: 18 (2022 04:33) (18 - 18)  SpO2: 100% (2022 04:33) (97% - 100%) on room air        General: Awake. Alert. Cooperative. No distress. Appears stated age. Sitting in the chair.	  HEENT:  Atraumatic. Normocephalic. Anicteric. Normal oral mucosa. PERRL. EOMI. Pale conjunctiva.  Neck: Supple. Trachea midline. Thyroid without enlargement/tenderness/nodules. No carotid bruit. No JVD.	  Cardiovascular: Regular rate and rhythm. S1 S2 normal. III/VI systolic murmur  Respiratory: Respirations unlabored. Clear to auscultation and percussion bilaterally. Kyphosis.  Abdomen: Soft. Non-tender. Non-distended. No organomegaly. No masses. Normal bowel sounds.  Extremities: Warm to touch. No clubbing or cyanosis. No pedal edema. Bilateral SCDs.  Pulses: 2+ peripheral pulses all extremities.	  Skin: Normal skin color. No rashes or lesions. No ecchymoses. No cyanosis. Warm to touch.  Lymph Nodes: Cervical, supraclavicular and axillary nodes normal  Neurological: Motor and sensory examination equal and normal. A and O x 3  Psychiatry: Appropriate mood and affect.    LABS:                          7.4    6.19  )-----------( 175      ( 2022 06:44 )             23.2     CBC    WBC  6.19 <==, 7.12 <==, 5.99 <==, 6.43 <==, 6.96 <==, 8.17 <==, 6.86 <==    Hemoglobin  7.4 <<==, 8.4 <<==, 7.8 <<==, 6.8 <<==, 7.7 <<==, 8.0 <<==, 6.7 <<==    Hematocrit  23.2 <==, 26.1 <==, 23.8 <==, 21.9 <==, 24.4 <==, 25.3 <==, 21.4 <==    Platelets  175 <==, 190 <==, 153 <==, 166 <==, 158 <==, 157 <==, 146 <==      144  |  113<H>  |  x   ----------------------------<  x     06-17  4.1   |  x   |  x       LYTES    sodium  144 <==, 140 <==, 143 <==, 142 <==, 141 <==, 137 <==    potassium   4.1 <==, 3.9 <==, 4.0 <==, 3.8 <==, 4.0 <==, 4.3 <==    chloride  113 <==, 108 <==, 108 <==, 107 <==, 107 <==, 102 <==    carbon dioxide  22 <==, 23 <==, 22 <==, 24 <==, 22 <==    =============================================================================================  RENAL FUNCTION:    Creatinine:   1.71  <<==, 1.58  <<==, 1.56  <<==, 1.76  <<==, 1.93  <<==    BUN:   45 <==, 43 <==, 50 <==, 57 <==, 62 <==    ============================================================================================    calcium   8.5 <==, 8.7 <==, 8.9 <==, 8.8 <==, 8.6 <==    phos   3.6 <==, 3.3 <==, 4.0 <==, 3.9 <==    mag   1.8 <==, 1.9 <==, 2.0 <==, 2.0 <==    ============================================================================================  LFTs    AST:   15 <== , 13 <== , 14 <== , 17 <==     ALT:  8  <== , 7  <== , 9  <== , 9  <==     AP:  61  <=, 62  <=, 59  <=, 68  <=    Bili:  0.4  <=, 0.3  <=, 0.3  <=, 0.1  <=    PT/INR - ( 2022 13:16 )   PT: 12.9 sec;   INR: 1.11 ratio      PTT - ( 2022 13:16 )  PTT:28.4 sec    Venous Blood Gas:   @ 01:58  7.33/47/25/25/28.5  VBG Lactate: 1.5    < from: Transthoracic Echocardiogram (22 @ 14:37) >    Patient name: KOREY DIAZ  YOB: 1929   Age: 89 (F)   MR#: [id number]  Study Date: 2022  Location: [address] [place], [place] [id number]: [facility]  Study quality: Technically fair  Referring Physician: Teo Monson MD  Blood Pressure: 165/72 mmHg  Height: 158 cm  Weight: 53 kg  BSA: 1.5 m2  Heart Rate: 70 mmHg  ------------------------------------------------------------------------  PROCEDURE: Transthoracic echocardiogram with 2-D, M-Mode  and complete spectral and color flow Doppler.  INDICATION: Cardiac murmur, unspecified (R01.1)  ------------------------------------------------------------------------  Dimensions:    Normal Values:  LA:     3.6    2.0 - 4.0 cm  Ao:     3.0    2.0 - 3.8 cm  SEPTUM: 1.0    0.6 -1.2 cm  PWT:    1.0    0.6 - 1.1 cm  LVIDd:  4.0    3.0 - 5.6 cm  LVIDs:  2.8    1.8 - 4.0 cm  Derived variables:  LVMI: 84 g/m2  RWT: 0.50  Fractional short: 30 %  EF (Moran Rule): 67 %Doppler Peak Velocity (m/sec):  AoV=3.2  ------------------------------------------------------------------------  Observations:  Mitral Valve: Normal mitral valve. Mild mitral  regurgitation.  Aortic Valve/Aorta: Heavily calcified trileaflet aortic  valve with decreased opening. Peak transaortic valve  gradientequals 41 mm Hg, estimated aortic valve area  equals 1 sqcm (by continuity equation), aortic valve  velocity time integral equals 70 cm, consistent with  moderate aortic stenosis.  However the aortic valve appears heavily calcified and may  be closerto moderate-severe aortic stenosis.  Mild aortic  regurgitation.  Peak left ventricular outflow tract  gradient equals 3 mm Hg, LVOT velocity time integral equals  23 cm.  Aortic Root: 3 cm.  Left Atrium: Mildly dilated left atrium.  LA volume index =  39 cc/m2.  Left Ventricle: Normal left ventricular systolic function.  No segmental wall motion abnormalities. Normal left  ventricular internal dimensions and wall thicknesses.  Moderate diastolic dysfunction (Stage II).  Right Heart: Normal rightatrium. Normal right ventricular  size and function. Normal tricuspid valve. Minimal  tricuspid regurgitation. Normal pulmonic valve. Minimal  pulmonic regurgitation.  Pericardium/Pleura: Normal pericardium with trace  pericardial effusion.  Bilateral pleural effusions.  Hemodynamic: Estimated right atrial pressure is 8 mm Hg.  Color Doppler demonstrates no evidence of a patent foramen  ovale.  ------------------------------------------------------------------------  Conclusions:  1. Normal mitral valve. Mild mitral regurgitation.  2. Heavily calcified trileaflet aortic valve with decreased  opening. Peak transaortic valve gradient equals 41 mm Hg,  estimated aortic valve area equals 1 sqcm (by continuity  equation), aortic valve velocity time integral equals 70  cm, consistent with moderate aortic stenosis.  However the aortic valve appears heavily calcified and may  be closer to moderate-severe aortic stenosis.  Mild aortic  regurgitation.  3. Mildly dilated left atrium.  LA volume index = 39 cc/m2.  4. Normal left ventricular systolic function. No segmental  wall motion abnormalities.  5. Normal right ventricular size and function.  6. Normal pericardium with trace pericardial effusion.  7. Bilateral pleural effusions.  *** Compared with echocardiogram of 3/11/2022, no  significant changes noted.  ------------------------------------------------------------------------  Confirmed on  2022 - 17:38:34 by Osito Argueta M.D.  ------------------------------------------------------------------------  ---------------------------------------------------------------------------------------------------------------    MICROBIOLOGY:     Flu With COVID-19 By ERICK (22 @ 01:51)   SARS-CoV-2 Result: Detected  This Respiratory Panel uses polymerase chain reaction (PCR) to detect for   influenza A; influenza B; respiratory syncytial virus; and SARS-CoV-2.   This test was validated by [employer] and is in use under the FDA   Emergency Use Authorization (EUA) for clinical labs CLIA-certified to   perform high complexity testing. Test results should be correlated with   clinical presentation, patient history, and epidemiology.   Influenza A Result: NotDeteGlobeIn   Influenza B Result: NotDetec   Resp Syn Virus Result: NotDetec     Urinalysis Basic - ( 2022 00:46 )    Color: Light Yellow / Appearance: Slightly Turbid / S.014 / pH: x  Gluc: x / Ketone: Negative  / Bili: Negative / Urobili: Negative   Blood: x / Protein: Trace / Nitrite: Positive   Leuk Esterase: Moderate / RBC: 26 /hpf / WBC 2 /HPF   Sq Epi: x / Non Sq Epi: 2 /hpf / Bacteria: Moderate    RADIOLOGY:  [x] Chest radiographs reviewed and interpreted by me    EXAM:  XR CHEST PORTABLE URGENT 1V                          PROCEDURE DATE:  2022      INTERPRETATION:  CLINICAL INFORMATION: Shortness of breath.    TECHNIQUE: Frontal radiograph of the chest.    COMPARISON: CT chest and chest x-ray 2022    FINDINGS:  The lungs are clear.  No pleural effusion or pneumothorax.  Cardiomediastinal silhouette size is within normal limits.  No acute osseous abnormality.    IMPRESSION:  No evidence of acute pulmonary disease.    SCARLET JALLOH MD; Resident Radiology  This document has been electronically signed.  DIANE OBREGON MD; Attending Radiologist  This document has been electronically signed. 2022  1:19PM  ---------------------------------------------------------------------------------------------------------------  EXAM:  NM GI BLEEDING IMG                          PROCEDURE DATE:  06/15/2022      IMPRESSION: Abnormal GI bleeding scan.    Active bleeding site in the proximal ascending colon.    Dr. Argenis Barnes was notified of these results at 3:54 PM on 6/15/2022    MELVIN THOMPSON MD; Attending Nuclear Medicine  This document has been electronically signed. Dario 15 2022  4:03PM  ---------------------------------------------------------------------------------------------------------------  EXAM:  CT CHEST                          PROCEDURE DATE:  2022      FINDINGS:    Lungs/Airways/Pleura: The central airways are patent. There are small   pleural effusions, new from 3/9/2022 abdominal CT, with partial lower   lobe compressive atelectasis. There are diffuse peribronchial and  peribronchovascular groundglass opacity with mild septal thickening,   likely pulmonary edema. There are few clusters of small nodules on a  background of subsegmental bronchial impaction, likely due to small   airways disease.    Mediastinum/Lymph nodes: No thoracic adenopathy.    Heart and Vessels: Mild cardiomegaly. Extensive coronary arterial and   aortic valvular calcification is present. Hypodensity of the blood pool   in relation to left ventricular myocardium suggests underlying anemia.   The great vessels are normal in size. No pericardial effusion.    Upper Abdomen: Cholelithiasis. Partially imaged large right parapelvic  renal cyst. Extensive visceral artery calcification.    Osseous structures and Soft Tissues: Degenerative changes without   aggressive osseous lesions. Remote left posterior 11th rib fracture.    IMPRESSION:  Pulmonary edema with small pleural effusions and partial lower lobe   compressive atelectasis.    ELISA BLACKBURN M.D., Attending Radiologist  This document has been electronically signed. May 10 2022  8:52AM  ---------------------------------------------------------------------------------------------------------------  EXAM:  DUPLEX SCAN EXT VEINS LOWER BI                          PROCEDURE DATE:  2022      IMPRESSION:  No evidence of deep venous thrombosis in either lower extremity venous   segments able to be examined.     AUGUSTIN EASTMAN MD; Attending Radiologist  This document has been electronically signed. May  9 2022  3:31PM  ---------------------------------------------------------------------------------------------------------------

## 2022-06-17 NOTE — PROGRESS NOTE ADULT - ATTENDING COMMENTS
This is a 89F w/ hx HTN, PAF on Eliquis,  CHF, Covid in May 2022, nonsmoker, remote hx of asthma presenting with BRBPR, Hb was 4.8. BP has been stable, not tachycardic. Receiving 2 units PRBCs. G evaluated, off AC    1. LGI bleed, likely diverticular/AVM/neoplasm  2. Acute blood loss anemia  3. PAF, Moderate to Severe AS, off Eliquis now  4. Recent Covid, s/p Paxlovid  5. Chronic diastolic CHF    - Had BM w some blood this am. Hb 7.4,, s/p PRBCS tx, VSS  GI f/u plan noted- colonoscopy today, NPO now. Bleeding scan showed R colon bleeding source. Initial plan was conservative treatment with PRBC transfusions.  - Off AC, CBC q 8hrs, c/w PPI PO  - Cardiology clearance done. off AC, Echo Moderate to severe AS, off AC  - c/w metoprolol 25mg bid, hold diuretic, amlodipine  - GOC discussion in progress.    ** spoke to Son and daughter at bedside.

## 2022-06-17 NOTE — PROGRESS NOTE ADULT - ASSESSMENT
89 year-old with dementia and moderate aortic stenosis admitted with symptomatic anemia. She was recently started on Eliquis for thromboembolic prophylaxis in the setting of atrial fibrillation.  Noted to have anemia with hemoglobin < 5 g/dL.  Noted to continue test positive for Covid-19 (first positive on 5/22).    Symptoms and ELISA are likely due to her anemia, and not due to her aortic stenosis or Covid-19.  Transfuse PRBC as needed to maintain hemoglobin > 8 g/dL.    Agree with holding anticoagulation at this time.  GI offering colonoscopy.    Patient is not capable of 4 METS at baseline. Recent echo as above.  Patient is without acute coronary syndrome, decompensated heart failure, dangerous arrhythmia, or critical valvular stenosis. No further cardiac testing is necessary prior to proceeding with colonoscopy in the hopes of treating an acute and life threatening bleed.    Discussed with patient's daughter present.

## 2022-06-17 NOTE — PRE PROCEDURE NOTE - PRE PROCEDURE EVALUATION
Attending Physician:      Dr. Hernandez                      Procedure:   Colonoscopy    Indication for Procedure:   Diverticular Bleed  ________________________________________________________  PAST MEDICAL & SURGICAL HISTORY:    HTN (hypertension)  Diabetes mellitus type 2, noninsulin dependent  Diverticulitis  Hyperlipidemia  GERD (gastroesophageal reflux disease)  Glaucoma  Breast cancer  Urinary incontinence  Renal calculus or stone  Arthritis  Heart murmur    Renal calculi-s/p stone extraction 57 yrs ago  S/P lumpectomy of breast-right breast with node removal  S/P bladder repair-Interstim device placement 2010    ALLERGIES:  Keflex (Rash; Hives)    HOME MEDICATIONS:  Eliquis 2.5 mg oral tablet: 1 tab(s) orally 2 times a day  ferrous sulfate 325 mg (65 mg elemental iron) oral tablet: 1 tab(s) orally once a day  metoprolol succinate 25 mg oral tablet, extended release: 1 tab(s) orally once a day  rosuvastatin 10 mg oral tablet: 1 tab(s) orally once a day  Synthroid 25 mcg (0.025 mg) oral tablet: 1 tab(s) orally once a day  venlafaxine 75 mg oral capsule, extended release: 1 cap(s) orally once a day    AICD/PPM: [ ] yes   [ X] no    PERTINENT LAB DATA:                        7.4    6.19  )-----------( 175      ( 17 Jun 2022 06:44 )             23.2     06-17    144  |  113<H>  |  38<H>  ----------------------------<  180<H>  4.1   |  23  |  1.64<H>    Ca    8.7      17 Jun 2022 06:44  Phos  3.4     06-17  Mg     1.9     06-17    PHYSICAL EXAMINATION:    T(C): 36.5  HR: 71  BP: 157/68  RR: 18  SpO2: 98%    Constitutional: NAD    Neck:  No JVD  Respiratory: CTAB/L  Cardiovascular: S1 and S2  Gastrointestinal: BS+, soft, NT/ND  Extremities: No peripheral edema  Neurological: A/O x 4      COMMENTS:    The patient is a suitable candidate for the planned procedure unless box checked [ ]  No, explain:

## 2022-06-17 NOTE — PROGRESS NOTE ADULT - SUBJECTIVE AND OBJECTIVE BOX
PROGRESS NOTE:   Authored By: Jennie Woo MD     PGY-1, Internal Medicine  Pager: [phone], [phone]    Patient is a 89y old  Female who presents with a chief complaint of gi bleed (16 Jun 2022 10:27)      OVERNIGHT EVENTS: No acute events overnight    SUBJECTIVE: Pt seen and examined at bedside this morning.     ADDITIONAL REVIEW OF SYSTEMS:    MEDICATIONS  (STANDING):  ascorbic acid 500 milliGRAM(s) Oral daily  levothyroxine 25 MICROGram(s) Oral daily  melatonin 3 milliGRAM(s) Oral at bedtime  metoprolol tartrate 12.5 milliGRAM(s) Oral two times a day  multivitamin 1 Tablet(s) Oral daily  pantoprazole    Tablet 40 milliGRAM(s) Oral before breakfast  polyethylene glycol 3350 17 Gram(s) Oral <User Schedule>  rosuvastatin 10 milliGRAM(s) Oral at bedtime  venlafaxine XR. 75 milliGRAM(s) Oral daily    MEDICATIONS  (PRN):      CAPILLARY BLOOD GLUCOSE        I&O's Summary    16 Jun 2022 07:01  -  17 Jun 2022 07:00  --------------------------------------------------------  IN: 760 mL / OUT: 300 mL / NET: 460 mL        PHYSICAL EXAM:  Vital Signs Last 24 Hrs  T(C): 36.7 (17 Jun 2022 04:33), Max: 36.9 (16 Jun 2022 20:23)  T(F): 98 (17 Jun 2022 04:33), Max: 98.5 (16 Jun 2022 20:23)  HR: 73 (17 Jun 2022 04:33) (64 - 73)  BP: 152/72 (17 Jun 2022 04:33) (131/65 - 155/68)  BP(mean): --  RR: 18 (17 Jun 2022 04:33) (18 - 18)  SpO2: 100% (17 Jun 2022 04:33) (97% - 100%)    CONSTITUTIONAL: NAD, well-developed  HEENT: NC/AT, EOMI  RESPIRATORY: No increased work of breathing, CTAB, no wheezes or crackles appreciated  CARDIOVASCULAR: RRR, S1 and S2 present, no m/r/g  ABDOMEN: soft, NT, ND, bowel sounds present  EXTREMITIES: No LE edema  MUSCULOSKELETAL: no joint swelling or tenderness to palpation  NEURO: A&Ox3, moving all extremities    LABS:                        7.4    6.19  )-----------( 175      ( 17 Jun 2022 06:44 )             23.2     06-17    144  |  113<H>  |  x   ----------------------------<  x   4.1   |  x   |  x     Ca    8.5      16 Jun 2022 06:20  Phos  3.6     06-16  Mg     1.8     06-16                Culture - Urine (collected 14 Jun 2022 15:42)  Source: Clean Catch Clean Catch (Midstream)  Final Report (16 Jun 2022 20:20):    >100,000 CFU/ml Escherichia coli    <10,000 CFU/ml Normal Urogenital afshin present  Organism: Escherichia coli (16 Jun 2022 20:20)  Organism: Escherichia coli (16 Jun 2022 20:20)        RADIOLOGY & ADDITIONAL TESTS:    Results Reviewed:   Imaging Personally Reviewed:  Electrocardiogram Personally Reviewed:    COORDINATION OF CARE:  Care Discussed with Consultants/Other Providers [Y/N]:  Prior or Outpatient Records Reviewed [Y/N]:   PROGRESS NOTE:   Authored By: Jennie Woo MD     PGY-1, Internal Medicine  Pager: [phone], [phone]    Patient is a 89y old  Female who presents with a chief complaint of gi bleed (16 Jun 2022 10:27)      OVERNIGHT EVENTS: No acute events overnight    SUBJECTIVE: Pt seen and examined at bedside this morning. Pt states she feels well. Pt does endorse one bloody streaked bowel movement. No other new symptoms.     ADDITIONAL REVIEW OF SYSTEMS:    MEDICATIONS  (STANDING):  ascorbic acid 500 milliGRAM(s) Oral daily  levothyroxine 25 MICROGram(s) Oral daily  melatonin 3 milliGRAM(s) Oral at bedtime  metoprolol tartrate 12.5 milliGRAM(s) Oral two times a day  multivitamin 1 Tablet(s) Oral daily  pantoprazole    Tablet 40 milliGRAM(s) Oral before breakfast  polyethylene glycol 3350 17 Gram(s) Oral <User Schedule>  rosuvastatin 10 milliGRAM(s) Oral at bedtime  venlafaxine XR. 75 milliGRAM(s) Oral daily    MEDICATIONS  (PRN):      CAPILLARY BLOOD GLUCOSE        I&O's Summary    16 Jun 2022 07:01  -  17 Jun 2022 07:00  --------------------------------------------------------  IN: 760 mL / OUT: 300 mL / NET: 460 mL        PHYSICAL EXAM:  Vital Signs Last 24 Hrs  T(C): 36.7 (17 Jun 2022 04:33), Max: 36.9 (16 Jun 2022 20:23)  T(F): 98 (17 Jun 2022 04:33), Max: 98.5 (16 Jun 2022 20:23)  HR: 73 (17 Jun 2022 04:33) (64 - 73)  BP: 152/72 (17 Jun 2022 04:33) (131/65 - 155/68)  BP(mean): --  RR: 18 (17 Jun 2022 04:33) (18 - 18)  SpO2: 100% (17 Jun 2022 04:33) (97% - 100%)    CONSTITUTIONAL: NAD, well-developed, laying in bed awaiting her procedure  HEENT: NC/AT, EOMI  RESPIRATORY: No increased work of breathing, CTAB, no wheezes or crackles appreciated  CARDIOVASCULAR: RRR, S1 and S2 present, of note an audible crescendo/decrescendo systolic murmur, no rubs or gallops   ABDOMEN: soft, NT, ND, bowel sounds present  EXTREMITIES: No LE edema  MUSCULOSKELETAL: no joint swelling or tenderness to palpation  NEURO: A&Ox3, moving all extremities    LABS:                        7.4    6.19  )-----------( 175      ( 17 Jun 2022 06:44 )             23.2     06-17    144  |  113<H>  |  x   ----------------------------<  x   4.1   |  x   |  x     Ca    8.5      16 Jun 2022 06:20  Phos  3.6     06-16  Mg     1.8     06-16                Culture - Urine (collected 14 Jun 2022 15:42)  Source: Clean Catch Clean Catch (Midstream)  Final Report (16 Jun 2022 20:20):    >100,000 CFU/ml Escherichia coli    <10,000 CFU/ml Normal Urogenital afshin present  Organism: Escherichia coli (16 Jun 2022 20:20)  Organism: Escherichia coli (16 Jun 2022 20:20)        RADIOLOGY & ADDITIONAL TESTS:    Results Reviewed:   Imaging Personally Reviewed:  Electrocardiogram Personally Reviewed:    COORDINATION OF CARE:  Care Discussed with Consultants/Other Providers [Y/N]:  Prior or Outpatient Records Reviewed [Y/N]:

## 2022-06-17 NOTE — PROGRESS NOTE ADULT - SUBJECTIVE AND OBJECTIVE BOX
HPI:  Patient seen and examined at bedside in PICU.  Plan for colonscopy today.    Review Of Systems:           Respiratory: No shortness of breath, cough, or wheezing  Cardiovascular: No chest pain or palpitations  10 point review of systems is otherwise negative except as mentioned above        Medications:  ascorbic acid 500 milliGRAM(s) Oral daily  levothyroxine 25 MICROGram(s) Oral daily  melatonin 3 milliGRAM(s) Oral at bedtime  metoprolol tartrate 25 milliGRAM(s) Oral two times a day  multivitamin 1 Tablet(s) Oral daily  pantoprazole    Tablet 40 milliGRAM(s) Oral before breakfast  rosuvastatin 10 milliGRAM(s) Oral at bedtime  sodium chloride 0.9%. 500 milliLiter(s) IV Continuous <Continuous>  venlafaxine XR. 75 milliGRAM(s) Oral daily    PAST MEDICAL & SURGICAL HISTORY:  HTN (hypertension)      Diabetes mellitus type 2, noninsulin dependent      Diverticulitis      Hyperlipidemia      GERD (gastroesophageal reflux disease)      Glaucoma      Breast cancer      Urinary incontinence      Renal calculus or stone      Arthritis      Heart murmur      Renal calculi  s/p stone extraction 57 yrs ago      S/P lumpectomy of breast  right breast with node removal      S/P bladder repair  Interstim device placement 2010        Vitals:  T(C): 36.3 (06-17-22 @ 15:46), Max: 36.9 (06-17-22 @ 08:41)  HR: 62 (06-17-22 @ 15:46) (58 - 73)  BP: 167/72 (06-17-22 @ 15:46) (139/96 - 170/72)  BP(mean): --  RR: 18 (06-17-22 @ 15:46) (16 - 20)  SpO2: 100% (06-17-22 @ 15:46) (97% - 100%)  Wt(kg): --  Daily Height in cm: 157.48 (17 Jun 2022 13:23)    Daily   I&O's Summary    16 Jun 2022 07:01  -  17 Jun 2022 07:00  --------------------------------------------------------  IN: 760 mL / OUT: 300 mL / NET: 460 mL    17 Jun 2022 07:01  -  17 Jun 2022 22:40  --------------------------------------------------------  IN: 240 mL / OUT: 0 mL / NET: 240 mL        Physical Exam:  Appearance: Fraily elderly  Eyes: PERRLA, EOMI, pink conjunctiva, no scleral icterus   HENT: normal oral mucosa  Cardiovascular: RRR, S1, S2, III/VI systolic murmur at apex; no edema; no JVD  Respiratory: Clear to auscultation bilaterally  Gastrointestinal: Soft, non-tender, non-distended, BS+  Musculoskeletal: No clubbing or joint deformity   Neurologic: No focal weakness  Lymphatic: No lymphadenopathy  Psychiatry: AAOx2 with appropriate mood and affect  Skin: No rashes, ecchymoses, or cyanosis                        7.4    6.19  )-----------( 175      ( 17 Jun 2022 06:44 )             23.2     06-17    144  |  113<H>  |  38<H>  ----------------------------<  180<H>  4.1   |  23  |  1.64<H>    Ca    8.7      17 Jun 2022 06:44  Phos  3.4     06-17  Mg     1.9     06-17            Cardiovascular Diagnostic Testing:  ECG: sinus 76 bpm, low voltage in limb leads    Echo: < from: Transthoracic Echocardiogram (10.09.20 @ 15:28) >  ------------------------------------------------------------------------  Dimensions:    Normal Values:  LA:     3.6    2.0 - 4.0 cm  Ao:     2.7    2.0 - 3.8 cm  SEPTUM: 1.0    0.6 - 1.2 cm  PWT:    0.8    0.6 -1.1 cm  LVIDd:  3.9    3.0 - 5.6 cm  LVIDs:  2.4    1.8 - 4.0 cm  Derived variables:  LVMI: 73 g/m2  RWT: 0.41  Fractional short: 38 %  EF (Moran Rule): 63 %Doppler Peak Velocity (m/sec):  AoV=2.0  ------------------------------------------------------------------------  Observations:  Mitral Valve: Normal mitral valve. Mild mitral  regurgitation.  Aortic Valve/Aorta: Calcified trileaflet aortic valve with  decreased opening. Peak transaortic valve gradient equals  16 mm Hg, mean transaortic valve gradient equals 8 mm Hg,  estimated aortic valve area equals 1.7 sqcm (by continuity  equation), aortic valve velocity time integral equals 43  cm, consistent with mild aortic stenosis. Mild aortic  regurgitation.  Peak left ventricular outflow tract  gradient equals 3 mm Hg, mean gradient is equal to 2 mm Hg,  LVOT velocity time integral equals 19 cm.  Aortic Root: 2.7 cm.  LVOT diameter: 2.2 cm.  Left Atrium: Mildly dilated left atrium.  LA volume index =  41 cc/m2.  Left Ventricle: Normal left ventricular systolic function.  No segmental wall motion abnormalities. Normal left  ventricular internal dimensions and wall thickness.  Moderate diastolic dysfunction (Stage II).  Right Heart: Normal right atrium. Normal right ventricular  size and function. Normal tricuspid valve. Mild tricuspid  regurgitation. Normal pulmonic valve.  Pericardium/Pleura: Normal pericardium with no pericardial  effusion.  Hemodynamic: Estimated right atrial pressure is 8 mm Hg.  Estimated right ventricular systolic pressure equals 44 mm  Hg, assuming right atrial pressure equals 8 mm Hg,  consistent with mild pulmonary hypertension.  ------------------------------------------------------------------------  Conclusions:  1. Calcified trileaflet aortic valve with decreased  opening. Peak transaortic valve gradient equals 16 mm Hg,  mean transaortic valve gradient equals 8 mm Hg, estimated  aortic valve area equals 1.7 sqcm (by continuity equation),  aortic valve velocity time integral equals 43 cm,  consistent with mild aortic stenosis.  2. Mildly dilated left atrium.  LA volume index = 41 cc/m2.  3. Normal left ventricular internal dimensions and wall  thickness.  4. Normal left ventricular systolic function. No segmental  wall motion abnormalities.  5. Moderate diastolic dysfunction (Stage II).  *** No previous Echo exam.  ------------------------------------------------------------------------  Confirmed on  10/9/2020 - 17:47:14 by ERNESTO Felipe  ------------------------------------------------------------------------    < end of copied text >    Interpretation of Telemetry: NSR with APCs, PVCs

## 2022-06-17 NOTE — PROGRESS NOTE ADULT - PROBLEM SELECTOR PLAN 5
Hx of afib:   - Will switch metoprolol 25mg ER to 12.5mg tartrate BID   - Hold AC given bleeding Hx of afib:   - metoprolol tartrate 25mg BID as pt BP on higher end   - Hold AC given bleeding

## 2022-06-17 NOTE — PRE PROCEDURE NOTE - NS ATTEND AMEND GEN_ALL_CORE FT
Agree with above, proceed with colonoscopy after informed consent to evaluate ? diverticular bleeding from Ascending colon.    Hola Hernandez MD  [facility]

## 2022-06-17 NOTE — PROGRESS NOTE ADULT - ASSESSMENT
89yo F w/ hx HTN, Afib, CHF, HLD, CKD Stage IV nonsmoker, remote hx of asthma presenting with loose stools, lethargy, fatigue, low blood pressure and blood bowel movement found to have a hemoglobin of 4.8 most likely 2/2 to GI bleed from eliquis.  DDx includes diverticulitis vs Gastric ulcer vs. Duodenal ulcer vs. angiodysplasia (?hades syndrome) vs. diverticulosis vs. hemorrhoids. Family does not wish to pursue invasive interventions  89yo F w/ hx HTN, Afib, CHF, HLD, CKD Stage IV nonsmoker, remote hx of asthma presenting with loose stools, lethargy, fatigue, low blood pressure and blood bowel movement found to have a hemoglobin of 4.8 most likely 2/2 to GI bleed from eliquis.  DDx includes diverticulitis vs Gastric ulcer vs. Duodenal ulcer vs. angiodysplasia. Pt pending colonoscopy today to ablate bleeding vessel in the ascending colon.

## 2022-06-18 LAB
ALBUMIN SERPL ELPH-MCNC: 2.7 G/DL — LOW (ref 3.3–5)
ALP SERPL-CCNC: 58 U/L — SIGNIFICANT CHANGE UP (ref 40–120)
ALT FLD-CCNC: 18 U/L — SIGNIFICANT CHANGE UP (ref 10–45)
ANION GAP SERPL CALC-SCNC: 7 MMOL/L — SIGNIFICANT CHANGE UP (ref 5–17)
AST SERPL-CCNC: 20 U/L — SIGNIFICANT CHANGE UP (ref 10–40)
BASOPHILS # BLD AUTO: 0.03 K/UL — SIGNIFICANT CHANGE UP (ref 0–0.2)
BASOPHILS NFR BLD AUTO: 0.4 % — SIGNIFICANT CHANGE UP (ref 0–2)
BILIRUB SERPL-MCNC: 0.6 MG/DL — SIGNIFICANT CHANGE UP (ref 0.2–1.2)
BUN SERPL-MCNC: 27 MG/DL — HIGH (ref 7–23)
CALCIUM SERPL-MCNC: 8.5 MG/DL — SIGNIFICANT CHANGE UP (ref 8.4–10.5)
CHLORIDE SERPL-SCNC: 110 MMOL/L — HIGH (ref 96–108)
CO2 SERPL-SCNC: 22 MMOL/L — SIGNIFICANT CHANGE UP (ref 22–31)
CREAT SERPL-MCNC: 1.3 MG/DL — SIGNIFICANT CHANGE UP (ref 0.5–1.3)
EGFR: 39 ML/MIN/1.73M2 — LOW
EOSINOPHIL # BLD AUTO: 0.29 K/UL — SIGNIFICANT CHANGE UP (ref 0–0.5)
EOSINOPHIL NFR BLD AUTO: 4.1 % — SIGNIFICANT CHANGE UP (ref 0–6)
GLUCOSE SERPL-MCNC: 186 MG/DL — HIGH (ref 70–99)
HCT VFR BLD CALC: 25.1 % — LOW (ref 34.5–45)
HGB BLD-MCNC: 7.9 G/DL — LOW (ref 11.5–15.5)
IMM GRANULOCYTES NFR BLD AUTO: 0.6 % — SIGNIFICANT CHANGE UP (ref 0–1.5)
LYMPHOCYTES # BLD AUTO: 1.12 K/UL — SIGNIFICANT CHANGE UP (ref 1–3.3)
LYMPHOCYTES # BLD AUTO: 15.8 % — SIGNIFICANT CHANGE UP (ref 13–44)
MAGNESIUM SERPL-MCNC: 1.6 MG/DL — SIGNIFICANT CHANGE UP (ref 1.6–2.6)
MCHC RBC-ENTMCNC: 28.9 PG — SIGNIFICANT CHANGE UP (ref 27–34)
MCHC RBC-ENTMCNC: 31.5 GM/DL — LOW (ref 32–36)
MCV RBC AUTO: 91.9 FL — SIGNIFICANT CHANGE UP (ref 80–100)
MONOCYTES # BLD AUTO: 0.5 K/UL — SIGNIFICANT CHANGE UP (ref 0–0.9)
MONOCYTES NFR BLD AUTO: 7.1 % — SIGNIFICANT CHANGE UP (ref 2–14)
NEUTROPHILS # BLD AUTO: 5.11 K/UL — SIGNIFICANT CHANGE UP (ref 1.8–7.4)
NEUTROPHILS NFR BLD AUTO: 72 % — SIGNIFICANT CHANGE UP (ref 43–77)
NRBC # BLD: 0 /100 WBCS — SIGNIFICANT CHANGE UP (ref 0–0)
PHOSPHATE SERPL-MCNC: 3.1 MG/DL — SIGNIFICANT CHANGE UP (ref 2.5–4.5)
PLATELET # BLD AUTO: 172 K/UL — SIGNIFICANT CHANGE UP (ref 150–400)
POTASSIUM SERPL-MCNC: 4 MMOL/L — SIGNIFICANT CHANGE UP (ref 3.5–5.3)
POTASSIUM SERPL-SCNC: 4 MMOL/L — SIGNIFICANT CHANGE UP (ref 3.5–5.3)
PROT SERPL-MCNC: 5 G/DL — LOW (ref 6–8.3)
RBC # BLD: 2.73 M/UL — LOW (ref 3.8–5.2)
RBC # FLD: 15.7 % — HIGH (ref 10.3–14.5)
SODIUM SERPL-SCNC: 139 MMOL/L — SIGNIFICANT CHANGE UP (ref 135–145)
WBC # BLD: 7.09 K/UL — SIGNIFICANT CHANGE UP (ref 3.8–10.5)
WBC # FLD AUTO: 7.09 K/UL — SIGNIFICANT CHANGE UP (ref 3.8–10.5)

## 2022-06-18 PROCEDURE — 99233 SBSQ HOSP IP/OBS HIGH 50: CPT | Mod: GC

## 2022-06-18 PROCEDURE — 99232 SBSQ HOSP IP/OBS MODERATE 35: CPT

## 2022-06-18 RX ORDER — SODIUM CHLORIDE 9 MG/ML
1000 INJECTION INTRAMUSCULAR; INTRAVENOUS; SUBCUTANEOUS
Refills: 0 | Status: DISCONTINUED | OUTPATIENT
Start: 2022-06-18 | End: 2022-06-18

## 2022-06-18 RX ORDER — DEXTROSE MONOHYDRATE, SODIUM CHLORIDE, AND POTASSIUM CHLORIDE 50; .745; 4.5 G/1000ML; G/1000ML; G/1000ML
1000 INJECTION, SOLUTION INTRAVENOUS
Refills: 0 | Status: DISCONTINUED | OUTPATIENT
Start: 2022-06-18 | End: 2022-06-19

## 2022-06-18 RX ADMIN — Medication 25 MILLIGRAM(S): at 17:30

## 2022-06-18 RX ADMIN — DEXTROSE MONOHYDRATE, SODIUM CHLORIDE, AND POTASSIUM CHLORIDE 50 MILLILITER(S): 50; .745; 4.5 INJECTION, SOLUTION INTRAVENOUS at 13:04

## 2022-06-18 RX ADMIN — Medication 25 MICROGRAM(S): at 06:02

## 2022-06-18 RX ADMIN — Medication 1 TABLET(S): at 12:28

## 2022-06-18 RX ADMIN — Medication 3 MILLIGRAM(S): at 21:23

## 2022-06-18 RX ADMIN — PANTOPRAZOLE SODIUM 40 MILLIGRAM(S): 20 TABLET, DELAYED RELEASE ORAL at 07:36

## 2022-06-18 RX ADMIN — Medication 75 MILLIGRAM(S): at 12:28

## 2022-06-18 RX ADMIN — Medication 25 MILLIGRAM(S): at 06:02

## 2022-06-18 RX ADMIN — Medication 500 MILLIGRAM(S): at 12:28

## 2022-06-18 RX ADMIN — ROSUVASTATIN CALCIUM 10 MILLIGRAM(S): 5 TABLET ORAL at 21:24

## 2022-06-18 NOTE — PROGRESS NOTE ADULT - ASSESSMENT
89yo F w/ hx HTN, Afib, CHF, HLD, CKD Stage IV nonsmoker, remote hx of asthma presenting with loose stools, lethargy, fatigue, low blood pressure and blood bowel movement found to have a hemoglobin of 4.8 most likely 2/2 to GI bleed from eliquis.  DDx includes diverticulitis vs Gastric ulcer vs. Duodenal ulcer vs. angiodysplasia. Pt pending colonoscopy today to ablate bleeding vessel in the ascending colon.

## 2022-06-18 NOTE — PROGRESS NOTE ADULT - ATTENDING COMMENTS
Patient seen and examined with son at bedside. Plan as discussed w/ Dr. Gregorio: monitor H&H and hemodynamics closely in setting of presentation with likely acute blood loss anemia that has improved s/p PRBC transfusions, f/u GI s/p colonoscopy as performed on 6/18, monitor stool count, continuing to hold home AC for Afib -- f/u cardiology re: plan for initiation of rhythm control w/ Amiodarone; OOB to chair, f/u PT.

## 2022-06-18 NOTE — CONSULT NOTE ADULT - ASSESSMENT
89yo F w/ hx HTN, Afib, CHF, AS, HLD, nonsmoker, remote hx of asthma presenting with loose stools, lethargy, fatigue, low blood pressure and bloody bowel movement. Of note, the patient had COVID 2 weeks PTA measured by at home test. RVP was + for COVID by PCR on admission.   In the ED: Labs: Hemoglobin 4.8, Cr 1.93, COVID +   required prbc. pt also had egd. eliquis was dc.       1- CKD IV   2- hx chf   3- anemia/ GI bleed    cr is better overall  compared to her baseline cr 1.9 range  chf is compensated hold lasix for now   trend hb   d.w pt son at bedside

## 2022-06-18 NOTE — PROGRESS NOTE ADULT - PROBLEM SELECTOR PLAN 9
DVT/PE ppx: SCD's   Diet: pt can return to [facility] Diet after scope    Dispo:   - home w/ assist; home w/ home PT DVT/PE ppx: SCD's   Diet: CC/DASH diet    Dispo:   - home w/ assist; home w/ home PT

## 2022-06-18 NOTE — PROGRESS NOTE ADULT - ASSESSMENT
Ms. Hermosillo is alert and oriented.  Had uneventful colonoscopy yesterday and H/H stable suggesting no recurrence of GI bleeding.  Appears volume depleted on examination.  Start gradual IV hydration for 12 hours.  As Ms. Hermosillo has had significant bleeding with Eliquis due to diverticular, the risks of anticoagulation appear to be greater then the benefits.  Consider rhythm control with oral amiodarone.  Murmur of aortic stenosis although recent echo with mild AS.  Cntinue current treatment.  Discussed with Ms Hermosillo and her son.

## 2022-06-18 NOTE — PROGRESS NOTE ADULT - PROBLEM SELECTOR PLAN 1
Most likely 2/2 to eliquis initiation and underlying GI pathology. Pending colonoscopy today for intervention.     Plan:  - c/w pantoprazole 40mg BID   - Maintain active type and screen   - Maintain 2 large bore IVs  - Transfuse to hemoglobin <7 --> pt with regular pRBCs transusions as hgb low.  - GI following  - cbc q12

## 2022-06-18 NOTE — PROGRESS NOTE ADULT - SUBJECTIVE AND OBJECTIVE BOX
Ms Hermosillo seen in room 301 PICU [facility]  Ms. Hermosillo is awake and alert with her son at bedside.  No complaints.      Review Of Systems:  Constitutional: No Fever, Chills,  No Fatigue, No Weight change   HEENT: No Blurred vision, No Headache   Respiratory: No Cough, No sputum production, No Wheezing, No Shortness of breath  Cardiovascular: No Chest Pain, No Palpitations, No Lightheadedness, No Falling, No Syncope, No ACOSTA, No PND, No Orthopnea, No Peripehral Edema  Gastrointestinal: No Abdominal Pain, No Diarrhea, No Constipation, No Nausea, No Vomiting, Normal Appetite   Genitourinary: No Dysuria  Extremities: No Swelling, No Claudication,   Neurologic:  No Focal deficit, No Weakness, No Dysphagia, No Paresthesias, No Syncope  Skin: No Rash,  No Ecchymoses, No Wounds, No Tenderness, No Drainage     Medications:  ascorbic acid 500 milliGRAM(s) Oral daily  levothyroxine 25 MICROGram(s) Oral daily  melatonin 3 milliGRAM(s) Oral at bedtime  metoprolol tartrate 25 milliGRAM(s) Oral two times a day  multivitamin 1 Tablet(s) Oral daily  pantoprazole    Tablet 40 milliGRAM(s) Oral before breakfast  rosuvastatin 10 milliGRAM(s) Oral at bedtime  sodium chloride 0.9%. 500 milliLiter(s) IV Continuous <Continuous>  venlafaxine XR. 75 milliGRAM(s) Oral daily    PMH/PSH/FH/SH: Unchanged  Vitals:  T(C): 36.4 (06-18-22 @ 04:30), Max: 36.9 (06-17-22 @ 08:41)  HR: 60 (06-18-22 @ 04:30) (58 - 71)  BP: 155/72 (06-18-22 @ 04:30) (129/56 - 170/72)  BP(mean): --  RR: 18 (06-18-22 @ 04:30) (16 - 20)  SpO2: 95% (06-18-22 @ 04:30) (95% - 100%)  Wt(kg): --  Daily Height in cm: 157.48 (17 Jun 2022 13:23)    Daily     Physical Exam:  Appearance:  Normal, NAD, Dry Mucous Membranes  Eyes: PERRL, EOMI  HENT:  Normal oral [place], NC/AT  Neck: without heptojugular reflux, carotid 2+ equal without bruits  No Thyromegaly  Cardiovascular: normal regular S1, physiologic harsh 2-3/6 systolic murmur with decreased A2 although present  Respiratory: Clear to percussion and auscultation bilaterally  Gastrointestinal: Soft, Non-tender, Non-distended, BS+, No masses  Neurologic: Non-focal, No focal neurologic deficits  Skin: No rashes, No ecchymoses, No cyanosis, no peripheral edema  Pulses-2+ equal to dorsalis pedis    06-17    144  |  113<H>  |  38<H>  ----------------------------<  180<H>  4.1   |  23  |  1.64<H>    Ca    8.7      17 Jun 2022 06:44  Phos  3.4     06-17  Mg     1.9     06-17 06-17 @ 07:01  -  06-18 @ 07:00  --------------------------------------------------------  IN: 240 mL / OUT: 300 mL / NET: -60 mL        Cardiac Testing:  Telemetry: Sinus rhythm

## 2022-06-18 NOTE — CONSULT NOTE ADULT - SUBJECTIVE AND OBJECTIVE BOX
[facility]  [phone]  NEPHROLOGY      INITIAL CONSULT NOTE  --------------------------------------------------------------------------------  HPI:    89yo F w/ hx HTN, Afib, CHF, AS, HLD, nonsmoker, remote hx of asthma presenting with loose stools, lethargy, fatigue, low blood pressure and bloody bowel movement. The patient started Eliquis at the end of April on 4/2022. The daughter noted on Friday that the patient developed loose stools that were normal in color. The patient subsequently was developing progressive fatigue, lethargy and decreased PO intake pt sent to er.  Of note, the patient had COVID 2 weeks PTA measured by at home test. RVP was + for COVID by PCR on admission.   In the ED: Labs: Hemoglobin 4.8, Cr 1.93, COVID +   required prbc. pt also had egd. eliquis was dc.       PAST HISTORY  --------------------------------------------------------------------------------  PAST MEDICAL & SURGICAL HISTORY:  HTN (hypertension)      Diabetes mellitus type 2, noninsulin dependent      Diverticulitis      Hyperlipidemia      GERD (gastroesophageal reflux disease)      Glaucoma      Breast cancer      Urinary incontinence      Renal calculus or stone      Arthritis      Heart murmur      Renal calculi  s/p stone extraction 57 yrs ago      S/P lumpectomy of breast  right breast with node removal      S/P bladder repair  Interstim device placement 2010        FAMILY HISTORY:  No pertinent family history in first degree relatives      PAST SOCIAL HISTORY:    ALLERGIES & MEDICATIONS  --------------------------------------------------------------------------------  Allergies    Keflex (Rash; Hives)    Intolerances      Standing Inpatient Medications  ascorbic acid 500 milliGRAM(s) Oral daily  levothyroxine 25 MICROGram(s) Oral daily  melatonin 3 milliGRAM(s) Oral at bedtime  metoprolol tartrate 25 milliGRAM(s) Oral two times a day  multivitamin 1 Tablet(s) Oral daily  pantoprazole    Tablet 40 milliGRAM(s) Oral before breakfast  rosuvastatin 10 milliGRAM(s) Oral at bedtime  sodium chloride 0.45% with potassium chloride 20 mEq/L 1000 milliLiter(s) IV Continuous <Continuous>  venlafaxine XR. 75 milliGRAM(s) Oral daily    PRN Inpatient Medications      REVIEW OF SYSTEMS  --------------------------------------------------------------------------------  Gen: No  fevers/chills   Skin: No rashes  Head/Eyes/Ears/Mouth: No headache; decrease hearing;  No sinus pain/discomfort, sore throat  Respiratory: No dyspnea, cough, wheezing,  CV: No chest pain, orthopnea  GI: No abdominal pain, diarrhea, nausea, vomiting, melena  : No dysuria, decrease urination or hesitancy urinating  hematuria\  MSK: No joint pain/swelling; no back pain  Neuro: No dizziness/lightheadedness,  also with no edema       VITALS/PHYSICAL EXAM  --------------------------------------------------------------------------------  T(C): 36.8 (06-18-22 @ 23:24), Max: 36.8 (06-18-22 @ 13:51)  HR: 62 (06-18-22 @ 23:24) (60 - 66)  BP: 136/70 (06-18-22 @ 23:24) (136/70 - 161/69)  RR: 18 (06-18-22 @ 23:24) (18 - 18)  SpO2: 99% (06-18-22 @ 23:24) (95% - 100%)  Wt(kg): --  Height (cm): 157.5 (06-17-22 @ 13:23)  Weight (kg): 50.8 (06-17-22 @ 13:23)  BMI (kg/m2): 20.5 (06-17-22 @ 13:23)  BSA (m2): 1.49 (06-17-22 @ 13:23)      06-17-22 @ 07:01  -  06-18-22 @ 07:00  --------------------------------------------------------  IN: 240 mL / OUT: 300 mL / NET: -60 mL    06-18-22 @ 07:01  -  06-18-22 @ 23:47  --------------------------------------------------------  IN: 730 mL / OUT: 500 mL / NET: 230 mL      Physical Exam:  	Gen: Non toxic comfortable appearing  forgetful   	no jvd    	Pulm: decrease bs  no rales or ronchi or wheezing  	CV: RRR, S1S2; no rub  	Back: No CVA tenderness  	Abd: +BS, soft, nontender/nondistended  	: No suprapubic tenderness  	UE: Warm, no cyanosis  no clubbing,  no edema  	LE: Warm, no cyanosis  no clubbing, no edema  	Neuro: alert and oriented. speech coherent  forgetful  	Skin: Warm, no decrease skin turgor   	    LABS/STUDIES  --------------------------------------------------------------------------------              7.9    7.09  >-----------<  172      [06-18-22 @ 11:53]              25.1     139  |  110  |  27  ----------------------------<  186      [06-18-22 @ 11:53]  4.0   |  22  |  1.30        Ca     8.5     [06-18-22 @ 11:53]      Mg     1.6     [06-18-22 @ 11:53]      Phos  3.1     [06-18-22 @ 11:53]    TPro  5.0  /  Alb  2.7  /  TBili  0.6  /  DBili  x   /  AST  20  /  ALT  18  /  AlkPhos  58  [06-18-22 @ 11:53]          Creatinine Trend:  SCr 1.30 [06-18 @ 11:53]  SCr 1.64 [06-17 @ 06:44]  SCr 1.71 [06-16 @ 06:20]  SCr 1.58 [06-15 @ 07:26]  SCr 1.56 [06-14 @ 06:00]    Urinalysis - [06-14-22 @ 00:46]      Color Light Yellow / Appearance Slightly Turbid / SG 1.014 / pH 5.5      Gluc 100 mg/dL / Ketone Negative  / Bili Negative / Urobili Negative       Blood Large / Protein Trace / Leuk Est Moderate / Nitrite Positive      RBC 26 / WBC 2 / Hyaline 3 / Gran  / Sq Epi  / Non Sq Epi 2 / Bacteria Moderate          Immunofixation Serum:   No Monoclonal Band Identified    Reference Range: None Detected      [10-10-20 @ 10:44]  SPEP Interpretation: Normal Electrophoresis Pattern      [10-10-20 @ 10:44]

## 2022-06-18 NOTE — PROGRESS NOTE ADULT - SUBJECTIVE AND OBJECTIVE BOX
Edin Gregorio MD    Internal Medicine Resident (PGY-3)  Please see "resident assignment" column on [place] for contact and coverage info  ___________________________________________________________________________________________________      KOREY DIAZ 89y Female    Overnight events/subjective:     Vital Signs Last 24 Hrs  T(C): 36.4 (18 Jun 2022 04:30), Max: 36.9 (17 Jun 2022 08:41)  T(F): 97.6 (18 Jun 2022 04:30), Max: 98.4 (17 Jun 2022 08:41)  HR: 60 (18 Jun 2022 04:30) (58 - 71)  BP: 155/72 (18 Jun 2022 04:30) (129/56 - 170/72)  BP(mean): --  RR: 18 (18 Jun 2022 04:30) (16 - 20)  SpO2: 95% (18 Jun 2022 04:30) (95% - 100%)    PHYSICAL EXAM:      HOSPITAL MEDICATIONS:  MEDICATIONS  (STANDING):  ascorbic acid 500 milliGRAM(s) Oral daily  levothyroxine 25 MICROGram(s) Oral daily  melatonin 3 milliGRAM(s) Oral at bedtime  metoprolol tartrate 25 milliGRAM(s) Oral two times a day  multivitamin 1 Tablet(s) Oral daily  pantoprazole    Tablet 40 milliGRAM(s) Oral before breakfast  rosuvastatin 10 milliGRAM(s) Oral at bedtime  sodium chloride 0.9%. 500 milliLiter(s) (30 mL/Hr) IV Continuous <Continuous>  venlafaxine XR. 75 milliGRAM(s) Oral daily    MEDICATIONS  (PRN):      LABS:                        8.0    6.34  )-----------( 166      ( 17 Jun 2022 22:59 )             25.2     06-17    144  |  113<H>  |  38<H>  ----------------------------<  180<H>  4.1   |  23  |  1.64<H>    Ca    8.7      17 Jun 2022 06:44  Phos  3.4     06-17  Mg     1.9     06-17             Edin Gregorio MD    Internal Medicine Resident (PGY-3)  Please see "resident assignment" column on [place] for contact and coverage info  ___________________________________________________________________________________________________      KOREY DIAZ 89y Female    Overnight events/subjective: No acute overnight events. Patient seen and examined at bedside. No complaints. Denies fever, chills, chest pain, shortness of breath, abdominal pain, nausea, vomiting, changes in bowel habits, or urinary symptoms.    Vital Signs Last 24 Hrs  T(C): 36.4 (18 Jun 2022 04:30), Max: 36.9 (17 Jun 2022 08:41)  T(F): 97.6 (18 Jun 2022 04:30), Max: 98.4 (17 Jun 2022 08:41)  HR: 60 (18 Jun 2022 04:30) (58 - 71)  BP: 155/72 (18 Jun 2022 04:30) (129/56 - 170/72)  BP(mean): --  RR: 18 (18 Jun 2022 04:30) (16 - 20)  SpO2: 95% (18 Jun 2022 04:30) (95% - 100%)    PHYSICAL EXAM:  CONSTITUTIONAL: NAD, well-developed, laying in bed awaiting her procedure  HEENT: NC/AT, EOMI  RESPIRATORY: No increased work of breathing, CTAB, no wheezes or crackles appreciated  CARDIOVASCULAR: RRR, S1 and S2 present, of note an audible crescendo/decrescendo systolic murmur, no rubs or gallops   ABDOMEN: soft, NT, ND, bowel sounds present  EXTREMITIES: No LE edema  MUSCULOSKELETAL: no joint swelling or tenderness to palpation  NEURO: A&Ox3, moving all extremities      HOSPITAL MEDICATIONS:  MEDICATIONS  (STANDING):  ascorbic acid 500 milliGRAM(s) Oral daily  levothyroxine 25 MICROGram(s) Oral daily  melatonin 3 milliGRAM(s) Oral at bedtime  metoprolol tartrate 25 milliGRAM(s) Oral two times a day  multivitamin 1 Tablet(s) Oral daily  pantoprazole    Tablet 40 milliGRAM(s) Oral before breakfast  rosuvastatin 10 milliGRAM(s) Oral at bedtime  sodium chloride 0.9%. 500 milliLiter(s) (30 mL/Hr) IV Continuous <Continuous>  venlafaxine XR. 75 milliGRAM(s) Oral daily    MEDICATIONS  (PRN):      LABS:                        8.0    6.34  )-----------( 166      ( 17 Jun 2022 22:59 )             25.2     06-17    144  |  113<H>  |  38<H>  ----------------------------<  180<H>  4.1   |  23  |  1.64<H>    Ca    8.7      17 Jun 2022 06:44  Phos  3.4     06-17  Mg     1.9     06-17

## 2022-06-19 LAB
ALBUMIN SERPL ELPH-MCNC: 2.3 G/DL — LOW (ref 3.3–5)
ALP SERPL-CCNC: 62 U/L — SIGNIFICANT CHANGE UP (ref 40–120)
ALT FLD-CCNC: 17 U/L — SIGNIFICANT CHANGE UP (ref 10–45)
ANION GAP SERPL CALC-SCNC: 10 MMOL/L — SIGNIFICANT CHANGE UP (ref 5–17)
AST SERPL-CCNC: 19 U/L — SIGNIFICANT CHANGE UP (ref 10–40)
BILIRUB SERPL-MCNC: 0.4 MG/DL — SIGNIFICANT CHANGE UP (ref 0.2–1.2)
BUN SERPL-MCNC: 36 MG/DL — HIGH (ref 7–23)
CALCIUM SERPL-MCNC: 8.3 MG/DL — LOW (ref 8.4–10.5)
CHLORIDE SERPL-SCNC: 110 MMOL/L — HIGH (ref 96–108)
CO2 SERPL-SCNC: 20 MMOL/L — LOW (ref 22–31)
CREAT SERPL-MCNC: 1.63 MG/DL — HIGH (ref 0.5–1.3)
EGFR: 29 ML/MIN/1.73M2 — LOW
GLUCOSE SERPL-MCNC: 135 MG/DL — HIGH (ref 70–99)
HCT VFR BLD CALC: 22.3 % — LOW (ref 34.5–45)
HCT VFR BLD CALC: 23.8 % — LOW (ref 34.5–45)
HGB BLD-MCNC: 7.1 G/DL — LOW (ref 11.5–15.5)
HGB BLD-MCNC: 7.5 G/DL — LOW (ref 11.5–15.5)
MAGNESIUM SERPL-MCNC: 1.6 MG/DL — SIGNIFICANT CHANGE UP (ref 1.6–2.6)
MCHC RBC-ENTMCNC: 29.3 PG — SIGNIFICANT CHANGE UP (ref 27–34)
MCHC RBC-ENTMCNC: 29.5 PG — SIGNIFICANT CHANGE UP (ref 27–34)
MCHC RBC-ENTMCNC: 31.5 GM/DL — LOW (ref 32–36)
MCHC RBC-ENTMCNC: 31.8 GM/DL — LOW (ref 32–36)
MCV RBC AUTO: 92.1 FL — SIGNIFICANT CHANGE UP (ref 80–100)
MCV RBC AUTO: 93.7 FL — SIGNIFICANT CHANGE UP (ref 80–100)
NRBC # BLD: 0 /100 WBCS — SIGNIFICANT CHANGE UP (ref 0–0)
NRBC # BLD: 0 /100 WBCS — SIGNIFICANT CHANGE UP (ref 0–0)
PHOSPHATE SERPL-MCNC: 3.5 MG/DL — SIGNIFICANT CHANGE UP (ref 2.5–4.5)
PLATELET # BLD AUTO: 161 K/UL — SIGNIFICANT CHANGE UP (ref 150–400)
PLATELET # BLD AUTO: 174 K/UL — SIGNIFICANT CHANGE UP (ref 150–400)
POTASSIUM SERPL-MCNC: 4.1 MMOL/L — SIGNIFICANT CHANGE UP (ref 3.5–5.3)
POTASSIUM SERPL-SCNC: 4.1 MMOL/L — SIGNIFICANT CHANGE UP (ref 3.5–5.3)
PROT SERPL-MCNC: 4.6 G/DL — LOW (ref 6–8.3)
RBC # BLD: 2.42 M/UL — LOW (ref 3.8–5.2)
RBC # BLD: 2.54 M/UL — LOW (ref 3.8–5.2)
RBC # FLD: 15.5 % — HIGH (ref 10.3–14.5)
RBC # FLD: 15.7 % — HIGH (ref 10.3–14.5)
SODIUM SERPL-SCNC: 140 MMOL/L — SIGNIFICANT CHANGE UP (ref 135–145)
WBC # BLD: 7.8 K/UL — SIGNIFICANT CHANGE UP (ref 3.8–10.5)
WBC # BLD: 8.06 K/UL — SIGNIFICANT CHANGE UP (ref 3.8–10.5)
WBC # FLD AUTO: 7.8 K/UL — SIGNIFICANT CHANGE UP (ref 3.8–10.5)
WBC # FLD AUTO: 8.06 K/UL — SIGNIFICANT CHANGE UP (ref 3.8–10.5)

## 2022-06-19 PROCEDURE — 99232 SBSQ HOSP IP/OBS MODERATE 35: CPT | Mod: GC

## 2022-06-19 PROCEDURE — 99232 SBSQ HOSP IP/OBS MODERATE 35: CPT

## 2022-06-19 RX ORDER — AMIODARONE HYDROCHLORIDE 400 MG/1
400 TABLET ORAL DAILY
Refills: 0 | Status: DISCONTINUED | OUTPATIENT
Start: 2022-06-19 | End: 2022-06-19

## 2022-06-19 RX ADMIN — Medication 500 MILLIGRAM(S): at 11:57

## 2022-06-19 RX ADMIN — Medication 25 MILLIGRAM(S): at 17:13

## 2022-06-19 RX ADMIN — Medication 1 TABLET(S): at 11:57

## 2022-06-19 RX ADMIN — Medication 25 MILLIGRAM(S): at 05:31

## 2022-06-19 RX ADMIN — Medication 75 MILLIGRAM(S): at 11:57

## 2022-06-19 RX ADMIN — ROSUVASTATIN CALCIUM 10 MILLIGRAM(S): 5 TABLET ORAL at 23:02

## 2022-06-19 RX ADMIN — Medication 3 MILLIGRAM(S): at 23:02

## 2022-06-19 RX ADMIN — Medication 25 MICROGRAM(S): at 05:32

## 2022-06-19 RX ADMIN — PANTOPRAZOLE SODIUM 40 MILLIGRAM(S): 20 TABLET, DELAYED RELEASE ORAL at 05:32

## 2022-06-19 NOTE — PROGRESS NOTE ADULT - SUBJECTIVE AND OBJECTIVE BOX
PROGRESS NOTE:   Authored By: Jennie Woo MD     PGY-1, Internal Medicine  Pager: [phone], [phone]    Patient is a 89y old  Female who presents with a chief complaint of gi bleed (19 Jun 2022 07:11)      OVERNIGHT EVENTS: No acute events overnight    SUBJECTIVE: Pt seen and examined at bedside this morning.     ADDITIONAL REVIEW OF SYSTEMS:    MEDICATIONS  (STANDING):  ascorbic acid 500 milliGRAM(s) Oral daily  levothyroxine 25 MICROGram(s) Oral daily  melatonin 3 milliGRAM(s) Oral at bedtime  metoprolol tartrate 25 milliGRAM(s) Oral two times a day  multivitamin 1 Tablet(s) Oral daily  pantoprazole    Tablet 40 milliGRAM(s) Oral before breakfast  rosuvastatin 10 milliGRAM(s) Oral at bedtime  sodium chloride 0.45% with potassium chloride 20 mEq/L 1000 milliLiter(s) (50 mL/Hr) IV Continuous <Continuous>  venlafaxine XR. 75 milliGRAM(s) Oral daily    MEDICATIONS  (PRN):      CAPILLARY BLOOD GLUCOSE        I&O's Summary    18 Jun 2022 07:01  -  19 Jun 2022 07:00  --------------------------------------------------------  IN: 730 mL / OUT: 800 mL / NET: -70 mL        PHYSICAL EXAM:  Vital Signs Last 24 Hrs  T(C): 36.7 (19 Jun 2022 05:30), Max: 36.8 (18 Jun 2022 13:51)  T(F): 98 (19 Jun 2022 05:30), Max: 98.3 (18 Jun 2022 13:51)  HR: 61 (19 Jun 2022 05:30) (61 - 66)  BP: 135/67 (19 Jun 2022 05:30) (135/67 - 161/69)  BP(mean): --  RR: 18 (19 Jun 2022 05:30) (18 - 18)  SpO2: 99% (19 Jun 2022 05:30) (95% - 100%)    CONSTITUTIONAL: NAD, well-developed  HEENT: NC/AT, EOMI  RESPIRATORY: No increased work of breathing, CTAB, no wheezes or crackles appreciated  CARDIOVASCULAR: RRR, S1 and S2 present, no m/r/g  ABDOMEN: soft, NT, ND, bowel sounds present  EXTREMITIES: No LE edema  MUSCULOSKELETAL: no joint swelling or tenderness to palpation  NEURO: A&Ox3, moving all extremities    LABS:                        7.1    8.06  )-----------( 161      ( 18 Jun 2022 23:20 )             22.3     06-18    139  |  110<H>  |  27<H>  ----------------------------<  186<H>  4.0   |  22  |  1.30    Ca    8.5      18 Jun 2022 11:53  Phos  3.1     06-18  Mg     1.6     06-18    TPro  5.0<L>  /  Alb  2.7<L>  /  TBili  0.6  /  DBili  x   /  AST  20  /  ALT  18  /  AlkPhos  58  06-18                RADIOLOGY & ADDITIONAL TESTS:    Results Reviewed:   Imaging Personally Reviewed:  Electrocardiogram Personally Reviewed:    COORDINATION OF CARE:  Care Discussed with Consultants/Other Providers [Y/N]:  Prior or Outpatient Records Reviewed [Y/N]:

## 2022-06-19 NOTE — PROGRESS NOTE ADULT - ATTENDING COMMENTS
Patient seen and examined. Plan as discussed w/ Dr. Woo: monitor H&H and hemodynamics closely in setting of presentation with likely acute blood loss anemia that has improved s/p PRBC transfusions, f/u GI s/p colonoscopy as performed on 6/18, monitor stool count, continuing to hold home AC for Afib -- f/u cardiology re: plan for initiation of rhythm control w/ Amiodarone (dosing of load, etc); OOB to chair, f/u PT.

## 2022-06-19 NOTE — PROGRESS NOTE ADULT - ASSESSMENT
Ms. Hermosillo is alert and oriented.  Had uneventful colonoscopy on 6/17 and H/H stable (Hgb 7.1 today after some hydration and my be dilutional) suggesting no recurrence of GI bleeding.  Continues to have dry mucous membranes and appears volume depleted.  Eating and drinking well , will therefore allow Ms Hermosillo to hydrate orally.  As Ms. Hermosillo has had significant bleeding with Eliquis due to diverticular, the risks of anticoagulation appear to be greater then the benefits.  Consider rhythm control with oral amiodarone.  Murmur of aortic stenosis although recent echo with mild AS.  Continue current treatment.  Discussed with Ms Hermosillo and her son. Ms. Hermosillo is alert and oriented.  Had uneventful colonoscopy on 6/17 and H/H stable (Hgb 7.1 today after some hydration and my be dilutional) suggesting no recurrence of GI bleeding.  Continue to monitor H/H.  Continues to have dry mucous membranes and appears volume depleted.  Eating and drinking well , will therefore allow Ms Hermosillo to hydrate orally.  As Ms. Hermosillo has had significant bleeding with Eliquis due to diverticular, the risks of anticoagulation appear to be greater then the benefits.  Consider rhythm control with oral amiodarone.  Murmur of aortic stenosis although recent echo with mild AS.  Continue current treatment.  Discussed with Ms Hermosillo and her son.

## 2022-06-19 NOTE — PROGRESS NOTE ADULT - SUBJECTIVE AND OBJECTIVE BOX
Ms Hermosillo seen in room 301 PICU [facility]  Ms. Hermosillo is awake and alert with her son at bedside.  No complaints.  Ms. Hermosillo's son indicates his mother was sitting in a chair yesterday and eating and drinking well.  No complaints.  States no bowel movement since colonoscopy.      Review Of Systems:  Constitutional: No Fever, Chills,  No Fatigue, No Weight change   HEENT: No Blurred vision, No Headache   Respiratory: No Cough, No sputum production, No Wheezing, No Shortness of breath  Cardiovascular: No Chest Pain, No Palpitations, No Lightheadedness, No Falling, No Syncope, No ACOSTA, No PND, No Orthopnea, No Peripehral Edema  Gastrointestinal: No Abdominal Pain, No Diarrhea, No Constipation, No Nausea, No Vomiting, Normal Appetite   Genitourinary: No Dysuria  Extremities: No Swelling, No Claudication,   Neurologic:  No Focal deficit, No Weakness, No Dysphagia, No Paresthesias, No Syncope  Skin: No Rash,  No Ecchymoses, No Wounds, No Tenderness, No Drainage     Medications:  ascorbic acid 500 milliGRAM(s) Oral daily  levothyroxine 25 MICROGram(s) Oral daily  melatonin 3 milliGRAM(s) Oral at bedtime  metoprolol tartrate 25 milliGRAM(s) Oral two times a day  multivitamin 1 Tablet(s) Oral daily  pantoprazole    Tablet 40 milliGRAM(s) Oral before breakfast  rosuvastatin 10 milliGRAM(s) Oral at bedtime  sodium chloride 0.45% with potassium chloride 20 mEq/L 1000 milliLiter(s) IV Continuous <Continuous>  venlafaxine XR. 75 milliGRAM(s) Oral daily    PMH/PSH/FH/SH: Unchanged  Vitals:  T(C): 36.7 (06-19-22 @ 05:30), Max: 36.8 (06-18-22 @ 13:51)  HR: 61 (06-19-22 @ 05:30) (61 - 66)  BP: 135/67 (06-19-22 @ 05:30) (135/67 - 161/69)  BP(mean): --  RR: 18 (06-19-22 @ 05:30) (18 - 18)  SpO2: 99% (06-19-22 @ 05:30) (95% - 100%)  Wt(kg): --  Daily     Daily     Physical Exam:  Appearance:  Normal, NAD, Dry mucous membranes  Eyes: PERRL, EOMI  HENT:  Normal oral mucosa NC/AT  Neck: without hepatojugular reflux, carotid 2+ equal without bruits  No Thyromegaly  Cardiovascular: normal regular S1,harsh 2/6 systolic murmur in aortic region  Respiratory: Clear to percussion and auscultation bilaterally  Gastrointestinal: Soft, Non-tender, Non-distended, BS+, No masses  Neurologic: Non-focal, No focal neurologic deficits  Skin: No rashes, No ecchymoses, No cyanosis, no peripheral edema  Pulses-2+ equal to dorsalis pedis    06-18    139  |  110<H>  |  27<H>  ----------------------------<  186<H>  4.0   |  22  |  1.30    Ca    8.5      18 Jun 2022 11:53  Phos  3.1     06-18  Mg     1.6     06-18    TPro  5.0<L>  /  Alb  2.7<L>  /  TBili  0.6  /  DBili  x   /  AST  20  /  ALT  18  /  AlkPhos  58  06-18 06-18 @ 07:01  -  06-19 @ 07:00  --------------------------------------------------------  IN: 730 mL / OUT: 800 mL / NET: -70 mL        Cardiac Testing:  Telemetry: Sinus rhyth

## 2022-06-19 NOTE — PROGRESS NOTE ADULT - ASSESSMENT
89yo F w/ hx HTN, Afib, CHF, HLD, CKD Stage IV nonsmoker, remote hx of asthma presenting with loose stools, lethargy, fatigue, low blood pressure and blood bowel movement found to have a hemoglobin of 4.8 most likely 2/2 to GI bleed from eliquis.  DDx includes diverticulitis vs Gastric ulcer vs. Duodenal ulcer vs. angiodysplasia. Pt pending colonoscopy today to ablate bleeding vessel in the ascending colon.

## 2022-06-20 LAB
ALBUMIN SERPL ELPH-MCNC: 2.5 G/DL — LOW (ref 3.3–5)
ALP SERPL-CCNC: 58 U/L — SIGNIFICANT CHANGE UP (ref 40–120)
ALT FLD-CCNC: 16 U/L — SIGNIFICANT CHANGE UP (ref 10–45)
ANION GAP SERPL CALC-SCNC: 10 MMOL/L — SIGNIFICANT CHANGE UP (ref 5–17)
AST SERPL-CCNC: 19 U/L — SIGNIFICANT CHANGE UP (ref 10–40)
BASOPHILS # BLD AUTO: 0.03 K/UL — SIGNIFICANT CHANGE UP (ref 0–0.2)
BASOPHILS NFR BLD AUTO: 0.5 % — SIGNIFICANT CHANGE UP (ref 0–2)
BILIRUB SERPL-MCNC: 0.6 MG/DL — SIGNIFICANT CHANGE UP (ref 0.2–1.2)
BUN SERPL-MCNC: 44 MG/DL — HIGH (ref 7–23)
CALCIUM SERPL-MCNC: 8.4 MG/DL — SIGNIFICANT CHANGE UP (ref 8.4–10.5)
CHLORIDE SERPL-SCNC: 111 MMOL/L — HIGH (ref 96–108)
CO2 SERPL-SCNC: 19 MMOL/L — LOW (ref 22–31)
CREAT SERPL-MCNC: 1.65 MG/DL — HIGH (ref 0.5–1.3)
EGFR: 29 ML/MIN/1.73M2 — LOW
EOSINOPHIL # BLD AUTO: 0.29 K/UL — SIGNIFICANT CHANGE UP (ref 0–0.5)
EOSINOPHIL NFR BLD AUTO: 4.4 % — SIGNIFICANT CHANGE UP (ref 0–6)
GLUCOSE SERPL-MCNC: 130 MG/DL — HIGH (ref 70–99)
HCT VFR BLD CALC: 20.5 % — CRITICAL LOW (ref 34.5–45)
HCT VFR BLD CALC: 25.4 % — LOW (ref 34.5–45)
HGB BLD-MCNC: 6.4 G/DL — CRITICAL LOW (ref 11.5–15.5)
HGB BLD-MCNC: 8.1 G/DL — LOW (ref 11.5–15.5)
IMM GRANULOCYTES NFR BLD AUTO: 0.9 % — SIGNIFICANT CHANGE UP (ref 0–1.5)
LYMPHOCYTES # BLD AUTO: 0.9 K/UL — LOW (ref 1–3.3)
LYMPHOCYTES # BLD AUTO: 13.6 % — SIGNIFICANT CHANGE UP (ref 13–44)
MAGNESIUM SERPL-MCNC: 1.7 MG/DL — SIGNIFICANT CHANGE UP (ref 1.6–2.6)
MCHC RBC-ENTMCNC: 29.2 PG — SIGNIFICANT CHANGE UP (ref 27–34)
MCHC RBC-ENTMCNC: 30 PG — SIGNIFICANT CHANGE UP (ref 27–34)
MCHC RBC-ENTMCNC: 31.2 GM/DL — LOW (ref 32–36)
MCHC RBC-ENTMCNC: 31.9 GM/DL — LOW (ref 32–36)
MCV RBC AUTO: 93.6 FL — SIGNIFICANT CHANGE UP (ref 80–100)
MCV RBC AUTO: 94.1 FL — SIGNIFICANT CHANGE UP (ref 80–100)
MONOCYTES # BLD AUTO: 0.47 K/UL — SIGNIFICANT CHANGE UP (ref 0–0.9)
MONOCYTES NFR BLD AUTO: 7.1 % — SIGNIFICANT CHANGE UP (ref 2–14)
NEUTROPHILS # BLD AUTO: 4.88 K/UL — SIGNIFICANT CHANGE UP (ref 1.8–7.4)
NEUTROPHILS NFR BLD AUTO: 73.5 % — SIGNIFICANT CHANGE UP (ref 43–77)
NRBC # BLD: 0 /100 WBCS — SIGNIFICANT CHANGE UP (ref 0–0)
NRBC # BLD: 0 /100 WBCS — SIGNIFICANT CHANGE UP (ref 0–0)
PHOSPHATE SERPL-MCNC: 3.6 MG/DL — SIGNIFICANT CHANGE UP (ref 2.5–4.5)
PLATELET # BLD AUTO: 167 K/UL — SIGNIFICANT CHANGE UP (ref 150–400)
PLATELET # BLD AUTO: 172 K/UL — SIGNIFICANT CHANGE UP (ref 150–400)
POTASSIUM SERPL-MCNC: 4.2 MMOL/L — SIGNIFICANT CHANGE UP (ref 3.5–5.3)
POTASSIUM SERPL-SCNC: 4.2 MMOL/L — SIGNIFICANT CHANGE UP (ref 3.5–5.3)
PROT SERPL-MCNC: 4.5 G/DL — LOW (ref 6–8.3)
RBC # BLD: 2.19 M/UL — LOW (ref 3.8–5.2)
RBC # BLD: 2.7 M/UL — LOW (ref 3.8–5.2)
RBC # FLD: 15.4 % — HIGH (ref 10.3–14.5)
RBC # FLD: 15.8 % — HIGH (ref 10.3–14.5)
SODIUM SERPL-SCNC: 140 MMOL/L — SIGNIFICANT CHANGE UP (ref 135–145)
WBC # BLD: 6.63 K/UL — SIGNIFICANT CHANGE UP (ref 3.8–10.5)
WBC # BLD: 7.03 K/UL — SIGNIFICANT CHANGE UP (ref 3.8–10.5)
WBC # FLD AUTO: 6.63 K/UL — SIGNIFICANT CHANGE UP (ref 3.8–10.5)
WBC # FLD AUTO: 7.03 K/UL — SIGNIFICANT CHANGE UP (ref 3.8–10.5)

## 2022-06-20 PROCEDURE — 99233 SBSQ HOSP IP/OBS HIGH 50: CPT | Mod: FS

## 2022-06-20 PROCEDURE — 99233 SBSQ HOSP IP/OBS HIGH 50: CPT | Mod: GC

## 2022-06-20 PROCEDURE — 99233 SBSQ HOSP IP/OBS HIGH 50: CPT

## 2022-06-20 RX ORDER — MAGNESIUM SULFATE 500 MG/ML
2 VIAL (ML) INJECTION ONCE
Refills: 0 | Status: COMPLETED | OUTPATIENT
Start: 2022-06-20 | End: 2022-06-20

## 2022-06-20 RX ORDER — SENNA PLUS 8.6 MG/1
2 TABLET ORAL AT BEDTIME
Refills: 0 | Status: DISCONTINUED | OUTPATIENT
Start: 2022-06-20 | End: 2022-06-25

## 2022-06-20 RX ADMIN — SENNA PLUS 2 TABLET(S): 8.6 TABLET ORAL at 22:07

## 2022-06-20 RX ADMIN — ROSUVASTATIN CALCIUM 10 MILLIGRAM(S): 5 TABLET ORAL at 22:08

## 2022-06-20 RX ADMIN — Medication 75 MILLIGRAM(S): at 11:06

## 2022-06-20 RX ADMIN — Medication 25 GRAM(S): at 09:22

## 2022-06-20 RX ADMIN — Medication 1 TABLET(S): at 11:05

## 2022-06-20 RX ADMIN — PANTOPRAZOLE SODIUM 40 MILLIGRAM(S): 20 TABLET, DELAYED RELEASE ORAL at 05:18

## 2022-06-20 RX ADMIN — Medication 25 MICROGRAM(S): at 05:19

## 2022-06-20 RX ADMIN — Medication 25 MILLIGRAM(S): at 17:31

## 2022-06-20 RX ADMIN — Medication 25 MILLIGRAM(S): at 05:18

## 2022-06-20 RX ADMIN — Medication 3 MILLIGRAM(S): at 22:08

## 2022-06-20 RX ADMIN — Medication 500 MILLIGRAM(S): at 11:05

## 2022-06-20 NOTE — PROGRESS NOTE ADULT - ASSESSMENT
89yo F w/ hx HTN, Afib, CHF, HLD, CKD Stage IV nonsmoker, remote hx of asthma presenting with loose stools, lethargy, fatigue, low blood pressure and blood bowel movement found to have a hemoglobin of 4.8 most likely 2/2 to GI bleed from eliquis.  DDx includes diverticulitis vs Gastric ulcer vs. Duodenal ulcer vs. angiodysplasia. Pt pending colonoscopy today to ablate bleeding vessel in the ascending colon.  89yo F w/ hx HTN, Afib, CHF, HLD, CKD Stage IV nonsmoker, remote hx of asthma presenting with loose stools, lethargy, fatigue, low blood pressure and blood bowel movement found to have a hemoglobin of 4.8 most likely 2/2 to GI bleed from eliquis.  DDx includes diverticulitis vs Gastric ulcer vs. Duodenal ulcer vs. angiodysplasia. s/p colonoscopy without active bleeding, now with stable Hg, without additional GI intervention, pending monitoring and discharge later this week.

## 2022-06-20 NOTE — PATIENT PROFILE ADULT - FALL HARM RISK - HARM RISK INTERVENTIONS

## 2022-06-20 NOTE — PROGRESS NOTE ADULT - SUBJECTIVE AND OBJECTIVE BOX
INTERVAL HPI/OVERNIGHT EVENTS:  no BMs or episodes of rectal bleeding since prep/colonoscopy 6/17  has required PRBCs transfusions over weekend  appetite good, no abdominal pain, nausea or vomiting    no CP, SOB or palpitations    COLONOSCOPY 6/17/22  poor prep and "old" blood in colon  no active bleeding encountered  +diverticulosis - primarily Left sided      MEDICATIONS  (STANDING):  ascorbic acid 500 milliGRAM(s) Oral daily  levothyroxine 25 MICROGram(s) Oral daily  melatonin 3 milliGRAM(s) Oral at bedtime  metoprolol tartrate 25 milliGRAM(s) Oral two times a day  multivitamin 1 Tablet(s) Oral daily  pantoprazole    Tablet 40 milliGRAM(s) Oral before breakfast  rosuvastatin 10 milliGRAM(s) Oral at bedtime  venlafaxine XR. 75 milliGRAM(s) Oral daily    MEDICATIONS  (PRN):      Allergies  Keflex (Rash; Hives)      Review of Systems:  General:  No wt loss, fevers, chills, night sweats  CV:  No pain, palpitations, +AF  Resp:  No dyspnea, cough, tachypnea, wheezing  GI:  see HPI  :  No pain, bleeding +hx kidney stones +incontinence s/p bladder repair  Muscle:  No pain, weakness +arthritis  Neuro:  No focal weakness, tingling, memory problems  Psych:  No fatigue, insomnia, mood problems, depression  Endocrine:  No polyuria, polydypsia, cold/heat intolerance  Heme:  No petechiae, ecchymosis, easy bruisability  Skin:  No rash, tattoos, scars, edema    Vital Signs Last 24 Hrs  T(C): 36.4 (20 Jun 2022 11:58), Max: 36.8 (19 Jun 2022 21:05)  T(F): 97.6 (20 Jun 2022 11:58), Max: 98.3 (19 Jun 2022 21:05)  HR: 63 (20 Jun 2022 11:58) (63 - 69)  BP: 104/55 (20 Jun 2022 11:58) (104/55 - 153/61)  BP(mean): --  RR: 18 (20 Jun 2022 11:58) (18 - 18)  SpO2: 96% (20 Jun 2022 11:58) (96% - 98%)    PHYSICAL EXAM:  Constitutional: NAD, well-developed elderly WF  OOB to chair, reading magazine  daughter at bedside. no pallor  Neck: No LAD, supple no JVD  Respiratory: clear b/l no accessory muscle use  Cardiovascular: S1 and S2, RRR,  +murmur  Gastrointestinal: BS+, soft, NT/ND, neg HSM  Extremities: No peripheral edema, neg clubbing, cyanosis  +healing ecchymoses on skin +fragile skin  Vascular: 2+ peripheral pulses  Neurological: A/O x 3, no focal deficits  Psychiatric: Normal mood, normal affect  Skin: No rashes +fragile skin No pallor        LABS:                        8.1    6.63  )-----------( 172      ( 20 Jun 2022 07:26 )             25.4   s/p 1 unit PRBCs transfused  Hemoglobin: 6.4 g/dL (06.20.22 @ 00:18)   Hemoglobin: 7.5 g/dL (06.19.22 @ 07:22)   Hemoglobin: 7.1 g/dL (06.18.22 @ 23:20)     06-20    140  |  111<H>  |  44<H>  ----------------------------<  130<H>  4.2   |  19<L>  |  1.65<H>    Ca    8.4      20 Jun 2022 07:22  Phos  3.6     06-20  Mg     1.7     06-20    TPro  4.5<L>  /  Alb  2.5<L>  /  TBili  0.6  /  DBili  x   /  AST  19  /  ALT  16  /  AlkPhos  58  06-20        RADIOLOGY & ADDITIONAL TESTS:

## 2022-06-20 NOTE — PROGRESS NOTE ADULT - SUBJECTIVE AND OBJECTIVE BOX
[facility] - PROGRESS NOTE    CHIEF COMPLAINT: COVID-19 positive nasal PCR; dyspnea; asthma; bilateral pleural effusions; atelectasis; hypotension; anorexia; fatigue; hematochezia; anemia    INTERVAL HISTORY: no bowel movement since her bowel prep and colonoscopy - poor prep - evidence of recent but not active bleeding - severe diverticulosis; received her 6th unit of PRBCs this AM for hgb less than 7.0; no shortness of breath or hypoxemia on room air; no cough, sputum production, hemoptysis, chest congestion or wheeze; no fevers, chills or sweats; no chest pain/pressure or palpitations;    REVIEW OF SYSTEMS:  Constitutional: As per interval history  HEENT: Within normal limits  CV: As per interval history  Resp: As per interval history  GI: lower GI bleed likely due to diverticulosis  : Within normal limits  Musculoskeletal: Within normal limits  Skin: Within normal limits  Neurological: Within normal limits  Psychiatric: Within normal limits  Endocrine: Within normal limits  Hematologic/Lymphatic: anemia  Allergic/Immunologic: Within normal limits        MEDICATIONS:     Pulmonary "    Anti-microbials:    Cardiovascular:  metoprolol tartrate 25 milliGRAM(s) Oral two times a day    Other:  ascorbic acid 500 milliGRAM(s) Oral daily  levothyroxine 25 MICROGram(s) Oral daily  melatonin 3 milliGRAM(s) Oral at bedtime  multivitamin 1 Tablet(s) Oral daily  pantoprazole    Tablet 40 milliGRAM(s) Oral before breakfast  rosuvastatin 10 milliGRAM(s) Oral at bedtime  venlafaxine XR. 75 milliGRAM(s) Oral daily    OBJECTIVE:    PHYSICAL EXAM:       ICU Vital Signs Last 24 Hrs  T(C): 36.4 (2022 11:58), Max: 36.8 (2022 21:05)  T(F): 97.6 (2022 11:58), Max: 98.3 (2022 21:05)  HR: 63 (2022 11:58) (63 - 69)  BP: 104/55 (2022 11:58) (104/55 - 153/61)  BP(mean): --  ABP: --  ABP(mean): --  RR: 18 (2022 11:58) (18 - 18)  SpO2: 96% (2022 11:58) (96% - 98%) on room air     General: Awake. Alert. Cooperative. No distress. Appears stated age. Sitting in the chair.	  HEENT:  Atraumatic. Normocephalic. Anicteric. Normal oral mucosa. PERRL. EOMI. Pale conjunctiva.  Neck: Supple. Trachea midline. Thyroid without enlargement/tenderness/nodules. No carotid bruit. No JVD.	  Cardiovascular: Regular rate and rhythm. S1 S2 normal. III/VI systolic murmur  Respiratory: Respirations unlabored. Clear to auscultation and percussion bilaterally. Kyphosis.  Abdomen: Soft. Non-tender. Non-distended. No organomegaly. No masses. Normal bowel sounds.  Extremities: Warm to touch. No clubbing or cyanosis. No pedal edema.   Pulses: 2+ peripheral pulses all extremities.	  Skin: Normal skin color. No rashes or lesions. No ecchymoses. No cyanosis. Warm to touch.  Lymph Nodes: Cervical, supraclavicular and axillary nodes normal  Neurological: Motor and sensory examination equal and normal. A and O x 3  Psychiatry: Appropriate mood and affect.    LABS:                          8.1    6.63  )-----------( 172      ( 2022 07:26 )             25.4     CBC    WBC  6.63 <==, 7.03 <==, 7.80 <==, 8.06 <==, 7.09 <==, 6.34 <==, 6.19 <==    Hemoglobin  8.1 <<==, 6.4 <<==, 7.5 <<==, 7.1 <<==, 7.9 <<==, 8.0 <<==, 7.4 <<==    Hematocrit  25.4 <==, 20.5 <==, 23.8 <==, 22.3 <==, 25.1 <==, 25.2 <==, 23.2 <==    Platelets  172 <==, 167 <==, 174 <==, 161 <==, 172 <==, 166 <==, 175 <==      140  |  111<H>  |  44<H>  ----------------------------<  130<H>    06-20  4.2   |  19<L>  |  1.65<H>      LYTES    sodium  140 <==, 140 <==, 139 <==, 144 <==, 140 <==, 143 <==, 142 <==    potassium   4.2 <==, 4.1 <==, 4.0 <==, 4.1 <==, 3.9 <==, 4.0 <==, 3.8 <==    chloride  111 <==, 110 <==, 110 <==, 113 <==, 108 <==, 108 <==, 107 <==    carbon dioxide  19 <==, 20 <==, 22 <==, 23 <==, 22 <==, 23 <==, 22 <==    =============================================================================================  RENAL FUNCTION:    Creatinine:   1.65  <<==, 1.63  <<==, 1.30  <<==, 1.64  <<==, 1.71  <<==, 1.58  <<==, 1.56  <<==    BUN:   44 <==, 36 <==, 27 <==, 38 <==, 45 <==, 43 <==, 50 <==    ============================================================================================    calcium   8.4 <==, 8.3 <==, 8.5 <==, 8.7 <==, 8.5 <==, 8.7 <==, 8.9 <==    phos   3.6 <==, 3.5 <==, 3.1 <==, 3.4 <==, 3.6 <==, 3.3 <==, 4.0 <==    mag   1.7 <==, 1.6 <==, 1.6 <==, 1.9 <==, 1.8 <==, 1.9 <==, 2.0 <==    ============================================================================================  LFTs    AST:   19 <== , 19 <== , 20 <== , 15 <== , 13 <==     ALT:  16  <== , 17  <== , 18  <== , 8  <== , 7  <==     AP:  58  <=, 62  <=, 58  <=, 61  <=, 62  <=    Bili:  0.6  <=, 0.4  <=, 0.6  <=, 0.4  <=, 0.3  <=    PT/INR - ( 2022 13:16 )   PT: 12.9 sec;   INR: 1.11 ratio      PTT - ( 2022 13:16 )  PTT:28.4 sec    Venous Blood Gas:   @ 01:58  7.33/47/25/25/28.5  VBG Lactate: 1.5    < from: Transthoracic Echocardiogram (22 @ 14:37) >    Patient name: KOREY DIAZ  YOB: 1929   Age: 89 (F)   MR#: [id number]  Study Date: 2022  [id number] Gadiel Schwartz RDCS  Study quality: Technically fair  Referring Physician: Teo Monson MD  Blood Pressure: 165/72 mmHg  Height: 158 cm  Weight: 53 kg  BSA: 1.5 m2  Heart Rate: 70 mmHg  ------------------------------------------------------------------------  PROCEDURE: Transthoracic echocardiogram with 2-D, M-Mode  and complete spectral and color flow Doppler.  INDICATION: Cardiac murmur, unspecified (R01.1)  ------------------------------------------------------------------------  Dimensions:    Normal Values:  LA:     3.6    2.0 - 4.0 cm  Ao:     3.0    2.0 - 3.8 cm  SEPTUM: 1.0    0.6 -1.2 cm  PWT:    1.0    0.6 - 1.1 cm  LVIDd:  4.0    3.0 - 5.6 cm  LVIDs:  2.8    1.8 - 4.0 cm  Derived variables:  LVMI: 84 g/m2  RWT: 0.50  Fractional short: 30 %  EF (Moran Rule): 67 %Doppler Peak Velocity (m/sec):  AoV=3.2  ------------------------------------------------------------------------  Observations:  Mitral Valve: Normal mitral valve. Mild mitral  regurgitation.  Aortic Valve/Aorta: Heavily calcified trileaflet aortic  valve with decreased opening. Peak transaortic valve  gradientequals 41 mm Hg, estimated aortic valve area  equals 1 sqcm (by continuity equation), aortic valve  velocity time integral equals 70 cm, consistent with  moderate aortic stenosis.  However the aortic valve appears heavily calcified and may  be closerto moderate-severe aortic stenosis.  Mild aortic  regurgitation.  Peak left ventricular outflow tract  gradient equals 3 mm Hg, LVOT velocity time integral equals  23 cm.  Aortic Root: 3 cm.  Left Atrium: Mildly dilated left atrium.  LA volume index =  39 cc/m2.  Left Ventricle: Normal left ventricular systolic function.  No segmental wall motion abnormalities. Normal left  ventricular internal dimensions and wall thicknesses.  Moderate diastolic dysfunction (Stage II).  Right Heart: Normal rightatrium. Normal right ventricular  size and function. Normal tricuspid valve. Minimal  tricuspid regurgitation. Normal pulmonic valve. Minimal  pulmonic regurgitation.  Pericardium/Pleura: Normal pericardium with trace  pericardial effusion.  Bilateral pleural effusions.  Hemodynamic: Estimated right atrial pressure is 8 mm Hg.  Color Doppler demonstrates no evidence of a patent foramen  ovale.  ------------------------------------------------------------------------  Conclusions:  1. Normal mitral valve. Mild mitral regurgitation.  2. Heavily calcified trileaflet aortic valve with decreased  opening. Peak transaortic valve gradient equals 41 mm Hg,  estimated aortic valve area equals 1 sqcm (by continuity  equation), aortic valve velocity time integral equals 70  cm, consistent with moderate aortic stenosis.  However the aortic valve appears heavily calcified and may  be closer to moderate-severe aortic stenosis.  Mild aortic  regurgitation.  3. Mildly dilated left atrium.  LA volume index = 39 cc/m2.  4. Normal left ventricular systolic function. No segmental  wall motion abnormalities.  5. Normal right ventricular size and function.  6. Normal pericardium with trace pericardial effusion.  7. Bilateral pleural effusions.  *** Compared with echocardiogram of 3/11/2022, no  significant changes noted.  ------------------------------------------------------------------------  Confirmed on  2022 - 17:38:34 by Osito Argueta M.D.  ------------------------------------------------------------------------  ---------------------------------------------------------------------------------------------------------------    MICROBIOLOGY:     Flu With COVID-19 By ERICK (22 @ 01:51)   SARS-CoV-2 Result: Detected  This Respiratory Panel uses polymerase chain reaction (PCR) to detect for   influenza A; influenza B; respiratory syncytial virus; and SARS-CoV-2.   This test was validated by [employer] and is in use under the FDA   Emergency Use Authorization (EUA) for clinical labs CLIA-certified to   perform high complexity testing. Test results should be correlated with   clinical presentation, patient history, and epidemiology.   Influenza A Result: NotDetec   Influenza B Result: NotDetec   Resp Syn Virus Result: NotDetec     Urinalysis Basic - ( 2022 00:46 )    Color: Light Yellow / Appearance: Slightly Turbid / S.014 / pH: x  Gluc: x / Ketone: Negative  / Bili: Negative / Urobili: Negative   Blood: x / Protein: Trace / Nitrite: Positive   Leuk Esterase: Moderate / RBC: 26 /hpf / WBC 2 /HPF   Sq Epi: x / Non Sq Epi: 2 /hpf / Bacteria: Moderate    Culture - Urine (22 @ 15:42)   Specimen Source: Clean Catch Clean Catch (Midstream)   Culture Results:   >100,000 CFU/ml Escherichia coli   <10,000 CFU/ml Normal Urogenital afshin present     RADIOLOGY:  [x] Chest radiographs reviewed and interpreted by me    EXAM:  XR CHEST PORTABLE URGENT 1V                          PROCEDURE DATE:  2022      INTERPRETATION:  CLINICAL INFORMATION: Shortness of breath.    TECHNIQUE: Frontal radiograph of the chest.    COMPARISON: CT chest and chest x-ray 2022    FINDINGS:  The lungs are clear.  No pleural effusion or pneumothorax.  Cardiomediastinal silhouette size is within normal limits.  No acute osseous abnormality.    IMPRESSION:  No evidence of acute pulmonary disease.    SCARLET JALLOH MD; Resident Radiology  This document has been electronically signed.  DIANE OBREGON MD; Attending Radiologist  This document has been electronically signed. 2022  1:19PM  ---------------------------------------------------------------------------------------------------------------  EXAM:  NM GI BLEEDING IMG                          PROCEDURE DATE:  06/15/2022      IMPRESSION: Abnormal GI bleeding scan.    Active bleeding site in the proximal ascending colon.    Dr. Argenis Barnes was notified of these results at 3:54 PM on 6/15/2022    MELVIN THOMPSON MD; Attending Nuclear Medicine  This document has been electronically signed. Dario 15 2022  4:03PM  ---------------------------------------------------------------------------------------------------------------  EXAM:  CT CHEST                          PROCEDURE DATE:  2022      FINDINGS:    Lungs/Airways/Pleura: The central airways are patent. There are small   pleural effusions, new from 3/9/2022 abdominal CT, with partial lower   lobe compressive atelectasis. There are diffuse peribronchial and  peribronchovascular groundglass opacity with mild septal thickening,   likely pulmonary edema. There are few clusters of small nodules on a  background of subsegmental bronchial impaction, likely due to small   airways disease.    Mediastinum/Lymph nodes: No thoracic adenopathy.    Heart and Vessels: Mild cardiomegaly. Extensive coronary arterial and   aortic valvular calcification is present. Hypodensity of the blood pool   in relation to left ventricular myocardium suggests underlying anemia.   The great vessels are normal in size. No pericardial effusion.    Upper Abdomen: Cholelithiasis. Partially imaged large right parapelvic  renal cyst. Extensive visceral artery calcification.    Osseous structures and Soft Tissues: Degenerative changes without   aggressive osseous lesions. Remote left posterior 11th rib fracture.    IMPRESSION:  Pulmonary edema with small pleural effusions and partial lower lobe   compressive atelectasis.    ELISA BLACKBURN M.D., Attending Radiologist  This document has been electronically signed. May 10 2022  8:52AM  ---------------------------------------------------------------------------------------------------------------  EXAM:  DUPLEX SCAN EXT VEINS LOWER BI                          PROCEDURE DATE:  2022      IMPRESSION:  No evidence of deep venous thrombosis in either lower extremity venous   segments able to be examined.     AUGUSTIN EASTMAN MD; Attending Radiologist  This document has been electronically signed. May  9 2022  3:31PM  ---------------------------------------------------------------------------------------------------------------

## 2022-06-20 NOTE — PROGRESS NOTE ADULT - ASSESSMENT
89yo F w/ hx HTN, Afib (recent dx 4/2022), CHF, HLD, nonsmoker, remote hx of asthma presenting with loose stools, lethargy, fatigue, low blood pressure and blood bowel movement found to have a hemoglobin of 4.8 most likely 2/2 to GI bleed- likely diverticular bleed given history/presentation    recent COVID + 5/20 s/p Paxlovid rx; COVID PCR + on admission  -continue off contact isolation per Infection Control Dept      Acute GI blood loss anemia; likely 2/2  diverticular bleeding given history/presentation.   +NM GI bleed scan (proximal AC)  IR input appreciated - increased risk of renal injury with IV contrast load required for angio  Recurrent/ongoing rectal bleeding with continued transfusion requirement therefore underwent Colonoscopy 6/17/22 6/17/22 COLON poor prep and "old" blood in colon; no active bleeding encountered, +diverticulosis - primarily Left sided    s/p 6 units PRBCs admission ->current  No evidence of active/ongoing GI bleeding (no BM since prep 6/17) ?equilibration    -monitor CBC (daily) and BMs (expect BMs to be dark 2/2 passage of retained blood from GI tract)  -ok for PO diet (DASH  -Recommend keep off AC 2/2 risk of recurrent bleeding  -PO PPI  -no plan for repeat endoscopic evaluation at this time    Discussed with pt and family at bedside  Discussed with House staff and Medicine attending      Marek Cormier PA-C    [facility]  [phone]  After hours and weekend coverage [phone]

## 2022-06-20 NOTE — PROGRESS NOTE ADULT - PROBLEM SELECTOR PLAN 1
Most likely 2/2 to eliquis initiation and underlying GI pathology. Pending colonoscopy today for intervention.     Plan:  - c/w pantoprazole 40mg BID   - Maintain active type and screen   - Maintain 2 large bore IVs  - Transfuse to hemoglobin <7 --> pt with regular pRBCs transusions as hgb low.  - GI following  - cbc q12 Most likely 2/2 to eliquis initiation and underlying GI pathology. Pending colonoscopy today for intervention.     Plan:  - c/w pantoprazole 40mg qd  - Maintain active type and screen   - Maintain 2 large bore IVs  - Transfuse to hemoglobin <7 --> s/p 5 PRBC transfusions, no bloody BM since colonoscopy  - GI following: no additional intervention  - Trend CBC qd

## 2022-06-20 NOTE — PROGRESS NOTE ADULT - ASSESSMENT
89 year-old with dementia and moderate aortic stenosis admitted with symptomatic anemia. She was recently started on Eliquis for thromboembolic prophylaxis in the setting of atrial fibrillation.  Noted to have anemia with hemoglobin < 5 g/dL.  Noted to continue test positive for Covid-19 (first positive on 5/22).    Symptoms and ELISA are likely due to her anemia, and not due to her aortic stenosis or Covid-19.  Transfuse PRBC as needed to maintain hemoglobin > 8 g/dL.    Agree with holding anticoagulation at this time.  GI to follow-up.    Discussed with patient's daughter present.

## 2022-06-20 NOTE — PROGRESS NOTE ADULT - SUBJECTIVE AND OBJECTIVE BOX
HPI:  Patient seen and examined at bedside in PICU with her daughter present.  Poor colonoscopy prep on Friday, 6/17.  H/H continues to drop.    Review Of Systems:           Respiratory: No shortness of breath, cough, or wheezing  Cardiovascular: No chest pain or palpitations  10 point review of systems is otherwise negative except as mentioned above        Medications:  ascorbic acid 500 milliGRAM(s) Oral daily  levothyroxine 25 MICROGram(s) Oral daily  melatonin 3 milliGRAM(s) Oral at bedtime  metoprolol tartrate 25 milliGRAM(s) Oral two times a day  multivitamin 1 Tablet(s) Oral daily  pantoprazole    Tablet 40 milliGRAM(s) Oral before breakfast  rosuvastatin 10 milliGRAM(s) Oral at bedtime  senna 2 Tablet(s) Oral at bedtime  venlafaxine XR. 75 milliGRAM(s) Oral daily    PAST MEDICAL & SURGICAL HISTORY:  HTN (hypertension)      Diabetes mellitus type 2, noninsulin dependent      Diverticulitis      Hyperlipidemia      GERD (gastroesophageal reflux disease)      Glaucoma      Breast cancer      Urinary incontinence      Renal calculus or stone      Arthritis      Heart murmur      Renal calculi  s/p stone extraction 57 yrs ago      S/P lumpectomy of breast  right breast with node removal      S/P bladder repair  Interstim device placement 2010        Vitals:  T(C): 36.4 (06-20-22 @ 11:58), Max: 36.6 (06-20-22 @ 06:09)  HR: 70 (06-20-22 @ 17:23) (63 - 70)  BP: 107/51 (06-20-22 @ 17:23) (104/55 - 153/61)  BP(mean): --  RR: 18 (06-20-22 @ 11:58) (18 - 18)  SpO2: 96% (06-20-22 @ 11:58) (96% - 98%)  Wt(kg): --  Daily     Daily   I&O's Summary    19 Jun 2022 07:01  -  20 Jun 2022 07:00  --------------------------------------------------------  IN: 660 mL / OUT: 950 mL / NET: -290 mL        Physical Exam:  Appearance: Fraily elderly  Eyes: PERRLA, EOMI, pink conjunctiva, no scleral icterus   HENT: normal oral mucosa  Cardiovascular: RRR, S1, S2, III/VI systolic murmur at apex; no edema; no JVD  Respiratory: Clear to auscultation bilaterally  Gastrointestinal: Soft, non-tender, non-distended, BS+  Musculoskeletal: No clubbing or joint deformity   Neurologic: No focal weakness  Lymphatic: No lymphadenopathy  Psychiatry: AAOx2 with appropriate mood and affect  Skin: No rashes, ecchymoses, or cyanosis                          8.1    6.63  )-----------( 172      ( 20 Jun 2022 07:26 )             25.4     06-20    140  |  111<H>  |  44<H>  ----------------------------<  130<H>  4.2   |  19<L>  |  1.65<H>    Ca    8.4      20 Jun 2022 07:22  Phos  3.6     06-20  Mg     1.7     06-20    TPro  4.5<L>  /  Alb  2.5<L>  /  TBili  0.6  /  DBili  x   /  AST  19  /  ALT  16  /  AlkPhos  58  06-20        Cardiovascular Diagnostic Testing:  ECG: sinus 76 bpm, low voltage in limb leads    Echo: < from: Transthoracic Echocardiogram (10.09.20 @ 15:28) >  ------------------------------------------------------------------------  Dimensions:    Normal Values:  LA:     3.6    2.0 - 4.0 cm  Ao:     2.7    2.0 - 3.8 cm  SEPTUM: 1.0    0.6 - 1.2 cm  PWT:    0.8    0.6 -1.1 cm  LVIDd:  3.9    3.0 - 5.6 cm  LVIDs:  2.4    1.8 - 4.0 cm  Derived variables:  LVMI: 73 g/m2  RWT: 0.41  Fractional short: 38 %  EF (Moran Rule): 63 %Doppler Peak Velocity (m/sec):  AoV=2.0  ------------------------------------------------------------------------  Observations:  Mitral Valve: Normal mitral valve. Mild mitral  regurgitation.  Aortic Valve/Aorta: Calcified trileaflet aortic valve with  decreased opening. Peak transaortic valve gradient equals  16 mm Hg, mean transaortic valve gradient equals 8 mm Hg,  estimated aortic valve area equals 1.7 sqcm (by continuity  equation), aortic valve velocity time integral equals 43  cm, consistent with mild aortic stenosis. Mild aortic  regurgitation.  Peak left ventricular outflow tract  gradient equals 3 mm Hg, mean gradient is equal to 2 mm Hg,  LVOT velocity time integral equals 19 cm.  Aortic Root: 2.7 cm.  LVOT diameter: 2.2 cm.  Left Atrium: Mildly dilated left atrium.  LA volume index =  41 cc/m2.  Left Ventricle: Normal left ventricular systolic function.  No segmental wall motion abnormalities. Normal left  ventricular internal dimensions and wall thickness.  Moderate diastolic dysfunction (Stage II).  Right Heart: Normal right atrium. Normal right ventricular  size and function. Normal tricuspid valve. Mild tricuspid  regurgitation. Normal pulmonic valve.  Pericardium/Pleura: Normal pericardium with no pericardial  effusion.  Hemodynamic: Estimated right atrial pressure is 8 mm Hg.  Estimated right ventricular systolic pressure equals 44 mm  Hg, assuming right atrial pressure equals 8 mm Hg,  consistent with mild pulmonary hypertension.  ------------------------------------------------------------------------  Conclusions:  1. Calcified trileaflet aortic valve with decreased  opening. Peak transaortic valve gradient equals 16 mm Hg,  mean transaortic valve gradient equals 8 mm Hg, estimated  aortic valve area equals 1.7 sqcm (by continuity equation),  aortic valve velocity time integral equals 43 cm,  consistent with mild aortic stenosis.  2. Mildly dilated left atrium.  LA volume index = 41 cc/m2.  3. Normal left ventricular internal dimensions and wall  thickness.  4. Normal left ventricular systolic function. No segmental  wall motion abnormalities.  5. Moderate diastolic dysfunction (Stage II).  *** No previous Echo exam.  ------------------------------------------------------------------------  Confirmed on  10/9/2020 - 17:47:14 by ERNESTO Felipe  ------------------------------------------------------------------------    < end of copied text >    Interpretation of Telemetry: NSR with APCs, PVCs

## 2022-06-20 NOTE — PROGRESS NOTE ADULT - PROBLEM SELECTOR PLAN 2
Currently euvolemic   - Will assess need for lasix daily Currently euvolemic   - Will assess need for lasix daily. Family updated that likely will hold lasix and re-evaluate OP

## 2022-06-20 NOTE — PROGRESS NOTE ADULT - SUBJECTIVE AND OBJECTIVE BOX
Patient:  KOREY DIAZ  [id number]    Progress Note    Interval events: No acute events.  Pertinent ROS (if any):      Administered:  levothyroxine: 25 MICROGram(s) Oral (06-20 @ 05:19)  pantoprazole    Tablet: 40 milliGRAM(s) Oral (06-20 @ 05:18)  metoprolol tartrate: 25 milliGRAM(s) Oral (06-20 @ 05:18)  rosuvastatin: 10 milliGRAM(s) Oral (06-19 @ 23:02)  melatonin: 3 milliGRAM(s) Oral (06-19 @ 23:02)        OBJECTIVE:    06-19 @ 07:01  -  06-20 @ 07:00  --------------------------------------------------------  IN: 660 mL / OUT: 950 mL / NET: -290 mL      CAPILLARY BLOOD GLUCOSE            VITALS:  T(F): 97.8 (06-20-22 @ 06:09), Max: 98.3 (06-19-22 @ 21:05)  HR: 65 (06-20-22 @ 06:09) (60 - 69)  BP: 143/68 (06-20-22 @ 06:09) (139/69 - 157/64)  BP(mean): --  ABP: --  ABP(mean): --  RR: 18 (06-20-22 @ 06:09) (18 - 18)  SpO2: 98% (06-20-22 @ 06:09) (98% - 100%)    PHYSICAL EXAM:  GENERAL: NAD, lying in bed comfortably  HEAD:  Atraumatic, Normocephalic  EYES: EOMI, PERRLA, conjunctiva and sclera clear  ENT: Moist mucous membranes  NECK: Supple, No JVD  CHEST/LUNG: Clear to auscultation bilaterally; No rales, rhonchi, wheezing, or rubs. Unlabored respirations  HEART: Regular rate and rhythm; No murmurs, rubs, or gallops  ABDOMEN: Bowel sounds present; Soft, Nontender, Nondistended. No hepatomegaly  EXTREMITIES:  2+ Peripheral Pulses, brisk capillary refill. No clubbing, cyanosis, or edema  NERVOUS SYSTEM:  Alert & Oriented X3, speech clear. No deficits   MSK: FROM all 4 extremities, full and equal strength  SKIN: No rashes or lesions    HOSPITAL MEDICATIONS:  Standing Meds:  ascorbic acid 500 milliGRAM(s) Oral daily  levothyroxine 25 MICROGram(s) Oral daily  melatonin 3 milliGRAM(s) Oral at bedtime  metoprolol tartrate 25 milliGRAM(s) Oral two times a day  multivitamin 1 Tablet(s) Oral daily  pantoprazole    Tablet 40 milliGRAM(s) Oral before breakfast  rosuvastatin 10 milliGRAM(s) Oral at bedtime  venlafaxine XR. 75 milliGRAM(s) Oral daily      PRN Meds:      LABS:  CBC 06-20-22 @ 00:18                        6.4    7.03  )-----------( 167                   20.5       Hgb trend: 6.4 <-- , 7.5 <-- , 7.1 <-- , 7.9 <-- , 8.0 <--   WBC trend: 7.03 <-- , 7.80 <-- , 8.06 <-- , 7.09 <-- , 6.34 <--       CMP 06-19-22 @ 07:07    140  |  110<H>  |  36<H>  ----------------------------<  135<H>  4.1   |  20<L>  |  1.63<H>    Phos  3.5     06-19  Mg     1.6     06-19    TPro  4.6<L>  /  Alb  2.3<L>  /  TBili  0.4  /  DBili  x   /  AST  19  /  ALT  17  /  AlkPhos  62  06-19      Serum Cr trend: 1.63 <-- , 1.30 <--         ABG Trend:     VBG Trend:       MICROBIOLOGY:       RADIOLOGY:  [ ] Reviewed and interpreted by me    EKG:   Patient:  KOREY DIAZ  [id number]    Progress Note    Interval events: Patient Hg < 7 ovn, received 1 unit PRBC, otherwise no BM since colonoscopy. Patient otherwise tolerating diet, passing gas. Family updated bedside.   Pertinent ROS (if any):  Denies f/c/n/v/cp/ap/bowel or bladder changes.    Administered:  levothyroxine: 25 MICROGram(s) Oral (06-20 @ 05:19)  pantoprazole    Tablet: 40 milliGRAM(s) Oral (06-20 @ 05:18)  metoprolol tartrate: 25 milliGRAM(s) Oral (06-20 @ 05:18)  rosuvastatin: 10 milliGRAM(s) Oral (06-19 @ 23:02)  melatonin: 3 milliGRAM(s) Oral (06-19 @ 23:02)    OBJECTIVE:    06-19 @ 07:01  -  06-20 @ 07:00  --------------------------------------------------------  IN: 660 mL / OUT: 950 mL / NET: -290 mL    CAPILLARY BLOOD GLUCOSE      VITALS:  T(F): 97.8 (06-20-22 @ 06:09), Max: 98.3 (06-19-22 @ 21:05)  HR: 65 (06-20-22 @ 06:09) (60 - 69)  BP: 143/68 (06-20-22 @ 06:09) (139/69 - 157/64)  BP(mean): --  ABP: --  ABP(mean): --  RR: 18 (06-20-22 @ 06:09) (18 - 18)  SpO2: 98% (06-20-22 @ 06:09) (98% - 100%)    PHYSICAL EXAM:  CONSTITUTIONAL: NAD, well-developed, AOX2-3  HEENT: NC/AT, EOMI  RESPIRATORY: No increased work of breathing, CTAB, no wheezes or crackles appreciated  CARDIOVASCULAR: RRR, S1 and S2 present, no m/r/g  ABDOMEN: soft, NT, ND, bowel sounds present  EXTREMITIES: No LE edema  MUSCULOSKELETAL: no joint swelling or tenderness to palpation  NEURO: A&Ox3, moving all extremities      HOSPITAL MEDICATIONS:  Standing Meds:  ascorbic acid 500 milliGRAM(s) Oral daily  levothyroxine 25 MICROGram(s) Oral daily  melatonin 3 milliGRAM(s) Oral at bedtime  metoprolol tartrate 25 milliGRAM(s) Oral two times a day  multivitamin 1 Tablet(s) Oral daily  pantoprazole    Tablet 40 milliGRAM(s) Oral before breakfast  rosuvastatin 10 milliGRAM(s) Oral at bedtime  venlafaxine XR. 75 milliGRAM(s) Oral daily      PRN Meds:      LABS:  CBC 06-20-22 @ 00:18                        6.4    7.03  )-----------( 167                   20.5       Hgb trend: 6.4 <-- , 7.5 <-- , 7.1 <-- , 7.9 <-- , 8.0 <--   WBC trend: 7.03 <-- , 7.80 <-- , 8.06 <-- , 7.09 <-- , 6.34 <--       CMP 06-19-22 @ 07:07    140  |  110<H>  |  36<H>  ----------------------------<  135<H>  4.1   |  20<L>  |  1.63<H>    Phos  3.5     06-19  Mg     1.6     06-19    TPro  4.6<L>  /  Alb  2.3<L>  /  TBili  0.4  /  DBili  x   /  AST  19  /  ALT  17  /  AlkPhos  62  06-19      Serum Cr trend: 1.63 <-- , 1.30 <--     ABG Trend:     VBG Trend:       MICROBIOLOGY:       RADIOLOGY:  [ ] Reviewed and interpreted by me    EKG:

## 2022-06-20 NOTE — PROGRESS NOTE ADULT - PROBLEM SELECTOR PLAN 5
Hx of afib:   - metoprolol tartrate 25mg BID as pt BP on higher end   - Hold AC given bleeding Hx of afib:   - metoprolol tartrate 25mg BID as pt BP on higher end   - Hold AC given bleeding  - Plan for patient to possibly start Amiodarone as OP, will continue with metoprolol tartrate while hospitalized, will f/u OP Cards

## 2022-06-20 NOTE — PROGRESS NOTE ADULT - ATTENDING COMMENTS
Patient seen with daughter at bedside. No bloody bowel movement, but hemoglobin dropped to 6s and patient received 1 unit pRBC with greater than expected response. Thus, I suspect fluctuation and possible lag and not true drop. GI team at bedside and no further plans for endoscopic intervention as they do not think she is actively bleeding. Maintain diet for now and monitor Hg daily. Patient should not be restarted on AC given risks. Cardiology following and monitoring volume status of diuretics. Likely discharge if hemoglobin is stable.

## 2022-06-20 NOTE — PROGRESS NOTE ADULT - ASSESSMENT
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 She has a history of HTN, HLD, DM, aortic stenosis, breast cancer s/p lumpectomy and CKD (Dr. Escobar). She was recently started on Eliquis and beta-blockers for atrial fibrillation. She was hospitalized in March with a UTI complicated by ELISA due to bactrim. She was admitted in May with dyspnea, hypoxemia, gurgling in her chest and leg swelling. She was initially treated with zosyn for a possible right lower lobe pneumonia seen on CXR until further evaluation revealed pulmonary edema with pleural effusions and atelectasis. ECHO -> preserved LVEF - moderate-severe aortic stenosis - moderate diastolic dysfunction - bilateral pleural effusions; CT -> pulmonary edema with small pleural effusions and partial lower lobe compressive atelectasis. She was diuresed and discharged on norvasc/toprol XL/lipitor/eliquis. The patient was diagnosed with COVID on May 22nd and was treated with a course of Paxlovid. She now returns to the ER with shortness of breath and weakness with minimal exertion. She has fatigue, malaise and pallor. She has been noted to have blood in her stools and black colored stools (after eating blueberries). Her daughter measured her blood pressure has 90/60 from a baseline of 120/90. She currently has no shortness of breath or hypoxemia on a 2lpm nasal canula. She has no cough, sputum production, chest congestion or wheeze. No fevers, chills or sweats. She has been found to be guaiac positive. RVP PCR remains positive for COVID. The patient has received Kcentra and 2 units PRBCs for severe anemia (4.8/16/6).     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 and respiratory muscle weakness limiting diaphragmatic excursion and chest wall expansion  4) no evidence of bronchospasm or pulmonary edema/pleural effusions    ELISA is due to intravascular volume depletion    nasal COVID PCR positivity more likely represents shedding of non-viable viral particles than ongoing infection in this immunocompetent patient    PLAN/RECOMMENDATIONS:    stable oxygenation on room air  no indication for airborne or contact isolation - the patient was first diagnosed with COVID on May 22nd  observe off systemic steroids, antibiotics and pulmonary medications  head of bed elevation greater than 45 degrees at all times  incentive spirometry  GI evaluation noted     no recent hematochezia - has now received 6 units of PRBCs - bleeding scan positive for active bleeding in the ascending colon -> felt to be at high rish for bowel ischemia and worsening renal function with an interventional radiology procedure -> colonoscopy -> poor prep - evidence of recent but not active bleeding - severe diverticulosis    hemodynamically stable - following H/H    holding A/C    protonix 40mg daily  cardiac meds: lopressor/crestor  glucose control  DVT prophylaxis - SCDs  venlafaxine XR/melatonin at bedtime  synthroid    Will follow with you. Plan of care discussed with the patient and her son at bedside. Discharge planning.    Brayden Benavides MD, [facility]  [phone]  Pulmonary Medicine

## 2022-06-21 LAB
ANION GAP SERPL CALC-SCNC: 8 MMOL/L — SIGNIFICANT CHANGE UP (ref 5–17)
BLD GP AB SCN SERPL QL: POSITIVE — SIGNIFICANT CHANGE UP
BUN SERPL-MCNC: 49 MG/DL — HIGH (ref 7–23)
CALCIUM SERPL-MCNC: 8.5 MG/DL — SIGNIFICANT CHANGE UP (ref 8.4–10.5)
CHLORIDE SERPL-SCNC: 110 MMOL/L — HIGH (ref 96–108)
CO2 SERPL-SCNC: 21 MMOL/L — LOW (ref 22–31)
CREAT SERPL-MCNC: 1.67 MG/DL — HIGH (ref 0.5–1.3)
EGFR: 29 ML/MIN/1.73M2 — LOW
GLUCOSE SERPL-MCNC: 140 MG/DL — HIGH (ref 70–99)
HCT VFR BLD CALC: 22.5 % — LOW (ref 34.5–45)
HCT VFR BLD CALC: 24.1 % — LOW (ref 34.5–45)
HGB BLD-MCNC: 7.2 G/DL — LOW (ref 11.5–15.5)
HGB BLD-MCNC: 7.4 G/DL — LOW (ref 11.5–15.5)
MCHC RBC-ENTMCNC: 29.4 PG — SIGNIFICANT CHANGE UP (ref 27–34)
MCHC RBC-ENTMCNC: 30.4 PG — SIGNIFICANT CHANGE UP (ref 27–34)
MCHC RBC-ENTMCNC: 30.7 GM/DL — LOW (ref 32–36)
MCHC RBC-ENTMCNC: 32 GM/DL — SIGNIFICANT CHANGE UP (ref 32–36)
MCV RBC AUTO: 94.9 FL — SIGNIFICANT CHANGE UP (ref 80–100)
MCV RBC AUTO: 95.6 FL — SIGNIFICANT CHANGE UP (ref 80–100)
NRBC # BLD: 0 /100 WBCS — SIGNIFICANT CHANGE UP (ref 0–0)
NRBC # BLD: 0 /100 WBCS — SIGNIFICANT CHANGE UP (ref 0–0)
PLATELET # BLD AUTO: 158 K/UL — SIGNIFICANT CHANGE UP (ref 150–400)
PLATELET # BLD AUTO: 191 K/UL — SIGNIFICANT CHANGE UP (ref 150–400)
POTASSIUM SERPL-MCNC: 4.3 MMOL/L — SIGNIFICANT CHANGE UP (ref 3.5–5.3)
POTASSIUM SERPL-SCNC: 4.3 MMOL/L — SIGNIFICANT CHANGE UP (ref 3.5–5.3)
RBC # BLD: 2.37 M/UL — LOW (ref 3.8–5.2)
RBC # BLD: 2.52 M/UL — LOW (ref 3.8–5.2)
RBC # FLD: 16.2 % — HIGH (ref 10.3–14.5)
RBC # FLD: 16.3 % — HIGH (ref 10.3–14.5)
RH IG SCN BLD-IMP: POSITIVE — SIGNIFICANT CHANGE UP
SODIUM SERPL-SCNC: 139 MMOL/L — SIGNIFICANT CHANGE UP (ref 135–145)
WBC # BLD: 6.05 K/UL — SIGNIFICANT CHANGE UP (ref 3.8–10.5)
WBC # BLD: 6.92 K/UL — SIGNIFICANT CHANGE UP (ref 3.8–10.5)
WBC # FLD AUTO: 6.05 K/UL — SIGNIFICANT CHANGE UP (ref 3.8–10.5)
WBC # FLD AUTO: 6.92 K/UL — SIGNIFICANT CHANGE UP (ref 3.8–10.5)

## 2022-06-21 PROCEDURE — 86077 PHYS BLOOD BANK SERV XMATCH: CPT

## 2022-06-21 PROCEDURE — 99233 SBSQ HOSP IP/OBS HIGH 50: CPT | Mod: GC

## 2022-06-21 PROCEDURE — 99233 SBSQ HOSP IP/OBS HIGH 50: CPT

## 2022-06-21 PROCEDURE — 86078 PHYS BLOOD BANK SERV REACTJ: CPT

## 2022-06-21 RX ADMIN — Medication 1 TABLET(S): at 11:44

## 2022-06-21 RX ADMIN — Medication 25 MICROGRAM(S): at 05:38

## 2022-06-21 RX ADMIN — Medication 500 MILLIGRAM(S): at 11:44

## 2022-06-21 RX ADMIN — Medication 3 MILLIGRAM(S): at 22:23

## 2022-06-21 RX ADMIN — Medication 25 MILLIGRAM(S): at 17:08

## 2022-06-21 RX ADMIN — Medication 25 MILLIGRAM(S): at 05:38

## 2022-06-21 RX ADMIN — SENNA PLUS 2 TABLET(S): 8.6 TABLET ORAL at 22:24

## 2022-06-21 RX ADMIN — ROSUVASTATIN CALCIUM 10 MILLIGRAM(S): 5 TABLET ORAL at 22:23

## 2022-06-21 RX ADMIN — Medication 75 MILLIGRAM(S): at 11:44

## 2022-06-21 RX ADMIN — PANTOPRAZOLE SODIUM 40 MILLIGRAM(S): 20 TABLET, DELAYED RELEASE ORAL at 05:38

## 2022-06-21 NOTE — PROGRESS NOTE ADULT - SUBJECTIVE AND OBJECTIVE BOX
[facility] - PROGRESS NOTE    CHIEF COMPLAINT: COVID-19 positive nasal PCR; dyspnea; asthma; bilateral pleural effusions; atelectasis; hypotension; anorexia; fatigue; hematochezia; anemia    INTERVAL HISTORY: hemodynamically stable with stable H/H -> awaiting a repeat CBC later today; no bowel movement since her bowel prep and colonoscopy -> poor prep - evidence of recent but not active bleeding - severe diverticulosis; received her 6th unit of PRBCs yesterday for hgb less than 7.0; no shortness of breath or hypoxemia on room air; no cough, sputum production, hemoptysis, chest congestion or wheeze; no fevers, chills or sweats; no chest pain/pressure or palpitations;    REVIEW OF SYSTEMS:  Constitutional: As per interval history  HEENT: Within normal limits  CV: As per interval history  Resp: As per interval history  GI: lower GI bleed likely due to diverticulosis  : Within normal limits  Musculoskeletal: Within normal limits  Skin: Within normal limits  Neurological: Within normal limits  Psychiatric: Within normal limits  Endocrine: Within normal limits  Hematologic/Lymphatic: anemia  Allergic/Immunologic: Within normal limits    MEDICATIONS:     Pulmonary "    Anti-microbials:    Cardiovascular:  metoprolol tartrate 25 milliGRAM(s) Oral two times a day    Other:  ascorbic acid 500 milliGRAM(s) Oral daily  levothyroxine 25 MICROGram(s) Oral daily  melatonin 3 milliGRAM(s) Oral at bedtime  multivitamin 1 Tablet(s) Oral daily  pantoprazole    Tablet 40 milliGRAM(s) Oral before breakfast  rosuvastatin 10 milliGRAM(s) Oral at bedtime  senna 2 Tablet(s) Oral at bedtime  venlafaxine XR. 75 milliGRAM(s) Oral daily    OBJECTIVE:    I&O's Detail    2022 07:  -  2022 07:00  --------------------------------------------------------  IN:  Total IN: 0 mL    OUT:    Voided (mL): 700 mL  Total OUT: 700 mL    Total NET: -700 mL      2022 07:  -  2022 14:02  --------------------------------------------------------  IN:    Oral Fluid: 300 mL  Total IN: 300 mL    OUT:    Voided (mL): 500 mL  Total OUT: 500 mL    Total NET: -200 mL    PHYSICAL EXAM:       ICU Vital Signs Last 24 Hrs  T(C): 36.5 (2022 12:44), Max: 36.5 (2022 12:44)  T(F): 97.7 (2022 12:44), Max: 97.7 (2022 12:44)  HR: 53 (2022 12:44) (53 - 70)  BP: 138/67 (2022 12:44) (107/51 - 138/67)  BP(mean): --  ABP: --  ABP(mean): --  RR: 18 (2022 12:44) (18 - 18)  SpO2: 97% (2022 12:44) (97% - 99%) on room air    General: Awake. Alert. Cooperative. No distress. Appears stated age. Sitting in the chair.	  HEENT:  Atraumatic. Normocephalic. Anicteric. Normal oral mucosa. PERRL. EOMI. Pale conjunctiva.  Neck: Supple. Trachea midline. Thyroid without enlargement/tenderness/nodules. No carotid bruit. No JVD.	  Cardiovascular: Regular rate and rhythm. S1 S2 normal. III/VI systolic murmur  Respiratory: Respirations unlabored. Clear to auscultation and percussion bilaterally. Kyphosis.  Abdomen: Soft. Non-tender. Non-distended. No organomegaly. No masses. Normal bowel sounds.  Extremities: Warm to touch. No clubbing or cyanosis. No pedal edema.   Pulses: 2+ peripheral pulses all extremities.	  Skin: Normal skin color. No rashes or lesions. No ecchymoses. No cyanosis. Warm to touch.  Lymph Nodes: Cervical, supraclavicular and axillary nodes normal  Neurological: Motor and sensory examination equal and normal. A and O x 3  Psychiatry: Appropriate mood and affect.    LABS:                          7.4    6.92  )-----------( 158      ( 2022 09:06 )             24.1     CBC    WBC  6.92 <==, 6.63 <==, 7.03 <==, 7.80 <==, 8.06 <==, 7.09 <==, 6.34 <==    Hemoglobin  7.4 <<==, 8.1 <<==, 6.4 <<==, 7.5 <<==, 7.1 <<==, 7.9 <<==, 8.0 <<==    Hematocrit  24.1 <==, 25.4 <==, 20.5 <==, 23.8 <==, 22.3 <==, 25.1 <==, 25.2 <==    Platelets  158 <==, 172 <==, 167 <==, 174 <==, 161 <==, 172 <==, 166 <==      139  |  110<H>  |  49<H>  ----------------------------<  140<H>    06-21  4.3   |  21<L>  |  1.67<H>      LYTES    sodium  139 <==, 140 <==, 140 <==, 139 <==, 144 <==, 140 <==, 143 <==    potassium   4.3 <==, 4.2 <==, 4.1 <==, 4.0 <==, 4.1 <==, 3.9 <==, 4.0 <==    chloride  110 <==, 111 <==, 110 <==, 110 <==, 113 <==, 108 <==, 108 <==    carbon dioxide  21 <==, 19 <==, 20 <==, 22 <==, 23 <==, 22 <==, 23 <==    =============================================================================================  RENAL FUNCTION:    Creatinine:   1.67  <<==, 1.65  <<==, 1.63  <<==, 1.30  <<==, 1.64  <<==, 1.71  <<==, 1.58  <<==    BUN:   49 <==, 44 <==, 36 <==, 27 <==, 38 <==, 45 <==, 43 <==    ============================================================================================    calcium   8.5 <==, 8.4 <==, 8.3 <==, 8.5 <==, 8.7 <==, 8.5 <==, 8.7 <==    phos   3.6 <==, 3.5 <==, 3.1 <==, 3.4 <==, 3.6 <==, 3.3 <==    mag   1.7 <==, 1.6 <==, 1.6 <==, 1.9 <==, 1.8 <==, 1.9 <==    ============================================================================================  LFTs    AST:   19 <== , 19 <== , 20 <== , 15 <==     ALT:  16  <== , 17  <== , 18  <== , 8  <==     AP:  58  <=, 62  <=, 58  <=, 61  <=    Bili:  0.6  <=, 0.4  <=, 0.6  <=, 0.4  <=    PT/INR - ( 2022 13:16 )   PT: 12.9 sec;   INR: 1.11 ratio      PTT - ( 2022 13:16 )  PTT:28.4 sec    Venous Blood Gas:   @ 01:58  7.33/47/25/25/28.5  VBG Lactate: 1.5    < from: Transthoracic Echocardiogram (22 @ 14:37) >    Patient name: KOREY DIAZ  YOB: 1929   Age: 89 (F)   MR#: [id number]  Study Date: 2022  Location: [address] [place], [place] [id number]: Gadiel Schwartz RDCS  Study quality: Technically fair  Referring Physician: Teo Monson MD  Blood Pressure: 165/72 mmHg  Height: 158 cm  Weight: 53 kg  BSA: 1.5 m2  Heart Rate: 70 mmHg  ------------------------------------------------------------------------  PROCEDURE: Transthoracic echocardiogram with 2-D, M-Mode  and complete spectral and color flow Doppler.  INDICATION: Cardiac murmur, unspecified (R01.1)  ------------------------------------------------------------------------  Dimensions:    Normal Values:  LA:     3.6    2.0 - 4.0 cm  Ao:     3.0    2.0 - 3.8 cm  SEPTUM: 1.0    0.6 -1.2 cm  PWT:    1.0    0.6 - 1.1 cm  LVIDd:  4.0    3.0 - 5.6 cm  LVIDs:  2.8    1.8 - 4.0 cm  Derived variables:  LVMI: 84 g/m2  RWT: 0.50  Fractional short: 30 %  EF (Moran Rule): 67 %Doppler Peak Velocity (m/sec):  AoV=3.2  ------------------------------------------------------------------------  Observations:  Mitral Valve: Normal mitral valve. Mild mitral  regurgitation.  Aortic Valve/Aorta: Heavily calcified trileaflet aortic  valve with decreased opening. Peak transaortic valve  gradientequals 41 mm Hg, estimated aortic valve area  equals 1 sqcm (by continuity equation), aortic valve  velocity time integral equals 70 cm, consistent with  moderate aortic stenosis.  However the aortic valve appears heavily calcified and may  be closerto moderate-severe aortic stenosis.  Mild aortic  regurgitation.  Peak left ventricular outflow tract  gradient equals 3 mm Hg, LVOT velocity time integral equals  23 cm.  Aortic Root: 3 cm.  Left Atrium: Mildly dilated left atrium.  LA volume index =  39 cc/m2.  Left Ventricle: Normal left ventricular systolic function.  No segmental wall motion abnormalities. Normal left  ventricular internal dimensions and wall thicknesses.  Moderate diastolic dysfunction (Stage II).  Right Heart: Normal rightatrium. Normal right ventricular  size and function. Normal tricuspid valve. Minimal  tricuspid regurgitation. Normal pulmonic valve. Minimal  pulmonic regurgitation.  Pericardium/Pleura: Normal pericardium with trace  pericardial effusion.  Bilateral pleural effusions.  Hemodynamic: Estimated right atrial pressure is 8 mm Hg.  Color Doppler demonstrates no evidence of a patent foramen  ovale.  ------------------------------------------------------------------------  Conclusions:  1. Normal mitral valve. Mild mitral regurgitation.  2. Heavily calcified trileaflet aortic valve with decreased  opening. Peak transaortic valve gradient equals 41 mm Hg,  estimated aortic valve area equals 1 sqcm (by continuity  equation), aortic valve velocity time integral equals 70  cm, consistent with moderate aortic stenosis.  However the aortic valve appears heavily calcified and may  be closer to moderate-severe aortic stenosis.  Mild aortic  regurgitation.  3. Mildly dilated left atrium.  LA volume index = 39 cc/m2.  4. Normal left ventricular systolic function. No segmental  wall motion abnormalities.  5. Normal right ventricular size and function.  6. Normal pericardium with trace pericardial effusion.  7. Bilateral pleural effusions.  *** Compared with echocardiogram of 3/11/2022, no  significant changes noted.  ------------------------------------------------------------------------  Confirmed on  2022 - 17:38:34 by Osito Argueta M.D.  ------------------------------------------------------------------------  ---------------------------------------------------------------------------------------------------------------    MICROBIOLOGY:     Flu With COVID-19 By ERICK (22 @ 01:51)   SARS-CoV-2 Result: Detected  This Respiratory Panel uses polymerase chain reaction (PCR) to detect for   influenza A; influenza B; respiratory syncytial virus; and SARS-CoV-2.   This test was validated by [employer] and is in use under the FDA   Emergency Use Authorization (EUA) for clinical labs CLIA-certified to   perform high complexity testing. Test results should be correlated with   clinical presentation, patient history, and epidemiology.   Influenza A Result: [employer]   Influenza B Result: [employer]   Resp Syn Virus Result: [employer]     Urinalysis Basic - ( 2022 00:46 )    Color: Light Yellow / Appearance: Slightly Turbid / S.014 / pH: x  Gluc: x / Ketone: Negative  / Bili: Negative / Urobili: Negative   Blood: x / Protein: Trace / Nitrite: Positive   Leuk Esterase: Moderate / RBC: 26 /hpf / WBC 2 /HPF   Sq Epi: x / Non Sq Epi: 2 /hpf / Bacteria: Moderate    Culture - Urine (22 @ 15:42)   Specimen Source: Clean Catch Clean Catch (Midstream)   Culture Results:   >100,000 CFU/ml Escherichia coli   <10,000 CFU/ml Normal Urogenital afshin present     RADIOLOGY:  [x] Chest radiographs reviewed and interpreted by me    EXAM:  XR CHEST PORTABLE URGENT 1V                          PROCEDURE DATE:  2022      INTERPRETATION:  CLINICAL INFORMATION: Shortness of breath.    TECHNIQUE: Frontal radiograph of the chest.    COMPARISON: CT chest and chest x-ray 2022    FINDINGS:  The lungs are clear.  No pleural effusion or pneumothorax.  Cardiomediastinal silhouette size is within normal limits.  No acute osseous abnormality.    IMPRESSION:  No evidence of acute pulmonary disease.    SCARLET JALLOH MD; Resident Radiology  This document has been electronically signed.  DIANE OBREGON MD; Attending Radiologist  This document has been electronically signed. 2022  1:19PM  ---------------------------------------------------------------------------------------------------------------  EXAM:  NM GI BLEEDING IMG                          PROCEDURE DATE:  06/15/2022      IMPRESSION: Abnormal GI bleeding scan.    Active bleeding site in the proximal ascending colon.    Dr. Argenis Barnes was notified of these results at 3:54 PM on 6/15/2022    MELVIN THOMPSON MD; Attending Nuclear Medicine  This document has been electronically signed. Dario 15 2022  4:03PM  ---------------------------------------------------------------------------------------------------------------  EXAM:  CT CHEST                          PROCEDURE DATE:  2022      FINDINGS:    Lungs/Airways/Pleura: The central airways are patent. There are small   pleural effusions, new from 3/9/2022 abdominal CT, with partial lower   lobe compressive atelectasis. There are diffuse peribronchial and  peribronchovascular groundglass opacity with mild septal thickening,   likely pulmonary edema. There are few clusters of small nodules on a  background of subsegmental bronchial impaction, likely due to small   airways disease.    Mediastinum/Lymph nodes: No thoracic adenopathy.    Heart and Vessels: Mild cardiomegaly. Extensive coronary arterial and   aortic valvular calcification is present. Hypodensity of the blood pool   in relation to left ventricular myocardium suggests underlying anemia.   The great vessels are normal in size. No pericardial effusion.    Upper Abdomen: Cholelithiasis. Partially imaged large right parapelvic  renal cyst. Extensive visceral artery calcification.    Osseous structures and Soft Tissues: Degenerative changes without   aggressive osseous lesions. Remote left posterior 11th rib fracture.    IMPRESSION:  Pulmonary edema with small pleural effusions and partial lower lobe   compressive atelectasis.    ELISA BLACKBURN M.D., Attending Radiologist  This document has been electronically signed. May 10 2022  8:52AM  ---------------------------------------------------------------------------------------------------------------  EXAM:  DUPLEX SCAN EXT VEINS LOWER BI                          PROCEDURE DATE:  2022      IMPRESSION:  No evidence of deep venous thrombosis in either lower extremity venous   segments able to be examined.     AUGUSTIN EASTMAN MD; Attending Radiologist  This document has been electronically signed. May  9 2022  3:31PM  ---------------------------------------------------------------------------------------------------------------

## 2022-06-21 NOTE — PROGRESS NOTE ADULT - PROBLEM SELECTOR PLAN 2
Currently euvolemic   - Will assess need for lasix daily. Family updated that likely will hold lasix and re-evaluate OP

## 2022-06-21 NOTE — PROGRESS NOTE ADULT - PROBLEM SELECTOR PLAN 5
Hx of afib:   - metoprolol tartrate 25mg BID as pt BP on higher end   - Hold AC given bleeding  - Plan for patient to possibly start Amiodarone as OP, will continue with metoprolol tartrate while hospitalized, will f/u OP Cards

## 2022-06-21 NOTE — PROGRESS NOTE ADULT - SUBJECTIVE AND OBJECTIVE BOX
HPI:  Patient seen and examined at bedside in PICU.  Hemoglobin from 6.4 --> 7.4 g/dL after 1 unite PRBC yesterday.  No evidence of active bleed.    Review Of Systems:           Respiratory: No shortness of breath, cough, or wheezing  Cardiovascular: No chest pain or palpitations  10 point review of systems is otherwise negative except as mentioned above        Medications:  ascorbic acid 500 milliGRAM(s) Oral daily  levothyroxine 25 MICROGram(s) Oral daily  melatonin 3 milliGRAM(s) Oral at bedtime  metoprolol tartrate 25 milliGRAM(s) Oral two times a day  multivitamin 1 Tablet(s) Oral daily  pantoprazole    Tablet 40 milliGRAM(s) Oral before breakfast  rosuvastatin 10 milliGRAM(s) Oral at bedtime  senna 2 Tablet(s) Oral at bedtime  venlafaxine XR. 75 milliGRAM(s) Oral daily    PAST MEDICAL & SURGICAL HISTORY:  HTN (hypertension)      Diabetes mellitus type 2, noninsulin dependent      Diverticulitis      Hyperlipidemia      GERD (gastroesophageal reflux disease)      Glaucoma      Breast cancer      Urinary incontinence      Renal calculus or stone      Arthritis      Heart murmur      Renal calculi  s/p stone extraction 57 yrs ago      S/P lumpectomy of breast  right breast with node removal      S/P bladder repair  Interstim device placement 2010        Vitals:  T(C): 36.8 (06-21-22 @ 20:48), Max: 36.8 (06-21-22 @ 20:48)  HR: 60 (06-21-22 @ 20:48) (53 - 65)  BP: 156/61 (06-21-22 @ 20:48) (127/58 - 156/61)  BP(mean): --  RR: 18 (06-21-22 @ 20:48) (18 - 18)  SpO2: 96% (06-21-22 @ 20:48) (96% - 99%)  Wt(kg): --  Daily     Daily   I&O's Summary    20 Jun 2022 07:01  -  21 Jun 2022 07:00  --------------------------------------------------------  IN: 0 mL / OUT: 700 mL / NET: -700 mL    21 Jun 2022 07:01  -  21 Jun 2022 23:00  --------------------------------------------------------  IN: 540 mL / OUT: 1500 mL / NET: -960 mL        Physical Exam:  Appearance: Fraily elderly  Eyes: PERRLA, EOMI, pink conjunctiva, no scleral icterus   HENT: normal oral mucosa  Cardiovascular: RRR, S1, S2, III/VI systolic murmur at apex; no edema; no JVD  Respiratory: Clear to auscultation bilaterally  Gastrointestinal: Soft, non-tender, non-distended, BS+  Musculoskeletal: No clubbing or joint deformity   Neurologic: No focal weakness  Lymphatic: No lymphadenopathy  Psychiatry: AAOx2 with appropriate mood and affect  Skin: No rashes, ecchymoses, or cyanosis                          7.2    6.05  )-----------( 191      ( 21 Jun 2022 15:55 )             22.5     06-21    139  |  110<H>  |  49<H>  ----------------------------<  140<H>  4.3   |  21<L>  |  1.67<H>    Ca    8.5      21 Jun 2022 09:06  Phos  3.6     06-20  Mg     1.7     06-20    TPro  4.5<L>  /  Alb  2.5<L>  /  TBili  0.6  /  DBili  x   /  AST  19  /  ALT  16  /  AlkPhos  58  06-20        Cardiovascular Diagnostic Testing:  ECG: sinus 76 bpm, low voltage in limb leads    Echo: < from: Transthoracic Echocardiogram (10.09.20 @ 15:28) >  ------------------------------------------------------------------------  Dimensions:    Normal Values:  LA:     3.6    2.0 - 4.0 cm  Ao:     2.7    2.0 - 3.8 cm  SEPTUM: 1.0    0.6 - 1.2 cm  PWT:    0.8    0.6 -1.1 cm  LVIDd:  3.9    3.0 - 5.6 cm  LVIDs:  2.4    1.8 - 4.0 cm  Derived variables:  LVMI: 73 g/m2  RWT: 0.41  Fractional short: 38 %  EF (Moran Rule): 63 %Doppler Peak Velocity (m/sec):  AoV=2.0  ------------------------------------------------------------------------  Observations:  Mitral Valve: Normal mitral valve. Mild mitral  regurgitation.  Aortic Valve/Aorta: Calcified trileaflet aortic valve with  decreased opening. Peak transaortic valve gradient equals  16 mm Hg, mean transaortic valve gradient equals 8 mm Hg,  estimated aortic valve area equals 1.7 sqcm (by continuity  equation), aortic valve velocity time integral equals 43  cm, consistent with mild aortic stenosis. Mild aortic  regurgitation.  Peak left ventricular outflow tract  gradient equals 3 mm Hg, mean gradient is equal to 2 mm Hg,  LVOT velocity time integral equals 19 cm.  Aortic Root: 2.7 cm.  LVOT diameter: 2.2 cm.  Left Atrium: Mildly dilated left atrium.  LA volume index =  41 cc/m2.  Left Ventricle: Normal left ventricular systolic function.  No segmental wall motion abnormalities. Normal left  ventricular internal dimensions and wall thickness.  Moderate diastolic dysfunction (Stage II).  Right Heart: Normal right atrium. Normal right ventricular  size and function. Normal tricuspid valve. Mild tricuspid  regurgitation. Normal pulmonic valve.  Pericardium/Pleura: Normal pericardium with no pericardial  effusion.  Hemodynamic: Estimated right atrial pressure is 8 mm Hg.  Estimated right ventricular systolic pressure equals 44 mm  Hg, assuming right atrial pressure equals 8 mm Hg,  consistent with mild pulmonary hypertension.  ------------------------------------------------------------------------  Conclusions:  1. Calcified trileaflet aortic valve with decreased  opening. Peak transaortic valve gradient equals 16 mm Hg,  mean transaortic valve gradient equals 8 mm Hg, estimated  aortic valve area equals 1.7 sqcm (by continuity equation),  aortic valve velocity time integral equals 43 cm,  consistent with mild aortic stenosis.  2. Mildly dilated left atrium.  LA volume index = 41 cc/m2.  3. Normal left ventricular internal dimensions and wall  thickness.  4. Normal left ventricular systolic function. No segmental  wall motion abnormalities.  5. Moderate diastolic dysfunction (Stage II).  *** No previous Echo exam.  ------------------------------------------------------------------------  Confirmed on  10/9/2020 - 17:47:14 by ERNESTO Felipe  ------------------------------------------------------------------------    < end of copied text >    Interpretation of Telemetry: NSR with APCs, PVCs

## 2022-06-21 NOTE — PROGRESS NOTE ADULT - PROBLEM SELECTOR PLAN 1
Most likely 2/2 to eliquis initiation and underlying GI pathology. Pending colonoscopy today for intervention.     Plan:  - c/w pantoprazole 40mg qd  - Maintain active type and screen   - Maintain 2 large bore IVs  - Transfuse to hemoglobin <7 --> s/p 5 PRBC transfusions, no bloody BM since colonoscopy  - GI following: no additional intervention  - Trend CBC qd

## 2022-06-21 NOTE — PROGRESS NOTE ADULT - SUBJECTIVE AND OBJECTIVE BOX
[facility]   [phone]  RENAL FOLLOW UP NOTE  --------------------------------------------------------------------------------  Chief Complaint:    24 hour events/subjective:    seen earlier   family at bedside   denies shortness of breath     PAST HISTORY  --------------------------------------------------------------------------------  No significant changes to PMH, PSH, FHx, SHx, unless otherwise noted    ALLERGIES & MEDICATIONS  --------------------------------------------------------------------------------  Allergies    Keflex (Rash; Hives)    Intolerances      Standing Inpatient Medications  ascorbic acid 500 milliGRAM(s) Oral daily  levothyroxine 25 MICROGram(s) Oral daily  melatonin 3 milliGRAM(s) Oral at bedtime  metoprolol tartrate 25 milliGRAM(s) Oral two times a day  multivitamin 1 Tablet(s) Oral daily  pantoprazole    Tablet 40 milliGRAM(s) Oral before breakfast  rosuvastatin 10 milliGRAM(s) Oral at bedtime  senna 2 Tablet(s) Oral at bedtime  venlafaxine XR. 75 milliGRAM(s) Oral daily    PRN Inpatient Medications      REVIEW OF SYSTEMS  --------------------------------------------------------------------------------    Gen: denies  fevers/chills,  CVS: denies chest pain/palpitations  Resp: denies SOB/Cough  GI: Denies N/V/Abd pain  : Denies dysuria      VITALS/PHYSICAL EXAM  --------------------------------------------------------------------------------  T(C): 36.8 (06-21-22 @ 20:48), Max: 36.8 (06-21-22 @ 20:48)  HR: 60 (06-21-22 @ 20:48) (53 - 65)  BP: 156/61 (06-21-22 @ 20:48) (127/58 - 156/61)  RR: 18 (06-21-22 @ 20:48) (18 - 18)  SpO2: 96% (06-21-22 @ 20:48) (96% - 99%)  Wt(kg): --        06-20-22 @ 07:01  -  06-21-22 @ 07:00  --------------------------------------------------------  IN: 0 mL / OUT: 700 mL / NET: -700 mL    06-21-22 @ 07:01  -  06-21-22 @ 21:17  --------------------------------------------------------  IN: 540 mL / OUT: 500 mL / NET: 40 mL      Physical Exam:  	    Gen: Non toxic comfortable appearing  forgetful   	no jvd    	Pulm: decrease bs  no rales or ronchi or wheezing  	CV: RRR, S1S2; no rub  	Abd: +BS, soft, nontender/nondistended  	: No suprapubic tenderness  	UE: Warm, no cyanosis  no clubbing,  no edema  	LE: Warm, no cyanosis  no clubbing, no edema  	Neuro: alert and oriented. speech coherent  forgetful  	Skin: Warm, no decrease skin turgor       LABS/STUDIES  --------------------------------------------------------------------------------              7.2    6.05  >-----------<  191      [06-21-22 @ 15:55]              22.5     139  |  110  |  49  ----------------------------<  140      [06-21-22 @ 09:06]  4.3   |  21  |  1.67        Ca     8.5     [06-21-22 @ 09:06]      Mg     1.7     [06-20-22 @ 07:22]      Phos  3.6     [06-20-22 @ 07:22]    TPro  4.5  /  Alb  2.5  /  TBili  0.6  /  DBili  x   /  AST  19  /  ALT  16  /  AlkPhos  58  [06-20-22 @ 07:22]          Creatinine Trend:  SCr 1.67 [06-21 @ 09:06]  SCr 1.65 [06-20 @ 07:22]  SCr 1.63 [06-19 @ 07:07]  SCr 1.30 [06-18 @ 11:53]  SCr 1.64 [06-17 @ 06:44]              Urinalysis - [06-14-22 @ 00:46]      Color Light Yellow / Appearance Slightly Turbid / SG 1.014 / pH 5.5      Gluc 100 mg/dL / Ketone Negative  / Bili Negative / Urobili Negative       Blood Large / Protein Trace / Leuk Est Moderate / Nitrite Positive      RBC 26 / WBC 2 / Hyaline 3 / Gran  / Sq Epi  / Non Sq Epi 2 / Bacteria Moderate

## 2022-06-21 NOTE — PROGRESS NOTE ADULT - ATTENDING COMMENTS
Patient seen and examined at bedside. No acute events overnight. No bloody bowel movements & constipated. Started on Senna. Hemoglobin seems stable, but patient needs to have home services re-established. Will see what Hg is tomorrow and if >7 and no BRBPR or otherwise signs concerning for GI bleed will discharge. She will follow up closely with her cardiologist.

## 2022-06-21 NOTE — PROGRESS NOTE ADULT - SUBJECTIVE AND OBJECTIVE BOX
Patient:  KOREY DIAZ  [id number]    Progress Note    Interval events: No acute events.  Pertinent ROS (if any):      Administered:  pantoprazole    Tablet: 40 milliGRAM(s) Oral (06-21 @ 05:38)  metoprolol tartrate: 25 milliGRAM(s) Oral (06-21 @ 05:38)  levothyroxine: 25 MICROGram(s) Oral (06-21 @ 05:38)  rosuvastatin: 10 milliGRAM(s) Oral (06-20 @ 22:08)  melatonin: 3 milliGRAM(s) Oral (06-20 @ 22:08)  senna: 2 Tablet(s) Oral (06-20 @ 22:07)        OBJECTIVE:    06-19 @ 07:01  -  06-20 @ 07:00  --------------------------------------------------------  IN: 660 mL / OUT: 950 mL / NET: -290 mL    06-20 @ 07:01  -  06-21 @ 05:45  --------------------------------------------------------  IN: 0 mL / OUT: 700 mL / NET: -700 mL      CAPILLARY BLOOD GLUCOSE            VITALS:  T(F): 97.3 (06-21-22 @ 05:28), Max: 97.8 (06-20-22 @ 06:09)  HR: 62 (06-21-22 @ 05:28) (62 - 70)  BP: 127/58 (06-21-22 @ 05:28) (104/55 - 153/61)  BP(mean): --  ABP: --  ABP(mean): --  RR: 18 (06-21-22 @ 05:28) (18 - 18)  SpO2: 99% (06-21-22 @ 05:28) (96% - 99%)    PHYSICAL EXAM:  GENERAL: NAD, lying in bed comfortably  HEAD:  Atraumatic, Normocephalic  EYES: EOMI, PERRLA, conjunctiva and sclera clear  ENT: Moist mucous membranes  NECK: Supple, No JVD  CHEST/LUNG: Clear to auscultation bilaterally; No rales, rhonchi, wheezing, or rubs. Unlabored respirations  HEART: Regular rate and rhythm; No murmurs, rubs, or gallops  ABDOMEN: Bowel sounds present; Soft, Nontender, Nondistended. No hepatomegaly  EXTREMITIES:  2+ Peripheral Pulses, brisk capillary refill. No clubbing, cyanosis, or edema  NERVOUS SYSTEM:  Alert & Oriented X3, speech clear. No deficits   MSK: FROM all 4 extremities, full and equal strength  SKIN: No rashes or lesions    HOSPITAL MEDICATIONS:  Standing Meds:  ascorbic acid 500 milliGRAM(s) Oral daily  levothyroxine 25 MICROGram(s) Oral daily  melatonin 3 milliGRAM(s) Oral at bedtime  metoprolol tartrate 25 milliGRAM(s) Oral two times a day  multivitamin 1 Tablet(s) Oral daily  pantoprazole    Tablet 40 milliGRAM(s) Oral before breakfast  rosuvastatin 10 milliGRAM(s) Oral at bedtime  senna 2 Tablet(s) Oral at bedtime  venlafaxine XR. 75 milliGRAM(s) Oral daily      PRN Meds:      LABS:  CBC 06-20-22 @ 07:26                        8.1    6.63  )-----------( 172                   25.4       Hgb trend: 8.1 <-- , 6.4 <-- , 7.5 <-- , 7.1 <-- , 7.9 <--   WBC trend: 6.63 <-- , 7.03 <-- , 7.80 <-- , 8.06 <-- , 7.09 <--       CMP 06-20-22 @ 07:22    140  |  111<H>  |  44<H>  ----------------------------<  130<H>  4.2   |  19<L>  |  1.65<H>    Ca    8.4      06-20-22 @ 07:22  Phos  3.6     06-20  Mg     1.7     06-20    TPro  4.5<L>  /  Alb  2.5<L>  /  TBili  0.6  /  DBili  x   /  AST  19  /  ALT  16  /  AlkPhos  58  06-20      Serum Cr trend: 1.65 <-- , 1.63 <-- , 1.30 <--         ABG Trend:     VBG Trend:       MICROBIOLOGY:       RADIOLOGY:  [ ] Reviewed and interpreted by me    EKG:   Patient:  KOREY DIAZ  [id number]    Progress Note    Interval events: No acute events. Patient resting comfortably in bed, no episodes of bleeding last night. Family updated on repeat CBC then likely discharge.  Pertinent ROS (if any):  Denies f/c/n/v/cp/ap/bowel or bladder changes.     Administered:  pantoprazole    Tablet: 40 milliGRAM(s) Oral (06-21 @ 05:38)  metoprolol tartrate: 25 milliGRAM(s) Oral (06-21 @ 05:38)  levothyroxine: 25 MICROGram(s) Oral (06-21 @ 05:38)  rosuvastatin: 10 milliGRAM(s) Oral (06-20 @ 22:08)  melatonin: 3 milliGRAM(s) Oral (06-20 @ 22:08)  senna: 2 Tablet(s) Oral (06-20 @ 22:07)    OBJECTIVE:    06-19 @ 07:01  -  06-20 @ 07:00  --------------------------------------------------------  IN: 660 mL / OUT: 950 mL / NET: -290 mL    06-20 @ 07:01  -  06-21 @ 05:45  --------------------------------------------------------  IN: 0 mL / OUT: 700 mL / NET: -700 mL      CAPILLARY BLOOD GLUCOSE    VITALS:  T(F): 97.3 (06-21-22 @ 05:28), Max: 97.8 (06-20-22 @ 06:09)  HR: 62 (06-21-22 @ 05:28) (62 - 70)  BP: 127/58 (06-21-22 @ 05:28) (104/55 - 153/61)  BP(mean): --  ABP: --  ABP(mean): --  RR: 18 (06-21-22 @ 05:28) (18 - 18)  SpO2: 99% (06-21-22 @ 05:28) (96% - 99%)    PHYSICAL EXAM:  CONSTITUTIONAL: NAD, well-developed, AOX2-3  HEENT: NC/AT, EOMI  RESPIRATORY: No increased work of breathing, CTAB, no wheezes or crackles appreciated  CARDIOVASCULAR: RRR, S1 and S2 present, no m/r/g  ABDOMEN: soft, NT, ND, bowel sounds present  EXTREMITIES: No LE edema  MUSCULOSKELETAL: no joint swelling or tenderness to palpation  NEURO: A&Ox3, moving all extremities      HOSPITAL MEDICATIONS:  Standing Meds:  ascorbic acid 500 milliGRAM(s) Oral daily  levothyroxine 25 MICROGram(s) Oral daily  melatonin 3 milliGRAM(s) Oral at bedtime  metoprolol tartrate 25 milliGRAM(s) Oral two times a day  multivitamin 1 Tablet(s) Oral daily  pantoprazole    Tablet 40 milliGRAM(s) Oral before breakfast  rosuvastatin 10 milliGRAM(s) Oral at bedtime  senna 2 Tablet(s) Oral at bedtime  venlafaxine XR. 75 milliGRAM(s) Oral daily      PRN Meds:      LABS:  CBC 06-20-22 @ 07:26                        8.1    6.63  )-----------( 172                   25.4       Hgb trend: 8.1 <-- , 6.4 <-- , 7.5 <-- , 7.1 <-- , 7.9 <--   WBC trend: 6.63 <-- , 7.03 <-- , 7.80 <-- , 8.06 <-- , 7.09 <--       CMP 06-20-22 @ 07:22    140  |  111<H>  |  44<H>  ----------------------------<  130<H>  4.2   |  19<L>  |  1.65<H>    Ca    8.4      06-20-22 @ 07:22  Phos  3.6     06-20  Mg     1.7     06-20    TPro  4.5<L>  /  Alb  2.5<L>  /  TBili  0.6  /  DBili  x   /  AST  19  /  ALT  16  /  AlkPhos  58  06-20      Serum Cr trend: 1.65 <-- , 1.63 <-- , 1.30 <--         ABG Trend:     VBG Trend:       MICROBIOLOGY:       RADIOLOGY:  [ ] Reviewed and interpreted by me    EKG:

## 2022-06-21 NOTE — PROGRESS NOTE ADULT - SUBJECTIVE AND OBJECTIVE BOX
INTERVAL HPI/OVERNIGHT EVENTS:  no BM since bowel prep (6/17)  no rectal bleeding  appetite good  no CP or SOB    started on Senna 6/20 PM    MEDICATIONS  (STANDING):  ascorbic acid 500 milliGRAM(s) Oral daily  levothyroxine 25 MICROGram(s) Oral daily  melatonin 3 milliGRAM(s) Oral at bedtime  metoprolol tartrate 25 milliGRAM(s) Oral two times a day  multivitamin 1 Tablet(s) Oral daily  pantoprazole    Tablet 40 milliGRAM(s) Oral before breakfast  rosuvastatin 10 milliGRAM(s) Oral at bedtime  senna 2 Tablet(s) Oral at bedtime  venlafaxine XR. 75 milliGRAM(s) Oral daily    MEDICATIONS  (PRN):      Allergies  Keflex (Rash; Hives)      Review of Systems:  General:  No wt loss, fevers, chills, night sweats  CV:  No pain, palpitations, +AF  Resp:  No dyspnea, cough, tachypnea, wheezing  GI:  see HPI  :  No pain, bleeding +hx kidney stones +incontinence s/p bladder repair  Muscle:  No pain, weakness +arthritis  Neuro:  No focal weakness, tingling, memory problems  Psych:  No fatigue, insomnia, mood problems, depression  Endocrine:  No polyuria, polydypsia, cold/heat intolerance  Heme:  No petechiae, ecchymosis, easy bruisability  Skin:  No rash, tattoos, scars, edema      Vital Signs Last 24 Hrs  T(C): 36.3 (21 Jun 2022 05:28), Max: 36.4 (20 Jun 2022 11:58)  T(F): 97.3 (21 Jun 2022 05:28), Max: 97.6 (20 Jun 2022 11:58)  HR: 62 (21 Jun 2022 05:28) (62 - 70)  BP: 127/58 (21 Jun 2022 05:28) (104/55 - 153/61)  BP(mean): --  RR: 18 (21 Jun 2022 05:28) (18 - 18)  SpO2: 99% (21 Jun 2022 05:28) (96% - 99%)    PHYSICAL EXAM:  Constitutional: NAD, well-developed elderly WF  sitting up in bed, son at bedside. no pallor  Neck: No LAD, supple no JVD  Respiratory: clear b/l no accessory muscle use  Cardiovascular: S1 and S2, RRR,  +murmur  Gastrointestinal: BS+, soft, NT/ND, neg HSM  Extremities: No peripheral edema, neg clubbing, cyanosis  +healing ecchymoses on skin +fragile skin  Vascular: 2+ peripheral pulses  Neurological: A/O x 3, no focal deficits  Psychiatric: Normal mood, normal affect  Skin: No rashes +fragile skin No pallor        LABS:                        7.4    6.92  )-----------( 158      ( 21 Jun 2022 09:06 )             24.1     06-21    139  |  110<H>  |  49<H>  ----------------------------<  140<H>  4.3   |  21<L>  |  1.67<H>    Ca    8.5      21 Jun 2022 09:06  Phos  3.6     06-20  Mg     1.7     06-20    TPro  4.5<L>  /  Alb  2.5<L>  /  TBili  0.6  /  DBili  x   /  AST  19  /  ALT  16  /  AlkPhos  58  06-20        RADIOLOGY & ADDITIONAL TESTS:

## 2022-06-21 NOTE — PROGRESS NOTE ADULT - ASSESSMENT
89 year-old with dementia and moderate aortic stenosis admitted with symptomatic anemia. She was recently started on Eliquis for thromboembolic prophylaxis in the setting of atrial fibrillation.  Noted to have anemia with hemoglobin < 5 g/dL.  Noted to continue test positive for Covid-19 (first positive on 5/22).    Symptoms and ELISA are likely due to her anemia, and not due to her aortic stenosis or Covid-19.  Transfuse PRBC as needed to maintain hemoglobin > 8 g/dL.    Agree with holding anticoagulation at this time.  GI to follow-up.    Discussed with patient's son present.

## 2022-06-21 NOTE — PROGRESS NOTE ADULT - ASSESSMENT
89yo F w/ hx HTN, Afib (recent dx 4/2022), CHF, HLD, nonsmoker, remote hx of asthma presenting with loose stools, lethargy, fatigue, low blood pressure and blood bowel movement found to have a hemoglobin of 4.8 most likely 2/2 to GI bleed- likely diverticular bleed given history/presentation    recent COVID + 5/20 s/p Paxlovid rx; COVID PCR + on admission  -continue off contact isolation per Infection Control Dept      Acute GI blood loss anemia; likely 2/2  diverticular bleeding given history/presentation.   +NM GI bleed scan (proximal AC)  IR input appreciated - increased risk of renal injury with IV contrast load required for angio  Recurrent/ongoing rectal bleeding with continued transfusion requirement therefore underwent Colonoscopy 6/17/22 6/17/22 COLON poor prep and "old" blood in colon; no active bleeding encountered, +diverticulosis - primarily Left sided    s/p 6 units PRBCs admission ->current  No evidence of active/ongoing GI bleeding (no BM since prep 6/17), overall appropriate Hgb response to 1 unit PRBCs 6.4 (6/20)->->7.4 (6/21)    -monitor CBC (daily) and BMs (expect BMs to be dark 2/2 passage of retained blood from GI tract)  -bowel regimen to avoid straining   -PO diet (DASH  -Recommend keep off AC 2/2 risk of recurrent bleeding  -PO PPI  -no plan for repeat endoscopic evaluation at this time    Discussed with pt and family at bedside  Discussed with House staff and Cardiology attending    Marek Cormier PA-C    [facility]  [phone]  After hours and weekend coverage [phone]

## 2022-06-21 NOTE — PROGRESS NOTE ADULT - ASSESSMENT
89yo F w/ hx HTN, Afib, CHF, AS, HLD, nonsmoker, remote hx of asthma presenting with loose stools, lethargy, fatigue, low blood pressure and bloody bowel movement. Of note, the patient had COVID 2 weeks PTA measured by at home test. RVP was + for COVID by PCR on admission.   In the ED: Labs: Hemoglobin 4.8, Cr 1.93, COVID +   required prbc. pt also had egd. eliquis was dc.       1- CKD IV   2- hx chf   3- anemia/ GI bleed    cr is steady   chf is compensated hold lasix for now and give prn   prbc as needed   trend hb   metoprolol 25 mg bid   d.w pt son at bedside

## 2022-06-21 NOTE — PROGRESS NOTE ADULT - ASSESSMENT
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 She has a history of HTN, HLD, DM, aortic stenosis, breast cancer s/p lumpectomy and CKD (Dr. Escobar). She was recently started on Eliquis and beta-blockers for atrial fibrillation. She was hospitalized in March with a UTI complicated by ELISA due to bactrim. She was admitted in May with dyspnea, hypoxemia, gurgling in her chest and leg swelling. She was initially treated with zosyn for a possible right lower lobe pneumonia seen on CXR until further evaluation revealed pulmonary edema with pleural effusions and atelectasis. ECHO -> preserved LVEF - moderate-severe aortic stenosis - moderate diastolic dysfunction - bilateral pleural effusions; CT -> pulmonary edema with small pleural effusions and partial lower lobe compressive atelectasis. She was diuresed and discharged on norvasc/toprol XL/lipitor/eliquis. The patient was diagnosed with COVID on May 22nd and was treated with a course of Paxlovid. She now returns to the ER with shortness of breath and weakness with minimal exertion. She has fatigue, malaise and pallor. She has been noted to have blood in her stools and black colored stools (after eating blueberries). Her daughter measured her blood pressure has 90/60 from a baseline of 120/90. She currently has no shortness of breath or hypoxemia on a 2lpm nasal canula. She has no cough, sputum production, chest congestion or wheeze. No fevers, chills or sweats. She has been found to be guaiac positive. RVP PCR remains positive for COVID. The patient has received Kcentra and 2 units PRBCs for severe anemia (4.8/16/6).     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 and respiratory muscle weakness limiting diaphragmatic excursion and chest wall expansion  4) no evidence of bronchospasm or pulmonary edema/pleural effusions    ELISA is due to intravascular volume depletion    nasal COVID PCR positivity more likely represents shedding of non-viable viral particles than ongoing infection in this immunocompetent patient    PLAN/RECOMMENDATIONS:    stable oxygenation on room air  no indication for airborne or contact isolation - the patient was first diagnosed with COVID on May 22nd  observe off systemic steroids, antibiotics and pulmonary medications  head of bed elevation greater than 45 degrees at all times  incentive spirometry  GI evaluation noted     no recent hematochezia - has now received 6 units of PRBCs - bleeding scan positive for active bleeding in the ascending colon -> felt to be at high rish for bowel ischemia and worsening renal function with an interventional radiology procedure -> colonoscopy -> poor prep - evidence of recent but not active bleeding - severe diverticulosis    hemodynamically stable - following H/H - awaiting a bowel movement    holding A/C    protonix 40mg daily    senna 2 tablets at bedtime  cardiac meds: lopressor/crestor - holding A/C - considering amiodarone for rhythm control  glucose control  DVT prophylaxis - SCDs  venlafaxine XR/melatonin at bedtime  synthroid    Will follow with you. Plan of care discussed with the patient and her son at bedside. No pulmonary objection to discharge.    Brayden Benavides MD, [facility]  [phone]  Pulmonary Medicine

## 2022-06-21 NOTE — PROGRESS NOTE ADULT - ASSESSMENT
89yo F w/ hx HTN, Afib, CHF, HLD, CKD Stage IV nonsmoker, remote hx of asthma presenting with loose stools, lethargy, fatigue, low blood pressure and blood bowel movement found to have a hemoglobin of 4.8 most likely 2/2 to GI bleed from eliquis.  DDx includes diverticulitis vs Gastric ulcer vs. Duodenal ulcer vs. angiodysplasia. s/p colonoscopy without active bleeding, now with stable Hg, without additional GI intervention, pending monitoring and discharge later this week.

## 2022-06-22 ENCOUNTER — TRANSCRIPTION ENCOUNTER (OUTPATIENT)
Age: 87
End: 2022-06-22

## 2022-06-22 LAB
HCT VFR BLD CALC: 21.8 % — LOW (ref 34.5–45)
HCT VFR BLD CALC: 27.6 % — LOW (ref 34.5–45)
HGB BLD-MCNC: 6.9 G/DL — CRITICAL LOW (ref 11.5–15.5)
HGB BLD-MCNC: 8.8 G/DL — LOW (ref 11.5–15.5)
MCHC RBC-ENTMCNC: 30.3 PG — SIGNIFICANT CHANGE UP (ref 27–34)
MCHC RBC-ENTMCNC: 30.4 PG — SIGNIFICANT CHANGE UP (ref 27–34)
MCHC RBC-ENTMCNC: 31.7 GM/DL — LOW (ref 32–36)
MCHC RBC-ENTMCNC: 31.9 GM/DL — LOW (ref 32–36)
MCV RBC AUTO: 95.5 FL — SIGNIFICANT CHANGE UP (ref 80–100)
MCV RBC AUTO: 95.6 FL — SIGNIFICANT CHANGE UP (ref 80–100)
NRBC # BLD: 0 /100 WBCS — SIGNIFICANT CHANGE UP (ref 0–0)
NRBC # BLD: 0 /100 WBCS — SIGNIFICANT CHANGE UP (ref 0–0)
PLATELET # BLD AUTO: 186 K/UL — SIGNIFICANT CHANGE UP (ref 150–400)
PLATELET # BLD AUTO: 187 K/UL — SIGNIFICANT CHANGE UP (ref 150–400)
RBC # BLD: 2.28 M/UL — LOW (ref 3.8–5.2)
RBC # BLD: 2.89 M/UL — LOW (ref 3.8–5.2)
RBC # FLD: 15.5 % — HIGH (ref 10.3–14.5)
RBC # FLD: 16.4 % — HIGH (ref 10.3–14.5)
WBC # BLD: 6.7 K/UL — SIGNIFICANT CHANGE UP (ref 3.8–10.5)
WBC # BLD: 6.87 K/UL — SIGNIFICANT CHANGE UP (ref 3.8–10.5)
WBC # FLD AUTO: 6.7 K/UL — SIGNIFICANT CHANGE UP (ref 3.8–10.5)
WBC # FLD AUTO: 6.87 K/UL — SIGNIFICANT CHANGE UP (ref 3.8–10.5)

## 2022-06-22 PROCEDURE — 99233 SBSQ HOSP IP/OBS HIGH 50: CPT

## 2022-06-22 PROCEDURE — 99233 SBSQ HOSP IP/OBS HIGH 50: CPT | Mod: GC

## 2022-06-22 PROCEDURE — 99233 SBSQ HOSP IP/OBS HIGH 50: CPT | Mod: FS

## 2022-06-22 RX ORDER — PANTOPRAZOLE SODIUM 20 MG/1
1 TABLET, DELAYED RELEASE ORAL
Qty: 0 | Refills: 0 | DISCHARGE
Start: 2022-06-22

## 2022-06-22 RX ORDER — METOPROLOL TARTRATE 50 MG
1 TABLET ORAL
Qty: 0 | Refills: 0 | DISCHARGE

## 2022-06-22 RX ORDER — METOPROLOL TARTRATE 50 MG
1 TABLET ORAL
Qty: 60 | Refills: 0
Start: 2022-06-22 | End: 2022-07-21

## 2022-06-22 RX ORDER — APIXABAN 2.5 MG/1
1 TABLET, FILM COATED ORAL
Qty: 0 | Refills: 0 | DISCHARGE

## 2022-06-22 RX ORDER — SENNA PLUS 8.6 MG/1
2 TABLET ORAL
Qty: 60 | Refills: 0
Start: 2022-06-22 | End: 2022-07-21

## 2022-06-22 RX ORDER — FUROSEMIDE 40 MG
40 TABLET ORAL DAILY
Refills: 0 | Status: DISCONTINUED | OUTPATIENT
Start: 2022-06-22 | End: 2022-06-26

## 2022-06-22 RX ORDER — FUROSEMIDE 40 MG
1 TABLET ORAL
Qty: 0 | Refills: 0 | DISCHARGE
Start: 2022-06-22

## 2022-06-22 RX ADMIN — Medication 500 MILLIGRAM(S): at 11:52

## 2022-06-22 RX ADMIN — Medication 75 MILLIGRAM(S): at 11:52

## 2022-06-22 RX ADMIN — Medication 40 MILLIGRAM(S): at 17:21

## 2022-06-22 RX ADMIN — Medication 1 TABLET(S): at 11:52

## 2022-06-22 RX ADMIN — Medication 3 MILLIGRAM(S): at 21:59

## 2022-06-22 RX ADMIN — ROSUVASTATIN CALCIUM 10 MILLIGRAM(S): 5 TABLET ORAL at 21:59

## 2022-06-22 RX ADMIN — Medication 10 MILLIGRAM(S): at 13:04

## 2022-06-22 RX ADMIN — Medication 25 MICROGRAM(S): at 06:14

## 2022-06-22 RX ADMIN — Medication 25 MILLIGRAM(S): at 17:21

## 2022-06-22 RX ADMIN — SENNA PLUS 2 TABLET(S): 8.6 TABLET ORAL at 22:01

## 2022-06-22 RX ADMIN — PANTOPRAZOLE SODIUM 40 MILLIGRAM(S): 20 TABLET, DELAYED RELEASE ORAL at 06:14

## 2022-06-22 RX ADMIN — Medication 25 MILLIGRAM(S): at 06:14

## 2022-06-22 NOTE — DISCHARGE NOTE NURSING/CASE MANAGEMENT/SOCIAL WORK - PATIENT PORTAL LINK FT
You can access the FollowMyHealth Patient Portal offered by [facility] by registering at the following website: [email or web address]. By joining [employer]’s FollowMyHealth portal, you will also be able to view your health information using other applications (apps) compatible with our system.

## 2022-06-22 NOTE — DISCHARGE NOTE NURSING/CASE MANAGEMENT/SOCIAL WORK - NSDCFUADDAPPT_GEN_ALL_CORE_FT
Please stop taking eliquis on discharge.     Please follow-up with your cardiologist within 1-2 weeks after discharge.     Please follow-up with your PCP for the management of your chronic conditions.     Please follow-up with [facility] upon discharge.   [facility]  [phone]

## 2022-06-22 NOTE — DISCHARGE NOTE NURSING/CASE MANAGEMENT/SOCIAL WORK - NSSCTYPOFSERV_GEN_ALL_CORE
reinstatement of home health aide through [facility] reinstatement of home health aide through [facility].   FOLLOW UP WITH VISITING RN AND HOME P/T. SERVICES TO BEGIN AFTER PATIENT IS DC HOME.

## 2022-06-22 NOTE — PROGRESS NOTE ADULT - SUBJECTIVE AND OBJECTIVE BOX
INTERVAL HPI/OVERNIGHT EVENTS:  no BM  since 6/17 (prep and colonoscopy)  no nausea or vomiting  no CP or  SOB    drop in Hgb overnight  - completed 1  unit PRBCs just now    MEDICATIONS  (STANDING):  ascorbic acid 500 milliGRAM(s) Oral daily  furosemide    Tablet 40 milliGRAM(s) Oral daily  levothyroxine 25 MICROGram(s) Oral daily  melatonin 3 milliGRAM(s) Oral at bedtime  metoprolol tartrate 25 milliGRAM(s) Oral two times a day  multivitamin 1 Tablet(s) Oral daily  pantoprazole    Tablet 40 milliGRAM(s) Oral before breakfast  rosuvastatin 10 milliGRAM(s) Oral at bedtime  senna 2 Tablet(s) Oral at bedtime  venlafaxine XR. 75 milliGRAM(s) Oral daily    MEDICATIONS  (PRN):      Allergies  Keflex (Rash; Hives)      Review of Systems:  General:  No wt loss, fevers, chills, night sweats  CV:  No pain, palpitations, +AF  Resp:  No dyspnea, cough, tachypnea, wheezing  GI:  see HPI  :  No pain, bleeding +hx kidney stones +incontinence s/p bladder repair  Muscle:  No pain, weakness +arthritis  Neuro:  No focal weakness, tingling, memory problems  Psych:  No fatigue, insomnia, mood problems, depression  Endocrine:  No polyuria, polydypsia, cold/heat intolerance  Heme:  No petechiae, ecchymosis, easy bruisability  Skin:  No rash, tattoos, scars, edema      Vital Signs Last 24 Hrs  T(C): 36.5 (22 Jun 2022 11:55), Max: 36.8 (21 Jun 2022 20:48)  T(F): 97.7 (22 Jun 2022 11:55), Max: 98.3 (21 Jun 2022 20:48)  HR: 60 (22 Jun 2022 11:55) (53 - 66)  BP: 151/60 (22 Jun 2022 11:55) (124/57 - 156/61)  BP(mean): --  RR: 18 (22 Jun 2022 11:55) (18 - 18)  SpO2: 98% (22 Jun 2022 11:55) (96% - 99%)    PHYSICAL EXAM:  Constitutional: NAD, well-developed elderly WF  sitting up in bed, daughter at  bedside  Neck: No LAD, supple no JVD  Respiratory: clear b/l no accessory muscle use  Cardiovascular: S1 and S2, RRR,  +murmur  Gastrointestinal: BS+, soft, NT/ND, neg HSM  Extremities: No peripheral edema, neg clubbing, cyanosis  +healing ecchymoses on skin +fragile skin  Vascular: 2+ peripheral pulses  Neurological: A/O x 3, no focal deficits  Psychiatric: Normal mood, normal affect  Skin: No rashes +fragile skin       LABS:                        6.9    6.70  )-----------( 186      ( 22 Jun 2022 06:24 )             21.8   Hemoglobin: 7.2: Test Repeated Specimen integrity verified. g/dL (06.21.22 @ 15:55)   Hemoglobin: 7.4 g/dL (06.21.22 @ 09:06)     06-21    139  |  110<H>  |  49<H>  ----------------------------<  140<H>  4.3   |  21<L>  |  1.67<H>    Ca    8.5      21 Jun 2022 09:06          RADIOLOGY & ADDITIONAL TESTS:

## 2022-06-22 NOTE — PROGRESS NOTE ADULT - ASSESSMENT
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 She has a history of HTN, HLD, DM, aortic stenosis, breast cancer s/p lumpectomy and CKD (Dr. Escobar). She was recently started on Eliquis and beta-blockers for atrial fibrillation. She was hospitalized in March with a UTI complicated by ELISA due to bactrim. She was admitted in May with dyspnea, hypoxemia, gurgling in her chest and leg swelling. She was initially treated with zosyn for a possible right lower lobe pneumonia seen on CXR until further evaluation revealed pulmonary edema with pleural effusions and atelectasis. ECHO -> preserved LVEF - moderate-severe aortic stenosis - moderate diastolic dysfunction - bilateral pleural effusions; CT -> pulmonary edema with small pleural effusions and partial lower lobe compressive atelectasis. She was diuresed and discharged on norvasc/toprol XL/lipitor/eliquis. The patient was diagnosed with COVID on May 22nd and was treated with a course of Paxlovid. She now returns to the ER with shortness of breath and weakness with minimal exertion. She has fatigue, malaise and pallor. She has been noted to have blood in her stools and black colored stools (after eating blueberries). Her daughter measured her blood pressure has 90/60 from a baseline of 120/90. She currently has no shortness of breath or hypoxemia on a 2lpm nasal canula. She has no cough, sputum production, chest congestion or wheeze. No fevers, chills or sweats. She has been found to be guaiac positive. RVP PCR remains positive for COVID. The patient has received Kcentra and 2 units PRBCs for severe anemia (4.8/16/6).     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 and respiratory muscle weakness limiting diaphragmatic excursion and chest wall expansion  4) no evidence of bronchospasm or pulmonary edema/pleural effusions    ELISA is due to intravascular volume depletion    nasal COVID PCR positivity more likely represents shedding of non-viable viral particles than ongoing infection in this immunocompetent patient    PLAN/RECOMMENDATIONS:    stable oxygenation on room air  no indication for airborne or contact isolation - the patient was first diagnosed with COVID on May 22nd  observe off systemic steroids, antibiotics and pulmonary medications  head of bed elevation greater than 45 degrees at all times  incentive spirometry  GI evaluation noted     now with maroon colored stools and no recent hematochezia - has now received 7 units of PRBCs - bleeding scan positive for active bleeding in the ascending colon -> felt to be at high rish for bowel ischemia and worsening renal function with an interventional radiology procedure -> colonoscopy -> poor prep - evidence of recent but not active bleeding - severe diverticulosis    following hemodynamics and H/H - transfuse for Hgb less than 7.0    holding A/C    protonix 40mg daily    senna 2 tablets at bedtime  cardiac meds: lopressor/crestor - holding A/C - considering amiodarone for rhythm control  glucose control  DVT prophylaxis - SCDs  venlafaxine XR/melatonin at bedtime  synthroid    Will follow with you. Plan of care discussed with the patient and her son at bedside. No pulmonary objection to discharge.    Brayden Benavides MD, [facility]  [phone]  Pulmonary Medicine

## 2022-06-22 NOTE — DISCHARGE NOTE NURSING/CASE MANAGEMENT/SOCIAL WORK - NSDCPEFALRISK_GEN_ALL_CORE
For information on Fall & Injury Prevention, visit: [email or web address] OR  [email or web address] OR  https://www.cdc.gov/steadi/patient.html

## 2022-06-22 NOTE — PROGRESS NOTE ADULT - ATTENDING COMMENTS
Patient seen and examined at bedside. No acute events overnight. Hemoglobin <7 so decision made to transfuse. Explained to daughter that unlikely actively bleeding and hemoglobin likely fluctuating. We are transfusing because we are using an objective threshold--though one could argue that clinically a hemoglobin of 6.9 is equivalent to say 7.4. In addition, if she were having a GI bleed we would not see constipation as blood is a laxative. Daughter understood but is concerned about discharge on a day which she requires transfusion, which is quite reasonable. Thus, we will transfuse 1 unit today and see what her hemoglobin is tomorrow. Also recently became debbie positive, but doubt hemolytic process with no evidence to support this.    Otherwise may take several weeks for Hg to get back to baseline of 8-9. CKD stable and last creatinine in line with prior. Will restart Lasix 40 mg PO daily especially in light of multiple transfusions. No longer a candidate for AC despite afib. Rate well controlled. CM working on re-establishing home services. Anticipate discharge tomorrow with close outpatient follow up. Spent extensive time discussing with daughter and son that bleeding may reoccur in the future, but it is not predictable and hopefully less likely now that she will be off anticoagulation.

## 2022-06-22 NOTE — PROGRESS NOTE ADULT - ASSESSMENT
89yo F w/ hx HTN, Afib (recent dx 4/2022), CHF, HLD, nonsmoker, remote hx of asthma presenting with loose stools, lethargy, fatigue, low blood pressure and blood bowel movement found to have a hemoglobin of 4.8 most likely 2/2 to GI bleed- likely diverticular bleed given history/presentation    recent COVID + 5/20 s/p Paxlovid rx; COVID PCR + on admission  -continue off contact isolation per Infection Control Dept    Acute GI blood loss anemia; likely 2/2  diverticular bleeding given history/presentation.   +NM GI bleed scan (proximal AC)  IR input appreciated - increased risk of renal injury with IV contrast load required for angio  Recurrent/ongoing rectal bleeding with continued transfusion requirement therefore underwent Colonoscopy 6/17/22 6/17/22 COLON poor prep and "old" blood in colon; no active bleeding encountered, +diverticulosis - primarily Left sided    s/p 7 units PRBCs admission ->current  No evidence of active/brisk GI bleeding (no BM since prep 6/17)    -Dulcolax supp DC x 1 today (no BM x 5 days, known hx constipation pre-admission  and with significant diverticular  disease; expect first few BMs to be dark 2/2 passage of retained blood from GI tract as noted on colonosocopy  -check post-transfusion CBC later today (afternoon/evening) and CB; if stable then family agreeable to dc planning for AM   -bowel regimen to avoid straining   -PO diet (DASH)  -Recommend keep off AC 2/2 risk of recurrent bleeding  -PO PPI  -no plan for repeat endoscopic evaluation at this time    Discussed with pt and family at bedside  Discussed with House staff and Medicine attending    Marek Cormier PA-C    [facility]  [phone]  After hours and weekend coverage [phone]

## 2022-06-22 NOTE — PROGRESS NOTE ADULT - SUBJECTIVE AND OBJECTIVE BOX
Patient:  KOREY DIAZ  [id number]    Progress Note    Interval events: No acute events.  Pertinent ROS (if any):      Administered:  pantoprazole    Tablet: 40 milliGRAM(s) Oral (06-22 @ 06:14)  metoprolol tartrate: 25 milliGRAM(s) Oral (06-22 @ 06:14)  levothyroxine: 25 MICROGram(s) Oral (06-22 @ 06:14)  senna: 2 Tablet(s) Oral (06-21 @ 22:24)  rosuvastatin: 10 milliGRAM(s) Oral (06-21 @ 22:23)  melatonin: 3 milliGRAM(s) Oral (06-21 @ 22:23)        OBJECTIVE:    06-21 @ 07:01  -  06-22 @ 07:00  --------------------------------------------------------  IN: 640 mL / OUT: 2300 mL / NET: -1660 mL      CAPILLARY BLOOD GLUCOSE            VITALS:  T(F): 97.6 (06-22-22 @ 04:48), Max: 98.3 (06-21-22 @ 20:48)  HR: 63 (06-22-22 @ 04:48) (53 - 65)  BP: 156/60 (06-22-22 @ 04:48) (136/66 - 156/61)  BP(mean): --  ABP: --  ABP(mean): --  RR: 18 (06-22-22 @ 04:48) (18 - 18)  SpO2: 97% (06-22-22 @ 04:48) (96% - 99%)    PHYSICAL EXAM:  GENERAL: NAD, lying in bed comfortably  HEAD:  Atraumatic, Normocephalic  EYES: EOMI, PERRLA, conjunctiva and sclera clear  ENT: Moist mucous membranes  NECK: Supple, No JVD  CHEST/LUNG: Clear to auscultation bilaterally; No rales, rhonchi, wheezing, or rubs. Unlabored respirations  HEART: Regular rate and rhythm; No murmurs, rubs, or gallops  ABDOMEN: Bowel sounds present; Soft, Nontender, Nondistended. No hepatomegaly  EXTREMITIES:  2+ Peripheral Pulses, brisk capillary refill. No clubbing, cyanosis, or edema  NERVOUS SYSTEM:  Alert & Oriented X3, speech clear. No deficits   MSK: FROM all 4 extremities, full and equal strength  SKIN: No rashes or lesions    HOSPITAL MEDICATIONS:  Standing Meds:  ascorbic acid 500 milliGRAM(s) Oral daily  levothyroxine 25 MICROGram(s) Oral daily  melatonin 3 milliGRAM(s) Oral at bedtime  metoprolol tartrate 25 milliGRAM(s) Oral two times a day  multivitamin 1 Tablet(s) Oral daily  pantoprazole    Tablet 40 milliGRAM(s) Oral before breakfast  rosuvastatin 10 milliGRAM(s) Oral at bedtime  senna 2 Tablet(s) Oral at bedtime  venlafaxine XR. 75 milliGRAM(s) Oral daily      PRN Meds:      LABS:  CBC 06-21-22 @ 15:55                        7.2    6.05  )-----------( 191                   22.5       Hgb trend: 7.2 <-- , 7.4 <-- , 8.1 <-- , 6.4 <-- , 7.5 <--   WBC trend: 6.05 <-- , 6.92 <-- , 6.63 <-- , 7.03 <-- , 7.80 <--       CMP 06-21-22 @ 09:06    139  |  110<H>  |  49<H>  ----------------------------<  140<H>  4.3   |  21<L>  |  1.67<H>    Ca    8.5      06-21-22 @ 09:06  Phos  3.6     06-20  Mg     1.7     06-20    TPro  4.5<L>  /  Alb  2.5<L>  /  TBili  0.6  /  DBili  x   /  AST  19  /  ALT  16  /  AlkPhos  58  06-20      Serum Cr trend: 1.67 <-- , 1.65 <--         ABG Trend:     VBG Trend:       MICROBIOLOGY:       RADIOLOGY:  [ ] Reviewed and interpreted by me    EKG:

## 2022-06-22 NOTE — DISCHARGE NOTE NURSING/CASE MANAGEMENT/SOCIAL WORK - NSDCVIVACCINE_GEN_ALL_CORE_FT
influenza, injectable, quadrivalent, preservative free; 13-Oct-2020 13:18; Renetta Lyn (RN); [employer]; [id number] (Exp. Date: 30-Jun-2021); IntraMuscular; Deltoid Right.; 0.5 milliLiter(s); VIS (VIS Published: 15-Aug-2019, VIS Presented: 13-Oct-2020);   Tdap; 08-Oct-2020 15:26; Lorna Mcneal (RN); [employer]; [id number] (Exp. Date: 09-Dec-2021); IntraMuscular; Dorsogluteal Left.; 0.5 milliLiter(s); VIS (VIS Published: 09-May-2013, VIS Presented: 08-Oct-2020);   Tdap; 21-Jan-2021 06:53; Yrn Kurtz (RN); [employer]; [id number] (Exp. Date: 21-Jul-2022); IntraMuscular; Deltoid Right.; 0.5 milliLiter(s); VIS (VIS Published: 09-May-2013, VIS Presented: 21-Jan-2021);

## 2022-06-22 NOTE — DISCHARGE NOTE NURSING/CASE MANAGEMENT/SOCIAL WORK - NSSCNAMETXT_GEN_ALL_CORE
[employer]  [employer]          [facility]

## 2022-06-22 NOTE — PROGRESS NOTE ADULT - SUBJECTIVE AND OBJECTIVE BOX
HPI:  Patient seen and examined at bedside on [address] [place].  She is receiving a unit of PRBC for hemoglobin < 7 g/dL.    Review Of Systems:           Respiratory: No shortness of breath, cough, or wheezing  Cardiovascular: No chest pain or palpitations  10 point review of systems is otherwise negative except as mentioned above        Medications:  ascorbic acid 500 milliGRAM(s) Oral daily  bisacodyl Suppository 10 milliGRAM(s) Rectal once  furosemide    Tablet 40 milliGRAM(s) Oral daily  levothyroxine 25 MICROGram(s) Oral daily  melatonin 3 milliGRAM(s) Oral at bedtime  metoprolol tartrate 25 milliGRAM(s) Oral two times a day  multivitamin 1 Tablet(s) Oral daily  pantoprazole    Tablet 40 milliGRAM(s) Oral before breakfast  rosuvastatin 10 milliGRAM(s) Oral at bedtime  senna 2 Tablet(s) Oral at bedtime  venlafaxine XR. 75 milliGRAM(s) Oral daily    PAST MEDICAL & SURGICAL HISTORY:  HTN (hypertension)      Diabetes mellitus type 2, noninsulin dependent      Diverticulitis      Hyperlipidemia      GERD (gastroesophageal reflux disease)      Glaucoma      Breast cancer      Urinary incontinence      Renal calculus or stone      Arthritis      Heart murmur      Renal calculi  s/p stone extraction 57 yrs ago      S/P lumpectomy of breast  right breast with node removal      S/P bladder repair  Interstim device placement 2010        Vitals:  T(C): 36.5 (06-22-22 @ 11:55), Max: 36.8 (06-21-22 @ 20:48)  HR: 60 (06-22-22 @ 11:55) (53 - 66)  BP: 151/60 (06-22-22 @ 11:55) (124/57 - 156/61)  BP(mean): --  RR: 18 (06-22-22 @ 11:55) (18 - 18)  SpO2: 98% (06-22-22 @ 11:55) (96% - 99%)  Wt(kg): --  Daily     Daily   I&O's Summary    21 Jun 2022 07:01  -  22 Jun 2022 07:00  --------------------------------------------------------  IN: 640 mL / OUT: 2300 mL / NET: -1660 mL        Physical Exam:  Appearance: Fraily elderly  Eyes: PERRLA, EOMI, pink conjunctiva, no scleral icterus   HENT: normal oral mucosa  Cardiovascular: RRR, S1, S2, III/VI systolic murmur at apex; no edema; no JVD  Respiratory: Clear to auscultation bilaterally  Gastrointestinal: Soft, non-tender, non-distended, BS+  Musculoskeletal: No clubbing or joint deformity   Neurologic: No focal weakness  Lymphatic: No lymphadenopathy  Psychiatry: AAOx2 with appropriate mood and affect  Skin: No rashes, ecchymoses, or cyanosis                          6.9    6.70  )-----------( 186      ( 22 Jun 2022 06:24 )             21.8     06-21    139  |  110<H>  |  49<H>  ----------------------------<  140<H>  4.3   |  21<L>  |  1.67<H>    Ca    8.5      21 Jun 2022 09:06        Cardiovascular Diagnostic Testing:  ECG: sinus 76 bpm, low voltage in limb leads    Echo: < from: Transthoracic Echocardiogram (10.09.20 @ 15:28) >  ------------------------------------------------------------------------  Dimensions:    Normal Values:  LA:     3.6    2.0 - 4.0 cm  Ao:     2.7    2.0 - 3.8 cm  SEPTUM: 1.0    0.6 - 1.2 cm  PWT:    0.8    0.6 -1.1 cm  LVIDd:  3.9    3.0 - 5.6 cm  LVIDs:  2.4    1.8 - 4.0 cm  Derived variables:  LVMI: 73 g/m2  RWT: 0.41  Fractional short: 38 %  EF (Moran Rule): 63 %Doppler Peak Velocity (m/sec):  AoV=2.0  ------------------------------------------------------------------------  Observations:  Mitral Valve: Normal mitral valve. Mild mitral  regurgitation.  Aortic Valve/Aorta: Calcified trileaflet aortic valve with  decreased opening. Peak transaortic valve gradient equals  16 mm Hg, mean transaortic valve gradient equals 8 mm Hg,  estimated aortic valve area equals 1.7 sqcm (by continuity  equation), aortic valve velocity time integral equals 43  cm, consistent with mild aortic stenosis. Mild aortic  regurgitation.  Peak left ventricular outflow tract  gradient equals 3 mm Hg, mean gradient is equal to 2 mm Hg,  LVOT velocity time integral equals 19 cm.  Aortic Root: 2.7 cm.  LVOT diameter: 2.2 cm.  Left Atrium: Mildly dilated left atrium.  LA volume index =  41 cc/m2.  Left Ventricle: Normal left ventricular systolic function.  No segmental wall motion abnormalities. Normal left  ventricular internal dimensions and wall thickness.  Moderate diastolic dysfunction (Stage II).  Right Heart: Normal right atrium. Normal right ventricular  size and function. Normal tricuspid valve. Mild tricuspid  regurgitation. Normal pulmonic valve.  Pericardium/Pleura: Normal pericardium with no pericardial  effusion.  Hemodynamic: Estimated right atrial pressure is 8 mm Hg.  Estimated right ventricular systolic pressure equals 44 mm  Hg, assuming right atrial pressure equals 8 mm Hg,  consistent with mild pulmonary hypertension.  ------------------------------------------------------------------------  Conclusions:  1. Calcified trileaflet aortic valve with decreased  opening. Peak transaortic valve gradient equals 16 mm Hg,  mean transaortic valve gradient equals 8 mm Hg, estimated  aortic valve area equals 1.7 sqcm (by continuity equation),  aortic valve velocity time integral equals 43 cm,  consistent with mild aortic stenosis.  2. Mildly dilated left atrium.  LA volume index = 41 cc/m2.  3. Normal left ventricular internal dimensions and wall  thickness.  4. Normal left ventricular systolic function. No segmental  wall motion abnormalities.  5. Moderate diastolic dysfunction (Stage II).  *** No previous Echo exam.  ------------------------------------------------------------------------  Confirmed on  10/9/2020 - 17:47:14 by ERNESTO Felipe  ------------------------------------------------------------------------    < end of copied text >    Interpretation of Telemetry: NSR with APCs, PVCs

## 2022-06-22 NOTE — PROGRESS NOTE ADULT - SUBJECTIVE AND OBJECTIVE BOX
[facility] - PROGRESS NOTE    CHIEF COMPLAINT: COVID-19 positive nasal PCR; dyspnea; asthma; bilateral pleural effusions; atelectasis; hypotension; anorexia; fatigue; hematochezia; anemia    INTERVAL HISTORY: received a 7th unit of blood for hgb < 7.0; maroon colored stools this AM; colonoscopy -> poor prep - evidence of recent but not active bleeding - severe diverticulosis; no shortness of breath or hypoxemia on room air; no cough, sputum production, hemoptysis, chest congestion or wheeze; no fevers, chills or sweats; no chest pain/pressure or palpitations;    REVIEW OF SYSTEMS:  Constitutional: As per interval history  HEENT: Within normal limits  CV: As per interval history  Resp: As per interval history  GI: lower GI bleed likely due to diverticulosis  : Within normal limits  Musculoskeletal: Within normal limits  Skin: Within normal limits  Neurological: Within normal limits  Psychiatric: Within normal limits  Endocrine: Within normal limits  Hematologic/Lymphatic: anemia  Allergic/Immunologic: Within normal limits    MEDICATIONS:     Pulmonary "    Anti-microbials:    Cardiovascular:  furosemide    Tablet 40 milliGRAM(s) Oral daily  metoprolol tartrate 25 milliGRAM(s) Oral two times a day    Other:  ascorbic acid 500 milliGRAM(s) Oral daily  levothyroxine 25 MICROGram(s) Oral daily  melatonin 3 milliGRAM(s) Oral at bedtime  multivitamin 1 Tablet(s) Oral daily  pantoprazole    Tablet 40 milliGRAM(s) Oral before breakfast  rosuvastatin 10 milliGRAM(s) Oral at bedtime  senna 2 Tablet(s) Oral at bedtime  venlafaxine XR. 75 milliGRAM(s) Oral daily    OBJECTIVE:    PHYSICAL EXAM:       ICU Vital Signs Last 24 Hrs  T(C): 36.5 (2022 11:55), Max: 36.8 (2022 20:48)  T(F): 97.7 (2022 11:55), Max: 98.3 (2022 20:48)  HR: 60 (2022 11:55) (60 - 66)  BP: 151/60 (2022 11:55) (124/57 - 156/61)  BP(mean): --  ABP: --  ABP(mean): --  RR: 18 (2022 11:55) (18 - 18)  SpO2: 98% (2022 11:55) (96% - 99%) on room air     General: Awake. Alert. Cooperative. No distress. Appears stated age. In bed.  HEENT:  Atraumatic. Normocephalic. Anicteric. Normal oral mucosa. PERRL. EOMI. Pale conjunctiva.  Neck: Supple. Trachea midline. Thyroid without enlargement/tenderness/nodules. No carotid bruit. No JVD.	  Cardiovascular: Regular rate and rhythm. S1 S2 normal. III/VI systolic murmur  Respiratory: Respirations unlabored. Clear to auscultation and percussion bilaterally. Kyphosis.  Abdomen: Soft. Non-tender. Non-distended. No organomegaly. No masses. Normal bowel sounds.  Extremities: Warm to touch. No clubbing or cyanosis. No pedal edema.   Pulses: 2+ peripheral pulses all extremities.	  Skin: Normal skin color. No rashes or lesions. No ecchymoses. No cyanosis. Warm to touch.  Lymph Nodes: Cervical, supraclavicular and axillary nodes normal  Neurological: Motor and sensory examination equal and normal. A and O x 3  Psychiatry: Appropriate mood and affect.    LABS:                          6.9    6.70  )-----------( 186      ( 2022 06:24 )             21.8     CBC    WBC  6.70 <==, 6.05 <==, 6.92 <==, 6.63 <==, 7.03 <==, 7.80 <==, 8.06 <==    Hemoglobin  6.9 <<==, 7.2 <<==, 7.4 <<==, 8.1 <<==, 6.4 <<==, 7.5 <<==, 7.1 <<==    Hematocrit  21.8 <==, 22.5 <==, 24.1 <==, 25.4 <==, 20.5 <==, 23.8 <==, 22.3 <==    Platelets  186 <==, 191 <==, 158 <==, 172 <==, 167 <==, 174 <==, 161 <==      139  |  110<H>  |  49<H>  ----------------------------<  140<H>    06-21  4.3   |  21<L>  |  1.67<H>      LYTES    sodium  139 <==, 140 <==, 140 <==, 139 <==, 144 <==, 140 <==    potassium   4.3 <==, 4.2 <==, 4.1 <==, 4.0 <==, 4.1 <==, 3.9 <==    chloride  110 <==, 111 <==, 110 <==, 110 <==, 113 <==, 108 <==    carbon dioxide  21 <==, 19 <==, 20 <==, 22 <==, 23 <==, 22 <==    =============================================================================================  RENAL FUNCTION:    Creatinine:   1.67  <<==, 1.65  <<==, 1.63  <<==, 1.30  <<==, 1.64  <<==, 1.71  <<==    BUN:   49 <==, 44 <==, 36 <==, 27 <==, 38 <==, 45 <==    ============================================================================================    calcium   8.5 <==, 8.4 <==, 8.3 <==, 8.5 <==, 8.7 <==, 8.5 <==    phos   3.6 <==, 3.5 <==, 3.1 <==, 3.4 <==, 3.6 <==    mag   1.7 <==, 1.6 <==, 1.6 <==, 1.9 <==, 1.8 <==    ============================================================================================  LFTs    AST:   19 <== , 19 <== , 20 <==     ALT:  16  <== , 17  <== , 18  <==     AP:  58  <=, 62  <=, 58  <=    Bili:  0.6  <=, 0.4  <=, 0.6  <=    PT/INR - ( 2022 13:16 )   PT: 12.9 sec;   INR: 1.11 ratio      PTT - ( 2022 13:16 )  PTT:28.4 sec    Venous Blood Gas:   @ 01:58  7.33/47/25/25/28.5  VBG Lactate: 1.5    < from: Transthoracic Echocardiogram (22 @ 14:37) >    Patient name: KOREY DIAZ  YOB: 1929   Age: 89 (F)   MR#: [id number]  Study Date: 2022  Location: [address] [place], [place] [id number]: Gadiel Schwartz RDCS  Study quality: Technically fair  Referring Physician: Teo Monson MD  Blood Pressure: 165/72 mmHg  Height: 158 cm  Weight: 53 kg  BSA: 1.5 m2  Heart Rate: 70 mmHg  ------------------------------------------------------------------------  PROCEDURE: Transthoracic echocardiogram with 2-D, M-Mode  and complete spectral and color flow Doppler.  INDICATION: Cardiac murmur, unspecified (R01.1)  ------------------------------------------------------------------------  Dimensions:    Normal Values:  LA:     3.6    2.0 - 4.0 cm  Ao:     3.0    2.0 - 3.8 cm  SEPTUM: 1.0    0.6 -1.2 cm  PWT:    1.0    0.6 - 1.1 cm  LVIDd:  4.0    3.0 - 5.6 cm  LVIDs:  2.8    1.8 - 4.0 cm  Derived variables:  LVMI: 84 g/m2  RWT: 0.50  Fractional short: 30 %  EF (Moran Rule): 67 %Doppler Peak Velocity (m/sec):  AoV=3.2  ------------------------------------------------------------------------  Observations:  Mitral Valve: Normal mitral valve. Mild mitral  regurgitation.  Aortic Valve/Aorta: Heavily calcified trileaflet aortic  valve with decreased opening. Peak transaortic valve  gradientequals 41 mm Hg, estimated aortic valve area  equals 1 sqcm (by continuity equation), aortic valve  velocity time integral equals 70 cm, consistent with  moderate aortic stenosis.  However the aortic valve appears heavily calcified and may  be closerto moderate-severe aortic stenosis.  Mild aortic  regurgitation.  Peak left ventricular outflow tract  gradient equals 3 mm Hg, LVOT velocity time integral equals  23 cm.  Aortic Root: 3 cm.  Left Atrium: Mildly dilated left atrium.  LA volume index =  39 cc/m2.  Left Ventricle: Normal left ventricular systolic function.  No segmental wall motion abnormalities. Normal left  ventricular internal dimensions and wall thicknesses.  Moderate diastolic dysfunction (Stage II).  Right Heart: Normal rightatrium. Normal right ventricular  size and function. Normal tricuspid valve. Minimal  tricuspid regurgitation. Normal pulmonic valve. Minimal  pulmonic regurgitation.  Pericardium/Pleura: Normal pericardium with trace  pericardial effusion.  Bilateral pleural effusions.  Hemodynamic: Estimated right atrial pressure is 8 mm Hg.  Color Doppler demonstrates no evidence of a patent foramen  ovale.  ------------------------------------------------------------------------  Conclusions:  1. Normal mitral valve. Mild mitral regurgitation.  2. Heavily calcified trileaflet aortic valve with decreased  opening. Peak transaortic valve gradient equals 41 mm Hg,  estimated aortic valve area equals 1 sqcm (by continuity  equation), aortic valve velocity time integral equals 70  cm, consistent with moderate aortic stenosis.  However the aortic valve appears heavily calcified and may  be closer to moderate-severe aortic stenosis.  Mild aortic  regurgitation.  3. Mildly dilated left atrium.  LA volume index = 39 cc/m2.  4. Normal left ventricular systolic function. No segmental  wall motion abnormalities.  5. Normal right ventricular size and function.  6. Normal pericardium with trace pericardial effusion.  7. Bilateral pleural effusions.  *** Compared with echocardiogram of 3/11/2022, no  significant changes noted.  ------------------------------------------------------------------------  Confirmed on  2022 - 17:38:34 by Osito Argueta M.D.  ------------------------------------------------------------------------  ---------------------------------------------------------------------------------------------------------------    MICROBIOLOGY:     Flu With COVID-19 By ERICK (22 @ 01:51)   SARS-CoV-2 Result: Detected  This Respiratory Panel uses polymerase chain reaction (PCR) to detect for   influenza A; influenza B; respiratory syncytial virus; and SARS-CoV-2.   This test was validated by [employer] and is in use under the FDA   Emergency Use Authorization (EUA) for clinical labs CLIA-certified to   perform high complexity testing. Test results should be correlated with   clinical presentation, patient history, and epidemiology.   Influenza A Result: NotDetec   Influenza B Result: NotDetec   Resp Syn Virus Result: NotDetec     Urinalysis Basic - ( 2022 00:46 )    Color: Light Yellow / Appearance: Slightly Turbid / S.014 / pH: x  Gluc: x / Ketone: Negative  / Bili: Negative / Urobili: Negative   Blood: x / Protein: Trace / Nitrite: Positive   Leuk Esterase: Moderate / RBC: 26 /hpf / WBC 2 /HPF   Sq Epi: x / Non Sq Epi: 2 /hpf / Bacteria: Moderate    Culture - Urine (22 @ 15:42)   Specimen Source: Clean Catch Clean Catch (Midstream)   Culture Results:   >100,000 CFU/ml Escherichia coli   <10,000 CFU/ml Normal Urogenital afshin present     RADIOLOGY:  [x] Chest radiographs reviewed and interpreted by me    EXAM:  XR CHEST PORTABLE URGENT 1V                          PROCEDURE DATE:  2022      INTERPRETATION:  CLINICAL INFORMATION: Shortness of breath.    TECHNIQUE: Frontal radiograph of the chest.    COMPARISON: CT chest and chest x-ray 2022    FINDINGS:  The lungs are clear.  No pleural effusion or pneumothorax.  Cardiomediastinal silhouette size is within normal limits.  No acute osseous abnormality.    IMPRESSION:  No evidence of acute pulmonary disease.    SCARLET JALLOH MD; Resident Radiology  This document has been electronically signed.  DIANE OBREGON MD; Attending Radiologist  This document has been electronically signed. 2022  1:19PM  ---------------------------------------------------------------------------------------------------------------  EXAM:  NM GI BLEEDING IMG                          PROCEDURE DATE:  06/15/2022      IMPRESSION: Abnormal GI bleeding scan.    Active bleeding site in the proximal ascending colon.    Dr. Argenis Barnes was notified of these results at 3:54 PM on 6/15/2022    MELVIN THOMPSON MD; Attending Nuclear Medicine  This document has been electronically signed. Dario 15 2022  4:03PM  ---------------------------------------------------------------------------------------------------------------  EXAM:  CT CHEST                          PROCEDURE DATE:  2022      FINDINGS:    Lungs/Airways/Pleura: The central airways are patent. There are small   pleural effusions, new from 3/9/2022 abdominal CT, with partial lower   lobe compressive atelectasis. There are diffuse peribronchial and  peribronchovascular groundglass opacity with mild septal thickening,   likely pulmonary edema. There are few clusters of small nodules on a  background of subsegmental bronchial impaction, likely due to small   airways disease.    Mediastinum/Lymph nodes: No thoracic adenopathy.    Heart and Vessels: Mild cardiomegaly. Extensive coronary arterial and   aortic valvular calcification is present. Hypodensity of the blood pool   in relation to left ventricular myocardium suggests underlying anemia.   The great vessels are normal in size. No pericardial effusion.    Upper Abdomen: Cholelithiasis. Partially imaged large right parapelvic  renal cyst. Extensive visceral artery calcification.    Osseous structures and Soft Tissues: Degenerative changes without   aggressive osseous lesions. Remote left posterior 11th rib fracture.    IMPRESSION:  Pulmonary edema with small pleural effusions and partial lower lobe   compressive atelectasis.    ELISA BLACKBURN M.D., Attending Radiologist  This document has been electronically signed. May 10 2022  8:52AM  ---------------------------------------------------------------------------------------------------------------  EXAM:  DUPLEX SCAN EXT VEINS LOWER BI                          PROCEDURE DATE:  2022      IMPRESSION:  No evidence of deep venous thrombosis in either lower extremity venous   segments able to be examined.     AUGUSTIN EASTMAN MD; Attending Radiologist  This document has been electronically signed. May  9 2022  3:31PM  ---------------------------------------------------------------------------------------------------------------

## 2022-06-23 LAB
ALBUMIN SERPL ELPH-MCNC: 3.1 G/DL — LOW (ref 3.3–5)
ALP SERPL-CCNC: 69 U/L — SIGNIFICANT CHANGE UP (ref 40–120)
ALT FLD-CCNC: 20 U/L — SIGNIFICANT CHANGE UP (ref 10–45)
ANION GAP SERPL CALC-SCNC: 12 MMOL/L — SIGNIFICANT CHANGE UP (ref 5–17)
AST SERPL-CCNC: 27 U/L — SIGNIFICANT CHANGE UP (ref 10–40)
BASOPHILS # BLD AUTO: 0.04 K/UL — SIGNIFICANT CHANGE UP (ref 0–0.2)
BASOPHILS NFR BLD AUTO: 0.6 % — SIGNIFICANT CHANGE UP (ref 0–2)
BILIRUB SERPL-MCNC: 0.6 MG/DL — SIGNIFICANT CHANGE UP (ref 0.2–1.2)
BUN SERPL-MCNC: 44 MG/DL — HIGH (ref 7–23)
CALCIUM SERPL-MCNC: 8.8 MG/DL — SIGNIFICANT CHANGE UP (ref 8.4–10.5)
CHLORIDE SERPL-SCNC: 99 MMOL/L — SIGNIFICANT CHANGE UP (ref 96–108)
CK SERPL-CCNC: 39 U/L — SIGNIFICANT CHANGE UP (ref 25–170)
CO2 SERPL-SCNC: 23 MMOL/L — SIGNIFICANT CHANGE UP (ref 22–31)
CREAT SERPL-MCNC: 1.86 MG/DL — HIGH (ref 0.5–1.3)
EGFR: 25 ML/MIN/1.73M2 — LOW
EOSINOPHIL # BLD AUTO: 0.25 K/UL — SIGNIFICANT CHANGE UP (ref 0–0.5)
EOSINOPHIL NFR BLD AUTO: 3.8 % — SIGNIFICANT CHANGE UP (ref 0–6)
GLUCOSE SERPL-MCNC: 193 MG/DL — HIGH (ref 70–99)
HCT VFR BLD CALC: 25.5 % — LOW (ref 34.5–45)
HCT VFR BLD CALC: 28.9 % — LOW (ref 34.5–45)
HGB BLD-MCNC: 8.1 G/DL — LOW (ref 11.5–15.5)
HGB BLD-MCNC: 9 G/DL — LOW (ref 11.5–15.5)
IMM GRANULOCYTES NFR BLD AUTO: 0.6 % — SIGNIFICANT CHANGE UP (ref 0–1.5)
LDH SERPL L TO P-CCNC: 294 U/L — HIGH (ref 50–242)
LYMPHOCYTES # BLD AUTO: 1.12 K/UL — SIGNIFICANT CHANGE UP (ref 1–3.3)
LYMPHOCYTES # BLD AUTO: 17 % — SIGNIFICANT CHANGE UP (ref 13–44)
MCHC RBC-ENTMCNC: 30.3 PG — SIGNIFICANT CHANGE UP (ref 27–34)
MCHC RBC-ENTMCNC: 30.6 PG — SIGNIFICANT CHANGE UP (ref 27–34)
MCHC RBC-ENTMCNC: 31.1 GM/DL — LOW (ref 32–36)
MCHC RBC-ENTMCNC: 31.8 GM/DL — LOW (ref 32–36)
MCV RBC AUTO: 95.5 FL — SIGNIFICANT CHANGE UP (ref 80–100)
MCV RBC AUTO: 98.3 FL — SIGNIFICANT CHANGE UP (ref 80–100)
MONOCYTES # BLD AUTO: 0.62 K/UL — SIGNIFICANT CHANGE UP (ref 0–0.9)
MONOCYTES NFR BLD AUTO: 9.4 % — SIGNIFICANT CHANGE UP (ref 2–14)
NEUTROPHILS # BLD AUTO: 4.52 K/UL — SIGNIFICANT CHANGE UP (ref 1.8–7.4)
NEUTROPHILS NFR BLD AUTO: 68.6 % — SIGNIFICANT CHANGE UP (ref 43–77)
NRBC # BLD: 0 /100 WBCS — SIGNIFICANT CHANGE UP (ref 0–0)
NRBC # BLD: 0 /100 WBCS — SIGNIFICANT CHANGE UP (ref 0–0)
PLATELET # BLD AUTO: 191 K/UL — SIGNIFICANT CHANGE UP (ref 150–400)
PLATELET # BLD AUTO: 256 K/UL — SIGNIFICANT CHANGE UP (ref 150–400)
POTASSIUM SERPL-MCNC: 5.1 MMOL/L — SIGNIFICANT CHANGE UP (ref 3.5–5.3)
POTASSIUM SERPL-SCNC: 5.1 MMOL/L — SIGNIFICANT CHANGE UP (ref 3.5–5.3)
PROT SERPL-MCNC: 5.9 G/DL — LOW (ref 6–8.3)
RBC # BLD: 2.67 M/UL — LOW (ref 3.8–5.2)
RBC # BLD: 2.94 M/UL — LOW (ref 3.8–5.2)
RBC # BLD: 2.97 M/UL — LOW (ref 3.8–5.2)
RBC # FLD: 15.9 % — HIGH (ref 10.3–14.5)
RBC # FLD: 16.2 % — HIGH (ref 10.3–14.5)
RETICS #: 251.6 K/UL — HIGH (ref 25–125)
RETICS/RBC NFR: 8.3 % — HIGH (ref 0.5–2.5)
SARS-COV-2 RNA SPEC QL NAA+PROBE: SIGNIFICANT CHANGE UP
SODIUM SERPL-SCNC: 134 MMOL/L — LOW (ref 135–145)
WBC # BLD: 11.19 K/UL — HIGH (ref 3.8–10.5)
WBC # BLD: 6.59 K/UL — SIGNIFICANT CHANGE UP (ref 3.8–10.5)
WBC # FLD AUTO: 11.19 K/UL — HIGH (ref 3.8–10.5)
WBC # FLD AUTO: 6.59 K/UL — SIGNIFICANT CHANGE UP (ref 3.8–10.5)

## 2022-06-23 PROCEDURE — 99233 SBSQ HOSP IP/OBS HIGH 50: CPT | Mod: FS

## 2022-06-23 PROCEDURE — 99233 SBSQ HOSP IP/OBS HIGH 50: CPT | Mod: GC

## 2022-06-23 PROCEDURE — 99233 SBSQ HOSP IP/OBS HIGH 50: CPT

## 2022-06-23 RX ADMIN — Medication 25 MILLIGRAM(S): at 17:35

## 2022-06-23 RX ADMIN — SENNA PLUS 2 TABLET(S): 8.6 TABLET ORAL at 21:33

## 2022-06-23 RX ADMIN — Medication 25 MICROGRAM(S): at 05:41

## 2022-06-23 RX ADMIN — Medication 3 MILLIGRAM(S): at 21:30

## 2022-06-23 RX ADMIN — PANTOPRAZOLE SODIUM 40 MILLIGRAM(S): 20 TABLET, DELAYED RELEASE ORAL at 05:41

## 2022-06-23 RX ADMIN — Medication 25 MILLIGRAM(S): at 05:41

## 2022-06-23 RX ADMIN — Medication 75 MILLIGRAM(S): at 11:23

## 2022-06-23 RX ADMIN — Medication 40 MILLIGRAM(S): at 05:41

## 2022-06-23 RX ADMIN — Medication 1 TABLET(S): at 11:23

## 2022-06-23 RX ADMIN — Medication 500 MILLIGRAM(S): at 11:23

## 2022-06-23 RX ADMIN — ROSUVASTATIN CALCIUM 10 MILLIGRAM(S): 5 TABLET ORAL at 21:29

## 2022-06-23 NOTE — PROGRESS NOTE ADULT - ATTENDING COMMENTS
Patient seen and examined at bedside. No acute events overnight. Feeling well. Had a BM yesterday with digested blood and possibly hemorrhoidal bleed? All likely self-limiting. In addition, has antibodies after multiple transfusions, but concern for significant hemolytic process is low. Will repeat blood work in the afternoon including CMP and other hemolysis labs and if stable without significant hemolysis will discharge with close follow up. Clinically does not seem to be hemolyzing and if she does not require further blood I suspect it will be self-limiting. Urine at bedside is dark, but may be mixed in with the stool? Possibly low level hemolysis as above. Probable discharge today. Discussed with GI the stool findings.

## 2022-06-23 NOTE — PROGRESS NOTE ADULT - SUBJECTIVE AND OBJECTIVE BOX
INTERVAL HPI/OVERNIGHT EVENTS:  large BM following suppository yesterday per RN - dark stool c/w "old blood"  then had 1 episode of small amount of BRBPR approximately 1 hour after BM  no additional episodes of stool or bleeding    no additional transfusions (received 1 unit 6/22 AM)  appetite very good  no abdominal pain  no nausea or vomiting    reported dark red urine this AM  no dysuria or flank pain  no CP or SOB  no fever or chills    MEDICATIONS  (STANDING):  ascorbic acid 500 milliGRAM(s) Oral daily  furosemide    Tablet 40 milliGRAM(s) Oral daily  levothyroxine 25 MICROGram(s) Oral daily  melatonin 3 milliGRAM(s) Oral at bedtime  metoprolol tartrate 25 milliGRAM(s) Oral two times a day  multivitamin 1 Tablet(s) Oral daily  pantoprazole    Tablet 40 milliGRAM(s) Oral before breakfast  rosuvastatin 10 milliGRAM(s) Oral at bedtime  senna 2 Tablet(s) Oral at bedtime  venlafaxine XR. 75 milliGRAM(s) Oral daily    MEDICATIONS  (PRN):      Allergies  Keflex (Rash; Hives)      Review of Systems:  General:  No wt loss, fevers, chills, night sweats  CV:  No pain, palpitations, +PAF  Resp:  No dyspnea, cough, tachypnea, wheezing  GI:  see HPI  :  No pain,  +hx kidney stones +incontinence s/p bladder repair  Muscle:  No pain, weakness +arthritis  Neuro:  No focal weakness, tingling, memory problems  Psych:  No fatigue, insomnia, mood problems, depression  Endocrine:  No polyuria, polydypsia, cold/heat intolerance  Heme:  No petechiae, ecchymosis, easy bruisability  Skin:  No rash, tattoos, scars, edema      Vital Signs Last 24 Hrs  T(C): 36.6 (23 Jun 2022 11:26), Max: 36.7 (22 Jun 2022 20:52)  T(F): 97.8 (23 Jun 2022 11:26), Max: 98 (22 Jun 2022 20:52)  HR: 67 (23 Jun 2022 11:26) (64 - 69)  BP: 103/60 (23 Jun 2022 11:26) (103/60 - 171/73)  BP(mean): --  RR: 18 (23 Jun 2022 11:26) (18 - 18)  SpO2: 100% (23 Jun 2022 11:26) (98% - 100%)    PHYSICAL EXAM:  Constitutional: NAD, well-developed elderly WF  sitting up in bed, son at  bedside  Neck: No LAD, supple no JVD  Respiratory: clear b/l no accessory muscle use  Cardiovascular: S1 and S2, RRR,  +murmur  Gastrointestinal: BS+, soft, NT/ND, neg HSM  Extremities: No peripheral edema, neg clubbing, cyanosis  +healing ecchymoses on skin +fragile skin  Vascular: 2+ peripheral pulses  Neurological: A/O x 3, no focal deficits  Psychiatric: Normal mood, normal affect  Skin: No rashes +fragile skin , anicteric      LABS:                        8.1    6.59  )-----------( 191      ( 23 Jun 2022 06:23 )             25.5   Hemoglobin: 8.8 g/dL (06.22.22 @ 16:47)   s/p 1 unit PRBCs transfused  Hemoglobin: 6.9 g/dL (06.22.22 @ 06:24)     Antibody Identification (06.21.22 @ 11:21)   Antibody ID 1_1: Anti-Jka   Direct [place] C3: Positive (06.21.22 @ 15:05)   Direct [place] IgG: Positive (06.21.22 @ 15:05)   Direct [place] Poly: Positive (06.21.22 @ 15:00)         LIVER FUNCTIONS - ( 20 Jun 2022 07:22 )  Alb: 2.5 g/dL / Pro: 4.5 g/dL / ALK PHOS: 58 U/L / ALT: 16 U/L / AST: 19 U/L / GGT: x             RADIOLOGY & ADDITIONAL TESTS:

## 2022-06-23 NOTE — PROGRESS NOTE ADULT - SUBJECTIVE AND OBJECTIVE BOX
Patient:  KOREY DIAZ  [id number]    Progress Note    Interval events: No acute events.  Pertinent ROS (if any):      Administered:  pantoprazole    Tablet: 40 milliGRAM(s) Oral (06-23 @ 05:41)  metoprolol tartrate: 25 milliGRAM(s) Oral (06-23 @ 05:41)  levothyroxine: 25 MICROGram(s) Oral (06-23 @ 05:41)  furosemide    Tablet: 40 milliGRAM(s) Oral (06-23 @ 05:41)  senna: 2 Tablet(s) Oral (06-22 @ 22:01)  rosuvastatin: 10 milliGRAM(s) Oral (06-22 @ 21:59)  melatonin: 3 milliGRAM(s) Oral (06-22 @ 21:59)        OBJECTIVE:    06-22 @ 07:01  -  06-23 @ 07:00  --------------------------------------------------------  IN: 800 mL / OUT: 1250 mL / NET: -450 mL      CAPILLARY BLOOD GLUCOSE            VITALS:  T(F): 97.7 (06-23-22 @ 04:24), Max: 98.3 (06-22-22 @ 08:45)  HR: 64 (06-23-22 @ 04:24) (60 - 69)  BP: 135/58 (06-23-22 @ 04:24) (124/57 - 171/73)  BP(mean): --  ABP: --  ABP(mean): --  RR: 18 (06-23-22 @ 04:24) (18 - 18)  SpO2: 99% (06-23-22 @ 04:24) (96% - 99%)    PHYSICAL EXAM:  GENERAL: NAD, lying in bed comfortably  HEAD:  Atraumatic, Normocephalic  EYES: EOMI, PERRLA, conjunctiva and sclera clear  ENT: Moist mucous membranes  NECK: Supple, No JVD  CHEST/LUNG: Clear to auscultation bilaterally; No rales, rhonchi, wheezing, or rubs. Unlabored respirations  HEART: Regular rate and rhythm; No murmurs, rubs, or gallops  ABDOMEN: Bowel sounds present; Soft, Nontender, Nondistended. No hepatomegaly  EXTREMITIES:  2+ Peripheral Pulses, brisk capillary refill. No clubbing, cyanosis, or edema  NERVOUS SYSTEM:  Alert & Oriented X3, speech clear. No deficits   MSK: FROM all 4 extremities, full and equal strength  SKIN: No rashes or lesions    HOSPITAL MEDICATIONS:  Standing Meds:  ascorbic acid 500 milliGRAM(s) Oral daily  furosemide    Tablet 40 milliGRAM(s) Oral daily  levothyroxine 25 MICROGram(s) Oral daily  melatonin 3 milliGRAM(s) Oral at bedtime  metoprolol tartrate 25 milliGRAM(s) Oral two times a day  multivitamin 1 Tablet(s) Oral daily  pantoprazole    Tablet 40 milliGRAM(s) Oral before breakfast  rosuvastatin 10 milliGRAM(s) Oral at bedtime  senna 2 Tablet(s) Oral at bedtime  venlafaxine XR. 75 milliGRAM(s) Oral daily      PRN Meds:      LABS:  CBC 06-23-22 @ 06:23                        8.1    6.59  )-----------( 191                   25.5       Hgb trend: 8.1 <-- , 8.8 <-- , 6.9 <-- , 7.2 <-- , 7.4 <--   WBC trend: 6.59 <-- , 6.87 <-- , 6.70 <-- , 6.05 <-- , 6.92 <--       CMP 06-21-22 @ 09:06    139  |  110<H>  |  49<H>  ----------------------------<  140<H>  4.3   |  21<L>  |  1.67<H>          Serum Cr trend: 1.67 <--         ABG Trend:     VBG Trend:       MICROBIOLOGY:       RADIOLOGY:  [ ] Reviewed and interpreted by me    EKG:   Patient:  KOREY DIAZ  [id number]    Progress Note    Interval events: No acute events. Patient is AOx3, bowel movements with stool + clots + possible blood from hemmorhoids last night. Patient with dark brown? urine after changing primafit (possibly mixed with stool), spoke to family, all questions answered.   Pertinent ROS (if any):  Denies f/c/n/v/cp/ap/bowel or bladder changes     Administered:  pantoprazole    Tablet: 40 milliGRAM(s) Oral (06-23 @ 05:41)  metoprolol tartrate: 25 milliGRAM(s) Oral (06-23 @ 05:41)  levothyroxine: 25 MICROGram(s) Oral (06-23 @ 05:41)  furosemide    Tablet: 40 milliGRAM(s) Oral (06-23 @ 05:41)  senna: 2 Tablet(s) Oral (06-22 @ 22:01)  rosuvastatin: 10 milliGRAM(s) Oral (06-22 @ 21:59)  melatonin: 3 milliGRAM(s) Oral (06-22 @ 21:59)        OBJECTIVE:    06-22 @ 07:01  -  06-23 @ 07:00  --------------------------------------------------------  IN: 800 mL / OUT: 1250 mL / NET: -450 mL      CAPILLARY BLOOD GLUCOSE            VITALS:  T(F): 97.7 (06-23-22 @ 04:24), Max: 98.3 (06-22-22 @ 08:45)  HR: 64 (06-23-22 @ 04:24) (60 - 69)  BP: 135/58 (06-23-22 @ 04:24) (124/57 - 171/73)  BP(mean): --  ABP: --  ABP(mean): --  RR: 18 (06-23-22 @ 04:24) (18 - 18)  SpO2: 99% (06-23-22 @ 04:24) (96% - 99%)    PHYSICAL EXAM:  GENERAL: NAD, lying in bed comfortably, AOx3  HEAD:  Atraumatic, Normocephalic  EYES: EOMI, PERRLA, conjunctiva and sclera clear  ENT: Moist mucous membranes  NECK: Supple, No JVD  CHEST/LUNG: Clear to auscultation bilaterally; No rales, rhonchi, wheezing, or rubs. Unlabored respirations  HEART: Regular rate and rhythm; No murmurs, rubs, or gallops  ABDOMEN: Bowel sounds present; Soft, Nontender, Nondistended. No hepatomegaly  EXTREMITIES:  2+ Peripheral Pulses, brisk capillary refill. No clubbing, cyanosis, or edema  NERVOUS SYSTEM:  Alert & Oriented X3, speech clear. No deficits   MSK: FROM all 4 extremities, full and equal strength  SKIN: No rashes or lesions    HOSPITAL MEDICATIONS:  Standing Meds:  ascorbic acid 500 milliGRAM(s) Oral daily  furosemide    Tablet 40 milliGRAM(s) Oral daily  levothyroxine 25 MICROGram(s) Oral daily  melatonin 3 milliGRAM(s) Oral at bedtime  metoprolol tartrate 25 milliGRAM(s) Oral two times a day  multivitamin 1 Tablet(s) Oral daily  pantoprazole    Tablet 40 milliGRAM(s) Oral before breakfast  rosuvastatin 10 milliGRAM(s) Oral at bedtime  senna 2 Tablet(s) Oral at bedtime  venlafaxine XR. 75 milliGRAM(s) Oral daily      PRN Meds:      LABS:  CBC 06-23-22 @ 06:23                        8.1    6.59  )-----------( 191                   25.5       Hgb trend: 8.1 <-- , 8.8 <-- , 6.9 <-- , 7.2 <-- , 7.4 <--   WBC trend: 6.59 <-- , 6.87 <-- , 6.70 <-- , 6.05 <-- , 6.92 <--       CMP 06-21-22 @ 09:06    139  |  110<H>  |  49<H>  ----------------------------<  140<H>  4.3   |  21<L>  |  1.67<H>          Serum Cr trend: 1.67 <--         ABG Trend:     VBG Trend:       MICROBIOLOGY:       RADIOLOGY:  [ ] Reviewed and interpreted by me    EKG:

## 2022-06-23 NOTE — PROGRESS NOTE ADULT - SUBJECTIVE AND OBJECTIVE BOX
[facility] - PROGRESS NOTE    CHIEF COMPLAINT: COVID-19 positive nasal PCR; dyspnea; asthma; bilateral pleural effusions; atelectasis; hypotension; anorexia; fatigue; hematochezia; anemia    INTERVAL HISTORY: received a 7th unit of blood for hgb < 7.0 yesterday; maroon colored stools last night -> bright red blood per rectum this AM; colonoscopy -> poor prep - evidence of recent but not active bleeding - severe diverticulosis; no shortness of breath or hypoxemia on room air; no cough, sputum production, hemoptysis, chest congestion or wheeze; no fevers, chills or sweats; no chest pain/pressure or palpitations;    REVIEW OF SYSTEMS:  Constitutional: As per interval history  HEENT: Within normal limits  CV: As per interval history  Resp: As per interval history  GI: lower GI bleed likely due to diverticulosis  : Within normal limits  Musculoskeletal: Within normal limits  Skin: Within normal limits  Neurological: Within normal limits  Psychiatric: Within normal limits  Endocrine: Within normal limits  Hematologic/Lymphatic: anemia  Allergic/Immunologic: Within normal limits    MEDICATIONS:     Pulmonary "    Anti-microbials:    Cardiovascular:  furosemide    Tablet 40 milliGRAM(s) Oral daily  metoprolol tartrate 25 milliGRAM(s) Oral two times a day    Other:  ascorbic acid 500 milliGRAM(s) Oral daily  levothyroxine 25 MICROGram(s) Oral daily  melatonin 3 milliGRAM(s) Oral at bedtime  multivitamin 1 Tablet(s) Oral daily  pantoprazole    Tablet 40 milliGRAM(s) Oral before breakfast  rosuvastatin 10 milliGRAM(s) Oral at bedtime  senna 2 Tablet(s) Oral at bedtime  venlafaxine XR. 75 milliGRAM(s) Oral daily    OBJECTIVE:    PHYSICAL EXAM:       ICU Vital Signs Last 24 Hrs  T(C): 36.6 (2022 11:26), Max: 36.7 (2022 20:52)  T(F): 97.8 (2022 11:26), Max: 98 (2022 20:52)  HR: 67 (2022 11:26) (60 - 69)  BP: 103/60 (2022 11:26) (103/60 - 171/73)  BP(mean): --  ABP: --  ABP(mean): --  RR: 18 (2022 11:26) (18 - 18)  SpO2: 100% (2022 11:26) (98% - 100%) on room air     General: Awake. Alert. Cooperative. No distress. Appears stated age. In bed.  HEENT:  Atraumatic. Normocephalic. Anicteric. Normal oral mucosa. PERRL. EOMI. Pale conjunctiva.  Neck: Supple. Trachea midline. Thyroid without enlargement/tenderness/nodules. No carotid bruit. No JVD.	  Cardiovascular: Regular rate and rhythm. S1 S2 normal. III/VI systolic murmur  Respiratory: Respirations unlabored. Clear to auscultation and percussion bilaterally. Kyphosis.  Abdomen: Soft. Non-tender. Non-distended. No organomegaly. No masses. Normal bowel sounds.  Extremities: Warm to touch. No clubbing or cyanosis. No pedal edema.   Pulses: 2+ peripheral pulses all extremities.	  Skin: Normal skin color. No rashes or lesions. No ecchymoses. No cyanosis. Warm to touch.  Lymph Nodes: Cervical, supraclavicular and axillary nodes normal  Neurological: Motor and sensory examination equal and normal. A and O x 3  Psychiatry: Appropriate mood and affect.    LABS:                          8.1    6.59  )-----------( 191      ( 2022 06:23 )             25.5     CBC    WBC  6.59 <==, 6.87 <==, 6.70 <==, 6.05 <==, 6.92 <==, 6.63 <==, 7.03 <==    Hemoglobin  8.1 <<==, 8.8 <<==, 6.9 <<==, 7.2 <<==, 7.4 <<==, 8.1 <<==, 6.4 <<==    Hematocrit  25.5 <==, 27.6 <==, 21.8 <==, 22.5 <==, 24.1 <==, 25.4 <==, 20.5 <==    Platelets  191 <==, 187 <==, 186 <==, 191 <==, 158 <==, 172 <==, 167 <==      139  |  110<H>  |  49<H>  ----------------------------<  140<H>    06-21  4.3   |  21<L>  |  1.67<H>      LYTES    sodium  139 <==, 140 <==, 140 <==, 139 <==, 144 <==    potassium   4.3 <==, 4.2 <==, 4.1 <==, 4.0 <==, 4.1 <==    chloride  110 <==, 111 <==, 110 <==, 110 <==, 113 <==    carbon dioxide  21 <==, 19 <==, 20 <==, 22 <==, 23 <==    =============================================================================================  RENAL FUNCTION:    Creatinine:   1.67  <<==, 1.65  <<==, 1.63  <<==, 1.30  <<==, 1.64  <<==    BUN:   49 <==, 44 <==, 36 <==, 27 <==, 38 <==    ============================================================================================    calcium   8.5 <==, 8.4 <==, 8.3 <==, 8.5 <==, 8.7 <==    phos   3.6 <==, 3.5 <==, 3.1 <==, 3.4 <==    mag   1.7 <==, 1.6 <==, 1.6 <==, 1.9 <==    ============================================================================================  LFTs    AST:   19 <== , 19 <== , 20 <==     ALT:  16  <== , 17  <== , 18  <==     AP:  58  <=, 62  <=, 58  <=    Bili:  0.6  <=, 0.4  <=, 0.6  <=    PT/INR - ( 2022 13:16 )   PT: 12.9 sec;   INR: 1.11 ratio      PTT - ( 2022 13:16 )  PTT:28.4 sec    Venous Blood Gas:   @ 01:58  7.33/47/25/25/28.5  VBG Lactate: 1.5    < from: Transthoracic Echocardiogram (22 @ 14:37) >    Patient name: KOREY DIAZ  YOB: 1929   Age: 89 (F)   MR#: [id number]  Study Date: 2022  Location: [address] [place], [place] [id number]: [facility]  Study quality: Technically fair  Referring Physician: Teo Monson MD  Blood Pressure: 165/72 mmHg  Height: 158 cm  Weight: 53 kg  BSA: 1.5 m2  Heart Rate: 70 mmHg  ------------------------------------------------------------------------  PROCEDURE: Transthoracic echocardiogram with 2-D, M-Mode  and complete spectral and color flow Doppler.  INDICATION: Cardiac murmur, unspecified (R01.1)  ------------------------------------------------------------------------  Dimensions:    Normal Values:  LA:     3.6    2.0 - 4.0 cm  Ao:     3.0    2.0 - 3.8 cm  SEPTUM: 1.0    0.6 -1.2 cm  PWT:    1.0    0.6 - 1.1 cm  LVIDd:  4.0    3.0 - 5.6 cm  LVIDs:  2.8    1.8 - 4.0 cm  Derived variables:  LVMI: 84 g/m2  RWT: 0.50  Fractional short: 30 %  EF (Moran Rule): 67 %Doppler Peak Velocity (m/sec):  AoV=3.2  ------------------------------------------------------------------------  Observations:  Mitral Valve: Normal mitral valve. Mild mitral  regurgitation.  Aortic Valve/Aorta: Heavily calcified trileaflet aortic  valve with decreased opening. Peak transaortic valve  gradientequals 41 mm Hg, estimated aortic valve area  equals 1 sqcm (by continuity equation), aortic valve  velocity time integral equals 70 cm, consistent with  moderate aortic stenosis.  However the aortic valve appears heavily calcified and may  be closerto moderate-severe aortic stenosis.  Mild aortic  regurgitation.  Peak left ventricular outflow tract  gradient equals 3 mm Hg, LVOT velocity time integral equals  23 cm.  Aortic Root: 3 cm.  Left Atrium: Mildly dilated left atrium.  LA volume index =  39 cc/m2.  Left Ventricle: Normal left ventricular systolic function.  No segmental wall motion abnormalities. Normal left  ventricular internal dimensions and wall thicknesses.  Moderate diastolic dysfunction (Stage II).  Right Heart: Normal rightatrium. Normal right ventricular  size and function. Normal tricuspid valve. Minimal  tricuspid regurgitation. Normal pulmonic valve. Minimal  pulmonic regurgitation.  Pericardium/Pleura: Normal pericardium with trace  pericardial effusion.  Bilateral pleural effusions.  Hemodynamic: Estimated right atrial pressure is 8 mm Hg.  Color Doppler demonstrates no evidence of a patent foramen  ovale.  ------------------------------------------------------------------------  Conclusions:  1. Normal mitral valve. Mild mitral regurgitation.  2. Heavily calcified trileaflet aortic valve with decreased  opening. Peak transaortic valve gradient equals 41 mm Hg,  estimated aortic valve area equals 1 sqcm (by continuity  equation), aortic valve velocity time integral equals 70  cm, consistent with moderate aortic stenosis.  However the aortic valve appears heavily calcified and may  be closer to moderate-severe aortic stenosis.  Mild aortic  regurgitation.  3. Mildly dilated left atrium.  LA volume index = 39 cc/m2.  4. Normal left ventricular systolic function. No segmental  wall motion abnormalities.  5. Normal right ventricular size and function.  6. Normal pericardium with trace pericardial effusion.  7. Bilateral pleural effusions.  *** Compared with echocardiogram of 3/11/2022, no  significant changes noted.  ------------------------------------------------------------------------  Confirmed on  2022 - 17:38:34 by Osito Argueta M.D.  ------------------------------------------------------------------------  ---------------------------------------------------------------------------------------------------------------    MICROBIOLOGY:     Flu With COVID-19 By ERICK (22 @ 01:51)   SARS-CoV-2 Result: Detected  This Respiratory Panel uses polymerase chain reaction (PCR) to detect for   influenza A; influenza B; respiratory syncytial virus; and SARS-CoV-2.   This test was validated by [facility] and is in use under the FDA   Emergency Use Authorization (EUA) for clinical labs CLIA-certified to   perform high complexity testing. Test results should be correlated with   clinical presentation, patient history, and epidemiology.   Influenza A Result: NotDetec   Influenza B Result: NotDetec   Resp Syn Virus Result: NotDetec     Urinalysis Basic - ( 2022 00:46 )    Color: Light Yellow / Appearance: Slightly Turbid / S.014 / pH: x  Gluc: x / Ketone: Negative  / Bili: Negative / Urobili: Negative   Blood: x / Protein: Trace / Nitrite: Positive   Leuk Esterase: Moderate / RBC: 26 /hpf / WBC 2 /HPF   Sq Epi: x / Non Sq Epi: 2 /hpf / Bacteria: Moderate    Culture - Urine (22 @ 15:42)   Specimen Source: Clean Catch Clean Catch (Midstream)   Culture Results:   >100,000 CFU/ml Escherichia coli   <10,000 CFU/ml Normal Urogenital afshin present     RADIOLOGY:  [x] Chest radiographs reviewed and interpreted by me    EXAM:  XR CHEST PORTABLE URGENT 1V                          PROCEDURE DATE:  2022      INTERPRETATION:  CLINICAL INFORMATION: Shortness of breath.    TECHNIQUE: Frontal radiograph of the chest.    COMPARISON: CT chest and chest x-ray 2022    FINDINGS:  The lungs are clear.  No pleural effusion or pneumothorax.  Cardiomediastinal silhouette size is within normal limits.  No acute osseous abnormality.    IMPRESSION:  No evidence of acute pulmonary disease.    SCARLET JALLOH MD; Resident Radiology  This document has been electronically signed.  DIANE OBREGON MD; Attending Radiologist  This document has been electronically signed. 2022  1:19PM  ---------------------------------------------------------------------------------------------------------------  EXAM:  NM GI BLEEDING IMG                          PROCEDURE DATE:  06/15/2022      IMPRESSION: Abnormal GI bleeding scan.    Active bleeding site in the proximal ascending colon.    Dr. Argenis Barnes was notified of these results at 3:54 PM on 6/15/2022    MELVIN THOMPSON MD; Attending Nuclear Medicine  This document has been electronically signed. Dario 15 2022  4:03PM  ---------------------------------------------------------------------------------------------------------------  EXAM:  CT CHEST                          PROCEDURE DATE:  2022      FINDINGS:    Lungs/Airways/Pleura: The central airways are patent. There are small   pleural effusions, new from 3/9/2022 abdominal CT, with partial lower   lobe compressive atelectasis. There are diffuse peribronchial and  peribronchovascular groundglass opacity with mild septal thickening,   likely pulmonary edema. There are few clusters of small nodules on a  background of subsegmental bronchial impaction, likely due to small   airways disease.    Mediastinum/Lymph nodes: No thoracic adenopathy.    Heart and Vessels: Mild cardiomegaly. Extensive coronary arterial and   aortic valvular calcification is present. Hypodensity of the blood pool   in relation to left ventricular myocardium suggests underlying anemia.   The great vessels are normal in size. No pericardial effusion.    Upper Abdomen: Cholelithiasis. Partially imaged large right parapelvic  renal cyst. Extensive visceral artery calcification.    Osseous structures and Soft Tissues: Degenerative changes without   aggressive osseous lesions. Remote left posterior 11th rib fracture.    IMPRESSION:  Pulmonary edema with small pleural effusions and partial lower lobe   compressive atelectasis.    ELISA BLACKBURN M.D., Attending Radiologist  This document has been electronically signed. May 10 2022  8:52AM  ---------------------------------------------------------------------------------------------------------------  EXAM:  DUPLEX SCAN EXT VEINS LOWER BI                          PROCEDURE DATE:  2022      IMPRESSION:  No evidence of deep venous thrombosis in either lower extremity venous   segments able to be examined.     AUGUSTIN EASTMAN MD; Attending Radiologist  This document has been electronically signed. May  9 2022  3:31PM  ---------------------------------------------------------------------------------------------------------------

## 2022-06-23 NOTE — PROGRESS NOTE ADULT - ASSESSMENT
89yo F w/ hx HTN, Afib (recent dx 4/2022), CHF, HLD, nonsmoker, remote hx of asthma presenting with loose stools, lethargy, fatigue, low blood pressure and blood bowel movement found to have a hemoglobin of 4.8 most likely 2/2 to GI bleed- likely diverticular bleed given history/presentation    recent COVID + 5/20 s/p Paxlovid rx; COVID PCR + on admission  -continue off contact isolation per Infection Control Dept    Acute GI blood loss anemia; likely 2/2  diverticular bleeding given history/presentation.   +NM GI bleed scan (proximal AC)  IR input appreciated - increased risk of renal injury with IV contrast load required for angio  Recurrent/ongoing rectal bleeding with continued transfusion requirement therefore underwent Colonoscopy 6/17/22 6/17/22 COLON poor prep and "old" blood in colon; no active bleeding encountered, +diverticulosis - primarily Left sided    s/p 7 units PRBCs admission ->current  No evidence of active/brisk GI bleeding   BM 6/22 (after suppository, previously last BM 6/17 with prep)  small episode BRB after BM, self-limited ?hemorrhoidal given need for supp/constipation.   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

## 2022-06-23 NOTE — PROGRESS NOTE ADULT - SUBJECTIVE AND OBJECTIVE BOX
[facility]   [phone]  RENAL FOLLOW UP NOTE  --------------------------------------------------------------------------------  Chief Complaint:    24 hour events/subjective:    patient seen and examined  waylon coon cp    PAST HISTORY  --------------------------------------------------------------------------------  No significant changes to PMH, PSH, FHx, SHx, unless otherwise noted    ALLERGIES & MEDICATIONS  --------------------------------------------------------------------------------  Allergies    Keflex (Rash; Hives)    Intolerances      Standing Inpatient Medications  ascorbic acid 500 milliGRAM(s) Oral daily  furosemide    Tablet 40 milliGRAM(s) Oral daily  levothyroxine 25 MICROGram(s) Oral daily  melatonin 3 milliGRAM(s) Oral at bedtime  metoprolol tartrate 25 milliGRAM(s) Oral two times a day  multivitamin 1 Tablet(s) Oral daily  pantoprazole    Tablet 40 milliGRAM(s) Oral before breakfast  rosuvastatin 10 milliGRAM(s) Oral at bedtime  senna 2 Tablet(s) Oral at bedtime  venlafaxine XR. 75 milliGRAM(s) Oral daily    PRN Inpatient Medications      REVIEW OF SYSTEMS  --------------------------------------------------------------------------------    Gen: denies fevers/chills,  CVS: denies chest pain/palpitations  Resp: denies SOB/Cough  GI: Denies N/V/Abd pain  : Denies dysuria/oliguria/hematuria    VITALS/PHYSICAL EXAM  --------------------------------------------------------------------------------  T(C): 36.6 (06-23-22 @ 11:26), Max: 36.7 (06-22-22 @ 20:52)  HR: 67 (06-23-22 @ 11:26) (64 - 69)  BP: 103/60 (06-23-22 @ 11:26) (103/60 - 171/73)  RR: 18 (06-23-22 @ 11:26) (18 - 18)  SpO2: 100% (06-23-22 @ 11:26) (98% - 100%)  Wt(kg): --        06-22-22 @ 07:01  -  06-23-22 @ 07:00  --------------------------------------------------------  IN: 800 mL / OUT: 1250 mL / NET: -450 mL    06-23-22 @ 07:01  -  06-23-22 @ 12:57  --------------------------------------------------------  IN: 480 mL / OUT: 0 mL / NET: 480 mL      Physical Exam:  	              Gen: Appears comfortable,  forgetful   	Pulm: decrease bs,  no rales or ronchi or wheezing  	CV: No JVD. RRR, S1S2; no rub  	Abd: +BS, soft, nontender/nondistended  	: No suprapubic tenderness, urine output appears dark  	UE: Warm, no cyanosis  no clubbing,  no edema  	LE: Warm, no cyanosis  no clubbing, no edema    LABS/STUDIES  --------------------------------------------------------------------------------              8.1    6.59  >-----------<  191      [06-23-22 @ 06:23]              25.5                 Creatinine Trend:  SCr 1.67 [06-21 @ 09:06]  SCr 1.65 [06-20 @ 07:22]  SCr 1.63 [06-19 @ 07:07]  SCr 1.30 [06-18 @ 11:53]  SCr 1.64 [06-17 @ 06:44]              Urinalysis - [06-14-22 @ 00:46]      Color Light Yellow / Appearance Slightly Turbid / SG 1.014 / pH 5.5      Gluc 100 mg/dL / Ketone Negative  / Bili Negative / Urobili Negative       Blood Large / Protein Trace / Leuk Est Moderate / Nitrite Positive      RBC 26 / WBC 2 / Hyaline 3 / Gran  / Sq Epi  / Non Sq Epi 2 / Bacteria Moderate

## 2022-06-23 NOTE — PROGRESS NOTE ADULT - ASSESSMENT
89yo F w/ hx HTN, Afib, CHF, HLD, CKD Stage IV nonsmoker, remote hx of asthma presenting with loose stools, lethargy, fatigue, low blood pressure and blood bowel movement found to have a hemoglobin of 4.8 most likely 2/2 to GI bleed from eliquis.  DDx includes diverticulitis vs Gastric ulcer vs. Duodenal ulcer vs. angiodysplasia. s/p colonoscopy without active bleeding, now with stable Hg, without additional GI intervention, pending monitoring and discharge later this week.  89yo F w/ hx HTN, Afib, CHF, HLD, CKD Stage IV nonsmoker, remote hx of asthma presenting with loose stools, lethargy, fatigue, low blood pressure and blood bowel movement found to have a hemoglobin of 4.8 most likely 2/2 to GI bleed from eliquis.  DDx includes diverticulitis vs Gastric ulcer vs. Duodenal ulcer vs. angiodysplasia. s/p colonoscopy without active bleeding, now with stable Hg, without additional GI intervention, pending hemolysis labs (anti-JKA positive) and discharge to home with home health services.

## 2022-06-23 NOTE — PROGRESS NOTE ADULT - ASSESSMENT
89yo F w/ hx HTN, Afib, CHF, AS, HLD, nonsmoker, remote hx of asthma presenting with loose stools, lethargy, fatigue, low blood pressure and bloody bowel movement. Of note, the patient had COVID 2 weeks PTA measured by at home test. RVP was + for COVID by PCR on admission.   In the ED: Labs: Hemoglobin 4.8, Cr 1.93, COVID +   required prbc. pt also had egd. eliquis was dc.       1- CKD IV   2- hx chf   3- anemia/ GI bleed    creatinine is steady, last creatinine from 6/21   chf is compensated   lasix 40 mg daily resumed 6/22  prbc as needed   trend hb   suspect urine output mixed with stool  place new purwick, if urine output doesn't appear clear, will need U/A  metoprolol 25 mg bid   trend creatinine   d.w pt son at bedside

## 2022-06-23 NOTE — PROGRESS NOTE ADULT - SUBJECTIVE AND OBJECTIVE BOX
HPI:  Patient seen and examined at bedside on 3 [place].  Awaiting repeat CBC.    Review Of Systems:           Respiratory: No shortness of breath, cough, or wheezing  Cardiovascular: No chest pain or palpitations  10 point review of systems is otherwise negative except as mentioned above        Medications:  artificial tears (preservative free) Ophthalmic Solution 1 Drop(s) Both EYES two times a day  ascorbic acid 500 milliGRAM(s) Oral daily  furosemide    Tablet 40 milliGRAM(s) Oral daily  levothyroxine 25 MICROGram(s) Oral daily  melatonin 3 milliGRAM(s) Oral at bedtime  metoprolol tartrate 25 milliGRAM(s) Oral two times a day  multivitamin 1 Tablet(s) Oral daily  pantoprazole    Tablet 40 milliGRAM(s) Oral before breakfast  rosuvastatin 10 milliGRAM(s) Oral at bedtime  senna 2 Tablet(s) Oral at bedtime  venlafaxine XR. 75 milliGRAM(s) Oral daily    PAST MEDICAL & SURGICAL HISTORY:  HTN (hypertension)      Diabetes mellitus type 2, noninsulin dependent      Diverticulitis      Hyperlipidemia      GERD (gastroesophageal reflux disease)      Glaucoma      Breast cancer      Urinary incontinence      Renal calculus or stone      Arthritis      Heart murmur      Renal calculi  s/p stone extraction 57 yrs ago      S/P lumpectomy of breast  right breast with node removal      S/P bladder repair  Interstim device placement 2010        Vitals:  T(C): 36.9 (06-23-22 @ 21:35), Max: 36.9 (06-23-22 @ 21:35)  HR: 69 (06-23-22 @ 22:48) (64 - 70)  BP: 123/53 (06-23-22 @ 22:48) (96/52 - 135/58)  BP(mean): --  RR: 18 (06-23-22 @ 21:35) (18 - 18)  SpO2: 98% (06-23-22 @ 21:35) (98% - 100%)  Wt(kg): --  Daily     Daily   I&O's Summary    22 Jun 2022 07:01  -  23 Jun 2022 07:00  --------------------------------------------------------  IN: 800 mL / OUT: 1250 mL / NET: -450 mL    23 Jun 2022 07:01  -  23 Jun 2022 22:59  --------------------------------------------------------  IN: 480 mL / OUT: 300 mL / NET: 180 mL        Physical Exam:  Appearance: Fraily elderly  Eyes: PERRLA, EOMI, pink conjunctiva, no scleral icterus   HENT: normal oral mucosa  Cardiovascular: RRR, S1, S2, III/VI systolic murmur at apex; no edema; no JVD  Respiratory: Clear to auscultation bilaterally  Gastrointestinal: Soft, non-tender, non-distended, BS+  Musculoskeletal: No clubbing or joint deformity   Neurologic: No focal weakness  Lymphatic: No lymphadenopathy  Psychiatry: AAOx2 with appropriate mood and affect  Skin: No rashes, ecchymoses, or cyanosis                          9.0    11.19 )-----------( 256      ( 23 Jun 2022 14:45 )             28.9     06-23    134<L>  |  99  |  44<H>  ----------------------------<  193<H>  5.1   |  23  |  1.86<H>    Ca    8.8      23 Jun 2022 14:45    TPro  5.9<L>  /  Alb  3.1<L>  /  TBili  0.6  /  DBili  x   /  AST  27  /  ALT  20  /  AlkPhos  69  06-23      CARDIAC MARKERS ( 23 Jun 2022 14:45 )  x     / x     / 39 U/L / x     / x          Cardiovascular Diagnostic Testing:  ECG: sinus 76 bpm, low voltage in limb leads    Echo: < from: Transthoracic Echocardiogram (10.09.20 @ 15:28) >  ------------------------------------------------------------------------  Dimensions:    Normal Values:  LA:     3.6    2.0 - 4.0 cm  Ao:     2.7    2.0 - 3.8 cm  SEPTUM: 1.0    0.6 - 1.2 cm  PWT:    0.8    0.6 -1.1 cm  LVIDd:  3.9    3.0 - 5.6 cm  LVIDs:  2.4    1.8 - 4.0 cm  Derived variables:  LVMI: 73 g/m2  RWT: 0.41  Fractional short: 38 %  EF (Moran Rule): 63 %Doppler Peak Velocity (m/sec):  AoV=2.0  ------------------------------------------------------------------------  Observations:  Mitral Valve: Normal mitral valve. Mild mitral  regurgitation.  Aortic Valve/Aorta: Calcified trileaflet aortic valve with  decreased opening. Peak transaortic valve gradient equals  16 mm Hg, mean transaortic valve gradient equals 8 mm Hg,  estimated aortic valve area equals 1.7 sqcm (by continuity  equation), aortic valve velocity time integral equals 43  cm, consistent with mild aortic stenosis. Mild aortic  regurgitation.  Peak left ventricular outflow tract  gradient equals 3 mm Hg, mean gradient is equal to 2 mm Hg,  LVOT velocity time integral equals 19 cm.  Aortic Root: 2.7 cm.  LVOT diameter: 2.2 cm.  Left Atrium: Mildly dilated left atrium.  LA volume index =  41 cc/m2.  Left Ventricle: Normal left ventricular systolic function.  No segmental wall motion abnormalities. Normal left  ventricular internal dimensions and wall thickness.  Moderate diastolic dysfunction (Stage II).  Right Heart: Normal right atrium. Normal right ventricular  size and function. Normal tricuspid valve. Mild tricuspid  regurgitation. Normal pulmonic valve.  Pericardium/Pleura: Normal pericardium with no pericardial  effusion.  Hemodynamic: Estimated right atrial pressure is 8 mm Hg.  Estimated right ventricular systolic pressure equals 44 mm  Hg, assuming right atrial pressure equals 8 mm Hg,  consistent with mild pulmonary hypertension.  ------------------------------------------------------------------------  Conclusions:  1. Calcified trileaflet aortic valve with decreased  opening. Peak transaortic valve gradient equals 16 mm Hg,  mean transaortic valve gradient equals 8 mm Hg, estimated  aortic valve area equals 1.7 sqcm (by continuity equation),  aortic valve velocity time integral equals 43 cm,  consistent with mild aortic stenosis.  2. Mildly dilated left atrium.  LA volume index = 41 cc/m2.  3. Normal left ventricular internal dimensions and wall  thickness.  4. Normal left ventricular systolic function. No segmental  wall motion abnormalities.  5. Moderate diastolic dysfunction (Stage II).  *** No previous Echo exam.  ------------------------------------------------------------------------  Confirmed on  10/9/2020 - 17:47:14 by ERNESTO Felipe  ------------------------------------------------------------------------    < end of copied text >    Interpretation of Telemetry: NSR with APCs, PVCs

## 2022-06-23 NOTE — PROGRESS NOTE ADULT - ASSESSMENT
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 She has a history of HTN, HLD, DM, aortic stenosis, breast cancer s/p lumpectomy and CKD (Dr. Escobar). She was recently started on Eliquis and beta-blockers for atrial fibrillation. She was hospitalized in March with a UTI complicated by ELISA due to bactrim. She was admitted in May with dyspnea, hypoxemia, gurgling in her chest and leg swelling. She was initially treated with zosyn for a possible right lower lobe pneumonia seen on CXR until further evaluation revealed pulmonary edema with pleural effusions and atelectasis. ECHO -> preserved LVEF - moderate-severe aortic stenosis - moderate diastolic dysfunction - bilateral pleural effusions; CT -> pulmonary edema with small pleural effusions and partial lower lobe compressive atelectasis. She was diuresed and discharged on norvasc/toprol XL/lipitor/eliquis. The patient was diagnosed with COVID on May 22nd and was treated with a course of Paxlovid. She now returns to the ER with shortness of breath and weakness with minimal exertion. She has fatigue, malaise and pallor. She has been noted to have blood in her stools and black colored stools (after eating blueberries). Her daughter measured her blood pressure has 90/60 from a baseline of 120/90. She currently has no shortness of breath or hypoxemia on a 2lpm nasal canula. She has no cough, sputum production, chest congestion or wheeze. No fevers, chills or sweats. She has been found to be guaiac positive. RVP PCR remains positive for COVID. The patient has received Kcentra and 2 units PRBCs for severe anemia (4.8/16/6).     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 and respiratory muscle weakness limiting diaphragmatic excursion and chest wall expansion  4) no evidence of bronchospasm or pulmonary edema/pleural effusions    ELISA is due to intravascular volume depletion    nasal COVID PCR positivity more likely represents shedding of non-viable viral particles than ongoing infection in this immunocompetent patient    PLAN/RECOMMENDATIONS:    stable oxygenation on room air  no indication for airborne or contact isolation - the patient was first diagnosed with COVID on May 22nd  observe off systemic steroids, antibiotics and pulmonary medications  head of bed elevation greater than 45 degrees at all times  incentive spirometry  GI evaluation noted     now with maroon colored stools and recent hematochezia perhaps related to hemorrhoidal bleeding - has now received 7 units of PRBCs - bleeding scan positive for active bleeding in the ascending colon -> felt to be at high rish for bowel ischemia and worsening renal function with an interventional radiology procedure -> colonoscopy -> poor prep - evidence of recent but not active bleeding - severe diverticulosis    following hemodynamics and H/H - transfuse for Hgb less than 7.0    holding A/C    protonix 40mg daily    senna 2 tablets at bedtime  cardiac meds: lopressor/crestor/lasix - holding A/C - considering amiodarone for rhythm control  glucose control  DVT prophylaxis - SCDs  venlafaxine XR/melatonin at bedtime  synthroid    Will follow with you. Plan of care discussed with the patient and her son at bedside and with GI. No pulmonary objection to discharge.    Brayden Benavides MD, [facility]  [phone]  Pulmonary Medicine

## 2022-06-24 DIAGNOSIS — D64.9 ANEMIA, UNSPECIFIED: ICD-10-CM

## 2022-06-24 LAB
HAPTOGLOB SERPL-MCNC: 146 MG/DL — SIGNIFICANT CHANGE UP (ref 34–200)
HCT VFR BLD CALC: 21.6 % — LOW (ref 34.5–45)
HCT VFR BLD CALC: 21.7 % — LOW (ref 34.5–45)
HGB BLD-MCNC: 6.8 G/DL — CRITICAL LOW (ref 11.5–15.5)
HGB BLD-MCNC: 6.9 G/DL — CRITICAL LOW (ref 11.5–15.5)
MCHC RBC-ENTMCNC: 30.1 PG — SIGNIFICANT CHANGE UP (ref 27–34)
MCHC RBC-ENTMCNC: 31.2 PG — SIGNIFICANT CHANGE UP (ref 27–34)
MCHC RBC-ENTMCNC: 31.3 GM/DL — LOW (ref 32–36)
MCHC RBC-ENTMCNC: 31.9 GM/DL — LOW (ref 32–36)
MCV RBC AUTO: 96 FL — SIGNIFICANT CHANGE UP (ref 80–100)
MCV RBC AUTO: 97.7 FL — SIGNIFICANT CHANGE UP (ref 80–100)
NRBC # BLD: 0 /100 WBCS — SIGNIFICANT CHANGE UP (ref 0–0)
NRBC # BLD: 0 /100 WBCS — SIGNIFICANT CHANGE UP (ref 0–0)
PLATELET # BLD AUTO: 199 K/UL — SIGNIFICANT CHANGE UP (ref 150–400)
PLATELET # BLD AUTO: 205 K/UL — SIGNIFICANT CHANGE UP (ref 150–400)
RBC # BLD: 2.21 M/UL — LOW (ref 3.8–5.2)
RBC # BLD: 2.26 M/UL — LOW (ref 3.8–5.2)
RBC # FLD: 15.8 % — HIGH (ref 10.3–14.5)
RBC # FLD: 15.9 % — HIGH (ref 10.3–14.5)
WBC # BLD: 8.31 K/UL — SIGNIFICANT CHANGE UP (ref 3.8–10.5)
WBC # BLD: 8.34 K/UL — SIGNIFICANT CHANGE UP (ref 3.8–10.5)
WBC # FLD AUTO: 8.31 K/UL — SIGNIFICANT CHANGE UP (ref 3.8–10.5)
WBC # FLD AUTO: 8.34 K/UL — SIGNIFICANT CHANGE UP (ref 3.8–10.5)

## 2022-06-24 PROCEDURE — 99231 SBSQ HOSP IP/OBS SF/LOW 25: CPT | Mod: FS

## 2022-06-24 PROCEDURE — 99233 SBSQ HOSP IP/OBS HIGH 50: CPT | Mod: GC

## 2022-06-24 PROCEDURE — 99233 SBSQ HOSP IP/OBS HIGH 50: CPT

## 2022-06-24 RX ORDER — POLYETHYLENE GLYCOL 3350 17 G/17G
17 POWDER, FOR SOLUTION ORAL
Refills: 0 | Status: DISCONTINUED | OUTPATIENT
Start: 2022-06-24 | End: 2022-06-25

## 2022-06-24 RX ADMIN — Medication 25 MILLIGRAM(S): at 21:39

## 2022-06-24 RX ADMIN — POLYETHYLENE GLYCOL 3350 17 GRAM(S): 17 POWDER, FOR SOLUTION ORAL at 10:08

## 2022-06-24 RX ADMIN — Medication 500 MILLIGRAM(S): at 13:12

## 2022-06-24 RX ADMIN — PANTOPRAZOLE SODIUM 40 MILLIGRAM(S): 20 TABLET, DELAYED RELEASE ORAL at 05:25

## 2022-06-24 RX ADMIN — ROSUVASTATIN CALCIUM 10 MILLIGRAM(S): 5 TABLET ORAL at 21:39

## 2022-06-24 RX ADMIN — SENNA PLUS 2 TABLET(S): 8.6 TABLET ORAL at 21:40

## 2022-06-24 RX ADMIN — POLYETHYLENE GLYCOL 3350 17 GRAM(S): 17 POWDER, FOR SOLUTION ORAL at 18:31

## 2022-06-24 RX ADMIN — Medication 25 MICROGRAM(S): at 05:25

## 2022-06-24 RX ADMIN — Medication 3 MILLIGRAM(S): at 21:39

## 2022-06-24 RX ADMIN — Medication 75 MILLIGRAM(S): at 13:12

## 2022-06-24 RX ADMIN — Medication 1 TABLET(S): at 13:12

## 2022-06-24 RX ADMIN — Medication 1 DROP(S): at 05:26

## 2022-06-24 RX ADMIN — Medication 40 MILLIGRAM(S): at 05:25

## 2022-06-24 RX ADMIN — Medication 1 DROP(S): at 18:31

## 2022-06-24 NOTE — PROGRESS NOTE ADULT - PROBLEM SELECTOR PLAN 1
Most likely 2/2 to eliquis initiation and underlying GI pathology. Pending colonoscopy today for intervention.     Plan:  - c/w pantoprazole 40mg qd  - Maintain active type and screen   - Maintain 2 large bore IVs  - Transfuse to hemoglobin <7 --> s/p 5 PRBC transfusions, no bloody BM since colonoscopy  - GI following: no additional intervention  - Trend CBC qd - Patient presented with Hg 4s likely s/s GI bleed/diverticular bleed, s/p colonoscopy without source of bleeding  - Patient continues to have Hg in 6s 1-2 days after transfusion  - Blood Bank (6/23): found to have anti-JKA positive antibodies  - 6/23: hemolysis labs wnl, however concern patient has intermittent dark brown urine (possibly from mixing with stool)  - 6/24: Hematology consulted to w/u for autobody-mediated hemolysis, f/u recs

## 2022-06-24 NOTE — CONSULT NOTE ADULT - ASSESSMENT
Patient admitted with GIB. Patient is not actively bleeding. She was diagnosed with diverticulosis. Patient was given multiple units of packed red cells during admission. Patient type and screen is negative on admission but on 6/21 delayed Jka identified. In addition, debbie testing is positive on the 21st, eluate is negative. Patient did receive packed cells prior to identification of Jka.      1. Debbie positive , eluate negative Blood Bank Result  hemolytic marker (bili,hapto) not c/w active hemolysis    2. patient did have identification of [place] ab ( jka)  patient has potential risk of delayed hemolytic transfusion reaction  some clinical features associated with dhtr are fever, anemia and mild jaundice occurring most often 4 to 8 days post transfusion  recommend observing ldh, hapto, retic and indirect bili over the next few days  frequency of dhtr is quite low in general population but often related to anti-kenton ab    thank you for the consult.    Thank you for the consultation    Mandy Salmeron MD  Hematology/Oncology  [phone]

## 2022-06-24 NOTE — PROGRESS NOTE ADULT - PROBLEM SELECTOR PLAN 10
DVT/PE ppx: SCD's   Diet: CC/DASH diet  Dispo:  CM/SW has set up home health aid and home PT services. Pending hematology recs then d/c to home.

## 2022-06-24 NOTE — PROGRESS NOTE ADULT - SUBJECTIVE AND OBJECTIVE BOX
HPI:  Patient seen and examined at bedside on 3 [place] with her son present.  H/H seen to have dropped again.    Review Of Systems:           Respiratory: No shortness of breath, cough, or wheezing  Cardiovascular: No chest pain or palpitations  10 point review of systems is otherwise negative except as mentioned above        Medications:  artificial tears (preservative free) Ophthalmic Solution 1 Drop(s) Both EYES two times a day  ascorbic acid 500 milliGRAM(s) Oral daily  furosemide    Tablet 40 milliGRAM(s) Oral daily  levothyroxine 25 MICROGram(s) Oral daily  melatonin 3 milliGRAM(s) Oral at bedtime  metoprolol tartrate 25 milliGRAM(s) Oral two times a day  multivitamin 1 Tablet(s) Oral daily  pantoprazole    Tablet 40 milliGRAM(s) Oral before breakfast  polyethylene glycol 3350 17 Gram(s) Oral two times a day  rosuvastatin 10 milliGRAM(s) Oral at bedtime  senna 2 Tablet(s) Oral at bedtime  venlafaxine XR. 75 milliGRAM(s) Oral daily    PAST MEDICAL & SURGICAL HISTORY:  HTN (hypertension)      Diabetes mellitus type 2, noninsulin dependent      Diverticulitis      Hyperlipidemia      GERD (gastroesophageal reflux disease)      Glaucoma      Breast cancer      Urinary incontinence      Renal calculus or stone      Arthritis      Heart murmur      Renal calculi  s/p stone extraction 57 yrs ago      S/P lumpectomy of breast  right breast with node removal      S/P bladder repair  Interstim device placement 2010        Vitals:  T(C): 36.7 (06-24-22 @ 20:25), Max: 37.1 (06-24-22 @ 17:39)  HR: 79 (06-24-22 @ 20:25) (67 - 80)  BP: 109/62 (06-24-22 @ 20:25) (100/46 - 129/47)  BP(mean): --  RR: 18 (06-24-22 @ 20:25) (18 - 18)  SpO2: 98% (06-24-22 @ 20:25) (95% - 100%)  Wt(kg): --  Daily     Daily   I&O's Summary    23 Jun 2022 07:01  -  24 Jun 2022 07:00  --------------------------------------------------------  IN: 600 mL / OUT: 300 mL / NET: 300 mL    24 Jun 2022 07:01  -  24 Jun 2022 22:56  --------------------------------------------------------  IN: 320 mL / OUT: 1200 mL / NET: -880 mL        Physical Exam:  Appearance: Fraily elderly  Eyes: PERRLA, EOMI, pink conjunctiva, no scleral icterus   HENT: normal oral mucosa  Cardiovascular: RRR, S1, S2, III/VI systolic murmur at apex; no edema; no JVD  Respiratory: Clear to auscultation bilaterally  Gastrointestinal: Soft, non-tender, non-distended, BS+  Musculoskeletal: No clubbing or joint deformity   Neurologic: No focal weakness  Lymphatic: No lymphadenopathy  Psychiatry: AAOx2 with appropriate mood and affect  Skin: No rashes, ecchymoses, or cyanosis                        6.8    8.34  )-----------( 199      ( 24 Jun 2022 08:35 )             21.7     06-23    134<L>  |  99  |  44<H>  ----------------------------<  193<H>  5.1   |  23  |  1.86<H>    Ca    8.8      23 Jun 2022 14:45    TPro  5.9<L>  /  Alb  3.1<L>  /  TBili  0.6  /  DBili  x   /  AST  27  /  ALT  20  /  AlkPhos  69  06-23      CARDIAC MARKERS ( 23 Jun 2022 14:45 )  x     / x     / 39 U/L / x     / x            Cardiovascular Diagnostic Testing:  ECG: sinus 76 bpm, low voltage in limb leads    Echo: < from: Transthoracic Echocardiogram (10.09.20 @ 15:28) >  ------------------------------------------------------------------------  Dimensions:    Normal Values:  LA:     3.6    2.0 - 4.0 cm  Ao:     2.7    2.0 - 3.8 cm  SEPTUM: 1.0    0.6 - 1.2 cm  PWT:    0.8    0.6 -1.1 cm  LVIDd:  3.9    3.0 - 5.6 cm  LVIDs:  2.4    1.8 - 4.0 cm  Derived variables:  LVMI: 73 g/m2  RWT: 0.41  Fractional short: 38 %  EF (Moran Rule): 63 %Doppler Peak Velocity (m/sec):  AoV=2.0  ------------------------------------------------------------------------  Observations:  Mitral Valve: Normal mitral valve. Mild mitral  regurgitation.  Aortic Valve/Aorta: Calcified trileaflet aortic valve with  decreased opening. Peak transaortic valve gradient equals  16 mm Hg, mean transaortic valve gradient equals 8 mm Hg,  estimated aortic valve area equals 1.7 sqcm (by continuity  equation), aortic valve velocity time integral equals 43  cm, consistent with mild aortic stenosis. Mild aortic  regurgitation.  Peak left ventricular outflow tract  gradient equals 3 mm Hg, mean gradient is equal to 2 mm Hg,  LVOT velocity time integral equals 19 cm.  Aortic Root: 2.7 cm.  LVOT diameter: 2.2 cm.  Left Atrium: Mildly dilated left atrium.  LA volume index =  41 cc/m2.  Left Ventricle: Normal left ventricular systolic function.  No segmental wall motion abnormalities. Normal left  ventricular internal dimensions and wall thickness.  Moderate diastolic dysfunction (Stage II).  Right Heart: Normal right atrium. Normal right ventricular  size and function. Normal tricuspid valve. Mild tricuspid  regurgitation. Normal pulmonic valve.  Pericardium/Pleura: Normal pericardium with no pericardial  effusion.  Hemodynamic: Estimated right atrial pressure is 8 mm Hg.  Estimated right ventricular systolic pressure equals 44 mm  Hg, assuming right atrial pressure equals 8 mm Hg,  consistent with mild pulmonary hypertension.  ------------------------------------------------------------------------  Conclusions:  1. Calcified trileaflet aortic valve with decreased  opening. Peak transaortic valve gradient equals 16 mm Hg,  mean transaortic valve gradient equals 8 mm Hg, estimated  aortic valve area equals 1.7 sqcm (by continuity equation),  aortic valve velocity time integral equals 43 cm,  consistent with mild aortic stenosis.  2. Mildly dilated left atrium.  LA volume index = 41 cc/m2.  3. Normal left ventricular internal dimensions and wall  thickness.  4. Normal left ventricular systolic function. No segmental  wall motion abnormalities.  5. Moderate diastolic dysfunction (Stage II).  *** No previous Echo exam.  ------------------------------------------------------------------------  Confirmed on  10/9/2020 - 17:47:14 by ERNESTO Felipe  ------------------------------------------------------------------------    < end of copied text >    Interpretation of Telemetry: NSR with APCs, PVCs

## 2022-06-24 NOTE — CONSULT NOTE ADULT - CONSULT REASON
GI bleed while on anticoagulation
COVID-19 infection; dyspnea; asthma; bilateral pleural effusions; atelectasis
rectal bleeding
debbie positive
abn creatinine

## 2022-06-24 NOTE — PROGRESS NOTE ADULT - PROBLEM SELECTOR PLAN 3
Known hx of aortic stenosis.  - 05/08: TTE EF 67%, AS moderate-severe systolic dysfunction, Moderate to severe AS velocity 70cm Peak, area 1cmPeak gradient 41     Plan:   - No acute issues   - Monitor fluid status closely  - STrict ins and outs   - Daily standing weights Currently euvolemic     - restarted lasix  - plan for OP Cardiology f/u regarding standing amiodarone

## 2022-06-24 NOTE — PROGRESS NOTE ADULT - SUBJECTIVE AND OBJECTIVE BOX
INTERVAL HPI/OVERNIGHT EVENTS:  no BM yesterday and overnight  no abdominal pain  no rectal bleeding  no nausea or vomiting  started on Miralax today  no CP or SOB    MEDICATIONS  (STANDING):  artificial tears (preservative free) Ophthalmic Solution 1 Drop(s) Both EYES two times a day  ascorbic acid 500 milliGRAM(s) Oral daily  furosemide    Tablet 40 milliGRAM(s) Oral daily  levothyroxine 25 MICROGram(s) Oral daily  melatonin 3 milliGRAM(s) Oral at bedtime  metoprolol tartrate 25 milliGRAM(s) Oral two times a day  multivitamin 1 Tablet(s) Oral daily  pantoprazole    Tablet 40 milliGRAM(s) Oral before breakfast  polyethylene glycol 3350 17 Gram(s) Oral two times a day  rosuvastatin 10 milliGRAM(s) Oral at bedtime  senna 2 Tablet(s) Oral at bedtime  venlafaxine XR. 75 milliGRAM(s) Oral daily      Allergies  Keflex (Rash; Hives)      Review of Systems:  General:  No wt loss, fevers, chills, night sweats  CV:  No pain, palpitations, +PAF  Resp:  No dyspnea, cough, tachypnea, wheezing  GI:  see HPI  :  No pain,  +hx kidney stones +incontinence s/p bladder repair  Muscle:  No pain, weakness +arthritis  Neuro:  No focal weakness, tingling, memory problems  Psych:  No fatigue, insomnia, mood problems, depression  Endocrine:  No polyuria, polydypsia, cold/heat intolerance  Heme:  No petechiae, ecchymosis, easy bruisability  Skin:  No rash, tattoos, scars, edema    Vital Signs Last 24 Hrs  T(C): 36.7 (24 Jun 2022 11:27), Max: 36.9 (23 Jun 2022 21:35)  T(F): 98.1 (24 Jun 2022 11:27), Max: 98.4 (23 Jun 2022 21:35)  HR: 68 (24 Jun 2022 11:27) (67 - 80)  BP: 129/47 (24 Jun 2022 11:27) (96/52 - 129/73)  BP(mean): --  RR: 18 (24 Jun 2022 11:27) (18 - 18)  SpO2: 100% (24 Jun 2022 11:27) (95% - 100%)    PHYSICAL EXAM:  Constitutional: NAD, well-developed elderly WF  lying in bed, son at  bedside  Neck: No LAD, supple no JVD  Respiratory: clear b/l no accessory muscle use  Cardiovascular: S1 and S2, RRR,  +murmur  Gastrointestinal: BS+, soft, NT/ND, neg HSM  Extremities: No peripheral edema, neg clubbing, cyanosis  +healing ecchymoses on skin +fragile skin  Vascular: 2+ peripheral pulses  Neurological: A/O x 3, no focal deficits  Psychiatric: Normal mood, normal affect  Skin: No rashes +fragile skin , anicteric +pallor        LABS:                        6.8    8.34  )-----------( 199      ( 24 Jun 2022 08:35 )             21.7   Hemoglobin: 6.9 g/dL (06.24.22 @ 07:20)   Hemoglobin: 9.0 g/dL (06.23.22 @ 14:45)   Hemoglobin: 8.1 g/dL (06.23.22 @ 06:23)   06-23    134<L>  |  99  |  44<H>  ----------------------------<  193<H>  5.1   |  23  |  1.86<H>    Ca    8.8      23 Jun 2022 14:45    TPro  5.9<L>  /  Alb  3.1<L>  /  TBili  0.6  /  DBili  x   /  AST  27  /  ALT  20  /  AlkPhos  69  06-23      Reticulocyte Count (06.23.22 @ 14:45)   RBC Count: 2.97 M/uL   Reticulocyte Percent: 8.3 %   Absolute Reticulocytes: 251.6 K/uL   Lactate Dehydrogenase, Serum: 294: Mild hemolysis results may be falsely elevated U/L (06.23.22 @ 14:45)   Haptoglobin, Serum: 146 mg/dL (06.23.22 @ 14:45)     Creatine Kinase, Serum: 39 U/L (06.23.22 @ 14:45)   COVID-19 PCR . (06.23.22 @ 14:43)   COVID-19 PCR: NotDetec:    RADIOLOGY & ADDITIONAL TESTS:

## 2022-06-24 NOTE — PROGRESS NOTE ADULT - ASSESSMENT
89yo F w/ hx HTN, Afib, CHF, HLD, CKD Stage IV nonsmoker, remote hx of asthma presenting with loose stools, lethargy, fatigue, low blood pressure and blood bowel movement found to have a hemoglobin of 4.8 most likely 2/2 to GI bleed from eliquis.  DDx includes diverticulitis vs Gastric ulcer vs. Duodenal ulcer vs. angiodysplasia. s/p colonoscopy without active bleeding, now with stable Hg, without additional GI intervention, pending hemolysis labs (anti-JKA positive) and discharge to home with home health services.  89yo F w/ hx HTN, Afib, CHF, HLD, CKD Stage IV nonsmoker, remote hx of asthma presenting with loose stools, lethargy, fatigue, low blood pressure and blood bowel movement found to have a hemoglobin of 4.8 most likely 2/2 to GI bleed from eliquis.  DDx includes diverticulitis vs Gastric ulcer vs. Duodenal ulcer vs. angiodysplasia. 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

## 2022-06-24 NOTE — PROGRESS NOTE ADULT - PROBLEM SELECTOR PLAN 5
Hx of afib:   - metoprolol tartrate 25mg BID as pt BP on higher end   - Hold AC given bleeding  - Plan for patient to possibly start Amiodarone as OP, will continue with metoprolol tartrate while hospitalized, will f/u OP Cards SCr baseline: 1.88, currently stable    Monitor SCr  Monitor I&O   Renally dose meds

## 2022-06-24 NOTE — PROGRESS NOTE ADULT - PROBLEM SELECTOR PLAN 4
SCr baseline: 1.88   Currently better than baseline    Monitor SCr  Monitor I&O   Renally dose meds Known hx of aortic stenosis.  - 05/08: TTE EF 67%, AS moderate-severe systolic dysfunction, Moderate to severe AS velocity 70cm Peak, area 1cmPeak gradient 41     Plan:   - No acute issues   - Monitor fluid status closely  - daily weights, I/O

## 2022-06-24 NOTE — PROGRESS NOTE ADULT - PROBLEM SELECTOR PLAN 7
Home: No home medications   - CANDE  - CC Diet - Hold home amlodipine 5mg given normotensive and active GI bleeding.  - Likely will hold amlodipine on discharge and have patient f/u DC instructions

## 2022-06-24 NOTE — CONSULT NOTE ADULT - SUBJECTIVE AND OBJECTIVE BOX
Reason for consult:    HPI:  Patient is a 89yo F w/ hx HTN, Afib, CHF, AS, HLD, nonsmoker, remote hx of asthma presenting with loose stools, lethargy, fatigue, low blood pressure and bloody bowel movement. The patient started Eliquis at the end of April on 4/2022. The daughter noted on Friday that the patient developed loose stools that were normal in color. The patient subsequently was developing progressive fatigue, lethargy and decreased PO intake. Her daughter noted that her stools were a maroon color. On 6/12, the daughter noticed that the patient had low blood pressures SBP of 90s down from baseline of 120s. She checked her BM that morning and noted blood with blood clots in the toilet. At this time she decided to seek medical care in the ED for further evaluation.     Of note, the patient had COVID 2 weeks ago measured by at home test. RVP was + for COVID by PCR on admission.     In the ED:   Vitals: Temp 97.7, HR 60, /55, RR 20, O2 saturation 100%  Labs: Hemoglobin 4.8, Cr 1.93, COVID +   Imaging:  Cxr No acute pathology  Interventions: Given Kcentra, 2 units of pRBCs, Pantoprazole 80mg    (13 Jun 2022 06:24)      PAST MEDICAL & SURGICAL HISTORY:  HTN (hypertension)      Diabetes mellitus type 2, noninsulin dependent      Diverticulitis      Hyperlipidemia      GERD (gastroesophageal reflux disease)      Glaucoma      Breast cancer      Urinary incontinence      Renal calculus or stone      Arthritis      Heart murmur      Renal calculi  s/p stone extraction 57 yrs ago      S/P lumpectomy of breast  right breast with node removal      S/P bladder repair  Interstim device placement 2010          FAMILY HISTORY:  No pertinent family history in first degree relatives        Alochol: Denied  Smoking: Nonsmoker  Drug Use: Denied  Marital Status:         Allergies    Keflex (Rash; Hives)    Intolerances        MEDICATIONS  (STANDING):  artificial tears (preservative free) Ophthalmic Solution 1 Drop(s) Both EYES two times a day  ascorbic acid 500 milliGRAM(s) Oral daily  furosemide    Tablet 40 milliGRAM(s) Oral daily  levothyroxine 25 MICROGram(s) Oral daily  melatonin 3 milliGRAM(s) Oral at bedtime  metoprolol tartrate 25 milliGRAM(s) Oral two times a day  multivitamin 1 Tablet(s) Oral daily  pantoprazole    Tablet 40 milliGRAM(s) Oral before breakfast  polyethylene glycol 3350 17 Gram(s) Oral two times a day  rosuvastatin 10 milliGRAM(s) Oral at bedtime  senna 2 Tablet(s) Oral at bedtime  venlafaxine XR. 75 milliGRAM(s) Oral daily    MEDICATIONS  (PRN):      ROS:     General:  No wt loss, fevers, chills, night sweats, fatigue,   Eyes:  Good vision, no reported pain  ENT:  No sore throat, pain, runny nose, dysphagia  CV:  No pain, palpitations, hypo/hypertension  Resp:  No dyspnea, cough, tachypnea, wheezing  GI:  See HPI   :  No pain, bleeding, incontinence, nocturia  Muscle:  No pain, weakness  Neuro:  No weakness, tingling, memory problems  Psych:  No fatigue, insomnia, mood problems, depression  Endocrine:  No polyuria, polydipsia, cold/heat intolerance  Heme:  No petechiae, ecchymosis, easy bruisability  Skin:  No rash, tattoos, scars, edema      PHYSICAL EXAM:     GENERAL:  Appears stated age, well-groomed  HEENT:  NC/AT,  conjunctivae clear and pink  CHEST:  Full & symmetric excursion, no increased effort, breath sounds clear  HEART:  Regular rhythm, S1, S2, no murmur/rub/S3/S4  ABDOMEN:  Soft, non-tender, non-distended  EXTEREMITIES:  no cyanosis,clubbing or edema  SKIN:  No rash/erythema/ecchymoses  NEURO:  Alert, oriented, no asterixis  LN: no palpable Lymphadenopathy                           6.8    8.34  )-----------( 199      ( 24 Jun 2022 08:35 )             21.7       06-23    134<L>  |  99  |  44<H>  ----------------------------<  193<H>  5.1   |  23  |  1.86<H>    Ca    8.8      23 Jun 2022 14:45    TPro  5.9<L>  /  Alb  3.1<L>  /  TBili  0.6  /  DBili  x   /  AST  27  /  ALT  20  /  AlkPhos  69  06-23

## 2022-06-24 NOTE — PROGRESS NOTE ADULT - ASSESSMENT
89yo F w/ hx HTN, Afib (recent dx 4/2022), CHF, HLD, nonsmoker, remote hx of asthma presenting with loose stools, lethargy, fatigue, low blood pressure and blood bowel movement found to have a hemoglobin of 4.8 most likely 2/2 to GI bleed- likely diverticular bleed given history/presentation    recent COVID + 5/20 s/p Paxlovid rx; COVID PCR + on admission  -continue off contact isolation per Infection Control Dept    Acute GI blood loss anemia; likely 2/2  diverticular bleeding given history/presentation.   +NM GI bleed scan (proximal AC)  IR input appreciated - increased risk of renal injury with IV contrast load required for angio  Recurrent/ongoing rectal bleeding with continued transfusion requirement therefore underwent Colonoscopy 6/17/22 6/17/22 COLON poor prep and "old" blood in colon; no active bleeding encountered, +diverticulosis - primarily Left sided    s/p 7 units PRBCs admission ->current  No evidence of active/brisk GI bleeding   BM 6/22 (after suppository, previously last BM 6/17 with prep)  small episode BRB after BM, self-limited ?hemorrhoidal given need for supp/constipation.   appropriate Hgb response to PRBCs 6/22 6.9 ->-> 8.1 (with 1 unit PRBCs)  6/24 recurrent drop in Hgb without overt/active bleeding ?component of hemolysis (known AS and Anti- JKA+)    -Hematology called, discussed with team  -bowel regimen to avoid straining; Miralax daily  -monitor CBC and BMs   -PO diet (DASH)  -Recommend keep off AC 2/2 risk of recurrent bleeding  -PO PPI  -no plan for repeat endoscopic evaluation or imaging at this time    +Anti-JKA ?component of hemolysis  -Hematology consulted (discussed with Hematology team)    Discussed with pt and family at bedside  Discussed with House staff and Medicine attending    Please call over weekend prn with acute GI concerns   GI service : [phone]    Marek Cormier PA-C    [facility]  [phone]  After hours and weekend coverage [phone]

## 2022-06-24 NOTE — PROGRESS NOTE ADULT - PROBLEM SELECTOR PLAN 2
Currently euvolemic   - Will assess need for lasix daily. Family updated that likely will hold lasix and re-evaluate OP Resolved.   Most likely 2/2 to eliquis initiation and underlying GI pathology. s/p tagged scan with R. sided bleeding, s/p colonoscopy w/ poor prep and no active signs of bleeding. No active bleeding for one week.     Plan:  - c/w pantoprazole 40mg qd  - Maintain active type and screen   - Maintain 2 large bore IVs  - Transfuse to hemoglobin <7 --> s/p 5 PRBC transfusions, no bloody BM since colonoscopy  - GI following: no additional intervention  - Trend CBC qd Resolved.   Most likely 2/2 to eliquis initiation and underlying GI pathology. s/p tagged scan with R. sided bleeding, s/p colonoscopy w/ poor prep and no active signs of bleeding. No active bleeding for one week.     Plan:  - c/w pantoprazole 40mg qd  - Maintain active type and screen   - Maintain 2 large bore IVs  - Transfuse to hemoglobin <7 --> s/p 7 PRBC transfusions, no bloody BM since colonoscopy  - GI following: no additional intervention  - Trend CBC qd

## 2022-06-24 NOTE — PROGRESS NOTE ADULT - ASSESSMENT
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 She has a history of HTN, HLD, DM, aortic stenosis, breast cancer s/p lumpectomy and CKD (Dr. Escobar). She was recently started on Eliquis and beta-blockers for atrial fibrillation. She was hospitalized in March with a UTI complicated by ELISA due to bactrim. She was admitted in May with dyspnea, hypoxemia, gurgling in her chest and leg swelling. She was initially treated with zosyn for a possible right lower lobe pneumonia seen on CXR until further evaluation revealed pulmonary edema with pleural effusions and atelectasis. ECHO -> preserved LVEF - moderate-severe aortic stenosis - moderate diastolic dysfunction - bilateral pleural effusions; CT -> pulmonary edema with small pleural effusions and partial lower lobe compressive atelectasis. She was diuresed and discharged on norvasc/toprol XL/lipitor/eliquis. The patient was diagnosed with COVID on May 22nd and was treated with a course of Paxlovid. She now returns to the ER with shortness of breath and weakness with minimal exertion. She has fatigue, malaise and pallor. She has been noted to have blood in her stools and black colored stools (after eating blueberries). Her daughter measured her blood pressure has 90/60 from a baseline of 120/90. She currently has no shortness of breath or hypoxemia on a 2lpm nasal canula. She has no cough, sputum production, chest congestion or wheeze. No fevers, chills or sweats. She has been found to be guaiac positive. RVP PCR remains positive for COVID. The patient has received Kcentra and 2 units PRBCs for severe anemia (4.8/16/6).     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 and respiratory muscle weakness limiting diaphragmatic excursion and chest wall expansion  4) no evidence of bronchospasm or pulmonary edema/pleural effusions    ELISA is due to intravascular volume depletion    nasal COVID PCR positivity more likely represents shedding of non-viable viral particles than ongoing infection in this immunocompetent patient    PLAN/RECOMMENDATIONS:    stable oxygenation on room air  no indication for airborne or contact isolation - the patient was first diagnosed with COVID on May 22nd  observe off systemic steroids, antibiotics and pulmonary medications  head of bed elevation greater than 45 degrees at all times  incentive spirometry  GI evaluation noted     no recent bowel movements but another drop in H/H -> repeat    has now received 7 units of PRBCs - bleeding scan positive for active bleeding in the ascending colon -> felt to be at high rish for bowel ischemia and worsening renal function with an interventional radiology procedure -> colonoscopy -> poor prep - evidence of recent but not active bleeding - severe diverticulosis    holding A/C    protonix 40mg daily    miralax/senna to soften stools and prevent straining  cardiac meds: lopressor/crestor/lasix - holding A/C - considering amiodarone for rhythm control  glucose control  DVT prophylaxis - SCDs  venlafaxine XR/melatonin at bedtime  synthroid    Will follow with you. Plan of care discussed with the patient and her sons at bedside and with GI.    The patient's respiratory status remains stable. Dr. Celso Gardner will be covering our service for the weekend. Please call [phone] with questions or clinical changes. Thank you.      Brayden Benavides MD, [facility]  [phone]  Pulmonary Medicine

## 2022-06-24 NOTE — PROGRESS NOTE ADULT - SUBJECTIVE AND OBJECTIVE BOX
[facility] - PROGRESS NOTE    CHIEF COMPLAINT: COVID-19 positive nasal PCR; dyspnea; asthma; bilateral pleural effusions; atelectasis; hypotension; anorexia; fatigue; hematochezia; anemia    INTERVAL HISTORY: hemodynamically stable and without tachycardia despite a drop in hemoglobin from 9.0 -> 6.9; received a 7th unit of blood for hgb < 7.0 yesterday; no recent bowel movement; colonoscopy -> poor prep - evidence of recent but not active bleeding - severe diverticulosis; no shortness of breath or hypoxemia on room air; no cough, sputum production, hemoptysis, chest congestion or wheeze; no fevers, chills or sweats; no chest pain/pressure or palpitations;    REVIEW OF SYSTEMS:  Constitutional: As per interval history  HEENT: Within normal limits  CV: As per interval history  Resp: As per interval history  GI: lower GI bleed likely due to diverticulosis  : Within normal limits  Musculoskeletal: Within normal limits  Skin: Within normal limits  Neurological: Within normal limits  Psychiatric: Within normal limits  Endocrine: Within normal limits  Hematologic/Lymphatic: anemia  Allergic/Immunologic: Within normal limits    MEDICATIONS:     Pulmonary "    Anti-microbials:    Cardiovascular:  furosemide    Tablet 40 milliGRAM(s) Oral daily  metoprolol tartrate 25 milliGRAM(s) Oral two times a day    Other:  artificial tears (preservative free) Ophthalmic Solution 1 Drop(s) Both EYES two times a day  ascorbic acid 500 milliGRAM(s) Oral daily  levothyroxine 25 MICROGram(s) Oral daily  melatonin 3 milliGRAM(s) Oral at bedtime  multivitamin 1 Tablet(s) Oral daily  pantoprazole    Tablet 40 milliGRAM(s) Oral before breakfast  rosuvastatin 10 milliGRAM(s) Oral at bedtime  senna 2 Tablet(s) Oral at bedtime  venlafaxine XR. 75 milliGRAM(s) Oral daily    OBJECTIVE:    PHYSICAL EXAM:       ICU Vital Signs Last 24 Hrs  T(C): 36.4 (2022 04:41), Max: 36.9 (2022 21:35)  T(F): 97.6 (2022 04:41), Max: 98.4 (2022 21:35)  HR: 75 (2022 04:41) (67 - 75)  BP: 103/50 (2022 04:41) (96/52 - 129/73)  BP(mean): --  ABP: --  ABP(mean): --  RR: 18 (2022 04:41) (18 - 18)  SpO2: 95% (2022 04:41) (95% - 100%) on room air        General: Awake. Alert. Cooperative. No distress. Appears stated age. In bed.  HEENT:  Atraumatic. Normocephalic. Anicteric. Normal oral mucosa. PERRL. EOMI. Pale conjunctiva.  Neck: Supple. Trachea midline. Thyroid without enlargement/tenderness/nodules. No carotid bruit. No JVD.	  Cardiovascular: Regular rate and rhythm. S1 S2 normal. III/VI systolic murmur  Respiratory: Respirations unlabored. Clear to auscultation and percussion bilaterally. Kyphosis.  Abdomen: Soft. Non-tender. Non-distended. No organomegaly. No masses. Normal bowel sounds.  Extremities: Warm to touch. No clubbing or cyanosis. No pedal edema.   Pulses: 2+ peripheral pulses all extremities.	  Skin: Normal skin color. No rashes or lesions. No ecchymoses. No cyanosis. Warm to touch.  Lymph Nodes: Cervical, supraclavicular and axillary nodes normal  Neurological: Motor and sensory examination equal and normal. A and O x 3  Psychiatry: Appropriate mood and affect.    LABS:                          6.9    8.31  )-----------( 205      ( 2022 07:20 )             21.6     CBC    WBC  8.31 <==, 11.19 <==, 6.59 <==, 6.87 <==, 6.70 <==, 6.05 <==, 6.92 <==    Hemoglobin  6.9 <<==, 9.0 <<==, 8.1 <<==, 8.8 <<==, 6.9 <<==, 7.2 <<==, 7.4 <<==    Hematocrit  21.6 <==, 28.9 <==, 25.5 <==, 27.6 <==, 21.8 <==, 22.5 <==, 24.1 <==    Platelets  205 <==, 256 <==, 191 <==, 187 <==, 186 <==, 191 <==, 158 <==      134<L>  |  99  |  44<H>  ----------------------------<  193<H>    06-23  5.1   |  23  |  1.86<H>      LYTES    sodium  134 <==, 139 <==, 140 <==, 140 <==, 139 <==    potassium   5.1 <==, 4.3 <==, 4.2 <==, 4.1 <==, 4.0 <==    chloride  99 <==, 110 <==, 111 <==, 110 <==, 110 <==    carbon dioxide  23 <==, 21 <==, 19 <==, 20 <==, 22 <==    =============================================================================================  RENAL FUNCTION:    Creatinine:   1.86  <<==, 1.67  <<==, 1.65  <<==, 1.63  <<==, 1.30  <<==    BUN:   44 <==, 49 <==, 44 <==, 36 <==, 27 <==    ============================================================================================    calcium   8.8 <==, 8.5 <==, 8.4 <==, 8.3 <==, 8.5 <==    phos   3.6 <==, 3.5 <==, 3.1 <==    mag   1.7 <==, 1.6 <==, 1.6 <==    ============================================================================================  LFTs    AST:   27 <== , 19 <== , 19 <== , 20 <==     ALT:  20  <== , 16  <== , 17  <== , 18  <==     AP:  69  <=, 58  <=, 62  <=, 58  <=    Bili:  0.6  <=, 0.6  <=, 0.4  <=, 0.6  <=    PT/INR - ( 2022 13:16 )   PT: 12.9 sec;   INR: 1.11 ratio      PTT - ( 2022 13:16 )  PTT:28.4 sec    Venous Blood Gas:   @ 01:58  7.33/47/25/25/28.5  VBG Lactate: 1.5      CARDIAC MARKERS ( 2022 14:45 )  CPK 39 U/L /CKMB x     /CKMB Units x        troponin x        < from: Transthoracic Echocardiogram (22 @ 14:37) >    Patient name: KOREY DIAZ  YOB: 1929   Age: 89 (F)   MR#: [id number]  Study Date: 2022  Location: [address] [place], [place] [id number]: Gadiel Schwartz KIKI  Study quality: Technically fair  Referring Physician: Teo Monson MD  Blood Pressure: 165/72 mmHg  Height: 158 cm  Weight: 53 kg  BSA: 1.5 m2  Heart Rate: 70 mmHg  ------------------------------------------------------------------------  PROCEDURE: Transthoracic echocardiogram with 2-D, M-Mode  and complete spectral and color flow Doppler.  INDICATION: Cardiac murmur, unspecified (R01.1)  ------------------------------------------------------------------------  Dimensions:    Normal Values:  LA:     3.6    2.0 - 4.0 cm  Ao:     3.0    2.0 - 3.8 cm  SEPTUM: 1.0    0.6 -1.2 cm  PWT:    1.0    0.6 - 1.1 cm  LVIDd:  4.0    3.0 - 5.6 cm  LVIDs:  2.8    1.8 - 4.0 cm  Derived variables:  LVMI: 84 g/m2  RWT: 0.50  Fractional short: 30 %  EF (Moran Rule): 67 %Doppler Peak Velocity (m/sec):  AoV=3.2  ------------------------------------------------------------------------  Observations:  Mitral Valve: Normal mitral valve. Mild mitral  regurgitation.  Aortic Valve/Aorta: Heavily calcified trileaflet aortic  valve with decreased opening. Peak transaortic valve  gradientequals 41 mm Hg, estimated aortic valve area  equals 1 sqcm (by continuity equation), aortic valve  velocity time integral equals 70 cm, consistent with  moderate aortic stenosis.  However the aortic valve appears heavily calcified and may  be closerto moderate-severe aortic stenosis.  Mild aortic  regurgitation.  Peak left ventricular outflow tract  gradient equals 3 mm Hg, LVOT velocity time integral equals  23 cm.  Aortic Root: 3 cm.  Left Atrium: Mildly dilated left atrium.  LA volume index =  39 cc/m2.  Left Ventricle: Normal left ventricular systolic function.  No segmental wall motion abnormalities. Normal left  ventricular internal dimensions and wall thicknesses.  Moderate diastolic dysfunction (Stage II).  Right Heart: Normal rightatrium. Normal right ventricular  size and function. Normal tricuspid valve. Minimal  tricuspid regurgitation. Normal pulmonic valve. Minimal  pulmonic regurgitation.  Pericardium/Pleura: Normal pericardium with trace  pericardial effusion.  Bilateral pleural effusions.  Hemodynamic: Estimated right atrial pressure is 8 mm Hg.  Color Doppler demonstrates no evidence of a patent foramen  ovale.  ------------------------------------------------------------------------  Conclusions:  1. Normal mitral valve. Mild mitral regurgitation.  2. Heavily calcified trileaflet aortic valve with decreased  opening. Peak transaortic valve gradient equals 41 mm Hg,  estimated aortic valve area equals 1 sqcm (by continuity  equation), aortic valve velocity time integral equals 70  cm, consistent with moderate aortic stenosis.  However the aortic valve appears heavily calcified and may  be closer to moderate-severe aortic stenosis.  Mild aortic  regurgitation.  3. Mildly dilated left atrium.  LA volume index = 39 cc/m2.  4. Normal left ventricular systolic function. No segmental  wall motion abnormalities.  5. Normal right ventricular size and function.  6. Normal pericardium with trace pericardial effusion.  7. Bilateral pleural effusions.  *** Compared with echocardiogram of 3/11/2022, no  significant changes noted.  ------------------------------------------------------------------------  Confirmed on  2022 - 17:38:34 by Osito Argueta M.D.  ------------------------------------------------------------------------  ---------------------------------------------------------------------------------------------------------------    MICROBIOLOGY:     Flu With COVID-19 By ERICK (22 @ 01:51)   SARS-CoV-2 Result: Detected  This Respiratory Panel uses polymerase chain reaction (PCR) to detect for   influenza A; influenza B; respiratory syncytial virus; and SARS-CoV-2.   This test was validated by [employer] and is in use under the FDA   Emergency Use Authorization (EUA) for clinical labs CLIA-certified to   perform high complexity testing. Test results should be correlated with   clinical presentation, patient history, and epidemiology.   Influenza A Result: NotDetec   Influenza B Result: NotDetec   Resp Syn Virus Result: NotDetec     Urinalysis Basic - ( 2022 00:46 )    Color: Light Yellow / Appearance: Slightly Turbid / S.014 / pH: x  Gluc: x / Ketone: Negative  / Bili: Negative / Urobili: Negative   Blood: x / Protein: Trace / Nitrite: Positive   Leuk Esterase: Moderate / RBC: 26 /hpf / WBC 2 /HPF   Sq Epi: x / Non Sq Epi: 2 /hpf / Bacteria: Moderate    Culture - Urine (22 @ 15:42)   Specimen Source: Clean Catch Clean Catch (Midstream)   Culture Results:   >100,000 CFU/ml Escherichia coli   <10,000 CFU/ml Normal Urogenital afshin present     RADIOLOGY:  [x] Chest radiographs reviewed and interpreted by me    EXAM:  XR CHEST PORTABLE URGENT 1V                          PROCEDURE DATE:  2022      INTERPRETATION:  CLINICAL INFORMATION: Shortness of breath.    TECHNIQUE: Frontal radiograph of the chest.    COMPARISON: CT chest and chest x-ray 2022    FINDINGS:  The lungs are clear.  No pleural effusion or pneumothorax.  Cardiomediastinal silhouette size is within normal limits.  No acute osseous abnormality.    IMPRESSION:  No evidence of acute pulmonary disease.    SCARLET JALLOH MD; Resident Radiology  This document has been electronically signed.  DIANE OBREGON MD; Attending Radiologist  This document has been electronically signed. 2022  1:19PM  ---------------------------------------------------------------------------------------------------------------  EXAM:  NM GI BLEEDING IMG                          PROCEDURE DATE:  06/15/2022      IMPRESSION: Abnormal GI bleeding scan.    Active bleeding site in the proximal ascending colon.    Dr. Argenis Barnes was notified of these results at 3:54 PM on 6/15/2022    MELVIN THOMPSON MD; Attending Nuclear Medicine  This document has been electronically signed. Dario 15 2022  4:03PM  ---------------------------------------------------------------------------------------------------------------  EXAM:  CT CHEST                          PROCEDURE DATE:  2022      FINDINGS:    Lungs/Airways/Pleura: The central airways are patent. There are small   pleural effusions, new from 3/9/2022 abdominal CT, with partial lower   lobe compressive atelectasis. There are diffuse peribronchial and  peribronchovascular groundglass opacity with mild septal thickening,   likely pulmonary edema. There are few clusters of small nodules on a  background of subsegmental bronchial impaction, likely due to small   airways disease.    Mediastinum/Lymph nodes: No thoracic adenopathy.    Heart and Vessels: Mild cardiomegaly. Extensive coronary arterial and   aortic valvular calcification is present. Hypodensity of the blood pool   in relation to left ventricular myocardium suggests underlying anemia.   The great vessels are normal in size. No pericardial effusion.    Upper Abdomen: Cholelithiasis. Partially imaged large right parapelvic  renal cyst. Extensive visceral artery calcification.    Osseous structures and Soft Tissues: Degenerative changes without   aggressive osseous lesions. Remote left posterior 11th rib fracture.    IMPRESSION:  Pulmonary edema with small pleural effusions and partial lower lobe   compressive atelectasis.    ELISA BLACKBURN M.D., Attending Radiologist  This document has been electronically signed. May 10 2022  8:52AM  ---------------------------------------------------------------------------------------------------------------  EXAM:  DUPLEX SCAN EXT VEINS LOWER BI                          PROCEDURE DATE:  2022      IMPRESSION:  No evidence of deep venous thrombosis in either lower extremity venous   segments able to be examined.     AUGUSTIN EASTMAN MD; Attending Radiologist  This document has been electronically signed. May  9 2022  3:31PM  ---------------------------------------------------------------------------------------------------------------

## 2022-06-24 NOTE — PROGRESS NOTE ADULT - SUBJECTIVE AND OBJECTIVE BOX
Patient:  KOREY DIAZ  [id number]    Progress Note    Interval events: No acute events.  Pertinent ROS (if any):      Administered:  artificial tears (preservative free) Ophthalmic Solution: 1 Drop(s) Both EYES (06-24 @ 05:26)  pantoprazole    Tablet: 40 milliGRAM(s) Oral (06-24 @ 05:25)  levothyroxine: 25 MICROGram(s) Oral (06-24 @ 05:25)  furosemide    Tablet: 40 milliGRAM(s) Oral (06-24 @ 05:25)  senna: 2 Tablet(s) Oral (06-23 @ 21:33)  melatonin: 3 milliGRAM(s) Oral (06-23 @ 21:30)  rosuvastatin: 10 milliGRAM(s) Oral (06-23 @ 21:29)        OBJECTIVE:    06-23 @ 07:01  -  06-24 @ 07:00  --------------------------------------------------------  IN: 600 mL / OUT: 300 mL / NET: 300 mL      CAPILLARY BLOOD GLUCOSE            VITALS:  T(F): 97.6 (06-24-22 @ 04:41), Max: 98.4 (06-23-22 @ 21:35)  HR: 80 (06-24-22 @ 08:16) (67 - 80)  BP: 118/60 (06-24-22 @ 08:16) (96/52 - 129/73)  BP(mean): --  ABP: --  ABP(mean): --  RR: 18 (06-24-22 @ 08:16) (18 - 18)  SpO2: 95% (06-24-22 @ 08:16) (95% - 100%)    PHYSICAL EXAM:  GENERAL: NAD, lying in bed comfortably  HEAD:  Atraumatic, Normocephalic  EYES: EOMI, PERRLA, conjunctiva and sclera clear  ENT: Moist mucous membranes  NECK: Supple, No JVD  CHEST/LUNG: Clear to auscultation bilaterally; No rales, rhonchi, wheezing, or rubs. Unlabored respirations  HEART: Regular rate and rhythm; No murmurs, rubs, or gallops  ABDOMEN: Bowel sounds present; Soft, Nontender, Nondistended. No hepatomegaly  EXTREMITIES:  2+ Peripheral Pulses, brisk capillary refill. No clubbing, cyanosis, or edema  NERVOUS SYSTEM:  Alert & Oriented X3, speech clear. No deficits   MSK: FROM all 4 extremities, full and equal strength  SKIN: No rashes or lesions    HOSPITAL MEDICATIONS:  Standing Meds:  artificial tears (preservative free) Ophthalmic Solution 1 Drop(s) Both EYES two times a day  ascorbic acid 500 milliGRAM(s) Oral daily  furosemide    Tablet 40 milliGRAM(s) Oral daily  levothyroxine 25 MICROGram(s) Oral daily  melatonin 3 milliGRAM(s) Oral at bedtime  metoprolol tartrate 25 milliGRAM(s) Oral two times a day  multivitamin 1 Tablet(s) Oral daily  pantoprazole    Tablet 40 milliGRAM(s) Oral before breakfast  polyethylene glycol 3350 17 Gram(s) Oral two times a day  rosuvastatin 10 milliGRAM(s) Oral at bedtime  senna 2 Tablet(s) Oral at bedtime  venlafaxine XR. 75 milliGRAM(s) Oral daily      PRN Meds:      LABS:  CBC 06-24-22 @ 08:35                        6.8    8.34  )-----------( 199                   21.7       Hgb trend: 6.8 <-- , 6.9 <-- , 9.0 <-- , 8.1 <-- , 8.8 <-- , 6.9 <-- , 7.2 <-- , 7.4 <--   WBC trend: 8.34 <-- , 8.31 <-- , 11.19 <-- , 6.59 <-- , 6.87 <-- , 6.70 <-- , 6.05 <-- , 6.92 <--       CMP 06-23-22 @ 14:45    134<L>  |  99  |  44<H>  ----------------------------<  193<H>  5.1   |  23  |  1.86<H>    Ca    8.8      06-23-22 @ 14:45    TPro  5.9<L>  /  Alb  3.1<L>  /  TBili  0.6  /  DBili  x   /  AST  27  /  ALT  20  /  AlkPhos  69  06-23      Serum Cr trend: 1.86 <-- , 1.67 <--         ABG Trend:     VBG Trend:       MICROBIOLOGY:       RADIOLOGY:  [ ] Reviewed and interpreted by me    EKG:   Patient:  KOREY DIAZ  [id number]    Progress Note    Interval events: No acute events. Patient without any BM or episodes of bleeding last night, however Hg dropped from 9 to 6.9 (and repeated 6.8). Discussed with family and plan to consult hematology, GI team made aware. Otherwise patient stable.  Pertinent ROS (if any):  Denies f/c/n/v/cp/ap/bowel or bladder changes.     Administered:  artificial tears (preservative free) Ophthalmic Solution: 1 Drop(s) Both EYES (06-24 @ 05:26)  pantoprazole    Tablet: 40 milliGRAM(s) Oral (06-24 @ 05:25)  levothyroxine: 25 MICROGram(s) Oral (06-24 @ 05:25)  furosemide    Tablet: 40 milliGRAM(s) Oral (06-24 @ 05:25)  senna: 2 Tablet(s) Oral (06-23 @ 21:33)  melatonin: 3 milliGRAM(s) Oral (06-23 @ 21:30)  rosuvastatin: 10 milliGRAM(s) Oral (06-23 @ 21:29)        OBJECTIVE:    06-23 @ 07:01  -  06-24 @ 07:00  --------------------------------------------------------  IN: 600 mL / OUT: 300 mL / NET: 300 mL      CAPILLARY BLOOD GLUCOSE            VITALS:  T(F): 97.6 (06-24-22 @ 04:41), Max: 98.4 (06-23-22 @ 21:35)  HR: 80 (06-24-22 @ 08:16) (67 - 80)  BP: 118/60 (06-24-22 @ 08:16) (96/52 - 129/73)  BP(mean): --  ABP: --  ABP(mean): --  RR: 18 (06-24-22 @ 08:16) (18 - 18)  SpO2: 95% (06-24-22 @ 08:16) (95% - 100%)    PHYSICAL EXAM:  GENERAL: NAD, lying in bed comfortably, AOx2-3  HEAD:  Atraumatic, Normocephalic  EYES: EOMI, PERRLA, conjunctiva and sclera clear  ENT: Moist mucous membranes  NECK: Supple, No JVD  CHEST/LUNG: Clear to auscultation bilaterally; No rales, rhonchi, wheezing, or rubs. Unlabored respirations  HEART: Regular rate and rhythm; No murmurs, rubs, or gallops  ABDOMEN: Bowel sounds present; Soft, Nontender, Nondistended. No hepatomegaly  EXTREMITIES:  2+ Peripheral Pulses, brisk capillary refill. No clubbing, cyanosis, or edema  NERVOUS SYSTEM:  Alert & Oriented X2-3, speech clear. No deficits   MSK: FROM all 4 extremities, full and equal strength  SKIN: No rashes or lesions    HOSPITAL MEDICATIONS:  Standing Meds:  artificial tears (preservative free) Ophthalmic Solution 1 Drop(s) Both EYES two times a day  ascorbic acid 500 milliGRAM(s) Oral daily  furosemide    Tablet 40 milliGRAM(s) Oral daily  levothyroxine 25 MICROGram(s) Oral daily  melatonin 3 milliGRAM(s) Oral at bedtime  metoprolol tartrate 25 milliGRAM(s) Oral two times a day  multivitamin 1 Tablet(s) Oral daily  pantoprazole    Tablet 40 milliGRAM(s) Oral before breakfast  polyethylene glycol 3350 17 Gram(s) Oral two times a day  rosuvastatin 10 milliGRAM(s) Oral at bedtime  senna 2 Tablet(s) Oral at bedtime  venlafaxine XR. 75 milliGRAM(s) Oral daily    PRN Meds:    LABS:  CBC 06-24-22 @ 08:35                        6.8    8.34  )-----------( 199                   21.7       Hgb trend: 6.8 <-- , 6.9 <-- , 9.0 <-- , 8.1 <-- , 8.8 <-- , 6.9 <-- , 7.2 <-- , 7.4 <--   WBC trend: 8.34 <-- , 8.31 <-- , 11.19 <-- , 6.59 <-- , 6.87 <-- , 6.70 <-- , 6.05 <-- , 6.92 <--       CMP 06-23-22 @ 14:45    134<L>  |  99  |  44<H>  ----------------------------<  193<H>  5.1   |  23  |  1.86<H>    Ca    8.8      06-23-22 @ 14:45    TPro  5.9<L>  /  Alb  3.1<L>  /  TBili  0.6  /  DBili  x   /  AST  27  /  ALT  20  /  AlkPhos  69  06-23      Serum Cr trend: 1.86 <-- , 1.67 <--         ABG Trend:     VBG Trend:       MICROBIOLOGY:       RADIOLOGY:  [ ] Reviewed and interpreted by me    EKG:

## 2022-06-24 NOTE — PROGRESS NOTE ADULT - PROBLEM SELECTOR PLAN 6
Hold home amlodipine 5mg given normotensive and active GI bleeding. Hx of afib:   - metoprolol tartrate 25mg PO BID as pt BP on higher end   - Hold AC given bleeding  - Plan for patient to possibly start Amiodarone as OP, will continue with metoprolol tartrate while hospitalized, will f/u OP Cards

## 2022-06-24 NOTE — PROGRESS NOTE ADULT - TIME BILLING
Discussing risks and benefits of proceeding with colonoscopy to address bleeding.  Goals of care discussion.
Please see above for:    A review of diagnostic results  Prognosis, treatment options  Instructions for treatment and/or follow-up  Importance of compliance with chosen treatment options  Risk factor reduction  Patient and family education

## 2022-06-24 NOTE — PROGRESS NOTE ADULT - ATTENDING COMMENTS
Patient seen and examined at bedside. No acute events overnight. Hemoglobin slightly less than 7, but I strongly believe this is a fluctuation given prior trend. She has also developed antibodies with positive debbie, but per blood bank delayed serological trxn. She has no signs of hemolysis and labs do not support such. I have consulted [facility]. No recurrence of GI bleed.     Thus, if hemoglobin only less than 7 avoid transfusion given she now has multiple antibodies. If hemoglobin is stable then I will consider discharge with close outpatient follow up. Follow up final heme recs. There is no plan for repeat endoscopic evaluation. Likely discharge over this weekend.

## 2022-06-25 LAB
ALBUMIN SERPL ELPH-MCNC: 2.6 G/DL — LOW (ref 3.3–5)
ALP SERPL-CCNC: 58 U/L — SIGNIFICANT CHANGE UP (ref 40–120)
ALT FLD-CCNC: 13 U/L — SIGNIFICANT CHANGE UP (ref 10–45)
ANION GAP SERPL CALC-SCNC: 10 MMOL/L — SIGNIFICANT CHANGE UP (ref 5–17)
AST SERPL-CCNC: 19 U/L — SIGNIFICANT CHANGE UP (ref 10–40)
BILIRUB DIRECT SERPL-MCNC: <0.1 MG/DL — SIGNIFICANT CHANGE UP (ref 0–0.3)
BILIRUB INDIRECT FLD-MCNC: >0.1 MG/DL — LOW (ref 0.2–1)
BILIRUB SERPL-MCNC: 0.2 MG/DL — SIGNIFICANT CHANGE UP (ref 0.2–1.2)
BILIRUB SERPL-MCNC: 0.2 MG/DL — SIGNIFICANT CHANGE UP (ref 0.2–1.2)
BLD GP AB SCN SERPL QL: POSITIVE — SIGNIFICANT CHANGE UP
BUN SERPL-MCNC: 66 MG/DL — HIGH (ref 7–23)
CALCIUM SERPL-MCNC: 8.3 MG/DL — LOW (ref 8.4–10.5)
CHLORIDE SERPL-SCNC: 101 MMOL/L — SIGNIFICANT CHANGE UP (ref 96–108)
CO2 SERPL-SCNC: 26 MMOL/L — SIGNIFICANT CHANGE UP (ref 22–31)
CREAT SERPL-MCNC: 1.93 MG/DL — HIGH (ref 0.5–1.3)
EGFR: 24 ML/MIN/1.73M2 — LOW
GLUCOSE SERPL-MCNC: 143 MG/DL — HIGH (ref 70–99)
HAPTOGLOB SERPL-MCNC: 134 MG/DL — SIGNIFICANT CHANGE UP (ref 34–200)
HCT VFR BLD CALC: 18.5 % — CRITICAL LOW (ref 34.5–45)
HCT VFR BLD CALC: 21.3 % — LOW (ref 34.5–45)
HGB BLD-MCNC: 5.8 G/DL — CRITICAL LOW (ref 11.5–15.5)
HGB BLD-MCNC: 6.5 G/DL — CRITICAL LOW (ref 11.5–15.5)
LDH SERPL L TO P-CCNC: 172 U/L — SIGNIFICANT CHANGE UP (ref 50–242)
MAGNESIUM SERPL-MCNC: 1.7 MG/DL — SIGNIFICANT CHANGE UP (ref 1.6–2.6)
MCHC RBC-ENTMCNC: 30.1 PG — SIGNIFICANT CHANGE UP (ref 27–34)
MCHC RBC-ENTMCNC: 30.5 GM/DL — LOW (ref 32–36)
MCHC RBC-ENTMCNC: 30.7 PG — SIGNIFICANT CHANGE UP (ref 27–34)
MCHC RBC-ENTMCNC: 31.4 GM/DL — LOW (ref 32–36)
MCV RBC AUTO: 97.9 FL — SIGNIFICANT CHANGE UP (ref 80–100)
MCV RBC AUTO: 98.6 FL — SIGNIFICANT CHANGE UP (ref 80–100)
NRBC # BLD: 0 /100 WBCS — SIGNIFICANT CHANGE UP (ref 0–0)
NRBC # BLD: 0 /100 WBCS — SIGNIFICANT CHANGE UP (ref 0–0)
PHOSPHATE SERPL-MCNC: 3.8 MG/DL — SIGNIFICANT CHANGE UP (ref 2.5–4.5)
PLATELET # BLD AUTO: 195 K/UL — SIGNIFICANT CHANGE UP (ref 150–400)
PLATELET # BLD AUTO: 283 K/UL — SIGNIFICANT CHANGE UP (ref 150–400)
POTASSIUM SERPL-MCNC: 4.3 MMOL/L — SIGNIFICANT CHANGE UP (ref 3.5–5.3)
POTASSIUM SERPL-SCNC: 4.3 MMOL/L — SIGNIFICANT CHANGE UP (ref 3.5–5.3)
PROT SERPL-MCNC: 4.7 G/DL — LOW (ref 6–8.3)
RBC # BLD: 1.89 M/UL — LOW (ref 3.8–5.2)
RBC # BLD: 1.89 M/UL — LOW (ref 3.8–5.2)
RBC # BLD: 2.16 M/UL — LOW (ref 3.8–5.2)
RBC # FLD: 16.1 % — HIGH (ref 10.3–14.5)
RBC # FLD: 16.3 % — HIGH (ref 10.3–14.5)
RETICS #: 180.7 K/UL — HIGH (ref 25–125)
RETICS/RBC NFR: 9.6 % — HIGH (ref 0.5–2.5)
RH IG SCN BLD-IMP: POSITIVE — SIGNIFICANT CHANGE UP
SODIUM SERPL-SCNC: 137 MMOL/L — SIGNIFICANT CHANGE UP (ref 135–145)
WBC # BLD: 11.23 K/UL — HIGH (ref 3.8–10.5)
WBC # BLD: 8.26 K/UL — SIGNIFICANT CHANGE UP (ref 3.8–10.5)
WBC # FLD AUTO: 11.23 K/UL — HIGH (ref 3.8–10.5)
WBC # FLD AUTO: 8.26 K/UL — SIGNIFICANT CHANGE UP (ref 3.8–10.5)

## 2022-06-25 PROCEDURE — 99233 SBSQ HOSP IP/OBS HIGH 50: CPT | Mod: GC

## 2022-06-25 RX ORDER — SENNA PLUS 8.6 MG/1
2 TABLET ORAL AT BEDTIME
Refills: 0 | Status: DISCONTINUED | OUTPATIENT
Start: 2022-06-25 | End: 2022-07-01

## 2022-06-25 RX ORDER — POLYETHYLENE GLYCOL 3350 17 G/17G
17 POWDER, FOR SOLUTION ORAL
Refills: 0 | Status: DISCONTINUED | OUTPATIENT
Start: 2022-06-25 | End: 2022-06-26

## 2022-06-25 RX ORDER — PANTOPRAZOLE SODIUM 20 MG/1
40 TABLET, DELAYED RELEASE ORAL DAILY
Refills: 0 | Status: DISCONTINUED | OUTPATIENT
Start: 2022-06-25 | End: 2022-06-28

## 2022-06-25 RX ORDER — ACETAMINOPHEN 500 MG
650 TABLET ORAL ONCE
Refills: 0 | Status: COMPLETED | OUTPATIENT
Start: 2022-06-25 | End: 2022-06-26

## 2022-06-25 RX ORDER — SENNA PLUS 8.6 MG/1
2 TABLET ORAL AT BEDTIME
Refills: 0 | Status: DISCONTINUED | OUTPATIENT
Start: 2022-06-25 | End: 2022-06-25

## 2022-06-25 RX ORDER — PANTOPRAZOLE SODIUM 20 MG/1
40 TABLET, DELAYED RELEASE ORAL DAILY
Refills: 0 | Status: DISCONTINUED | OUTPATIENT
Start: 2022-06-25 | End: 2022-06-25

## 2022-06-25 RX ORDER — POLYETHYLENE GLYCOL 3350 17 G/17G
17 POWDER, FOR SOLUTION ORAL
Refills: 0 | Status: DISCONTINUED | OUTPATIENT
Start: 2022-06-25 | End: 2022-06-25

## 2022-06-25 RX ORDER — DIPHENHYDRAMINE HCL 50 MG
25 CAPSULE ORAL DAILY
Refills: 0 | Status: DISCONTINUED | OUTPATIENT
Start: 2022-06-25 | End: 2022-06-28

## 2022-06-25 RX ADMIN — Medication 3 MILLIGRAM(S): at 21:41

## 2022-06-25 RX ADMIN — Medication 75 MILLIGRAM(S): at 12:15

## 2022-06-25 RX ADMIN — POLYETHYLENE GLYCOL 3350 17 GRAM(S): 17 POWDER, FOR SOLUTION ORAL at 17:54

## 2022-06-25 RX ADMIN — Medication 25 MILLIGRAM(S): at 17:55

## 2022-06-25 RX ADMIN — PANTOPRAZOLE SODIUM 40 MILLIGRAM(S): 20 TABLET, DELAYED RELEASE ORAL at 12:15

## 2022-06-25 RX ADMIN — Medication 500 MILLIGRAM(S): at 12:15

## 2022-06-25 RX ADMIN — Medication 25 MICROGRAM(S): at 05:57

## 2022-06-25 RX ADMIN — Medication 40 MILLIGRAM(S): at 05:57

## 2022-06-25 RX ADMIN — PANTOPRAZOLE SODIUM 40 MILLIGRAM(S): 20 TABLET, DELAYED RELEASE ORAL at 05:57

## 2022-06-25 RX ADMIN — Medication 1 DROP(S): at 17:54

## 2022-06-25 RX ADMIN — ROSUVASTATIN CALCIUM 10 MILLIGRAM(S): 5 TABLET ORAL at 21:41

## 2022-06-25 RX ADMIN — Medication 25 MILLIGRAM(S): at 05:58

## 2022-06-25 RX ADMIN — Medication 1 DROP(S): at 05:56

## 2022-06-25 RX ADMIN — SENNA PLUS 2 TABLET(S): 8.6 TABLET ORAL at 21:41

## 2022-06-25 RX ADMIN — Medication 1 TABLET(S): at 12:15

## 2022-06-25 RX ADMIN — POLYETHYLENE GLYCOL 3350 17 GRAM(S): 17 POWDER, FOR SOLUTION ORAL at 05:58

## 2022-06-25 NOTE — PROVIDER CONTACT NOTE (OTHER) - SITUATION
Pt had dark red bloody bm
RN marking 25mg metoprolol PO as not done due to BP of 100/46 HR 67
Patient diagnosed with Covid >10 days ago  (5/20). Asymptomatic. No further isolation required.
Bloody bowel movement (BM)
Patient had a run of PAT 2.5sec with 
Patient was originally retaining 403mL, then as we are ready to do straight cath the patient voids some more. Patient was rescanned to check for urinary retention and found to retain 214mL.
Informed by tele tech, run of second degree heart block type I

## 2022-06-25 NOTE — PROVIDER CONTACT NOTE (OTHER) - REASON
RN marking 25mg metoprolol PO as not done due to BP of 100/46 HR 67
Discontinuation of Isolation for COVID patient
Pt had dark red bloody bm
Bloody bowel movement
Notified by Tele Tech, pt had a run of PAT 2.5sec with 
Patient is retaining urine
Informed by tele tech, run of second degree heart block type I

## 2022-06-25 NOTE — PROVIDER CONTACT NOTE (OTHER) - DATE AND TIME:
13-Jun-2022 15:36
23-Jun-2022 22:35
25-Jun-2022 21:24
18-Jun-2022 05:24
13-Jun-2022 23:45
24-Jun-2022 10:14
14-Jun-2022 16:40

## 2022-06-25 NOTE — PROVIDER CONTACT NOTE (OTHER) - BACKGROUND
89yo F w/ hx HTN, Afib, CHF, HLD, nonsmoker, remote hx of asthma presenting with loose stools, lethargy, fatigue, low blood pressure and blood bowel movement found to have a hemoglobin 4.8
Pt admitted for GI hemorrhage. Given 2 units PRBC in ED, H&H 8.9
89yo F in for GI bleed.
DX GI Bleed, Hx Heart Murmur, Chronic Atrial Fib.
DX GI Bleed
Pt admitted for GI Bleed

## 2022-06-25 NOTE — PROGRESS NOTE ADULT - SUBJECTIVE AND OBJECTIVE BOX
PROGRESS NOTE:    Lelo Burgess  Internal Medicine PGY-1      Patient is a 89y old  Female who presents with a chief complaint of gi bleed (24 Jun 2022 20:43)      SUBJECTIVE / OVERNIGHT EVENTS: No acute overnight events. Pt seen and examined. Denies fevers, chills, CP, SOB, Abdominal pain, N/V, Constipation, Diarrhea      MEDICATIONS  (STANDING):  artificial tears (preservative free) Ophthalmic Solution 1 Drop(s) Both EYES two times a day  ascorbic acid 500 milliGRAM(s) Oral daily  furosemide    Tablet 40 milliGRAM(s) Oral daily  levothyroxine 25 MICROGram(s) Oral daily  melatonin 3 milliGRAM(s) Oral at bedtime  metoprolol tartrate 25 milliGRAM(s) Oral two times a day  multivitamin 1 Tablet(s) Oral daily  pantoprazole    Tablet 40 milliGRAM(s) Oral before breakfast  polyethylene glycol 3350 17 Gram(s) Oral two times a day  rosuvastatin 10 milliGRAM(s) Oral at bedtime  senna 2 Tablet(s) Oral at bedtime  venlafaxine XR. 75 milliGRAM(s) Oral daily    MEDICATIONS  (PRN):      I&O's Summary    23 Jun 2022 07:01  -  24 Jun 2022 07:00  --------------------------------------------------------  IN: 600 mL / OUT: 300 mL / NET: 300 mL    24 Jun 2022 07:01  -  25 Jun 2022 06:39  --------------------------------------------------------  IN: 320 mL / OUT: 2100 mL / NET: -1780 mL        Vital Signs Last 24 Hrs  T(C): 36.6 (25 Jun 2022 04:30), Max: 37.1 (24 Jun 2022 17:39)  T(F): 97.9 (25 Jun 2022 04:30), Max: 98.8 (24 Jun 2022 17:39)  HR: 67 (25 Jun 2022 04:30) (67 - 80)  BP: 112/61 (25 Jun 2022 04:30) (100/46 - 129/47)  BP(mean): --  RR: 18 (25 Jun 2022 04:30) (18 - 18)  SpO2: 98% (25 Jun 2022 04:30) (95% - 100%)    =================PHYSICAL EXAM=================    PHYSICAL EXAM:  GENERAL: NAD, lying in bed comfortably  HEAD:  Atraumatic, Normocephalic  EYES: EOMI, PERRLA, conjunctiva and sclera clear  ENT: Moist mucous membranes  NECK: Supple, No JVD  CHEST/LUNG: Clear to auscultation bilaterally; No rales, rhonchi, wheezing, or rubs. Unlabored respirations  HEART: Regular rate and rhythm; No murmurs, rubs, or gallops  ABDOMEN: Bowel sounds present; Soft, Nontender, Nondistended. No hepatomegally  EXTREMITIES:  2+ Peripheral Pulses, brisk capillary refill. No clubbing, cyanosis, or edema  NERVOUS SYSTEM:  Alert & Oriented X3, speech clear. No deficits   MSK: FROM all 4 extremities, full and equal strength  SKIN: No rashes or lesions    =================================================    LABS:                        6.8    8.34  )-----------( 199      ( 24 Jun 2022 08:35 )             21.7     Auto Eosinophil # x     / Auto Eosinophil % x     / Auto Neutrophil # x     / Auto Neutrophil % x     / BANDS % x                            6.9    8.31  )-----------( 205      ( 24 Jun 2022 07:20 )             21.6     Auto Eosinophil # x     / Auto Eosinophil % x     / Auto Neutrophil # x     / Auto Neutrophil % x     / BANDS % x                            9.0    11.19 )-----------( 256      ( 23 Jun 2022 14:45 )             28.9     Auto Eosinophil # x     / Auto Eosinophil % x     / Auto Neutrophil # x     / Auto Neutrophil % x     / BANDS % x        06-23    134<L>  |  99  |  44<H>  ----------------------------<  193<H>  5.1   |  23  |  1.86<H>    Ca    8.8      23 Jun 2022 14:45  TPro  5.9<L>  /  Alb  3.1<L>  /  TBili  0.6  /  DBili  x   /  AST  27  /  ALT  20  /  AlkPhos  69  06-23      CARDIAC MARKERS ( 23 Jun 2022 14:45 )  x     / x     / 39 U/L / x     / x                  RADIOLOGY & ADDITIONAL TESTS:    Imaging Personally Reviewed:    Consultant(s) Notes Reviewed:      Care Discussed with Consultants/Other Providers:

## 2022-06-25 NOTE — PROVIDER CONTACT NOTE (OTHER) - NAME OF MD/NP/PA/DO NOTIFIED:
Cj Jolley MD
Patria Patel MD
Unit leadership and bed board.
Cj Jolley
Mohan Duncan MD
MD jyoti presley
T8

## 2022-06-25 NOTE — PROGRESS NOTE ADULT - ASSESSMENT
89yo F w/ hx HTN, Afib, CHF, HLD, CKD Stage IV nonsmoker, remote hx of asthma presenting with loose stools, lethargy, fatigue, low blood pressure and blood bowel movement found to have a hemoglobin of 4.8 most likely 2/2 to GI bleed from eliquis.  DDx includes diverticulitis vs Gastric ulcer vs. Duodenal ulcer vs. angiodysplasia. 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

## 2022-06-25 NOTE — PROGRESS NOTE ADULT - PROBLEM SELECTOR PLAN 1
- Patient presented with Hg 4s likely s/s GI bleed/diverticular bleed, s/p colonoscopy without source of bleeding  - Patient continues to have Hg in 6s 1-2 days after transfusion  - Blood Bank (6/23): found to have anti-JKA positive antibodies  - 6/23: hemolysis labs wnl, however concern patient has intermittent dark brown urine (possibly from mixing with stool)  - 6/24: Hematology consulted to w/u for autobody-mediated hemolysis, f/u recs - Patient presented with Hg 4s likely s/s GI bleed/diverticular bleed, s/p colonoscopy without source of bleeding  - Patient continues to have Hg in 6s 1-2 days after transfusion, dropped to 5.8 today in setting of GI bleed  - Blood Bank (6/23): 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

## 2022-06-25 NOTE — PROGRESS NOTE ADULT - ASSESSMENT
89yo F w/ hx HTN, Afib, CHF, AS, HLD, nonsmoker, remote hx of asthma presenting with loose stools, lethargy, fatigue, low blood pressure and bloody bowel movement. Of note, the patient had COVID 2 weeks PTA measured by at home test. RVP was + for COVID by PCR on admission.   In the ED: Labs: Hemoglobin 4.8, Cr 1.93, COVID +   required prbc. pt also had egd. eliquis was dc.       1- CKD IV   2- hx chf   3- anemia/ GI bleed    creatinine is steady  chf is compensated  with lasix 40 mg daily resumed 6/22  prbc for anemia and keep Hb > 7 heme note noted.   trend hb   metoprolol 25 mg bid   trend creatinine   d.w pt son at bedside

## 2022-06-25 NOTE — PROGRESS NOTE ADULT - PROBLEM SELECTOR PLAN 7
- Hold home amlodipine 5mg given normotensive and active GI bleeding.  - Likely will hold amlodipine on discharge and have patient f/u

## 2022-06-25 NOTE — PROVIDER CONTACT NOTE (OTHER) - ACTION/TREATMENT ORDERED:
No Intervention @ this time. Will continue to monitor.
RN messaged MD presley on teams at 10:14AM to let him know RN did not give medication.
Continue to monitor, notify provider if happens again
Continue to monitor. Notify provider for stat CBC if pt has another bloody BM.
Provider to assess at bedside

## 2022-06-25 NOTE — PROGRESS NOTE ADULT - PROBLEM SELECTOR PLAN 6
Hx of afib:   - metoprolol tartrate 25mg PO BID as pt BP on higher end   - Hold AC given bleeding  - Plan for patient to possibly start Amiodarone as OP, will continue with metoprolol tartrate while hospitalized, will f/u OP Cards

## 2022-06-25 NOTE — PROGRESS NOTE ADULT - PROBLEM SELECTOR PLAN 4
Known hx of aortic stenosis.  - 05/08: TTE EF 67%, AS moderate-severe systolic dysfunction, Moderate to severe AS velocity 70cm Peak, area 1cmPeak gradient 41     Plan:   - No acute issues   - Monitor fluid status closely  - daily weights, I/O

## 2022-06-25 NOTE — PROGRESS NOTE ADULT - PROBLEM SELECTOR PLAN 2
Resolved.   Most likely 2/2 to eliquis initiation and underlying GI pathology. s/p tagged scan with R. sided bleeding, s/p colonoscopy w/ poor prep and no active signs of bleeding. No active bleeding for one week.     Plan:  - c/w pantoprazole 40mg qd  - Maintain active type and screen   - Maintain 2 large bore IVs  - Transfuse to hemoglobin <7 --> s/p 7 PRBC transfusions, no bloody BM since colonoscopy  - GI following: no additional intervention  - Trend CBC qd Resolved.   Most likely 2/2 to eliquis initiation and underlying GI pathology. s/p tagged scan with R. sided bleeding, s/p colonoscopy w/ poor prep and no active signs of bleeding. No active bleeding for one week.     Plan:  - changed pantoprazole to IV  - Maintain active type and screen   - Maintain 2 large bore IVs  - Transfuse to hemoglobin <7 --> s/p 7 PRBC transfusions, no bloody BM since colonoscopy  - GI following  - Trend CBC qd

## 2022-06-25 NOTE — PROGRESS NOTE ADULT - PROBLEM SELECTOR PLAN 3
Currently euvolemic     - restarted lasix  - plan for OP Cardiology f/u regarding standing amiodarone Currently euvolemic  - restarted lasix  - plan for OP Cardiology f/u regarding standing amiodarone

## 2022-06-25 NOTE — PROVIDER CONTACT NOTE (OTHER) - ASSESSMENT
Pt is A+Ox4, /58, HR 67, SpO2 93% on room air. Pt denies lightheadedness, dizziness. BM was moderate amount, maroon-colored, blood mixed in with stool, no clots.
VS as documented. No s/s of distress.
pt A/o x4, denies CP, SOB, dizziness.
patient asleep, denies CP, SOB, Palpitation. V/S HR 69, B/P 123/53, R 18, O2sat 98% RA
Pt A&OX4, pt asymptomatic, denies dizziness, pt awake and alert, no signs of bleeding noted.
Pt A&Ox4. Vital signs stable. Denies CP and SOB. Pt has been constipated the past few days. Pt had a scant dark red tarry bm early in the day. Pt had a large dark maroon tarry stool.

## 2022-06-25 NOTE — PROGRESS NOTE ADULT - SUBJECTIVE AND OBJECTIVE BOX
[facility]   [phone]  RENAL FOLLOW UP NOTE  --------------------------------------------------------------------------------  Chief Complaint:    24 hour events/subjective:    seen earlier   son at bedside   had some blood in stool as per RN and pt     PAST HISTORY  --------------------------------------------------------------------------------  No significant changes to PMH, PSH, FHx, SHx, unless otherwise noted    ALLERGIES & MEDICATIONS  --------------------------------------------------------------------------------  Allergies    Keflex (Rash; Hives)    Intolerances      Standing Inpatient Medications  artificial tears (preservative free) Ophthalmic Solution 1 Drop(s) Both EYES two times a day  ascorbic acid 500 milliGRAM(s) Oral daily  furosemide    Tablet 40 milliGRAM(s) Oral daily  levothyroxine 25 MICROGram(s) Oral daily  melatonin 3 milliGRAM(s) Oral at bedtime  metoprolol tartrate 25 milliGRAM(s) Oral two times a day  multivitamin 1 Tablet(s) Oral daily  pantoprazole  Injectable 40 milliGRAM(s) IV Push daily  polyethylene glycol 3350 17 Gram(s) Oral two times a day  rosuvastatin 10 milliGRAM(s) Oral at bedtime  senna 2 Tablet(s) Oral at bedtime  venlafaxine XR. 75 milliGRAM(s) Oral daily    PRN Inpatient Medications  acetaminophen     Tablet .. 650 milliGRAM(s) Oral once PRN  bisacodyl 5 milliGRAM(s) Oral every 12 hours PRN  diphenhydrAMINE 25 milliGRAM(s) Oral daily PRN      REVIEW OF SYSTEMS  --------------------------------------------------------------------------------    Gen: denies  fevers/chills,  CVS: denies chest pain/palpitations  Resp: denies SOB/Cough  GI: Denies N/V/Abd pain  : Denies dysuria        VITALS/PHYSICAL EXAM  --------------------------------------------------------------------------------  T(C): 36.6 (06-25-22 @ 19:40), Max: 36.8 (06-25-22 @ 08:30)  HR: 64 (06-25-22 @ 19:40) (64 - 68)  BP: 138/60 (06-25-22 @ 19:40) (99/57 - 138/60)  RR: 20 (06-25-22 @ 19:40) (18 - 20)  SpO2: 100% (06-25-22 @ 19:40) (95% - 100%)  Wt(kg): --        06-24-22 @ 07:01  -  06-25-22 @ 07:00  --------------------------------------------------------  IN: 320 mL / OUT: 2100 mL / NET: -1780 mL    06-25-22 @ 07:01  -  06-25-22 @ 21:00  --------------------------------------------------------  IN: 960 mL / OUT: 1000 mL / NET: -40 mL      Physical Exam:  	                  Gen: Appears comfortable,  forgetful   	Pulm: decrease bs,  no rales or ronchi or wheezing  	CV: No JVD. RRR, S1S2; no rub  	Abd: +BS, soft, nontender/nondistended  	: No suprapubic tenderness, urine output appears dark  	UE: Warm, no cyanosis  no clubbing,  no edema  	LE: Warm, no cyanosis  no clubbing, no edema      LABS/STUDIES  --------------------------------------------------------------------------------              6.5    11.23 >-----------<  283      [06-25-22 @ 13:04]              21.3     137  |  101  |  66  ----------------------------<  143      [06-25-22 @ 06:47]  4.3   |  26  |  1.93        Ca     8.3     [06-25-22 @ 06:47]      Mg     1.7     [06-25-22 @ 06:47]      Phos  3.8     [06-25-22 @ 06:47]    TPro  x   /  Alb  x   /  TBili  0.2  /  DBili  <0.1  /  AST  x   /  ALT  x   /  AlkPhos  x   [06-25-22 @ 07:58]              [06-25-22 @ 06:47]    Creatinine Trend:  SCr 1.93 [06-25 @ 06:47]  SCr 1.86 [06-23 @ 14:45]  SCr 1.67 [06-21 @ 09:06]  SCr 1.65 [06-20 @ 07:22]  SCr 1.63 [06-19 @ 07:07]              Urinalysis - [06-14-22 @ 00:46]      Color Light Yellow / Appearance Slightly Turbid / SG 1.014 / pH 5.5      Gluc 100 mg/dL / Ketone Negative  / Bili Negative / Urobili Negative       Blood Large / Protein Trace / Leuk Est Moderate / Nitrite Positive      RBC 26 / WBC 2 / Hyaline 3 / Gran  / Sq Epi  / Non Sq Epi 2 / Bacteria Moderate

## 2022-06-25 NOTE — PROVIDER CONTACT NOTE (OTHER) - RECOMMENDATIONS
Dr. Patel was notified. Order for straight cath to remain in the event patient is retaining again. Will continue to monitor to see if retaining, allowing patient to void on her own.
Provider notified
Provider notified
Notify provider
negative

## 2022-06-25 NOTE — PROGRESS NOTE ADULT - ASSESSMENT
89 y/o female admitted with GIB while on A/C. heme consulted for + debbie    1. Debbie positive , eluate negative Blood Bank Result  - Hemolytic parameters including LDH , Hapto, Bilirubin WNL and no overt evidence of hemolysis.   - Patient had recent transfusion which can results in + debbie test    2. Patient did have identification of Ann ab ( jka)  -Potential risk of delayed hemolytic transfusion reaction  - Monitor closely with future transfusions and continue to trend LDH daily along with other labs.     3. GIB: Ongoing GI workup and supportive transfusion  - Now off A/C and on PPI.   - management per primary team and GI      89 y/o female admitted with GIB while on A/C. heme consulted for + debbie    1. Debbie positive , eluate negative Blood Bank Result  - Hemolytic parameters including LDH , Hapto, Bilirubin WNL and no overt evidence of hemolysis.   - Patient had recent transfusion which can results in + debbie test  - Ongoing follow up. Anemia secondary to GIB     2. Patient did have identification of [place] ab ( jka)  -With that patients can have delayed hemolytic transfusion reaction. No evidence of hemolysis at this time.   - Monitor closely with future transfusions and continue to trend LDH daily along with other labs.   - Recommend premedication with Tylenol and benadryl for future transfusions     3. GIB: Ongoing GI workup and supportive transfusion  - She notes intermittent episiodes of dark stool but no overt bleeding as she had on presentation   - Now off A/C and on PPI.   - management per primary team and GI   - Transfuse to keep Hgb >7.     Will continue to follow. Please call if questions     Sarah Santoro D.O  Hematology Oncology   [facility]  [phone] ( Office)   Evenings and weekends please call MD on call or office

## 2022-06-25 NOTE — PROGRESS NOTE ADULT - SUBJECTIVE AND OBJECTIVE BOX
PROGRESS NOTE:    Lelo Burgess  Internal Medicine PGY-1      Patient is a 89y old  Female who presents with a chief complaint of gi bleed (25 Jun 2022 06:39)      SUBJECTIVE / OVERNIGHT EVENTS: No acute overnight events. Pt seen and examined. Denies fevers, chills, CP, SOB, Abdominal pain, N/V, Constipation, Diarrhea      MEDICATIONS  (STANDING):  artificial tears (preservative free) Ophthalmic Solution 1 Drop(s) Both EYES two times a day  ascorbic acid 500 milliGRAM(s) Oral daily  furosemide    Tablet 40 milliGRAM(s) Oral daily  levothyroxine 25 MICROGram(s) Oral daily  melatonin 3 milliGRAM(s) Oral at bedtime  metoprolol tartrate 25 milliGRAM(s) Oral two times a day  multivitamin 1 Tablet(s) Oral daily  pantoprazole    Tablet 40 milliGRAM(s) Oral before breakfast  polyethylene glycol 3350 17 Gram(s) Oral two times a day  rosuvastatin 10 milliGRAM(s) Oral at bedtime  senna 2 Tablet(s) Oral at bedtime  venlafaxine XR. 75 milliGRAM(s) Oral daily    MEDICATIONS  (PRN):      I&O's Summary    23 Jun 2022 07:01  -  24 Jun 2022 07:00  --------------------------------------------------------  IN: 600 mL / OUT: 300 mL / NET: 300 mL    24 Jun 2022 07:01  -  25 Jun 2022 06:45  --------------------------------------------------------  IN: 320 mL / OUT: 2100 mL / NET: -1780 mL        Vital Signs Last 24 Hrs  T(C): 36.6 (25 Jun 2022 04:30), Max: 37.1 (24 Jun 2022 17:39)  T(F): 97.9 (25 Jun 2022 04:30), Max: 98.8 (24 Jun 2022 17:39)  HR: 67 (25 Jun 2022 04:30) (67 - 80)  BP: 112/61 (25 Jun 2022 04:30) (100/46 - 129/47)  BP(mean): --  RR: 18 (25 Jun 2022 04:30) (18 - 18)  SpO2: 98% (25 Jun 2022 04:30) (95% - 100%)    =================PHYSICAL EXAM=================    PHYSICAL EXAM:  GENERAL: NAD, lying in bed comfortably  HEAD:  Atraumatic, Normocephalic  EYES: EOMI, PERRLA, conjunctiva and sclera clear  ENT: Moist mucous membranes  NECK: Supple, No JVD  CHEST/LUNG: Clear to auscultation bilaterally; No rales, rhonchi, wheezing, or rubs. Unlabored respirations  HEART: Regular rate and rhythm; No murmurs, rubs, or gallops  ABDOMEN: Bowel sounds present; Soft, Nontender, Nondistended. No hepatomegally  EXTREMITIES:  2+ Peripheral Pulses, brisk capillary refill. No clubbing, cyanosis, or edema  NERVOUS SYSTEM:  Alert & Oriented X3, speech clear. No deficits   MSK: FROM all 4 extremities, full and equal strength  SKIN: No rashes or lesions    =================================================    LABS:                        6.8    8.34  )-----------( 199      ( 24 Jun 2022 08:35 )             21.7     Auto Eosinophil # x     / Auto Eosinophil % x     / Auto Neutrophil # x     / Auto Neutrophil % x     / BANDS % x                            6.9    8.31  )-----------( 205      ( 24 Jun 2022 07:20 )             21.6     Auto Eosinophil # x     / Auto Eosinophil % x     / Auto Neutrophil # x     / Auto Neutrophil % x     / BANDS % x                            9.0    11.19 )-----------( 256      ( 23 Jun 2022 14:45 )             28.9     Auto Eosinophil # x     / Auto Eosinophil % x     / Auto Neutrophil # x     / Auto Neutrophil % x     / BANDS % x        06-23    134<L>  |  99  |  44<H>  ----------------------------<  193<H>  5.1   |  23  |  1.86<H>    Ca    8.8      23 Jun 2022 14:45  TPro  5.9<L>  /  Alb  3.1<L>  /  TBili  0.6  /  DBili  x   /  AST  27  /  ALT  20  /  AlkPhos  69  06-23      CARDIAC MARKERS ( 23 Jun 2022 14:45 )  x     / x     / 39 U/L / x     / x                  RADIOLOGY & ADDITIONAL TESTS:    Imaging Personally Reviewed:    Consultant(s) Notes Reviewed:      Care Discussed with Consultants/Other Providers:   PROGRESS NOTE:    Lelo Burgess  Internal Medicine PGY-1      Patient is a 89y old  Female who presents with a chief complaint of gi bleed (25 Jun 2022 06:39)      SUBJECTIVE / OVERNIGHT EVENTS: No acute overnight events. Pt seen and examined. Per nursing, patient did not pass stool but had a small smear of dark red blood this morning. Patient's Hg dropped from 6.9 to 5.8. Patient's son reports good appetite and patient denies dizziness, fevers, CP, SOB or abdominal pain.    MEDICATIONS  (STANDING):  artificial tears (preservative free) Ophthalmic Solution 1 Drop(s) Both EYES two times a day  ascorbic acid 500 milliGRAM(s) Oral daily  furosemide    Tablet 40 milliGRAM(s) Oral daily  levothyroxine 25 MICROGram(s) Oral daily  melatonin 3 milliGRAM(s) Oral at bedtime  metoprolol tartrate 25 milliGRAM(s) Oral two times a day  multivitamin 1 Tablet(s) Oral daily  pantoprazole    Tablet 40 milliGRAM(s) Oral before breakfast  polyethylene glycol 3350 17 Gram(s) Oral two times a day  rosuvastatin 10 milliGRAM(s) Oral at bedtime  senna 2 Tablet(s) Oral at bedtime  venlafaxine XR. 75 milliGRAM(s) Oral daily    MEDICATIONS  (PRN):      I&O's Summary    23 Jun 2022 07:01  -  24 Jun 2022 07:00  --------------------------------------------------------  IN: 600 mL / OUT: 300 mL / NET: 300 mL    24 Jun 2022 07:01  -  25 Jun 2022 06:45  --------------------------------------------------------  IN: 320 mL / OUT: 2100 mL / NET: -1780 mL        Vital Signs Last 24 Hrs  T(C): 36.6 (25 Jun 2022 04:30), Max: 37.1 (24 Jun 2022 17:39)  T(F): 97.9 (25 Jun 2022 04:30), Max: 98.8 (24 Jun 2022 17:39)  HR: 67 (25 Jun 2022 04:30) (67 - 80)  BP: 112/61 (25 Jun 2022 04:30) (100/46 - 129/47)  BP(mean): --  RR: 18 (25 Jun 2022 04:30) (18 - 18)  SpO2: 98% (25 Jun 2022 04:30) (95% - 100%)    =================PHYSICAL EXAM=================    PHYSICAL EXAM:  GENERAL: NAD, lying in bed comfortably  HEAD:  Atraumatic, Normocephalic  EYES: EOMI, PERRLA, conjunctiva and sclera clear  ENT: Moist mucous membranes  NECK: Supple, No JVD  CHEST/LUNG: Clear to auscultation bilaterally; No rales, rhonchi, wheezing, or rubs. Unlabored respirations  HEART: Regular rate and rhythm; No murmurs, rubs, or gallops  ABDOMEN: Bowel sounds present; Soft, Nontender, Nondistended  EXTREMITIES:  2+ Peripheral Pulses, brisk capillary refill. No clubbing, cyanosis, or edema  NERVOUS SYSTEM:  Alert & Oriented X2. No deficits   MSK: FROM all 4 extremities, full and equal strength  SKIN: No rashes or lesions    =================================================    LABS:                        6.8    8.34  )-----------( 199      ( 24 Jun 2022 08:35 )             21.7     Auto Eosinophil # x     / Auto Eosinophil % x     / Auto Neutrophil # x     / Auto Neutrophil % x     / BANDS % x                            6.9    8.31  )-----------( 205      ( 24 Jun 2022 07:20 )             21.6     Auto Eosinophil # x     / Auto Eosinophil % x     / Auto Neutrophil # x     / Auto Neutrophil % x     / BANDS % x                            9.0    11.19 )-----------( 256      ( 23 Jun 2022 14:45 )             28.9     Auto Eosinophil # x     / Auto Eosinophil % x     / Auto Neutrophil # x     / Auto Neutrophil % x     / BANDS % x        06-23    134<L>  |  99  |  44<H>  ----------------------------<  193<H>  5.1   |  23  |  1.86<H>    Ca    8.8      23 Jun 2022 14:45  TPro  5.9<L>  /  Alb  3.1<L>  /  TBili  0.6  /  DBili  x   /  AST  27  /  ALT  20  /  AlkPhos  69  06-23      CARDIAC MARKERS ( 23 Jun 2022 14:45 )  x     / x     / 39 U/L / x     / x

## 2022-06-25 NOTE — PROGRESS NOTE ADULT - ATTENDING COMMENTS
Patient seen and examined. Plan as discussed w/ Dr. Burgess: son at bedside reporting dark stools this AM? will monitor closely, and repeat CBC given ongoing Hgb fluctuations (anemic to Hgb < 6 this AM) -- monitor hemodynamics closely, and if Hgb < 7, will transfuse 1unit PRBC, changing Protonix back to IV; f/u heme and GI.

## 2022-06-25 NOTE — PROGRESS NOTE ADULT - SUBJECTIVE AND OBJECTIVE BOX
Patient is a 89y old  Female who presents with a chief complaint of gi bleed (25 Jun 2022 06:41)  Patient seen in f/u today. Further drop in H/H in setting of GIB     MEDICATIONS  (STANDING):  artificial tears (preservative free) Ophthalmic Solution 1 Drop(s) Both EYES two times a day  ascorbic acid 500 milliGRAM(s) Oral daily  furosemide    Tablet 40 milliGRAM(s) Oral daily  levothyroxine 25 MICROGram(s) Oral daily  melatonin 3 milliGRAM(s) Oral at bedtime  metoprolol tartrate 25 milliGRAM(s) Oral two times a day  multivitamin 1 Tablet(s) Oral daily  pantoprazole    Tablet 40 milliGRAM(s) Oral before breakfast  polyethylene glycol 3350 17 Gram(s) Oral two times a day  rosuvastatin 10 milliGRAM(s) Oral at bedtime  senna 2 Tablet(s) Oral at bedtime  venlafaxine XR. 75 milliGRAM(s) Oral daily    MEDICATIONS  (PRN):      ROS  No fever, sweats, chills  No epistaxis, HA, sore throat  No CP, SOB, cough, sputum  No n/v/d, abd pain, melena, hematochezia  No edema  No rash  No anxiety  No back pain, joint pain  No bleeding, bruising  No dysuria, hematuria    Vital Signs Last 24 Hrs  T(C): 36.8 (25 Jun 2022 08:30), Max: 37.1 (24 Jun 2022 17:39)  T(F): 98.3 (25 Jun 2022 08:30), Max: 98.8 (24 Jun 2022 17:39)  HR: 64 (25 Jun 2022 08:30) (64 - 79)  BP: 111/44 (25 Jun 2022 08:30) (109/62 - 129/47)  BP(mean): --  RR: 18 (25 Jun 2022 08:30) (18 - 18)  SpO2: 95% (25 Jun 2022 08:30) (95% - 100%)    PE  NAD  Awake, alert  Anicteric, MMM  RRR  CTAB  Abd soft, NT, ND  No c/c/e  No rash grossly  FROM                          5.8    8.26  )-----------( 195      ( 25 Jun 2022 07:50 )             18.5       06-25    137  |  101  |  66<H>  ----------------------------<  143<H>  4.3   |  26  |  1.93<H>    Ca    8.3<L>      25 Jun 2022 06:47  Phos  3.8     06-25  Mg     1.7     06-25    TPro  x   /  Alb  x   /  TBili  0.2  /  DBili  <0.1  /  AST  x   /  ALT  x   /  AlkPhos  x   06-25       Patient is a 89y old  Female who presents with a chief complaint of gi bleed (25 Jun 2022 06:41)  Patient seen in f/u today. Further drop in H/H in setting of GIB. Son at bedside. Patient up in chair. Notes intermittent episodes of dark stool, not bright red     MEDICATIONS  (STANDING):  artificial tears (preservative free) Ophthalmic Solution 1 Drop(s) Both EYES two times a day  ascorbic acid 500 milliGRAM(s) Oral daily  furosemide    Tablet 40 milliGRAM(s) Oral daily  levothyroxine 25 MICROGram(s) Oral daily  melatonin 3 milliGRAM(s) Oral at bedtime  metoprolol tartrate 25 milliGRAM(s) Oral two times a day  multivitamin 1 Tablet(s) Oral daily  pantoprazole    Tablet 40 milliGRAM(s) Oral before breakfast  polyethylene glycol 3350 17 Gram(s) Oral two times a day  rosuvastatin 10 milliGRAM(s) Oral at bedtime  senna 2 Tablet(s) Oral at bedtime  venlafaxine XR. 75 milliGRAM(s) Oral daily    MEDICATIONS  (PRN):      ROS  No fever, sweats, chills  No epistaxis, HA, sore throat  No CP, SOB, cough, sputum  No n/v/d, abd pain, melena, hematochezia  No edema  No rash  No anxiety  No back pain, joint pain  No bleeding, bruising  No dysuria, hematuria    Vital Signs Last 24 Hrs  T(C): 36.8 (25 Jun 2022 08:30), Max: 37.1 (24 Jun 2022 17:39)  T(F): 98.3 (25 Jun 2022 08:30), Max: 98.8 (24 Jun 2022 17:39)  HR: 64 (25 Jun 2022 08:30) (64 - 79)  BP: 111/44 (25 Jun 2022 08:30) (109/62 - 129/47)  BP(mean): --  RR: 18 (25 Jun 2022 08:30) (18 - 18)  SpO2: 95% (25 Jun 2022 08:30) (95% - 100%)    PE  NAD  Awake, alert  Anicteric, MMM  RRR  CTAB  Abd soft, NT, ND  No c/c/e  No rash grossly  FROM                          5.8    8.26  )-----------( 195      ( 25 Jun 2022 07:50 )             18.5       06-25    137  |  101  |  66<H>  ----------------------------<  143<H>  4.3   |  26  |  1.93<H>    Ca    8.3<L>      25 Jun 2022 06:47  Phos  3.8     06-25  Mg     1.7     06-25    TPro  x   /  Alb  x   /  TBili  0.2  /  DBili  <0.1  /  AST  x   /  ALT  x   /  AlkPhos  x   06-25

## 2022-06-26 LAB
ALBUMIN SERPL ELPH-MCNC: 2.1 G/DL — LOW (ref 3.3–5)
ALBUMIN SERPL ELPH-MCNC: 2.5 G/DL — LOW (ref 3.3–5)
ALP SERPL-CCNC: 49 U/L — SIGNIFICANT CHANGE UP (ref 40–120)
ALP SERPL-CCNC: 62 U/L — SIGNIFICANT CHANGE UP (ref 40–120)
ALT FLD-CCNC: 11 U/L — SIGNIFICANT CHANGE UP (ref 10–45)
ALT FLD-CCNC: 14 U/L — SIGNIFICANT CHANGE UP (ref 10–45)
ANION GAP SERPL CALC-SCNC: 13 MMOL/L — SIGNIFICANT CHANGE UP (ref 5–17)
ANION GAP SERPL CALC-SCNC: 8 MMOL/L — SIGNIFICANT CHANGE UP (ref 5–17)
AST SERPL-CCNC: 12 U/L — SIGNIFICANT CHANGE UP (ref 10–40)
AST SERPL-CCNC: 17 U/L — SIGNIFICANT CHANGE UP (ref 10–40)
BASE EXCESS BLDV CALC-SCNC: -3.5 MMOL/L — LOW (ref -2–2)
BILIRUB DIRECT SERPL-MCNC: 0.1 MG/DL — SIGNIFICANT CHANGE UP (ref 0–0.3)
BILIRUB INDIRECT FLD-MCNC: 0.2 MG/DL — SIGNIFICANT CHANGE UP (ref 0.2–1)
BILIRUB SERPL-MCNC: 0.3 MG/DL — SIGNIFICANT CHANGE UP (ref 0.2–1.2)
BILIRUB SERPL-MCNC: 0.3 MG/DL — SIGNIFICANT CHANGE UP (ref 0.2–1.2)
BILIRUB SERPL-MCNC: 0.4 MG/DL — SIGNIFICANT CHANGE UP (ref 0.2–1.2)
BUN SERPL-MCNC: 51 MG/DL — HIGH (ref 7–23)
BUN SERPL-MCNC: 63 MG/DL — HIGH (ref 7–23)
CA-I SERPL-SCNC: 1.24 MMOL/L — SIGNIFICANT CHANGE UP (ref 1.15–1.33)
CALCIUM SERPL-MCNC: 7.8 MG/DL — LOW (ref 8.4–10.5)
CALCIUM SERPL-MCNC: 8.6 MG/DL — SIGNIFICANT CHANGE UP (ref 8.4–10.5)
CHLORIDE BLDV-SCNC: 101 MMOL/L — SIGNIFICANT CHANGE UP (ref 96–108)
CHLORIDE SERPL-SCNC: 102 MMOL/L — SIGNIFICANT CHANGE UP (ref 96–108)
CHLORIDE SERPL-SCNC: 103 MMOL/L — SIGNIFICANT CHANGE UP (ref 96–108)
CO2 BLDV-SCNC: 23 MMOL/L — SIGNIFICANT CHANGE UP (ref 22–26)
CO2 SERPL-SCNC: 21 MMOL/L — LOW (ref 22–31)
CO2 SERPL-SCNC: 26 MMOL/L — SIGNIFICANT CHANGE UP (ref 22–31)
CREAT SERPL-MCNC: 1.42 MG/DL — HIGH (ref 0.5–1.3)
CREAT SERPL-MCNC: 1.79 MG/DL — HIGH (ref 0.5–1.3)
EGFR: 26 ML/MIN/1.73M2 — LOW
EGFR: 35 ML/MIN/1.73M2 — LOW
GAS PNL BLDV: 133 MMOL/L — LOW (ref 136–145)
GAS PNL BLDV: SIGNIFICANT CHANGE UP
GAS PNL BLDV: SIGNIFICANT CHANGE UP
GLUCOSE BLDV-MCNC: 225 MG/DL — HIGH (ref 70–99)
GLUCOSE SERPL-MCNC: 185 MG/DL — HIGH (ref 70–99)
GLUCOSE SERPL-MCNC: 246 MG/DL — HIGH (ref 70–99)
HCO3 BLDV-SCNC: 22 MMOL/L — SIGNIFICANT CHANGE UP (ref 22–29)
HCT VFR BLD CALC: 18.4 % — CRITICAL LOW (ref 34.5–45)
HCT VFR BLD CALC: 22.1 % — LOW (ref 34.5–45)
HCT VFR BLD CALC: 22.6 % — LOW (ref 34.5–45)
HCT VFR BLDA CALC: 18 % — CRITICAL LOW (ref 34.5–46.5)
HGB BLD CALC-MCNC: 5.9 G/DL — CRITICAL LOW (ref 11.7–16.1)
HGB BLD-MCNC: 5.8 G/DL — CRITICAL LOW (ref 11.5–15.5)
HGB BLD-MCNC: 7.1 G/DL — LOW (ref 11.5–15.5)
HGB BLD-MCNC: 7.1 G/DL — LOW (ref 11.5–15.5)
LACTATE BLDV-MCNC: 4.9 MMOL/L — CRITICAL HIGH (ref 0.7–2)
LACTATE SERPL-SCNC: 1.2 MMOL/L — SIGNIFICANT CHANGE UP (ref 0.7–2)
LDH SERPL L TO P-CCNC: 180 U/L — SIGNIFICANT CHANGE UP (ref 50–242)
MAGNESIUM SERPL-MCNC: 1.5 MG/DL — LOW (ref 1.6–2.6)
MAGNESIUM SERPL-MCNC: 1.8 MG/DL — SIGNIFICANT CHANGE UP (ref 1.6–2.6)
MCHC RBC-ENTMCNC: 29.8 PG — SIGNIFICANT CHANGE UP (ref 27–34)
MCHC RBC-ENTMCNC: 29.9 PG — SIGNIFICANT CHANGE UP (ref 27–34)
MCHC RBC-ENTMCNC: 30.2 PG — SIGNIFICANT CHANGE UP (ref 27–34)
MCHC RBC-ENTMCNC: 31.4 GM/DL — LOW (ref 32–36)
MCHC RBC-ENTMCNC: 31.5 GM/DL — LOW (ref 32–36)
MCHC RBC-ENTMCNC: 32.1 GM/DL — SIGNIFICANT CHANGE UP (ref 32–36)
MCV RBC AUTO: 94 FL — SIGNIFICANT CHANGE UP (ref 80–100)
MCV RBC AUTO: 94.8 FL — SIGNIFICANT CHANGE UP (ref 80–100)
MCV RBC AUTO: 95 FL — SIGNIFICANT CHANGE UP (ref 80–100)
NRBC # BLD: 0 /100 WBCS — SIGNIFICANT CHANGE UP (ref 0–0)
PCO2 BLDV: 42 MMHG — SIGNIFICANT CHANGE UP (ref 39–42)
PH BLDV: 7.33 — SIGNIFICANT CHANGE UP (ref 7.32–7.43)
PHOSPHATE SERPL-MCNC: 3.3 MG/DL — SIGNIFICANT CHANGE UP (ref 2.5–4.5)
PHOSPHATE SERPL-MCNC: 3.9 MG/DL — SIGNIFICANT CHANGE UP (ref 2.5–4.5)
PLATELET # BLD AUTO: 167 K/UL — SIGNIFICANT CHANGE UP (ref 150–400)
PLATELET # BLD AUTO: 198 K/UL — SIGNIFICANT CHANGE UP (ref 150–400)
PLATELET # BLD AUTO: 203 K/UL — SIGNIFICANT CHANGE UP (ref 150–400)
PO2 BLDV: 78 MMHG — HIGH (ref 25–45)
POTASSIUM BLDV-SCNC: 4.1 MMOL/L — SIGNIFICANT CHANGE UP (ref 3.5–5.1)
POTASSIUM SERPL-MCNC: 4.2 MMOL/L — SIGNIFICANT CHANGE UP (ref 3.5–5.3)
POTASSIUM SERPL-MCNC: 4.3 MMOL/L — SIGNIFICANT CHANGE UP (ref 3.5–5.3)
POTASSIUM SERPL-SCNC: 4.2 MMOL/L — SIGNIFICANT CHANGE UP (ref 3.5–5.3)
POTASSIUM SERPL-SCNC: 4.3 MMOL/L — SIGNIFICANT CHANGE UP (ref 3.5–5.3)
PROT SERPL-MCNC: 4.1 G/DL — LOW (ref 6–8.3)
PROT SERPL-MCNC: 5 G/DL — LOW (ref 6–8.3)
RBC # BLD: 1.94 M/UL — LOW (ref 3.8–5.2)
RBC # BLD: 2.35 M/UL — LOW (ref 3.8–5.2)
RBC # BLD: 2.38 M/UL — LOW (ref 3.8–5.2)
RBC # FLD: 17 % — HIGH (ref 10.3–14.5)
RBC # FLD: 17.4 % — HIGH (ref 10.3–14.5)
RBC # FLD: 17.4 % — HIGH (ref 10.3–14.5)
SAO2 % BLDV: 96.6 % — HIGH (ref 67–88)
SODIUM SERPL-SCNC: 136 MMOL/L — SIGNIFICANT CHANGE UP (ref 135–145)
SODIUM SERPL-SCNC: 137 MMOL/L — SIGNIFICANT CHANGE UP (ref 135–145)
WBC # BLD: 10.43 K/UL — SIGNIFICANT CHANGE UP (ref 3.8–10.5)
WBC # BLD: 11.65 K/UL — HIGH (ref 3.8–10.5)
WBC # BLD: 9.13 K/UL — SIGNIFICANT CHANGE UP (ref 3.8–10.5)
WBC # FLD AUTO: 10.43 K/UL — SIGNIFICANT CHANGE UP (ref 3.8–10.5)
WBC # FLD AUTO: 11.65 K/UL — HIGH (ref 3.8–10.5)
WBC # FLD AUTO: 9.13 K/UL — SIGNIFICANT CHANGE UP (ref 3.8–10.5)

## 2022-06-26 PROCEDURE — 70450 CT HEAD/BRAIN W/O DYE: CPT | Mod: 26

## 2022-06-26 PROCEDURE — 99232 SBSQ HOSP IP/OBS MODERATE 35: CPT | Mod: GC

## 2022-06-26 PROCEDURE — 93010 ELECTROCARDIOGRAM REPORT: CPT

## 2022-06-26 RX ORDER — SODIUM CHLORIDE 9 MG/ML
500 INJECTION, SOLUTION INTRAVENOUS
Refills: 0 | Status: DISCONTINUED | OUTPATIENT
Start: 2022-06-26 | End: 2022-06-28

## 2022-06-26 RX ORDER — MAGNESIUM SULFATE 500 MG/ML
1 VIAL (ML) INJECTION ONCE
Refills: 0 | Status: COMPLETED | OUTPATIENT
Start: 2022-06-26 | End: 2022-06-26

## 2022-06-26 RX ADMIN — Medication 100 GRAM(S): at 21:39

## 2022-06-26 RX ADMIN — Medication 650 MILLIGRAM(S): at 16:41

## 2022-06-26 RX ADMIN — Medication 25 MILLIGRAM(S): at 17:25

## 2022-06-26 RX ADMIN — Medication 1 TABLET(S): at 12:10

## 2022-06-26 RX ADMIN — ROSUVASTATIN CALCIUM 10 MILLIGRAM(S): 5 TABLET ORAL at 21:38

## 2022-06-26 RX ADMIN — Medication 25 MILLIGRAM(S): at 09:42

## 2022-06-26 RX ADMIN — Medication 25 MILLIGRAM(S): at 16:40

## 2022-06-26 RX ADMIN — SODIUM CHLORIDE 75 MILLILITER(S): 9 INJECTION, SOLUTION INTRAVENOUS at 12:09

## 2022-06-26 RX ADMIN — Medication 1 DROP(S): at 06:55

## 2022-06-26 RX ADMIN — Medication 75 MILLIGRAM(S): at 12:10

## 2022-06-26 RX ADMIN — SENNA PLUS 2 TABLET(S): 8.6 TABLET ORAL at 21:38

## 2022-06-26 RX ADMIN — Medication 25 MICROGRAM(S): at 06:56

## 2022-06-26 RX ADMIN — Medication 500 MILLIGRAM(S): at 12:10

## 2022-06-26 RX ADMIN — PANTOPRAZOLE SODIUM 40 MILLIGRAM(S): 20 TABLET, DELAYED RELEASE ORAL at 12:10

## 2022-06-26 RX ADMIN — Medication 1 DROP(S): at 19:00

## 2022-06-26 RX ADMIN — Medication 40 MILLIGRAM(S): at 06:56

## 2022-06-26 NOTE — PROGRESS NOTE ADULT - PROBLEM SELECTOR PLAN 1
- Patient presented with Hg 4s likely s/s GI bleed/diverticular bleed, s/p colonoscopy without source of bleeding  - Patient continues to have Hg in 6s 1-2 days after transfusion, dropped to 5.8 today in setting of GI bleed  - Blood Bank (6/23): 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 - Patient presented with Hg 4s likely s/s GI bleed/diverticular bleed, s/p colonoscopy without source of bleeding  - Patient continues to have Hg in 6s 1-2 days after transfusion, dropped to 5.8 today in setting of GI bleed  - Blood Bank (6/23): 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  - 1u prbc for drop in hb since am. f/u post transfusion cbc, keep hb>7

## 2022-06-26 NOTE — PROGRESS NOTE ADULT - PROBLEM SELECTOR PLAN 3
Currently euvolemic  - restarted lasix  - plan for OP Cardiology f/u regarding standing amiodarone Currently euvolemic  - restarted lasix -> stopped lasix 6/26 as pt appears dry, lethargic, lactate ~5, gentle IVF. f/u tomorrow and restart if appropriate   - plan for OP Cardiology f/u regarding standing amiodarone

## 2022-06-26 NOTE — PROGRESS NOTE ADULT - ATTENDING COMMENTS
89yo F w/ hx HTN, Afib, CHF, HLD, CKD Stage IV nonsmoker, remote hx of asthma presenting with loose stools, lethargy, fatigue, low blood pressure and blood bowel movement found to have a hemoglobin of 4.8 most likely 2/2 to GI bleed from eliquis.  DDx includes diverticulitis vs Gastric ulcer vs. Duodenal ulcer vs. angiodysplasia. 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     #Anemia; likely 2/2 GIB, . heme consulted for + debbie and eluate negative. , likely from prior transfusion, no overt evidence of hemolysis. Monitor CBC, premedication with Tylenol and benadryl for future transfusions. f/u GI recs, off AC, no plan for repeat endoscopy at this time. transfuse to keep hgb>7. 89yo F w/ hx HTN, Afib, CHF, HLD, CKD Stage IV nonsmoker, remote hx of asthma presenting with loose stools, lethargy, fatigue, low blood pressure and blood bowel movement found to have a hemoglobin of 4.8 most likely 2/2 to GI bleed from eliquis.  DDx includes diverticulitis vs Gastric ulcer vs. Duodenal ulcer vs. angiodysplasia. s/p colonoscopy without active bleeding, now with stable Hg, without additional GI intervention, per hematology, no overt evidence of hemolysis    #Anemia; likely 2/2 GIB, . heme consulted for + debbie and eluate negative. , likely from prior transfusion, no overt evidence of hemolysis. Monitor CBC, premedication with Tylenol and benadryl for future transfusions. f/u GI recs, off AC, no plan for repeat endoscopy at this time. transfuse to keep hgb>7.

## 2022-06-26 NOTE — PROGRESS NOTE ADULT - SUBJECTIVE AND OBJECTIVE BOX
Natalia Lyles PGY2  pager [phone] or check resident tab for coverage    Patient is a 89y old  Female who presents with a chief complaint of gi bleed (25 Jun 2022 21:00)      SUBJECTIVE / OVERNIGHT EVENTS:    MEDICATIONS  (STANDING):  artificial tears (preservative free) Ophthalmic Solution 1 Drop(s) Both EYES two times a day  ascorbic acid 500 milliGRAM(s) Oral daily  furosemide    Tablet 40 milliGRAM(s) Oral daily  levothyroxine 25 MICROGram(s) Oral daily  melatonin 3 milliGRAM(s) Oral at bedtime  metoprolol tartrate 25 milliGRAM(s) Oral two times a day  multivitamin 1 Tablet(s) Oral daily  pantoprazole  Injectable 40 milliGRAM(s) IV Push daily  polyethylene glycol 3350 17 Gram(s) Oral two times a day  rosuvastatin 10 milliGRAM(s) Oral at bedtime  senna 2 Tablet(s) Oral at bedtime  venlafaxine XR. 75 milliGRAM(s) Oral daily    MEDICATIONS  (PRN):  acetaminophen     Tablet .. 650 milliGRAM(s) Oral once PRN Mild Pain (1 - 3), Moderate Pain (4 - 6)  bisacodyl 5 milliGRAM(s) Oral every 12 hours PRN Constipation  diphenhydrAMINE 25 milliGRAM(s) Oral daily PRN ppx      CAPILLARY BLOOD GLUCOSE        I&O's Summary    25 Jun 2022 07:01  -  26 Jun 2022 07:00  --------------------------------------------------------  IN: 960 mL / OUT: 1000 mL / NET: -40 mL        Vital Signs Last 24 Hrs  T(C): 36.9 (26 Jun 2022 04:30), Max: 36.9 (26 Jun 2022 04:30)  T(F): 98.4 (26 Jun 2022 04:30), Max: 98.4 (26 Jun 2022 04:30)  HR: 67 (26 Jun 2022 04:30) (64 - 68)  BP: 122/56 (26 Jun 2022 04:30) (99/57 - 138/60)  BP(mean): --  RR: 20 (26 Jun 2022 04:30) (18 - 20)  SpO2: 99% (26 Jun 2022 04:30) (95% - 100%)    PHYSICAL EXAM:  GENERAL: no distress  PSYCH: A&O x3  HEAD: Atraumatic, Normocephalic  NECK: Supple, No JVD  CHEST/LUNG: clear to auscultation bilaterally  HEART: regular rate and rhythm, no murmurs  ABDOMEN: nontender to palpation, no rebound tenderness/guarding  EXTREMITIES: no edema on bilateral LE  NEUROLOGY: no focal neurologic deficit  SKIN: No rashes or lesions    LABS:                        7.1    10.43 )-----------( 198      ( 26 Jun 2022 00:24 )             22.1      06-25    137  |  101  |  66<H>  ----------------------------<  143<H>  4.3   |  26  |  1.93<H>    Ca    8.3<L>      25 Jun 2022 06:47  Phos  3.8     06-25  Mg     1.7     06-25    TPro  x   /  Alb  x   /  TBili  0.2  /  DBili  <0.1  /  AST  x   /  ALT  x   /  AlkPhos  x   06-25              RADIOLOGY & ADDITIONAL TESTS:    Imaging Personally Reviewed:    Consultant(s) Notes Reviewed:      Care Discussed with Consultants/Other Providers:   Natalia Lyles PGY2  pager [phone] or check resident tab for coverage    Patient is a 89y old  Female who presents with a chief complaint of gi bleed (25 Jun 2022 21:00)      SUBJECTIVE / OVERNIGHT EVENTS:\  overnight melena episode  am - pt is lethargic, difficulty keeping eyes open. per son, acute change since yesterday. explained we will be repeating labs, ct head     MEDICATIONS  (STANDING):  artificial tears (preservative free) Ophthalmic Solution 1 Drop(s) Both EYES two times a day  ascorbic acid 500 milliGRAM(s) Oral daily  furosemide    Tablet 40 milliGRAM(s) Oral daily  levothyroxine 25 MICROGram(s) Oral daily  melatonin 3 milliGRAM(s) Oral at bedtime  metoprolol tartrate 25 milliGRAM(s) Oral two times a day  multivitamin 1 Tablet(s) Oral daily  pantoprazole  Injectable 40 milliGRAM(s) IV Push daily  polyethylene glycol 3350 17 Gram(s) Oral two times a day  rosuvastatin 10 milliGRAM(s) Oral at bedtime  senna 2 Tablet(s) Oral at bedtime  venlafaxine XR. 75 milliGRAM(s) Oral daily    MEDICATIONS  (PRN):  acetaminophen     Tablet .. 650 milliGRAM(s) Oral once PRN Mild Pain (1 - 3), Moderate Pain (4 - 6)  bisacodyl 5 milliGRAM(s) Oral every 12 hours PRN Constipation  diphenhydrAMINE 25 milliGRAM(s) Oral daily PRN ppx      CAPILLARY BLOOD GLUCOSE        I&O's Summary    25 Jun 2022 07:01  -  26 Jun 2022 07:00  --------------------------------------------------------  IN: 960 mL / OUT: 1000 mL / NET: -40 mL        Vital Signs Last 24 Hrs  T(C): 36.9 (26 Jun 2022 04:30), Max: 36.9 (26 Jun 2022 04:30)  T(F): 98.4 (26 Jun 2022 04:30), Max: 98.4 (26 Jun 2022 04:30)  HR: 67 (26 Jun 2022 04:30) (64 - 68)  BP: 122/56 (26 Jun 2022 04:30) (99/57 - 138/60)  BP(mean): --  RR: 20 (26 Jun 2022 04:30) (18 - 20)  SpO2: 99% (26 Jun 2022 04:30) (95% - 100%)    PHYSICAL EXAM:  GENERAL: lethargic but arousable, answers questions briefly, depressed affect   PSYCH: A&O x3  CHEST/LUNG: clear to auscultation bilaterally  HEART: regular rate and rhythm, no murmurs  ABDOMEN: nontender to palpation, no rebound tenderness/guarding  EXTREMITIES: no edema on bilateral LE  NEUROLOGY: no focal neurologic deficit  SKIN: No rashes or lesions    LABS:                        7.1    10.43 )-----------( 198      ( 26 Jun 2022 00:24 )             22.1      06-25    137  |  101  |  66<H>  ----------------------------<  143<H>  4.3   |  26  |  1.93<H>    Ca    8.3<L>      25 Jun 2022 06:47  Phos  3.8     06-25  Mg     1.7     06-25    TPro  x   /  Alb  x   /  TBili  0.2  /  DBili  <0.1  /  AST  x   /  ALT  x   /  AlkPhos  x   06-25              RADIOLOGY & ADDITIONAL TESTS:    Imaging Personally Reviewed:    Consultant(s) Notes Reviewed:      Care Discussed with Consultants/Other Providers:

## 2022-06-26 NOTE — PROGRESS NOTE ADULT - PROBLEM SELECTOR PLAN 2
Resolved.   Most likely 2/2 to eliquis initiation and underlying GI pathology. s/p tagged scan with R. sided bleeding, s/p colonoscopy w/ poor prep and no active signs of bleeding. No active bleeding for one week.     Plan:  - changed pantoprazole to IV  - Maintain active type and screen   - Maintain 2 large bore IVs  - Transfuse to hemoglobin <7 --> s/p 7 PRBC transfusions, no bloody BM since colonoscopy  - GI following  - Trend CBC qd Resolved.   Most likely 2/2 to eliquis initiation and underlying GI pathology. s/p tagged scan with R. sided bleeding, s/p colonoscopy w/ poor prep and no active signs of bleeding. No active bleeding for one week.     Plan:  - changed pantoprazole to IV  - Maintain active type and screen   - Maintain 2 large bore IVs  - Transfuse to hemoglobin <7 --> s/p 7 PRBC transfusions, no bloody BM since colonoscopy  - GI following - notifed about hb drop and melena today (6/26) - rec NPO after mn   - Trend CBC qd Resolved.   Most likely 2/2 to eliquis initiation and underlying GI pathology. s/p tagged scan with R. sided bleeding, s/p colonoscopy w/ poor prep and no active signs of bleeding. No active bleeding for one week.     Plan:  - changed pantoprazole to IV  - Maintain active type and screen   - Maintain 2 large bore IVs  - Transfuse to hemoglobin <7 --> s/p 7 PRBC transfusions, no bloody BM since colonoscopy  - GI following - notifed about hb drop and melena today (6/26) - rec NPO after mn for possible scope tomorrow (cancel NPO tomorrow if no planned scope per private GI).    - Trend CBC qd

## 2022-06-27 DIAGNOSIS — N39.0 URINARY TRACT INFECTION, SITE NOT SPECIFIED: ICD-10-CM

## 2022-06-27 LAB
ALBUMIN SERPL ELPH-MCNC: 2.7 G/DL — LOW (ref 3.3–5)
ALP SERPL-CCNC: 62 U/L — SIGNIFICANT CHANGE UP (ref 40–120)
ALT FLD-CCNC: 13 U/L — SIGNIFICANT CHANGE UP (ref 10–45)
ANION GAP SERPL CALC-SCNC: 10 MMOL/L — SIGNIFICANT CHANGE UP (ref 5–17)
APPEARANCE UR: ABNORMAL
AST SERPL-CCNC: 20 U/L — SIGNIFICANT CHANGE UP (ref 10–40)
BACTERIA # UR AUTO: ABNORMAL
BILIRUB SERPL-MCNC: 0.5 MG/DL — SIGNIFICANT CHANGE UP (ref 0.2–1.2)
BILIRUB UR-MCNC: NEGATIVE — SIGNIFICANT CHANGE UP
BUN SERPL-MCNC: 55 MG/DL — HIGH (ref 7–23)
CALCIUM SERPL-MCNC: 8.7 MG/DL — SIGNIFICANT CHANGE UP (ref 8.4–10.5)
CHLORIDE SERPL-SCNC: 105 MMOL/L — SIGNIFICANT CHANGE UP (ref 96–108)
CO2 SERPL-SCNC: 26 MMOL/L — SIGNIFICANT CHANGE UP (ref 22–31)
COLOR SPEC: SIGNIFICANT CHANGE UP
CREAT SERPL-MCNC: 1.64 MG/DL — HIGH (ref 0.5–1.3)
DIFF PNL FLD: ABNORMAL
EGFR: 29 ML/MIN/1.73M2 — LOW
EPI CELLS # UR: 1 /HPF — SIGNIFICANT CHANGE UP
GLUCOSE SERPL-MCNC: 143 MG/DL — HIGH (ref 70–99)
GLUCOSE UR QL: NEGATIVE — SIGNIFICANT CHANGE UP
HAPTOGLOB SERPL-MCNC: 139 MG/DL — SIGNIFICANT CHANGE UP (ref 34–200)
HCT VFR BLD CALC: 21.6 % — LOW (ref 34.5–45)
HCT VFR BLD CALC: 23.9 % — LOW (ref 34.5–45)
HCT VFR BLD CALC: 25.3 % — LOW (ref 34.5–45)
HGB BLD-MCNC: 7.2 G/DL — LOW (ref 11.5–15.5)
HGB BLD-MCNC: 7.7 G/DL — LOW (ref 11.5–15.5)
HGB BLD-MCNC: 8.2 G/DL — LOW (ref 11.5–15.5)
HYALINE CASTS # UR AUTO: 1 /LPF — SIGNIFICANT CHANGE UP (ref 0–2)
KETONES UR-MCNC: NEGATIVE — SIGNIFICANT CHANGE UP
LEUKOCYTE ESTERASE UR-ACNC: ABNORMAL
MAGNESIUM SERPL-MCNC: 2.1 MG/DL — SIGNIFICANT CHANGE UP (ref 1.6–2.6)
MCHC RBC-ENTMCNC: 29.5 PG — SIGNIFICANT CHANGE UP (ref 27–34)
MCHC RBC-ENTMCNC: 29.7 PG — SIGNIFICANT CHANGE UP (ref 27–34)
MCHC RBC-ENTMCNC: 30.5 PG — SIGNIFICANT CHANGE UP (ref 27–34)
MCHC RBC-ENTMCNC: 32.2 GM/DL — SIGNIFICANT CHANGE UP (ref 32–36)
MCHC RBC-ENTMCNC: 32.4 GM/DL — SIGNIFICANT CHANGE UP (ref 32–36)
MCHC RBC-ENTMCNC: 33.3 GM/DL — SIGNIFICANT CHANGE UP (ref 32–36)
MCV RBC AUTO: 91.5 FL — SIGNIFICANT CHANGE UP (ref 80–100)
MCV RBC AUTO: 91.6 FL — SIGNIFICANT CHANGE UP (ref 80–100)
MCV RBC AUTO: 91.7 FL — SIGNIFICANT CHANGE UP (ref 80–100)
NITRITE UR-MCNC: NEGATIVE — SIGNIFICANT CHANGE UP
NRBC # BLD: 0 /100 WBCS — SIGNIFICANT CHANGE UP (ref 0–0)
PH UR: 6 — SIGNIFICANT CHANGE UP (ref 5–8)
PHOSPHATE SERPL-MCNC: 3.6 MG/DL — SIGNIFICANT CHANGE UP (ref 2.5–4.5)
PLATELET # BLD AUTO: 189 K/UL — SIGNIFICANT CHANGE UP (ref 150–400)
PLATELET # BLD AUTO: 214 K/UL — SIGNIFICANT CHANGE UP (ref 150–400)
PLATELET # BLD AUTO: 234 K/UL — SIGNIFICANT CHANGE UP (ref 150–400)
POTASSIUM SERPL-MCNC: 4 MMOL/L — SIGNIFICANT CHANGE UP (ref 3.5–5.3)
POTASSIUM SERPL-SCNC: 4 MMOL/L — SIGNIFICANT CHANGE UP (ref 3.5–5.3)
PROT SERPL-MCNC: 5 G/DL — LOW (ref 6–8.3)
PROT UR-MCNC: SIGNIFICANT CHANGE UP
RBC # BLD: 2.36 M/UL — LOW (ref 3.8–5.2)
RBC # BLD: 2.61 M/UL — LOW (ref 3.8–5.2)
RBC # BLD: 2.76 M/UL — LOW (ref 3.8–5.2)
RBC # BLD: 2.76 M/UL — LOW (ref 3.8–5.2)
RBC # FLD: 16.6 % — HIGH (ref 10.3–14.5)
RBC # FLD: 17.2 % — HIGH (ref 10.3–14.5)
RBC # FLD: 17.2 % — HIGH (ref 10.3–14.5)
RBC CASTS # UR COMP ASSIST: 3 /HPF — SIGNIFICANT CHANGE UP (ref 0–4)
RETICS #: 191.3 K/UL — HIGH (ref 25–125)
RETICS/RBC NFR: 6.9 % — HIGH (ref 0.5–2.5)
SARS-COV-2 RNA SPEC QL NAA+PROBE: SIGNIFICANT CHANGE UP
SODIUM SERPL-SCNC: 141 MMOL/L — SIGNIFICANT CHANGE UP (ref 135–145)
SP GR SPEC: 1.01 — LOW (ref 1.01–1.02)
UROBILINOGEN FLD QL: NEGATIVE — SIGNIFICANT CHANGE UP
WBC # BLD: 9.08 K/UL — SIGNIFICANT CHANGE UP (ref 3.8–10.5)
WBC # BLD: 9.69 K/UL — SIGNIFICANT CHANGE UP (ref 3.8–10.5)
WBC # BLD: 9.92 K/UL — SIGNIFICANT CHANGE UP (ref 3.8–10.5)
WBC # FLD AUTO: 9.08 K/UL — SIGNIFICANT CHANGE UP (ref 3.8–10.5)
WBC # FLD AUTO: 9.69 K/UL — SIGNIFICANT CHANGE UP (ref 3.8–10.5)
WBC # FLD AUTO: 9.92 K/UL — SIGNIFICANT CHANGE UP (ref 3.8–10.5)
WBC UR QL: 71 /HPF — HIGH (ref 0–5)

## 2022-06-27 PROCEDURE — 99233 SBSQ HOSP IP/OBS HIGH 50: CPT

## 2022-06-27 PROCEDURE — 99233 SBSQ HOSP IP/OBS HIGH 50: CPT | Mod: GC

## 2022-06-27 PROCEDURE — 99233 SBSQ HOSP IP/OBS HIGH 50: CPT | Mod: FS

## 2022-06-27 RX ORDER — SOD SULF/SODIUM/NAHCO3/KCL/PEG
4000 SOLUTION, RECONSTITUTED, ORAL ORAL ONCE
Refills: 0 | Status: COMPLETED | OUTPATIENT
Start: 2022-06-27 | End: 2022-06-27

## 2022-06-27 RX ORDER — CIPROFLOXACIN LACTATE 400MG/40ML
200 VIAL (ML) INTRAVENOUS EVERY 24 HOURS
Refills: 0 | Status: DISCONTINUED | OUTPATIENT
Start: 2022-06-28 | End: 2022-07-01

## 2022-06-27 RX ADMIN — Medication 100 MILLIGRAM(S): at 23:00

## 2022-06-27 RX ADMIN — Medication 25 MILLIGRAM(S): at 05:32

## 2022-06-27 RX ADMIN — Medication 500 MILLIGRAM(S): at 09:44

## 2022-06-27 RX ADMIN — Medication 75 MILLIGRAM(S): at 17:43

## 2022-06-27 RX ADMIN — PANTOPRAZOLE SODIUM 40 MILLIGRAM(S): 20 TABLET, DELAYED RELEASE ORAL at 09:44

## 2022-06-27 RX ADMIN — Medication 25 MICROGRAM(S): at 05:32

## 2022-06-27 RX ADMIN — Medication 1 DROP(S): at 05:32

## 2022-06-27 RX ADMIN — ROSUVASTATIN CALCIUM 10 MILLIGRAM(S): 5 TABLET ORAL at 22:47

## 2022-06-27 RX ADMIN — Medication 4000 MILLILITER(S): at 14:18

## 2022-06-27 RX ADMIN — Medication 15 MILLIGRAM(S): at 15:39

## 2022-06-27 RX ADMIN — Medication 1 DROP(S): at 22:47

## 2022-06-27 RX ADMIN — Medication 25 MILLIGRAM(S): at 17:43

## 2022-06-27 RX ADMIN — Medication 1 TABLET(S): at 09:44

## 2022-06-27 NOTE — PROGRESS NOTE ADULT - ASSESSMENT
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 She has a history of HTN, HLD, DM, aortic stenosis, breast cancer s/p lumpectomy and CKD (Dr. sEcobar). She was recently started on Eliquis and beta-blockers for atrial fibrillation. She was hospitalized in March with a UTI complicated by ELISA due to bactrim. She was admitted in May with dyspnea, hypoxemia, gurgling in her chest and leg swelling. She was initially treated with zosyn for a possible right lower lobe pneumonia seen on CXR until further evaluation revealed pulmonary edema with pleural effusions and atelectasis. ECHO -> preserved LVEF - moderate-severe aortic stenosis - moderate diastolic dysfunction - bilateral pleural effusions; CT -> pulmonary edema with small pleural effusions and partial lower lobe compressive atelectasis. She was diuresed and discharged on norvasc/toprol XL/lipitor/eliquis. The patient was diagnosed with COVID on May 22nd and was treated with a course of Paxlovid. She now returns to the ER with shortness of breath and weakness with minimal exertion. She has fatigue, malaise and pallor. She has been noted to have blood in her stools and black colored stools (after eating blueberries). Her daughter measured her blood pressure has 90/60 from a baseline of 120/90. She currently has no shortness of breath or hypoxemia on a 2lpm nasal canula. She has no cough, sputum production, chest congestion or wheeze. No fevers, chills or sweats. She has been found to be guaiac positive. RVP PCR remains positive for COVID. The patient has received Kcentra and 2 units PRBCs for severe anemia (4.8/16/6).     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 and respiratory muscle weakness limiting diaphragmatic excursion and chest wall expansion  4) no evidence of bronchospasm or pulmonary edema/pleural effusions    ELISA is due to intravascular volume depletion    nasal COVID PCR positivity more likely represents shedding of non-viable viral particles than ongoing infection in this immunocompetent patient    6/27 - hemodynamically stable and without tachycardia; ongoing lower GI bleeding with recurrent anemia -> has now received 9 units of PRBCs; episode of "lethargy" over the weekend -> head CT without acute changes; urinary frequency with cloudy urine in the canister; no shortness of breath or hypoxemia on room air; no cough, sputum production, hemoptysis, chest congestion or wheeze; no fevers, chills or sweats; no chest pain/pressure or palpitations; colonoscopy -> poor prep - evidence of recent but not active bleeding - severe diverticulosis      PLAN/RECOMMENDATIONS:    stable oxygenation on room air  no indication for airborne or contact isolation - the patient was first diagnosed with COVID on May 22nd  observe off systemic steroids, antibiotics and pulmonary medications  head of bed elevation greater than 45 degrees at all times  incentive spirometry  will give a dose of levaquin (allergic to cephalosporins) for the untreated pan-sensitive E coli UTI with ongoing evidence of a UTI on repeat U/A - perhaps infection explains her "lethargy"  GI evaluation noted     ongoing LGI bleed     has now received 9 units of PRBCs - bleeding scan positive for active bleeding in the ascending colon -> felt to be at high rish for bowel ischemia and worsening renal function with an interventional radiology procedure -> colonoscopy -> poor prep - evidence of recent but not active bleeding - severe diverticulosis    holding A/C    protonix 40mg daily    senna to soften stools and prevent straining    i believe that more definitive treatment is indicated at this time -> repeat colonoscopy versus embolization  cardiac meds: lopressor/crestor - holding A/C - considering amiodarone for rhythm control  glucose control  DVT prophylaxis - SCDs  venlafaxine XR/melatonin at bedtime  synthroid    Will follow with you. Plan of care discussed with the patient and her daughter at bedside and with GI.    Brayden Benavides MD, [facility]  [phone]  Pulmonary Medicine

## 2022-06-27 NOTE — PROGRESS NOTE ADULT - PROBLEM SELECTOR PLAN 10
DVT/PE ppx: SCD's   Diet: CC/DASH diet  Dispo:  CM/SW has set up home health aid and home PT services. Pending hematology recs then d/c to home. - PPI as above

## 2022-06-27 NOTE — PROGRESS NOTE ADULT - ATTENDING COMMENTS
Patient seen & examined at bedside with medicine team. Received pRBC over the weekend for anemia. There are no signs of hemolysis. No bowel movement. GI is planning for repeat EGD/Colonoscopy given recurrent nature. Possible UTI now with positive UA and on admission cultures grew E. Coli (though UA on admission was not positive and patient without symptoms). Patient given Levofloxacin this morning, will start Ciprofloxacin 200 mg daily (renally dosed) for 4 more days to treat for cystitis. Holding home Lasix, Cr baseline

## 2022-06-27 NOTE — PROGRESS NOTE ADULT - SUBJECTIVE AND OBJECTIVE BOX
INTERVAL HPI/OVERNIGHT EVENTS:  prior events noted - s/p episode of melena yesterday with drop in Hgb to 5.8  s/p PRBCs (#9 this admission)    MEDICATIONS  (STANDING):  artificial tears (preservative free) Ophthalmic Solution 1 Drop(s) Both EYES two times a day  ascorbic acid 500 milliGRAM(s) Oral daily  lactated ringers. 500 milliLiter(s) (75 mL/Hr) IV Continuous <Continuous>  levothyroxine 25 MICROGram(s) Oral daily  melatonin 3 milliGRAM(s) Oral at bedtime  metoprolol tartrate 25 milliGRAM(s) Oral two times a day  multivitamin 1 Tablet(s) Oral daily  pantoprazole  Injectable 40 milliGRAM(s) IV Push daily  polyethylene glycol/electrolyte Solution. 4000 milliLiter(s) Oral once  rosuvastatin 10 milliGRAM(s) Oral at bedtime  senna 2 Tablet(s) Oral at bedtime  venlafaxine XR. 75 milliGRAM(s) Oral daily    MEDICATIONS  (PRN):  bisacodyl 5 milliGRAM(s) Oral every 12 hours PRN Constipation  diphenhydrAMINE 25 milliGRAM(s) Oral daily PRN ppx      Allergies  Keflex (Rash; Hives)      Review of Systems:  General:  No wt loss, fevers, chills, night sweats  CV:  No pain, palpitations, +PAF  Resp:  No dyspnea, cough, tachypnea, wheezing  GI:  see HPI  :  No pain,  +hx kidney stones +incontinence s/p bladder repair  Muscle:  No pain, weakness +arthritis  Neuro:  No focal weakness, tingling, memory problems  Psych:  No fatigue, insomnia, mood problems, depression  Endocrine:  No polyuria, polydypsia, cold/heat intolerance  Heme:  No petechiae, ecchymosis, easy bruisability  Skin:  No rash, tattoos, scars, edema      Vital Signs Last 24 Hrs  T(C): 36.6 (2022 12:57), Max: 36.7 (2022 14:56)  T(F): 97.9 (2022 12:57), Max: 98.1 (2022 20:25)  HR: 66 (2022 12:57) (64 - 69)  BP: 120/54 (2022 12:57) (108/57 - 142/56)  BP(mean): --  RR: 18 (2022 12:57) (17 - 20)  SpO2: 97% (2022 12:57) (96% - 100%)    PHYSICAL EXAM:  Constitutional: NAD, well-developed elderly WF  lying in bed, son at  bedside  Neck: No LAD, supple no JVD  Respiratory: clear b/l no accessory muscle use  Cardiovascular: S1 and S2, RRR,  +murmur  Gastrointestinal: BS+, soft, NT/ND, neg HSM  Rectal: soft melenic stool at anus, no BRB or clots  Extremities: No peripheral edema, neg clubbing, cyanosis  +healing ecchymoses on skin +fragile skin  Vascular: 2+ peripheral pulses  Neurological: A/O x 3, no focal deficits  Psychiatric: Normal mood, normal affect  Skin: No rashes +fragile skin , anicteric +pallor    LABS:                        8.2    9.92  )-----------( 214      ( 2022 07:14 )             25.3   Hemoglobin: 7.2 g/dL (22 @ 23:04)   Hemoglobin: 5.8 g/dL (22 @ 14:38)         141  |  105  |  55<H>  ----------------------------<  143<H>  4.0   |  26  |  1.64<H>    Ca    8.7      2022 07:12  Phos  3.6       Mg     2.1         TPro  5.0<L>  /  Alb  2.7<L>  /  TBili  0.5  /  DBili  x   /  AST  20  /  ALT  13  /  AlkPhos  62  -    Haptoglobin, Serum: 139 mg/dL (22 @ 07:15)     Urinalysis Basic - ( 2022 22:50 )    Color: Light Yellow / Appearance: Slightly Turbid / S.009 / pH: x  Gluc: x / Ketone: Negative  / Bili: Negative / Urobili: Negative   Blood: x / Protein: Trace / Nitrite: Negative   Leuk Esterase: Large / RBC: 3 /hpf / WBC 71 /HPF   Sq Epi: x / Non Sq Epi: 1 /hpf / Bacteria: Many      LIVER FUNCTIONS - ( 2022 07:12 )  Alb: 2.7 g/dL / Pro: 5.0 g/dL / ALK PHOS: 62 U/L / ALT: 13 U/L / AST: 20 U/L / GGT: x             RADIOLOGY & ADDITIONAL TESTS:

## 2022-06-27 NOTE — PROGRESS NOTE ADULT - SUBJECTIVE AND OBJECTIVE BOX
[facility]   [phone]  RENAL FOLLOW UP NOTE  --------------------------------------------------------------------------------  Chief Complaint:    24 hour events/subjective:    patient seen and examined with Dr. Lzu coon  +abhay    PAST HISTORY  --------------------------------------------------------------------------------  No significant changes to PMH, PSH, FHx, SHx, unless otherwise noted    ALLERGIES & MEDICATIONS  --------------------------------------------------------------------------------  Allergies    Keflex (Rash; Hives)    Intolerances      Standing Inpatient Medications  artificial tears (preservative free) Ophthalmic Solution 1 Drop(s) Both EYES two times a day  ascorbic acid 500 milliGRAM(s) Oral daily  lactated ringers. 500 milliLiter(s) IV Continuous <Continuous>  levothyroxine 25 MICROGram(s) Oral daily  melatonin 3 milliGRAM(s) Oral at bedtime  metoprolol tartrate 25 milliGRAM(s) Oral two times a day  multivitamin 1 Tablet(s) Oral daily  pantoprazole  Injectable 40 milliGRAM(s) IV Push daily  polyethylene glycol/electrolyte Solution. 4000 milliLiter(s) Oral once  rosuvastatin 10 milliGRAM(s) Oral at bedtime  senna 2 Tablet(s) Oral at bedtime  venlafaxine XR. 75 milliGRAM(s) Oral daily    PRN Inpatient Medications  bisacodyl 5 milliGRAM(s) Oral every 12 hours PRN  diphenhydrAMINE 25 milliGRAM(s) Oral daily PRN      REVIEW OF SYSTEMS  --------------------------------------------------------------------------------    Gen: denies fevers/chills,  CVS: denies chest pain/palpitations  Resp: denies SOB/Cough  GI: Denies N/V/Abd pain  : Denies dysuria    VITALS/PHYSICAL EXAM  --------------------------------------------------------------------------------  T(C): 36.3 (06-27-22 @ 04:40), Max: 36.7 (06-26-22 @ 14:56)  HR: 64 (06-27-22 @ 04:40) (64 - 69)  BP: 142/56 (06-27-22 @ 04:40) (108/57 - 142/56)  RR: 18 (06-27-22 @ 04:40) (17 - 20)  SpO2: 100% (06-27-22 @ 04:40) (96% - 100%)  Wt(kg): --        06-26-22 @ 07:01  -  06-27-22 @ 07:00  --------------------------------------------------------  IN: 180 mL / OUT: 0 mL / NET: 180 mL      Physical Exam:  	              Gen: Appears comfortable, forgetful   	Pulm: decrease bs,  no rales or ronchi or wheezing  	CV: No JVD. RRR, S1S2; no rub  	Abd: +BS, soft, nontender/nondistended  	: No suprapubic tenderness, urine output appears dark  	UE: Warm, no cyanosis  no clubbing,  no edema  	LE: Warm, no cyanosis  no clubbing, no edema    LABS/STUDIES  --------------------------------------------------------------------------------              8.2    9.92  >-----------<  214      [06-27-22 @ 07:14]              25.3     141  |  105  |  55  ----------------------------<  143      [06-27-22 @ 07:12]  4.0   |  26  |  1.64        Ca     8.7     [06-27-22 @ 07:12]      Mg     2.1     [06-27-22 @ 07:12]      Phos  3.6     [06-27-22 @ 07:12]    TPro  5.0  /  Alb  2.7  /  TBili  0.5  /  DBili  x   /  AST  20  /  ALT  13  /  AlkPhos  62  [06-27-22 @ 07:12]              [06-26-22 @ 08:14]    Creatinine Trend:  SCr 1.64 [06-27 @ 07:12]  SCr 1.42 [06-26 @ 14:38]  SCr 1.79 [06-26 @ 08:14]  SCr 1.93 [06-25 @ 06:47]  SCr 1.86 [06-23 @ 14:45]              Urinalysis - [06-26-22 @ 22:50]      Color Light Yellow / Appearance Slightly Turbid / SG 1.009 / pH 6.0      Gluc Negative / Ketone Negative  / Bili Negative / Urobili Negative       Blood Small / Protein Trace / Leuk Est Large / Nitrite Negative      RBC 3 / WBC 71 / Hyaline 1 / Gran  / Sq Epi  / Non Sq Epi 1 / Bacteria Many

## 2022-06-27 NOTE — PROGRESS NOTE ADULT - ASSESSMENT
89 y/o female admitted with GIB while on A/C. heme consulted for + debbie    1. Debbie positive , eluate negative Blood Bank Result  - Hemolytic parameters including LDH , Hapto, Bilirubin WNL and no overt evidence of hemolysis.   - Patient had recent transfusion which can results in + debbie test  - Ongoing follow up. Anemia secondary to GIB     2. Patient did have identification of [place] ab ( jka)  -With that patients can have delayed hemolytic transfusion reaction. No evidence of hemolysis at this time.   - Monitor closely with future transfusions and continue to trend LDH daily along with other labs.   - Recommend premedication with Tylenol and benadryl for future transfusions     3. GIB: Ongoing GI workup and supportive transfusion  - She notes intermittent episodes of dark stool but no overt bleeding as she had on presentation   - Now off A/C and on PPI.   - management per primary team and GI   - Planned for EGD/colonoscopy 6/28  - Transfuse to keep Hgb >7.     Will continue to follow. Please call if questions     Matt Stearns PA-C  Hematology/Oncology  [facility]   [phone] (office)  [phone] (alt office)  Evenings and weekends please call MD on call or office

## 2022-06-27 NOTE — PROGRESS NOTE ADULT - PROBLEM SELECTOR PLAN 6
Hx of afib:   - metoprolol tartrate 25mg PO BID as pt BP on higher end   - Hold AC given bleeding  - Plan for patient to possibly start Amiodarone as OP, will continue with metoprolol tartrate while hospitalized, will f/u OP Cards SCr baseline: 1.88, currently stable    Monitor SCr  Monitor I&O   Renally dose meds

## 2022-06-27 NOTE — PROGRESS NOTE ADULT - PROBLEM SELECTOR PLAN 7
- Hold home amlodipine 5mg given normotensive and active GI bleeding.  - Likely will hold amlodipine on discharge and have patient f/u Hx of afib:   - metoprolol tartrate 25mg PO BID as pt BP on higher end   - Hold AC given bleeding  - Plan for patient to possibly start Amiodarone as OP, will continue with metoprolol tartrate while hospitalized, will f/u OP Cards

## 2022-06-27 NOTE — PROGRESS NOTE ADULT - ASSESSMENT
89yo F w/ hx HTN, Afib, CHF, AS, HLD, nonsmoker, remote hx of asthma presenting with loose stools, lethargy, fatigue, low blood pressure and bloody bowel movement. Of note, the patient had COVID 2 weeks PTA measured by at home test. RVP was + for COVID by PCR on admission.   In the ED: Labs: Hemoglobin 4.8, Cr 1.93, COVID +   required prbc. pt also had egd. eliquis was dc.       1- CKD IV   2- hx chf   3- anemia/ GI bleed      creatinine is steady  chf is compensated, lasix on hold since 6/26  required multiple prbc for GIB  keep Hb >7, trend hb   GI tentative plan for EGD/colonoscopy tomorrow.  metoprolol 25 mg bid   trend creatinine   d/w GI  d.w pt son at bedside

## 2022-06-27 NOTE — PROGRESS NOTE ADULT - NS PANP COMMENT GEN_ALL_CORE FT
Vicki Mejia MD, FACP, FACG, AGAF  [facility]  [phone]     After hours and weekend coverage GI service : [phone]
Vicki Mejia MD, FACP, FACG, AGAF  [facility]  [phone]     After hours and weekend coverage GI service : [phone]
melena, s/p transfusion , hgb increased post transfusion, probable diverticular bleed,       plan egd/colonoscopy tomorrow         ppi daily           momitor h/h

## 2022-06-27 NOTE — PROGRESS NOTE ADULT - NS PANP OPT1 GEN_ALL_CORE
I attest my time as PA/NP is greater than 50% of the total combined time spent on qualifying patient care activities by the PA/NP and attending.

## 2022-06-27 NOTE — PROGRESS NOTE ADULT - PROBLEM SELECTOR PLAN 3
Currently euvolemic  - restarted lasix -> stopped lasix 6/26 as pt appears dry, lethargic, lactate ~5, gentle IVF. f/u tomorrow and restart if appropriate   - plan for OP Cardiology f/u regarding standing amiodarone Positive UA with E. coli in cx upon admission (UA on admission was not positive and patient without symptoms).  - 6/27: Started Levofloxacin, will start Ciprofloxacin 200 mg daily (renally dosed) for 4 more days to treat for cystitis

## 2022-06-27 NOTE — PROGRESS NOTE ADULT - PROBLEM SELECTOR PLAN 8
Home: No home medications   - CANDE  - CC Diet - Hold home amlodipine 5mg given normotensive and active GI bleeding.  - Likely will hold amlodipine on discharge and have patient f/u

## 2022-06-27 NOTE — PROGRESS NOTE ADULT - SUBJECTIVE AND OBJECTIVE BOX
Patient is a 89y old  Female who presents with a chief complaint of gi bleed (27 Jun 2022 07:28)    Patient seen this morning. Having ongoing melena and unstable H/H. Received 1 unit PRBC's daily this weekend.     MEDICATIONS  (STANDING):  artificial tears (preservative free) Ophthalmic Solution 1 Drop(s) Both EYES two times a day  ascorbic acid 500 milliGRAM(s) Oral daily  lactated ringers. 500 milliLiter(s) (75 mL/Hr) IV Continuous <Continuous>  levothyroxine 25 MICROGram(s) Oral daily  melatonin 3 milliGRAM(s) Oral at bedtime  metoprolol tartrate 25 milliGRAM(s) Oral two times a day  multivitamin 1 Tablet(s) Oral daily  pantoprazole  Injectable 40 milliGRAM(s) IV Push daily  polyethylene glycol/electrolyte Solution. 4000 milliLiter(s) Oral once  rosuvastatin 10 milliGRAM(s) Oral at bedtime  senna 2 Tablet(s) Oral at bedtime  venlafaxine XR. 75 milliGRAM(s) Oral daily    MEDICATIONS  (PRN):  bisacodyl 5 milliGRAM(s) Oral every 12 hours PRN Constipation  diphenhydrAMINE 25 milliGRAM(s) Oral daily PRN ppx    Vital Signs Last 24 Hrs  T(C): 36.3 (27 Jun 2022 04:40), Max: 36.7 (26 Jun 2022 14:56)  T(F): 97.4 (27 Jun 2022 04:40), Max: 98.1 (26 Jun 2022 20:25)  HR: 64 (27 Jun 2022 04:40) (64 - 69)  BP: 142/56 (27 Jun 2022 04:40) (108/57 - 142/56)  BP(mean): --  RR: 18 (27 Jun 2022 04:40) (17 - 20)  SpO2: 100% (27 Jun 2022 04:40) (96% - 100%)    PE  NAD  Awake, alert  Anicteric, MMM  No c/c/e  No rash grossly                            8.2    9.92  )-----------( 214      ( 27 Jun 2022 07:14 )             25.3       06-27    141  |  105  |  55<H>  ----------------------------<  143<H>  4.0   |  26  |  1.64<H>    Ca    8.7      27 Jun 2022 07:12  Phos  3.6     06-27  Mg     2.1     06-27    TPro  5.0<L>  /  Alb  2.7<L>  /  TBili  0.5  /  DBili  x   /  AST  20  /  ALT  13  /  AlkPhos  62  06-27

## 2022-06-27 NOTE — PROGRESS NOTE ADULT - SUBJECTIVE AND OBJECTIVE BOX
PROGRESS NOTE:    Raquel Vegas MD  Internal Medicine PGY-2      Patient is a 89y old  Female who presents with a chief complaint of gi bleed (2022 07:50)      SUBJECTIVE / OVERNIGHT EVENTS: No acute overnight events. Pt seen and examined. Denies fevers, chills, CP, SOB, Abdominal pain, N/V, Constipation, Diarrhea      MEDICATIONS  (STANDING):  artificial tears (preservative free) Ophthalmic Solution 1 Drop(s) Both EYES two times a day  ascorbic acid 500 milliGRAM(s) Oral daily  lactated ringers. 500 milliLiter(s) (75 mL/Hr) IV Continuous <Continuous>  levothyroxine 25 MICROGram(s) Oral daily  melatonin 3 milliGRAM(s) Oral at bedtime  metoprolol tartrate 25 milliGRAM(s) Oral two times a day  multivitamin 1 Tablet(s) Oral daily  pantoprazole  Injectable 40 milliGRAM(s) IV Push daily  rosuvastatin 10 milliGRAM(s) Oral at bedtime  senna 2 Tablet(s) Oral at bedtime  venlafaxine XR. 75 milliGRAM(s) Oral daily    MEDICATIONS  (PRN):  bisacodyl 5 milliGRAM(s) Oral every 12 hours PRN Constipation  diphenhydrAMINE 25 milliGRAM(s) Oral daily PRN ppx      I&O's Summary    2022 07:01  -  2022 07:00  --------------------------------------------------------  IN: 960 mL / OUT: 1000 mL / NET: -40 mL    2022 07:01  -  2022 06:46  --------------------------------------------------------  IN: 180 mL / OUT: 0 mL / NET: 180 mL        Vital Signs Last 24 Hrs  T(C): 36.3 (2022 04:40), Max: 37.1 (2022 09:33)  T(F): 97.4 (2022 04:40), Max: 98.7 (2022 09:33)  HR: 64 (2022 04:40) (64 - 73)  BP: 142/56 (2022 04:40) (106/58 - 142/56)  BP(mean): --  RR: 18 (2022 04:40) (17 - 20)  SpO2: 100% (2022 04:40) (96% - 100%)    =================PHYSICAL EXAM=================    PHYSICAL EXAM:  GENERAL: NAD, lying in bed comfortably  HEAD:  Atraumatic, Normocephalic  EYES: EOMI, PERRLA, conjunctiva and sclera clear  ENT: Moist mucous membranes  NECK: Supple, No JVD  CHEST/LUNG: Clear to auscultation bilaterally; No rales, rhonchi, wheezing, or rubs. Unlabored respirations  HEART: Regular rate and rhythm; No murmurs, rubs, or gallops  ABDOMEN: Bowel sounds present; Soft, Nontender, Nondistended. No hepatomegally  EXTREMITIES:  2+ Peripheral Pulses, brisk capillary refill. No clubbing, cyanosis, or edema  NERVOUS SYSTEM:  Alert & Oriented X3, speech clear. No deficits   MSK: FROM all 4 extremities, full and equal strength  SKIN: No rashes or lesions    =================================================    LABS:                        7.2    9.08  )-----------( 189      ( 2022 23:04 )             21.6     Auto Eosinophil # x     / Auto Eosinophil % x     / Auto Neutrophil # x     / Auto Neutrophil % x     / BANDS % x                            5.8    9.13  )-----------( 167      ( 2022 14:38 )             18.4     Auto Eosinophil # x     / Auto Eosinophil % x     / Auto Neutrophil # x     / Auto Neutrophil % x     / BANDS % x                            7.1    11.65 )-----------( 203      ( 2022 07:01 )             22.6     Auto Eosinophil # x     / Auto Eosinophil % x     / Auto Neutrophil # x     / Auto Neutrophil % x     / BANDS % x            136  |  102  |  51<H>  ----------------------------<  246<H>  4.3   |  21<L>  |  1.42<H>      137  |  103  |  63<H>  ----------------------------<  185<H>  4.2   |  26  |  1.79<H>      137  |  101  |  66<H>  ----------------------------<  143<H>  4.3   |  26  |  1.93<H>    Ca    7.8<L>      2022 14:38  Mg     1.5       Phos  3.3       TPro  4.1<L>  /  Alb  2.1<L>  /  TBili  0.3  /  DBili  x   /  AST  17  /  ALT  11  /  AlkPhos  49    TPro  5.0<L>  /  Alb  2.5<L>  /  TBili  0.4  /  DBili  x   /  AST  12  /  ALT  14  /  AlkPhos  62    TPro  x   /  Alb  x   /  TBili  0.3  /  DBili  0.1  /  AST  x   /  ALT  x   /  AlkPhos  x             Urinalysis Basic - ( 2022 22:50 )    Color: Light Yellow / Appearance: Slightly Turbid / S.009 / pH: x  Gluc: x / Ketone: Negative  / Bili: Negative / Urobili: Negative   Blood: x / Protein: Trace / Nitrite: Negative   Leuk Esterase: Large / RBC: 3 /hpf / WBC 71 /HPF   Sq Epi: x / Non Sq Epi: 1 /hpf / Bacteria: Many      Lactate, Blood: 1.2 mmol/L ( @ 23:04)        RADIOLOGY & ADDITIONAL TESTS:    Imaging Personally Reviewed:    Consultant(s) Notes Reviewed:      Care Discussed with Consultants/Other Providers:

## 2022-06-27 NOTE — PROGRESS NOTE ADULT - PROBLEM SELECTOR PLAN 5
SCr baseline: 1.88, currently stable    Monitor SCr  Monitor I&O   Renally dose meds Known hx of aortic stenosis.  - 05/08: TTE EF 67%, AS moderate-severe systolic dysfunction, Moderate to severe AS velocity 70cm Peak, area 1cmPeak gradient 41     Plan:   - No acute issues   - Monitor fluid status closely  - daily weights, I/O

## 2022-06-27 NOTE — PROGRESS NOTE ADULT - ASSESSMENT
89yo F w/ hx HTN, Afib (recent dx 4/2022), CHF, HLD, nonsmoker, remote hx of asthma presenting with loose stools, lethargy, fatigue, low blood pressure and blood bowel movement found to have a hemoglobin of 4.8 most likely 2/2 to GI bleed- likely diverticular bleed given history/presentation    recent COVID + 5/20 s/p Paxlovid rx; COVID PCR + on admission  -continue off contact isolation per Infection Control Dept    Acute GI blood loss anemia; likely 2/2  diverticular bleeding given history/presentation.   +NM GI bleed scan (proximal AC)  IR input appreciated - increased risk of renal injury with IV contrast load required for angio  Recurrent/ongoing rectal bleeding with continued transfusion requirement therefore underwent Colonoscopy 6/17/22 6/17/22 COLON poor prep and "old" blood in colon; no active bleeding encountered, +diverticulosis - primarily Left sided    s/p 9 units PRBCs admission ->current  No evidence of active/brisk GI bleeding   BM 6/22 (after suppository, previously last BM 6/17 with prep)  small episode BRB after BM, self-limited ?hemorrhoidal given need for supp/constipation.   appropriate Hgb response to PRBCs 6/22 6.9 ->-> 8.1 (with 1 unit PRBCs)  6/24 recurrent drop in Hgb without overt/active bleeding ?component of hemolysis (known AS and Anti- JKA+)  6/26/22 melena with Hgb drop to 5.8    Discussed with family who wishes to proceed with EGD and want repeat colonoscopy - ?UGI source vs possible recurrent (suspected) diverticular bleeding  -clear liquids and bowel prep today  -NPO after MN for EGD and Colonoscopy in AM  -check COVID PCR (need withinm= 72 hours of procedure)    +Anti-JKA   -Hematology following; no e/o hemolysis at this time  -Goal Hgb>7    Discussed with pt and family at bedside  Discussed with House staff       Marek Cormier PA-C    [facility]  [phone]  After hours and weekend coverage [phone]

## 2022-06-27 NOTE — PROGRESS NOTE ADULT - SUBJECTIVE AND OBJECTIVE BOX
PROGRESS NOTE:    Lelo Burgess MD  Internal Medicine PGY-1      Patient is a 89y old  Female who presents with a chief complaint of gi bleed (2022 07:50)      SUBJECTIVE / OVERNIGHT EVENTS: No acute overnight events. Pt seen and examined. Denies fevers, chills, CP, SOB, Abdominal pain, N/V, Constipation, Diarrhea      MEDICATIONS  (STANDING):  artificial tears (preservative free) Ophthalmic Solution 1 Drop(s) Both EYES two times a day  ascorbic acid 500 milliGRAM(s) Oral daily  lactated ringers. 500 milliLiter(s) (75 mL/Hr) IV Continuous <Continuous>  levothyroxine 25 MICROGram(s) Oral daily  melatonin 3 milliGRAM(s) Oral at bedtime  metoprolol tartrate 25 milliGRAM(s) Oral two times a day  multivitamin 1 Tablet(s) Oral daily  pantoprazole  Injectable 40 milliGRAM(s) IV Push daily  rosuvastatin 10 milliGRAM(s) Oral at bedtime  senna 2 Tablet(s) Oral at bedtime  venlafaxine XR. 75 milliGRAM(s) Oral daily    MEDICATIONS  (PRN):  bisacodyl 5 milliGRAM(s) Oral every 12 hours PRN Constipation  diphenhydrAMINE 25 milliGRAM(s) Oral daily PRN ppx      I&O's Summary    2022 07:01  -  2022 07:00  --------------------------------------------------------  IN: 960 mL / OUT: 1000 mL / NET: -40 mL    2022 07:01  -  2022 06:59  --------------------------------------------------------  IN: 180 mL / OUT: 0 mL / NET: 180 mL        Vital Signs Last 24 Hrs  T(C): 36.3 (2022 04:40), Max: 37.1 (2022 09:33)  T(F): 97.4 (2022 04:40), Max: 98.7 (2022 09:33)  HR: 64 (2022 04:40) (64 - 73)  BP: 142/56 (2022 04:40) (106/58 - 142/56)  BP(mean): --  RR: 18 (2022 04:40) (17 - 20)  SpO2: 100% (2022 04:40) (96% - 100%)    =================PHYSICAL EXAM=================    PHYSICAL EXAM:  GENERAL: NAD, lying in bed comfortably  HEAD:  Atraumatic, Normocephalic  EYES: EOMI, PERRLA, conjunctiva and sclera clear  ENT: Moist mucous membranes  NECK: Supple, No JVD  CHEST/LUNG: Clear to auscultation bilaterally; No rales, rhonchi, wheezing, or rubs. Unlabored respirations  HEART: Regular rate and rhythm; No murmurs, rubs, or gallops  ABDOMEN: Bowel sounds present; Soft, Nontender, Nondistended. No hepatomegally  EXTREMITIES:  2+ Peripheral Pulses, brisk capillary refill. No clubbing, cyanosis, or edema  NERVOUS SYSTEM:  Alert & Oriented X3, speech clear. No deficits   MSK: FROM all 4 extremities, full and equal strength  SKIN: No rashes or lesions    =================================================    LABS:                        7.2    9.08  )-----------( 189      ( 2022 23:04 )             21.6     Auto Eosinophil # x     / Auto Eosinophil % x     / Auto Neutrophil # x     / Auto Neutrophil % x     / BANDS % x                            5.8    9.13  )-----------( 167      ( 2022 14:38 )             18.4     Auto Eosinophil # x     / Auto Eosinophil % x     / Auto Neutrophil # x     / Auto Neutrophil % x     / BANDS % x                            7.1    11.65 )-----------( 203      ( 2022 07:01 )             22.6     Auto Eosinophil # x     / Auto Eosinophil % x     / Auto Neutrophil # x     / Auto Neutrophil % x     / BANDS % x            136  |  102  |  51<H>  ----------------------------<  246<H>  4.3   |  21<L>  |  1.42<H>      137  |  103  |  63<H>  ----------------------------<  185<H>  4.2   |  26  |  1.79<H>    Ca    7.8<L>      2022 14:38  Mg     1.5       Phos  3.3       TPro  4.1<L>  /  Alb  2.1<L>  /  TBili  0.3  /  DBili  x   /  AST  17  /  ALT  11  /  AlkPhos  49    TPro  5.0<L>  /  Alb  2.5<L>  /  TBili  0.4  /  DBili  x   /  AST  12  /  ALT  14  /  AlkPhos  62    TPro  x   /  Alb  x   /  TBili  0.3  /  DBili  0.1  /  AST  x   /  ALT  x   /  AlkPhos  x             Urinalysis Basic - ( 2022 22:50 )    Color: Light Yellow / Appearance: Slightly Turbid / S.009 / pH: x  Gluc: x / Ketone: Negative  / Bili: Negative / Urobili: Negative   Blood: x / Protein: Trace / Nitrite: Negative   Leuk Esterase: Large / RBC: 3 /hpf / WBC 71 /HPF   Sq Epi: x / Non Sq Epi: 1 /hpf / Bacteria: Many      Lactate, Blood: 1.2 mmol/L ( @ 23:04)        RADIOLOGY & ADDITIONAL TESTS:    Imaging Personally Reviewed:    Consultant(s) Notes Reviewed:      Care Discussed with Consultants/Other Providers:   PROGRESS NOTE:    Lelo Burgess MD  Internal Medicine PGY-1      Patient is a 89y old  Female who presents with a chief complaint of gi bleed (2022 07:50)      SUBJECTIVE / OVERNIGHT EVENTS: No acute overnight events. Pt seen and examined. Per son, patient was disoriented last night and has been sleepy. Patient reports fatigue but denies dizziness, HA or pain.      MEDICATIONS  (STANDING):  artificial tears (preservative free) Ophthalmic Solution 1 Drop(s) Both EYES two times a day  ascorbic acid 500 milliGRAM(s) Oral daily  lactated ringers. 500 milliLiter(s) (75 mL/Hr) IV Continuous <Continuous>  levothyroxine 25 MICROGram(s) Oral daily  melatonin 3 milliGRAM(s) Oral at bedtime  metoprolol tartrate 25 milliGRAM(s) Oral two times a day  multivitamin 1 Tablet(s) Oral daily  pantoprazole  Injectable 40 milliGRAM(s) IV Push daily  rosuvastatin 10 milliGRAM(s) Oral at bedtime  senna 2 Tablet(s) Oral at bedtime  venlafaxine XR. 75 milliGRAM(s) Oral daily    MEDICATIONS  (PRN):  bisacodyl 5 milliGRAM(s) Oral every 12 hours PRN Constipation  diphenhydrAMINE 25 milliGRAM(s) Oral daily PRN ppx      I&O's Summary    2022 07:01  -  2022 07:00  --------------------------------------------------------  IN: 960 mL / OUT: 1000 mL / NET: -40 mL    2022 07:01  -  2022 06:59  --------------------------------------------------------  IN: 180 mL / OUT: 0 mL / NET: 180 mL        Vital Signs Last 24 Hrs  T(C): 36.3 (2022 04:40), Max: 37.1 (2022 09:33)  T(F): 97.4 (2022 04:40), Max: 98.7 (2022 09:33)  HR: 64 (2022 04:40) (64 - 73)  BP: 142/56 (2022 04:40) (106/58 - 142/56)  BP(mean): --  RR: 18 (2022 04:40) (17 - 20)  SpO2: 100% (2022 04:40) (96% - 100%)    =================PHYSICAL EXAM=================    PHYSICAL EXAM:  GENERAL: lethargic but arousable, answers questions briefly, depressed affect   PSYCH: A&O x2  CHEST/LUNG: clear to auscultation bilaterally  HEART: regular rate and rhythm, no murmurs  ABDOMEN: nontender to palpation, no rebound tenderness/guarding  EXTREMITIES: no edema on bilateral LE  NEUROLOGY: no focal neurologic deficit  SKIN: No rashes or lesions    =================================================    LABS:                        7.2    9.08  )-----------( 189      ( 2022 23:04 )             21.6     Auto Eosinophil # x     / Auto Eosinophil % x     / Auto Neutrophil # x     / Auto Neutrophil % x     / BANDS % x                            5.8    9.13  )-----------( 167      ( 2022 14:38 )             18.4     Auto Eosinophil # x     / Auto Eosinophil % x     / Auto Neutrophil # x     / Auto Neutrophil % x     / BANDS % x                            7.1    11.65 )-----------( 203      ( 2022 07:01 )             22.6     Auto Eosinophil # x     / Auto Eosinophil % x     / Auto Neutrophil # x     / Auto Neutrophil % x     / BANDS % x        06    136  |  102  |  51<H>  ----------------------------<  246<H>  4.3   |  21<L>  |  1.42<H>      137  |  103  |  63<H>  ----------------------------<  185<H>  4.2   |  26  |  1.79<H>    Ca    7.8<L>      2022 14:38  Mg     1.5       Phos  3.3       TPro  4.1<L>  /  Alb  2.1<L>  /  TBili  0.3  /  DBili  x   /  AST  17  /  ALT  11  /  AlkPhos  49    TPro  5.0<L>  /  Alb  2.5<L>  /  TBili  0.4  /  DBili  x   /  AST  12  /  ALT  14  /  AlkPhos  62    TPro  x   /  Alb  x   /  TBili  0.3  /  DBili  0.1  /  AST  x   /  ALT  x   /  AlkPhos  x             Urinalysis Basic - ( 2022 22:50 )    Color: Light Yellow / Appearance: Slightly Turbid / S.009 / pH: x  Gluc: x / Ketone: Negative  / Bili: Negative / Urobili: Negative   Blood: x / Protein: Trace / Nitrite: Negative   Leuk Esterase: Large / RBC: 3 /hpf / WBC 71 /HPF   Sq Epi: x / Non Sq Epi: 1 /hpf / Bacteria: Many      Lactate, Blood: 1.2 mmol/L ( @ 23:04)        RADIOLOGY & ADDITIONAL TESTS:    Imaging Personally Reviewed:    Consultant(s) Notes Reviewed:      Care Discussed with Consultants/Other Providers:

## 2022-06-27 NOTE — PROGRESS NOTE ADULT - SUBJECTIVE AND OBJECTIVE BOX
HPI:  Patient is a 89yo F w/ hx HTN, Afib, CHF, AS, HLD, nonsmoker, remote hx of asthma presenting with loose stools, lethargy, fatigue, low blood pressure and bloody bowel movement. The patient started Eliquis at the end of April on 4/2022. The daughter noted on Friday that the patient developed loose stools that were normal in color. The patient subsequently was developing progressive fatigue, lethargy and decreased PO intake. Her daughter noted that her stools were a maroon color. On 6/12, the daughter noticed that the patient had low blood pressures SBP of 90s down from baseline of 120s. She checked her BM that morning and noted blood with blood clots in the toilet. At this time she decided to seek medical care in the ED for further evaluation.     Of note, the patient had COVID 2 weeks ago measured by at home test. RVP was + for COVID by PCR on admission.     In the ED:   Vitals: Temp 97.7, HR 60, /55, RR 20, O2 saturation 100%  Labs: Hemoglobin 4.8, Cr 1.93, COVID +   Imaging:  Cxr No acute pathology  Interventions: Given Kcentra, 2 units of pRBCs, Pantoprazole 80mg    (13 Jun 2022 06:24)    Review Of Systems:     No chest pain, shortness of breath, or palpitations            Medications:  artificial tears (preservative free) Ophthalmic Solution 1 Drop(s) Both EYES two times a day  ascorbic acid 500 milliGRAM(s) Oral daily  bisacodyl 5 milliGRAM(s) Oral every 12 hours PRN  diphenhydrAMINE 25 milliGRAM(s) Oral daily PRN  lactated ringers. 500 milliLiter(s) IV Continuous <Continuous>  levoFLOXacin IVPB 500 milliGRAM(s) IV Intermittent once  levothyroxine 25 MICROGram(s) Oral daily  melatonin 3 milliGRAM(s) Oral at bedtime  metoprolol tartrate 25 milliGRAM(s) Oral two times a day  multivitamin 1 Tablet(s) Oral daily  pantoprazole  Injectable 40 milliGRAM(s) IV Push daily  rosuvastatin 10 milliGRAM(s) Oral at bedtime  senna 2 Tablet(s) Oral at bedtime  venlafaxine XR. 75 milliGRAM(s) Oral daily    PAST MEDICAL & SURGICAL HISTORY:  HTN (hypertension)      Diabetes mellitus type 2, noninsulin dependent      Diverticulitis      Hyperlipidemia      GERD (gastroesophageal reflux disease)      Glaucoma      Breast cancer      Urinary incontinence      Renal calculus or stone      Arthritis      Heart murmur      Renal calculi  s/p stone extraction 57 yrs ago      S/P lumpectomy of breast  right breast with node removal      S/P bladder repair  Interstim device placement 2010        Vitals:  T(C): 36.3 (06-27-22 @ 04:40), Max: 37.1 (06-26-22 @ 09:33)  HR: 64 (06-27-22 @ 04:40) (64 - 73)  BP: 142/56 (06-27-22 @ 04:40) (106/58 - 142/56)  BP(mean): --  RR: 18 (06-27-22 @ 04:40) (17 - 20)  SpO2: 100% (06-27-22 @ 04:40) (96% - 100%)  Wt(kg): --  Daily     Daily   I&O's Summary    26 Jun 2022 07:01  -  27 Jun 2022 07:00  --------------------------------------------------------  IN: 180 mL / OUT: 0 mL / NET: 180 mL        Physical Exam:  Appearance: No acute distress; well appearing  Eyes: PERRL, EOMI, pink conjunctiva  HENT: Normal oral mucosa  Cardiovascular: RRR, S1, S2, 3/6 holosystolc murmur, ; no edema; no JVD  Respiratory: Clear to auscultation bilaterally  Gastrointestinal: soft, non-tender, non-distended with normal bowel sounds  Musculoskeletal: No clubbing; no joint deformity   Neurologic: Non-focal  Lymphatic: No lymphadenopathy  Psychiatry: AAOx3, mood & affect appropriate  Skin: No rashes, ecchymoses, or cyanosis                          7.2    9.08  )-----------( 189      ( 26 Jun 2022 23:04 )             21.6     06-26    136  |  102  |  51<H>  ----------------------------<  246<H>  4.3   |  21<L>  |  1.42<H>    Ca    7.8<L>      26 Jun 2022 14:38  Phos  3.3     06-26  Mg     1.5     06-26    TPro  4.1<L>  /  Alb  2.1<L>  /  TBili  0.3  /  DBili  x   /  AST  17  /  ALT  11  /  AlkPhos  49  06-26                  ECG:sinus brdy    Echo:    Stress Testing:     Cath:    Imaging:    Interpretation of Telemetry:

## 2022-06-27 NOTE — PROGRESS NOTE ADULT - SUBJECTIVE AND OBJECTIVE BOX
[facility] - PROGRESS NOTE    CHIEF COMPLAINT: COVID-19 positive nasal PCR; dyspnea; asthma; bilateral pleural effusions; atelectasis; hypotension; anorexia; fatigue; hematochezia; anemia    INTERVAL HISTORY: hemodynamically stable and without tachycardia; ongoing lower GI bleeding with recurrent anemia -> has now received 9 units of PRBCs; episode of "lethargy" over the weekend -> head CT without acute changes; urinary frequency with cloudy urine in the canister; no shortness of breath or hypoxemia on room air; no cough, sputum production, hemoptysis, chest congestion or wheeze; no fevers, chills or sweats; no chest pain/pressure or palpitations; colonoscopy -> poor prep - evidence of recent but not active bleeding - severe diverticulosis    REVIEW OF SYSTEMS:  Constitutional: As per interval history  HEENT: Within normal limits  CV: As per interval history  Resp: As per interval history  GI: lower GI bleed likely due to diverticulosis  : Within normal limits  Musculoskeletal: Within normal limits  Skin: Within normal limits  Neurological: Within normal limits  Psychiatric: Within normal limits  Endocrine: Within normal limits  Hematologic/Lymphatic: anemia  Allergic/Immunologic: Within normal limits    MEDICATIONS:     Pulmonary "    Anti-microbials:    Cardiovascular:  metoprolol tartrate 25 milliGRAM(s) Oral two times a day    Other:  artificial tears (preservative free) Ophthalmic Solution 1 Drop(s) Both EYES two times a day  ascorbic acid 500 milliGRAM(s) Oral daily  lactated ringers. 500 milliLiter(s) IV Continuous <Continuous>  levothyroxine 25 MICROGram(s) Oral daily  melatonin 3 milliGRAM(s) Oral at bedtime  multivitamin 1 Tablet(s) Oral daily  pantoprazole  Injectable 40 milliGRAM(s) IV Push daily  rosuvastatin 10 milliGRAM(s) Oral at bedtime  senna 2 Tablet(s) Oral at bedtime  venlafaxine XR. 75 milliGRAM(s) Oral daily    MEDICATIONS  (PRN):  bisacodyl 5 milliGRAM(s) Oral every 12 hours PRN Constipation  diphenhydrAMINE 25 milliGRAM(s) Oral daily PRN ppx      OBJECTIVE:    PHYSICAL EXAM:       ICU Vital Signs Last 24 Hrs  T(C): 36.3 (2022 04:40), Max: 37.1 (2022 09:33)  T(F): 97.4 (2022 04:40), Max: 98.7 (2022 09:33)  HR: 64 (2022 04:40) (64 - 73)  BP: 142/56 (2022 04:40) (106/58 - 142/56)  BP(mean): --  ABP: --  ABP(mean): --  RR: 18 (2022 04:40) (17 - 20)  SpO2: 100% (2022 04:40) (96% - 100%) on room air      General: Awake. Alert. Cooperative. No distress. Appears stated age. In bed.  HEENT:  Atraumatic. Normocephalic. Anicteric. Normal oral mucosa. PERRL. EOMI. Pale conjunctiva.  Neck: Supple. Trachea midline. Thyroid without enlargement/tenderness/nodules. No carotid bruit. No JVD.	  Cardiovascular: Regular rate and rhythm. S1 S2 normal. III/VI systolic murmur  Respiratory: Respirations unlabored. Clear to auscultation and percussion bilaterally. Kyphosis.  Abdomen: Soft. Non-tender. Non-distended. No organomegaly. No masses. Normal bowel sounds.  Extremities: Warm to touch. No clubbing or cyanosis. No pedal edema.   Pulses: 2+ peripheral pulses all extremities.	  Skin: Normal skin color. No rashes or lesions. No ecchymoses. No cyanosis. Warm to touch.  Lymph Nodes: Cervical, supraclavicular and axillary nodes normal  Neurological: Motor and sensory examination equal and normal. A and O x 3  Psychiatry: Appropriate mood and affect.      LABS:                          7.2    9.08  )-----------( 189      ( 2022 23:04 )             21.6     CBC    WBC  9.08 <==, 9.13 <==, 11.65 <==, 10.43 <==, 11.23 <==, 8.26 <==, 8.34 <==    Hemoglobin  7.2 <<==, 5.8 <<==, 7.1 <<==, 7.1 <<==, 6.5 <<==, 5.8 <<==, 6.8 <<==    Hematocrit  21.6 <==, 18.4 <==, 22.6 <==, 22.1 <==, 21.3 <==, 18.5 <==, 21.7 <==    Platelets  189 <==, 167 <==, 203 <==, 198 <==, 283 <==, 195 <==, 199 <==      136  |  102  |  51<H>  ----------------------------<  246<H>    06-26  4.3   |  21<L>  |  1.42<H>      LYTES    sodium  136 <==, 137 <==, 137 <==, 134 <==, 139 <==, 140 <==    potassium   4.3 <==, 4.2 <==, 4.3 <==, 5.1 <==, 4.3 <==, 4.2 <==    chloride  102 <==, 103 <==, 101 <==, 99 <==, 110 <==, 111 <==    carbon dioxide  21 <==, 26 <==, 26 <==, 23 <==, 21 <==, 19 <==    =============================================================================================  RENAL FUNCTION:    Creatinine:   1.42  <<==, 1.79  <<==, 1.93  <<==, 1.86  <<==, 1.67  <<==, 1.65  <<==    BUN:   51 <==, 63 <==, 66 <==, 44 <==, 49 <==, 44 <==    ============================================================================================    calcium   7.8 <==, 8.6 <==, 8.3 <==, 8.8 <==, 8.5 <==, 8.4 <==    phos   3.3 <==, 3.9 <==, 3.8 <==, 3.6 <==    mag   1.5 <==, 1.8 <==, 1.7 <==, 1.7 <==    ============================================================================================  LFTs    AST:   17 <== , 12 <== , 19 <== , 27 <== , 19 <==     ALT:  11  <== , 14  <== , 13  <== , 20  <== , 16  <==     AP:  49  <=, 62  <=, 58  <=, 69  <=, 58  <=    Bili:  0.3  <=, 0.4  <=, 0.3  <=, 0.2  <=, 0.2  <=, 0.6  <=, 0.6  <=    Venous Blood Gas:   @ 14:44  7.33/42/78/22/96.6  VBG Lactate: 4.9    CARDIAC MARKERS ( 2022 14:45 )  CPK 39 U/L /CKMB x     /CKMB Units x        troponin x        CARDIAC MARKERS ( 2022 14:45 )  CPK 39 U/L /CKMB x     /CKMB Units x        troponin x        < from: Transthoracic Echocardiogram (22 @ 14:37) >    Patient name: KOREY DIAZ  YOB: 1929   Age: 89 (F)   MR#: [id number]  Study Date: 2022  Location: [address] [place], [place] [id number]: Gadiel Schwartz [facility]  Study quality: Technically fair  Referring Physician: Teo Monson MD  Blood Pressure: 165/72 mmHg  Height: 158 cm  Weight: 53 kg  BSA: 1.5 m2  Heart Rate: 70 mmHg  ------------------------------------------------------------------------  PROCEDURE: Transthoracic echocardiogram with 2-D, M-Mode  and complete spectral and color flow Doppler.  INDICATION: Cardiac murmur, unspecified (R01.1)  ------------------------------------------------------------------------  Dimensions:    Normal Values:  LA:     3.6    2.0 - 4.0 cm  Ao:     3.0    2.0 - 3.8 cm  SEPTUM: 1.0    0.6 -1.2 cm  PWT:    1.0    0.6 - 1.1 cm  LVIDd:  4.0    3.0 - 5.6 cm  LVIDs:  2.8    1.8 - 4.0 cm  Derived variables:  LVMI: 84 g/m2  RWT: 0.50  Fractional short: 30 %  EF (Moran Rule): 67 %Doppler Peak Velocity (m/sec):  AoV=3.2  ------------------------------------------------------------------------  Observations:  Mitral Valve: Normal mitral valve. Mild mitral  regurgitation.  Aortic Valve/Aorta: Heavily calcified trileaflet aortic  valve with decreased opening. Peak transaortic valve  gradientequals 41 mm Hg, estimated aortic valve area  equals 1 sqcm (by continuity equation), aortic valve  velocity time integral equals 70 cm, consistent with  moderate aortic stenosis.  However the aortic valve appears heavily calcified and may  be closerto moderate-severe aortic stenosis.  Mild aortic  regurgitation.  Peak left ventricular outflow tract  gradient equals 3 mm Hg, LVOT velocity time integral equals  23 cm.  Aortic Root: 3 cm.  Left Atrium: Mildly dilated left atrium.  LA volume index =  39 cc/m2.  Left Ventricle: Normal left ventricular systolic function.  No segmental wall motion abnormalities. Normal left  ventricular internal dimensions and wall thicknesses.  Moderate diastolic dysfunction (Stage II).  Right Heart: Normal rightatrium. Normal right ventricular  size and function. Normal tricuspid valve. Minimal  tricuspid regurgitation. Normal pulmonic valve. Minimal  pulmonic regurgitation.  Pericardium/Pleura: Normal pericardium with trace  pericardial effusion.  Bilateral pleural effusions.  Hemodynamic: Estimated right atrial pressure is 8 mm Hg.  Color Doppler demonstrates no evidence of a patent foramen  ovale.  ------------------------------------------------------------------------  Conclusions:  1. Normal mitral valve. Mild mitral regurgitation.  2. Heavily calcified trileaflet aortic valve with decreased  opening. Peak transaortic valve gradient equals 41 mm Hg,  estimated aortic valve area equals 1 sqcm (by continuity  equation), aortic valve velocity time integral equals 70  cm, consistent with moderate aortic stenosis.  However the aortic valve appears heavily calcified and may  be closer to moderate-severe aortic stenosis.  Mild aortic  regurgitation.  3. Mildly dilated left atrium.  LA volume index = 39 cc/m2.  4. Normal left ventricular systolic function. No segmental  wall motion abnormalities.  5. Normal right ventricular size and function.  6. Normal pericardium with trace pericardial effusion.  7. Bilateral pleural effusions.  *** Compared with echocardiogram of 3/11/2022, no  significant changes noted.  ------------------------------------------------------------------------  Confirmed on  2022 - 17:38:34 by Osito Argueta M.D.  ------------------------------------------------------------------------  ---------------------------------------------------------------------------------------------------------------    MICROBIOLOGY:     Flu With COVID-19 By ERICK (22 @ 01:51)   SARS-CoV-2 Result: Detected  This Respiratory Panel uses polymerase chain reaction (PCR) to detect for   influenza A; influenza B; respiratory syncytial virus; and SARS-CoV-2.   This test was validated by [facility] and is in use under the FDA   Emergency Use Authorization (EUA) for clinical labs CLIA-certified to   perform high complexity testing. Test results should be correlated with   clinical presentation, patient history, and epidemiology.   Influenza A Result: NotDetec   Influenza B Result: NotDetec   Resp Syn Virus Result: NotDetec     Urinalysis Basic - ( 2022 22:50 )    Color: Light Yellow / Appearance: Slightly Turbid / S.009 / pH: x  Gluc: x / Ketone: Negative  / Bili: Negative / Urobili: Negative   Blood: x / Protein: Trace / Nitrite: Negative   Leuk Esterase: Large / RBC: 3 /hpf / WBC 71 /HPF   Sq Epi: x / Non Sq Epi: 1 /hpf / Bacteria: Many    Culture - Urine (22 @ 15:42)   Specimen Source: Clean Catch Clean Catch (Midstream)   Culture Results:   >100,000 CFU/ml Escherichia coli   <10,000 CFU/ml Normal Urogenital afshin present     RADIOLOGY:  [x] Chest radiographs reviewed and interpreted by me    EXAM:  XR CHEST PORTABLE URGENT 1V                          PROCEDURE DATE:  2022      INTERPRETATION:  CLINICAL INFORMATION: Shortness of breath.    TECHNIQUE: Frontal radiograph of the chest.    COMPARISON: CT chest and chest x-ray 2022    FINDINGS:  The lungs are clear.  No pleural effusion or pneumothorax.  Cardiomediastinal silhouette size is within normal limits.  No acute osseous abnormality.    IMPRESSION:  No evidence of acute pulmonary disease.    SCARLET JALLOH MD; Resident Radiology  This document has been electronically signed.  DIANE OBREGON MD; Attending Radiologist  This document has been electronically signed. 2022  1:19PM  ---------------------------------------------------------------------------------------------------------------  EXAM:  NM GI BLEEDING IMG                          PROCEDURE DATE:  06/15/2022      IMPRESSION: Abnormal GI bleeding scan.    Active bleeding site in the proximal ascending colon.    Dr. Argenis Barnes was notified of these results at 3:54 PM on 6/15/2022    MELVIN THOMPSON MD; Attending Nuclear Medicine  This document has been electronically signed. Dario 15 2022  4:03PM  ---------------------------------------------------------------------------------------------------------------  EXAM:  CT CHEST                          PROCEDURE DATE:  2022      FINDINGS:    Lungs/Airways/Pleura: The central airways are patent. There are small   pleural effusions, new from 3/9/2022 abdominal CT, with partial lower   lobe compressive atelectasis. There are diffuse peribronchial and  peribronchovascular groundglass opacity with mild septal thickening,   likely pulmonary edema. There are few clusters of small nodules on a  background of subsegmental bronchial impaction, likely due to small   airways disease.    Mediastinum/Lymph nodes: No thoracic adenopathy.    Heart and Vessels: Mild cardiomegaly. Extensive coronary arterial and   aortic valvular calcification is present. Hypodensity of the blood pool   in relation to left ventricular myocardium suggests underlying anemia.   The great vessels are normal in size. No pericardial effusion.    Upper Abdomen: Cholelithiasis. Partially imaged large right parapelvic  renal cyst. Extensive visceral artery calcification.    Osseous structures and Soft Tissues: Degenerative changes without   aggressive osseous lesions. Remote left posterior 11th rib fracture.    IMPRESSION:  Pulmonary edema with small pleural effusions and partial lower lobe   compressive atelectasis.    ELISA BLACKBURN M.D., Attending Radiologist  This document has been electronically signed. May 10 2022  8:52AM  ---------------------------------------------------------------------------------------------------------------  EXAM:  DUPLEX SCAN EXT VEINS LOWER BI                          PROCEDURE DATE:  2022      IMPRESSION:  No evidence of deep venous thrombosis in either lower extremity venous   segments able to be examined.     AUGUSTIN EASTMAN MD; Attending Radiologist  This document has been electronically signed. May  9 2022  3:31PM  ---------------------------------------------------------------------------------------------------------------  EXAM:  CT BRAIN                          PROCEDURE DATE:  2022      Parenchymal volume loss and chronic microvascular ischemic changes are   again seen and unchanged    Also again seen is an extra-axial lesion involving the inferior left   frontal region with some associated calcification. This finding measures   approximately 1.2 cm and previously measured approximately 1.3 cm. This   is likely compatible with a meningioma. No significant shift or   herniation seen. Contrast enhanced MR brain can be done for further   evaluation clinical    Evaluation of osseous structures with the window appears unremarkable    The visualized paranasal sinuses mastoid and middle ear regions appear   clear.    Impression: Stable exam.    ADELE CHANDRA MD; Attending Radiologist  This document has been electronically signed. 2022  2:05PM  ---------------------------------------------------------------------------------------------------------------

## 2022-06-27 NOTE — PROGRESS NOTE ADULT - PROBLEM SELECTOR PLAN 4
Known hx of aortic stenosis.  - 05/08: TTE EF 67%, AS moderate-severe systolic dysfunction, Moderate to severe AS velocity 70cm Peak, area 1cmPeak gradient 41     Plan:   - No acute issues   - Monitor fluid status closely  - daily weights, I/O Currently euvolemic  - restarted lasix -> stopped lasix 6/26 as pt appears dry, lethargic, lactate ~5, gentle IVF. f/u tomorrow and restart if appropriate   - plan for OP Cardiology f/u regarding standing amiodarone

## 2022-06-27 NOTE — PROGRESS NOTE ADULT - PROBLEM SELECTOR PLAN 1
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 f/u post transfusion cbc, keep hb>7

## 2022-06-28 ENCOUNTER — RESULT REVIEW (OUTPATIENT)
Age: 87
End: 2022-06-28

## 2022-06-28 LAB
ALBUMIN SERPL ELPH-MCNC: 2.5 G/DL — LOW (ref 3.3–5)
ALP SERPL-CCNC: 57 U/L — SIGNIFICANT CHANGE UP (ref 40–120)
ALT FLD-CCNC: 15 U/L — SIGNIFICANT CHANGE UP (ref 10–45)
ANION GAP SERPL CALC-SCNC: 9 MMOL/L — SIGNIFICANT CHANGE UP (ref 5–17)
AST SERPL-CCNC: 20 U/L — SIGNIFICANT CHANGE UP (ref 10–40)
BILIRUB SERPL-MCNC: 0.3 MG/DL — SIGNIFICANT CHANGE UP (ref 0.2–1.2)
BLD GP AB SCN SERPL QL: POSITIVE — SIGNIFICANT CHANGE UP
BUN SERPL-MCNC: 42 MG/DL — HIGH (ref 7–23)
CALCIUM SERPL-MCNC: 8.6 MG/DL — SIGNIFICANT CHANGE UP (ref 8.4–10.5)
CHLORIDE SERPL-SCNC: 106 MMOL/L — SIGNIFICANT CHANGE UP (ref 96–108)
CO2 SERPL-SCNC: 28 MMOL/L — SIGNIFICANT CHANGE UP (ref 22–31)
CREAT SERPL-MCNC: 1.36 MG/DL — HIGH (ref 0.5–1.3)
CULTURE RESULTS: SIGNIFICANT CHANGE UP
EGFR: 37 ML/MIN/1.73M2 — LOW
GLUCOSE SERPL-MCNC: 168 MG/DL — HIGH (ref 70–99)
HCT VFR BLD CALC: 24 % — LOW (ref 34.5–45)
HGB BLD-MCNC: 7.7 G/DL — LOW (ref 11.5–15.5)
MAGNESIUM SERPL-MCNC: 1.9 MG/DL — SIGNIFICANT CHANGE UP (ref 1.6–2.6)
MCHC RBC-ENTMCNC: 29.7 PG — SIGNIFICANT CHANGE UP (ref 27–34)
MCHC RBC-ENTMCNC: 32.1 GM/DL — SIGNIFICANT CHANGE UP (ref 32–36)
MCV RBC AUTO: 92.7 FL — SIGNIFICANT CHANGE UP (ref 80–100)
NRBC # BLD: 0 /100 WBCS — SIGNIFICANT CHANGE UP (ref 0–0)
PHOSPHATE SERPL-MCNC: 2.8 MG/DL — SIGNIFICANT CHANGE UP (ref 2.5–4.5)
PLATELET # BLD AUTO: 245 K/UL — SIGNIFICANT CHANGE UP (ref 150–400)
POTASSIUM SERPL-MCNC: 3.8 MMOL/L — SIGNIFICANT CHANGE UP (ref 3.5–5.3)
POTASSIUM SERPL-SCNC: 3.8 MMOL/L — SIGNIFICANT CHANGE UP (ref 3.5–5.3)
PROT SERPL-MCNC: 5 G/DL — LOW (ref 6–8.3)
RBC # BLD: 2.59 M/UL — LOW (ref 3.8–5.2)
RBC # FLD: 17.2 % — HIGH (ref 10.3–14.5)
RH IG SCN BLD-IMP: POSITIVE — SIGNIFICANT CHANGE UP
SODIUM SERPL-SCNC: 143 MMOL/L — SIGNIFICANT CHANGE UP (ref 135–145)
SPECIMEN SOURCE: SIGNIFICANT CHANGE UP
WBC # BLD: 7.66 K/UL — SIGNIFICANT CHANGE UP (ref 3.8–10.5)
WBC # FLD AUTO: 7.66 K/UL — SIGNIFICANT CHANGE UP (ref 3.8–10.5)

## 2022-06-28 PROCEDURE — 99233 SBSQ HOSP IP/OBS HIGH 50: CPT

## 2022-06-28 PROCEDURE — 99233 SBSQ HOSP IP/OBS HIGH 50: CPT | Mod: GC

## 2022-06-28 PROCEDURE — 88305 TISSUE EXAM BY PATHOLOGIST: CPT | Mod: 26

## 2022-06-28 DEVICE — NET RETRV ROT ROTH 2.5MMX230CM: Type: IMPLANTABLE DEVICE | Status: FUNCTIONAL

## 2022-06-28 RX ORDER — SODIUM CHLORIDE 9 MG/ML
500 INJECTION INTRAMUSCULAR; INTRAVENOUS; SUBCUTANEOUS
Refills: 0 | Status: DISCONTINUED | OUTPATIENT
Start: 2022-06-28 | End: 2022-06-28

## 2022-06-28 RX ORDER — PANTOPRAZOLE SODIUM 20 MG/1
40 TABLET, DELAYED RELEASE ORAL
Refills: 0 | Status: DISCONTINUED | OUTPATIENT
Start: 2022-06-28 | End: 2022-07-01

## 2022-06-28 RX ADMIN — Medication 100 MILLIGRAM(S): at 23:17

## 2022-06-28 RX ADMIN — SENNA PLUS 2 TABLET(S): 8.6 TABLET ORAL at 21:49

## 2022-06-28 RX ADMIN — PANTOPRAZOLE SODIUM 40 MILLIGRAM(S): 20 TABLET, DELAYED RELEASE ORAL at 12:41

## 2022-06-28 RX ADMIN — Medication 1 TABLET(S): at 12:41

## 2022-06-28 RX ADMIN — Medication 25 MILLIGRAM(S): at 06:13

## 2022-06-28 RX ADMIN — Medication 25 MICROGRAM(S): at 06:13

## 2022-06-28 RX ADMIN — Medication 75 MILLIGRAM(S): at 12:42

## 2022-06-28 RX ADMIN — Medication 3 MILLIGRAM(S): at 21:49

## 2022-06-28 RX ADMIN — Medication 500 MILLIGRAM(S): at 12:42

## 2022-06-28 RX ADMIN — ROSUVASTATIN CALCIUM 10 MILLIGRAM(S): 5 TABLET ORAL at 21:49

## 2022-06-28 RX ADMIN — Medication 1 DROP(S): at 21:48

## 2022-06-28 RX ADMIN — Medication 25 MILLIGRAM(S): at 18:25

## 2022-06-28 RX ADMIN — Medication 1 DROP(S): at 06:14

## 2022-06-28 NOTE — PRE-ANESTHESIA EVALUATION ADULT - NSANTHOSAYNRD_GEN_A_CORE
No. JERRICA screening performed.  STOP BANG Legend: 0-2 = LOW Risk; 3-4 = INTERMEDIATE Risk; 5-8 = HIGH Risk
No. JERRICA screening performed.  STOP BANG Legend: 0-2 = LOW Risk; 3-4 = INTERMEDIATE Risk; 5-8 = HIGH Risk

## 2022-06-28 NOTE — PRE PROCEDURE NOTE - PROCEDURE CANDIDATE
Message  Received: Today  Grzegorz Morgan MD sent to Tomasz Conner LPN  Caller: Unspecified (Today, 12:39 PM)  Yes. 5 days. Printed this encounter and [facility] at [phone] stating see attached note for clearance. Received fax confirmation.
Yes
Yes

## 2022-06-28 NOTE — PROGRESS NOTE ADULT - PROBLEM SELECTOR PLAN 3
Positive UA with E. coli in cx upon admission (UA on admission was not positive and patient without symptoms).  - 6/27: Started Levofloxacin, will start Ciprofloxacin 200 mg daily (renally dosed) for 4 more days to treat for cystitis

## 2022-06-28 NOTE — PROGRESS NOTE ADULT - SUBJECTIVE AND OBJECTIVE BOX
HPI:  Patient is a 89yo F w/ hx HTN, Afib, CHF, AS, HLD, nonsmoker, remote hx of asthma presenting with loose stools, lethargy, fatigue, low blood pressure and bloody bowel movement. The patient started Eliquis at the end of April on 4/2022. The daughter noted on Friday that the patient developed loose stools that were normal in color. The patient subsequently was developing progressive fatigue, lethargy and decreased PO intake. Her daughter noted that her stools were a maroon color. On 6/12, the daughter noticed that the patient had low blood pressures SBP of 90s down from baseline of 120s. She checked her BM that morning and noted blood with blood clots in the toilet. At this time she decided to seek medical care in the ED for further evaluation.     Of note, the patient had COVID 2 weeks ago measured by at home test. RVP was + for COVID by PCR on admission.     In the ED:   Vitals: Temp 97.7, HR 60, /55, RR 20, O2 saturation 100%  Labs: Hemoglobin 4.8, Cr 1.93, COVID +   Imaging:  Cxr No acute pathology  Interventions: Given Kcentra, 2 units of pRBCs, Pantoprazole 80mg    (13 Jun 2022 06:24)    Review Of Systems:     No chest pain, shortness of breath, or palpitations            Medications:  artificial tears (preservative free) Ophthalmic Solution 1 Drop(s) Both EYES two times a day  ascorbic acid 500 milliGRAM(s) Oral daily  ciprofloxacin   IVPB 200 milliGRAM(s) IV Intermittent every 24 hours  diphenhydrAMINE 25 milliGRAM(s) Oral daily PRN  lactated ringers. 500 milliLiter(s) IV Continuous <Continuous>  levothyroxine 25 MICROGram(s) Oral daily  melatonin 3 milliGRAM(s) Oral at bedtime  metoprolol tartrate 25 milliGRAM(s) Oral two times a day  multivitamin 1 Tablet(s) Oral daily  pantoprazole  Injectable 40 milliGRAM(s) IV Push daily  rosuvastatin 10 milliGRAM(s) Oral at bedtime  senna 2 Tablet(s) Oral at bedtime  venlafaxine XR. 75 milliGRAM(s) Oral daily    PAST MEDICAL & SURGICAL HISTORY:  HTN (hypertension)      Diabetes mellitus type 2, noninsulin dependent      Diverticulitis      Hyperlipidemia      GERD (gastroesophageal reflux disease)      Glaucoma      Breast cancer      Urinary incontinence      Renal calculus or stone      Arthritis      Heart murmur      Renal calculi  s/p stone extraction 57 yrs ago      S/P lumpectomy of breast  right breast with node removal      S/P bladder repair  Interstim device placement 2010        Vitals:  T(C): 37.3 (06-28-22 @ 04:35), Max: 37.3 (06-28-22 @ 04:35)  HR: 69 (06-28-22 @ 04:35) (63 - 69)  BP: 148/67 (06-28-22 @ 04:35) (120/54 - 157/67)  BP(mean): --  RR: 18 (06-28-22 @ 04:35) (18 - 18)  SpO2: 100% (06-28-22 @ 04:35) (97% - 100%)  Wt(kg): --  Daily     Daily   I&O's Summary    27 Jun 2022 07:01  -  28 Jun 2022 07:00  --------------------------------------------------------  IN: 540 mL / OUT: 650 mL / NET: -110 mL        Physical Exam:  Appearance: No acute distress; well appearing  Eyes: PERRL, EOMI, pink conjunctiva  HENT: Normal oral mucosa  Cardiovascular: RRR, S1, S2, 3/6 holosystolic murmur no edema; no JVD  Respiratory: Clear to auscultation bilaterally  Gastrointestinal: soft, non-tender, non-distended with normal bowel sounds  Musculoskeletal: No clubbing; no joint deformity   Neurologic: Non-focal  Lymphatic: No lymphadenopathy  Psychiatry: AAOx3, mood & affect appropriate  Skin: No rashes, ecchymoses, or cyanosis                          7.7    9.69  )-----------( 234      ( 27 Jun 2022 22:49 )             23.9     06-27    141  |  105  |  55<H>  ----------------------------<  143<H>  4.0   |  26  |  1.64<H>    Ca    8.7      27 Jun 2022 07:12  Phos  3.6     06-27  Mg     2.1     06-27    TPro  5.0<L>  /  Alb  2.7<L>  /  TBili  0.5  /  DBili  x   /  AST  20  /  ALT  13  /  AlkPhos  62  06-27                  ECG:    Echo:    Stress Testing:     Cath:    Imaging:    Interpretation of Telemetry: NSR

## 2022-06-28 NOTE — PROGRESS NOTE ADULT - SUBJECTIVE AND OBJECTIVE BOX
[facility]   [phone]  RENAL FOLLOW UP NOTE  --------------------------------------------------------------------------------  Chief Complaint:    24 hour events/subjective:    patient seen and examined.   s/p EGD/colonoscopy    PAST HISTORY  --------------------------------------------------------------------------------  No significant changes to PMH, PSH, FHx, SHx, unless otherwise noted    ALLERGIES & MEDICATIONS  --------------------------------------------------------------------------------  Allergies    Keflex (Rash; Hives)    Intolerances      Standing Inpatient Medications  artificial tears (preservative free) Ophthalmic Solution 1 Drop(s) Both EYES two times a day  ascorbic acid 500 milliGRAM(s) Oral daily  ciprofloxacin   IVPB 200 milliGRAM(s) IV Intermittent every 24 hours  lactated ringers. 500 milliLiter(s) IV Continuous <Continuous>  levothyroxine 25 MICROGram(s) Oral daily  melatonin 3 milliGRAM(s) Oral at bedtime  metoprolol tartrate 25 milliGRAM(s) Oral two times a day  multivitamin 1 Tablet(s) Oral daily  pantoprazole  Injectable 40 milliGRAM(s) IV Push daily  rosuvastatin 10 milliGRAM(s) Oral at bedtime  senna 2 Tablet(s) Oral at bedtime  sodium chloride 0.9%. 500 milliLiter(s) IV Continuous <Continuous>  venlafaxine XR. 75 milliGRAM(s) Oral daily    PRN Inpatient Medications  diphenhydrAMINE 25 milliGRAM(s) Oral daily PRN      REVIEW OF SYSTEMS  --------------------------------------------------------------------------------    Gen: denies fevers/chills,  CVS: denies chest pain/palpitations  Resp: denies SOB/Cough  GI: Denies N/V/Abd pain  : Denies dysuria    VITALS/PHYSICAL EXAM  --------------------------------------------------------------------------------  T(C): 36.3 (06-28-22 @ 12:30), Max: 37.3 (06-28-22 @ 04:35)  HR: 60 (06-28-22 @ 12:30) (54 - 72)  BP: 147/68 (06-28-22 @ 12:30) (132/55 - 160/49)  RR: 18 (06-28-22 @ 12:30) (18 - 19)  SpO2: 99% (06-28-22 @ 12:30) (86% - 100%)  Wt(kg): --  Height (cm): 157.5 (06-28-22 @ 09:27)  Weight (kg): 50.8 (06-28-22 @ 09:27)  BMI (kg/m2): 20.5 (06-28-22 @ 09:27)  BSA (m2): 1.49 (06-28-22 @ 09:27)      06-27-22 @ 07:01  -  06-28-22 @ 07:00  --------------------------------------------------------  IN: 540 mL / OUT: 650 mL / NET: -110 mL    06-28-22 @ 07:01  -  06-28-22 @ 13:18  --------------------------------------------------------  IN: 0 mL / OUT: 0 mL / NET: 0 mL      Physical Exam:  	              Gen: Appears comfortable, forgetful   	Pulm: decrease bs,  no rales or ronchi or wheezing  	CV: No JVD. RRR, S1S2; no rub  	Abd: +BS, soft, nontender/nondistended  	: No suprapubic tenderness, urine output appears dark/mixed  	UE: Warm, no cyanosis  no clubbing,  no edema  	LE: Warm, no cyanosis  no clubbing, no edema    LABS/STUDIES  --------------------------------------------------------------------------------              7.7    7.66  >-----------<  245      [06-28-22 @ 07:26]              24.0     143  |  106  |  42  ----------------------------<  168      [06-28-22 @ 07:24]  3.8   |  28  |  1.36        Ca     8.6     [06-28-22 @ 07:24]      Mg     1.9     [06-28-22 @ 07:24]      Phos  2.8     [06-28-22 @ 07:24]    TPro  5.0  /  Alb  2.5  /  TBili  0.3  /  DBili  x   /  AST  20  /  ALT  15  /  AlkPhos  57  [06-28-22 @ 07:24]          Creatinine Trend:  SCr 1.36 [06-28 @ 07:24]  SCr 1.64 [06-27 @ 07:12]  SCr 1.42 [06-26 @ 14:38]  SCr 1.79 [06-26 @ 08:14]  SCr 1.93 [06-25 @ 06:47]              Urinalysis - [06-26-22 @ 22:50]      Color Light Yellow / Appearance Slightly Turbid / SG 1.009 / pH 6.0      Gluc Negative / Ketone Negative  / Bili Negative / Urobili Negative       Blood Small / Protein Trace / Leuk Est Large / Nitrite Negative      RBC 3 / WBC 71 / Hyaline 1 / Gran  / Sq Epi  / Non Sq Epi 1 / Bacteria Many

## 2022-06-28 NOTE — PRE PROCEDURE NOTE - PRE PROCEDURE EVALUATION
Attending Physician:   Dr. Mejia                         Procedure:   EGD/Colonoscopy    Indication for Procedure:    GIH  ________________________________________________________  PAST MEDICAL & SURGICAL HISTORY:    HTN (hypertension)  Diabetes mellitus type 2, noninsulin dependent  Diverticulitis  Hyperlipidemia  GERD (gastroesophageal reflux disease)  Glaucoma  Breast cancer  Urinary incontinence  Renal calculus or stone  Arthritis  Heart murmur    Renal calculi-s/p stone extraction 57 yrs ago  S/P lumpectomy of breast-right breast with node removal  S/P bladder repair-Interstim device placement 2010    ALLERGIES:  Keflex (Rash; Hives)    HOME MEDICATIONS:  ferrous sulfate 325 mg (65 mg elemental iron) oral tablet: 1 tab(s) orally once a day  furosemide 40 mg oral tablet: 1 tab(s) orally once a day  pantoprazole 40 mg oral delayed release tablet: 1 tab(s) orally once a day (before a meal)  rosuvastatin 10 mg oral tablet: 1 tab(s) orally once a day  Synthroid 25 mcg (0.025 mg) oral tablet: 1 tab(s) orally once a day  venlafaxine 75 mg oral capsule, extended release: 1 cap(s) orally once a day    AICD/PPM: [ ] yes   [X ] no    PERTINENT LAB DATA:                        7.7    7.66  )-----------( 245      ( 28 Jun 2022 07:26 )             24.0     06-28    143  |  106  |  42<H>  ----------------------------<  168<H>  3.8   |  28  |  1.36<H>    Ca    8.6      28 Jun 2022 07:24  Phos  2.8     06-28  Mg     1.9     06-28  TPro  5.0<L>  /  Alb  2.5<L>  /  TBili  0.3  /  DBili  x   /  AST  20  /  ALT  15  /  AlkPhos  57  06-28    PHYSICAL EXAMINATION:    T(C): 37.3  HR: 69  BP: 148/67  RR: 18  SpO2: 100%    Constitutional: NAD    Neck:  No JVD  Respiratory: CTAB/L  Cardiovascular: S1 and S2  Gastrointestinal: BS+, soft, NT/ND  Extremities: No peripheral edema  Neurological: A/O x 3      COMMENTS:    The patient is a suitable candidate for the planned procedure unless box checked [ ]  No, explain:     Attending Physician:   Dr. Mejia                         Procedure:   EGD/Colonoscopy    Indication for Procedure:    GIB  ________________________________________________________  PAST MEDICAL & SURGICAL HISTORY:    HTN (hypertension)  Diabetes mellitus type 2, noninsulin dependent  Diverticulitis  Hyperlipidemia  GERD (gastroesophageal reflux disease)  Glaucoma  Breast cancer  Urinary incontinence  Renal calculus or stone  Arthritis  Heart murmur    Renal calculi-s/p stone extraction 57 yrs ago  S/P lumpectomy of breast-right breast with node removal  S/P bladder repair-Interstim device placement 2010    ALLERGIES:  Keflex (Rash; Hives)    HOME MEDICATIONS:  ferrous sulfate 325 mg (65 mg elemental iron) oral tablet: 1 tab(s) orally once a day  furosemide 40 mg oral tablet: 1 tab(s) orally once a day  pantoprazole 40 mg oral delayed release tablet: 1 tab(s) orally once a day (before a meal)  rosuvastatin 10 mg oral tablet: 1 tab(s) orally once a day  Synthroid 25 mcg (0.025 mg) oral tablet: 1 tab(s) orally once a day  venlafaxine 75 mg oral capsule, extended release: 1 cap(s) orally once a day    AICD/PPM: [ ] yes   [X ] no    PERTINENT LAB DATA:                        7.7    7.66  )-----------( 245      ( 28 Jun 2022 07:26 )             24.0     06-28    143  |  106  |  42<H>  ----------------------------<  168<H>  3.8   |  28  |  1.36<H>    Ca    8.6      28 Jun 2022 07:24  Phos  2.8     06-28  Mg     1.9     06-28  TPro  5.0<L>  /  Alb  2.5<L>  /  TBili  0.3  /  DBili  x   /  AST  20  /  ALT  15  /  AlkPhos  57  06-28    PHYSICAL EXAMINATION:    T(C): 37.3  HR: 69  BP: 148/67  RR: 18  SpO2: 100%    Constitutional: NAD    Neck:  No JVD  Respiratory: CTAB/L  Cardiovascular: S1 and S2  Gastrointestinal: BS+, soft, NT/ND  Extremities: No peripheral edema  Neurological: A/O x 3

## 2022-06-28 NOTE — PROGRESS NOTE ADULT - ASSESSMENT
89yo F w/ hx HTN, Afib, CHF, HLD, CKD Stage IV nonsmoker, remote hx of asthma presenting with loose stools, lethargy, fatigue, low blood pressure and blood bowel movement found to have a hemoglobin of 4.8 most likely 2/2 to GI bleed from eliquis.  DDx includes diverticulitis vs Gastric ulcer vs. Duodenal ulcer vs. angiodysplasia. 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

## 2022-06-28 NOTE — PROGRESS NOTE ADULT - PROBLEM SELECTOR PLAN 1
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 f/u post transfusion cbc, keep hb>7 - Patient presented with Hg 4s likely s/s GI bleed/diverticular bleed, s/p colonoscopy without source of bleeding  - Patient continues to have Hg in 6s 1-2 days after transfusion, dropped to 5.8 today in setting of GI bleed  - Blood Bank (6/23): 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  - 1u prbc for drop in hb since am. f/u post transfusion cbc, keep hb>7  6/28: episode of large BM with maeve blood, patient assessed AO3, NAD, VSS, CBC 7.7

## 2022-06-28 NOTE — PROGRESS NOTE ADULT - PROBLEM SELECTOR PLAN 4
Currently euvolemic  - restarted lasix -> stopped lasix 6/26 as pt appears dry, lethargic, lactate ~5, gentle IVF. f/u tomorrow and restart if appropriate   - plan for OP Cardiology f/u regarding standing amiodarone

## 2022-06-28 NOTE — PROGRESS NOTE ADULT - ASSESSMENT
89yo F w/ hx HTN, Afib, CHF, AS, HLD, nonsmoker, remote hx of asthma presenting with loose stools, lethargy, fatigue, low blood pressure and bloody bowel movement. Of note, the patient had COVID 2 weeks PTA measured by at home test. RVP was + for COVID by PCR on admission.   In the ED: Labs: Hemoglobin 4.8, Cr 1.93, COVID +   required prbc. pt also had egd. eliquis was dc.       1- CKD IV   2- hx chf   3- anemia/ GI bleed      creatinine improving  chf is compensated, lasix on hold since 6/26  required multiple prbc for GIB  keep Hb >7, trend hb   metoprolol 25 mg bid   trend creatinine   d.w pt son at bedside

## 2022-06-28 NOTE — ASU PREOP CHECKLIST - AS BP NONINV SITE
Okay for refills. However would patient rather take one 40 mg lisinopril tablet vs two 20 mg tablets? If agrees please update script and fill x 90 days with one refill.   left upper arm

## 2022-06-28 NOTE — PROGRESS NOTE ADULT - SUBJECTIVE AND OBJECTIVE BOX
[facility] - PROGRESS NOTE    CHIEF COMPLAINT: COVID-19 positive nasal PCR; dyspnea; asthma; bilateral pleural effusions; atelectasis; hypotension; anorexia; fatigue; hematochezia; anemia    INTERVAL HISTORY: hemodynamically stable and without tachycardia; now with red liquid stool following a bowel prep overnight in anticipation of an upper and lower endoscopy; has now received 9 units of PRBCs; episode of "lethargy" over the weekend -> head CT without acute changes; urinary frequency with cloudy urine in the canister -> antibiotics switched to cipro; no shortness of breath or hypoxemia on room air; no cough, sputum production, hemoptysis, chest congestion or wheeze; no fevers, chills or sweats; no chest pain/pressure or palpitations;     REVIEW OF SYSTEMS:  Constitutional: As per interval history  HEENT: Within normal limits  CV: As per interval history  Resp: As per interval history  GI: lower GI bleed likely due to diverticulosis  : Within normal limits  Musculoskeletal: Within normal limits  Skin: Within normal limits  Neurological: Within normal limits  Psychiatric: Within normal limits  Endocrine: Within normal limits  Hematologic/Lymphatic: anemia  Allergic/Immunologic: Within normal limits    MEDICATIONS:     Pulmonary "    Anti-microbials:  ciprofloxacin   IVPB 200 milliGRAM(s) IV Intermittent every 24 hours    Cardiovascular:  metoprolol tartrate 25 milliGRAM(s) Oral two times a day    Other:  artificial tears (preservative free) Ophthalmic Solution 1 Drop(s) Both EYES two times a day  ascorbic acid 500 milliGRAM(s) Oral daily  lactated ringers. 500 milliLiter(s) IV Continuous <Continuous>  levothyroxine 25 MICROGram(s) Oral daily  melatonin 3 milliGRAM(s) Oral at bedtime  multivitamin 1 Tablet(s) Oral daily  pantoprazole  Injectable 40 milliGRAM(s) IV Push daily  rosuvastatin 10 milliGRAM(s) Oral at bedtime  senna 2 Tablet(s) Oral at bedtime  sodium chloride 0.9%. 500 milliLiter(s) IV Continuous <Continuous>  venlafaxine XR. 75 milliGRAM(s) Oral daily    MEDICATIONS  (PRN):  diphenhydrAMINE 25 milliGRAM(s) Oral daily PRN ppx    OBJECTIVE:    PHYSICAL EXAM:       ICU Vital Signs Last 24 Hrs  T(C): 37.2 (2022 08:40), Max: 37.3 (2022 04:35)  T(F): 99 (2022 08:40), Max: 99.2 (2022 04:35)  HR: 72 (2022 08:40) (63 - 72)  BP: 148/67 (2022 08:40) (120/54 - 157/67)  BP(mean): --  ABP: --  ABP(mean): --  RR: 18 (2022 08:40) (18 - 19)  SpO2: 100% (2022 08:40) (97% - 100%) on room air         General: Awake. Alert. Cooperative. No distress. Appears stated age. In bed. Discussing an upcoming cruise she would like to take  HEENT:  Atraumatic. Normocephalic. Anicteric. Normal oral mucosa. PERRL. EOMI. Pale conjunctiva.  Neck: Supple. Trachea midline. Thyroid without enlargement/tenderness/nodules. No carotid bruit. No JVD.	  Cardiovascular: Regular rate and rhythm. S1 S2 normal. III/VI systolic murmur  Respiratory: Respirations unlabored. Clear to auscultation and percussion bilaterally. Kyphosis.  Abdomen: Soft. Non-tender. Non-distended. No organomegaly. No masses. Normal bowel sounds.  Extremities: Warm to touch. No clubbing or cyanosis. No pedal edema.   Pulses: 2+ peripheral pulses all extremities.	  Skin: Normal skin color. No rashes or lesions. No ecchymoses. No cyanosis. Warm to touch.  Lymph Nodes: Cervical, supraclavicular and axillary nodes normal  Neurological: Motor and sensory examination equal and normal. A and O x 3  Psychiatry: Appropriate mood and affect.    LABS:                          7.7    7.66  )-----------( 245      ( 2022 07:26 )             24.0     CBC    WBC  7.66 <==, 9.69 <==, 9.92 <==, 9.08 <==, 9.13 <==, 11.65 <==, 10.43 <==    Hemoglobin  7.7 <<==, 7.7 <<==, 8.2 <<==, 7.2 <<==, 5.8 <<==, 7.1 <<==, 7.1 <<==    Hematocrit  24.0 <==, 23.9 <==, 25.3 <==, 21.6 <==, 18.4 <==, 22.6 <==, 22.1 <==    Platelets  245 <==, 234 <==, 214 <==, 189 <==, 167 <==, 203 <==, 198 <==      143  |  106  |  42<H>  ----------------------------<  168<H>    06-28  3.8   |  28  |  1.36<H>      LYTES    sodium  143 <==, 141 <==, 136 <==, 137 <==, 137 <==, 134 <==    potassium   3.8 <==, 4.0 <==, 4.3 <==, 4.2 <==, 4.3 <==, 5.1 <==    chloride  106 <==, 105 <==, 102 <==, 103 <==, 101 <==, 99 <==    carbon dioxide  28 <==, 26 <==, 21 <==, 26 <==, 26 <==, 23 <==    =============================================================================================  RENAL FUNCTION:    Creatinine:   1.36  <<==, 1.64  <<==, 1.42  <<==, 1.79  <<==, 1.93  <<==, 1.86  <<==    BUN:   42 <==, 55 <==, 51 <==, 63 <==, 66 <==, 44 <==    ============================================================================================    calcium   8.6 <==, 8.7 <==, 7.8 <==, 8.6 <==, 8.3 <==, 8.8 <==    phos   2.8 <==, 3.6 <==, 3.3 <==, 3.9 <==, 3.8 <==    mag   1.9 <==, 2.1 <==, 1.5 <==, 1.8 <==, 1.7 <==    ============================================================================================  LFTs    AST:   20 <== , 20 <== , 17 <== , 12 <== , 19 <== , 27 <==     ALT:  15  <== , 13  <== , 11  <== , 14  <== , 13  <== , 20  <==     AP:  57  <=, 62  <=, 49  <=, 62  <=, 58  <=, 69  <=    Bili:  0.3  <=, 0.5  <=, 0.3  <=, 0.4  <=, 0.3  <=, 0.2  <=, 0.2  <=    Venous Blood Gas:   @ 14:44  7.33/42/78/22/96.6  VBG Lactate: 4.9    CARDIAC MARKERS ( 2022 14:45 )  CPK 39 U/L /CKMB x     /CKMB Units x        troponin x        CARDIAC MARKERS ( 2022 14:45 )  CPK 39 U/L /CKMB x     /CKMB Units x        troponin x        < from: Transthoracic Echocardiogram (22 @ 14:37) >    Patient name: KOREY DIAZ  YOB: 1929   Age: 89 (F)   MR#: [id number]  Study Date: 2022  Location: [address] [place], [place] [id number]: Gadiel Schwartz RDCS  Study quality: Technically fair  Referring Physician: Teo Monson MD  Blood Pressure: 165/72 mmHg  Height: 158 cm  Weight: 53 kg  BSA: 1.5 m2  Heart Rate: 70 mmHg  ------------------------------------------------------------------------  PROCEDURE: Transthoracic echocardiogram with 2-D, M-Mode  and complete spectral and color flow Doppler.  INDICATION: Cardiac murmur, unspecified (R01.1)  ------------------------------------------------------------------------  Dimensions:    Normal Values:  LA:     3.6    2.0 - 4.0 cm  Ao:     3.0    2.0 - 3.8 cm  SEPTUM: 1.0    0.6 -1.2 cm  PWT:    1.0    0.6 - 1.1 cm  LVIDd:  4.0    3.0 - 5.6 cm  LVIDs:  2.8    1.8 - 4.0 cm  Derived variables:  LVMI: 84 g/m2  RWT: 0.50  Fractional short: 30 %  EF (Moran Rule): 67 %Doppler Peak Velocity (m/sec):  AoV=3.2  ------------------------------------------------------------------------  Observations:  Mitral Valve: Normal mitral valve. Mild mitral  regurgitation.  Aortic Valve/Aorta: Heavily calcified trileaflet aortic  valve with decreased opening. Peak transaortic valve  gradientequals 41 mm Hg, estimated aortic valve area  equals 1 sqcm (by continuity equation), aortic valve  velocity time integral equals 70 cm, consistent with  moderate aortic stenosis.  However the aortic valve appears heavily calcified and may  be closerto moderate-severe aortic stenosis.  Mild aortic  regurgitation.  Peak left ventricular outflow tract  gradient equals 3 mm Hg, LVOT velocity time integral equals  23 cm.  Aortic Root: 3 cm.  Left Atrium: Mildly dilated left atrium.  LA volume index =  39 cc/m2.  Left Ventricle: Normal left ventricular systolic function.  No segmental wall motion abnormalities. Normal left  ventricular internal dimensions and wall thicknesses.  Moderate diastolic dysfunction (Stage II).  Right Heart: Normal rightatrium. Normal right ventricular  size and function. Normal tricuspid valve. Minimal  tricuspid regurgitation. Normal pulmonic valve. Minimal  pulmonic regurgitation.  Pericardium/Pleura: Normal pericardium with trace  pericardial effusion.  Bilateral pleural effusions.  Hemodynamic: Estimated right atrial pressure is 8 mm Hg.  Color Doppler demonstrates no evidence of a patent foramen  ovale.  ------------------------------------------------------------------------  Conclusions:  1. Normal mitral valve. Mild mitral regurgitation.  2. Heavily calcified trileaflet aortic valve with decreased  opening. Peak transaortic valve gradient equals 41 mm Hg,  estimated aortic valve area equals 1 sqcm (by continuity  equation), aortic valve velocity time integral equals 70  cm, consistent with moderate aortic stenosis.  However the aortic valve appears heavily calcified and may  be closer to moderate-severe aortic stenosis.  Mild aortic  regurgitation.  3. Mildly dilated left atrium.  LA volume index = 39 cc/m2.  4. Normal left ventricular systolic function. No segmental  wall motion abnormalities.  5. Normal right ventricular size and function.  6. Normal pericardium with trace pericardial effusion.  7. Bilateral pleural effusions.  *** Compared with echocardiogram of 3/11/2022, no  significant changes noted.  ------------------------------------------------------------------------  Confirmed on  2022 - 17:38:34 by Osito Argueta M.D.  ------------------------------------------------------------------------  ---------------------------------------------------------------------------------------------------------------    MICROBIOLOGY:     Flu With COVID-19 By ERICK (22 @ 01:51)   SARS-CoV-2 Result: Detected  This Respiratory Panel uses polymerase chain reaction (PCR) to detect for   influenza A; influenza B; respiratory syncytial virus; and SARS-CoV-2.   This test was validated by [employer] and is in use under the FDA   Emergency Use Authorization (EUA) for clinical labs CLIA-certified to   perform high complexity testing. Test results should be correlated with   clinical presentation, patient history, and epidemiology.   Influenza A Result: NotDetec   Influenza B Result: NotDetec   Resp Syn Virus Result: NotDetec     Urinalysis Basic - ( 2022 22:50 )    Color: Light Yellow / Appearance: Slightly Turbid / S.009 / pH: x  Gluc: x / Ketone: Negative  / Bili: Negative / Urobili: Negative   Blood: x / Protein: Trace / Nitrite: Negative   Leuk Esterase: Large / RBC: 3 /hpf / WBC 71 /HPF   Sq Epi: x / Non Sq Epi: 1 /hpf / Bacteria: Many    Culture - Blood (22 @ 16:13)   Specimen Source: .Blood Blood-Peripheral   Culture Results:   No growth to date.     Culture - Blood (22 @ 16:18)   Specimen Source: .Blood Blood-Peripheral   Culture Results:   No growth to date.     Culture - Urine (22 @ 15:42)   Specimen Source: Clean Catch Clean Catch (Midstream)   Culture Results:   >100,000 CFU/ml Escherichia coli   <10,000 CFU/ml Normal Urogenital afshin present     RADIOLOGY:  [x] Chest radiographs reviewed and interpreted by me    EXAM:  XR CHEST PORTABLE URGENT 1V                          PROCEDURE DATE:  2022      INTERPRETATION:  CLINICAL INFORMATION: Shortness of breath.    TECHNIQUE: Frontal radiograph of the chest.    COMPARISON: CT chest and chest x-ray 2022    FINDINGS:  The lungs are clear.  No pleural effusion or pneumothorax.  Cardiomediastinal silhouette size is within normal limits.  No acute osseous abnormality.    IMPRESSION:  No evidence of acute pulmonary disease.    SCARLET JALLOH MD; Resident Radiology  This document has been electronically signed.  DIANE OBREGON MD; Attending Radiologist  This document has been electronically signed. 2022  1:19PM  ---------------------------------------------------------------------------------------------------------------  EXAM:  NM GI BLEEDING IMG                          PROCEDURE DATE:  06/15/2022      IMPRESSION: Abnormal GI bleeding scan.    Active bleeding site in the proximal ascending colon.    Dr. Argenis Barnes was notified of these results at 3:54 PM on 6/15/2022    MELVIN THOMPSON MD; Attending Nuclear Medicine  This document has been electronically signed. Dario 15 2022  4:03PM  ---------------------------------------------------------------------------------------------------------------  EXAM:  CT CHEST                          PROCEDURE DATE:  2022      FINDINGS:    Lungs/Airways/Pleura: The central airways are patent. There are small   pleural effusions, new from 3/9/2022 abdominal CT, with partial lower   lobe compressive atelectasis. There are diffuse peribronchial and  peribronchovascular groundglass opacity with mild septal thickening,   likely pulmonary edema. There are few clusters of small nodules on a  background of subsegmental bronchial impaction, likely due to small   airways disease.    Mediastinum/Lymph nodes: No thoracic adenopathy.    Heart and Vessels: Mild cardiomegaly. Extensive coronary arterial and   aortic valvular calcification is present. Hypodensity of the blood pool   in relation to left ventricular myocardium suggests underlying anemia.   The great vessels are normal in size. No pericardial effusion.    Upper Abdomen: Cholelithiasis. Partially imaged large right parapelvic  renal cyst. Extensive visceral artery calcification.    Osseous structures and Soft Tissues: Degenerative changes without   aggressive osseous lesions. Remote left posterior 11th rib fracture.    IMPRESSION:  Pulmonary edema with small pleural effusions and partial lower lobe   compressive atelectasis.    ELISA BLACKBURN M.D., Attending Radiologist  This document has been electronically signed. May 10 2022  8:52AM  ---------------------------------------------------------------------------------------------------------------  EXAM:  DUPLEX SCAN EXT VEINS LOWER BI                          PROCEDURE DATE:  2022      IMPRESSION:  No evidence of deep venous thrombosis in either lower extremity venous   segments able to be examined.     AUGUSTIN EASTMAN MD; Attending Radiologist  This document has been electronically signed. May  9 2022  3:31PM  ---------------------------------------------------------------------------------------------------------------  EXAM:  CT BRAIN                          PROCEDURE DATE:  2022      Parenchymal volume loss and chronic microvascular ischemic changes are   again seen and unchanged    Also again seen is an extra-axial lesion involving the inferior left   frontal region with some associated calcification. This finding measures   approximately 1.2 cm and previously measured approximately 1.3 cm. This   is likely compatible with a meningioma. No significant shift or   herniation seen. Contrast enhanced MR brain can be done for further   evaluation clinical    Evaluation of osseous structures with the window appears unremarkable    The visualized paranasal sinuses mastoid and middle ear regions appear   clear.    Impression: Stable exam.    ADELE CHANDRA MD; Attending Radiologist  This document has been electronically signed. 2022  2:05PM  ---------------------------------------------------------------------------------------------------------------

## 2022-06-28 NOTE — PROGRESS NOTE ADULT - SUBJECTIVE AND OBJECTIVE BOX
PROGRESS NOTE:    Lelo Burgess MD  Internal Medicine PGY-1      Patient is a 89y old  Female who presents with a chief complaint of gi bleed (2022 13:30)      SUBJECTIVE / OVERNIGHT EVENTS: No acute overnight events. Pt seen and examined. Denies fevers, chills, CP, SOB, Abdominal pain, N/V, Constipation, Diarrhea      MEDICATIONS  (STANDING):  artificial tears (preservative free) Ophthalmic Solution 1 Drop(s) Both EYES two times a day  ascorbic acid 500 milliGRAM(s) Oral daily  ciprofloxacin   IVPB 200 milliGRAM(s) IV Intermittent every 24 hours  lactated ringers. 500 milliLiter(s) (75 mL/Hr) IV Continuous <Continuous>  levothyroxine 25 MICROGram(s) Oral daily  melatonin 3 milliGRAM(s) Oral at bedtime  metoprolol tartrate 25 milliGRAM(s) Oral two times a day  multivitamin 1 Tablet(s) Oral daily  pantoprazole  Injectable 40 milliGRAM(s) IV Push daily  rosuvastatin 10 milliGRAM(s) Oral at bedtime  senna 2 Tablet(s) Oral at bedtime  venlafaxine XR. 75 milliGRAM(s) Oral daily    MEDICATIONS  (PRN):  diphenhydrAMINE 25 milliGRAM(s) Oral daily PRN ppx      I&O's Summary    2022 07:01  -  2022 07:00  --------------------------------------------------------  IN: 180 mL / OUT: 0 mL / NET: 180 mL    2022 07:01  -  2022 06:34  --------------------------------------------------------  IN: 540 mL / OUT: 650 mL / NET: -110 mL        Vital Signs Last 24 Hrs  T(C): 37.3 (2022 04:35), Max: 37.3 (2022 04:35)  T(F): 99.2 (2022 04:35), Max: 99.2 (2022 04:35)  HR: 69 (2022 04:35) (63 - 69)  BP: 148/67 (2022 04:35) (120/54 - 157/67)  BP(mean): --  RR: 18 (2022 04:35) (18 - 18)  SpO2: 100% (2022 04:35) (97% - 100%)    =================PHYSICAL EXAM=================    PHYSICAL EXAM:  GENERAL: NAD, lying in bed comfortably  HEAD:  Atraumatic, Normocephalic  EYES: EOMI, PERRLA, conjunctiva and sclera clear  ENT: Moist mucous membranes  NECK: Supple, No JVD  CHEST/LUNG: Clear to auscultation bilaterally; No rales, rhonchi, wheezing, or rubs. Unlabored respirations  HEART: Regular rate and rhythm; No murmurs, rubs, or gallops  ABDOMEN: Bowel sounds present; Soft, Nontender, Nondistended. No hepatomegally  EXTREMITIES:  2+ Peripheral Pulses, brisk capillary refill. No clubbing, cyanosis, or edema  NERVOUS SYSTEM:  Alert & Oriented X3, speech clear. No deficits   MSK: FROM all 4 extremities, full and equal strength  SKIN: No rashes or lesions    =================================================    LABS:                        7.7    9.69  )-----------( 234      ( 2022 22:49 )             23.9     Auto Eosinophil # x     / Auto Eosinophil % x     / Auto Neutrophil # x     / Auto Neutrophil % x     / BANDS % x                            8.2    9.92  )-----------( 214      ( 2022 07:14 )             25.3     Auto Eosinophil # x     / Auto Eosinophil % x     / Auto Neutrophil # x     / Auto Neutrophil % x     / BANDS % x                            7.2    9.08  )-----------( 189      ( 2022 23:04 )             21.6     Auto Eosinophil # x     / Auto Eosinophil % x     / Auto Neutrophil # x     / Auto Neutrophil % x     / BANDS % x            141  |  105  |  55<H>  ----------------------------<  143<H>  4.0   |  26  |  1.64<H>      136  |  102  |  51<H>  ----------------------------<  246<H>  4.3   |  21<L>  |  1.42<H>      137  |  103  |  63<H>  ----------------------------<  185<H>  4.2   |  26  |  1.79<H>    Ca    8.7      2022 07:12  Mg     2.1       Phos  3.6       TPro  5.0<L>  /  Alb  2.7<L>  /  TBili  0.5  /  DBili  x   /  AST  20  /  ALT  13  /  AlkPhos  62    TPro  4.1<L>  /  Alb  2.1<L>  /  TBili  0.3  /  DBili  x   /  AST  17  /  ALT  11  /  AlkPhos  49    TPro  5.0<L>  /  Alb  2.5<L>  /  TBili  0.4  /  DBili  x   /  AST  12  /  ALT  14  /  AlkPhos  62            Urinalysis Basic - ( 2022 22:50 )    Color: Light Yellow / Appearance: Slightly Turbid / S.009 / pH: x  Gluc: x / Ketone: Negative  / Bili: Negative / Urobili: Negative   Blood: x / Protein: Trace / Nitrite: Negative   Leuk Esterase: Large / RBC: 3 /hpf / WBC 71 /HPF   Sq Epi: x / Non Sq Epi: 1 /hpf / Bacteria: Many      Lactate, Blood: 1.2 mmol/L ( @ 23:04)        RADIOLOGY & ADDITIONAL TESTS:    Imaging Personally Reviewed:    Consultant(s) Notes Reviewed:      Care Discussed with Consultants/Other Providers:   PROGRESS NOTE:    Lelo Burgess MD  Internal Medicine PGY-1      Patient is a 89y old  Female who presents with a chief complaint of gi bleed (2022 13:30)      SUBJECTIVE / OVERNIGHT EVENTS: No acute overnight events. Pt seen and examined. Per son, patient was disoriented last night and has been sleepy. Patient reports fatigue but denies dizziness, HA or pain.      MEDICATIONS  (STANDING):  artificial tears (preservative free) Ophthalmic Solution 1 Drop(s) Both EYES two times a day  ascorbic acid 500 milliGRAM(s) Oral daily  ciprofloxacin   IVPB 200 milliGRAM(s) IV Intermittent every 24 hours  lactated ringers. 500 milliLiter(s) (75 mL/Hr) IV Continuous <Continuous>  levothyroxine 25 MICROGram(s) Oral daily  melatonin 3 milliGRAM(s) Oral at bedtime  metoprolol tartrate 25 milliGRAM(s) Oral two times a day  multivitamin 1 Tablet(s) Oral daily  pantoprazole  Injectable 40 milliGRAM(s) IV Push daily  rosuvastatin 10 milliGRAM(s) Oral at bedtime  senna 2 Tablet(s) Oral at bedtime  venlafaxine XR. 75 milliGRAM(s) Oral daily    MEDICATIONS  (PRN):  diphenhydrAMINE 25 milliGRAM(s) Oral daily PRN ppx      I&O's Summary    2022 07:01  -  2022 07:00  --------------------------------------------------------  IN: 180 mL / OUT: 0 mL / NET: 180 mL    2022 07:01  -  2022 06:34  --------------------------------------------------------  IN: 540 mL / OUT: 650 mL / NET: -110 mL        Vital Signs Last 24 Hrs  T(C): 37.3 (2022 04:35), Max: 37.3 (2022 04:35)  T(F): 99.2 (2022 04:35), Max: 99.2 (2022 04:35)  HR: 69 (2022 04:35) (63 - 69)  BP: 148/67 (2022 04:35) (120/54 - 157/67)  BP(mean): --  RR: 18 (2022 04:35) (18 - 18)  SpO2: 100% (2022 04:35) (97% - 100%)    =================PHYSICAL EXAM=================    PHYSICAL EXAM:  GENERAL: NAD, lying in bed comfortably  HEAD:  Atraumatic, Normocephalic  EYES: EOMI, PERRLA, conjunctiva and sclera clear  ENT: Moist mucous membranes  NECK: Supple, No JVD  CHEST/LUNG: Clear to auscultation bilaterally; No rales, rhonchi, wheezing, or rubs. Unlabored respirations  HEART: Regular rate and rhythm; No murmurs, rubs, or gallops  ABDOMEN: Bowel sounds present; Soft, Nontender, Nondistended. No hepatomegally  EXTREMITIES:  2+ Peripheral Pulses, brisk capillary refill. No clubbing, cyanosis, or edema  NERVOUS SYSTEM:  Alert & Oriented X3, speech clear. No deficits   MSK: FROM all 4 extremities, full and equal strength  SKIN: No rashes or lesions    =================================================    LABS:                        7.7    9.69  )-----------( 234      ( 2022 22:49 )             23.9     Auto Eosinophil # x     / Auto Eosinophil % x     / Auto Neutrophil # x     / Auto Neutrophil % x     / BANDS % x                            8.2    9.92  )-----------( 214      ( 2022 07:14 )             25.3     Auto Eosinophil # x     / Auto Eosinophil % x     / Auto Neutrophil # x     / Auto Neutrophil % x     / BANDS % x                            7.2    9.08  )-----------( 189      ( 2022 23:04 )             21.6     Auto Eosinophil # x     / Auto Eosinophil % x     / Auto Neutrophil # x     / Auto Neutrophil % x     / BANDS % x            141  |  105  |  55<H>  ----------------------------<  143<H>  4.0   |  26  |  1.64<H>      136  |  102  |  51<H>  ----------------------------<  246<H>  4.3   |  21<L>  |  1.42<H>      137  |  103  |  63<H>  ----------------------------<  185<H>  4.2   |  26  |  1.79<H>    Ca    8.7      2022 07:12  Mg     2.1       Phos  3.6       TPro  5.0<L>  /  Alb  2.7<L>  /  TBili  0.5  /  DBili  x   /  AST  20  /  ALT  13  /  AlkPhos  62    TPro  4.1<L>  /  Alb  2.1<L>  /  TBili  0.3  /  DBili  x   /  AST  17  /  ALT  11  /  AlkPhos  49    TPro  5.0<L>  /  Alb  2.5<L>  /  TBili  0.4  /  DBili  x   /  AST  12  /  ALT  14  /  AlkPhos  62            Urinalysis Basic - ( 2022 22:50 )    Color: Light Yellow / Appearance: Slightly Turbid / S.009 / pH: x  Gluc: x / Ketone: Negative  / Bili: Negative / Urobili: Negative   Blood: x / Protein: Trace / Nitrite: Negative   Leuk Esterase: Large / RBC: 3 /hpf / WBC 71 /HPF   Sq Epi: x / Non Sq Epi: 1 /hpf / Bacteria: Many      Lactate, Blood: 1.2 mmol/L ( @ 23:04)        RADIOLOGY & ADDITIONAL TESTS:    Imaging Personally Reviewed:    Consultant(s) Notes Reviewed:      Care Discussed with Consultants/Other Providers:   PROGRESS NOTE:    Lelo Burgess MD  Internal Medicine PGY-1      Patient is a 89y old  Female who presents with a chief complaint of gi bleed (2022 13:30)      SUBJECTIVE / OVERNIGHT EVENTS: Per nursing, patient had episode of large BM with maeve blood overnight. Patient was assessed bedside and AO3, in NAD, VSS, and CBC 7.7. Patient reports fatigue but denies dizziness, HA or chest pain.      MEDICATIONS  (STANDING):  artificial tears (preservative free) Ophthalmic Solution 1 Drop(s) Both EYES two times a day  ascorbic acid 500 milliGRAM(s) Oral daily  ciprofloxacin   IVPB 200 milliGRAM(s) IV Intermittent every 24 hours  lactated ringers. 500 milliLiter(s) (75 mL/Hr) IV Continuous <Continuous>  levothyroxine 25 MICROGram(s) Oral daily  melatonin 3 milliGRAM(s) Oral at bedtime  metoprolol tartrate 25 milliGRAM(s) Oral two times a day  multivitamin 1 Tablet(s) Oral daily  pantoprazole  Injectable 40 milliGRAM(s) IV Push daily  rosuvastatin 10 milliGRAM(s) Oral at bedtime  senna 2 Tablet(s) Oral at bedtime  venlafaxine XR. 75 milliGRAM(s) Oral daily    MEDICATIONS  (PRN):  diphenhydrAMINE 25 milliGRAM(s) Oral daily PRN ppx      I&O's Summary    2022 07:01  -  2022 07:00  --------------------------------------------------------  IN: 180 mL / OUT: 0 mL / NET: 180 mL    2022 07:01  -  2022 06:34  --------------------------------------------------------  IN: 540 mL / OUT: 650 mL / NET: -110 mL        Vital Signs Last 24 Hrs  T(C): 37.3 (2022 04:35), Max: 37.3 (2022 04:35)  T(F): 99.2 (2022 04:35), Max: 99.2 (2022 04:35)  HR: 69 (2022 04:35) (63 - 69)  BP: 148/67 (2022 04:35) (120/54 - 157/67)  BP(mean): --  RR: 18 (2022 04:35) (18 - 18)  SpO2: 100% (2022 04:35) (97% - 100%)    =================PHYSICAL EXAM=================    PHYSICAL EXAM:  GENERAL: NAD  PSYCH: A&O x3  CHEST/LUNG: clear to auscultation bilaterally  HEART: regular rate and rhythm, no murmurs  ABDOMEN: nontender to palpation, no rebound tenderness/guarding  EXTREMITIES: no edema on bilateral LE  NEUROLOGY: no focal neurologic deficit  SKIN: No rashes or lesions  =================================================    LABS:                        7.7    9.69  )-----------( 234      ( 2022 22:49 )             23.9     Auto Eosinophil # x     / Auto Eosinophil % x     / Auto Neutrophil # x     / Auto Neutrophil % x     / BANDS % x                            8.2    9.92  )-----------( 214      ( 2022 07:14 )             25.3     Auto Eosinophil # x     / Auto Eosinophil % x     / Auto Neutrophil # x     / Auto Neutrophil % x     / BANDS % x                            7.2    9.08  )-----------( 189      ( 2022 23:04 )             21.6     Auto Eosinophil # x     / Auto Eosinophil % x     / Auto Neutrophil # x     / Auto Neutrophil % x     / BANDS % x        06-    141  |  105  |  55<H>  ----------------------------<  143<H>  4.0   |  26  |  1.64<H>  06-    136  |  102  |  51<H>  ----------------------------<  246<H>  4.3   |  21<L>  |  1.42<H>      137  |  103  |  63<H>  ----------------------------<  185<H>  4.2   |  26  |  1.79<H>    Ca    8.7      2022 07:12  Mg     2.1       Phos  3.6       TPro  5.0<L>  /  Alb  2.7<L>  /  TBili  0.5  /  DBili  x   /  AST  20  /  ALT  13  /  AlkPhos  62    TPro  4.1<L>  /  Alb  2.1<L>  /  TBili  0.3  /  DBili  x   /  AST  17  /  ALT  11  /  AlkPhos  49    TPro  5.0<L>  /  Alb  2.5<L>  /  TBili  0.4  /  DBili  x   /  AST  12  /  ALT  14  /  AlkPhos  62            Urinalysis Basic - ( 2022 22:50 )    Color: Light Yellow / Appearance: Slightly Turbid / S.009 / pH: x  Gluc: x / Ketone: Negative  / Bili: Negative / Urobili: Negative   Blood: x / Protein: Trace / Nitrite: Negative   Leuk Esterase: Large / RBC: 3 /hpf / WBC 71 /HPF   Sq Epi: x / Non Sq Epi: 1 /hpf / Bacteria: Many      Lactate, Blood: 1.2 mmol/L ( @ 23:04)        RADIOLOGY & ADDITIONAL TESTS:    Imaging Personally Reviewed:    Consultant(s) Notes Reviewed:      Care Discussed with Consultants/Other Providers:

## 2022-06-28 NOTE — PROGRESS NOTE ADULT - PROBLEM SELECTOR PLAN 2
Resolved.   Most likely 2/2 to eliquis initiation and underlying GI pathology. s/p tagged scan with R. sided bleeding, s/p colonoscopy w/ poor prep and no active signs of bleeding. No active bleeding for one week.     Plan:  - changed pantoprazole to IV  - Maintain active type and screen   - Maintain 2 large bore IVs  - Transfuse to hemoglobin <7 --> s/p 7 PRBC transfusions, no bloody BM since colonoscopy  - GI following - notified about hb drop and melena today (6/26) - rec NPO after mn for possible scope tomorrow (cancel NPO tomorrow if no planned scope per private GI).    - Trend CBC qd Resolved.   Most likely 2/2 to eliquis initiation and underlying GI pathology. s/p tagged scan with R. sided bleeding, s/p colonoscopy w/ poor prep and no active signs of bleeding. No active bleeding for one week.     Plan:  - changed pantoprazole to IV  - Maintain active type and screen   - Maintain 2 large bore IVs  - Transfuse to hemoglobin <7 --> s/p 7 PRBC transfusions, no bloody BM since colonoscopy  - GI following - notified about hb drop and melena today (6/26) - rec NPO after mn for possible scope tomorrow (cancel NPO tomorrow if no planned scope per private GI).    - Trend CBC qd  - endoscopy 6/28 revealed colonic angiodysplasia lesions that were cauterized

## 2022-06-28 NOTE — PROGRESS NOTE ADULT - ATTENDING COMMENTS
Patient seen and examined at bedside. No acute events overnight. Doing okay after EGD/Colonoscopy. Patient had 2 colonic angiodysplasia lesions cauterized. Diet as tolerated and monitor Hg daily. If stable by 6/30 likely discharge then. Son is in agreement. Finishing antibiotics for UTI. Needs outpatient cardiology follow up.

## 2022-06-28 NOTE — PROGRESS NOTE ADULT - ASSESSMENT
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 She has a history of HTN, HLD, DM, aortic stenosis, breast cancer s/p lumpectomy and CKD (Dr. Escobar). She was recently started on Eliquis and beta-blockers for atrial fibrillation. She was hospitalized in March with a UTI complicated by ELISA due to bactrim. She was admitted in May with dyspnea, hypoxemia, gurgling in her chest and leg swelling. She was initially treated with zosyn for a possible right lower lobe pneumonia seen on CXR until further evaluation revealed pulmonary edema with pleural effusions and atelectasis. ECHO -> preserved LVEF - moderate-severe aortic stenosis - moderate diastolic dysfunction - bilateral pleural effusions; CT -> pulmonary edema with small pleural effusions and partial lower lobe compressive atelectasis. She was diuresed and discharged on norvasc/toprol XL/lipitor/eliquis. The patient was diagnosed with COVID on May 22nd and was treated with a course of Paxlovid. She now returns to the ER with shortness of breath and weakness with minimal exertion. She has fatigue, malaise and pallor. She has been noted to have blood in her stools and black colored stools (after eating blueberries). Her daughter measured her blood pressure has 90/60 from a baseline of 120/90. She currently has no shortness of breath or hypoxemia on a 2lpm nasal canula. She has no cough, sputum production, chest congestion or wheeze. No fevers, chills or sweats. She has been found to be guaiac positive. RVP PCR remains positive for COVID. The patient has received Kcentra and 2 units PRBCs for severe anemia (4.8/16/6).     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 and respiratory muscle weakness limiting diaphragmatic excursion and chest wall expansion  4) no evidence of bronchospasm or pulmonary edema/pleural effusions    ELISA is due to intravascular volume depletion    nasal COVID PCR positivity more likely represents shedding of non-viable viral particles than ongoing infection in this immunocompetent patient    6/27 - hemodynamically stable and without tachycardia; ongoing lower GI bleeding with recurrent anemia -> has now received 9 units of PRBCs; episode of "lethargy" over the weekend -> head CT without acute changes; urinary frequency with cloudy urine in the canister; no shortness of breath or hypoxemia on room air; no cough, sputum production, hemoptysis, chest congestion or wheeze; no fevers, chills or sweats; no chest pain/pressure or palpitations; colonoscopy -> poor prep - evidence of recent but not active bleeding - severe diverticulosis      PLAN/RECOMMENDATIONS:    stable oxygenation on room air  no indication for airborne or contact isolation - the patient was first diagnosed with COVID on May 22nd  observe off systemic steroids, antibiotics and pulmonary medications  head of bed elevation greater than 45 degrees at all times  incentive spirometry  will give a dose of levaquin (allergic to cephalosporins) for the untreated pan-sensitive E coli UTI with ongoing evidence of a UTI on repeat U/A - perhaps infection explains her "lethargy" -> transitioned to cipro   GI evaluation noted     ongoing GI bleed     has now received 9 units of PRBCs - bleeding scan positive for active bleeding in the ascending colon -> felt to be at high rish for bowel ischemia and worsening renal function with an interventional radiology procedure -> colonoscopy -> poor prep - evidence of recent but not active bleeding - severe diverticulosis    holding A/C    protonix 40mg daily    senna to soften stools and prevent straining    awaiting upper and lower endoscopy today  cardiac meds: lopressor/crestor - holding A/C - considering amiodarone for rhythm control  glucose control  DVT prophylaxis - SCDs  venlafaxine XR/melatonin at bedtime  synthroid    Will follow with you. Plan of care discussed with the patient and her family at bedside and with GI. Her respiratory status remains stable and there is no contraindication to the proposed procedure    Brayden Benavides MD, [facility]  [phone]  Pulmonary Medicine

## 2022-06-28 NOTE — PRE PROCEDURE NOTE - TIME BILLING
Vicki Mejia MD, FACP, FACG, AGAF  [facility]  [phone]     After hours and weekend coverage GI service : [phone]

## 2022-06-28 NOTE — CHART NOTE - NSCHARTNOTEFT_GEN_A_CORE
Colonoscopy report in [place]    poor prep and "old" blood in colon  no active bleeding encountered  +diverticulosis - primarily Left sided    Discussed with pt's family and house staff  Monitor CBC and BMs  supportive care/ transfuse PRN  No further AC planned as d/w team and family  OK for PO diet    Marek Cormier PA-C    [facility]  [phone]  After hours and weekend coverage [phone]
discussed with IR  given Cr ~1.5 and HD stability, would require CTA to localize site/vessel and additional dye load with angio  IR not planning angio/intervention    Discussed with pt and son at bedside -   given right side colon source on nuc bleed scan, would require good prep and relatively low yield of localizing /intervention on diverticular bleed source (if found site of bleeding/clot, can place clip; has extensive diverticulosis and can still have recurrent bleeding from other sites)    Family will discuss tonight and give final decision tomorrow re: plan of action  in meantime - please check CBC q 8 hour x 24 hours, and PRN for active/brisk bleeding (BRBPR or maroon stools)  transfuse PRN  monitor BMs - expect passage of "old"/retained blood from GI tract    all questions answered  Will update house staff  Discussed with IR team    Marek Cormier PA-C    [facility]  [phone]  After hours and weekend coverage [phone]
notified by house staff of + bleeding scan  bleeding in prox ascending and mid ascending colon    Please consult IR for angio +/- embolization  pt and family really do not want to do prep/colon  given + bleeding in right colon, would need to get well prepped and previously has had multiple issues with prep (last colon >10 years ago)    Discussed with house staff    Marek Cormier PA-C    [facility]  [phone]  After hours and weekend coverage [phone]
As per my conversation via TEAMS with Dr. Veto Flores from Cardiology, the patient is able to lye down flat, comfortably and has no signs/symptoms of CHF. Dr. Flores feels that the patient is optimized for EGD/Colonoscopy this am.
Paged following large bowel movement with maeve blood overnight. Patient currently admitted for GI bleed and scheduled for EGD and colonoscopy today (6/28/22). Patient was seen and assessed at bedside. She was alert and oriented, and in no apparent distress. She denied shortness of breath, chest pain, lightheadedness. Vital signs were stable. Patient ordered for new T+S, and repeat CBC showed hemoglobin of 7.7.    Edmundo Wong MD  PGY1 Internal Medicine
full note to follow  \  melena yesterday with drop in Hgb - improved after transfusio (#9 this admission)    family wants repeat endoscopic evaluation to assess for possible source ?recurrent LGIB (suspected diverticular) as well as r/o UGI source    rectal - soft melenic  stool  will prep today for EGD + Colonoscopy in AM  po clears and bowel prep (will order)  covid PCR (will order)  NPO after MN      Discussed with pt and son at bedside    Marek Cormier PA-C    [facility]  [phone]  After hours and weekend coverage [phone]
pt seen and examined  full consult to follow    no further painless rectal bleeding  hgb 4.8 on admission  admitted with BRBPR with clots  s/p 1 unit PRBCs, awaiting 2nd unit  no bleeding since admission  started on AC end of 4/2022 2/2 PAF  recent COVID+ (end of 5/2022) - s/p paxlovid (AC held for paxlovid and resumed few days after rx completed)  now off AC    last colonoscopy ~10-15 years ago - reported poor prep despite completing PO prep; rec no further screening colonoscopy given advanced age  no personal or FH of colon CA  +diverticulosis on CT    likely s/p diverticular bleeding given history/presentation  off AC 2/2 risk of recurrent bleeding  transfuse as ordered  given no further/ongoing rectal bleeding and pt's advanced age; family currently leaning towards supportive care and hold off on colonoscopy. Son to discuss with siblings and will give final decision this afternoon    ok for PO clears    Discussed with house staff    Marek Cormier PA-C    [facility]  [phone]  After hours and weekend coverage [phone]

## 2022-06-29 LAB
ANION GAP SERPL CALC-SCNC: 7 MMOL/L — SIGNIFICANT CHANGE UP (ref 5–17)
BUN SERPL-MCNC: 33 MG/DL — HIGH (ref 7–23)
CALCIUM SERPL-MCNC: 8.5 MG/DL — SIGNIFICANT CHANGE UP (ref 8.4–10.5)
CHLORIDE SERPL-SCNC: 105 MMOL/L — SIGNIFICANT CHANGE UP (ref 96–108)
CO2 SERPL-SCNC: 27 MMOL/L — SIGNIFICANT CHANGE UP (ref 22–31)
CREAT SERPL-MCNC: 1.44 MG/DL — HIGH (ref 0.5–1.3)
EGFR: 34 ML/MIN/1.73M2 — LOW
GLUCOSE SERPL-MCNC: 209 MG/DL — HIGH (ref 70–99)
HCT VFR BLD CALC: 20.3 % — CRITICAL LOW (ref 34.5–45)
HGB BLD-MCNC: 6.4 G/DL — CRITICAL LOW (ref 11.5–15.5)
MAGNESIUM SERPL-MCNC: 1.8 MG/DL — SIGNIFICANT CHANGE UP (ref 1.6–2.6)
MCHC RBC-ENTMCNC: 29.4 PG — SIGNIFICANT CHANGE UP (ref 27–34)
MCHC RBC-ENTMCNC: 31.5 GM/DL — LOW (ref 32–36)
MCV RBC AUTO: 93.1 FL — SIGNIFICANT CHANGE UP (ref 80–100)
NRBC # BLD: 0 /100 WBCS — SIGNIFICANT CHANGE UP (ref 0–0)
PHOSPHATE SERPL-MCNC: 3.3 MG/DL — SIGNIFICANT CHANGE UP (ref 2.5–4.5)
PLATELET # BLD AUTO: 195 K/UL — SIGNIFICANT CHANGE UP (ref 150–400)
POTASSIUM SERPL-MCNC: 3.8 MMOL/L — SIGNIFICANT CHANGE UP (ref 3.5–5.3)
POTASSIUM SERPL-SCNC: 3.8 MMOL/L — SIGNIFICANT CHANGE UP (ref 3.5–5.3)
RBC # BLD: 2.18 M/UL — LOW (ref 3.8–5.2)
RBC # FLD: 16.3 % — HIGH (ref 10.3–14.5)
SODIUM SERPL-SCNC: 139 MMOL/L — SIGNIFICANT CHANGE UP (ref 135–145)
WBC # BLD: 6.02 K/UL — SIGNIFICANT CHANGE UP (ref 3.8–10.5)
WBC # FLD AUTO: 6.02 K/UL — SIGNIFICANT CHANGE UP (ref 3.8–10.5)

## 2022-06-29 PROCEDURE — 99233 SBSQ HOSP IP/OBS HIGH 50: CPT

## 2022-06-29 PROCEDURE — 99233 SBSQ HOSP IP/OBS HIGH 50: CPT | Mod: GC

## 2022-06-29 PROCEDURE — 99232 SBSQ HOSP IP/OBS MODERATE 35: CPT | Mod: FS

## 2022-06-29 RX ORDER — ACETAMINOPHEN 500 MG
650 TABLET ORAL ONCE
Refills: 0 | Status: COMPLETED | OUTPATIENT
Start: 2022-06-29 | End: 2022-06-29

## 2022-06-29 RX ORDER — DIPHENHYDRAMINE HCL 50 MG
25 CAPSULE ORAL ONCE
Refills: 0 | Status: COMPLETED | OUTPATIENT
Start: 2022-06-29 | End: 2022-06-29

## 2022-06-29 RX ADMIN — Medication 3 MILLIGRAM(S): at 22:07

## 2022-06-29 RX ADMIN — Medication 25 MICROGRAM(S): at 06:09

## 2022-06-29 RX ADMIN — ROSUVASTATIN CALCIUM 10 MILLIGRAM(S): 5 TABLET ORAL at 22:07

## 2022-06-29 RX ADMIN — Medication 25 MILLIGRAM(S): at 07:57

## 2022-06-29 RX ADMIN — SENNA PLUS 2 TABLET(S): 8.6 TABLET ORAL at 22:07

## 2022-06-29 RX ADMIN — Medication 75 MILLIGRAM(S): at 12:18

## 2022-06-29 RX ADMIN — Medication 100 MILLIGRAM(S): at 22:06

## 2022-06-29 RX ADMIN — Medication 500 MILLIGRAM(S): at 12:18

## 2022-06-29 RX ADMIN — Medication 25 MILLIGRAM(S): at 06:09

## 2022-06-29 RX ADMIN — Medication 1 TABLET(S): at 12:18

## 2022-06-29 RX ADMIN — Medication 1 DROP(S): at 06:09

## 2022-06-29 RX ADMIN — Medication 25 MILLIGRAM(S): at 17:17

## 2022-06-29 RX ADMIN — Medication 1 DROP(S): at 18:09

## 2022-06-29 RX ADMIN — Medication 650 MILLIGRAM(S): at 07:58

## 2022-06-29 RX ADMIN — PANTOPRAZOLE SODIUM 40 MILLIGRAM(S): 20 TABLET, DELAYED RELEASE ORAL at 06:09

## 2022-06-29 NOTE — PROGRESS NOTE ADULT - SUBJECTIVE AND OBJECTIVE BOX
PROGRESS NOTE:    Lelo Burgess MD  Internal Medicine PGY-1      Patient is a 89y old  Female who presents with a chief complaint of gi bleed (29 Jun 2022 07:24)      SUBJECTIVE / OVERNIGHT EVENTS: No acute overnight events. Pt seen and examined. Denies fevers, chills, CP, SOB, Abdominal pain, N/V, Constipation, Diarrhea      MEDICATIONS  (STANDING):  acetaminophen     Tablet .. 650 milliGRAM(s) Oral once  artificial tears (preservative free) Ophthalmic Solution 1 Drop(s) Both EYES two times a day  ascorbic acid 500 milliGRAM(s) Oral daily  ciprofloxacin   IVPB 200 milliGRAM(s) IV Intermittent every 24 hours  diphenhydrAMINE 25 milliGRAM(s) Oral once  levothyroxine 25 MICROGram(s) Oral daily  melatonin 3 milliGRAM(s) Oral at bedtime  metoprolol tartrate 25 milliGRAM(s) Oral two times a day  multivitamin 1 Tablet(s) Oral daily  pantoprazole    Tablet 40 milliGRAM(s) Oral before breakfast  rosuvastatin 10 milliGRAM(s) Oral at bedtime  senna 2 Tablet(s) Oral at bedtime  venlafaxine XR. 75 milliGRAM(s) Oral daily    MEDICATIONS  (PRN):      I&O's Summary    28 Jun 2022 07:01  -  29 Jun 2022 07:00  --------------------------------------------------------  IN: 398 mL / OUT: 0 mL / NET: 398 mL        Vital Signs Last 24 Hrs  T(C): 36.4 (29 Jun 2022 04:48), Max: 37.2 (28 Jun 2022 08:40)  T(F): 97.6 (29 Jun 2022 04:48), Max: 99 (28 Jun 2022 08:40)  HR: 64 (29 Jun 2022 04:48) (54 - 73)  BP: 127/64 (29 Jun 2022 04:48) (127/64 - 160/49)  BP(mean): --  RR: 18 (29 Jun 2022 04:48) (18 - 19)  SpO2: 98% (29 Jun 2022 04:48) (86% - 100%)    =================PHYSICAL EXAM=================    PHYSICAL EXAM:  GENERAL: NAD, lying in bed comfortably  HEAD:  Atraumatic, Normocephalic  EYES: EOMI, PERRLA, conjunctiva and sclera clear  ENT: Moist mucous membranes  NECK: Supple, No JVD  CHEST/LUNG: Clear to auscultation bilaterally; No rales, rhonchi, wheezing, or rubs. Unlabored respirations  HEART: Regular rate and rhythm; No murmurs, rubs, or gallops  ABDOMEN: Bowel sounds present; Soft, Nontender, Nondistended. No hepatomegally  EXTREMITIES:  2+ Peripheral Pulses, brisk capillary refill. No clubbing, cyanosis, or edema  NERVOUS SYSTEM:  Alert & Oriented X3, speech clear. No deficits   MSK: FROM all 4 extremities, full and equal strength  SKIN: No rashes or lesions    =================================================    LABS:                        6.4    6.02  )-----------( 195      ( 29 Jun 2022 06:41 )             20.3     Auto Eosinophil # x     / Auto Eosinophil % x     / Auto Neutrophil # x     / Auto Neutrophil % x     / BANDS % x                            7.7    7.66  )-----------( 245      ( 28 Jun 2022 07:26 )             24.0     Auto Eosinophil # x     / Auto Eosinophil % x     / Auto Neutrophil # x     / Auto Neutrophil % x     / BANDS % x                            7.7    9.69  )-----------( 234      ( 27 Jun 2022 22:49 )             23.9     Auto Eosinophil # x     / Auto Eosinophil % x     / Auto Neutrophil # x     / Auto Neutrophil % x     / BANDS % x        06-29    139  |  105  |  33<H>  ----------------------------<  209<H>  3.8   |  27  |  1.44<H>  06-28    143  |  106  |  42<H>  ----------------------------<  168<H>  3.8   |  28  |  1.36<H>    Ca    8.5      29 Jun 2022 06:42  Mg     1.8     06-29  Phos  3.3     06-29  TPro  5.0<L>  /  Alb  2.5<L>  /  TBili  0.3  /  DBili  x   /  AST  20  /  ALT  15  /  AlkPhos  57  06-28            Lactate, Blood: 1.2 mmol/L (06-26 @ 23:04)        RADIOLOGY & ADDITIONAL TESTS:    Imaging Personally Reviewed:    Consultant(s) Notes Reviewed:      Care Discussed with Consultants/Other Providers:   PROGRESS NOTE:    Lelo Burgess MD  Internal Medicine PGY-1      Patient is a 89y old  Female who presents with a chief complaint of gi bleed (29 Jun 2022 07:24)      SUBJECTIVE / OVERNIGHT EVENTS: Per nursing, patient did not have a BM overnight. Patient reports good appetite but denies dizziness, HA or chest pain.      MEDICATIONS  (STANDING):  acetaminophen     Tablet .. 650 milliGRAM(s) Oral once  artificial tears (preservative free) Ophthalmic Solution 1 Drop(s) Both EYES two times a day  ascorbic acid 500 milliGRAM(s) Oral daily  ciprofloxacin   IVPB 200 milliGRAM(s) IV Intermittent every 24 hours  diphenhydrAMINE 25 milliGRAM(s) Oral once  levothyroxine 25 MICROGram(s) Oral daily  melatonin 3 milliGRAM(s) Oral at bedtime  metoprolol tartrate 25 milliGRAM(s) Oral two times a day  multivitamin 1 Tablet(s) Oral daily  pantoprazole    Tablet 40 milliGRAM(s) Oral before breakfast  rosuvastatin 10 milliGRAM(s) Oral at bedtime  senna 2 Tablet(s) Oral at bedtime  venlafaxine XR. 75 milliGRAM(s) Oral daily    MEDICATIONS  (PRN):      I&O's Summary    28 Jun 2022 07:01  -  29 Jun 2022 07:00  --------------------------------------------------------  IN: 398 mL / OUT: 0 mL / NET: 398 mL        Vital Signs Last 24 Hrs  T(C): 36.4 (29 Jun 2022 04:48), Max: 37.2 (28 Jun 2022 08:40)  T(F): 97.6 (29 Jun 2022 04:48), Max: 99 (28 Jun 2022 08:40)  HR: 64 (29 Jun 2022 04:48) (54 - 73)  BP: 127/64 (29 Jun 2022 04:48) (127/64 - 160/49)  BP(mean): --  RR: 18 (29 Jun 2022 04:48) (18 - 19)  SpO2: 98% (29 Jun 2022 04:48) (86% - 100%)    =================PHYSICAL EXAM=================    PHYSICAL EXAM:  GENERAL: NAD, lying in bed comfortably  HEAD:  Atraumatic, Normocephalic  EYES: EOMI, PERRLA, conjunctiva and sclera clear  ENT: Moist mucous membranes  NECK: Supple, No JVD  CHEST/LUNG: Clear to auscultation bilaterally; No rales, rhonchi, wheezing, or rubs. Unlabored respirations  HEART: Regular rate and rhythm; No murmurs, rubs, or gallops  ABDOMEN: Bowel sounds present; Soft, Nontender, Nondistended. No hepatomegally  EXTREMITIES:  2+ Peripheral Pulses, brisk capillary refill. No clubbing, cyanosis, or edema  NERVOUS SYSTEM:  Alert & Oriented X3, speech clear. No deficits   MSK: FROM all 4 extremities, full and equal strength  SKIN: No rashes or lesions    =================================================    LABS:                        6.4    6.02  )-----------( 195      ( 29 Jun 2022 06:41 )             20.3     Auto Eosinophil # x     / Auto Eosinophil % x     / Auto Neutrophil # x     / Auto Neutrophil % x     / BANDS % x                            7.7    7.66  )-----------( 245      ( 28 Jun 2022 07:26 )             24.0     Auto Eosinophil # x     / Auto Eosinophil % x     / Auto Neutrophil # x     / Auto Neutrophil % x     / BANDS % x                            7.7    9.69  )-----------( 234      ( 27 Jun 2022 22:49 )             23.9     Auto Eosinophil # x     / Auto Eosinophil % x     / Auto Neutrophil # x     / Auto Neutrophil % x     / BANDS % x        06-29    139  |  105  |  33<H>  ----------------------------<  209<H>  3.8   |  27  |  1.44<H>  06-28    143  |  106  |  42<H>  ----------------------------<  168<H>  3.8   |  28  |  1.36<H>    Ca    8.5      29 Jun 2022 06:42  Mg     1.8     06-29  Phos  3.3     06-29  TPro  5.0<L>  /  Alb  2.5<L>  /  TBili  0.3  /  DBili  x   /  AST  20  /  ALT  15  /  AlkPhos  57  06-28            Lactate, Blood: 1.2 mmol/L (06-26 @ 23:04)        RADIOLOGY & ADDITIONAL TESTS:    Imaging Personally Reviewed:    Consultant(s) Notes Reviewed:      Care Discussed with Consultants/Other Providers:

## 2022-06-29 NOTE — PROGRESS NOTE ADULT - SUBJECTIVE AND OBJECTIVE BOX
Patient is a 89y old  Female who presents with a chief complaint of gi bleed (29 Jun 2022 10:38)    Patient seen this morning. Receiving 1 unit PRBC's. Daughter at bedside. No new complaints.    MEDICATIONS  (STANDING):  artificial tears (preservative free) Ophthalmic Solution 1 Drop(s) Both EYES two times a day  ascorbic acid 500 milliGRAM(s) Oral daily  ciprofloxacin   IVPB 200 milliGRAM(s) IV Intermittent every 24 hours  levothyroxine 25 MICROGram(s) Oral daily  melatonin 3 milliGRAM(s) Oral at bedtime  metoprolol tartrate 25 milliGRAM(s) Oral two times a day  multivitamin 1 Tablet(s) Oral daily  pantoprazole    Tablet 40 milliGRAM(s) Oral before breakfast  rosuvastatin 10 milliGRAM(s) Oral at bedtime  senna 2 Tablet(s) Oral at bedtime  venlafaxine XR. 75 milliGRAM(s) Oral daily    MEDICATIONS  (PRN):      Vital Signs Last 24 Hrs  T(C): 36.4 (29 Jun 2022 04:48), Max: 37 (28 Jun 2022 21:29)  T(F): 97.6 (29 Jun 2022 04:48), Max: 98.6 (28 Jun 2022 21:29)  HR: 64 (29 Jun 2022 04:48) (60 - 73)  BP: 127/64 (29 Jun 2022 04:48) (127/64 - 147/68)  BP(mean): --  RR: 18 (29 Jun 2022 04:48) (18 - 18)  SpO2: 98% (29 Jun 2022 04:48) (98% - 100%)    PE  NAD  Awake, alert  Anicteric, MMM  No c/c/e  No rash grossly                            6.4    6.02  )-----------( 195      ( 29 Jun 2022 06:41 )             20.3       06-29    139  |  105  |  33<H>  ----------------------------<  209<H>  3.8   |  27  |  1.44<H>    Ca    8.5      29 Jun 2022 06:42  Phos  3.3     06-29  Mg     1.8     06-29    TPro  5.0<L>  /  Alb  2.5<L>  /  TBili  0.3  /  DBili  x   /  AST  20  /  ALT  15  /  AlkPhos  57  06-28        [facility]  ____________________________________________________________________________________________________  Patient Name: Sherice Hermosillo                     MRN: [id number]  Account Number: [id number]                     YOB: 1929  Room: Endoscopy Room 1                           Gender: Female  Attending MD: Vicki Mejia MD            Procedure Date No Time: 6/28/2022  ____________________________________________________________________________________________________     Procedure:           Colonoscopy  Indications:         Melena  Providers:           Vicki Mejia MD  Referring MD:        Sandor Diaz MD  Medicines:           Monitored Anesthesia Care  Complications:       No immediate complications.  ____________________________________________________________________________________________________  Procedure:           Pre-Anesthesia Assessment:                       - ASA Grade Assessment: III - A patient with severe systemic disease.                       After I obtained informed consent, the scope was passed under direct vision.                        Throughout the procedure, the patient's blood pressure, pulse, and oxygen                        saturations were monitored continuously. The Colonoscope was introduced                        through the anus and advanced to the cecum, identified by the appendiceal                        orifice, IC valve and transillumination. The colonoscopy was technically                        difficult and complex due to multiple diverticula in the colon, poor                        endoscopic visualization and significant looping. Successful completion of                        the procedure was aided by using manual pressure, withdrawing and reinserting                        the scope, using scope torsion and lavage. The patient tolerated the                        procedure well. The quality of the bowel preparation was poor. The quality of                        the bowel preparation was evaluated using the BBPS ([place] Bowel Preparation                        Scale) with scores of: Right Colon = 1 (portion of mucosa seen, but other                        areas not well seen due to staining, residual stool and/or opaque liquid),                        Transverse Colon = 1 (portion of mucosa seen, but other areas not well seen                        due to staining, residual stool and/or opaque liquid) and Left Colon = 1                        (portion of mucosa seen, but other areas not well seen due to staining,                        residual stool and/or opaque liquid). The total BBPS score equals 3.                                                                                                        Findings:       Multiple small and large-mouthed diverticula were found in the sigmoid colon and descending        colon.       Two 6-10mm polyps were found in the transverse colon. The polyps were sessile and left        in-situ.       Two small angiodysplastic lesions with bleeding were found in the ascending colon.        Coagulation for hemostasis using bipolar probe was successful. Estimated blood loss was        minimal.       A solitary 2cm area of ulcerated mucosa with no stigmata of recent bleeding was present in        the rectum.                                                                                                        Impression:          - Preparation of the colon was poor, but adequate visualization achieved with                        4 liter lavage.                       - Diverticulosis in the sigmoid colon and in the descending colon.                       - One 10 mm polyp in the transverse colon.                       - Two bleeding colonic angiodysplastic lesions. Treated successfully with                        bipolar cautery.                       - Rectal ulcer.                       - No specimens collected.  Recommendation:      - [facility].                       - Advance diet as tolerated.                       - Monitor bowel movements, H/H.                       - Transfuse prn Hgb < 7.                       - The findings and recommendations were discussed with the patient, the                        patient's family and the referring physician.                       - Repeat colonoscopy is not recommended unless there is recurrent bleeding.                                                                                                        Attending Participation:       I personally performed the entire procedure.      [facility]  ____________________________________________________________________________________________________  Patient Name: Sherice Hermosillo                     MRN: [id number]  Account Number: [id number]                     YOB: 1929  Room: Endoscopy Room 1                           Gender: Female  Attending MD: Vicki Mejia MD            Procedure Date No Time: 6/28/2022  ____________________________________________________________________________________________________     Procedure:           Upper GI endoscopy  Indications:         Melena  Providers:           Vicki Mejia MD  Referring MD:        Sandor Diaz MD  Medicines:           Monitored Anesthesia Care  Complications:       No immediate complications.  ____________________________________________________________________________________________________  Procedure:           Pre-Anesthesia Assessment:                       - ASA Grade Assessment: III - A patient with severe systemic disease.                       After obtaining informed consent, the endoscope was passed under direct                        vision. Throughout the procedure, the patient's blood pressure, pulse, and                        oxygen saturations were monitored continuously. The was introduced through                        the mouth, and advanced to the second part of duodenum. The upper GI                        endoscopy was accomplished without difficulty. The patient tolerated the                        procedure well.                                                                                                        Findings:       The examined esophagus was normal.       Mild inflammation was found in the gastric antrum. Biopsies were taken with a cold forceps        for histology. Estimated blood loss was minimal. Verification of patient identification for        the specimen was done by the physician and nurse using the patient's name and birth date.       The examined duodenum was normal.                                                                                                        Impression:          - Normal esophagus.                       - Mild gastritis.                       - Normal examined duodenum.  Recommendation:      - Await pathology results.                       - To treat accordingly if H. pylori present.                       - Perform a colonoscopy today.                       - The findings and recommendations were discussed with the patient, the                        patient's family and the referring physician.                                                                                                        Attending Participation:       I personally performed the entire procedure.                                                                                                          ______________________  Vicki Mejia MD  6/28/2022 10:49:14 AM  This report has been signed electronically.  Number of Addenda: 0

## 2022-06-29 NOTE — PROVIDER CONTACT NOTE (CRITICAL VALUE NOTIFICATION) - ASSESSMENT
Pt A+Ox3.  Denies chest pain or SOB. VSS
No ss of bleeding
Pt A+Ox3.  Denies chest pain or SOB. VSS
pt A&OX4, VSS as charted, no signs of active bleeding upon RN assessment of pt, abd is soft and nontender.
Patient is A&Ox4, denies any symptoms. Patient with no bowel this AM. Last bowel movement 6/17.
Patient a&ox4. vital signs stable. no complaints of pain or discomfort. no sob, increase fatigue, lightheadedness reported or observed by pt.
Pt A&Ox4, VSS. Pt having bloody bowel movements
VBG - Hgb 5.9, Hct 18, Lactate 4.9
pt A&OX4, VSS as charted, no signs of active bleeding upon RN assessment of pt, abd is soft and nontender.
Pt is A+Ox4. /53, HR 65, SpO2 96. Pt reports no BMs during the day. Pt denies lightheadedness, SOB, numbness or tingling.
Pt A+Ox3.  Denies chest pain or SOB. VSS

## 2022-06-29 NOTE — PROVIDER CONTACT NOTE (CRITICAL VALUE NOTIFICATION) - ACTION/TREATMENT ORDERED:
MD notified. 1 unit PRBC ordered. CBC Q8 ordered
T8 sakina omntes made aware, MD to order repeat CBC.
Will contact with furthers orders. Will continue to monitor
Pre-medicate and transfuse one unit
T8 sakina montes made aware as per MD hematology called this AM for pt, no immediate actions for RN at this time.
Will contact with furthers orders. Will continue to monitor
Take another set of vital signs and reassess patient. Update provider.
will give PRBC as ordered
1 unit of PRBC's ordered. Will continue to monitor.
Will contact with furthers orders. Will continue to monitor
md notified  transfusion prn

## 2022-06-29 NOTE — PROVIDER CONTACT NOTE (CRITICAL VALUE NOTIFICATION) - RECOMMENDATIONS
notify md
continue with goal to keep hgb above 7.0
repeat CBC?
Provider to order blood transfusion.
Notify provider.
Notify team.
Provider made aware. PRBC's needed.
hematology consult? consider epogen?
Notify team.
Notify team.

## 2022-06-29 NOTE — PROGRESS NOTE ADULT - ATTENDING COMMENTS
Patient seen at bedside. Yesterday she had unremarkable EGD and two colonic lesions which were cauterized. No bowel movements afterwards and now tolerating regular diet without abdominal pain. Hg this morning 6.4 so will transfuse 1 unit with pre-medication (Tylenol & Benadryl). Continue to monitor hemolysis labs and volume status (Lasix has been held due to soft BP over the weekend). Finishing Cipro for UTI. As explained to son I am hopeful that we will achieve stability soon now that presumptively bleeding source has been addressed. Likely can discharge only if hemoglobin is stable for 2-3 consecutive days. Unfortunately patient is still active.

## 2022-06-29 NOTE — PROGRESS NOTE ADULT - ASSESSMENT
89yo F w/ hx HTN, Afib (recent dx 4/2022), CHF, HLD, nonsmoker, remote hx of asthma presenting with loose stools, lethargy, fatigue, low blood pressure and blood bowel movement found to have a hemoglobin of 4.8 most likely 2/2 to GI bleed- likely diverticular bleed given history/presentation    recent COVID + 5/20 s/p Paxlovid rx; COVID PCR + on admission  -continue off contact isolation per Infection Control Dept    Acute GI blood loss anemia 2/2 bleeding colonic AVMs;  s/p Colonoscopy 6/28 with Rx of 2 bleeding AVMCs (ascending colon), non bleeding left sided diverticulosis and non bleeding solitary rectal ulcer  6/15/22 +NM GI bleed scan (proximal AC)  IR input appreciated - increased risk of renal injury with IV contrast load required for angio  Recurrent/ongoing rectal bleeding with continued transfusion requirement therefore underwent Colonoscopy 6/17/22 6/17/22 COLON poor prep and "old" blood in colon; no active bleeding encountered, +diverticulosis - primarily Left sided  recurrent episodes of intermittent bleeding/burgundy stools with ongoing transfusion requirement therefore repeat endoscopic evaluation  6/28/22 EGD - normal esophagus, mild gastritis, normal duodenum  6/28/22 COLON - poor prep but adequate visualization with 4L lavage. Diverticulosis sigmoid and Descending colon. 2 transverse colon polyps (non bleeding, in situ). 2 bleeding AVMS in AC - bicapped for hemostasis. non bleeding 2cm rectal ulcer    s/p 10 units PRBCs admission ->current  monitor post-transfusion CBC  no bleeding at end of colonoscopy and no bleeding reported overnight    +Anti-JKA   -Hematology following; no e/o hemolysis at this time  -Goal Hgb>7    Discussed with pt and family at bedside  Discussed with Medicine team and Hematology      Marek Cormier PA-C    [facility]  [phone]  After hours and weekend coverage [phone]

## 2022-06-29 NOTE — PROGRESS NOTE ADULT - SUBJECTIVE AND OBJECTIVE BOX
HPI:  Patient is a 89yo F w/ hx HTN, Afib, CHF, AS, HLD, nonsmoker, remote hx of asthma presenting with loose stools, lethargy, fatigue, low blood pressure and bloody bowel movement. The patient started Eliquis at the end of April on 4/2022. The daughter noted on Friday that the patient developed loose stools that were normal in color. The patient subsequently was developing progressive fatigue, lethargy and decreased PO intake. Her daughter noted that her stools were a maroon color. On 6/12, the daughter noticed that the patient had low blood pressures SBP of 90s down from baseline of 120s. She checked her BM that morning and noted blood with blood clots in the toilet. At this time she decided to seek medical care in the ED for further evaluation.     Of note, the patient had COVID 2 weeks ago measured by at home test. RVP was + for COVID by PCR on admission.     In the ED:   Vitals: Temp 97.7, HR 60, /55, RR 20, O2 saturation 100%  Labs: Hemoglobin 4.8, Cr 1.93, COVID +   Imaging:  Cxr No acute pathology  Interventions: Given Kcentra, 2 units of pRBCs, Pantoprazole 80mg    (13 Jun 2022 06:24)    Review Of Systems:     No chest pain, shortness of breath, or palpitations            Medications:  artificial tears (preservative free) Ophthalmic Solution 1 Drop(s) Both EYES two times a day  ascorbic acid 500 milliGRAM(s) Oral daily  ciprofloxacin   IVPB 200 milliGRAM(s) IV Intermittent every 24 hours  levothyroxine 25 MICROGram(s) Oral daily  melatonin 3 milliGRAM(s) Oral at bedtime  metoprolol tartrate 25 milliGRAM(s) Oral two times a day  multivitamin 1 Tablet(s) Oral daily  pantoprazole    Tablet 40 milliGRAM(s) Oral before breakfast  rosuvastatin 10 milliGRAM(s) Oral at bedtime  senna 2 Tablet(s) Oral at bedtime  venlafaxine XR. 75 milliGRAM(s) Oral daily    PAST MEDICAL & SURGICAL HISTORY:  HTN (hypertension)      Diabetes mellitus type 2, noninsulin dependent      Diverticulitis      Hyperlipidemia      GERD (gastroesophageal reflux disease)      Glaucoma      Breast cancer      Urinary incontinence      Renal calculus or stone      Arthritis      Heart murmur      Renal calculi  s/p stone extraction 57 yrs ago      S/P lumpectomy of breast  right breast with node removal      S/P bladder repair  Interstim device placement 2010        Vitals:  T(C): 36.4 (06-29-22 @ 04:48), Max: 37.2 (06-28-22 @ 08:40)  HR: 64 (06-29-22 @ 04:48) (54 - 73)  BP: 127/64 (06-29-22 @ 04:48) (127/64 - 160/49)  BP(mean): --  RR: 18 (06-29-22 @ 04:48) (18 - 19)  SpO2: 98% (06-29-22 @ 04:48) (86% - 100%)  Wt(kg): --  Daily Height in cm: 157.48 (28 Jun 2022 09:27)    Daily   I&O's Summary    28 Jun 2022 07:01  -  29 Jun 2022 07:00  --------------------------------------------------------  IN: 398 mL / OUT: 0 mL / NET: 398 mL        Physical Exam:  Appearance: No acute distress; well appearing  Eyes: PERRL, EOMI, pink conjunctiva  HENT: Normal oral mucosa  Cardiovascular: RRR, S1, S2, no murmurs, rubs, or gallops; no edema; no JVD  Respiratory: Clear to auscultation bilaterally  Gastrointestinal: soft, non-tender, non-distended with normal bowel sounds  Musculoskeletal: No clubbing; no joint deformity   Neurologic: Non-focal  Lymphatic: No lymphadenopathy  Psychiatry: AAOx3, mood & affect appropriate  Skin: No rashes, ecchymoses, or cyanosis                          6.4    6.02  )-----------( 195      ( 29 Jun 2022 06:41 )             20.3     06-29    139  |  105  |  33<H>  ----------------------------<  209<H>  3.8   |  27  |  1.44<H>    Ca    8.5      29 Jun 2022 06:42  Phos  3.3     06-29  Mg     1.8     06-29    TPro  5.0<L>  /  Alb  2.5<L>  /  TBili  0.3  /  DBili  x   /  AST  20  /  ALT  15  /  AlkPhos  57  06-28                  ECG:    Echo:    Stress Testing:     Cath:    Imaging:    Interpretation of Telemetry: NSR

## 2022-06-29 NOTE — PROVIDER CONTACT NOTE (CRITICAL VALUE NOTIFICATION) - TEST AND RESULT REPORTED:
H/H 6.4/20.5
H/H 6.5/21.3
VBG - Hgb 5.9, Hct 18, Lactate 4.9
hgb 6.9
Hemoglobin 6.9
H&H: 6.4/20.3
repeat HGB 6.8
H/H 5.8/18.5
H/H 5.8, Hct 18.4
Hgb 6.8 Hct 21.9
Hemoglobin 6.7 g/dL

## 2022-06-29 NOTE — PROGRESS NOTE ADULT - ASSESSMENT
89 y/o female admitted with GIB while on A/C. heme consulted for + debbie    1. Debbie positive , eluate negative Blood Bank Result  - Hemolytic parameters including LDH , Hapto, Bilirubin WNL and no overt evidence of hemolysis.   - Patient had recent transfusion which can results in + debbie test  - Patient did have identification of Ann ab ( jka)  -With that patients can have delayed hemolytic transfusion reaction. No evidence of hemolysis at this time.   - Monitor closely with future transfusions and continue to trend LDH daily along with other labs.   - Recommend premedication with Tylenol and benadryl for future transfusions     2. GIB: Ongoing GI workup and supportive transfusion  - History positive for hematochezia and melena  - Now off A/C and on PPI.   - S/P multiple PRBC transfusions  - S/P EGD/colonoscopy 6/28  --Found to have 2 AVM's in colon that were successfully cauterized  - Transfuse to keep Hgb >7.     Will continue to follow. Please call if questions     Matt Stearns PA-C  Hematology/Oncology  [facility]   [phone] (office)  [phone] (alt office)  Evenings and weekends please call MD on call or office

## 2022-06-29 NOTE — PROGRESS NOTE ADULT - PROBLEM SELECTOR PLAN 2
Resolved.   Most likely 2/2 to eliquis initiation and underlying GI pathology. s/p tagged scan with R. sided bleeding, s/p colonoscopy w/ poor prep and no active signs of bleeding. No active bleeding for one week.     Plan:  - changed pantoprazole to IV  - Maintain active type and screen   - Maintain 2 large bore IVs  - Transfuse to hemoglobin <7 --> s/p 7 PRBC transfusions, no bloody BM since colonoscopy  - GI following - notified about hb drop and melena today (6/26) - rec NPO after mn for possible scope tomorrow (cancel NPO tomorrow if no planned scope per private GI).    - Trend CBC qd  - endoscopy 6/28 revealed colonic angiodysplasia lesions that were cauterized

## 2022-06-29 NOTE — PROVIDER CONTACT NOTE (CRITICAL VALUE NOTIFICATION) - SITUATION
H/H 5.8/18.5
pts hgb is 6.9
H&H: 6.4/20.3
Pt Hgb 6.8 Hct 21.9
H/H 5.8/18.5
Hemoglobin (Hgb) 6.7 g/dL
H/H 6.4/20.5
H/H 5.8, Hct 18.4
Hemoglobin 6.9
VBG - Hgb 5.9, Hct 18, Lactate 4.9
repeat HGB 6.8

## 2022-06-29 NOTE — PROGRESS NOTE ADULT - ASSESSMENT
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 She has a history of HTN, HLD, DM, aortic stenosis, breast cancer s/p lumpectomy and CKD (Dr. Escobar). She was recently started on Eliquis and beta-blockers for atrial fibrillation. She was hospitalized in March with a UTI complicated by ELISA due to bactrim. She was admitted in May with dyspnea, hypoxemia, gurgling in her chest and leg swelling. She was initially treated with zosyn for a possible right lower lobe pneumonia seen on CXR until further evaluation revealed pulmonary edema with pleural effusions and atelectasis. ECHO -> preserved LVEF - moderate-severe aortic stenosis - moderate diastolic dysfunction - bilateral pleural effusions; CT -> pulmonary edema with small pleural effusions and partial lower lobe compressive atelectasis. She was diuresed and discharged on norvasc/toprol XL/lipitor/eliquis. The patient was diagnosed with COVID on May 22nd and was treated with a course of Paxlovid. She now returns to the ER with shortness of breath and weakness with minimal exertion. She has fatigue, malaise and pallor. She has been noted to have blood in her stools and black colored stools (after eating blueberries). Her daughter measured her blood pressure has 90/60 from a baseline of 120/90. She currently has no shortness of breath or hypoxemia on a 2lpm nasal canula. She has no cough, sputum production, chest congestion or wheeze. No fevers, chills or sweats. She has been found to be guaiac positive. RVP PCR remains positive for COVID. The patient has received Kcentra and 2 units PRBCs for severe anemia (4.8/16/6).     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 and respiratory muscle weakness limiting diaphragmatic excursion and chest wall expansion  4) no evidence of bronchospasm or pulmonary edema/pleural effusions    ELISA is due to intravascular volume depletion    nasal COVID PCR positivity more likely represents shedding of non-viable viral particles than ongoing infection in this immunocompetent patient    6/27 - hemodynamically stable and without tachycardia; ongoing lower GI bleeding with recurrent anemia -> has now received 9 units of PRBCs; episode of "lethargy" over the weekend -> head CT without acute changes; urinary frequency with cloudy urine in the canister; no shortness of breath or hypoxemia on room air; no cough, sputum production, hemoptysis, chest congestion or wheeze; no fevers, chills or sweats; no chest pain/pressure or palpitations; colonoscopy -> poor prep - evidence of recent but not active bleeding - severe diverticulosis      PLAN/RECOMMENDATIONS:    stable oxygenation on room air  no indication for airborne or contact isolation - the patient was first diagnosed with COVID on May 22nd  observe off systemic steroids, antibiotics and pulmonary medications  head of bed elevation greater than 45 degrees at all times  incentive spirometry  on cipro for an E coli UTI  GI evaluation noted     s/p panendoscopy -> normal esophagus - mild gastritis - normal duodenum - poor prep but adequate visualization after lavage - sigmoid and descending colon diverticulosis - 2 non-bleeding transverse colon polys - 2 bleeding AVMs in the ascending colon which bicapped for hemostasis - 2cm non-bleeding rectal ulcer    has now received 9 units of PRBCs    hemodynamically stable and without tachycardia but with a decrease in H/H today    holding A/C    protonix 40mg daily    senna to soften stools and prevent straining  cardiac meds: lopressor/crestor - holding A/C - considering amiodarone for rhythm control  glucose control  DVT prophylaxis - SCDs  venlafaxine XR/melatonin at bedtime  synthroid    Will follow with you. Plan of care discussed with the patient and her family at bedside and with GI. Her respiratory status remains stable.    Brayden Benavides MD, [facility]  [phone]  Pulmonary Medicine   ASSESSMENT:    89 year old gentlewoman, lifelong non-smoker, with history of quiescent asthma. She has a history of HTN, HLD, DM, aortic stenosis, breast cancer s/p lumpectomy and CKD (Dr. Escobar). She was recently started on Eliquis and beta-blockers for atrial fibrillation. She was hospitalized in March with a UTI complicated by ELISA due to bactrim. She was admitted in May with dyspnea, hypoxemia, gurgling in her chest and leg swelling. She was initially treated with zosyn for a possible right lower lobe pneumonia seen on CXR until further evaluation revealed pulmonary edema with pleural effusions and atelectasis. ECHO -> preserved LVEF - moderate-severe aortic stenosis - moderate diastolic dysfunction - bilateral pleural effusions; CT -> pulmonary edema with small pleural effusions and partial lower lobe compressive atelectasis. She was diuresed and discharged on norvasc/toprol XL/lipitor/eliquis. The patient was diagnosed with COVID on May 22nd and was treated with a course of Paxlovid. She now returns to the ER with shortness of breath and weakness with minimal exertion. She has fatigue, malaise and pallor. She has been noted to have blood in her stools and black colored stools (after eating blueberries). Her daughter measured her blood pressure has 90/60 from a baseline of 120/90. She currently has no shortness of breath or hypoxemia on a 2lpm nasal canula. She has no cough, sputum production, chest congestion or wheeze. No fevers, chills or sweats. She has been found to be guaiac positive. RVP PCR remains positive for COVID. The patient has received Kcentra and 2 units PRBCs for severe anemia (4.8/16/6).     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 and respiratory muscle weakness limiting diaphragmatic excursion and chest wall expansion  4) no evidence of bronchospasm or pulmonary edema/pleural effusions    ELISA is due to intravascular volume depletion    nasal COVID PCR positivity more likely represents shedding of non-viable viral particles than ongoing infection in this immunocompetent patient    6/27 - hemodynamically stable and without tachycardia; ongoing lower GI bleeding with recurrent anemia -> has now received 9 units of PRBCs; episode of "lethargy" over the weekend -> head CT without acute changes; urinary frequency with cloudy urine in the canister; no shortness of breath or hypoxemia on room air; no cough, sputum production, hemoptysis, chest congestion or wheeze; no fevers, chills or sweats; no chest pain/pressure or palpitations; colonoscopy -> poor prep - evidence of recent but not active bleeding - severe diverticulosis      PLAN/RECOMMENDATIONS:    stable oxygenation on room air  no indication for airborne or contact isolation - the patient was first diagnosed with COVID on May 22nd  observe off systemic steroids, antibiotics and pulmonary medications  head of bed elevation greater than 45 degrees at all times  incentive spirometry  on cipro for an E coli UTI  GI evaluation noted     s/p panendoscopy -> normal esophagus - mild gastritis - normal duodenum - poor prep but adequate visualization after lavage - sigmoid and descending colon diverticulosis - 2 non-bleeding transverse colon polys - 2 bleeding AVMs in the ascending colon which bicapped for hemostasis - 2cm non-bleeding rectal ulcer    has now received 10 units of PRBCs    hemodynamically stable and without tachycardia but with a decrease in H/H today s/p 1 unit of PRBCs this AM    holding A/C    protonix 40mg daily    senna to soften stools and prevent straining  cardiac meds: lopressor/crestor - holding A/C - considering amiodarone for rhythm control  glucose control  DVT prophylaxis - SCDs  venlafaxine XR/melatonin at bedtime  synthroid    Will follow with you. Plan of care discussed with the patient and her family at bedside and with GI. Her respiratory status remains stable.    Brayden Benavides MD, [facility]  [phone]  Pulmonary Medicine

## 2022-06-29 NOTE — PROVIDER CONTACT NOTE (CRITICAL VALUE NOTIFICATION) - BACKGROUND
Pt is admitted for Gastrointestinal hemorrhage. PMH of heart murmur, arthritis, breast cancer.
pt admitted with GI bleed, pt went to endoscopy on 6/17= no signs of active bleeding. Pt has had blood transfusions this admission.
Pt is admitted for Gastrointestinal hemorrhage. PMH of heart murmur, arthritis, breast cancer.
pt admitted for GI bleed
Pt is admitted for Gastrointestinal hemorrhage. PMH of heart murmur, arthritis, breast cancer.
pt admitted with GI bleed, pt went to endoscopy on 6/17= no signs of active bleeding. Pt has had blood transfusions this admission.
Patient is admitted for GI bleeding.
Pt admitted for GI hemorrhage. Given 2 units PRBC in ED.
patient dx GI Bleed
pt admitted for GIB. h/o DM, diverticulitis, breast ca, CKD4
Pt admitted for GI bleed

## 2022-06-29 NOTE — PROGRESS NOTE ADULT - SUBJECTIVE AND OBJECTIVE BOX
Patient is a 89y old  Female who presents with a chief complaint of gi bleed (29 Jun 2022 07:58)      INTERVAL HPI/OVERNIGHT EVENTS:  s/p EGD and Colonoscopy yesterday  EGD - normal esophagus, mild gastritis, normal duodenum  COLON - poor prep but adequate visualization with 4L lavage. Diverticulosis sigmoid and Descending colon. 2 transverse colon polyps (non bleeding, in situ). 2 bleeding AVMS in AC - bicapped for hemostasis. non bleeding 2cm rectal ulcer    no bleeding reported overnight  no abdominal pain, nausea or vomiting  drop noted in Am hgb, transfusion in progress (10th unit this adission)  no CP or SOB    MEDICATIONS  (STANDING):  artificial tears (preservative free) Ophthalmic Solution 1 Drop(s) Both EYES two times a day  ascorbic acid 500 milliGRAM(s) Oral daily  ciprofloxacin   IVPB 200 milliGRAM(s) IV Intermittent every 24 hours  levothyroxine 25 MICROGram(s) Oral daily  melatonin 3 milliGRAM(s) Oral at bedtime  metoprolol tartrate 25 milliGRAM(s) Oral two times a day  multivitamin 1 Tablet(s) Oral daily  pantoprazole    Tablet 40 milliGRAM(s) Oral before breakfast  rosuvastatin 10 milliGRAM(s) Oral at bedtime  senna 2 Tablet(s) Oral at bedtime  venlafaxine XR. 75 milliGRAM(s) Oral daily    MEDICATIONS  (PRN):      Allergies  Keflex (Rash; Hives)      Review of Systems:  General:  No wt loss, fevers, chills, night sweats  CV:  No pain, palpitations, +PAF  Resp:  No dyspnea, cough, tachypnea, wheezing  GI:  see HPI  :  No pain,  +hx kidney stones +incontinence s/p bladder repair  Muscle:  No pain, weakness +arthritis  Neuro:  No focal weakness, tingling, memory problems  Psych:  No fatigue, insomnia, mood problems, depression  Endocrine:  No polyuria, polydypsia, cold/heat intolerance  Heme:  No petechiae, ecchymosis, easy bruisability  Skin:  No rash, tattoos, scars, edema    Vital Signs Last 24 Hrs  T(C): 36.4 (29 Jun 2022 04:48), Max: 37 (28 Jun 2022 21:29)  T(F): 97.6 (29 Jun 2022 04:48), Max: 98.6 (28 Jun 2022 21:29)  HR: 64 (29 Jun 2022 04:48) (55 - 73)  BP: 127/64 (29 Jun 2022 04:48) (127/64 - 160/49)  BP(mean): --  RR: 18 (29 Jun 2022 04:48) (18 - 18)  SpO2: 98% (29 Jun 2022 04:48) (98% - 100%)    PHYSICAL EXAM:  Constitutional: NAD, well-developed elderly WF  lying in bed, transfusion in progress. daughter at bedside  Neck: No LAD, supple no JVD  Respiratory: clear b/l no accessory muscle use  Cardiovascular: S1 and S2, RRR,  +murmur  Gastrointestinal: BS+, soft, NT/ND, neg HSM  Extremities: No peripheral edema, neg clubbing, cyanosis +fragile skin  Vascular: 2+ peripheral pulses  Neurological: A/O x 3, no focal deficits  Psychiatric: Normal mood, normal affect  Skin: No rashes +fragile skin , anicteric     LABS:                        6.4    6.02  )-----------( 195      ( 29 Jun 2022 06:41 )             20.3   s/p bowel prep and colonoscopy with rx of AC AVMs (oozing/bleeding)  Hemoglobin: 7.7 g/dL (06.28.22 @ 07:26)   Hemoglobin: 7.7 g/dL (06.27.22 @ 22:49)   Hemoglobin: 8.2 g/dL (06.27.22 @ 07:14)   s/p 1 unit PRBCs transfused  Hemoglobin: 7.2 g/dL (06.26.22 @ 23:04)   06-29    139  |  105  |  33<H>  ----------------------------<  209<H>  3.8   |  27  |  1.44<H>    Ca    8.5      29 Jun 2022 06:42  Phos  3.3     06-29  Mg     1.8     06-29    TPro  5.0<L>  /  Alb  2.5<L>  /  TBili  0.3  /  DBili  x   /  AST  20  /  ALT  15  /  AlkPhos  57  06-28           RADIOLOGY & ADDITIONAL TESTS:

## 2022-06-29 NOTE — PROGRESS NOTE ADULT - PROBLEM SELECTOR PLAN 1
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 f/u post transfusion cbc, keep hb>7  6/28: episode of large BM with maeve blood, patient assessed AO3, NAD, VSS, CBC 7.7 - Patient presented with Hg 4s likely s/s GI bleed/diverticular bleed, s/p colonoscopy without source of bleeding  - Patient continues to have Hg in 6s 1-2 days after transfusion, dropped to 5.8 today in setting of GI bleed  - Blood Bank (6/23): 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  - 1u prbc for drop in hb since am. f/u post transfusion cbc, keep hb>7  6/28: episode of large BM with maeve blood, patient assessed AO3, NAD, VSS, CBC 7.7  6/29: Hg 6.4, received 1u prbc

## 2022-06-29 NOTE — PROVIDER CONTACT NOTE (CRITICAL VALUE NOTIFICATION) - NAME OF MD/NP/PA/DO NOTIFIED:
Cj Jolley MD
Paul Burgess/Team 8
Paul Burgess/Team 8
Natalia Lyles MD
BECKIE montes
Dawson Siddiqui MD
tanya montes
Lelo Burgess
Cj Jolley MD
Minerva Lyles MD
Noah Roy MD

## 2022-06-29 NOTE — PROGRESS NOTE ADULT - SUBJECTIVE AND OBJECTIVE BOX
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 which bicapped for hemostasis - 2cm non-bleeding rectal ulcer; hemodynamically stable and without tachycardia but with a decrease in H/H; has now received 9 units of PRBCs; episode of "lethargy" over the weekend -> head CT without acute changes; urinary frequency with cloudy urine in the canister -> antibiotics switched to cipro; no shortness of breath or hypoxemia on room air; no cough, sputum production, hemoptysis, chest congestion or wheeze; no fevers, chills or sweats; no chest pain/pressure or palpitations;       REVIEW OF SYSTEMS:  Constitutional: As per interval history  HEENT: Within normal limits  CV: As per interval history  Resp: As per interval history  GI: lower GI bleed likely due to diverticulosis  : Within normal limits  Musculoskeletal: Within normal limits  Skin: Within normal limits  Neurological: Within normal limits  Psychiatric: Within normal limits  Endocrine: Within normal limits  Hematologic/Lymphatic: anemia  Allergic/Immunologic: Within normal limits    MEDICATIONS:     Pulmonary "    Anti-microbials:  ciprofloxacin   IVPB 200 milliGRAM(s) IV Intermittent every 24 hours    Cardiovascular:  metoprolol tartrate 25 milliGRAM(s) Oral two times a day    Other:  artificial tears (preservative free) Ophthalmic Solution 1 Drop(s) Both EYES two times a day  ascorbic acid 500 milliGRAM(s) Oral daily  levothyroxine 25 MICROGram(s) Oral daily  melatonin 3 milliGRAM(s) Oral at bedtime  multivitamin 1 Tablet(s) Oral daily  pantoprazole    Tablet 40 milliGRAM(s) Oral before breakfast  rosuvastatin 10 milliGRAM(s) Oral at bedtime  senna 2 Tablet(s) Oral at bedtime  venlafaxine XR. 75 milliGRAM(s) Oral daily    MEDICATIONS  (PRN):        OBJECTIVE:    PHYSICAL EXAM:       ICU Vital Signs Last 24 Hrs  T(C): 36.4 (2022 04:48), Max: 37 (2022 21:29)  T(F): 97.6 (2022 04:48), Max: 98.6 (2022 21:29)  HR: 64 (2022 04:48) (60 - 73)  BP: 127/64 (2022 04:48) (127/64 - 147/68)  BP(mean): --  ABP: --  ABP(mean): --  RR: 18 (2022 04:48) (18 - 18)  SpO2: 98% (2022 04:48) (98% - 100%) on room air         General: Awake. Alert. Cooperative. No distress. Appears stated age. In bed. Discussing an upcoming cruise she would like to take.  HEENT:  Atraumatic. Normocephalic. Anicteric. Normal oral mucosa. PERRL. EOMI. Pale conjunctiva.  Neck: Supple. Trachea midline. Thyroid without enlargement/tenderness/nodules. No carotid bruit. No JVD.	  Cardiovascular: Regular rate and rhythm. S1 S2 normal. III/VI systolic murmur  Respiratory: Respirations unlabored. Clear to auscultation and percussion bilaterally. Kyphosis.  Abdomen: Soft. Non-tender. Non-distended. No organomegaly. No masses. Normal bowel sounds.  Extremities: Warm to touch. No clubbing or cyanosis. No pedal edema.   Pulses: 2+ peripheral pulses all extremities.	  Skin: Normal skin color. No rashes or lesions. No ecchymoses. No cyanosis. Warm to touch.  Lymph Nodes: Cervical, supraclavicular and axillary nodes normal  Neurological: Motor and sensory examination equal and normal. A and O x 3  Psychiatry: Appropriate mood and affect.    LABS:                          6.4    6.02  )-----------( 195      ( 2022 06:41 )             20.3     CBC    WBC  6.02 <==, 7.66 <==, 9.69 <==, 9.92 <==, 9.08 <==, 9.13 <==, 11.65 <==    Hemoglobin  6.4 <<==, 7.7 <<==, 7.7 <<==, 8.2 <<==, 7.2 <<==, 5.8 <<==, 7.1 <<==    Hematocrit  20.3 <==, 24.0 <==, 23.9 <==, 25.3 <==, 21.6 <==, 18.4 <==, 22.6 <==    Platelets  195 <==, 245 <==, 234 <==, 214 <==, 189 <==, 167 <==, 203 <==      139  |  105  |  33<H>  ----------------------------<  209<H>    06-29  3.8   |  27  |  1.44<H>      LYTES    sodium  139 <==, 143 <==, 141 <==, 136 <==, 137 <==, 137 <==, 134 <==    potassium   3.8 <==, 3.8 <==, 4.0 <==, 4.3 <==, 4.2 <==, 4.3 <==, 5.1 <==    chloride  105 <==, 106 <==, 105 <==, 102 <==, 103 <==, 101 <==, 99 <==    carbon dioxide  27 <==, 28 <==, 26 <==, 21 <==, 26 <==, 26 <==, 23 <==    =============================================================================================  RENAL FUNCTION:    Creatinine:   1.44  <<==, 1.36  <<==, 1.64  <<==, 1.42  <<==, 1.79  <<==, 1.93  <<==, 1.86  <<==    BUN:   33 <==, 42 <==, 55 <==, 51 <==, 63 <==, 66 <==, 44 <==    ============================================================================================    calcium   8.5 <==, 8.6 <==, 8.7 <==, 7.8 <==, 8.6 <==, 8.3 <==, 8.8 <==    phos   3.3 <==, 2.8 <==, 3.6 <==, 3.3 <==, 3.9 <==, 3.8 <==    mag   1.8 <==, 1.9 <==, 2.1 <==, 1.5 <==, 1.8 <==, 1.7 <==    ============================================================================================  LFTs    AST:   20 <== , 20 <== , 17 <== , 12 <== , 19 <== , 27 <==     ALT:  15  <== , 13  <== , 11  <== , 14  <== , 13  <== , 20  <==     AP:  57  <=, 62  <=, 49  <=, 62  <=, 58  <=, 69  <=    Bili:  0.3  <=, 0.5  <=, 0.3  <=, 0.4  <=, 0.3  <=, 0.2  <=, 0.2  <=    Venous Blood Gas:   14:44  7.33/42/78/22/96.6  VBG Lactate: 4.9    CARDIAC MARKERS ( 2022 14:45 )  CPK 39 U/L /CKMB x     /CKMB Units x        troponin x        CARDIAC MARKERS ( 2022 14:45 )  CPK 39 U/L /CKMB x     /CKMB Units x        troponin x        < from: Transthoracic Echocardiogram (22 @ 14:37) >    Patient name: KOREY DIAZ  YOB: 1929   Age: 89 (F)   MR#: [id number]  Study Date: 2022  Location: [address] [place], [place] [id number]: Gadiel Schwartz [facility]  Study quality: Technically fair  Referring Physician: Teo Monson MD  Blood Pressure: 165/72 mmHg  Height: 158 cm  Weight: 53 kg  BSA: 1.5 m2  Heart Rate: 70 mmHg  ------------------------------------------------------------------------  PROCEDURE: Transthoracic echocardiogram with 2-D, M-Mode  and complete spectral and color flow Doppler.  INDICATION: Cardiac murmur, unspecified (R01.1)  ------------------------------------------------------------------------  Dimensions:    Normal Values:  LA:     3.6    2.0 - 4.0 cm  Ao:     3.0    2.0 - 3.8 cm  SEPTUM: 1.0    0.6 -1.2 cm  PWT:    1.0    0.6 - 1.1 cm  LVIDd:  4.0    3.0 - 5.6 cm  LVIDs:  2.8    1.8 - 4.0 cm  Derived variables:  LVMI: 84 g/m2  RWT: 0.50  Fractional short: 30 %  EF (Moran Rule): 67 %Doppler Peak Velocity (m/sec):  AoV=3.2  ------------------------------------------------------------------------  Observations:  Mitral Valve: Normal mitral valve. Mild mitral  regurgitation.  Aortic Valve/Aorta: Heavily calcified trileaflet aortic  valve with decreased opening. Peak transaortic valve  gradientequals 41 mm Hg, estimated aortic valve area  equals 1 sqcm (by continuity equation), aortic valve  velocity time integral equals 70 cm, consistent with  moderate aortic stenosis.  However the aortic valve appears heavily calcified and may  be closerto moderate-severe aortic stenosis.  Mild aortic  regurgitation.  Peak left ventricular outflow tract  gradient equals 3 mm Hg, LVOT velocity time integral equals  23 cm.  Aortic Root: 3 cm.  Left Atrium: Mildly dilated left atrium.  LA volume index =  39 cc/m2.  Left Ventricle: Normal left ventricular systolic function.  No segmental wall motion abnormalities. Normal left  ventricular internal dimensions and wall thicknesses.  Moderate diastolic dysfunction (Stage II).  Right Heart: Normal rightatrium. Normal right ventricular  size and function. Normal tricuspid valve. Minimal  tricuspid regurgitation. Normal pulmonic valve. Minimal  pulmonic regurgitation.  Pericardium/Pleura: Normal pericardium with trace  pericardial effusion.  Bilateral pleural effusions.  Hemodynamic: Estimated right atrial pressure is 8 mm Hg.  Color Doppler demonstrates no evidence of a patent foramen  ovale.  ------------------------------------------------------------------------  Conclusions:  1. Normal mitral valve. Mild mitral regurgitation.  2. Heavily calcified trileaflet aortic valve with decreased  opening. Peak transaortic valve gradient equals 41 mm Hg,  estimated aortic valve area equals 1 sqcm (by continuity  equation), aortic valve velocity time integral equals 70  cm, consistent with moderate aortic stenosis.  However the aortic valve appears heavily calcified and may  be closer to moderate-severe aortic stenosis.  Mild aortic  regurgitation.  3. Mildly dilated left atrium.  LA volume index = 39 cc/m2.  4. Normal left ventricular systolic function. No segmental  wall motion abnormalities.  5. Normal right ventricular size and function.  6. Normal pericardium with trace pericardial effusion.  7. Bilateral pleural effusions.  *** Compared with echocardiogram of 3/11/2022, no  significant changes noted.  ------------------------------------------------------------------------  Confirmed on  2022 - 17:38:34 by Osito Argueta M.D.  ------------------------------------------------------------------------  ---------------------------------------------------------------------------------------------------------------    MICROBIOLOGY:     Flu With COVID-19 By ERICK (22 @ 01:51)   SARS-CoV-2 Result: Detected  This Respiratory Panel uses polymerase chain reaction (PCR) to detect for   influenza A; influenza B; respiratory syncytial virus; and SARS-CoV-2.   This test was validated by [employer] and is in use under the FDA   Emergency Use Authorization (EUA) for clinical labs CLIA-certified to   perform high complexity testing. Test results should be correlated with   clinical presentation, patient history, and epidemiology.   Influenza A Result: NotDetec   Influenza B Result: NotDetec   Resp Syn Virus Result: NotDetec     Urinalysis Basic - ( 2022 22:50 )    Color: Light Yellow / Appearance: Slightly Turbid / S.009 / pH: x  Gluc: x / Ketone: Negative  / Bili: Negative / Urobili: Negative   Blood: x / Protein: Trace / Nitrite: Negative   Leuk Esterase: Large / RBC: 3 /hpf / WBC 71 /HPF   Sq Epi: x / Non Sq Epi: 1 /hpf / Bacteria: Many    Culture - Blood (22 @ 16:13)   Specimen Source: .Blood Blood-Peripheral   Culture Results:   No growth to date.     Culture - Blood (22 @ 16:18)   Specimen Source: .Blood Blood-Peripheral   Culture Results:   No growth to date.     Culture - Urine (22 @ 15:42)   Specimen Source: Clean Catch Clean Catch (Midstream)   Culture Results:   >100,000 CFU/ml Escherichia coli   <10,000 CFU/ml Normal Urogenital afshin present     RADIOLOGY:  [x] Chest radiographs reviewed and interpreted by me    EXAM:  XR CHEST PORTABLE URGENT 1V                          PROCEDURE DATE:  2022      INTERPRETATION:  CLINICAL INFORMATION: Shortness of breath.    TECHNIQUE: Frontal radiograph of the chest.    COMPARISON: CT chest and chest x-ray 2022    FINDINGS:  The lungs are clear.  No pleural effusion or pneumothorax.  Cardiomediastinal silhouette size is within normal limits.  No acute osseous abnormality.    IMPRESSION:  No evidence of acute pulmonary disease.    SCARLET JALLOH MD; Resident Radiology  This document has been electronically signed.  DIANE OBREGON MD; Attending Radiologist  This document has been electronically signed. 2022  1:19PM  ---------------------------------------------------------------------------------------------------------------  EXAM:  NM GI BLEEDING IMG                          PROCEDURE DATE:  06/15/2022      IMPRESSION: Abnormal GI bleeding scan.    Active bleeding site in the proximal ascending colon.    Dr. Argenis Barnes was notified of these results at 3:54 PM on 6/15/2022    MELVIN THOMPSON MD; Attending Nuclear Medicine  This document has been electronically signed. Dario 15 2022  4:03PM  ---------------------------------------------------------------------------------------------------------------  EXAM:  CT CHEST                          PROCEDURE DATE:  2022      FINDINGS:    Lungs/Airways/Pleura: The central airways are patent. There are small   pleural effusions, new from 3/9/2022 abdominal CT, with partial lower   lobe compressive atelectasis. There are diffuse peribronchial and  peribronchovascular groundglass opacity with mild septal thickening,   likely pulmonary edema. There are few clusters of small nodules on a  background of subsegmental bronchial impaction, likely due to small   airways disease.    Mediastinum/Lymph nodes: No thoracic adenopathy.    Heart and Vessels: Mild cardiomegaly. Extensive coronary arterial and   aortic valvular calcification is present. Hypodensity of the blood pool   in relation to left ventricular myocardium suggests underlying anemia.   The great vessels are normal in size. No pericardial effusion.    Upper Abdomen: Cholelithiasis. Partially imaged large right parapelvic  renal cyst. Extensive visceral artery calcification.    Osseous structures and Soft Tissues: Degenerative changes without   aggressive osseous lesions. Remote left posterior 11th rib fracture.    IMPRESSION:  Pulmonary edema with small pleural effusions and partial lower lobe   compressive atelectasis.    ELISA BLACKBURN M.D., Attending Radiologist  This document has been electronically signed. May 10 2022  8:52AM  ---------------------------------------------------------------------------------------------------------------  EXAM:  DUPLEX SCAN EXT VEINS LOWER BI                          PROCEDURE DATE:  2022      IMPRESSION:  No evidence of deep venous thrombosis in either lower extremity venous   segments able to be examined.     AUGUSTIN EASTMAN MD; Attending Radiologist  This document has been electronically signed. May  9 2022  3:31PM  ---------------------------------------------------------------------------------------------------------------  EXAM:  CT BRAIN                          PROCEDURE DATE:  2022      Parenchymal volume loss and chronic microvascular ischemic changes are   again seen and unchanged    Also again seen is an extra-axial lesion involving the inferior left   frontal region with some associated calcification. This finding measures   approximately 1.2 cm and previously measured approximately 1.3 cm. This   is likely compatible with a meningioma. No significant shift or   herniation seen. Contrast enhanced MR brain can be done for further   evaluation clinical    Evaluation of osseous structures with the window appears unremarkable    The visualized paranasal sinuses mastoid and middle ear regions appear   clear.    Impression: Stable exam.    ADELE CHANDRA MD; Attending Radiologist  This document has been electronically signed. 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 which bicapped for hemostasis - 2cm non-bleeding rectal ulcer; hemodynamically stable and without tachycardia but with a decrease in H/H s/p 1 unit PRBCs this AM; has now received 10 units of PRBCs; episode of "lethargy" over the weekend -> head CT without acute changes; urinary frequency with cloudy urine in the canister -> antibiotics switched to cipro; no shortness of breath or hypoxemia on room air; no cough, sputum production, hemoptysis, chest congestion or wheeze; no fevers, chills or sweats; no chest pain/pressure or palpitations;       REVIEW OF SYSTEMS:  Constitutional: As per interval history  HEENT: Within normal limits  CV: As per interval history  Resp: As per interval history  GI: lower GI bleed likely due to diverticulosis  : Within normal limits  Musculoskeletal: Within normal limits  Skin: Within normal limits  Neurological: Within normal limits  Psychiatric: Within normal limits  Endocrine: Within normal limits  Hematologic/Lymphatic: anemia  Allergic/Immunologic: Within normal limits    MEDICATIONS:     Pulmonary "    Anti-microbials:  ciprofloxacin   IVPB 200 milliGRAM(s) IV Intermittent every 24 hours    Cardiovascular:  metoprolol tartrate 25 milliGRAM(s) Oral two times a day    Other:  artificial tears (preservative free) Ophthalmic Solution 1 Drop(s) Both EYES two times a day  ascorbic acid 500 milliGRAM(s) Oral daily  levothyroxine 25 MICROGram(s) Oral daily  melatonin 3 milliGRAM(s) Oral at bedtime  multivitamin 1 Tablet(s) Oral daily  pantoprazole    Tablet 40 milliGRAM(s) Oral before breakfast  rosuvastatin 10 milliGRAM(s) Oral at bedtime  senna 2 Tablet(s) Oral at bedtime  venlafaxine XR. 75 milliGRAM(s) Oral daily    MEDICATIONS  (PRN):        OBJECTIVE:    PHYSICAL EXAM:       ICU Vital Signs Last 24 Hrs  T(C): 36.4 (2022 04:48), Max: 37 (2022 21:29)  T(F): 97.6 (2022 04:48), Max: 98.6 (2022 21:29)  HR: 64 (2022 04:48) (60 - 73)  BP: 127/64 (2022 04:48) (127/64 - 147/68)  BP(mean): --  ABP: --  ABP(mean): --  RR: 18 (2022 04:48) (18 - 18)  SpO2: 98% (2022 04:48) (98% - 100%) on room air         General: Awake. Alert. Cooperative. No distress. Appears stated age. In bed. Discussing an upcoming cruise she would like to take.  HEENT:  Atraumatic. Normocephalic. Anicteric. Normal oral mucosa. PERRL. EOMI. Pale conjunctiva.  Neck: Supple. Trachea midline. Thyroid without enlargement/tenderness/nodules. No carotid bruit. No JVD.	  Cardiovascular: Regular rate and rhythm. S1 S2 normal. III/VI systolic murmur  Respiratory: Respirations unlabored. Clear to auscultation and percussion bilaterally. Kyphosis.  Abdomen: Soft. Non-tender. Non-distended. No organomegaly. No masses. Normal bowel sounds.  Extremities: Warm to touch. No clubbing or cyanosis. No pedal edema.   Pulses: 2+ peripheral pulses all extremities.	  Skin: Normal skin color. No rashes or lesions. No ecchymoses. No cyanosis. Warm to touch.  Lymph Nodes: Cervical, supraclavicular and axillary nodes normal  Neurological: Motor and sensory examination equal and normal. A and O x 3  Psychiatry: Appropriate mood and affect.    LABS:                          6.4    6.02  )-----------( 195      ( 2022 06:41 )             20.3     CBC    WBC  6.02 <==, 7.66 <==, 9.69 <==, 9.92 <==, 9.08 <==, 9.13 <==, 11.65 <==    Hemoglobin  6.4 <<==, 7.7 <<==, 7.7 <<==, 8.2 <<==, 7.2 <<==, 5.8 <<==, 7.1 <<==    Hematocrit  20.3 <==, 24.0 <==, 23.9 <==, 25.3 <==, 21.6 <==, 18.4 <==, 22.6 <==    Platelets  195 <==, 245 <==, 234 <==, 214 <==, 189 <==, 167 <==, 203 <==      139  |  105  |  33<H>  ----------------------------<  209<H>    06-29  3.8   |  27  |  1.44<H>      LYTES    sodium  139 <==, 143 <==, 141 <==, 136 <==, 137 <==, 137 <==, 134 <==    potassium   3.8 <==, 3.8 <==, 4.0 <==, 4.3 <==, 4.2 <==, 4.3 <==, 5.1 <==    chloride  105 <==, 106 <==, 105 <==, 102 <==, 103 <==, 101 <==, 99 <==    carbon dioxide  27 <==, 28 <==, 26 <==, 21 <==, 26 <==, 26 <==, 23 <==    =============================================================================================  RENAL FUNCTION:    Creatinine:   1.44  <<==, 1.36  <<==, 1.64  <<==, 1.42  <<==, 1.79  <<==, 1.93  <<==, 1.86  <<==    BUN:   33 <==, 42 <==, 55 <==, 51 <==, 63 <==, 66 <==, 44 <==    ============================================================================================    calcium   8.5 <==, 8.6 <==, 8.7 <==, 7.8 <==, 8.6 <==, 8.3 <==, 8.8 <==    phos   3.3 <==, 2.8 <==, 3.6 <==, 3.3 <==, 3.9 <==, 3.8 <==    mag   1.8 <==, 1.9 <==, 2.1 <==, 1.5 <==, 1.8 <==, 1.7 <==    ============================================================================================  LFTs    AST:   20 <== , 20 <== , 17 <== , 12 <== , 19 <== , 27 <==     ALT:  15  <== , 13  <== , 11  <== , 14  <== , 13  <== , 20  <==     AP:  57  <=, 62  <=, 49  <=, 62  <=, 58  <=, 69  <=    Bili:  0.3  <=, 0.5  <=, 0.3  <=, 0.4  <=, 0.3  <=, 0.2  <=, 0.2  <=    Venous Blood Gas:   @ 14:44  7.33/42/78/22/96.6  VBG Lactate: 4.9    CARDIAC MARKERS ( 2022 14:45 )  CPK 39 U/L /CKMB x     /CKMB Units x        troponin x        CARDIAC MARKERS ( 2022 14:45 )  CPK 39 U/L /CKMB x     /CKMB Units x        troponin x        < from: Transthoracic Echocardiogram (22 @ 14:37) >    Patient name: KOREY DIAZ  YOB: 1929   Age: 89 (F)   MR#: [id number]  Study Date: 2022  Location: [address] [place], [place] [id number]: Gadiel Schwartz [facility]  Study quality: Technically fair  Referring Physician: Teo Monson MD  Blood Pressure: 165/72 mmHg  Height: 158 cm  Weight: 53 kg  BSA: 1.5 m2  Heart Rate: 70 mmHg  ------------------------------------------------------------------------  PROCEDURE: Transthoracic echocardiogram with 2-D, M-Mode  and complete spectral and color flow Doppler.  INDICATION: Cardiac murmur, unspecified (R01.1)  ------------------------------------------------------------------------  Dimensions:    Normal Values:  LA:     3.6    2.0 - 4.0 cm  Ao:     3.0    2.0 - 3.8 cm  SEPTUM: 1.0    0.6 -1.2 cm  PWT:    1.0    0.6 - 1.1 cm  LVIDd:  4.0    3.0 - 5.6 cm  LVIDs:  2.8    1.8 - 4.0 cm  Derived variables:  LVMI: 84 g/m2  RWT: 0.50  Fractional short: 30 %  EF (Moran Rule): 67 %Doppler Peak Velocity (m/sec):  AoV=3.2  ------------------------------------------------------------------------  Observations:  Mitral Valve: Normal mitral valve. Mild mitral  regurgitation.  Aortic Valve/Aorta: Heavily calcified trileaflet aortic  valve with decreased opening. Peak transaortic valve  gradientequals 41 mm Hg, estimated aortic valve area  equals 1 sqcm (by continuity equation), aortic valve  velocity time integral equals 70 cm, consistent with  moderate aortic stenosis.  However the aortic valve appears heavily calcified and may  be closerto moderate-severe aortic stenosis.  Mild aortic  regurgitation.  Peak left ventricular outflow tract  gradient equals 3 mm Hg, LVOT velocity time integral equals  23 cm.  Aortic Root: 3 cm.  Left Atrium: Mildly dilated left atrium.  LA volume index =  39 cc/m2.  Left Ventricle: Normal left ventricular systolic function.  No segmental wall motion abnormalities. Normal left  ventricular internal dimensions and wall thicknesses.  Moderate diastolic dysfunction (Stage II).  Right Heart: Normal rightatrium. Normal right ventricular  size and function. Normal tricuspid valve. Minimal  tricuspid regurgitation. Normal pulmonic valve. Minimal  pulmonic regurgitation.  Pericardium/Pleura: Normal pericardium with trace  pericardial effusion.  Bilateral pleural effusions.  Hemodynamic: Estimated right atrial pressure is 8 mm Hg.  Color Doppler demonstrates no evidence of a patent foramen  ovale.  ------------------------------------------------------------------------  Conclusions:  1. Normal mitral valve. Mild mitral regurgitation.  2. Heavily calcified trileaflet aortic valve with decreased  opening. Peak transaortic valve gradient equals 41 mm Hg,  estimated aortic valve area equals 1 sqcm (by continuity  equation), aortic valve velocity time integral equals 70  cm, consistent with moderate aortic stenosis.  However the aortic valve appears heavily calcified and may  be closer to moderate-severe aortic stenosis.  Mild aortic  regurgitation.  3. Mildly dilated left atrium.  LA volume index = 39 cc/m2.  4. Normal left ventricular systolic function. No segmental  wall motion abnormalities.  5. Normal right ventricular size and function.  6. Normal pericardium with trace pericardial effusion.  7. Bilateral pleural effusions.  *** Compared with echocardiogram of 3/11/2022, no  significant changes noted.  ------------------------------------------------------------------------  Confirmed on  2022 - 17:38:34 by Osito Argueta M.D.  ------------------------------------------------------------------------  ---------------------------------------------------------------------------------------------------------------    MICROBIOLOGY:     Flu With COVID-19 By ERICK (22 @ 01:51)   SARS-CoV-2 Result: Detected  This Respiratory Panel uses polymerase chain reaction (PCR) to detect for   influenza A; influenza B; respiratory syncytial virus; and SARS-CoV-2.   This test was validated by [employer] and is in use under the FDA   Emergency Use Authorization (EUA) for clinical labs CLIA-certified to   perform high complexity testing. Test results should be correlated with   clinical presentation, patient history, and epidemiology.   Influenza A Result: NotDetec   Influenza B Result: NotDetec   Resp Syn Virus Result: NotDetec     Urinalysis Basic - ( 2022 22:50 )    Color: Light Yellow / Appearance: Slightly Turbid / S.009 / pH: x  Gluc: x / Ketone: Negative  / Bili: Negative / Urobili: Negative   Blood: x / Protein: Trace / Nitrite: Negative   Leuk Esterase: Large / RBC: 3 /hpf / WBC 71 /HPF   Sq Epi: x / Non Sq Epi: 1 /hpf / Bacteria: Many    Culture - Blood (22 @ 16:13)   Specimen Source: .Blood Blood-Peripheral   Culture Results:   No growth to date.     Culture - Blood (22 @ 16:18)   Specimen Source: .Blood Blood-Peripheral   Culture Results:   No growth to date.     Culture - Urine (22 @ 15:42)   Specimen Source: Clean Catch Clean Catch (Midstream)   Culture Results:   >100,000 CFU/ml Escherichia coli   <10,000 CFU/ml Normal Urogenital afshin present     RADIOLOGY:  [x] Chest radiographs reviewed and interpreted by me    EXAM:  XR CHEST PORTABLE URGENT 1V                          PROCEDURE DATE:  2022      INTERPRETATION:  CLINICAL INFORMATION: Shortness of breath.    TECHNIQUE: Frontal radiograph of the chest.    COMPARISON: CT chest and chest x-ray 2022    FINDINGS:  The lungs are clear.  No pleural effusion or pneumothorax.  Cardiomediastinal silhouette size is within normal limits.  No acute osseous abnormality.    IMPRESSION:  No evidence of acute pulmonary disease.    SCARLET JALLOH MD; Resident Radiology  This document has been electronically signed.  DIANE OBREGON MD; Attending Radiologist  This document has been electronically signed. 2022  1:19PM  ---------------------------------------------------------------------------------------------------------------  EXAM:  NM GI BLEEDING IMG                          PROCEDURE DATE:  06/15/2022      IMPRESSION: Abnormal GI bleeding scan.    Active bleeding site in the proximal ascending colon.    Dr. Argenis Barnes was notified of these results at 3:54 PM on 6/15/2022    MELVIN THOMPSON MD; Attending Nuclear Medicine  This document has been electronically signed. Dario 15 2022  4:03PM  ---------------------------------------------------------------------------------------------------------------  EXAM:  CT CHEST                          PROCEDURE DATE:  2022      FINDINGS:    Lungs/Airways/Pleura: The central airways are patent. There are small   pleural effusions, new from 3/9/2022 abdominal CT, with partial lower   lobe compressive atelectasis. There are diffuse peribronchial and  peribronchovascular groundglass opacity with mild septal thickening,   likely pulmonary edema. There are few clusters of small nodules on a  background of subsegmental bronchial impaction, likely due to small   airways disease.    Mediastinum/Lymph nodes: No thoracic adenopathy.    Heart and Vessels: Mild cardiomegaly. Extensive coronary arterial and   aortic valvular calcification is present. Hypodensity of the blood pool   in relation to left ventricular myocardium suggests underlying anemia.   The great vessels are normal in size. No pericardial effusion.    Upper Abdomen: Cholelithiasis. Partially imaged large right parapelvic  renal cyst. Extensive visceral artery calcification.    Osseous structures and Soft Tissues: Degenerative changes without   aggressive osseous lesions. Remote left posterior 11th rib fracture.    IMPRESSION:  Pulmonary edema with small pleural effusions and partial lower lobe   compressive atelectasis.    ELISA BLACKBURN M.D., Attending Radiologist  This document has been electronically signed. May 10 2022  8:52AM  ---------------------------------------------------------------------------------------------------------------  EXAM:  DUPLEX SCAN EXT VEINS LOWER BI                          PROCEDURE DATE:  2022      IMPRESSION:  No evidence of deep venous thrombosis in either lower extremity venous   segments able to be examined.     AUGUSTIN EASTMAN MD; Attending Radiologist  This document has been electronically signed. May  9 2022  3:31PM  ---------------------------------------------------------------------------------------------------------------  EXAM:  CT BRAIN                          PROCEDURE DATE:  2022      Parenchymal volume loss and chronic microvascular ischemic changes are   again seen and unchanged    Also again seen is an extra-axial lesion involving the inferior left   frontal region with some associated calcification. This finding measures   approximately 1.2 cm and previously measured approximately 1.3 cm. This   is likely compatible with a meningioma. No significant shift or   herniation seen. Contrast enhanced MR brain can be done for further   evaluation clinical    Evaluation of osseous structures with the window appears unremarkable    The visualized paranasal sinuses mastoid and middle ear regions appear   clear.    Impression: Stable exam.    ADELE CHANDRA MD; Attending Radiologist  This document has been electronically signed. 2022  2:05PM  ---------------------------------------------------------------------------------------------------------------

## 2022-06-30 LAB
ALBUMIN SERPL ELPH-MCNC: 2.4 G/DL — LOW (ref 3.3–5)
ALP SERPL-CCNC: 66 U/L — SIGNIFICANT CHANGE UP (ref 40–120)
ALT FLD-CCNC: 23 U/L — SIGNIFICANT CHANGE UP (ref 10–45)
ANION GAP SERPL CALC-SCNC: 8 MMOL/L — SIGNIFICANT CHANGE UP (ref 5–17)
AST SERPL-CCNC: 29 U/L — SIGNIFICANT CHANGE UP (ref 10–40)
BILIRUB SERPL-MCNC: 0.3 MG/DL — SIGNIFICANT CHANGE UP (ref 0.2–1.2)
BLD GP AB SCN SERPL QL: POSITIVE — SIGNIFICANT CHANGE UP
BUN SERPL-MCNC: 31 MG/DL — HIGH (ref 7–23)
CALCIUM SERPL-MCNC: 8.6 MG/DL — SIGNIFICANT CHANGE UP (ref 8.4–10.5)
CHLORIDE SERPL-SCNC: 108 MMOL/L — SIGNIFICANT CHANGE UP (ref 96–108)
CO2 SERPL-SCNC: 26 MMOL/L — SIGNIFICANT CHANGE UP (ref 22–31)
CREAT SERPL-MCNC: 1.64 MG/DL — HIGH (ref 0.5–1.3)
EGFR: 29 ML/MIN/1.73M2 — LOW
GLUCOSE SERPL-MCNC: 139 MG/DL — HIGH (ref 70–99)
HCT VFR BLD CALC: 27.1 % — LOW (ref 34.5–45)
HGB BLD-MCNC: 8.5 G/DL — LOW (ref 11.5–15.5)
LDH SERPL L TO P-CCNC: 198 U/L — SIGNIFICANT CHANGE UP (ref 50–242)
MAGNESIUM SERPL-MCNC: 1.8 MG/DL — SIGNIFICANT CHANGE UP (ref 1.6–2.6)
MCHC RBC-ENTMCNC: 29.1 PG — SIGNIFICANT CHANGE UP (ref 27–34)
MCHC RBC-ENTMCNC: 31.4 GM/DL — LOW (ref 32–36)
MCV RBC AUTO: 92.8 FL — SIGNIFICANT CHANGE UP (ref 80–100)
NRBC # BLD: 0 /100 WBCS — SIGNIFICANT CHANGE UP (ref 0–0)
PHOSPHATE SERPL-MCNC: 3.8 MG/DL — SIGNIFICANT CHANGE UP (ref 2.5–4.5)
PLATELET # BLD AUTO: 202 K/UL — SIGNIFICANT CHANGE UP (ref 150–400)
POTASSIUM SERPL-MCNC: 4.4 MMOL/L — SIGNIFICANT CHANGE UP (ref 3.5–5.3)
POTASSIUM SERPL-SCNC: 4.4 MMOL/L — SIGNIFICANT CHANGE UP (ref 3.5–5.3)
PROT SERPL-MCNC: 4.9 G/DL — LOW (ref 6–8.3)
RBC # BLD: 2.92 M/UL — LOW (ref 3.8–5.2)
RBC # FLD: 15.8 % — HIGH (ref 10.3–14.5)
RH IG SCN BLD-IMP: POSITIVE — SIGNIFICANT CHANGE UP
SODIUM SERPL-SCNC: 142 MMOL/L — SIGNIFICANT CHANGE UP (ref 135–145)
SURGICAL PATHOLOGY STUDY: SIGNIFICANT CHANGE UP
WBC # BLD: 5.89 K/UL — SIGNIFICANT CHANGE UP (ref 3.8–10.5)
WBC # FLD AUTO: 5.89 K/UL — SIGNIFICANT CHANGE UP (ref 3.8–10.5)

## 2022-06-30 PROCEDURE — 99233 SBSQ HOSP IP/OBS HIGH 50: CPT | Mod: FS

## 2022-06-30 PROCEDURE — 86077 PHYS BLOOD BANK SERV XMATCH: CPT

## 2022-06-30 PROCEDURE — 99233 SBSQ HOSP IP/OBS HIGH 50: CPT | Mod: GC

## 2022-06-30 PROCEDURE — 99233 SBSQ HOSP IP/OBS HIGH 50: CPT

## 2022-06-30 RX ORDER — POLYETHYLENE GLYCOL 3350 17 G/17G
17 POWDER, FOR SOLUTION ORAL DAILY
Refills: 0 | Status: DISCONTINUED | OUTPATIENT
Start: 2022-06-30 | End: 2022-07-01

## 2022-06-30 RX ORDER — FUROSEMIDE 40 MG
40 TABLET ORAL DAILY
Refills: 0 | Status: DISCONTINUED | OUTPATIENT
Start: 2022-06-30 | End: 2022-07-01

## 2022-06-30 RX ADMIN — PANTOPRAZOLE SODIUM 40 MILLIGRAM(S): 20 TABLET, DELAYED RELEASE ORAL at 05:08

## 2022-06-30 RX ADMIN — Medication 25 MILLIGRAM(S): at 05:08

## 2022-06-30 RX ADMIN — Medication 40 MILLIGRAM(S): at 13:48

## 2022-06-30 RX ADMIN — Medication 100 MILLIGRAM(S): at 22:45

## 2022-06-30 RX ADMIN — SENNA PLUS 2 TABLET(S): 8.6 TABLET ORAL at 22:46

## 2022-06-30 RX ADMIN — Medication 75 MILLIGRAM(S): at 13:47

## 2022-06-30 RX ADMIN — Medication 1 DROP(S): at 22:45

## 2022-06-30 RX ADMIN — Medication 1 DROP(S): at 05:09

## 2022-06-30 RX ADMIN — Medication 25 MICROGRAM(S): at 05:08

## 2022-06-30 RX ADMIN — Medication 25 MILLIGRAM(S): at 18:31

## 2022-06-30 RX ADMIN — POLYETHYLENE GLYCOL 3350 17 GRAM(S): 17 POWDER, FOR SOLUTION ORAL at 13:46

## 2022-06-30 RX ADMIN — ROSUVASTATIN CALCIUM 10 MILLIGRAM(S): 5 TABLET ORAL at 22:45

## 2022-06-30 RX ADMIN — Medication 500 MILLIGRAM(S): at 13:46

## 2022-06-30 RX ADMIN — Medication 1 TABLET(S): at 13:47

## 2022-06-30 NOTE — PROGRESS NOTE ADULT - SUBJECTIVE AND OBJECTIVE BOX
[facility]   [phone]  RENAL FOLLOW UP NOTE  --------------------------------------------------------------------------------  Chief Complaint:    24 hour events/subjective:    patient seen and examined  appears comfortable    PAST HISTORY  --------------------------------------------------------------------------------  No significant changes to PMH, PSH, FHx, SHx, unless otherwise noted    ALLERGIES & MEDICATIONS  --------------------------------------------------------------------------------  Allergies    Keflex (Rash; Hives)    Intolerances      Standing Inpatient Medications  artificial tears (preservative free) Ophthalmic Solution 1 Drop(s) Both EYES two times a day  ascorbic acid 500 milliGRAM(s) Oral daily  ciprofloxacin   IVPB 200 milliGRAM(s) IV Intermittent every 24 hours  furosemide    Tablet 40 milliGRAM(s) Oral daily  levothyroxine 25 MICROGram(s) Oral daily  melatonin 3 milliGRAM(s) Oral at bedtime  metoprolol tartrate 25 milliGRAM(s) Oral two times a day  multivitamin 1 Tablet(s) Oral daily  pantoprazole    Tablet 40 milliGRAM(s) Oral before breakfast  polyethylene glycol 3350 17 Gram(s) Oral daily  rosuvastatin 10 milliGRAM(s) Oral at bedtime  senna 2 Tablet(s) Oral at bedtime  venlafaxine XR. 75 milliGRAM(s) Oral daily    PRN Inpatient Medications      REVIEW OF SYSTEMS  --------------------------------------------------------------------------------    Gen: denies fatigue, fevers/chills,  CVS: denies chest pain/palpitations  Resp: denies SOB/Cough  GI: Denies N/V/Abd pain  : Denies dysuria    VITALS/PHYSICAL EXAM  --------------------------------------------------------------------------------  T(C): 36.9 (06-30-22 @ 12:32), Max: 36.9 (06-30-22 @ 12:32)  HR: 60 (06-30-22 @ 12:32) (60 - 64)  BP: 145/70 (06-30-22 @ 12:32) (129/57 - 173/67)  RR: 17 (06-30-22 @ 12:32) (17 - 18)  SpO2: 98% (06-30-22 @ 12:32) (97% - 98%)  Wt(kg): --        06-29-22 @ 07:01  -  06-30-22 @ 07:00  --------------------------------------------------------  IN: 200 mL / OUT: 0 mL / NET: 200 mL    06-30-22 @ 07:01  -  06-30-22 @ 15:16  --------------------------------------------------------  IN: 340 mL / OUT: 0 mL / NET: 340 mL      Physical Exam:  	              Gen: Appears comfortable, forgetful   	Pulm: decrease bs,  no rales or ronchi or wheezing  	CV: No JVD. RRR, S1S2; no rub  	Abd: +BS, soft, nontender/nondistended  	: No suprapubic tenderness, urine output appears dark/mixed  	UE: Warm, no cyanosis  no clubbing,  no edema  	LE: Warm, no cyanosis  no clubbing, no edema    LABS/STUDIES  --------------------------------------------------------------------------------              8.5    5.89  >-----------<  202      [06-30-22 @ 07:03]              27.1     142  |  108  |  31  ----------------------------<  139      [06-30-22 @ 07:01]  4.4   |  26  |  1.64        Ca     8.6     [06-30-22 @ 07:01]      Mg     1.8     [06-30-22 @ 07:01]      Phos  3.8     [06-30-22 @ 07:01]    TPro  4.9  /  Alb  2.4  /  TBili  0.3  /  DBili  x   /  AST  29  /  ALT  23  /  AlkPhos  66  [06-30-22 @ 07:01]              [06-30-22 @ 07:01]    Creatinine Trend:  SCr 1.64 [06-30 @ 07:01]  SCr 1.44 [06-29 @ 06:42]  SCr 1.36 [06-28 @ 07:24]  SCr 1.64 [06-27 @ 07:12]  SCr 1.42 [06-26 @ 14:38]              Urinalysis - [06-26-22 @ 22:50]      Color Light Yellow / Appearance Slightly Turbid / SG 1.009 / pH 6.0      Gluc Negative / Ketone Negative  / Bili Negative / Urobili Negative       Blood Small / Protein Trace / Leuk Est Large / Nitrite Negative      RBC 3 / WBC 71 / Hyaline 1 / Gran  / Sq Epi  / Non Sq Epi 1 / Bacteria Many

## 2022-06-30 NOTE — PROGRESS NOTE ADULT - PROBLEM SELECTOR PLAN 3
Positive UA with E. coli in cx upon admission (UA on admission was not positive and patient without symptoms).   Started Levofloxacin, switched to Ciprofloxacin 200 mg daily (renally dosed) for 4 more days to treat for cystitis    -last day of ciprofloxacin 7/1

## 2022-06-30 NOTE — PROGRESS NOTE ADULT - ATTENDING COMMENTS
Patient seen and examined at bedside. No acute events overnight. No bowel movement past 48 hours and son requesting Miralax. Hemoglobin is 8.5. Restart Lasix, renal function stable. If hemoglobin is stable tomorrow will discharge. Will speak to CM to reinstate services.

## 2022-06-30 NOTE — PROGRESS NOTE ADULT - ASSESSMENT
89yo F w/ hx HTN, Afib (recent dx 4/2022), CHF, HLD, nonsmoker, remote hx of asthma presenting with loose stools, lethargy, fatigue, low blood pressure and blood bowel movement found to have a hemoglobin of 4.8 most likely 2/2 to GI bleed- likely diverticular bleed given history/presentation    recent COVID + 5/20 s/p Paxlovid rx; COVID PCR + on admission  -continue off contact isolation per Infection Control Dept    Acute GI blood loss anemia 2/2 bleeding colonic AVMs;  s/p Colonoscopy 6/28 with Rx of 2 bleeding AVMs (ascending colon), non bleeding left sided diverticulosis and non bleeding solitary rectal ulcer  6/15/22 +NM GI bleed scan (proximal AC)  IR input appreciated - increased risk of renal injury with IV contrast load required for angio  Recurrent/ongoing rectal bleeding with continued transfusion requirement therefore underwent Colonoscopy 6/17/22 6/17/22 COLON poor prep and "old" blood in colon; no active bleeding encountered, +diverticulosis - primarily Left sided  recurrent episodes of intermittent bleeding/burgundy stools with ongoing transfusion requirement therefore repeat endoscopic evaluation  6/28/22 EGD - normal esophagus, mild gastritis, normal duodenum  6/28/22 COLON - poor prep but adequate visualization with 4L lavage. Diverticulosis sigmoid and Descending colon. 2 transverse colon polyps (non bleeding, in situ). 2 bleeding AVMS in AC - bicapped for hemostasis. non bleeding 2cm rectal ulcer    s/p 10 units PRBCs admission ->current  no bleeding at end of colonoscopy and no bleeding or BM reported since colonoscopy 6/28/22  Brisk Hgb response to PRBCs  -check AM CBC  -continue Miralax and Senna for bowel regimen (chronic constipation & diverticulosis; wish to avoid straining for BMs)  -PO PPI for gi ppx    +Anti-JKA   -Hematology following; no e/o hemolysis at this time  -Goal Hgb>7    Discussed with pt and family at bedside  Discussed with Medicine team and Hematology  no GI objuection to dc planning for 7/1    Marek Cormier PA-C    [facility]  [phone]  After hours and weekend coverage [phone]

## 2022-06-30 NOTE — PROGRESS NOTE ADULT - PROBLEM SELECTOR PLAN 1
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 f/u post transfusion cbc, keep hb>7  6/28: episode of large BM with maeve blood, patient assessed AO3, NAD, VSS, CBC 7.7  6/29: Hg 6.4, received 1u prbc  6/30: Hgb stable 8.5

## 2022-06-30 NOTE — PROGRESS NOTE ADULT - NS ATTEND OPT1 GEN_ALL_CORE
I independently performed the documented:
I independently performed the documented:
I attest my time as attending is greater than 50% of the total combined time spent on qualifying patient care activities by the PA/NP and attending.
I independently performed the documented:
I attest my time as attending is greater than 50% of the total combined time spent on qualifying patient care activities by the PA/NP and attending.

## 2022-06-30 NOTE — PROGRESS NOTE ADULT - ASSESSMENT
89yo F w/ hx HTN, Afib, CHF, AS, HLD, nonsmoker, remote hx of asthma presenting with loose stools, lethargy, fatigue, low blood pressure and bloody bowel movement. Of note, the patient had COVID 2 weeks PTA measured by at home test. RVP was + for COVID by PCR on admission.   In the ED: Labs: Hemoglobin 4.8, Cr 1.93, COVID +   required prbc. pt also had egd. eliquis was dc.       1- CKD IV   2- hx chf   3- anemia/ GI bleed      creatinine steady  resume lasix 40 mg po daily  s/p EGD/colonoscopy  required multiple prbc for GIB  keep Hb >7, trend hb   metoprolol 25 mg bid   trend creatinine   d.w pt son at bedside

## 2022-06-30 NOTE — PROGRESS NOTE ADULT - NS ATTEND BILL GEN_ALL_CORE
Attending to bill
PA/NP to bill
Attending to bill
PA/NP to bill
Attending to bill
PA/NP to bill

## 2022-06-30 NOTE — PROGRESS NOTE ADULT - PROBLEM SELECTOR PLAN 4
Currently euvolemic  -c/w lasix 40mg daily   - plan for OP Cardiology f/u regarding standing amiodarone

## 2022-06-30 NOTE — PROGRESS NOTE ADULT - NS ATTEND AMEND GEN_ALL_CORE FT
89yF with LGI ?diverticular bleed  VSS  got transfused  Agree with nuclear and CT angio if continues
Agree with above note. Briefly, this is a 89yo F w/ hx HTN, Afib (recent dx 4/2022), CHF, HLD, nonsmoker, remote hx of asthma presenting with loose stools, lethargy, fatigue, low blood pressure and blood bowel movement found to have a hemoglobin of 4.8 most likely 2/2 to GI bleed- likely diverticular bleed given history/presentation    recent COVID + 5/20 s/p Paxlovid rx; COVID PCR + on admission  -continue off contact isolation per Infection Control Dept    We were consulted for Acute GI blood loss anemia; likely 2/2  diverticular bleeding given history/presentation.   +NM GI bleed scan (proximal AC)  IR input appreciated - increased risk of renal injury with IV contrast load required for angio  Recurrent/ongoing rectal bleeding with continued transfusion requirement therefore underwent Colonoscopy 6/17/22 6/17/22 COLONoscopy revelated  poor prep and "old" blood in colon; no active bleeding encountered, +diverticulosis - primarily Left sided    s/p 7 units PRBCs admission ->current  No evidence of active/brisk GI bleeding   6/24 recurrent drop in Hgb without overt/active bleeding ?component of hemolysis (known AS and Anti- JKA+)  We are awaiting further evaluation regarding ?hemolysis, meanwhile bowel regiment o keep patient's stools soft.    Hola Hernandez MD  [facility]
agree with above
cr steady   resume lasix 40 mg po daily   check hb in am   d/w pt daughter at bedside
rectal bldg continues  EGD healed Ulcer  for colonoscopy tomorrow
ckd   cr is steady   prbc for anemia as needed  GI bleed work up  d/w GI team at bedside  d/w pt son
cr at 1.8 still in her baseline range  trend hb

## 2022-06-30 NOTE — PROGRESS NOTE ADULT - SUBJECTIVE AND OBJECTIVE BOX
PROGRESS NOTE:    Raquel Vegas MD  Internal Medicine PGY-2      Patient is a 89y old  Female who presents with a chief complaint of gi bleed (29 Jun 2022 11:43)      SUBJECTIVE / OVERNIGHT EVENTS: No acute overnight events. Pt seen and examined. Denies fevers, chills, CP, SOB, Abdominal pain, N/V, Constipation, Diarrhea      MEDICATIONS  (STANDING):  artificial tears (preservative free) Ophthalmic Solution 1 Drop(s) Both EYES two times a day  ascorbic acid 500 milliGRAM(s) Oral daily  ciprofloxacin   IVPB 200 milliGRAM(s) IV Intermittent every 24 hours  levothyroxine 25 MICROGram(s) Oral daily  melatonin 3 milliGRAM(s) Oral at bedtime  metoprolol tartrate 25 milliGRAM(s) Oral two times a day  multivitamin 1 Tablet(s) Oral daily  pantoprazole    Tablet 40 milliGRAM(s) Oral before breakfast  rosuvastatin 10 milliGRAM(s) Oral at bedtime  senna 2 Tablet(s) Oral at bedtime  venlafaxine XR. 75 milliGRAM(s) Oral daily    MEDICATIONS  (PRN):      I&O's Summary    29 Jun 2022 07:01  -  30 Jun 2022 07:00  --------------------------------------------------------  IN: 200 mL / OUT: 0 mL / NET: 200 mL        Vital Signs Last 24 Hrs  T(C): 36.6 (30 Jun 2022 04:28), Max: 36.8 (29 Jun 2022 08:30)  T(F): 97.9 (30 Jun 2022 04:28), Max: 98.2 (29 Jun 2022 08:30)  HR: 61 (30 Jun 2022 04:28) (58 - 65)  BP: 173/67 (30 Jun 2022 04:28) (129/57 - 173/67)  BP(mean): --  RR: 18 (30 Jun 2022 04:28) (18 - 18)  SpO2: 98% (30 Jun 2022 04:28) (97% - 98%)    =================PHYSICAL EXAM=================    PHYSICAL EXAM:  GENERAL: NAD, lying in bed comfortably  HEAD:  Atraumatic, Normocephalic  EYES: EOMI, PERRLA, conjunctiva and sclera clear  ENT: Moist mucous membranes  NECK: Supple, No JVD  CHEST/LUNG: Clear to auscultation bilaterally; No rales, rhonchi, wheezing, or rubs. Unlabored respirations  HEART: Regular rate and rhythm; No murmurs, rubs, or gallops  ABDOMEN: Bowel sounds present; Soft, Nontender, Nondistended. No hepatomegally  EXTREMITIES:  2+ Peripheral Pulses, brisk capillary refill. No clubbing, cyanosis, or edema  NERVOUS SYSTEM:  Alert & Oriented X3, speech clear. No deficits   MSK: FROM all 4 extremities, full and equal strength  SKIN: No rashes or lesions    =================================================    LABS:                        6.4    6.02  )-----------( 195      ( 29 Jun 2022 06:41 )             20.3     Auto Eosinophil # x     / Auto Eosinophil % x     / Auto Neutrophil # x     / Auto Neutrophil % x     / BANDS % x        06-29    139  |  105  |  33<H>  ----------------------------<  209<H>  3.8   |  27  |  1.44<H>    Ca    8.5      29 Jun 2022 06:42  Mg     1.8     06-29  Phos  3.3     06-29            Lactate, Blood: 1.2 mmol/L (06-26 @ 23:04)        RADIOLOGY & ADDITIONAL TESTS:    Imaging Personally Reviewed:    Consultant(s) Notes Reviewed:      Care Discussed with Consultants/Other Providers:   PROGRESS NOTE:    Raquel Vegas MD  Internal Medicine PGY-2      Patient is a 89y old  Female who presents with a chief complaint of gi bleed (29 Jun 2022 11:43)      SUBJECTIVE / OVERNIGHT EVENTS: No acute overnight events. Pt seen and examined. She reports a good appetite. States that she feels great this morning. Reports that she has not had a bowel movement since before the colonoscopy. Denies fevers, chills, CP, SOB, Abdominal pain, N/V.       MEDICATIONS  (STANDING):  artificial tears (preservative free) Ophthalmic Solution 1 Drop(s) Both EYES two times a day  ascorbic acid 500 milliGRAM(s) Oral daily  ciprofloxacin   IVPB 200 milliGRAM(s) IV Intermittent every 24 hours  levothyroxine 25 MICROGram(s) Oral daily  melatonin 3 milliGRAM(s) Oral at bedtime  metoprolol tartrate 25 milliGRAM(s) Oral two times a day  multivitamin 1 Tablet(s) Oral daily  pantoprazole    Tablet 40 milliGRAM(s) Oral before breakfast  rosuvastatin 10 milliGRAM(s) Oral at bedtime  senna 2 Tablet(s) Oral at bedtime  venlafaxine XR. 75 milliGRAM(s) Oral daily    MEDICATIONS  (PRN):      I&O's Summary    29 Jun 2022 07:01  -  30 Jun 2022 07:00  --------------------------------------------------------  IN: 200 mL / OUT: 0 mL / NET: 200 mL        Vital Signs Last 24 Hrs  T(C): 36.6 (30 Jun 2022 04:28), Max: 36.8 (29 Jun 2022 08:30)  T(F): 97.9 (30 Jun 2022 04:28), Max: 98.2 (29 Jun 2022 08:30)  HR: 61 (30 Jun 2022 04:28) (58 - 65)  BP: 173/67 (30 Jun 2022 04:28) (129/57 - 173/67)  BP(mean): --  RR: 18 (30 Jun 2022 04:28) (18 - 18)  SpO2: 98% (30 Jun 2022 04:28) (97% - 98%)    =================PHYSICAL EXAM=================    GENERAL: Older woman of stated age sitting up in bed in NAD    ENT: Moist mucous membranes  CHEST/LUNG: Clear to auscultation bilaterally; No rales, rhonchi, wheezing, or rubs. Unlabored respirations  HEART: Regular rate and rhythm; +systolic murmur, no  rubs, or gallops  ABDOMEN: Bowel sounds present; Soft, Nontender, Nondistended. No hepatomegally  EXTREMITIES:  2+ Peripheral Pulses, brisk capillary refill. No clubbing, cyanosis, or edema  NERVOUS SYSTEM:  Alert & Oriented X3, speech clear. No deficits   MSK: FROM all 4 extremities, full and equal strength  SKIN: No rashes or lesions    =================================================    LABS:                        6.4    6.02  )-----------( 195      ( 29 Jun 2022 06:41 )             20.3     Auto Eosinophil # x     / Auto Eosinophil % x     / Auto Neutrophil # x     / Auto Neutrophil % x     / BANDS % x        06-29    139  |  105  |  33<H>  ----------------------------<  209<H>  3.8   |  27  |  1.44<H>    Ca    8.5      29 Jun 2022 06:42  Mg     1.8     06-29  Phos  3.3     06-29            Lactate, Blood: 1.2 mmol/L (06-26 @ 23:04)        RADIOLOGY & ADDITIONAL TESTS:    Imaging Personally Reviewed:    Consultant(s) Notes Reviewed:      Care Discussed with Consultants/Other Providers:

## 2022-06-30 NOTE — PROGRESS NOTE ADULT - SUBJECTIVE AND OBJECTIVE BOX
HPI:  Patient is a 89yo F w/ hx HTN, Afib, CHF, AS, HLD, nonsmoker, remote hx of asthma presenting with loose stools, lethargy, fatigue, low blood pressure and bloody bowel movement. The patient started Eliquis at the end of April on 4/2022. The daughter noted on Friday that the patient developed loose stools that were normal in color. The patient subsequently was developing progressive fatigue, lethargy and decreased PO intake. Her daughter noted that her stools were a maroon color. On 6/12, the daughter noticed that the patient had low blood pressures SBP of 90s down from baseline of 120s. She checked her BM that morning and noted blood with blood clots in the toilet. At this time she decided to seek medical care in the ED for further evaluation.     Of note, the patient had COVID 2 weeks ago measured by at home test. RVP was + for COVID by PCR on admission.     In the ED:   Vitals: Temp 97.7, HR 60, /55, RR 20, O2 saturation 100%  Labs: Hemoglobin 4.8, Cr 1.93, COVID +   Imaging:  Cxr No acute pathology  Interventions: Given Kcentra, 2 units of pRBCs, Pantoprazole 80mg    (13 Jun 2022 06:24)    Review Of Systems:     No chest pain, shortness of breath, or palpitations            Medications:  artificial tears (preservative free) Ophthalmic Solution 1 Drop(s) Both EYES two times a day  ascorbic acid 500 milliGRAM(s) Oral daily  ciprofloxacin   IVPB 200 milliGRAM(s) IV Intermittent every 24 hours  furosemide    Tablet 40 milliGRAM(s) Oral daily  levothyroxine 25 MICROGram(s) Oral daily  melatonin 3 milliGRAM(s) Oral at bedtime  metoprolol tartrate 25 milliGRAM(s) Oral two times a day  multivitamin 1 Tablet(s) Oral daily  pantoprazole    Tablet 40 milliGRAM(s) Oral before breakfast  rosuvastatin 10 milliGRAM(s) Oral at bedtime  senna 2 Tablet(s) Oral at bedtime  venlafaxine XR. 75 milliGRAM(s) Oral daily    PAST MEDICAL & SURGICAL HISTORY:  HTN (hypertension)      Diabetes mellitus type 2, noninsulin dependent      Diverticulitis      Hyperlipidemia      GERD (gastroesophageal reflux disease)      Glaucoma      Breast cancer      Urinary incontinence      Renal calculus or stone      Arthritis      Heart murmur      Renal calculi  s/p stone extraction 57 yrs ago      S/P lumpectomy of breast  right breast with node removal      S/P bladder repair  Interstim device placement 2010        Vitals:  T(C): 36.6 (06-30-22 @ 04:28), Max: 36.8 (06-29-22 @ 08:30)  HR: 61 (06-30-22 @ 04:28) (58 - 65)  BP: 173/67 (06-30-22 @ 04:28) (129/57 - 173/67)  BP(mean): --  RR: 18 (06-30-22 @ 04:28) (18 - 18)  SpO2: 98% (06-30-22 @ 04:28) (97% - 98%)  Wt(kg): --  Daily     Daily   I&O's Summary    29 Jun 2022 07:01  -  30 Jun 2022 07:00  --------------------------------------------------------  IN: 200 mL / OUT: 0 mL / NET: 200 mL        Physical Exam:  Appearance: No acute distress; well appearing  Eyes: PERRL, EOMI, pink conjunctiva  HENT: Normal oral mucosa  Cardiovascular: RRR, S1, S2, no murmurs, rubs, or gallops; no edema; no JVD  Respiratory: Clear to auscultation bilaterally  Gastrointestinal: soft, non-tender, non-distended with normal bowel sounds  Musculoskeletal: No clubbing; no joint deformity   Neurologic: Non-focal  Lymphatic: No lymphadenopathy  Psychiatry: AAOx3, mood & affect appropriate  Skin: No rashes, ecchymoses, or cyanosis                          8.5    5.89  )-----------( 202 ( 30 Jun 2022 07:03 )             27.1     06-30    142  |  108  |  31<H>  ----------------------------<  139<H>  4.4   |  26  |  1.64<H>    Ca    8.6      30 Jun 2022 07:01  Phos  3.8     06-30  Mg     1.8     06-30    TPro  4.9<L>  /  Alb  2.4<L>  /  TBili  0.3  /  DBili  x   /  AST  29  /  ALT  23  /  AlkPhos  66  06-30                  ECG:    Echo:    Stress Testing:     Cath:    Imaging:    Interpretation of Telemetry:

## 2022-06-30 NOTE — PROGRESS NOTE ADULT - SUBJECTIVE AND OBJECTIVE BOX
Patient is a 89y old  Female who presents with a chief complaint of gi bleed (30 Jun 2022 08:16)      INTERVAL HPI/OVERNIGHT EVENTS:  s/p 1 unit PRBCS yesterday (#10 since admission)  no BM yesterday; no rectal bleeding or melena    MEDICATIONS  (STANDING):  artificial tears (preservative free) Ophthalmic Solution 1 Drop(s) Both EYES two times a day  ascorbic acid 500 milliGRAM(s) Oral daily  ciprofloxacin   IVPB 200 milliGRAM(s) IV Intermittent every 24 hours  furosemide    Tablet 40 milliGRAM(s) Oral daily  levothyroxine 25 MICROGram(s) Oral daily  melatonin 3 milliGRAM(s) Oral at bedtime  metoprolol tartrate 25 milliGRAM(s) Oral two times a day  multivitamin 1 Tablet(s) Oral daily  pantoprazole    Tablet 40 milliGRAM(s) Oral before breakfast  polyethylene glycol 3350 17 Gram(s) Oral daily  rosuvastatin 10 milliGRAM(s) Oral at bedtime  senna 2 Tablet(s) Oral at bedtime  venlafaxine XR. 75 milliGRAM(s) Oral daily    MEDICATIONS  (PRN):      Allergies  Keflex (Rash; Hives)      Review of Systems:  General:  No wt loss, fevers, chills, night sweats  CV:  No pain, palpitations, +PAF  Resp:  No dyspnea, cough, tachypnea, wheezing  GI:  see HPI  :  No pain,  +hx kidney stones +incontinence s/p bladder repair  Muscle:  No pain, weakness +arthritis  Neuro:  No focal weakness, tingling, memory problems  Psych:  No fatigue, insomnia, mood problems, depression  Endocrine:  No polyuria, polydypsia, cold/heat intolerance  Heme:  No petechiae, ecchymosis, easy bruisability  Skin:  No rash, tattoos, scars, edema      Vital Signs Last 24 Hrs  T(C): 36.9 (30 Jun 2022 12:32), Max: 36.9 (30 Jun 2022 12:32)  T(F): 98.5 (30 Jun 2022 12:32), Max: 98.5 (30 Jun 2022 12:32)  HR: 60 (30 Jun 2022 12:32) (60 - 64)  BP: 145/70 (30 Jun 2022 12:32) (129/57 - 173/67)  BP(mean): --  RR: 17 (30 Jun 2022 12:32) (17 - 18)  SpO2: 98% (30 Jun 2022 12:32) (97% - 98%)    PHYSICAL EXAM:  Constitutional: NAD, well-developed elderly WF sitting up in bed, alert and responsive, son at bedside  Neck: No LAD, supple no JVD  Respiratory: clear b/l no accessory muscle use  Cardiovascular: S1 and S2, RRR,  +murmur  Gastrointestinal: BS+, soft, NT/ND, neg HSM  Extremities: No peripheral edema, neg clubbing, cyanosis +fragile skin  Vascular: 2+ peripheral pulses  Neurological: A/O x 3, no focal deficits  Psychiatric: Normal mood, normal affect  Skin: No rashes +fragile skin , anicteric       LABS:                        8.5    5.89  )-----------( 202      ( 30 Jun 2022 07:03 )             27.1   s/p 1 unit PRBCs transfused   Hemoglobin: 6.4 g/dL (06.29.22 @ 06:41)   Hemoglobin: 7.7 g/dL (06.28.22 @ 07:26)     06-30    142  |  108  |  31<H>  ----------------------------<  139<H>  4.4   |  26  |  1.64<H>    Ca    8.6      30 Jun 2022 07:01  Phos  3.8     06-30  Mg     1.8     06-30    TPro  4.9<L>  /  Alb  2.4<L>  /  TBili  0.3  /  DBili  x   /  AST  29  /  ALT  23  /  AlkPhos  66  06-30        RADIOLOGY & ADDITIONAL TESTS:

## 2022-06-30 NOTE — PROGRESS NOTE ADULT - ASSESSMENT
89yo F w/ hx HTN, Afib, CHF, HLD, CKD Stage IV nonsmoker, remote hx of asthma presenting with loose stools, lethargy, fatigue, low blood pressure and blood bowel movement found to have a hemoglobin of 4.8 most likely 2/2 to GI bleed from eliquis.  DDx includes diverticulitis vs Gastric ulcer vs. Duodenal ulcer vs. angiodysplasia. 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 Repeat colonoscopy showed actively bleeding angiodysplastic lesions s/p cauterization.

## 2022-06-30 NOTE — PROGRESS NOTE ADULT - PROBLEM SELECTOR PLAN 2
Resolved.   Most likely 2/2 to eliquis initiation and underlying GI pathology. s/p tagged scan with R. sided bleeding, s/p colonoscopy w/ poor prep and no active signs of bleeding. No active bleeding for one week.   s/p EGD that showed mild gastritis. Antral biopsies negative for H. Pylori   s/p repeat colonoscopy that showed 2 actively bleeding angiodysplastic lesions that were cauterized.     Plan:  - c/w pantoprazole  - Maintain active type and screen   - Maintain 2 large bore IVs  - Transfuse to hemoglobin <7 --> s/p 7 PRBC transfusions, no bloody BM since colonoscopy  - Trend CBC qd  -If hemoglobin stable X2 then can be discharged

## 2022-07-01 VITALS
DIASTOLIC BLOOD PRESSURE: 68 MMHG | HEART RATE: 62 BPM | TEMPERATURE: 98 F | SYSTOLIC BLOOD PRESSURE: 148 MMHG | RESPIRATION RATE: 18 BRPM | OXYGEN SATURATION: 98 %

## 2022-07-01 LAB
ANION GAP SERPL CALC-SCNC: 9 MMOL/L — SIGNIFICANT CHANGE UP (ref 5–17)
BUN SERPL-MCNC: 33 MG/DL — HIGH (ref 7–23)
CALCIUM SERPL-MCNC: 8.6 MG/DL — SIGNIFICANT CHANGE UP (ref 8.4–10.5)
CHLORIDE SERPL-SCNC: 104 MMOL/L — SIGNIFICANT CHANGE UP (ref 96–108)
CO2 SERPL-SCNC: 26 MMOL/L — SIGNIFICANT CHANGE UP (ref 22–31)
CREAT SERPL-MCNC: 1.48 MG/DL — HIGH (ref 0.5–1.3)
EGFR: 33 ML/MIN/1.73M2 — LOW
GLUCOSE SERPL-MCNC: 131 MG/DL — HIGH (ref 70–99)
HCT VFR BLD CALC: 27.8 % — LOW (ref 34.5–45)
HCT VFR BLD CALC: 28.1 % — LOW (ref 34.5–45)
HGB BLD-MCNC: 8.8 G/DL — LOW (ref 11.5–15.5)
HGB BLD-MCNC: 9 G/DL — LOW (ref 11.5–15.5)
LDH SERPL L TO P-CCNC: 192 U/L — SIGNIFICANT CHANGE UP (ref 50–242)
MAGNESIUM SERPL-MCNC: 1.8 MG/DL — SIGNIFICANT CHANGE UP (ref 1.6–2.6)
MCHC RBC-ENTMCNC: 28.4 PG — SIGNIFICANT CHANGE UP (ref 27–34)
MCHC RBC-ENTMCNC: 29.4 PG — SIGNIFICANT CHANGE UP (ref 27–34)
MCHC RBC-ENTMCNC: 31.3 GM/DL — LOW (ref 32–36)
MCHC RBC-ENTMCNC: 32.4 GM/DL — SIGNIFICANT CHANGE UP (ref 32–36)
MCV RBC AUTO: 90.6 FL — SIGNIFICANT CHANGE UP (ref 80–100)
MCV RBC AUTO: 90.8 FL — SIGNIFICANT CHANGE UP (ref 80–100)
NRBC # BLD: 0 /100 WBCS — SIGNIFICANT CHANGE UP (ref 0–0)
NRBC # BLD: 0 /100 WBCS — SIGNIFICANT CHANGE UP (ref 0–0)
PHOSPHATE SERPL-MCNC: 3.5 MG/DL — SIGNIFICANT CHANGE UP (ref 2.5–4.5)
PLATELET # BLD AUTO: 210 K/UL — SIGNIFICANT CHANGE UP (ref 150–400)
PLATELET # BLD AUTO: 214 K/UL — SIGNIFICANT CHANGE UP (ref 150–400)
POTASSIUM SERPL-MCNC: 4.2 MMOL/L — SIGNIFICANT CHANGE UP (ref 3.5–5.3)
POTASSIUM SERPL-SCNC: 4.2 MMOL/L — SIGNIFICANT CHANGE UP (ref 3.5–5.3)
RBC # BLD: 3.06 M/UL — LOW (ref 3.8–5.2)
RBC # BLD: 3.1 M/UL — LOW (ref 3.8–5.2)
RBC # FLD: 15.1 % — HIGH (ref 10.3–14.5)
RBC # FLD: 15.1 % — HIGH (ref 10.3–14.5)
SODIUM SERPL-SCNC: 139 MMOL/L — SIGNIFICANT CHANGE UP (ref 135–145)
WBC # BLD: 6.14 K/UL — SIGNIFICANT CHANGE UP (ref 3.8–10.5)
WBC # BLD: 6.36 K/UL — SIGNIFICANT CHANGE UP (ref 3.8–10.5)
WBC # FLD AUTO: 6.14 K/UL — SIGNIFICANT CHANGE UP (ref 3.8–10.5)
WBC # FLD AUTO: 6.36 K/UL — SIGNIFICANT CHANGE UP (ref 3.8–10.5)

## 2022-07-01 PROCEDURE — 83735 ASSAY OF MAGNESIUM: CPT

## 2022-07-01 PROCEDURE — 85014 HEMATOCRIT: CPT

## 2022-07-01 PROCEDURE — 82565 ASSAY OF CREATININE: CPT

## 2022-07-01 PROCEDURE — 88305 TISSUE EXAM BY PATHOLOGIST: CPT

## 2022-07-01 PROCEDURE — 71045 X-RAY EXAM CHEST 1 VIEW: CPT

## 2022-07-01 PROCEDURE — 97161 PT EVAL LOW COMPLEX 20 MIN: CPT

## 2022-07-01 PROCEDURE — 86922 COMPATIBILITY TEST ANTIGLOB: CPT

## 2022-07-01 PROCEDURE — 85025 COMPLETE CBC W/AUTO DIFF WBC: CPT

## 2022-07-01 PROCEDURE — 87086 URINE CULTURE/COLONY COUNT: CPT

## 2022-07-01 PROCEDURE — 97530 THERAPEUTIC ACTIVITIES: CPT

## 2022-07-01 PROCEDURE — 36415 COLL VENOUS BLD VENIPUNCTURE: CPT

## 2022-07-01 PROCEDURE — 86880 COOMBS TEST DIRECT: CPT

## 2022-07-01 PROCEDURE — U0003: CPT

## 2022-07-01 PROCEDURE — 85018 HEMOGLOBIN: CPT

## 2022-07-01 PROCEDURE — 36430 TRANSFUSION BLD/BLD COMPNT: CPT

## 2022-07-01 PROCEDURE — 82550 ASSAY OF CK (CPK): CPT

## 2022-07-01 PROCEDURE — 86923 COMPATIBILITY TEST ELECTRIC: CPT

## 2022-07-01 PROCEDURE — 86902 BLOOD TYPE ANTIGEN DONOR EA: CPT

## 2022-07-01 PROCEDURE — 82247 BILIRUBIN TOTAL: CPT

## 2022-07-01 PROCEDURE — 86905 BLOOD TYPING RBC ANTIGENS: CPT

## 2022-07-01 PROCEDURE — 97162 PT EVAL MOD COMPLEX 30 MIN: CPT

## 2022-07-01 PROCEDURE — 82435 ASSAY OF BLOOD CHLORIDE: CPT

## 2022-07-01 PROCEDURE — 84132 ASSAY OF SERUM POTASSIUM: CPT

## 2022-07-01 PROCEDURE — 81001 URINALYSIS AUTO W/SCOPE: CPT

## 2022-07-01 PROCEDURE — 85045 AUTOMATED RETICULOCYTE COUNT: CPT

## 2022-07-01 PROCEDURE — 86860 RBC ANTIBODY ELUTION: CPT

## 2022-07-01 PROCEDURE — 83605 ASSAY OF LACTIC ACID: CPT

## 2022-07-01 PROCEDURE — 85610 PROTHROMBIN TIME: CPT

## 2022-07-01 PROCEDURE — 87637 SARSCOV2&INF A&B&RSV AMP PRB: CPT

## 2022-07-01 PROCEDURE — P9040: CPT

## 2022-07-01 PROCEDURE — 85730 THROMBOPLASTIN TIME PARTIAL: CPT

## 2022-07-01 PROCEDURE — 87040 BLOOD CULTURE FOR BACTERIA: CPT

## 2022-07-01 PROCEDURE — U0005: CPT

## 2022-07-01 PROCEDURE — 96375 TX/PRO/DX INJ NEW DRUG ADDON: CPT

## 2022-07-01 PROCEDURE — 99231 SBSQ HOSP IP/OBS SF/LOW 25: CPT | Mod: FS

## 2022-07-01 PROCEDURE — 86901 BLOOD TYPING SEROLOGIC RH(D): CPT

## 2022-07-01 PROCEDURE — 86900 BLOOD TYPING SEROLOGIC ABO: CPT

## 2022-07-01 PROCEDURE — 86870 RBC ANTIBODY IDENTIFICATION: CPT

## 2022-07-01 PROCEDURE — 83010 ASSAY OF HAPTOGLOBIN QUANT: CPT

## 2022-07-01 PROCEDURE — 99285 EMERGENCY DEPT VISIT HI MDM: CPT

## 2022-07-01 PROCEDURE — 82272 OCCULT BLD FECES 1-3 TESTS: CPT

## 2022-07-01 PROCEDURE — 82248 BILIRUBIN DIRECT: CPT

## 2022-07-01 PROCEDURE — 86850 RBC ANTIBODY SCREEN: CPT

## 2022-07-01 PROCEDURE — 80053 COMPREHEN METABOLIC PANEL: CPT

## 2022-07-01 PROCEDURE — 97110 THERAPEUTIC EXERCISES: CPT

## 2022-07-01 PROCEDURE — P9016: CPT

## 2022-07-01 PROCEDURE — 82947 ASSAY GLUCOSE BLOOD QUANT: CPT

## 2022-07-01 PROCEDURE — A9560: CPT

## 2022-07-01 PROCEDURE — 80048 BASIC METABOLIC PNL TOTAL CA: CPT

## 2022-07-01 PROCEDURE — 96374 THER/PROPH/DIAG INJ IV PUSH: CPT

## 2022-07-01 PROCEDURE — 99239 HOSP IP/OBS DSCHRG MGMT >30: CPT

## 2022-07-01 PROCEDURE — 82803 BLOOD GASES ANY COMBINATION: CPT

## 2022-07-01 PROCEDURE — 82330 ASSAY OF CALCIUM: CPT

## 2022-07-01 PROCEDURE — 85027 COMPLETE CBC AUTOMATED: CPT

## 2022-07-01 PROCEDURE — 83615 LACTATE (LD) (LDH) ENZYME: CPT

## 2022-07-01 PROCEDURE — 87186 SC STD MICRODIL/AGAR DIL: CPT

## 2022-07-01 PROCEDURE — 84100 ASSAY OF PHOSPHORUS: CPT

## 2022-07-01 PROCEDURE — 93005 ELECTROCARDIOGRAM TRACING: CPT

## 2022-07-01 PROCEDURE — 70450 CT HEAD/BRAIN W/O DYE: CPT

## 2022-07-01 PROCEDURE — 78278 ACUTE GI BLOOD LOSS IMAGING: CPT

## 2022-07-01 PROCEDURE — 84295 ASSAY OF SERUM SODIUM: CPT

## 2022-07-01 PROCEDURE — 97116 GAIT TRAINING THERAPY: CPT

## 2022-07-01 RX ORDER — PANTOPRAZOLE SODIUM 20 MG/1
1 TABLET, DELAYED RELEASE ORAL
Qty: 30 | Refills: 0
Start: 2022-07-01 | End: 2022-07-30

## 2022-07-01 RX ADMIN — Medication 500 MILLIGRAM(S): at 11:43

## 2022-07-01 RX ADMIN — POLYETHYLENE GLYCOL 3350 17 GRAM(S): 17 POWDER, FOR SOLUTION ORAL at 11:43

## 2022-07-01 RX ADMIN — Medication 25 MICROGRAM(S): at 06:38

## 2022-07-01 RX ADMIN — Medication 1 TABLET(S): at 11:43

## 2022-07-01 RX ADMIN — Medication 25 MILLIGRAM(S): at 06:41

## 2022-07-01 RX ADMIN — Medication 40 MILLIGRAM(S): at 06:38

## 2022-07-01 RX ADMIN — PANTOPRAZOLE SODIUM 40 MILLIGRAM(S): 20 TABLET, DELAYED RELEASE ORAL at 06:38

## 2022-07-01 RX ADMIN — Medication 1 DROP(S): at 06:38

## 2022-07-01 RX ADMIN — Medication 75 MILLIGRAM(S): at 11:43

## 2022-07-01 NOTE — PROGRESS NOTE ADULT - PROVIDER SPECIALTY LIST ADULT
Cardiology
Pulmonology
Cardiology
Gastroenterology
Nephrology
Pulmonology
Cardiology
Gastroenterology
Heme/Onc
Internal Medicine
Nephrology
Pulmonology
Cardiology
Cardiology
Gastroenterology
Heme/Onc
Heme/Onc
Nephrology
Internal Medicine
Cardiology
Internal Medicine
Cardiology
Internal Medicine

## 2022-07-01 NOTE — PROGRESS NOTE ADULT - ASSESSMENT
89yo F w/ hx HTN, Afib (recent dx 4/2022), CHF, HLD, nonsmoker, remote hx of asthma presenting with loose stools, lethargy, fatigue, low blood pressure and blood bowel movement found to have a hemoglobin of 4.8 most likely 2/2 to GI bleed- likely diverticular bleed given history/presentation    recent COVID + 5/20 s/p Paxlovid rx; COVID PCR + on admission  -continue off contact isolation per Infection Control Dept    Acute GI blood loss anemia 2/2 bleeding colonic AVMs;  s/p Colonoscopy 6/28 with Rx of 2 bleeding AVMs (ascending colon), non bleeding left sided diverticulosis and non bleeding solitary rectal ulcer  6/15/22 +NM GI bleed scan (proximal AC)  IR input appreciated - increased risk of renal injury with IV contrast load required for angio  Recurrent/ongoing rectal bleeding with continued transfusion requirement therefore underwent Colonoscopy 6/17/22 6/17/22 COLON poor prep and "old" blood in colon; no active bleeding encountered, +diverticulosis - primarily Left sided  recurrent episodes of intermittent bleeding/burgundy stools with ongoing transfusion requirement therefore repeat endoscopic evaluation  6/28/22 EGD - normal esophagus, mild gastritis, normal duodenum  6/28/22 COLON - poor prep but adequate visualization with 4L lavage. Diverticulosis sigmoid and Descending colon. 2 transverse colon polyps (non bleeding, in situ). 2 bleeding AVMS in AC - bicapped for hemostasis. non bleeding 2cm rectal ulcer    s/p 10 units PRBCs admission ->current  no bleeding at end of colonoscopy and no bleeding or BM reported since colonoscopy 6/28/22  Hg/HD stable    -f/u PM CBC and d/c home if stable  -continue Miralax and Senna for bowel regimen (chronic constipation & diverticulosis; wish to avoid straining for BMs)  -PO PPI for gi ppx    +Anti-JKA   -Hematology following; no e/o hemolysis at this time  -Goal Hgb>7    Discussed with pt and family at bedside  Discussed with Medicine team and Hematology    no GI objection to dc plans    Marek Cormier PA-C    [facility]  [phone]  After hours and weekend coverage [phone]

## 2022-07-01 NOTE — PROGRESS NOTE ADULT - ATTENDING COMMENTS
Patient seen and examined at bedside. No acute events overnight. Tolerating diet and no abnormal bowel movement. Hg today is 8.8. Per family request will repeat one at 2 PM prior to discharge consideration (but likely will be stable). Finished Ciprofloxacin for UTI today. She will follow up with cardiology and renal. Patient is a poor candidate for anticoagulation for secondary prevention for her AFIB. Children are acutely aware. CM setting up home services.    Discharge Time: 30 minutes

## 2022-07-01 NOTE — PROGRESS NOTE ADULT - SUBJECTIVE AND OBJECTIVE BOX
PROGRESS NOTE:    Lelo Burgess MD  Internal Medicine PGY-1      Patient is a 89y old  Female who presents with a chief complaint of gi bleed (30 Jun 2022 15:16)      SUBJECTIVE / OVERNIGHT EVENTS: No acute overnight events. Pt seen and examined. Denies fevers, chills, CP, SOB, Abdominal pain, N/V, Constipation, Diarrhea      MEDICATIONS  (STANDING):  artificial tears (preservative free) Ophthalmic Solution 1 Drop(s) Both EYES two times a day  ascorbic acid 500 milliGRAM(s) Oral daily  ciprofloxacin   IVPB 200 milliGRAM(s) IV Intermittent every 24 hours  furosemide    Tablet 40 milliGRAM(s) Oral daily  levothyroxine 25 MICROGram(s) Oral daily  melatonin 3 milliGRAM(s) Oral at bedtime  metoprolol tartrate 25 milliGRAM(s) Oral two times a day  multivitamin 1 Tablet(s) Oral daily  pantoprazole    Tablet 40 milliGRAM(s) Oral before breakfast  polyethylene glycol 3350 17 Gram(s) Oral daily  rosuvastatin 10 milliGRAM(s) Oral at bedtime  senna 2 Tablet(s) Oral at bedtime  venlafaxine XR. 75 milliGRAM(s) Oral daily    MEDICATIONS  (PRN):      I&O's Summary    30 Jun 2022 07:01  -  01 Jul 2022 07:00  --------------------------------------------------------  IN: 340 mL / OUT: 700 mL / NET: -360 mL        Vital Signs Last 24 Hrs  T(C): 36.9 (01 Jul 2022 04:38), Max: 37 (30 Jun 2022 20:23)  T(F): 98.5 (01 Jul 2022 04:38), Max: 98.6 (30 Jun 2022 20:23)  HR: 60 (01 Jul 2022 04:38) (60 - 67)  BP: 160/64 (01 Jul 2022 04:38) (145/70 - 160/64)  BP(mean): --  RR: 17 (01 Jul 2022 04:38) (17 - 17)  SpO2: 100% (01 Jul 2022 04:38) (98% - 100%)    =================PHYSICAL EXAM=================    PHYSICAL EXAM:  GENERAL: NAD, lying in bed comfortably  HEAD:  Atraumatic, Normocephalic  EYES: EOMI, PERRLA, conjunctiva and sclera clear  ENT: Moist mucous membranes  NECK: Supple, No JVD  CHEST/LUNG: Clear to auscultation bilaterally; No rales, rhonchi, wheezing, or rubs. Unlabored respirations  HEART: Regular rate and rhythm; No murmurs, rubs, or gallops  ABDOMEN: Bowel sounds present; Soft, Nontender, Nondistended. No hepatomegally  EXTREMITIES:  2+ Peripheral Pulses, brisk capillary refill. No clubbing, cyanosis, or edema  NERVOUS SYSTEM:  Alert & Oriented X3, speech clear. No deficits   MSK: FROM all 4 extremities, full and equal strength  SKIN: No rashes or lesions    =================================================    LABS:                        8.8    6.14  )-----------( 214 ( 01 Jul 2022 06:41 )             28.1     Auto Eosinophil # x     / Auto Eosinophil % x     / Auto Neutrophil # x     / Auto Neutrophil % x     / BANDS % x                            8.5    5.89  )-----------( 202 ( 30 Jun 2022 07:03 )             27.1     Auto Eosinophil # x     / Auto Eosinophil % x     / Auto Neutrophil # x     / Auto Neutrophil % x     / BANDS % x        07-01    139  |  104  |  33<H>  ----------------------------<  131<H>  4.2   |  26  |  1.48<H>  06-30    142  |  108  |  31<H>  ----------------------------<  139<H>  4.4   |  26  |  1.64<H>    Ca    8.6      01 Jul 2022 06:43  Mg     1.8     07-01  Phos  3.5     07-01  TPro  4.9<L>  /  Alb  2.4<L>  /  TBili  0.3  /  DBili  x   /  AST  29  /  ALT  23  /  AlkPhos  66  06-30            Lactate, Blood: 1.2 mmol/L (06-26 @ 23:04)        RADIOLOGY & ADDITIONAL TESTS:    Imaging Personally Reviewed:    Consultant(s) Notes Reviewed:      Care Discussed with Consultants/Other Providers:

## 2022-07-01 NOTE — PROGRESS NOTE ADULT - SUBJECTIVE AND OBJECTIVE BOX
INTERVAL HPI/OVERNIGHT EVENTS:  1 BM yesterday   no melena or rectal bleeding    feels well  anticipated discharge today    MEDICATIONS  (STANDING):  artificial tears (preservative free) Ophthalmic Solution 1 Drop(s) Both EYES two times a day  ascorbic acid 500 milliGRAM(s) Oral daily  ciprofloxacin   IVPB 200 milliGRAM(s) IV Intermittent every 24 hours  furosemide    Tablet 40 milliGRAM(s) Oral daily  levothyroxine 25 MICROGram(s) Oral daily  melatonin 3 milliGRAM(s) Oral at bedtime  metoprolol tartrate 25 milliGRAM(s) Oral two times a day  multivitamin 1 Tablet(s) Oral daily  pantoprazole    Tablet 40 milliGRAM(s) Oral before breakfast  polyethylene glycol 3350 17 Gram(s) Oral daily  rosuvastatin 10 milliGRAM(s) Oral at bedtime  senna 2 Tablet(s) Oral at bedtime  venlafaxine XR. 75 milliGRAM(s) Oral daily    MEDICATIONS  (PRN):      Allergies  Keflex (Rash; Hives)      Review of Systems:  General:  No wt loss, fevers, chills, night sweats  CV:  No pain, palpitations, +PAF  Resp:  No dyspnea, cough, tachypnea, wheezing  GI:  see HPI  :  No pain,  +hx kidney stones +incontinence s/p bladder repair  Muscle:  No pain, weakness +arthritis  Neuro:  No focal weakness, tingling, memory problems  Psych:  No fatigue, insomnia, mood problems, depression  Endocrine:  No polyuria, polydypsia, cold/heat intolerance  Heme:  No petechiae, ecchymosis, easy bruisability  Skin:  No rash, tattoos, scars, edema        Vital Signs Last 24 Hrs  T(C): 98.7 (01 Jul 2022 13:00), Max: 98.7 (01 Jul 2022 13:00)  T(F): 209.6 (01 Jul 2022 13:00), Max: 209.6 (01 Jul 2022 13:00)  HR: 63 (01 Jul 2022 13:00) (60 - 67)  BP: 136/74 (01 Jul 2022 13:00) (136/74 - 160/64)  BP(mean): --  RR: 18 (01 Jul 2022 13:00) (17 - 18)  SpO2: 99% (01 Jul 2022 13:00) (98% - 100%)    PHYSICAL EXAM:  Constitutional: NAD, well-developed elderly WF sitting up in bed, alert and responsive, daughter at bedside  Neck: No LAD, supple no JVD  Respiratory: clear b/l no accessory muscle use  Cardiovascular: S1 and S2, RRR,  +murmur  Gastrointestinal: BS+, soft, NT/ND, neg HSM  Extremities: No peripheral edema, neg clubbing, cyanosis +fragile skin  Vascular: 2+ peripheral pulses  Neurological: A/O x 3, no focal deficits  Psychiatric: Normal mood, normal affect  Skin: No rashes +fragile skin , anicteric     LABS:                        8.8    6.14  )-----------( 214      ( 01 Jul 2022 06:41 )             28.1   Hemoglobin: 8.5 g/dL (06.30.22 @ 07:03)     07-01    139  |  104  |  33<H>  ----------------------------<  131<H>  4.2   |  26  |  1.48<H>    Ca    8.6      01 Jul 2022 06:43  Phos  3.5     07-01  Mg     1.8     07-01    TPro  4.9<L>  /  Alb  2.4<L>  /  TBili  0.3  /  DBili  x   /  AST  29  /  ALT  23  /  AlkPhos  66  06-30        LIVER FUNCTIONS - ( 30 Jun 2022 07:01 )  Alb: 2.4 g/dL / Pro: 4.9 g/dL / ALK PHOS: 66 U/L / ALT: 23 U/L / AST: 29 U/L / GGT: x             RADIOLOGY & ADDITIONAL TESTS:

## 2022-07-01 NOTE — PROGRESS NOTE ADULT - ATTENDING SUPERVISION STATEMENT
Resident

## 2022-07-01 NOTE — PROGRESS NOTE ADULT - PROBLEM SELECTOR PLAN 11
DVT/PE ppx: SCD's   Diet: CC/DASH diet  Dispo:  CM/SW has set up home health aid and home PT services, If hemoglobin stable X2 then can be discharged
DVT/PE ppx: SCD's   Diet: CC/DASH diet  Dispo:  CM/SW has set up home health aid and home PT services, If hemoglobin stable X2 then can be discharged
DVT/PE ppx: SCD's   Diet: CC/DASH diet  Dispo:  CM/SW has set up home health aid and home PT services. Pending hematology recs then d/c to home.

## 2022-07-02 LAB
CULTURE RESULTS: SIGNIFICANT CHANGE UP
CULTURE RESULTS: SIGNIFICANT CHANGE UP
SPECIMEN SOURCE: SIGNIFICANT CHANGE UP
SPECIMEN SOURCE: SIGNIFICANT CHANGE UP

## 2022-07-07 ENCOUNTER — APPOINTMENT (OUTPATIENT)
Dept: CARDIOLOGY | Facility: CLINIC | Age: 87
End: 2022-07-07

## 2022-07-07 ENCOUNTER — NON-APPOINTMENT (OUTPATIENT)
Age: 87
End: 2022-07-07

## 2022-07-07 VITALS
HEART RATE: 68 BPM | OXYGEN SATURATION: 100 % | DIASTOLIC BLOOD PRESSURE: 68 MMHG | SYSTOLIC BLOOD PRESSURE: 151 MMHG | BODY MASS INDEX: 20.08 KG/M2 | WEIGHT: 117 LBS

## 2022-07-07 PROCEDURE — 99214 OFFICE O/P EST MOD 30 MIN: CPT

## 2022-07-07 PROCEDURE — 93000 ELECTROCARDIOGRAM COMPLETE: CPT

## 2022-07-07 RX ORDER — NIRMATRELVIR AND RITONAVIR 300-100 MG
20 X 150 MG & KIT ORAL
Qty: 30 | Refills: 0 | Status: ACTIVE | COMMUNITY
Start: 2022-05-22

## 2022-07-07 RX ORDER — APIXABAN 2.5 MG/1
2.5 TABLET, FILM COATED ORAL
Qty: 180 | Refills: 3 | Status: DISCONTINUED | COMMUNITY
Start: 2022-04-26 | End: 2022-07-07

## 2022-07-07 NOTE — REASON FOR VISIT
[FreeTextEntry1] : The patient comes in for followup of her asthma, hypertension, diabetes, hyperlipidemia, and mitral regurgitation. She has no new complaints. She denies any chest pains, shortness of breath, or palpitations. She does complain of feeling tired. \par May 14, 2019: The patient today is complaining of problems with swallowing her metformin pill. She cuts it in half. I told her to put it in applesauce. She also is complaining of dry skin. She does use Dove soap. She denies any chest pains, shortness of breath, or palpitations\par September 17, 2019: Patient complains of feeling fatigued and not sleeping well.  Complains of dry mouth.  She has a hard time walking.  She is losing weight.  Daughter reports that she does not eat well.  She denies any chest pains, shortness of breath, or palpitations.\par December 17, 2019: The patient's biggest complaint is that she is tired. She denies any chest pains, shortness of breath, or palpitations. She does not do a lot of walking. Sometimes her ankles are swollen.\par June 11, 2020: The patient still has complaints of dyspnea on exertion fatigue and not sleeping well.  She denies any chest pain shortness of breath at rest or palpitations.  She has urinary incontinence and she is on a medicine for overactive bladder.  I discussed with the daughter about stopping the trapezium medication.  She does get muscle aches and I discussed with the daughter about stopping the rosuvastatin temporarily for 2 weeks to see if the muscle aches get better.  She does take an Advil twice a day for arthritis pains.  It does not seem to bother her stomach.\par October 8, 2020: Patient complains of pains when she walks especially down the left leg\par October 15, 2020: The patient after leaving the office 1 week ago tripped and fell and lost consciousness.  He was not clear whether she had a syncopal attack.  She was hospitalized.  They found an E. coli UTI.  They found that she had orthostatic hypotension and stopped her blood pressure medicines.  She now comes for follow-up.  She is tired.  She denies any chest pains, shortness of breath, or palpitations.  She is still not taking any blood pressure medicines.  She is following up with a nephrologist.\par November 19, 2020: The patient's daughter reports that she has the beginnings of a pressure sore that is slightly pussy.  The patient herself has no specific complaints referable to that.  She has no chest pains, shortness of breath, or palpitations.  Blood pressures measured at home have been generally lower than the blood pressure here.  She is not yet on any antihypertensives.  She has had no further near syncope or syncope episodes and no further falls.  She was getting physical therapy and is doing her own exercises now.\par April 15, 2021: On January 21, 2021 the patient fell and hit her head.  She went to the [facility] emergency room and got stitches.  She eventually was cleared to go down to [place] and went down to [place] for 2 months.  She was confused at first but they may have forgotten to give her her antidepressants down there.  She is back to baseline now.  The daughter has noticed that she sweats around her head and neck after sleeping.  She also has a dry cough in the middle of the night.  She does have dyspnea on exertion but she is much less active.  She does walk with a walker.\par September 15, 2021: The patient complains of fatigue and poor memory.  She denies any chest pains, shortness of breath, or palpitations.  She did have a UTI which caused her to have increased confusion and she was hospitalized for time.  She is no longer taking anything for blood pressure.  Daughter reports that she has poor nutritional intake.\par January 19, 2022: The patient had a change in mental status and was determined to have a urinary tract infection.  The first urine culture was polymicrobial and was felt to be contaminated.  She took Cipro for 5 days.  On day three she had a repeat culture which was negative.  She is much better.\par March 21, 2022: The patient was just hospitalized with a change in mental status.  She was found to have dehydration and renal insufficiency as well as recurrent urinary tract infection.  Because of the renal insufficiency she was taken off her Metformin and that is the only change she is not taking Metformin.\par   April 12, 2022: The patient has had a red rash on her chest and underneath her left breast.  Sometimes because of her urinary incontinence in the morning her nightgown is soaked.  She is sponge bath with Dial soap and other soap containing towelettes.  The rash is not itchy.  Her left leg is slightly swollen.  She is taking amlodipine 5 mg.\par April 26, 2022: The patient denies any chest pains, palpitations, or shortness of breath at rest.  She does have dyspnea on exertion.  Sometimes she has pain when walking in her right leg.  It is in the right thigh above the knee.\par May 18, 2022: The patient was hospitalized at [facility] approximately 1 week ago when she was discharged home.  At first they thought it was pneumonia but then they came to the conclusion that it was congestive heart failure.  She was sent home on Lasix 40 mg daily as well as vitamin C and Nephro-Ernie.  Patient's biggest complaint is fatigue.  She denies any chest pains, shortness of breath, or palpitations.\par July 7, 2022: This is the patient's first visit since being hospitalized at [facility] with a GI bleed.  She went through 10 units of blood transfusion.  Her Eliquis was stopped.  They cauterize some polyps but they still an upper endoscopy.  She is sent home off the amlodipine but on metoprolol and Lasix she is not getting Eliquis.

## 2022-07-07 NOTE — DISCUSSION/SUMMARY
[EKG obtained to assist in diagnosis and management of assessed problem(s)] : EKG obtained to assist in diagnosis and management of assessed problem(s) [FreeTextEntry1] : The patient was examined. Her blood pressure was 151/68 and her pulse was 68. Her oxygen saturation was 100%. Her lungs were clear to auscultation. Cardiac exam was negative for rubs or gallops.There was a 3/6 holosystolic murmur heard at the apex, and a 3/6 systolic ejection murmur in the aortic area her EKG showed sinus bradycardia with a first-degree heart block and a left anterior hemiblock.  She'll return in 1 month, or earlier if needed. She'll continue her current medications..  Total time spent on the day of the encounter was 36 minutes which include face-to-face and non face-to-face times personally spent by the physician preparing to see the patient, obtaining  separately obtained history, performing a medically appropriate exam and evaluation, counseling, educating, talking to the family or caregivers, ordering medicines, ordering tests or procedures, referring and communicating with other healthcare professionals, and documenting clinical information in the electronic health record.

## 2022-07-21 DIAGNOSIS — K92.2 GASTROINTESTINAL HEMORRHAGE, UNSPECIFIED: ICD-10-CM

## 2022-07-21 DIAGNOSIS — D62 ACUTE POSTHEMORRHAGIC ANEMIA: ICD-10-CM

## 2022-07-25 ENCOUNTER — RX RENEWAL (OUTPATIENT)
Age: 87
End: 2022-07-25

## 2022-07-25 RX ORDER — MULTIVIT-MIN/FOLIC/VIT K/LYCOP 400-300MCG
500 TABLET ORAL
Qty: 90 | Refills: 0 | Status: ACTIVE | COMMUNITY
Start: 2022-05-11 | End: 1900-01-01

## 2022-07-27 ENCOUNTER — NON-APPOINTMENT (OUTPATIENT)
Age: 87
End: 2022-07-27

## 2022-08-02 RX ORDER — METOPROLOL SUCCINATE 25 MG/1
25 TABLET, EXTENDED RELEASE ORAL DAILY
Qty: 90 | Refills: 0 | Status: ACTIVE | COMMUNITY
Start: 2022-04-26 | End: 1900-01-01

## 2022-08-03 NOTE — CONSULT NOTE ADULT - ASSESSMENT
89yo F w/ hx HTN, Afib (recent dx 4/2022), CHF, HLD, nonsmoker, remote hx of asthma presenting with loose stools, lethargy, fatigue, low blood pressure and blood bowel movement found to have a hemoglobin of 4.8 most likely 2/2 to GI bleed- likely diverticular bleed given history/presentation    recent COVID + s/p Paxlovid rx; COVID PCR + on admission - defer to primary team    Acute GI blood loss anemia; likely s/p diverticular bleeding given history/presentation. pt and family aware there is a risk of occult lesion as well  -off AC 2/2 risk of recurrent bleeding  -transfuse as ordered  -given no further/ongoing rectal bleeding and pt's advanced age; family opting for supportive care and hold off on colonoscopy/invasive GI evaluation  -PO diet as tolerated  -monitor clinically  -can change to PO PPI  -IF has recurrent/brisk GI bleeding/ HD instability then please obtain urgent nuclear bleeding scan to help localize source/site of bleeding    Discussed with pt and family at bedside  Discussed with House staff and Medicine attending    Thank you for the courtesy of this consult.    Marek Cormier PA-C    [facility]  [phone]  After hours and weekend coverage [phone]           Problem: Pressure Injury - Risk of  Goal: *Prevention of pressure injury  Description: Document Dejuan Scale and appropriate interventions in the flowsheet.   Outcome: Progressing Towards Goal  Note: Pressure Injury Interventions:  Sensory Interventions: Float heels, Keep linens dry and wrinkle-free    Moisture Interventions: Apply protective barrier, creams and emollients, Absorbent underpads    Activity Interventions: Assess need for specialty bed    Mobility Interventions: Assess need for specialty bed    Nutrition Interventions: Document food/fluid/supplement intake    Friction and Shear Interventions: Apply protective barrier, creams and emollients

## 2022-08-09 ENCOUNTER — RX RENEWAL (OUTPATIENT)
Age: 87
End: 2022-08-09

## 2022-08-11 ENCOUNTER — NON-APPOINTMENT (OUTPATIENT)
Age: 87
End: 2022-08-11

## 2022-08-11 ENCOUNTER — APPOINTMENT (OUTPATIENT)
Dept: CARDIOLOGY | Facility: CLINIC | Age: 87
End: 2022-08-11

## 2022-08-11 VITALS
HEART RATE: 66 BPM | SYSTOLIC BLOOD PRESSURE: 106 MMHG | BODY MASS INDEX: 19.74 KG/M2 | DIASTOLIC BLOOD PRESSURE: 50 MMHG | OXYGEN SATURATION: 99 % | WEIGHT: 115 LBS

## 2022-08-11 PROCEDURE — 93000 ELECTROCARDIOGRAM COMPLETE: CPT

## 2022-08-11 PROCEDURE — 99214 OFFICE O/P EST MOD 30 MIN: CPT

## 2022-08-11 RX ORDER — ASCORBIC ACID, THIAMINE MONONITRATE,RIBOFLAVIN, NIACINAMIDE, PYRIDOXINE HYDROCHLORIDE, FOLIC ACID, CYANOCOBALAMIN, BIOTIN, CALCIUM PANTOTHENATE, 100; 1.5; 1.7; 20; 10; 1; 6000; 150000; 5 MG/1; MG/1; MG/1; MG/1; MG/1; MG/1; UG/1; UG/1; MG/1
1 CAPSULE, LIQUID FILLED ORAL
Qty: 90 | Refills: 0 | Status: ACTIVE | COMMUNITY
Start: 2022-07-19

## 2022-08-11 NOTE — DISCUSSION/SUMMARY
[EKG obtained to assist in diagnosis and management of assessed problem(s)] : EKG obtained to assist in diagnosis and management of assessed problem(s) [FreeTextEntry1] : The patient was examined. Her blood pressure was 106/50 and her pulse was 66. Her oxygen saturation was 99%. Her lungs were clear to auscultation. Cardiac exam was negative for rubs or gallops.There was a 3/6 holosystolic murmur heard at the apex, and a 3/6 systolic ejection murmur in the aortic area her EKG showed sinus bradycardia with a first-degree heart block and a left anterior hemiblock.  She'll return in 2 months, or earlier if needed. She'll continue her current medications..  Total time spent on the day of the encounter was 34 minutes which include face-to-face and non face-to-face times personally spent by the physician preparing to see the patient, obtaining  separately obtained history, performing a medically appropriate exam and evaluation, counseling, educating, talking to the family or caregivers, ordering medicines, ordering tests or procedures, referring and communicating with other healthcare professionals, and documenting clinical information in the electronic health record.

## 2022-08-11 NOTE — REASON FOR VISIT
[FreeTextEntry1] : The patient comes in for followup of her asthma, hypertension, diabetes, hyperlipidemia, and mitral regurgitation. She has no new complaints. She denies any chest pains, shortness of breath, or palpitations. She does complain of feeling tired. \par May 14, 2019: The patient today is complaining of problems with swallowing her metformin pill. She cuts it in half. I told her to put it in applesauce. She also is complaining of dry skin. She does use Dove soap. She denies any chest pains, shortness of breath, or palpitations\par September 17, 2019: Patient complains of feeling fatigued and not sleeping well.  Complains of dry mouth.  She has a hard time walking.  She is losing weight.  Daughter reports that she does not eat well.  She denies any chest pains, shortness of breath, or palpitations.\par December 17, 2019: The patient's biggest complaint is that she is tired. She denies any chest pains, shortness of breath, or palpitations. She does not do a lot of walking. Sometimes her ankles are swollen.\par June 11, 2020: The patient still has complaints of dyspnea on exertion fatigue and not sleeping well.  She denies any chest pain shortness of breath at rest or palpitations.  She has urinary incontinence and she is on a medicine for overactive bladder.  I discussed with the daughter about stopping the trapezium medication.  She does get muscle aches and I discussed with the daughter about stopping the rosuvastatin temporarily for 2 weeks to see if the muscle aches get better.  She does take an Advil twice a day for arthritis pains.  It does not seem to bother her stomach.\par October 8, 2020: Patient complains of pains when she walks especially down the left leg\par October 15, 2020: The patient after leaving the office 1 week ago tripped and fell and lost consciousness.  He was not clear whether she had a syncopal attack.  She was hospitalized.  They found an E. coli UTI.  They found that she had orthostatic hypotension and stopped her blood pressure medicines.  She now comes for follow-up.  She is tired.  She denies any chest pains, shortness of breath, or palpitations.  She is still not taking any blood pressure medicines.  She is following up with a nephrologist.\par November 19, 2020: The patient's daughter reports that she has the beginnings of a pressure sore that is slightly pussy.  The patient herself has no specific complaints referable to that.  She has no chest pains, shortness of breath, or palpitations.  Blood pressures measured at home have been generally lower than the blood pressure here.  She is not yet on any antihypertensives.  She has had no further near syncope or syncope episodes and no further falls.  She was getting physical therapy and is doing her own exercises now.\par April 15, 2021: On January 21, 2021 the patient fell and hit her head.  She went to the [facility] emergency room and got stitches.  She eventually was cleared to go down to [place] and went down to [place] for 2 months.  She was confused at first but they may have forgotten to give her her antidepressants down there.  She is back to baseline now.  The daughter has noticed that she sweats around her head and neck after sleeping.  She also has a dry cough in the middle of the night.  She does have dyspnea on exertion but she is much less active.  She does walk with a walker.\par September 15, 2021: The patient complains of fatigue and poor memory.  She denies any chest pains, shortness of breath, or palpitations.  She did have a UTI which caused her to have increased confusion and she was hospitalized for time.  She is no longer taking anything for blood pressure.  Daughter reports that she has poor nutritional intake.\par January 19, 2022: The patient had a change in mental status and was determined to have a urinary tract infection.  The first urine culture was polymicrobial and was felt to be contaminated.  She took Cipro for 5 days.  On day three she had a repeat culture which was negative.  She is much better.\par March 21, 2022: The patient was just hospitalized with a change in mental status.  She was found to have dehydration and renal insufficiency as well as recurrent urinary tract infection.  Because of the renal insufficiency she was taken off her Metformin and that is the only change she is not taking Metformin.\par   April 12, 2022: The patient has had a red rash on her chest and underneath her left breast.  Sometimes because of her urinary incontinence in the morning her nightgown is soaked.  She is sponge bath with Dial soap and other soap containing towelettes.  The rash is not itchy.  Her left leg is slightly swollen.  She is taking amlodipine 5 mg.\par April 26, 2022: The patient denies any chest pains, palpitations, or shortness of breath at rest.  She does have dyspnea on exertion.  Sometimes she has pain when walking in her right leg.  It is in the right thigh above the knee.\par May 18, 2022: The patient was hospitalized at [facility] approximately 1 week ago when she was discharged home.  At first they thought it was pneumonia but then they came to the conclusion that it was congestive heart failure.  She was sent home on Lasix 40 mg daily as well as vitamin C and Nephro-Ernie.  Patient's biggest complaint is fatigue.  She denies any chest pains, shortness of breath, or palpitations.\par July 7, 2022: This is the patient's first visit since being hospitalized at [facility] with a GI bleed.  She went through 10 units of blood transfusion.  Her Eliquis was stopped.  They cauterize some polyps but they still an upper endoscopy.  She is sent home off the amlodipine but on metoprolol and Lasix she is not getting Eliquis.\par August 11, 2022: The patient has no new complaints.  She denies any chest pains, shortness of breath, palpitations, or dizziness.  She is not taking Eliquis because of her previous massive GI bleed.

## 2022-09-12 ENCOUNTER — RX RENEWAL (OUTPATIENT)
Age: 87
End: 2022-09-12

## 2022-09-21 ENCOUNTER — APPOINTMENT (OUTPATIENT)
Dept: CARDIOLOGY | Facility: CLINIC | Age: 87
End: 2022-09-21

## 2022-10-13 ENCOUNTER — APPOINTMENT (OUTPATIENT)
Dept: CARDIOLOGY | Facility: CLINIC | Age: 87
End: 2022-10-13

## 2022-10-13 ENCOUNTER — NON-APPOINTMENT (OUTPATIENT)
Age: 87
End: 2022-10-13

## 2022-10-13 VITALS
RESPIRATION RATE: 17 BRPM | SYSTOLIC BLOOD PRESSURE: 131 MMHG | OXYGEN SATURATION: 99 % | HEIGHT: 64 IN | DIASTOLIC BLOOD PRESSURE: 73 MMHG | HEART RATE: 64 BPM

## 2022-10-13 DIAGNOSIS — I48.0 PAROXYSMAL ATRIAL FIBRILLATION: ICD-10-CM

## 2022-10-13 PROCEDURE — 99214 OFFICE O/P EST MOD 30 MIN: CPT

## 2022-10-13 PROCEDURE — 93000 ELECTROCARDIOGRAM COMPLETE: CPT

## 2022-10-13 NOTE — DISCUSSION/SUMMARY
[EKG obtained to assist in diagnosis and management of assessed problem(s)] : EKG obtained to assist in diagnosis and management of assessed problem(s) [FreeTextEntry1] : The patient was examined. Her blood pressure was 131/73 and her pulse was 64. Her oxygen saturation was 99%. Her lungs were clear to auscultation. 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  She'll return in 3 months, or earlier if needed. She'll continue her current medications..  She was encouraged to take Tylenol 2 tablets 3 times a day for sciatica.  (She had only been taking 2 Tylenol once a day).  Total time spent on the day of the encounter was 34 minutes which include face-to-face and non face-to-face times personally spent by the physician preparing to see the patient, obtaining  separately obtained history, performing a medically appropriate exam and evaluation, counseling, educating, talking to the family or caregivers, ordering medicines, ordering tests or procedures, referring and communicating with other healthcare professionals, and documenting clinical information in the electronic health record.

## 2022-10-13 NOTE — REASON FOR VISIT
[FreeTextEntry1] : The patient comes in for followup of her asthma, hypertension, diabetes, hyperlipidemia, and mitral regurgitation. She has no new complaints. She denies any chest pains, shortness of breath, or palpitations. She does complain of feeling tired. \par May 14, 2019: The patient today is complaining of problems with swallowing her metformin pill. She cuts it in half. I told her to put it in applesauce. She also is complaining of dry skin. She does use Dove soap. She denies any chest pains, shortness of breath, or palpitations\par September 17, 2019: Patient complains of feeling fatigued and not sleeping well.  Complains of dry mouth.  She has a hard time walking.  She is losing weight.  Daughter reports that she does not eat well.  She denies any chest pains, shortness of breath, or palpitations.\par December 17, 2019: The patient's biggest complaint is that she is tired. She denies any chest pains, shortness of breath, or palpitations. She does not do a lot of walking. Sometimes her ankles are swollen.\par June 11, 2020: The patient still has complaints of dyspnea on exertion fatigue and not sleeping well.  She denies any chest pain shortness of breath at rest or palpitations.  She has urinary incontinence and she is on a medicine for overactive bladder.  I discussed with the daughter about stopping the trapezium medication.  She does get muscle aches and I discussed with the daughter about stopping the rosuvastatin temporarily for 2 weeks to see if the muscle aches get better.  She does take an Advil twice a day for arthritis pains.  It does not seem to bother her stomach.\par October 8, 2020: Patient complains of pains when she walks especially down the left leg\par October 15, 2020: The patient after leaving the office 1 week ago tripped and fell and lost consciousness.  He was not clear whether she had a syncopal attack.  She was hospitalized.  They found an E. coli UTI.  They found that she had orthostatic hypotension and stopped her blood pressure medicines.  She now comes for follow-up.  She is tired.  She denies any chest pains, shortness of breath, or palpitations.  She is still not taking any blood pressure medicines.  She is following up with a nephrologist.\par November 19, 2020: The patient's daughter reports that she has the beginnings of a pressure sore that is slightly pussy.  The patient herself has no specific complaints referable to that.  She has no chest pains, shortness of breath, or palpitations.  Blood pressures measured at home have been generally lower than the blood pressure here.  She is not yet on any antihypertensives.  She has had no further near syncope or syncope episodes and no further falls.  She was getting physical therapy and is doing her own exercises now.\par April 15, 2021: On January 21, 2021 the patient fell and hit her head.  She went to the [facility] emergency room and got stitches.  She eventually was cleared to go down to [place] and went down to [place] for 2 months.  She was confused at first but they may have forgotten to give her her antidepressants down there.  She is back to baseline now.  The daughter has noticed that she sweats around her head and neck after sleeping.  She also has a dry cough in the middle of the night.  She does have dyspnea on exertion but she is much less active.  She does walk with a walker.\par September 15, 2021: The patient complains of fatigue and poor memory.  She denies any chest pains, shortness of breath, or palpitations.  She did have a UTI which caused her to have increased confusion and she was hospitalized for time.  She is no longer taking anything for blood pressure.  Daughter reports that she has poor nutritional intake.\par January 19, 2022: The patient had a change in mental status and was determined to have a urinary tract infection.  The first urine culture was polymicrobial and was felt to be contaminated.  She took Cipro for 5 days.  On day three she had a repeat culture which was negative.  She is much better.\par March 21, 2022: The patient was just hospitalized with a change in mental status.  She was found to have dehydration and renal insufficiency as well as recurrent urinary tract infection.  Because of the renal insufficiency she was taken off her Metformin and that is the only change she is not taking Metformin.\par   April 12, 2022: The patient has had a red rash on her chest and underneath her left breast.  Sometimes because of her urinary incontinence in the morning her nightgown is soaked.  She is sponge bath with Dial soap and other soap containing towelettes.  The rash is not itchy.  Her left leg is slightly swollen.  She is taking amlodipine 5 mg.\par April 26, 2022: The patient denies any chest pains, palpitations, or shortness of breath at rest.  She does have dyspnea on exertion.  Sometimes she has pain when walking in her right leg.  It is in the right thigh above the knee.\par May 18, 2022: The patient was hospitalized at [facility] approximately 1 week ago when she was discharged home.  At first they thought it was pneumonia but then they came to the conclusion that it was congestive heart failure.  She was sent home on Lasix 40 mg daily as well as vitamin C and Nephro-Ernie.  Patient's biggest complaint is fatigue.  She denies any chest pains, shortness of breath, or palpitations.\par July 7, 2022: This is the patient's first visit since being hospitalized at [facility] with a GI bleed.  She went through 10 units of blood transfusion.  Her Eliquis was stopped.  They cauterize some polyps but they still an upper endoscopy.  She is sent home off the amlodipine but on metoprolol and Lasix she is not getting Eliquis.\par August 11, 2022: The patient has no new complaints.  She denies any chest pains, shortness of breath, palpitations, or dizziness.  She is not taking Eliquis because of her previous massive GI bleed.\par October 13, 2022: The patient's biggest complaint is right-sided sciatica.  She denies any chest pains, shortness of breath, or palpitations.

## 2022-12-13 ENCOUNTER — LABORATORY RESULT (OUTPATIENT)
Age: 87
End: 2022-12-13

## 2022-12-13 DIAGNOSIS — N39.0 URINARY TRACT INFECTION, SITE NOT SPECIFIED: ICD-10-CM

## 2022-12-14 LAB
APPEARANCE: ABNORMAL
BILIRUBIN URINE: NEGATIVE
BLOOD URINE: NEGATIVE
COLOR: NORMAL
GLUCOSE QUALITATIVE U: NEGATIVE
KETONES URINE: NEGATIVE
LEUKOCYTE ESTERASE URINE: ABNORMAL
NITRITE URINE: NEGATIVE
PH URINE: 6.5
PROTEIN URINE: ABNORMAL
SPECIFIC GRAVITY URINE: 1.01
UROBILINOGEN URINE: NORMAL

## 2022-12-16 LAB — BACTERIA UR CULT: ABNORMAL

## 2023-01-12 ENCOUNTER — APPOINTMENT (OUTPATIENT)
Dept: CARDIOLOGY | Facility: CLINIC | Age: 88
End: 2023-01-12
Payer: MEDICARE

## 2023-01-12 ENCOUNTER — NON-APPOINTMENT (OUTPATIENT)
Age: 88
End: 2023-01-12

## 2023-01-12 VITALS
BODY MASS INDEX: 23.52 KG/M2 | HEART RATE: 61 BPM | OXYGEN SATURATION: 99 % | SYSTOLIC BLOOD PRESSURE: 183 MMHG | DIASTOLIC BLOOD PRESSURE: 76 MMHG | WEIGHT: 137 LBS

## 2023-01-12 VITALS — SYSTOLIC BLOOD PRESSURE: 172 MMHG | DIASTOLIC BLOOD PRESSURE: 81 MMHG

## 2023-01-12 PROCEDURE — 93000 ELECTROCARDIOGRAM COMPLETE: CPT

## 2023-01-12 PROCEDURE — 99214 OFFICE O/P EST MOD 30 MIN: CPT

## 2023-01-12 NOTE — DISCUSSION/SUMMARY
[EKG obtained to assist in diagnosis and management of assessed problem(s)] : EKG obtained to assist in diagnosis and management of assessed problem(s) [FreeTextEntry1] : The patient was examined. Her blood pressure was 172/81 and her pulse was 61. Her oxygen saturation was 99%. Her lungs were clear to auscultation. 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  She'll return in 4 months, or earlier if needed. She'll continue her current medications..  She was encouraged to take Tylenol 2 tablets 3 times a day for sciatica.  .  Total time spent on the day of the encounter was 34 minutes which include face-to-face and non face-to-face times personally spent by the physician preparing to see the patient, obtaining  separately obtained history, performing a medically appropriate exam and evaluation, counseling, educating, talking to the family or caregivers, ordering medicines, ordering tests or procedures, referring and communicating with other healthcare professionals, and documenting clinical information in the electronic health record.

## 2023-01-12 NOTE — REASON FOR VISIT
[FreeTextEntry1] : The patient comes in for followup of her asthma, hypertension, diabetes, hyperlipidemia, and mitral regurgitation. She has no new complaints. She denies any chest pains, shortness of breath, or palpitations. She does complain of feeling tired. \par May 14, 2019: The patient today is complaining of problems with swallowing her metformin pill. She cuts it in half. I told her to put it in applesauce. She also is complaining of dry skin. She does use Dove soap. She denies any chest pains, shortness of breath, or palpitations\par September 17, 2019: Patient complains of feeling fatigued and not sleeping well.  Complains of dry mouth.  She has a hard time walking.  She is losing weight.  Daughter reports that she does not eat well.  She denies any chest pains, shortness of breath, or palpitations.\par December 17, 2019: The patient's biggest complaint is that she is tired. She denies any chest pains, shortness of breath, or palpitations. She does not do a lot of walking. Sometimes her ankles are swollen.\par June 11, 2020: The patient still has complaints of dyspnea on exertion fatigue and not sleeping well.  She denies any chest pain shortness of breath at rest or palpitations.  She has urinary incontinence and she is on a medicine for overactive bladder.  I discussed with the daughter about stopping the trapezium medication.  She does get muscle aches and I discussed with the daughter about stopping the rosuvastatin temporarily for 2 weeks to see if the muscle aches get better.  She does take an Advil twice a day for arthritis pains.  It does not seem to bother her stomach.\par October 8, 2020: Patient complains of pains when she walks especially down the left leg\par October 15, 2020: The patient after leaving the office 1 week ago tripped and fell and lost consciousness.  He was not clear whether she had a syncopal attack.  She was hospitalized.  They found an E. coli UTI.  They found that she had orthostatic hypotension and stopped her blood pressure medicines.  She now comes for follow-up.  She is tired.  She denies any chest pains, shortness of breath, or palpitations.  She is still not taking any blood pressure medicines.  She is following up with a nephrologist.\par November 19, 2020: The patient's daughter reports that she has the beginnings of a pressure sore that is slightly pussy.  The patient herself has no specific complaints referable to that.  She has no chest pains, shortness of breath, or palpitations.  Blood pressures measured at home have been generally lower than the blood pressure here.  She is not yet on any antihypertensives.  She has had no further near syncope or syncope episodes and no further falls.  She was getting physical therapy and is doing her own exercises now.\par April 15, 2021: On January 21, 2021 the patient fell and hit her head.  She went to the [facility] emergency room and got stitches.  She eventually was cleared to go down to [place] and went down to [place] for 2 months.  She was confused at first but they may have forgotten to give her her antidepressants down there.  She is back to baseline now.  The daughter has noticed that she sweats around her head and neck after sleeping.  She also has a dry cough in the middle of the night.  She does have dyspnea on exertion but she is much less active.  She does walk with a walker.\par September 15, 2021: The patient complains of fatigue and poor memory.  She denies any chest pains, shortness of breath, or palpitations.  She did have a UTI which caused her to have increased confusion and she was hospitalized for time.  She is no longer taking anything for blood pressure.  Daughter reports that she has poor nutritional intake.\par January 19, 2022: The patient had a change in mental status and was determined to have a urinary tract infection.  The first urine culture was polymicrobial and was felt to be contaminated.  She took Cipro for 5 days.  On day three she had a repeat culture which was negative.  She is much better.\par March 21, 2022: The patient was just hospitalized with a change in mental status.  She was found to have dehydration and renal insufficiency as well as recurrent urinary tract infection.  Because of the renal insufficiency she was taken off her Metformin and that is the only change she is not taking Metformin.\par   April 12, 2022: The patient has had a red rash on her chest and underneath her left breast.  Sometimes because of her urinary incontinence in the morning her nightgown is soaked.  She is sponge bath with Dial soap and other soap containing towelettes.  The rash is not itchy.  Her left leg is slightly swollen.  She is taking amlodipine 5 mg.\par April 26, 2022: The patient denies any chest pains, palpitations, or shortness of breath at rest.  She does have dyspnea on exertion.  Sometimes she has pain when walking in her right leg.  It is in the right thigh above the knee.\par May 18, 2022: The patient was hospitalized at [facility] approximately 1 week ago when she was discharged home.  At first they thought it was pneumonia but then they came to the conclusion that it was congestive heart failure.  She was sent home on Lasix 40 mg daily as well as vitamin C and Nephro-Ernie.  Patient's biggest complaint is fatigue.  She denies any chest pains, shortness of breath, or palpitations.\par July 7, 2022: This is the patient's first visit since being hospitalized at [facility] with a GI bleed.  She went through 10 units of blood transfusion.  Her Eliquis was stopped.  They cauterize some polyps but they still an upper endoscopy.  She is sent home off the amlodipine but on metoprolol and Lasix she is not getting Eliquis.\par August 11, 2022: The patient has no new complaints.  She denies any chest pains, shortness of breath, palpitations, or dizziness.  She is not taking Eliquis because of her previous massive GI bleed.\par October 13, 2022: The patient's biggest complaint is right-sided sciatica.  She denies any chest pains, shortness of breath, or palpitations.\par January 12, 2023: The patient still complaining of sciatica pains.  She is not on amlodipine because her blood pressure was low the last time she was hospitalized.  She denies any chest pains, shortness of breath, or palpitations.

## 2023-03-20 NOTE — DIETITIAN INITIAL EVALUATION ADULT - IDEAL BODY WEIGHT (KG)
Encounter Date: 3/20/2023    ED Physician Progress Notes            ED Physician Hand-off Note:    ED Course: I assumed care of patient from off-going ED physician, Dr Alexander.  Briefly, Patient is a 66y F with SP cath followed by urology.    At the time of signout plan was pending urology recs.    Patient evaluated by urology. Recommending discharge with dicloxacillin until scheduled procedure on 3/28. SP cath exchanged in ED.      Disposition: Discharge    Impression:     Final diagnoses:  [R00.1] Bradycardia  [R10.9] Flank pain (Primary)  [N30.01] Acute cystitis with hematuria        
54.4

## 2023-04-21 NOTE — PROGRESS NOTE ADULT - ASSESSMENT
89y F pmhx HTN, HLD, DM, glaucoma, breast cancer, diverticulitis p/w urinary symptoms and encephalopathy - incidentally found to have choledocholithiasis on imaging.    Impression:  #Choledocholithiasis - without s/s of obstruction or cholangitis. Discussed risks/benefits with daughter and son at bedside including risk of stone causing pain, jaundice and infection.    Recommendation:  - discussed with daughter at bedside today; given current clinical status with advanced age would not like to pursue ERCP at this time however are willing to readdress in the future after improvement in current status or if a complication occurs due to the stone  - Advanced GI to s/o, please call back with questions    Note not finalized until signed by attending.     Camelia Wong PGY-5  Gastroenterology Fellow  Pager #[phone] ([facility]) or [phone] ([facility])  Available on [employer] Teams.  Please contact on-call GI fellow via answering service ([phone]) after 5pm and before 8am, and on weekends.            none

## 2023-05-04 ENCOUNTER — RX RENEWAL (OUTPATIENT)
Age: 88
End: 2023-05-04

## 2023-05-11 ENCOUNTER — APPOINTMENT (OUTPATIENT)
Dept: CARDIOLOGY | Facility: CLINIC | Age: 88
End: 2023-05-11
Payer: MEDICARE

## 2023-05-11 ENCOUNTER — NON-APPOINTMENT (OUTPATIENT)
Age: 88
End: 2023-05-11

## 2023-05-11 ENCOUNTER — LABORATORY RESULT (OUTPATIENT)
Age: 88
End: 2023-05-11

## 2023-05-11 VITALS
HEART RATE: 58 BPM | HEIGHT: 64 IN | OXYGEN SATURATION: 100 % | WEIGHT: 135 LBS | DIASTOLIC BLOOD PRESSURE: 64 MMHG | RESPIRATION RATE: 17 BRPM | SYSTOLIC BLOOD PRESSURE: 128 MMHG | BODY MASS INDEX: 23.05 KG/M2

## 2023-05-11 DIAGNOSIS — I34.0 NONRHEUMATIC MITRAL (VALVE) INSUFFICIENCY: ICD-10-CM

## 2023-05-11 PROCEDURE — 93000 ELECTROCARDIOGRAM COMPLETE: CPT

## 2023-05-11 PROCEDURE — 99214 OFFICE O/P EST MOD 30 MIN: CPT

## 2023-05-11 NOTE — DISCUSSION/SUMMARY
[EKG obtained to assist in diagnosis and management of assessed problem(s)] : EKG obtained to assist in diagnosis and management of assessed problem(s) [FreeTextEntry1] : The patient was examined. Her blood pressure was 128/64 and her pulse was 58. Her oxygen saturation was 100%. Her lungs were clear to auscultation. 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  She'll return in 4 months, or earlier if needed. She'll continue her current medications..  She was encouraged to take Tylenol 2 tablets 3 times a day for sciatica.  .  Total time spent on the day of the encounter was 34 minutes which include face-to-face and non face-to-face times personally spent by the physician preparing to see the patient, obtaining  separately obtained history, performing a medically appropriate exam and evaluation, counseling, educating, talking to the family or caregivers, ordering medicines, ordering tests or procedures, referring and communicating with other healthcare professionals, and documenting clinical information in the electronic health record.

## 2023-05-11 NOTE — REASON FOR VISIT
[FreeTextEntry1] : The patient comes in for followup of her asthma, hypertension, diabetes, hyperlipidemia, and mitral regurgitation. She has no new complaints. She denies any chest pains, shortness of breath, or palpitations. She does complain of feeling tired. \par May 14, 2019: The patient today is complaining of problems with swallowing her metformin pill. She cuts it in half. I told her to put it in applesauce. She also is complaining of dry skin. She does use Dove soap. She denies any chest pains, shortness of breath, or palpitations\par September 17, 2019: Patient complains of feeling fatigued and not sleeping well.  Complains of dry mouth.  She has a hard time walking.  She is losing weight.  Daughter reports that she does not eat well.  She denies any chest pains, shortness of breath, or palpitations.\par December 17, 2019: The patient's biggest complaint is that she is tired. She denies any chest pains, shortness of breath, or palpitations. She does not do a lot of walking. Sometimes her ankles are swollen.\par June 11, 2020: The patient still has complaints of dyspnea on exertion fatigue and not sleeping well.  She denies any chest pain shortness of breath at rest or palpitations.  She has urinary incontinence and she is on a medicine for overactive bladder.  I discussed with the daughter about stopping the trapezium medication.  She does get muscle aches and I discussed with the daughter about stopping the rosuvastatin temporarily for 2 weeks to see if the muscle aches get better.  She does take an Advil twice a day for arthritis pains.  It does not seem to bother her stomach.\par October 8, 2020: Patient complains of pains when she walks especially down the left leg\par October 15, 2020: The patient after leaving the office 1 week ago tripped and fell and lost consciousness.  He was not clear whether she had a syncopal attack.  She was hospitalized.  They found an E. coli UTI.  They found that she had orthostatic hypotension and stopped her blood pressure medicines.  She now comes for follow-up.  She is tired.  She denies any chest pains, shortness of breath, or palpitations.  She is still not taking any blood pressure medicines.  She is following up with a nephrologist.\par November 19, 2020: The patient's daughter reports that she has the beginnings of a pressure sore that is slightly pussy.  The patient herself has no specific complaints referable to that.  She has no chest pains, shortness of breath, or palpitations.  Blood pressures measured at home have been generally lower than the blood pressure here.  She is not yet on any antihypertensives.  She has had no further near syncope or syncope episodes and no further falls.  She was getting physical therapy and is doing her own exercises now.\par April 15, 2021: On January 21, 2021 the patient fell and hit her head.  She went to the [facility] emergency room and got stitches.  She eventually was cleared to go down to [place] and went down to [place] for 2 months.  She was confused at first but they may have forgotten to give her her antidepressants down there.  She is back to baseline now.  The daughter has noticed that she sweats around her head and neck after sleeping.  She also has a dry cough in the middle of the night.  She does have dyspnea on exertion but she is much less active.  She does walk with a walker.\par September 15, 2021: The patient complains of fatigue and poor memory.  She denies any chest pains, shortness of breath, or palpitations.  She did have a UTI which caused her to have increased confusion and she was hospitalized for time.  She is no longer taking anything for blood pressure.  Daughter reports that she has poor nutritional intake.\par January 19, 2022: The patient had a change in mental status and was determined to have a urinary tract infection.  The first urine culture was polymicrobial and was felt to be contaminated.  She took Cipro for 5 days.  On day three she had a repeat culture which was negative.  She is much better.\par March 21, 2022: The patient was just hospitalized with a change in mental status.  She was found to have dehydration and renal insufficiency as well as recurrent urinary tract infection.  Because of the renal insufficiency she was taken off her Metformin and that is the only change she is not taking Metformin.\par   April 12, 2022: The patient has had a red rash on her chest and underneath her left breast.  Sometimes because of her urinary incontinence in the morning her nightgown is soaked.  She is sponge bath with Dial soap and other soap containing towelettes.  The rash is not itchy.  Her left leg is slightly swollen.  She is taking amlodipine 5 mg.\par April 26, 2022: The patient denies any chest pains, palpitations, or shortness of breath at rest.  She does have dyspnea on exertion.  Sometimes she has pain when walking in her right leg.  It is in the right thigh above the knee.\par May 18, 2022: The patient was hospitalized at [facility] approximately 1 week ago when she was discharged home.  At first they thought it was pneumonia but then they came to the conclusion that it was congestive heart failure.  She was sent home on Lasix 40 mg daily as well as vitamin C and Nephro-Ernie.  Patient's biggest complaint is fatigue.  She denies any chest pains, shortness of breath, or palpitations.\par July 7, 2022: This is the patient's first visit since being hospitalized at [facility] with a GI bleed.  She went through 10 units of blood transfusion.  Her Eliquis was stopped.  They cauterize some polyps but they still an upper endoscopy.  She is sent home off the amlodipine but on metoprolol and Lasix she is not getting Eliquis.\par August 11, 2022: The patient has no new complaints.  She denies any chest pains, shortness of breath, palpitations, or dizziness.  She is not taking Eliquis because of her previous massive GI bleed.\par October 13, 2022: The patient's biggest complaint is right-sided sciatica.  She denies any chest pains, shortness of breath, or palpitations.\par January 12, 2023: The patient still complaining of sciatica pains.  She is not on amlodipine because her blood pressure was low the last time she was hospitalized.  She denies any chest pains, shortness of breath, or palpitations.\par May 11, 2023: The patient was out in the sun today and she got very hot.  The past when she was brought inside.  She denies any chest pains, shortness of breath, or palpitations.

## 2023-05-12 LAB
ALBUMIN SERPL ELPH-MCNC: 3.6 G/DL
ALP BLD-CCNC: 100 U/L
ALT SERPL-CCNC: 10 U/L
ANION GAP SERPL CALC-SCNC: 14 MMOL/L
AST SERPL-CCNC: 16 U/L
BASOPHILS # BLD AUTO: 0.03 K/UL
BASOPHILS NFR BLD AUTO: 0.6 %
BILIRUB SERPL-MCNC: 0.3 MG/DL
BUN SERPL-MCNC: 46 MG/DL
CALCIUM SERPL-MCNC: 9.5 MG/DL
CHLORIDE SERPL-SCNC: 102 MMOL/L
CHOLEST SERPL-MCNC: 118 MG/DL
CO2 SERPL-SCNC: 24 MMOL/L
CREAT SERPL-MCNC: 1.63 MG/DL
EGFR: 29 ML/MIN/1.73M2
EOSINOPHIL # BLD AUTO: 0 K/UL
EOSINOPHIL NFR BLD AUTO: 0 %
ESTIMATED AVERAGE GLUCOSE: 212 MG/DL
GLUCOSE SERPL-MCNC: 371 MG/DL
HBA1C MFR BLD HPLC: 9 %
HCT VFR BLD CALC: 35.8 %
HDLC SERPL-MCNC: 42 MG/DL
HGB BLD-MCNC: 10.8 G/DL
IMM GRANULOCYTES NFR BLD AUTO: 0.2 %
LDLC SERPL CALC-MCNC: 45 MG/DL
LYMPHOCYTES # BLD AUTO: 1 K/UL
LYMPHOCYTES NFR BLD AUTO: 20.2 %
MAN DIFF?: NORMAL
MCHC RBC-ENTMCNC: 30.2 GM/DL
MCHC RBC-ENTMCNC: 30.8 PG
MCV RBC AUTO: 102 FL
MONOCYTES # BLD AUTO: 0.38 K/UL
MONOCYTES NFR BLD AUTO: 7.7 %
NEUTROPHILS # BLD AUTO: 3.53 K/UL
NEUTROPHILS NFR BLD AUTO: 71.3 %
NONHDLC SERPL-MCNC: 76 MG/DL
PLATELET # BLD AUTO: 165 K/UL
POTASSIUM SERPL-SCNC: 4.6 MMOL/L
PROT SERPL-MCNC: 6.4 G/DL
RBC # BLD: 3.51 M/UL
RBC # FLD: 13.3 %
SODIUM SERPL-SCNC: 140 MMOL/L
T3RU NFR SERPL: 1 TBI
T4 SERPL-MCNC: 6.2 UG/DL
TRIGL SERPL-MCNC: 156 MG/DL
TSH SERPL-ACNC: 4.84 UIU/ML
WBC # FLD AUTO: 4.95 K/UL

## 2023-07-02 ENCOUNTER — NON-APPOINTMENT (OUTPATIENT)
Age: 88
End: 2023-07-02

## 2023-07-22 ENCOUNTER — RX RENEWAL (OUTPATIENT)
Age: 88
End: 2023-07-22

## 2023-09-12 ENCOUNTER — RX RENEWAL (OUTPATIENT)
Age: 88
End: 2023-09-12

## 2023-09-21 ENCOUNTER — APPOINTMENT (OUTPATIENT)
Dept: CARDIOLOGY | Facility: CLINIC | Age: 88
End: 2023-09-21
Payer: MEDICARE

## 2023-09-21 ENCOUNTER — NON-APPOINTMENT (OUTPATIENT)
Age: 88
End: 2023-09-21

## 2023-09-21 VITALS — DIASTOLIC BLOOD PRESSURE: 62 MMHG | SYSTOLIC BLOOD PRESSURE: 160 MMHG | BODY MASS INDEX: 23.17 KG/M2 | WEIGHT: 135 LBS

## 2023-09-21 DIAGNOSIS — Z86.79 PERSONAL HISTORY OF OTHER DISEASES OF THE CIRCULATORY SYSTEM: ICD-10-CM

## 2023-09-21 DIAGNOSIS — I50.9 HEART FAILURE, UNSPECIFIED: ICD-10-CM

## 2023-09-21 DIAGNOSIS — N64.89 OTHER SPECIFIED DISORDERS OF BREAST: ICD-10-CM

## 2023-09-21 DIAGNOSIS — Z86.39 PERSONAL HISTORY OF OTHER ENDOCRINE, NUTRITIONAL AND METABOLIC DISEASE: ICD-10-CM

## 2023-09-21 LAB
ALBUMIN SERPL ELPH-MCNC: 4.1 G/DL
ALP BLD-CCNC: 90 U/L
ALT SERPL-CCNC: 9 U/L
ANION GAP SERPL CALC-SCNC: 14 MMOL/L
AST SERPL-CCNC: 15 U/L
BILIRUB SERPL-MCNC: 0.3 MG/DL
BUN SERPL-MCNC: 38 MG/DL
CALCIUM SERPL-MCNC: 9.8 MG/DL
CHLORIDE SERPL-SCNC: 100 MMOL/L
CHOLEST SERPL-MCNC: 133 MG/DL
CO2 SERPL-SCNC: 26 MMOL/L
CREAT SERPL-MCNC: 1.69 MG/DL
EGFR: 28 ML/MIN/1.73M2
ESTIMATED AVERAGE GLUCOSE: 206 MG/DL
GLUCOSE SERPL-MCNC: 291 MG/DL
HBA1C MFR BLD HPLC: 8.8 %
HCT VFR BLD CALC: 40.9 %
HDLC SERPL-MCNC: 48 MG/DL
HGB BLD-MCNC: 12.3 G/DL
LDLC SERPL CALC-MCNC: 59 MG/DL
MCHC RBC-ENTMCNC: 30.1 GM/DL
MCHC RBC-ENTMCNC: 31.1 PG
MCV RBC AUTO: 103.3 FL
NONHDLC SERPL-MCNC: 84 MG/DL
PLATELET # BLD AUTO: 177 K/UL
POTASSIUM SERPL-SCNC: 4.5 MMOL/L
PROT SERPL-MCNC: 6.7 G/DL
RBC # BLD: 3.96 M/UL
RBC # FLD: 13.2 %
SODIUM SERPL-SCNC: 140 MMOL/L
T4 SERPL-MCNC: 7 UG/DL
TRIGL SERPL-MCNC: 147 MG/DL
TSH SERPL-ACNC: 4.68 UIU/ML
WBC # FLD AUTO: 5.3 K/UL

## 2023-09-21 PROCEDURE — 99215 OFFICE O/P EST HI 40 MIN: CPT

## 2023-09-21 PROCEDURE — 93000 ELECTROCARDIOGRAM COMPLETE: CPT

## 2023-10-05 RX ORDER — FOLIC ACID/VIT B COMPLEX AND C 0.8 MG
TABLET ORAL
Qty: 90 | Refills: 3 | Status: ACTIVE | COMMUNITY
Start: 2022-05-11 | End: 1900-01-01

## 2023-10-22 ENCOUNTER — NON-APPOINTMENT (OUTPATIENT)
Age: 88
End: 2023-10-22

## 2023-10-22 ENCOUNTER — RX RENEWAL (OUTPATIENT)
Age: 88
End: 2023-10-22

## 2023-10-23 ENCOUNTER — NON-APPOINTMENT (OUTPATIENT)
Age: 88
End: 2023-10-23

## 2023-11-05 ENCOUNTER — RX RENEWAL (OUTPATIENT)
Age: 88
End: 2023-11-05

## 2023-11-08 NOTE — ED PROCEDURE NOTE - CPROC ED LACERATION CLEANSED1
"Anesthesia Transfer of Care Note    Patient: Bri Santiago    Procedure(s) Performed: Procedure(s) (LRB):  ARTHROSCOPY, KNEE, WITH PARTIAL LATERAL MENISCECTOMY (Left)  ARTHROSCOPY, KNEE, WITH CHONDROPLASTY (Left)    Patient location: PACU    Anesthesia Type: general    Transport from OR: Transported from OR on room air with adequate spontaneous ventilation. Transported from OR on 6-10 L/min O2 by face mask with adequate spontaneous ventilation    Post pain: adequate analgesia    Post assessment: no apparent anesthetic complications and tolerated procedure well    Post vital signs: stable    Level of consciousness: awake    Nausea/Vomiting: no nausea/vomiting    Complications: none    Transfer of care protocol was followed      Last vitals:   Visit Vitals  BP (!) 141/92   Pulse 63   Temp 36.5 °C (97.7 °F) (Oral)   Resp 16   Ht 5' 11" (1.803 m)   Wt 108 kg (238 lb)   SpO2 95%   BMI 33.19 kg/m²     "
copious irrigation

## 2023-11-09 ENCOUNTER — NON-APPOINTMENT (OUTPATIENT)
Age: 88
End: 2023-11-09

## 2023-11-13 NOTE — GOALS OF CARE CONVERSATION - ADVANCED CARE PLANNING - LOCATION OF DISCUSSION
Spoke with pt about rescheduling surgery until after Dec 1st since pts ins is [employer] and she is having problems with getting approved to have surgery with us.   Face to face

## 2023-12-06 ENCOUNTER — RX RENEWAL (OUTPATIENT)
Age: 88
End: 2023-12-06

## 2023-12-06 RX ORDER — LEVOTHYROXINE SODIUM 0.03 MG/1
25 TABLET ORAL
Qty: 90 | Refills: 3 | Status: ACTIVE | COMMUNITY
Start: 2022-09-12 | End: 1900-01-01

## 2023-12-11 ENCOUNTER — RX RENEWAL (OUTPATIENT)
Age: 88
End: 2023-12-11

## 2023-12-11 RX ORDER — FUROSEMIDE 40 MG/1
40 TABLET ORAL DAILY
Qty: 90 | Refills: 3 | Status: ACTIVE | COMMUNITY
Start: 2022-05-18 | End: 1900-01-01

## 2023-12-22 ENCOUNTER — TRANSCRIPTION ENCOUNTER (OUTPATIENT)
Age: 88
End: 2023-12-22

## 2024-01-31 ENCOUNTER — APPOINTMENT (OUTPATIENT)
Dept: CARDIOLOGY | Facility: CLINIC | Age: 89
End: 2024-01-31
Payer: MEDICARE

## 2024-01-31 ENCOUNTER — NON-APPOINTMENT (OUTPATIENT)
Age: 89
End: 2024-01-31

## 2024-01-31 VITALS — SYSTOLIC BLOOD PRESSURE: 136 MMHG | WEIGHT: 135 LBS | BODY MASS INDEX: 23.17 KG/M2 | DIASTOLIC BLOOD PRESSURE: 60 MMHG

## 2024-01-31 DIAGNOSIS — M79.604 DORSALGIA, UNSPECIFIED: ICD-10-CM

## 2024-01-31 DIAGNOSIS — Z23 ENCOUNTER FOR IMMUNIZATION: ICD-10-CM

## 2024-01-31 DIAGNOSIS — M25.551 PAIN IN RIGHT HIP: ICD-10-CM

## 2024-01-31 DIAGNOSIS — M54.9 DORSALGIA, UNSPECIFIED: ICD-10-CM

## 2024-01-31 PROCEDURE — 90662 IIV NO PRSV INCREASED AG IM: CPT

## 2024-01-31 PROCEDURE — 93000 ELECTROCARDIOGRAM COMPLETE: CPT

## 2024-01-31 PROCEDURE — G2211 COMPLEX E/M VISIT ADD ON: CPT

## 2024-01-31 PROCEDURE — 99215 OFFICE O/P EST HI 40 MIN: CPT

## 2024-01-31 PROCEDURE — G0008: CPT

## 2024-02-01 NOTE — REASON FOR VISIT
[FreeTextEntry1] : The patient is here today for follow-up of several issues.  At the time of last visit, she was having significant problems with diarrhea but she was taking MiraLAX on a regular basis.  Since stopping the MiraLAX, the diarrhea has resolved.  She now uses MiraLAX 1-2 times a week as needed and that seems to control her bowel movements very well.  She has ongoing difficulties with right buttock and leg pain which is intermittent.  It seems to be worse in the morning when she wakes up and sometimes can be quite severe but usually only last for several seconds and then resolves.  Will obtain x-ray of hip and leg to determine whether any other intervention is appropriate.  Most of the time, the pain is not severe.  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  The patient has been using gloves and braces but was unable to wear them for sufficient time to allow any therapeutic effect.  I recommended that she try using them more regularly.  She does describe some exertional shortness of breath although she remains extremely inactive physically.  I strongly urged her to begin a regular progressive walking program.  She is planning to go to [place] for the next month.  The patient does also have severe aortic stenosis and intervention may be a consideration if symptoms persist.

## 2024-02-01 NOTE — DISCUSSION/SUMMARY
[FreeTextEntry1] : Hypertension-Well-controlled today. Right breast asymmetry-mammogram and bone density studies ordered.  Further recommendations pending those results Bilateral hand tendon ruptures-Trial of therapy As above Right leg cramping/pain-X-ray of right hip and leg.  Further recommendations thereafter Close follow-up by email over the next few weeks.  Return visit 2 months or sooner if current problems do not clear up.   Total time of the encounter: 45 minutes which included but was not limited to the following: Face-to-face and non face-to-face time personally spent by the physician preparing to see the patient, obtaining and/or resuming separately obtained history, performing a medically appropriate examination and/or evaluation, counseling and educating the patient/family/caregiver, ordering medications, tests or procedures, referring and communicating with other healthcare professionals, documenting clinical information in the electronic health record, independently interpreting results and communicated results to the patient/family/caregiver and care coordination.  [EKG obtained to assist in diagnosis and management of assessed problem(s)] : EKG obtained to assist in diagnosis and management of assessed problem(s)

## 2024-02-01 NOTE — PHYSICAL EXAM
[TextEntry] : General/Constitutional: WD/ WN in NAD H: NC/AT Eyes:  PERRL, sclerae and conjunctivae normal without jaundice or xanthelasma; ENMT:normal teeth, gums and palate with no petechiae, pallor or cyanosis Neck: w/o JVD, thyromegaly or adenopathy; normal venous contour Respiratory: clear to auscultation, normal respiratory effort with no retractions or use of accessory respiratory muscles; the right breast appears much more dense than the left breast and there is a a peau d'orange appearance of the right breast Heart: [id number], regular rate, normal S1, S2 with a 3/6 late peaking aortic stenosis murmur but no rubs, gallops, heaves or thrills Vascular exam: normal carotid upstrokes without carotid or abdominal bruits. 2+/2+ pulses to posterior tibialis and dorsalis pedis Abdomen: soft, nontender, bowels sounds normal without hepatomegaly or splenomegaly, masses or bruits Musculoskeletal:without significant kyphosis or scoliosis Extremities: w/o CCE, good capillary filling; Flexion deformity of middle 2 fingers of the right hand and left hand.  The hand can be opened with external pressure.  No evidence of Dupuytren's contractures. Skin: no stasis changes; no ulcers  Neuro: AA and O x 3; no focal neurologic deficits Psych: normal mood and affect

## 2024-03-03 ENCOUNTER — EMERGENCY (EMERGENCY)
Facility: HOSPITAL | Age: 89
LOS: 1 days | End: 2024-03-03
Attending: EMERGENCY MEDICINE | Admitting: STUDENT IN AN ORGANIZED HEALTH CARE EDUCATION/TRAINING PROGRAM
Payer: MEDICARE

## 2024-03-03 VITALS
HEIGHT: 64 IN | HEART RATE: 86 BPM | TEMPERATURE: 98 F | OXYGEN SATURATION: 97 % | RESPIRATION RATE: 20 BRPM | DIASTOLIC BLOOD PRESSURE: 57 MMHG | WEIGHT: 139.99 LBS | SYSTOLIC BLOOD PRESSURE: 105 MMHG

## 2024-03-03 LAB
ALBUMIN SERPL ELPH-MCNC: 3.7 G/DL — SIGNIFICANT CHANGE UP (ref 3.3–5)
ALP SERPL-CCNC: 73 U/L — SIGNIFICANT CHANGE UP (ref 40–120)
ALT FLD-CCNC: 26 U/L — SIGNIFICANT CHANGE UP (ref 10–45)
ANION GAP SERPL CALC-SCNC: 13 MMOL/L — SIGNIFICANT CHANGE UP (ref 5–17)
APPEARANCE UR: ABNORMAL
AST SERPL-CCNC: 32 U/L — SIGNIFICANT CHANGE UP (ref 10–40)
BACTERIA # UR AUTO: ABNORMAL /HPF
BASOPHILS # BLD AUTO: 0.03 K/UL — SIGNIFICANT CHANGE UP (ref 0–0.2)
BASOPHILS NFR BLD AUTO: 0.2 % — SIGNIFICANT CHANGE UP (ref 0–2)
BILIRUB SERPL-MCNC: 0.5 MG/DL — SIGNIFICANT CHANGE UP (ref 0.2–1.2)
BILIRUB UR-MCNC: NEGATIVE — SIGNIFICANT CHANGE UP
BUN SERPL-MCNC: 36 MG/DL — HIGH (ref 7–23)
CALCIUM SERPL-MCNC: 9.5 MG/DL — SIGNIFICANT CHANGE UP (ref 8.4–10.5)
CAST: 2 /LPF — SIGNIFICANT CHANGE UP (ref 0–4)
CHLORIDE SERPL-SCNC: 99 MMOL/L — SIGNIFICANT CHANGE UP (ref 96–108)
CO2 SERPL-SCNC: 26 MMOL/L — SIGNIFICANT CHANGE UP (ref 22–31)
COLOR SPEC: YELLOW — SIGNIFICANT CHANGE UP
CREAT SERPL-MCNC: 1.84 MG/DL — HIGH (ref 0.5–1.3)
DIFF PNL FLD: NEGATIVE — SIGNIFICANT CHANGE UP
EGFR: 25 ML/MIN/1.73M2 — LOW
EOSINOPHIL # BLD AUTO: 0 K/UL — SIGNIFICANT CHANGE UP (ref 0–0.5)
EOSINOPHIL NFR BLD AUTO: 0 % — SIGNIFICANT CHANGE UP (ref 0–6)
FLUAV AG NPH QL: SIGNIFICANT CHANGE UP
FLUBV AG NPH QL: SIGNIFICANT CHANGE UP
GLUCOSE SERPL-MCNC: 279 MG/DL — HIGH (ref 70–99)
GLUCOSE UR QL: NEGATIVE MG/DL — SIGNIFICANT CHANGE UP
HCT VFR BLD CALC: 37.6 % — SIGNIFICANT CHANGE UP (ref 34.5–45)
HGB BLD-MCNC: 12.2 G/DL — SIGNIFICANT CHANGE UP (ref 11.5–15.5)
IMM GRANULOCYTES NFR BLD AUTO: 0.6 % — SIGNIFICANT CHANGE UP (ref 0–0.9)
KETONES UR-MCNC: NEGATIVE MG/DL — SIGNIFICANT CHANGE UP
LEUKOCYTE ESTERASE UR-ACNC: ABNORMAL
LYMPHOCYTES # BLD AUTO: 0.54 K/UL — LOW (ref 1–3.3)
LYMPHOCYTES # BLD AUTO: 3.8 % — LOW (ref 13–44)
MCHC RBC-ENTMCNC: 31 PG — SIGNIFICANT CHANGE UP (ref 27–34)
MCHC RBC-ENTMCNC: 32.4 GM/DL — SIGNIFICANT CHANGE UP (ref 32–36)
MCV RBC AUTO: 95.4 FL — SIGNIFICANT CHANGE UP (ref 80–100)
MONOCYTES # BLD AUTO: 1.03 K/UL — HIGH (ref 0–0.9)
MONOCYTES NFR BLD AUTO: 7.3 % — SIGNIFICANT CHANGE UP (ref 2–14)
NEUTROPHILS # BLD AUTO: 12.43 K/UL — HIGH (ref 1.8–7.4)
NEUTROPHILS NFR BLD AUTO: 88.1 % — HIGH (ref 43–77)
NITRITE UR-MCNC: NEGATIVE — SIGNIFICANT CHANGE UP
NRBC # BLD: 0 /100 WBCS — SIGNIFICANT CHANGE UP (ref 0–0)
PH UR: 5.5 — SIGNIFICANT CHANGE UP (ref 5–8)
PLATELET # BLD AUTO: 186 K/UL — SIGNIFICANT CHANGE UP (ref 150–400)
POTASSIUM SERPL-MCNC: 3.7 MMOL/L — SIGNIFICANT CHANGE UP (ref 3.5–5.3)
POTASSIUM SERPL-SCNC: 3.7 MMOL/L — SIGNIFICANT CHANGE UP (ref 3.5–5.3)
PROT SERPL-MCNC: 6.8 G/DL — SIGNIFICANT CHANGE UP (ref 6–8.3)
PROT UR-MCNC: NEGATIVE MG/DL — SIGNIFICANT CHANGE UP
RBC # BLD: 3.94 M/UL — SIGNIFICANT CHANGE UP (ref 3.8–5.2)
RBC # FLD: 13.9 % — SIGNIFICANT CHANGE UP (ref 10.3–14.5)
RBC CASTS # UR COMP ASSIST: 21 /HPF — HIGH (ref 0–4)
REVIEW: SIGNIFICANT CHANGE UP
RSV RNA NPH QL NAA+NON-PROBE: SIGNIFICANT CHANGE UP
SARS-COV-2 RNA SPEC QL NAA+PROBE: SIGNIFICANT CHANGE UP
SODIUM SERPL-SCNC: 138 MMOL/L — SIGNIFICANT CHANGE UP (ref 135–145)
SP GR SPEC: 1.01 — SIGNIFICANT CHANGE UP (ref 1–1.03)
SQUAMOUS # UR AUTO: 2 /HPF — SIGNIFICANT CHANGE UP (ref 0–5)
UROBILINOGEN FLD QL: 1 MG/DL — SIGNIFICANT CHANGE UP (ref 0.2–1)
WBC # BLD: 14.12 K/UL — HIGH (ref 3.8–10.5)
WBC # FLD AUTO: 14.12 K/UL — HIGH (ref 3.8–10.5)
WBC UR QL: 33 /HPF — HIGH (ref 0–5)

## 2024-03-03 RX ORDER — SODIUM CHLORIDE 9 MG/ML
500 INJECTION INTRAMUSCULAR; INTRAVENOUS; SUBCUTANEOUS ONCE
Refills: 0 | Status: COMPLETED | OUTPATIENT
Start: 2024-03-03 | End: 2024-03-03

## 2024-03-03 RX ORDER — MEROPENEM 1 G/30ML
500 INJECTION INTRAVENOUS ONCE
Refills: 0 | Status: COMPLETED | OUTPATIENT
Start: 2024-03-03 | End: 2024-03-03

## 2024-03-03 RX ADMIN — SODIUM CHLORIDE 500 MILLILITER(S): 9 INJECTION INTRAMUSCULAR; INTRAVENOUS; SUBCUTANEOUS at 23:40

## 2024-03-03 RX ADMIN — SODIUM CHLORIDE 500 MILLILITER(S): 9 INJECTION INTRAMUSCULAR; INTRAVENOUS; SUBCUTANEOUS at 21:50

## 2024-03-03 RX ADMIN — MEROPENEM 100 MILLIGRAM(S): 1 INJECTION INTRAVENOUS at 23:39

## 2024-03-03 NOTE — ED ADULT NURSE REASSESSMENT NOTE - NS ED NURSE REASSESS COMMENT FT1
Patient had large bowel movement in diaper. Patient cleaned, changed and repositioned at this time. Safety and comfort measures maintained.

## 2024-03-03 NOTE — ED PROVIDER NOTE - PHYSICAL EXAMINATION
CONSTITUTIONAL: Elderly appearing, awake, alert, and in no apparent distress; hard of hearing  HEAD: normocephalic, atraumatic  ENT: oral mucosa moist  CARDIAC: regular rate and rhythm; no murmurs, rubs, or gallops  RESPIRATORY: normal breath sounds, clear to ascultation bilaterally, no rales, rhonchi, wheezing  GASTROINTESTINAL: abdomen nondistended, soft, nontender, no guarding, rebound tenderness  MSK: Spine appears normal, range of motion is not limited, no muscle or joint tenderness; no LE edema BL  NEURO: Alert and oriented, no focal deficits, no motor or sensory deficits.  SKIN: Skin normal color for race, warm, dry and intact. No evidence of rash.  PSYCH: appropriate mood and affect

## 2024-03-03 NOTE — ED PROVIDER NOTE - ATTENDING CONTRIBUTION TO CARE
I performed a history and physical exam of the patient and discussed their management with the resident and /or advanced care provider. I reviewed the resident and /or ACP's note and agree with the documented findings and plan of care. My medical decision making and observations are found above.  Lungs clear abd soft, responding

## 2024-03-03 NOTE — ED PROVIDER NOTE - PROGRESS NOTE DETAILS
Attending Hi Obrien:  Patient signed out to me, hemodynamically stable, mildy tachypneic though, here for dec nicholas, dec urination, delirium, found to have cystitis, with leukocytosis. Prior cultures with ecoli and kleb. Itching to cephalosporins. Given jagruti, will avoid tmp/smx, tx with carbapenem until culture differentiates, ADMIT. Alan PGY1: attempted to contact dr. dhillon and 2 different phone numbers, no answer, left voicemail.

## 2024-03-03 NOTE — ED ADULT NURSE REASSESSMENT NOTE - NS ED NURSE REASSESS COMMENT FT1
Handoff taken from RN Jean (break coverage). Introduced self to pt and daughter, A+Ox3 (intermittent), resting in bed, VSS, non-febrile. Soiled linens and diaper changed, gown provided. Brought in for UTI sx. No complaints at this time, safety maintained.

## 2024-03-03 NOTE — ED ADULT NURSE NOTE - OBJECTIVE STATEMENT
89 year old female, A&OX4, PMH of HTN, HLD, DM2, diverticulitis, recurrent UTI, presenting to ED complaining of urinary symptoms. Patient 89 year old female, A&OX4, PMH of HTN, HLD, DM2, diverticulitis, recurrent UTI, presenting to ED complaining of urinary symptoms. Patient accompanied by daughter to help with history. States they recently drove up from [place] and is having similar symptoms to when she had a UTI in the past. Daughter is concerned that she is dehydrated and acting slightly confused with decreased PO intake. Denies CP, SOB, HA, n/v/d, fever and chills. Heart rate and rhythm regular. Respirations clear and equal bilaterally. Abdomen soft, nontender and nondistended. Peripheral pulses strong and equal bilaterally.  Safety and comfort measures maintained.

## 2024-03-03 NOTE — ED PROVIDER NOTE - OBJECTIVE STATEMENT
89 female, hypertension, hyperlipidemia, A-fib on metoprolol only, presenting for 2 to 3 days of decreased appetite, decreased urination, today with episode of confusion.  Daughter at bedside reports recent 2 days drive from [place] where patient decreased fluid intake to avoid frequent stopping on the road.  Today day patient was in bed and believed she was in the kitchen stirring the pot, patient normally AOx3.  No fevers, chills, cough, chest pain, shortness of breath, leg swelling, leg pain, abdominal pain.  Patient denies urinary symptoms or daughter at bedside patient with history of frequent UTIs and presentation similar to current when she has an infection.

## 2024-03-03 NOTE — ED PROVIDER NOTE - CLINICAL SUMMARY MEDICAL DECISION MAKING FREE TEXT BOX
Young: Patient is 89-year-old who presents to the emergency department with weakness.  Patient is generally weak and not eating after a long car ride North.  Patient with decreased p.o. and decreased urine.  Patient has bodyaches but no specific area of pain.  Patient not febrile.  Patient does look dry.  Will hydrate and look for infectious source and electrolyte abnormality. Lab studies ordered, independently reviewed and acted on as appropriate.

## 2024-03-03 NOTE — ED ADULT TRIAGE NOTE - CHIEF COMPLAINT QUOTE
recently in ER in [place] for vaginal bleeding, d/c'd and drove back yesterday and pt was not drinking much in the car because she did not want to urinate  since being home, has not been eating or drinking. "a bit confused" as per daughter and is similar to UTI symptoms

## 2024-03-03 NOTE — ED ADULT NURSE NOTE - NSFALLUNIVINTERV_ED_ALL_ED
Bed/Stretcher in lowest position, wheels locked, appropriate side rails in place/Call bell, personal items and telephone in reach/Instruct patient to call for assistance before getting out of bed/chair/stretcher/Non-slip footwear applied when patient is off stretcher/[place] to call system/Physically safe environment - no spills, clutter or unnecessary equipment/Purposeful proactive rounding/Room/bathroom lighting operational, light cord in reach

## 2024-03-04 ENCOUNTER — TRANSCRIPTION ENCOUNTER (OUTPATIENT)
Age: 89
End: 2024-03-04

## 2024-03-04 VITALS
TEMPERATURE: 98 F | RESPIRATION RATE: 18 BRPM | HEART RATE: 65 BPM | OXYGEN SATURATION: 97 % | SYSTOLIC BLOOD PRESSURE: 115 MMHG | DIASTOLIC BLOOD PRESSURE: 62 MMHG

## 2024-03-04 DIAGNOSIS — E03.9 HYPOTHYROIDISM, UNSPECIFIED: ICD-10-CM

## 2024-03-04 DIAGNOSIS — R41.0 DISORIENTATION, UNSPECIFIED: ICD-10-CM

## 2024-03-04 DIAGNOSIS — N39.0 URINARY TRACT INFECTION, SITE NOT SPECIFIED: ICD-10-CM

## 2024-03-04 DIAGNOSIS — D64.9 ANEMIA, UNSPECIFIED: ICD-10-CM

## 2024-03-04 DIAGNOSIS — R53.81 OTHER MALAISE: ICD-10-CM

## 2024-03-04 DIAGNOSIS — I48.20 CHRONIC ATRIAL FIBRILLATION, UNSPECIFIED: ICD-10-CM

## 2024-03-04 DIAGNOSIS — I10 ESSENTIAL (PRIMARY) HYPERTENSION: ICD-10-CM

## 2024-03-04 DIAGNOSIS — N17.9 ACUTE KIDNEY FAILURE, UNSPECIFIED: ICD-10-CM

## 2024-03-04 LAB
ALBUMIN SERPL ELPH-MCNC: 3.4 G/DL — SIGNIFICANT CHANGE UP (ref 3.3–5)
ALP SERPL-CCNC: 69 U/L — SIGNIFICANT CHANGE UP (ref 40–120)
ALT FLD-CCNC: 22 U/L — SIGNIFICANT CHANGE UP (ref 10–45)
ANION GAP SERPL CALC-SCNC: 13 MMOL/L — SIGNIFICANT CHANGE UP (ref 5–17)
AST SERPL-CCNC: 24 U/L — SIGNIFICANT CHANGE UP (ref 10–40)
BILIRUB SERPL-MCNC: 0.4 MG/DL — SIGNIFICANT CHANGE UP (ref 0.2–1.2)
BUN SERPL-MCNC: 34 MG/DL — HIGH (ref 7–23)
CALCIUM SERPL-MCNC: 9.4 MG/DL — SIGNIFICANT CHANGE UP (ref 8.4–10.5)
CHLORIDE SERPL-SCNC: 102 MMOL/L — SIGNIFICANT CHANGE UP (ref 96–108)
CO2 SERPL-SCNC: 26 MMOL/L — SIGNIFICANT CHANGE UP (ref 22–31)
CREAT SERPL-MCNC: 1.71 MG/DL — HIGH (ref 0.5–1.3)
EGFR: 27 ML/MIN/1.73M2 — LOW
GLUCOSE SERPL-MCNC: 214 MG/DL — HIGH (ref 70–99)
HCT VFR BLD CALC: 35.2 % — SIGNIFICANT CHANGE UP (ref 34.5–45)
HGB BLD-MCNC: 11 G/DL — LOW (ref 11.5–15.5)
MCHC RBC-ENTMCNC: 30.4 PG — SIGNIFICANT CHANGE UP (ref 27–34)
MCHC RBC-ENTMCNC: 31.3 GM/DL — LOW (ref 32–36)
MCV RBC AUTO: 97.2 FL — SIGNIFICANT CHANGE UP (ref 80–100)
NRBC # BLD: 0 /100 WBCS — SIGNIFICANT CHANGE UP (ref 0–0)
PLATELET # BLD AUTO: 177 K/UL — SIGNIFICANT CHANGE UP (ref 150–400)
POTASSIUM SERPL-MCNC: 3.2 MMOL/L — LOW (ref 3.5–5.3)
POTASSIUM SERPL-SCNC: 3.2 MMOL/L — LOW (ref 3.5–5.3)
PROT SERPL-MCNC: 6.4 G/DL — SIGNIFICANT CHANGE UP (ref 6–8.3)
RBC # BLD: 3.62 M/UL — LOW (ref 3.8–5.2)
RBC # FLD: 14 % — SIGNIFICANT CHANGE UP (ref 10.3–14.5)
SODIUM SERPL-SCNC: 141 MMOL/L — SIGNIFICANT CHANGE UP (ref 135–145)
WBC # BLD: 15.19 K/UL — HIGH (ref 3.8–10.5)
WBC # FLD AUTO: 15.19 K/UL — HIGH (ref 3.8–10.5)

## 2024-03-04 PROCEDURE — 84443 ASSAY THYROID STIM HORMONE: CPT

## 2024-03-04 PROCEDURE — 87077 CULTURE AEROBIC IDENTIFY: CPT

## 2024-03-04 PROCEDURE — 85027 COMPLETE CBC AUTOMATED: CPT

## 2024-03-04 PROCEDURE — 99285 EMERGENCY DEPT VISIT HI MDM: CPT | Mod: 25

## 2024-03-04 PROCEDURE — 80053 COMPREHEN METABOLIC PANEL: CPT

## 2024-03-04 PROCEDURE — 85025 COMPLETE CBC W/AUTO DIFF WBC: CPT

## 2024-03-04 PROCEDURE — 36415 COLL VENOUS BLD VENIPUNCTURE: CPT

## 2024-03-04 PROCEDURE — 81001 URINALYSIS AUTO W/SCOPE: CPT

## 2024-03-04 PROCEDURE — 87086 URINE CULTURE/COLONY COUNT: CPT

## 2024-03-04 PROCEDURE — 87637 SARSCOV2&INF A&B&RSV AMP PRB: CPT

## 2024-03-04 PROCEDURE — 87186 SC STD MICRODIL/AGAR DIL: CPT

## 2024-03-04 PROCEDURE — 71045 X-RAY EXAM CHEST 1 VIEW: CPT

## 2024-03-04 PROCEDURE — 96374 THER/PROPH/DIAG INJ IV PUSH: CPT

## 2024-03-04 PROCEDURE — 97161 PT EVAL LOW COMPLEX 20 MIN: CPT

## 2024-03-04 PROCEDURE — 93005 ELECTROCARDIOGRAM TRACING: CPT

## 2024-03-04 RX ORDER — LORATADINE 10 MG/1
10 TABLET ORAL DAILY
Refills: 0 | Status: DISCONTINUED | OUTPATIENT
Start: 2024-03-04 | End: 2024-03-04

## 2024-03-04 RX ORDER — LEVOFLOXACIN 5 MG/ML
250 INJECTION, SOLUTION INTRAVENOUS EVERY 24 HOURS
Refills: 0 | Status: DISCONTINUED | OUTPATIENT
Start: 2024-03-04 | End: 2024-03-04

## 2024-03-04 RX ORDER — ACETAMINOPHEN 500 MG
2 TABLET ORAL
Qty: 0 | Refills: 0 | DISCHARGE
Start: 2024-03-04

## 2024-03-04 RX ORDER — SENNA PLUS 8.6 MG/1
2 TABLET ORAL AT BEDTIME
Refills: 0 | Status: DISCONTINUED | OUTPATIENT
Start: 2024-03-04 | End: 2024-03-04

## 2024-03-04 RX ORDER — LEVOTHYROXINE SODIUM 125 MCG
1 TABLET ORAL
Qty: 0 | Refills: 0 | DISCHARGE

## 2024-03-04 RX ORDER — AZTREONAM 2 G
1000 VIAL (EA) INJECTION EVERY 12 HOURS
Refills: 0 | Status: DISCONTINUED | OUTPATIENT
Start: 2024-03-04 | End: 2024-03-04

## 2024-03-04 RX ORDER — ACETAMINOPHEN 500 MG
650 TABLET ORAL EVERY 6 HOURS
Refills: 0 | Status: DISCONTINUED | OUTPATIENT
Start: 2024-03-04 | End: 2024-03-04

## 2024-03-04 RX ORDER — FERROUS SULFATE 325(65) MG
325 TABLET ORAL DAILY
Refills: 0 | Status: DISCONTINUED | OUTPATIENT
Start: 2024-03-04 | End: 2024-03-04

## 2024-03-04 RX ORDER — VENLAFAXINE HCL 75 MG
75 CAPSULE, EXT RELEASE 24 HR ORAL DAILY
Refills: 0 | Status: DISCONTINUED | OUTPATIENT
Start: 2024-03-04 | End: 2024-03-04

## 2024-03-04 RX ORDER — FERROUS SULFATE 325(65) MG
1 TABLET ORAL
Qty: 0 | Refills: 0 | DISCHARGE

## 2024-03-04 RX ORDER — AZTREONAM 2 G
VIAL (EA) INJECTION
Refills: 0 | Status: DISCONTINUED | OUTPATIENT
Start: 2024-03-04 | End: 2024-03-04

## 2024-03-04 RX ORDER — PANTOPRAZOLE SODIUM 20 MG/1
40 TABLET, DELAYED RELEASE ORAL
Refills: 0 | Status: DISCONTINUED | OUTPATIENT
Start: 2024-03-04 | End: 2024-03-04

## 2024-03-04 RX ORDER — ROSUVASTATIN CALCIUM 5 MG/1
1 TABLET ORAL
Qty: 0 | Refills: 0 | DISCHARGE

## 2024-03-04 RX ORDER — METOPROLOL TARTRATE 50 MG
25 TABLET ORAL
Refills: 0 | Status: DISCONTINUED | OUTPATIENT
Start: 2024-03-04 | End: 2024-03-04

## 2024-03-04 RX ORDER — LANOLIN ALCOHOL/MO/W.PET/CERES
3 CREAM (GRAM) TOPICAL AT BEDTIME
Refills: 0 | Status: DISCONTINUED | OUTPATIENT
Start: 2024-03-04 | End: 2024-03-04

## 2024-03-04 RX ORDER — AMLODIPINE BESYLATE 2.5 MG/1
2.5 TABLET ORAL DAILY
Refills: 0 | Status: DISCONTINUED | OUTPATIENT
Start: 2024-03-04 | End: 2024-03-04

## 2024-03-04 RX ORDER — LEVOTHYROXINE SODIUM 125 MCG
25 TABLET ORAL DAILY
Refills: 0 | Status: DISCONTINUED | OUTPATIENT
Start: 2024-03-04 | End: 2024-03-04

## 2024-03-04 RX ORDER — POTASSIUM CHLORIDE 20 MEQ
20 PACKET (EA) ORAL ONCE
Refills: 0 | Status: COMPLETED | OUTPATIENT
Start: 2024-03-04 | End: 2024-03-04

## 2024-03-04 RX ORDER — ATORVASTATIN CALCIUM 80 MG/1
40 TABLET, FILM COATED ORAL AT BEDTIME
Refills: 0 | Status: DISCONTINUED | OUTPATIENT
Start: 2024-03-04 | End: 2024-03-04

## 2024-03-04 RX ADMIN — AMLODIPINE BESYLATE 2.5 MILLIGRAM(S): 2.5 TABLET ORAL at 07:35

## 2024-03-04 RX ADMIN — PANTOPRAZOLE SODIUM 40 MILLIGRAM(S): 20 TABLET, DELAYED RELEASE ORAL at 07:35

## 2024-03-04 RX ADMIN — Medication 20 MILLIEQUIVALENT(S): at 14:06

## 2024-03-04 RX ADMIN — Medication 325 MILLIGRAM(S): at 07:37

## 2024-03-04 RX ADMIN — Medication 25 MICROGRAM(S): at 07:35

## 2024-03-04 RX ADMIN — LEVOFLOXACIN 250 MILLIGRAM(S): 5 INJECTION, SOLUTION INTRAVENOUS at 14:13

## 2024-03-04 RX ADMIN — Medication 1 TABLET(S): at 07:37

## 2024-03-04 RX ADMIN — Medication 75 MILLIGRAM(S): at 13:46

## 2024-03-04 RX ADMIN — LORATADINE 10 MILLIGRAM(S): 10 TABLET ORAL at 13:46

## 2024-03-04 NOTE — PHYSICAL THERAPY INITIAL EVALUATION ADULT - ACTIVE RANGE OF MOTION EXAMINATION, REHAB EVAL
decr ROM 2* stiffness arthritis, digits 3-4 flexed/Left UE Active ROM was WFL (within functional limits)/Right UE Active ROM was WFL (within functional limits)/Left LE Active ROM was WFL (within functional limits)/Right LE Active ROM was WFL (within functional limits)

## 2024-03-04 NOTE — CHART NOTE - NSCHARTNOTEFT_GEN_A_CORE
Patient seen and examined with son, Uzair, at bedside.    Reports she feels well. Abdominal pain is resolved. No chest pain. No lightheadedness/dizziness. Had BP taken in multiple positions lying in bed, without dizziness or lightheadedness. Enjoying a full meal. Per son, she is back to her baseline. ROS positive for chronic constipation.    On exam, stable hemodynamics noted. Cardiac murmur noted on exam of chest, chronic per patient. No LE edema. Lungs CTA. Abdomen soft, non-tender, non-distended.    A/P:    1. Cystitis - currently being managed on IV aztreonam, tolerating well. Has hx of Keflex allergy. Prior urine cultures reviewed, notable for prior pan-sensitive organisms. Will plan to d/c aztreonam today and start non-penicillin antibiotic, likely levofloxacin with renal dosing, to complete course.    2. CKD stage III - outpatient records reviewed, creatinine 1.6 in October 2022. Per son at bedside, has been as high as 2.5 in the past. At this time, creatinine of 1.7 is stable compared to outpatient record. Will repeat BMP and ensure stability moving forward. Will continue to hold diuretic at this time. She is s/p IV hydration in the ED as well.    3. Hx of AF - chronic AF, not on anticoagulation due to hx of bleeding. Continue beta blocker as ordered.    4. Chronic constipation - continue stool softeners.    If bloodwork shows relative stability, and PT feels safe for discharge home, will plan for d/c home later today on PO levofloxacin for treatment of UTI. Recommended to hold diuretic for next 2 days in setting of mild dehydration.    Discussed with son, who is in agreement. Call placed to Dr. Coleman's office as well.    D/C Time: 40 minutes

## 2024-03-04 NOTE — PHYSICAL THERAPY INITIAL EVALUATION ADULT - PERTINENT HX OF CURRENT PROBLEM, REHAB EVAL
Pt is a 89F admitted to [facility] on 3/4/24 w/Hx of HTN, HLD, A-fib (not on AC), hypothyroidism, CKD 3b, Chronic anemia, heart murmur presents for 2 to 3 days of decreased appetite, decreased urination, and lethargy. Hx taken alongside daughter at bedside. Pt recently had a 2 day drive up from [place] where she was purposeless drinking less water to urinate less. This morning had episode of confusion believing she was stirring a pit while she was actually in bed, but since has improved mental status. Currently she is A&Ox3 and comfortable with no acute complaints. Pt has had UTIs in the past and presented similarly per daughter. Patient denies fever, chills, chest pain, SOB, headache, dizziness, abd pain, nausea, vomiting, constipation.  Hospital course: +acute UTI, Historically with pan-sensitive E. Coli but with confirmed keflex allergy leadign to hives, Pt not on AC due to hx of GI bleed. CXR (-)

## 2024-03-04 NOTE — DISCHARGE NOTE PROVIDER - NSDCFUSCHEDAPPT_GEN_ALL_CORE_FT
Santi Coleman  [facility] Physician [facility]  CARDIOLOGY [address] [facility]   Scheduled Appointment: 05/31/2024

## 2024-03-04 NOTE — CHART NOTE - NSCHARTNOTEFT_GEN_A_CORE
Based on my assessment, this patient’s condition has improved and no longer warrants inpatient status and will be changed to outpatient status prior to being discharged from the hospital.  This decision is based on the following reasons: improvement in mentation and plan for outpatient treatment of urinary tract infection

## 2024-03-04 NOTE — DISCHARGE NOTE NURSING/CASE MANAGEMENT/SOCIAL WORK - NSDCVIVACCINE_GEN_ALL_CORE_FT
influenza, injectable, quadrivalent, preservative free; 13-Oct-2020 13:18; Renetta Lyn (RN); [employer]; [id number] (Exp. Date: 30-Jun-2021); IntraMuscular; Deltoid Right.; 0.5 milliLiter(s); VIS (VIS Published: 15-Aug-2019, VIS Presented: 13-Oct-2020);   Tdap; 08-Oct-2020 15:26; Lorna Mcneal (RN); [employer]; [id number] (Exp. Date: 09-Dec-2021); IntraMuscular; Dorsogluteal Left.; 0.5 milliLiter(s); VIS (VIS Published: 09-May-2013, VIS Presented: 08-Oct-2020);   Tdap; 21-Jan-2021 06:53; Yrn Kurtz (RN); [employer]; [id number] (Exp. Date: 21-Jul-2022); IntraMuscular; Deltoid Right.; 0.5 milliLiter(s); VIS (VIS Published: 09-May-2013, VIS Presented: 21-Jan-2021);

## 2024-03-04 NOTE — H&P ADULT - NSHPPHYSICALEXAM_GEN_ALL_CORE
T(C): 36.7 (03-04-24 @ 05:35), Max: 36.7 (03-04-24 @ 02:19)  HR: 61 (03-04-24 @ 05:35) (61 - 86)  BP: 118/56 (03-04-24 @ 05:35) (105/57 - 118/56)  RR: 15 (03-04-24 @ 05:35) (15 - 24)  SpO2: 98% (03-04-24 @ 05:35) (96% - 98%)    CONSTITUTIONAL: No apparent distress  EYES: PERRLA and symmetric, EOMI, No conjunctival or scleral injection, non-icteric  ENMT: Oral mucosa with moist membranes.  NECK: Supple, symmetric. No JVD.  RESP: No respiratory distress, no use of accessory muscles; CTA b/l, no WRR  CV: RRR, +S1S2, 3/6 systolic murmur  GI: Soft, NT, ND, no rebound, no guarding  : No suprapubic tenderness. No CVA tenderness.  LYMPH: No cervical LAD or tenderness  MSK: No spinal tenderness, normal muscle strength/tone  EXTREMITIES: No pedal edema  SKIN: No rashes or ulcers noted  NEURO: A+Ox3, responsive. No tremor, sensation intact in upper and lower extremities b/l  PSYCH: Appropriate insight/judgment; mood and affect appropriate, recent/remote memory intact

## 2024-03-04 NOTE — DISCHARGE NOTE PROVIDER - NSDCCPCAREPLAN_GEN_ALL_CORE_FT
PRINCIPAL DISCHARGE DIAGNOSIS  Diagnosis: Confusion  Assessment and Plan of Treatment: Likely caused by acute UTI. Your mental status has improved after a course of IV antibiotics. Please follow-up with your primary care physician within one week of discharge.      SECONDARY DISCHARGE DIAGNOSES  Diagnosis: Acute UTI  Assessment and Plan of Treatment: You were given IV antibiotics in the ED  You were transitioned to oral antibiotic levofloxacin. Please complete your entire course of antibiotics.  HOME CARE INSTRUCTIONS  Drink enough water and fluids to keep your urine clear or pale yellow.  Avoid caffeine, tea, and carbonated beverages. They tend to irritate your bladder.  Empty your bladder often. Avoid holding urine for long periods of time.  After a bowel movement, women should cleanse from front to back. Use each tissue only once.  SEEK MEDICAL CARE IF:  You have back pain.  You develop a fever.  Your symptoms do not begin to resolve within 3 days.  SEEK IMMEDIATE MEDICAL CARE IF:  You have severe back pain or lower abdominal pain.  You develop chills.  You have nausea or vomiting.  You have continued burning or discomfort with urination.    Diagnosis: ELISA (acute kidney injury)  Assessment and Plan of Treatment: Your creatinine is elevated likely due to mild dehydration. You were given IV fluids in the ED. It is recommended to hold your diuretic (water pill) for next 2 days in setting of mild dehydration.     PRINCIPAL DISCHARGE DIAGNOSIS  Diagnosis: Confusion  Assessment and Plan of Treatment: Likely caused by acute UTI. Your mental status has improved after a course of IV antibiotics. Please follow-up with your primary care physician within one week of discharge.      SECONDARY DISCHARGE DIAGNOSES  Diagnosis: Acute UTI  Assessment and Plan of Treatment: You were given IV antibiotics in the ED.  You were transitioned to oral antibiotic levofloxacin. Please take levofloxacin 250 mg for a 3 day course. You recieved the first dose in the hospital. Please complete your entire course of antibiotics.  HOME CARE INSTRUCTIONS  Drink enough water and fluids to keep your urine clear or pale yellow.  Avoid caffeine, tea, and carbonated beverages. They tend to irritate your bladder.  Empty your bladder often. Avoid holding urine for long periods of time.  After a bowel movement, women should cleanse from front to back. Use each tissue only once.  SEEK MEDICAL CARE IF:  You have back pain.  You develop a fever.  Your symptoms do not begin to resolve within 3 days.  SEEK IMMEDIATE MEDICAL CARE IF:  You have severe back pain or lower abdominal pain.  You develop chills.  You have nausea or vomiting.  You have continued burning or discomfort with urination.    Diagnosis: ELISA (acute kidney injury)  Assessment and Plan of Treatment: Your creatinine is elevated likely due to mild dehydration. IV fluids were administered. Because of your dehydration it is recommended that you hold your diuretic,furosemide 40 mg tablet, for next 2 days.

## 2024-03-04 NOTE — DISCHARGE NOTE NURSING/CASE MANAGEMENT/SOCIAL WORK - PATIENT PORTAL LINK FT
You can access the FollowMyHealth Patient Portal offered by [facility] by registering at the following website: [email or web address]. By joining [employer]’s FollowMyHealth portal, you will also be able to view your health information using other applications (apps) compatible with our system.

## 2024-03-04 NOTE — DISCHARGE NOTE PROVIDER - NSDCFUADDAPPT_GEN_ALL_CORE_FT
APPTS ARE READY TO BE MADE: [x ] YES    Best Family or Patient Contact (if needed):    Additional Information about above appointments (if needed):    1: PCP  2:   3:     Other comments or requests:    APPTS ARE READY TO BE MADE: [x ] YES    Best Family or Patient Contact (if needed):    Additional Information about above appointments (if needed):    1: PCP  2:   3:     Other comments or requests:       Patient informed us they already have secured a follow up appointment which was not scheduled by our team. Dr. Coleman on May 31 Patient is awaiting a call back with sooner appointment.

## 2024-03-04 NOTE — H&P ADULT - HISTORY OF PRESENT ILLNESS
89F w/Hx of HTN, HLD, A-fib (not on AC), hypothyroidism, CKD 3b, Chronic anemia, heart murmur presents for 2 to 3 days of decreased appetite, decreased urination, and lethargy. Hx taken alongside daughter at bedside. Pt recently had a 2 day drive up from [place] where she was purposeless drinking less water to urinate less. This morning had episode of confusion believing she was stirring a pit while she was actually in bed, but since has improved mental status. Currently she is A&Ox3 and comfortable with no acute complaints. Pt has had UTIs in the past and presented similarly per daughter. Patient denies fever, chills, chest pain, SOB, headache, dizziness, abd pain, nausea, vomiting, constipation.

## 2024-03-04 NOTE — H&P ADULT - NSICDXPASTMEDICALHX_GEN_ALL_CORE_FT
PAST MEDICAL HISTORY:  Arthritis     Breast cancer     Diverticulitis     GERD (gastroesophageal reflux disease)     Glaucoma     Heart murmur     HTN (hypertension)     Hyperlipidemia     Renal calculus or stone     Urinary incontinence

## 2024-03-04 NOTE — PHYSICAL THERAPY INITIAL EVALUATION ADULT - NSPTDISCHREC_GEN_A_CORE
DC home with home PT services, assist from HHA and family for mobility/ADLs, +HHA 8hrs/6days, assist from family on off time from HHA, +ramp access, +chair lift, owns rolling walker/WC/shower chair with grabbars, +electric recliner to assist to stand, +bed rail to assist with bed mobility, CM Анна aware. ZULLY [place] aware./Home PT

## 2024-03-04 NOTE — PHYSICAL THERAPY INITIAL EVALUATION ADULT - GENERAL OBSERVATIONS, REHAB EVAL
Pt a/w acute UTI on abx. Pt received in ED (gold 6), on stretcher, +tele, +BP cuff, son at bedside, A&OX4, follows commands

## 2024-03-04 NOTE — DISCHARGE NOTE PROVIDER - CARE PROVIDER_API CALL
Santi Coleman  Cardiovascular Disease  [address] [facility], [address]  [place] [address]  Phone: [phone]  Fax: [phone]  Established Patient  Follow Up Time: 1 week

## 2024-03-04 NOTE — H&P ADULT - PROBLEM SELECTOR PLAN 1
Pt with acute UTI  - Historically with pan-sensitive E. Coli but with confirmed keflex allergy leadign to hives  - Aztreonam 1g q12h  - F/u Urine and blood cx

## 2024-03-04 NOTE — H&P ADULT - ASSESSMENT
89F w/Hx of HTN, HLD, A-fib (not on AC), hypothyroidism, CKD 3b, Chronic anemia, heart murmur presents for 2 to 3 days of decreased appetite, decreased urination, and lethargy.

## 2024-03-04 NOTE — DISCHARGE NOTE PROVIDER - HOSPITAL COURSE
HPI:  89F w/Hx of HTN, HLD, A-fib (not on AC), hypothyroidism, CKD 3b, Chronic anemia, heart murmur presents for 2 to 3 days of decreased appetite, decreased urination, and lethargy. Hx taken alongside daughter at bedside. Pt recently had a 2 day drive up from [place] where she was purposeless drinking less water to urinate less. This morning had episode of confusion believing she was stirring a pit while she was actually in bed, but since has improved mental status. Currently she is A&Ox3 and comfortable with no acute complaints. Pt has had UTIs in the past and presented similarly per daughter. Patient denies fever, chills, chest pain, SOB, headache, dizziness, abd pain, nausea, vomiting, constipation. (04 Mar 2024 06:04)    Hospital Course:  *****    Important Medication Changes and Reason:  ******    Active or Pending Issues Requiring Follow-up:  PCP    Advanced Directives:   [ x] Full code  [ ] DNR  [ ] Hospice    Discharge Diagnoses:  HTN  HLD  Afib  Confusion  UTI  ELISA on CKD       HPI:  89F w/Hx of HTN, HLD, A-fib (not on AC), hypothyroidism, CKD 3b, Chronic anemia, heart murmur presents for 2 to 3 days of decreased appetite, decreased urination, and lethargy. Hx taken alongside daughter at bedside. Pt recently had a 2 day drive up from [place] where she was purposeless drinking less water to urinate less. This morning had episode of confusion believing she was stirring a pit while she was actually in bed, but since has improved mental status. Currently she is A&Ox3 and comfortable with no acute complaints. Pt has had UTIs in the past and presented similarly per daughter. Patient denies fever, chills, chest pain, SOB, headache, dizziness, abd pain, nausea, vomiting, constipation. (04 Mar 2024 06:04)    Hospital Course:  brought in for confusion. UA pos for UTI, s/p meropenem in ED. mental status improving after antibiotic administration.  transitioned for oral levofloxacin. orthostatics negative. PT worked with pt and recommends home PT. Son in agreement    Important Medication Changes and Reason:  ******    Active or Pending Issues Requiring Follow-up:  PCP    Advanced Directives:   [ x] Full code  [ ] DNR  [ ] Hospice    Discharge Diagnoses:  HTN  HLD  Afib  Confusion  UTI  ELISA on CKD       HPI:  89F w/Hx of HTN, HLD, A-fib (not on AC), hypothyroidism, CKD 3b, Chronic anemia, heart murmur presents for 2 to 3 days of decreased appetite, decreased urination, and lethargy. Hx taken alongside daughter at bedside. Pt recently had a 2 day drive up from [place] where she was purposeless drinking less water to urinate less. This morning had episode of confusion believing she was stirring a pit while she was actually in bed, but since has improved mental status. Currently she is A&Ox3 and comfortable with no acute complaints. Pt has had UTIs in the past and presented similarly per daughter. Patient denies fever, chills, chest pain, SOB, headache, dizziness, abd pain, nausea, vomiting, constipation. (04 Mar 2024 06:04)    Hospital Course:  brought in for confusion. UA pos for UTI, s/p meropenem and IV hydration in ED. Pt has hx keflex allergy. mental status improving after antibiotic administration. Cr of 1.7 is stable in comparison to outpatient record. Transitioned for oral levofloxacin. Patient asymptomatic with orthostatics to sitting. PT was able to walk  with pt and recommends home PT. Patients' son in agreement with plan.     Discharge planning discussed with attending Dr. Chavez. Patient is medically cleared and stable for discharge home with home PT. Medication Reconciliation reviewed with attending.    Important Medication Changes and Reason:  Levofloxacin 250 mg x 3 day course  HOLD diuretic for that 3 day course    Active or Pending Issues Requiring Follow-up:  PCP    Advanced Directives:   [ x] Full code  [ ] DNR  [ ] Hospice    Discharge Diagnoses:  HTN  HLD  Afib  Confusion  UTI  ELISA on CKD       HPI:  89F w/Hx of HTN, HLD, A-fib (not on AC), hypothyroidism, CKD 3b, Chronic anemia, heart murmur presents for 2 to 3 days of decreased appetite, decreased urination, and lethargy. Hx taken alongside daughter at bedside. Pt recently had a 2 day drive up from [place] where she was purposeless drinking less water to urinate less. This morning had episode of confusion believing she was stirring a pit while she was actually in bed, but since has improved mental status. Currently she is A&Ox3 and comfortable with no acute complaints. Pt has had UTIs in the past and presented similarly per daughter. Patient denies fever, chills, chest pain, SOB, headache, dizziness, abd pain, nausea, vomiting, constipation. (04 Mar 2024 06:04)    Hospital Course:  brought in for confusion. UA pos for UTI, s/p meropenem and IV hydration in ED. Pt has hx keflex allergy. mental status improving after antibiotic administration. Cr of 1.7 is stable in comparison to outpatient record. Transitioned for oral levofloxacin. Patient asymptomatic with orthostatics to sitting. PT was able to walk  with pt and recommends home PT. Patients' son in agreement with plan.     Discharge planning discussed with attending Dr. Chavez. Patient is medically cleared and stable for discharge home with home PT. Medication Reconciliation reviewed with attending.    Important Medication Changes and Reason:  Levofloxacin 250 mg x 3 day course  HOLD diuretic for that 3 day course    Active or Pending Issues Requiring Follow-up:  PCP    Advanced Directives:   [ x] Full code  [ ] DNR  [ ] Hospice    Discharge Diagnoses:  HTN  HLD  Afib  Metabolic Encephalopathy  Chronic CVA  UTI  CKD III

## 2024-03-04 NOTE — ED ADULT NURSE REASSESSMENT NOTE - NS ED NURSE REASSESS COMMENT FT1
1816 daughter arrived and dressed her mom brought to the waiting area fo the car Pt verbalized d/d instructions Carlotta

## 2024-03-04 NOTE — ED ADULT NURSE REASSESSMENT NOTE - NS ED NURSE REASSESS COMMENT FT1
1105 Labs sent as ordered Pt unable to tolerate standing Pt sat up vs documented and pt was falling over in the bed Will continue to monitor [email or web address]

## 2024-03-04 NOTE — ED ADULT NURSE REASSESSMENT NOTE - NS ED NURSE REASSESS COMMENT FT1
0743 pt provided breakfast tray daughter at the bedside ans meds givn3 as ordered [facility]

## 2024-03-04 NOTE — PHYSICAL THERAPY INITIAL EVALUATION ADULT - NSPTDMEREC_GEN_A_CORE
+ramp access, +chair lift, owns rolling walker/WC/shower chair with grabbars, +electric recliner to assist to stand, +bed rail to assist with bed mobility

## 2024-03-04 NOTE — PHYSICAL THERAPY INITIAL EVALUATION ADULT - ADDITIONAL COMMENTS
Pt lives with her daughter in a private house with ramp access, requires assistance with ADLs, has a RW and wheelchair for ambulation assist. Pt with 6 days 8 hrs H[facility] services Pt lives with her daughter in a private house with ramp access & chair lift, requires assistance with ADLs, has a RW and wheelchair for ambulation assist, owns shower chair with grabbars (walkin shower). Pt with 6 days 8 hrs HHA services, assist from family on off time, +electric recliner to assist to stand and +bed rail to assist OOB activity; +daughter takes care of groceries and takes pt to appts, pt is RH and wears reading glasses. Son confirmed at bedside; Ambulates short distances, room to room with supervision/assist as needed

## 2024-03-04 NOTE — DISCHARGE NOTE PROVIDER - NSDCMRMEDTOKEN_GEN_ALL_CORE_FT
amLODIPine 2.5 mg oral tablet: 1 tab(s) orally once a day  ferrous sulfate 325 mg (65 mg elemental iron) oral tablet: 1 tab(s) orally once a day  furosemide 40 mg oral tablet: 1 tab(s) orally once a day  loratadine 10 mg oral tablet: 1 tab(s) orally once a day  Metoprolol Tartrate 25 mg oral tablet: 1 tab(s) orally 2 times a day   Nephro-Ernie oral tablet: 1 tab(s) orally once a day   pantoprazole 40 mg oral delayed release tablet: 1 tab(s) orally once a day (before a meal)  rosuvastatin 10 mg oral tablet: 1 tab(s) orally once a day  senna leaf extract oral tablet: 2 tab(s) orally once a day (at bedtime)  Synthroid 25 mcg (0.025 mg) oral tablet: 1 tab(s) orally once a day  venlafaxine 75 mg oral capsule, extended release: 1 cap(s) orally once a day   acetaminophen 325 mg oral tablet: 2 tab(s) orally every 6 hours As needed Temp greater or equal to 38C (100.4F), Mild Pain (1 - 3)  amLODIPine 2.5 mg oral tablet: 1 tab(s) orally once a day  ferrous sulfate 325 mg (65 mg elemental iron) oral tablet: 1 tab(s) orally once a day  levoFLOXacin 250 mg oral tablet: 1 tab(s) orally every 24 hours  loratadine 10 mg oral tablet: 1 tab(s) orally once a day  Metoprolol Tartrate 25 mg oral tablet: 1 tab(s) orally 2 times a day   Nephro-Ernie oral tablet: 1 tab(s) orally once a day   pantoprazole 40 mg oral delayed release tablet: 1 tab(s) orally once a day (before a meal)  rosuvastatin 10 mg oral tablet: 1 tab(s) orally once a day  senna leaf extract oral tablet: 2 tab(s) orally once a day (at bedtime)  Synthroid 25 mcg (0.025 mg) oral tablet: 1 tab(s) orally once a day  venlafaxine 75 mg oral capsule, extended release: 1 cap(s) orally once a day

## 2024-03-04 NOTE — ED ADULT NURSE REASSESSMENT NOTE - NS ED NURSE REASSESS COMMENT FT1
Patient resting comfortably, breathing unlabored, VSS, no signs of acute distress, side rails raised, call bell within reach, comfort and safety maintained. To be admitted to medicine for UTI, daughter and pt updated, no complaints at this time.

## 2024-03-05 ENCOUNTER — NON-APPOINTMENT (OUTPATIENT)
Age: 89
End: 2024-03-05

## 2024-03-05 LAB — TSH SERPL-MCNC: 1.46 UIU/ML — SIGNIFICANT CHANGE UP (ref 0.27–4.2)

## 2024-03-05 RX ORDER — LEVOFLOXACIN 5 MG/ML
1 INJECTION, SOLUTION INTRAVENOUS
Qty: 2 | Refills: 0
Start: 2024-03-05 | End: 2024-03-06

## 2024-03-06 LAB
-  AMOXICILLIN/CLAVULANIC ACID: SIGNIFICANT CHANGE UP
-  AMOXICILLIN/CLAVULANIC ACID: SIGNIFICANT CHANGE UP
-  AMPICILLIN/SULBACTAM: SIGNIFICANT CHANGE UP
-  AMPICILLIN/SULBACTAM: SIGNIFICANT CHANGE UP
-  AMPICILLIN: SIGNIFICANT CHANGE UP
-  AMPICILLIN: SIGNIFICANT CHANGE UP
-  AZTREONAM: SIGNIFICANT CHANGE UP
-  AZTREONAM: SIGNIFICANT CHANGE UP
-  CEFAZOLIN: SIGNIFICANT CHANGE UP
-  CEFAZOLIN: SIGNIFICANT CHANGE UP
-  CEFEPIME: SIGNIFICANT CHANGE UP
-  CEFEPIME: SIGNIFICANT CHANGE UP
-  CEFOXITIN: SIGNIFICANT CHANGE UP
-  CEFOXITIN: SIGNIFICANT CHANGE UP
-  CEFTRIAXONE: SIGNIFICANT CHANGE UP
-  CEFTRIAXONE: SIGNIFICANT CHANGE UP
-  CEFUROXIME: SIGNIFICANT CHANGE UP
-  CIPROFLOXACIN: SIGNIFICANT CHANGE UP
-  CIPROFLOXACIN: SIGNIFICANT CHANGE UP
-  ERTAPENEM: SIGNIFICANT CHANGE UP
-  ERTAPENEM: SIGNIFICANT CHANGE UP
-  GENTAMICIN: SIGNIFICANT CHANGE UP
-  GENTAMICIN: SIGNIFICANT CHANGE UP
-  IMIPENEM: SIGNIFICANT CHANGE UP
-  IMIPENEM: SIGNIFICANT CHANGE UP
-  LEVOFLOXACIN: SIGNIFICANT CHANGE UP
-  LEVOFLOXACIN: SIGNIFICANT CHANGE UP
-  MEROPENEM: SIGNIFICANT CHANGE UP
-  MEROPENEM: SIGNIFICANT CHANGE UP
-  NITROFURANTOIN: SIGNIFICANT CHANGE UP
-  NITROFURANTOIN: SIGNIFICANT CHANGE UP
-  PIPERACILLIN/TAZOBACTAM: SIGNIFICANT CHANGE UP
-  PIPERACILLIN/TAZOBACTAM: SIGNIFICANT CHANGE UP
-  TOBRAMYCIN: SIGNIFICANT CHANGE UP
-  TOBRAMYCIN: SIGNIFICANT CHANGE UP
-  TRIMETHOPRIM/SULFAMETHOXAZOLE: SIGNIFICANT CHANGE UP
-  TRIMETHOPRIM/SULFAMETHOXAZOLE: SIGNIFICANT CHANGE UP
CULTURE RESULTS: ABNORMAL
METHOD TYPE: SIGNIFICANT CHANGE UP
METHOD TYPE: SIGNIFICANT CHANGE UP
ORGANISM # SPEC MICROSCOPIC CNT: ABNORMAL
SPECIMEN SOURCE: SIGNIFICANT CHANGE UP

## 2024-03-07 RX ORDER — LORATADINE 10 MG/1
1 TABLET ORAL
Refills: 0 | DISCHARGE

## 2024-03-07 RX ORDER — AMLODIPINE BESYLATE 2.5 MG/1
1 TABLET ORAL
Refills: 0 | DISCHARGE

## 2024-03-10 ENCOUNTER — TRANSCRIPTION ENCOUNTER (OUTPATIENT)
Age: 89
End: 2024-03-10

## 2024-03-12 ENCOUNTER — TRANSCRIPTION ENCOUNTER (OUTPATIENT)
Age: 89
End: 2024-03-12

## 2024-03-12 RX ORDER — PANTOPRAZOLE 40 MG/1
40 TABLET, DELAYED RELEASE ORAL
Qty: 90 | Refills: 5 | Status: ACTIVE | COMMUNITY
Start: 2022-07-01 | End: 1900-01-01

## 2024-03-15 ENCOUNTER — NON-APPOINTMENT (OUTPATIENT)
Age: 89
End: 2024-03-15

## 2024-04-10 ENCOUNTER — NON-APPOINTMENT (OUTPATIENT)
Age: 89
End: 2024-04-10

## 2024-04-11 RX ORDER — METOPROLOL TARTRATE 25 MG/1
25 TABLET, FILM COATED ORAL
Qty: 180 | Refills: 2 | Status: ACTIVE | COMMUNITY
Start: 2023-07-03 | End: 1900-01-01

## 2024-04-13 NOTE — ED ADULT NURSE REASSESSMENT NOTE - NS ED NURSE REASSESS COMMENT FT1
1500 Pt pending daughter to come and bring clothes for d.c [facility] Pt ambulates to ED co dizziness after a vertigo episode on Monday. Pt states she has PMHx of vertigo and since monday has had intermittent dizziness with a blank noise in her Left ear making it difficult to hear since Tuesday. Pt denies CP, SOB. NKDA< PMHX vertigo, AOx4, -BEFAST, STAT EKG in progress.

## 2024-05-31 ENCOUNTER — APPOINTMENT (OUTPATIENT)
Dept: CARDIOLOGY | Facility: CLINIC | Age: 89
End: 2024-05-31
Payer: MEDICARE

## 2024-05-31 ENCOUNTER — NON-APPOINTMENT (OUTPATIENT)
Age: 89
End: 2024-05-31

## 2024-05-31 VITALS
SYSTOLIC BLOOD PRESSURE: 120 MMHG | HEART RATE: 58 BPM | WEIGHT: 140 LBS | OXYGEN SATURATION: 99 % | BODY MASS INDEX: 24.03 KG/M2 | DIASTOLIC BLOOD PRESSURE: 68 MMHG

## 2024-05-31 DIAGNOSIS — Z86.39 PERSONAL HISTORY OF OTHER ENDOCRINE, NUTRITIONAL AND METABOLIC DISEASE: ICD-10-CM

## 2024-05-31 DIAGNOSIS — Z00.00 ENCOUNTER FOR GENERAL ADULT MEDICAL EXAMINATION W/OUT ABNORMAL FINDINGS: ICD-10-CM

## 2024-05-31 DIAGNOSIS — I10 ESSENTIAL (PRIMARY) HYPERTENSION: ICD-10-CM

## 2024-05-31 DIAGNOSIS — I35.0 NONRHEUMATIC AORTIC (VALVE) STENOSIS: ICD-10-CM

## 2024-05-31 DIAGNOSIS — I35.1 NONRHEUMATIC AORTIC (VALVE) INSUFFICIENCY: ICD-10-CM

## 2024-05-31 DIAGNOSIS — R53.83 OTHER FATIGUE: ICD-10-CM

## 2024-05-31 DIAGNOSIS — R06.02 SHORTNESS OF BREATH: ICD-10-CM

## 2024-05-31 PROCEDURE — 36415 COLL VENOUS BLD VENIPUNCTURE: CPT

## 2024-05-31 PROCEDURE — 93000 ELECTROCARDIOGRAM COMPLETE: CPT

## 2024-05-31 PROCEDURE — 99214 OFFICE O/P EST MOD 30 MIN: CPT

## 2024-05-31 PROCEDURE — G2211 COMPLEX E/M VISIT ADD ON: CPT | Mod: NC

## 2024-05-31 NOTE — REASON FOR VISIT
[FreeTextEntry1] : The patient is here today for follow-up of diabetes, hyperlipidemia, critical aortic stenosis, anemia and paroxysmal atrial fibrillation.  The patient has been having difficulties with right leg pain which limited her mobility.  Although the pain seems to have improved, the patient continues to be fairly inactive.  She does describe some exertional shortness of breath when she walks.  I recommended that she try to get into a regular progressive walking program to improve her stamina.  The patient does have history of critical aortic stenosis but I believe that her shortness of breath at this time is more from physical deconditioning.  If shortness of breath becomes a more active ongoing issue, might consider intervention on the aortic valve but because of the patient's age and debility, I am very resistant to considering this at this time.  She also describes intermittent difficulties with depression.  I asked her daughter to pay attention to those episodes to determine whether it is becoming more of an ongoing issue.  If so, might consider increasing patient's Effexor.

## 2024-05-31 NOTE — DISCUSSION/SUMMARY
[FreeTextEntry1] : Diabetes, hyperlipidemia-laboratory data drawn today Exertional shortness of breath-probably physical deconditioning but may be component of aortic stenosis.  Begin regular progressive walking program Critical aortic stenosis-continue to monitor Health maintenance-laboratory data drawn today.  Further recommendations thereafter Return visit 4 months   Total time of the encounter: 30 minutes which included but was not limited to the following: Face-to-face and non face-to-face time personally spent by the physician preparing to see the patient, obtaining and/or resuming separately obtained history, performing a medically appropriate examination and/or evaluation, counseling and educating the patient/family/caregiver, ordering medications, tests or procedures, referring and communicating with other healthcare professionals, documenting clinical information in the electronic health record, independently interpreting results and communicated results to the patient/family/caregiver and care coordination. [EKG obtained to assist in diagnosis and management of assessed problem(s)] : EKG obtained to assist in diagnosis and management of assessed problem(s)

## 2024-05-31 NOTE — PHYSICAL EXAM
[TextEntry] : General/Constitutional: WD/ WN in NAD H: NC/AT Eyes:  PERRL, sclerae and conjunctivae normal without jaundice or xanthelasma; ENMT:normal teeth, gums and palate with no petechiae, pallor or cyanosis Neck: w/o JVD, thyromegaly or adenopathy; normal venous contour Respiratory: clear to auscultation, normal respiratory effort with no retractions or use of accessory respiratory muscles Heart: [id number], regular rate, normal S1, S2 with 3/6 aortic stenosis murmur but no rubs, gallops, heaves or thrills Vascular exam: normal carotid upstrokes without carotid or abdominal bruits. 2+/2+ pulses to posterior tibialis and dorsalis pedis Abdomen: soft, nontender, bowels sounds normal without hepatomegaly or splenomegaly, masses or bruits Musculoskeletal:without significant kyphosis or scoliosis Extremities: w/o CCE, good capillary filling Skin: no stasis changes; no ulcers  Neuro: AA and O x 3; no focal neurologic deficits Psych: normal mood and affect

## 2024-06-03 LAB
25(OH)D3 SERPL-MCNC: 99.9 NG/ML
ALBUMIN SERPL ELPH-MCNC: 4.2 G/DL
ALP BLD-CCNC: 68 U/L
ALT SERPL-CCNC: 30 U/L
ANION GAP SERPL CALC-SCNC: 12 MMOL/L
AST SERPL-CCNC: 38 U/L
BILIRUB SERPL-MCNC: 0.3 MG/DL
BUN SERPL-MCNC: 33 MG/DL
CALCIUM SERPL-MCNC: 10.2 MG/DL
CHLORIDE SERPL-SCNC: 101 MMOL/L
CHOLEST SERPL-MCNC: 133 MG/DL
CO2 SERPL-SCNC: 27 MMOL/L
CREAT SERPL-MCNC: 1.6 MG/DL
EGFR: 30 ML/MIN/1.73M2
ESTIMATED AVERAGE GLUCOSE: 194 MG/DL
GLUCOSE SERPL-MCNC: 231 MG/DL
HBA1C MFR BLD HPLC: 8.4 %
HCT VFR BLD CALC: 40.9 %
HDLC SERPL-MCNC: 54 MG/DL
HGB BLD-MCNC: 12.7 G/DL
LDLC SERPL CALC-MCNC: 54 MG/DL
MCHC RBC-ENTMCNC: 30.7 PG
MCHC RBC-ENTMCNC: 31.1 GM/DL
MCV RBC AUTO: 98.8 FL
NONHDLC SERPL-MCNC: 79 MG/DL
PLATELET # BLD AUTO: 180 K/UL
POTASSIUM SERPL-SCNC: 4.3 MMOL/L
PROT SERPL-MCNC: 7 G/DL
RBC # BLD: 4.14 M/UL
RBC # FLD: 14.5 %
SODIUM SERPL-SCNC: 140 MMOL/L
T4 FREE SERPL-MCNC: 1.3 NG/DL
TRIGL SERPL-MCNC: 147 MG/DL
TSH SERPL-ACNC: 2.04 UIU/ML
WBC # FLD AUTO: 8.41 K/UL

## 2024-06-19 ENCOUNTER — TRANSCRIPTION ENCOUNTER (OUTPATIENT)
Age: 89
End: 2024-06-19

## 2024-06-19 ENCOUNTER — NON-APPOINTMENT (OUTPATIENT)
Age: 89
End: 2024-06-19

## 2024-06-20 RX ORDER — AMLODIPINE BESYLATE 2.5 MG/1
2.5 TABLET ORAL DAILY
Qty: 90 | Refills: 3 | Status: ACTIVE | COMMUNITY
Start: 2022-03-16 | End: 1900-01-01

## 2024-08-21 ENCOUNTER — NON-APPOINTMENT (OUTPATIENT)
Age: 89
End: 2024-08-21

## 2024-09-03 NOTE — ED PROVIDER NOTE - GASTROINTESTINAL, MLM
Cipher Alert Outreach Telephone Call Attempt      Name: DOMINIC STYLES     Question 1   Follow Up Help   Do you need help scheduling a follow-up appointment? If you would like help, please press 1; or press 2 if you would not like help scheduling a follow-up appointment.   Follow Up Help Needed (Issue Panel: [place] Clinical Intervention, Issue Alert: [place] Clinical Alert)     Question 2   Follow Up Transportation   Do you need transportation help to get to any of your healthcare appointments? Please press 1 for yes or press 2 for no.   Transportation Help (Issue Panel: [place] Clinical Intervention, Issue Alert: [place] Clinical Alert)    Call Status:  Attempt 1   Answered   Follow Up Appointment - Issues List:  Transportation   Other   Comments:  Patient is in need of assistance with transportation service to MD appointments. Patient agreed to the referral. CMA conference called Cardiology office to help patient schedule his PHF appointment.    Follow Up Appointment - Actions List:  Helped Schedule F/U Appointment    Abdomen soft, non-tender, no guarding.

## 2024-09-05 ENCOUNTER — NON-APPOINTMENT (OUTPATIENT)
Age: 89
End: 2024-09-05

## 2024-09-05 ENCOUNTER — APPOINTMENT (OUTPATIENT)
Dept: CARDIOLOGY | Facility: CLINIC | Age: 89
End: 2024-09-05
Payer: MEDICARE

## 2024-09-05 VITALS
SYSTOLIC BLOOD PRESSURE: 122 MMHG | HEART RATE: 63 BPM | HEIGHT: 64 IN | BODY MASS INDEX: 23.05 KG/M2 | WEIGHT: 135 LBS | DIASTOLIC BLOOD PRESSURE: 58 MMHG

## 2024-09-05 DIAGNOSIS — I35.0 NONRHEUMATIC AORTIC (VALVE) STENOSIS: ICD-10-CM

## 2024-09-05 DIAGNOSIS — R06.02 SHORTNESS OF BREATH: ICD-10-CM

## 2024-09-05 DIAGNOSIS — N28.9 DISORDER OF KIDNEY AND URETER, UNSPECIFIED: ICD-10-CM

## 2024-09-05 DIAGNOSIS — E11.9 TYPE 2 DIABETES MELLITUS W/OUT COMPLICATIONS: ICD-10-CM

## 2024-09-05 PROCEDURE — 99214 OFFICE O/P EST MOD 30 MIN: CPT

## 2024-09-05 PROCEDURE — G2211 COMPLEX E/M VISIT ADD ON: CPT

## 2024-09-05 PROCEDURE — 93000 ELECTROCARDIOGRAM COMPLETE: CPT

## 2024-09-05 NOTE — PHYSICAL EXAM
[TextEntry] : General/Constitutional: WD/ WN in NAD H: NC/AT Eyes:  PERRL, sclerae and conjunctivae normal without jaundice or xanthelasma; ENMT:normal teeth, gums and palate with no petechiae, pallor or cyanosis Neck: w/o JVD, thyromegaly or adenopathy; normal venous contour Respiratory: clear to auscultation, normal respiratory effort with no retractions or use of accessory respiratory muscles Heart: [id number], regular rate, normal S1, S2 with 3/6 aortic stenosis murmur but no rubs, gallops, heaves or thrills Vascular exam: normal carotid upstrokes without carotid or abdominal bruits. 2+/2+ pulses to posterior tibialis and dorsalis pedis Abdomen: soft, nontender, bowels sounds normal without hepatomegaly or splenomegaly, masses or bruits Musculoskeletal:without significant kyphosis or scoliosis Extremities: w/o CCE, good capillary filling Skin: no stasis changes; no ulcers  Neuro: AA and O x 3; no focal neurologic deficits Psych: normal mood and affect

## 2024-09-05 NOTE — REASON FOR VISIT
[FreeTextEntry1] : The patient is here today for follow-up of diabetes, hyperlipidemia, severe aortic stenosis, renal insufficiency and exertional shortness of breath.  The patient continues to be very inactive.  She walks around her house a little bit and does have some shortness of breath which she believes it is from physical deconditioning.  The patient spends a good part of the day doing jigsaw puzzles but then takes naps.  After lunch she takes another nap.  I strongly urged her to become more regularly active and may be alternate tricks for possible time with walking time.  The patient has chronic nephritis which also contributes to a general sense of fatigue.  Her last hemoglobin A1c was 8.4 and she does have renal insufficiency.  The patient had been on metformin in the past but it was stopped because of renal insufficiency.  Will recheck laboratory data at this time.  If hemoglobin A1c continues to be in the 8 range, will consider institution of Farxiga for renal protection and diabetes control.

## 2024-09-05 NOTE — DISCUSSION/SUMMARY
[FreeTextEntry1] : Diabetes, hyperlipidemia-laboratory data drawn today Exertional shortness of breath-probably physical deconditioning.  Begin regular progressive walking program Critical aortic stenosis-continue to monitor Health maintenance-laboratory data drawn today.  Further recommendations thereafter Return visit 4 months   Total time of the encounter: 30 minutes which included but was not limited to the following: Face-to-face and non face-to-face time personally spent by the physician preparing to see the patient, obtaining and/or resuming separately obtained history, performing a medically appropriate examination and/or evaluation, counseling and educating the patient/family/caregiver, ordering medications, tests or procedures, referring and communicating with other healthcare professionals, documenting clinical information in the electronic health record, independently interpreting results and communicated results to the patient/family/caregiver and care coordination. [EKG obtained to assist in diagnosis and management of assessed problem(s)] : EKG obtained to assist in diagnosis and management of assessed problem(s)

## 2024-09-08 LAB
25(OH)D3 SERPL-MCNC: 100 NG/ML
ALBUMIN SERPL ELPH-MCNC: 4 G/DL
ALP BLD-CCNC: 67 U/L
ALT SERPL-CCNC: 30 U/L
ANION GAP SERPL CALC-SCNC: 13 MMOL/L
AST SERPL-CCNC: 34 U/L
BILIRUB SERPL-MCNC: 0.2 MG/DL
BUN SERPL-MCNC: 40 MG/DL
CALCIUM SERPL-MCNC: 9.8 MG/DL
CHLORIDE SERPL-SCNC: 103 MMOL/L
CHOLEST SERPL-MCNC: 128 MG/DL
CO2 SERPL-SCNC: 23 MMOL/L
CREAT SERPL-MCNC: 1.73 MG/DL
EGFR: 27 ML/MIN/1.73M2
ESTIMATED AVERAGE GLUCOSE: 200 MG/DL
GLUCOSE SERPL-MCNC: 301 MG/DL
HBA1C MFR BLD HPLC: 8.6 %
HCT VFR BLD CALC: 39 %
HDLC SERPL-MCNC: 51 MG/DL
HGB BLD-MCNC: 12.2 G/DL
LDLC SERPL CALC-MCNC: 47 MG/DL
MCHC RBC-ENTMCNC: 31.3 GM/DL
MCHC RBC-ENTMCNC: 31.8 PG
MCV RBC AUTO: 101.6 FL
NONHDLC SERPL-MCNC: 78 MG/DL
PLATELET # BLD AUTO: 158 K/UL
POTASSIUM SERPL-SCNC: 4.3 MMOL/L
PROT SERPL-MCNC: 6.7 G/DL
RBC # BLD: 3.84 M/UL
RBC # FLD: 13.6 %
SODIUM SERPL-SCNC: 139 MMOL/L
T4 FREE SERPL-MCNC: 1.3 NG/DL
TRIGL SERPL-MCNC: 186 MG/DL
TSH SERPL-ACNC: 2.4 UIU/ML
WBC # FLD AUTO: 7.2 K/UL

## 2024-09-11 ENCOUNTER — RX RENEWAL (OUTPATIENT)
Age: 89
End: 2024-09-11

## 2024-11-29 ENCOUNTER — NON-APPOINTMENT (OUTPATIENT)
Age: 89
End: 2024-11-29

## 2024-12-02 ENCOUNTER — RX RENEWAL (OUTPATIENT)
Age: 88
End: 2024-12-02

## 2024-12-10 ENCOUNTER — INPATIENT (INPATIENT)
Facility: HOSPITAL | Age: 88
LOS: 2 days | Discharge: HOME | DRG: 948 | End: 2024-12-13
Attending: STUDENT IN AN ORGANIZED HEALTH CARE EDUCATION/TRAINING PROGRAM | Admitting: STUDENT IN AN ORGANIZED HEALTH CARE EDUCATION/TRAINING PROGRAM
Payer: MEDICARE

## 2024-12-10 VITALS
HEIGHT: 64 IN | HEART RATE: 79 BPM | TEMPERATURE: 97 F | WEIGHT: 139.99 LBS | RESPIRATION RATE: 19 BRPM | SYSTOLIC BLOOD PRESSURE: 111 MMHG | OXYGEN SATURATION: 96 % | DIASTOLIC BLOOD PRESSURE: 73 MMHG

## 2024-12-10 LAB
ALBUMIN SERPL ELPH-MCNC: 3.9 G/DL — SIGNIFICANT CHANGE UP (ref 3.3–5)
ALP SERPL-CCNC: 59 U/L — SIGNIFICANT CHANGE UP (ref 40–120)
ALT FLD-CCNC: 20 U/L — SIGNIFICANT CHANGE UP (ref 10–45)
ANION GAP SERPL CALC-SCNC: 16 MMOL/L — SIGNIFICANT CHANGE UP (ref 5–17)
APPEARANCE UR: CLEAR — SIGNIFICANT CHANGE UP
AST SERPL-CCNC: 25 U/L — SIGNIFICANT CHANGE UP (ref 10–40)
BACTERIA # UR AUTO: ABNORMAL /HPF
BASOPHILS # BLD AUTO: 0.04 K/UL — SIGNIFICANT CHANGE UP (ref 0–0.2)
BASOPHILS NFR BLD AUTO: 0.6 % — SIGNIFICANT CHANGE UP (ref 0–2)
BILIRUB SERPL-MCNC: 0.4 MG/DL — SIGNIFICANT CHANGE UP (ref 0.2–1.2)
BILIRUB UR-MCNC: NEGATIVE — SIGNIFICANT CHANGE UP
BUN SERPL-MCNC: 34 MG/DL — HIGH (ref 7–23)
CALCIUM SERPL-MCNC: 10 MG/DL — SIGNIFICANT CHANGE UP (ref 8.4–10.5)
CAST: 1 /LPF — SIGNIFICANT CHANGE UP (ref 0–4)
CHLORIDE SERPL-SCNC: 98 MMOL/L — SIGNIFICANT CHANGE UP (ref 96–108)
CO2 SERPL-SCNC: 24 MMOL/L — SIGNIFICANT CHANGE UP (ref 22–31)
COLOR SPEC: YELLOW — SIGNIFICANT CHANGE UP
CREAT SERPL-MCNC: 1.61 MG/DL — HIGH (ref 0.5–1.3)
DIFF PNL FLD: NEGATIVE — SIGNIFICANT CHANGE UP
EGFR: 29 ML/MIN/1.73M2 — LOW
EOSINOPHIL # BLD AUTO: 0.21 K/UL — SIGNIFICANT CHANGE UP (ref 0–0.5)
EOSINOPHIL NFR BLD AUTO: 2.9 % — SIGNIFICANT CHANGE UP (ref 0–6)
GAS PNL BLDV: SIGNIFICANT CHANGE UP
GLUCOSE SERPL-MCNC: 233 MG/DL — HIGH (ref 70–99)
GLUCOSE UR QL: NEGATIVE MG/DL — SIGNIFICANT CHANGE UP
HCT VFR BLD CALC: 41.1 % — SIGNIFICANT CHANGE UP (ref 34.5–45)
HGB BLD-MCNC: 13.1 G/DL — SIGNIFICANT CHANGE UP (ref 11.5–15.5)
IMM GRANULOCYTES NFR BLD AUTO: 0.7 % — SIGNIFICANT CHANGE UP (ref 0–0.9)
KETONES UR-MCNC: NEGATIVE MG/DL — SIGNIFICANT CHANGE UP
LEUKOCYTE ESTERASE UR-ACNC: ABNORMAL
LYMPHOCYTES # BLD AUTO: 1.68 K/UL — SIGNIFICANT CHANGE UP (ref 1–3.3)
LYMPHOCYTES # BLD AUTO: 23.2 % — SIGNIFICANT CHANGE UP (ref 13–44)
MAGNESIUM SERPL-MCNC: 2 MG/DL — SIGNIFICANT CHANGE UP (ref 1.6–2.6)
MCHC RBC-ENTMCNC: 31.6 PG — SIGNIFICANT CHANGE UP (ref 27–34)
MCHC RBC-ENTMCNC: 31.9 G/DL — LOW (ref 32–36)
MCV RBC AUTO: 99.3 FL — SIGNIFICANT CHANGE UP (ref 80–100)
MONOCYTES # BLD AUTO: 0.71 K/UL — SIGNIFICANT CHANGE UP (ref 0–0.9)
MONOCYTES NFR BLD AUTO: 9.8 % — SIGNIFICANT CHANGE UP (ref 2–14)
NEUTROPHILS # BLD AUTO: 4.56 K/UL — SIGNIFICANT CHANGE UP (ref 1.8–7.4)
NEUTROPHILS NFR BLD AUTO: 62.8 % — SIGNIFICANT CHANGE UP (ref 43–77)
NITRITE UR-MCNC: POSITIVE
NRBC # BLD: 0 /100 WBCS — SIGNIFICANT CHANGE UP (ref 0–0)
PH UR: 6 — SIGNIFICANT CHANGE UP (ref 5–8)
PLATELET # BLD AUTO: 158 K/UL — SIGNIFICANT CHANGE UP (ref 150–400)
POTASSIUM SERPL-MCNC: 4.2 MMOL/L — SIGNIFICANT CHANGE UP (ref 3.5–5.3)
POTASSIUM SERPL-SCNC: 4.2 MMOL/L — SIGNIFICANT CHANGE UP (ref 3.5–5.3)
PROT SERPL-MCNC: 7 G/DL — SIGNIFICANT CHANGE UP (ref 6–8.3)
PROT UR-MCNC: NEGATIVE MG/DL — SIGNIFICANT CHANGE UP
RBC # BLD: 4.14 M/UL — SIGNIFICANT CHANGE UP (ref 3.8–5.2)
RBC # FLD: 13.2 % — SIGNIFICANT CHANGE UP (ref 10.3–14.5)
RBC CASTS # UR COMP ASSIST: 0 /HPF — SIGNIFICANT CHANGE UP (ref 0–4)
SODIUM SERPL-SCNC: 138 MMOL/L — SIGNIFICANT CHANGE UP (ref 135–145)
SP GR SPEC: 1.01 — SIGNIFICANT CHANGE UP (ref 1–1.03)
SQUAMOUS # UR AUTO: 4 /HPF — SIGNIFICANT CHANGE UP (ref 0–5)
UROBILINOGEN FLD QL: 0.2 MG/DL — SIGNIFICANT CHANGE UP (ref 0.2–1)
WBC # BLD: 7.25 K/UL — SIGNIFICANT CHANGE UP (ref 3.8–10.5)
WBC # FLD AUTO: 7.25 K/UL — SIGNIFICANT CHANGE UP (ref 3.8–10.5)
WBC UR QL: 16 /HPF — HIGH (ref 0–5)

## 2024-12-10 PROCEDURE — 99285 EMERGENCY DEPT VISIT HI MDM: CPT | Mod: GC

## 2024-12-10 RX ORDER — PIPERACILLIN SODIUM AND TAZOBACTAM SODIUM 4; .5 G/20ML; G/20ML
3.38 INJECTION, POWDER, LYOPHILIZED, FOR SOLUTION INTRAVENOUS ONCE
Refills: 0 | Status: COMPLETED | OUTPATIENT
Start: 2024-12-10 | End: 2024-12-10

## 2024-12-10 RX ADMIN — PIPERACILLIN SODIUM AND TAZOBACTAM SODIUM 200 GRAM(S): 4; .5 INJECTION, POWDER, LYOPHILIZED, FOR SOLUTION INTRAVENOUS at 23:36

## 2024-12-10 NOTE — ED PROVIDER NOTE - CLINICAL SUMMARY MEDICAL DECISION MAKING FREE TEXT BOX
89F w/ PMHx of HTN, HLD, A-fib (not on AC), hypothyroidism, CKD 3b, chronic anemia, heart murmur, breast cancer 30 years ago presenting c/o generalized weakness, fatigue, and slightly increased confusion x3 days.  S/p 3 day course of augmentin 11/29-12/3 for UTI, sx resolved for several days however recurred 3 days ago. Pt daughter at bedside states that she has hx of UTIs not fully treated on PO abx which have then led to sepsis. No recent falls, fevers, chills, SOB, chest pain, palpitations, NVD, abd pain, dysuria, urinary frequency. Pt afebrile orally but will obtain rectal temp, hemodynamically stable, abd nontender, grossly neuro in tact. DDx including but not limited to UTI, viral syndrome, electrolyte imbalance. Will order labs including CBC, CMP, mag. EKG. UA/UCx. Dispo pending workup. 89F w/ PMHx of HTN, DM, HLD, A-fib (not on AC), hypothyroidism, CKD 3b, chronic anemia, heart murmur, breast cancer 30 years ago presenting c/o generalized weakness, fatigue, and slightly increased confusion x3 days.  S/p 3 day course of augmentin 11/29-12/3 for UTI, sx resolved for several days however recurred 3 days ago. Pt daughter at bedside states that she has hx of UTIs not fully treated on PO abx which have then led to sepsis. No recent falls, fevers, chills, SOB, chest pain, palpitations, NVD, abd pain, dysuria, urinary frequency. Pt afebrile orally but will obtain rectal temp, hemodynamically stable, abd nontender, grossly neuro in tact. DDx including but not limited to UTI, viral syndrome, electrolyte imbalance. Will order labs including CBC, CMP, mag. EKG. UA/UCx. Dispo pending workup.

## 2024-12-10 NOTE — ED PROVIDER NOTE - ATTENDING CONTRIBUTION TO CARE
89 year old w/ PMH of HTN, DM, HLD, A-fib (not on AC), hypothyroidism, CKD 3b, chronic anemia, presents to ED with generalized weakness and increased confusion/AMS for 3 days. She states that she walks with walker at baseline, has been able to ambulate but feels exhausted even with few steps. Pt had similar sx, was dx with UTI. s/p 3 day course of augmentin 11/29-12/3. Sx resolved for several days however recurred 3 days ago. No recent falls, fevers, chills, SOB, chest pain, palpitations, NVD, abd pain, dysuria, urinary frequency.    Physical Exam:  Gen: NAD, awake and alert  HEENT: Atraumatic, oropharynx clear, dry mucous membranes, normal conjunctiva  Cardio: RRR, no murmurs, rubs or gallops  Lung: CTAB, no respiratory distress, no wheezes/rhonchi/rales B/L  Abd: soft, NT, ND, no guarding, no rigidity, no rebound tenderness  MSK: no visible deformities, ROMx4   Neuro: No focal sensory or motor deficits. CN 2-12 grossly intact  Skin: Warm, well perfused, no rash, no leg swelling     patient presents with AMS, weakness. considering UTI, electrolyte abnormality, ELISA, dehydration.  CBC, CMP, UA. afebrile, vitals WNL - does not meet SIRS criteria  Will reassess the need for additional interventions as clinically warranted. Refer to any progress notes for updates on clinical course and as a continuation of this MDM.

## 2024-12-10 NOTE — ED ADULT NURSE NOTE - NSFALLHARMRISKINTERV_ED_ALL_ED

## 2024-12-10 NOTE — ED PROVIDER NOTE - PHYSICAL EXAMINATION
Gen: NAD  Head: NCAT  HEENT: BL erythematous palpebral conjunctiva, oral mucosa moist  Lung: no respiratory distress, speaking in full sentences, CTA b/l     CV: regular rate and rhythm, systolic murmur  Abd: soft, non distended, non tender   MSK: contracted fingers at baseline 2/2 tendon injury  Neuro:  AAOx3, no focal deficits  Skin: Scattered ecchymosis

## 2024-12-10 NOTE — ED PROVIDER NOTE - OBJECTIVE STATEMENT
89F w/ PMHx of HTN, HLD, A-fib (not on AC), hypothyroidism, CKD 3b, chronic anemia, heart murmur, breast cancer 30 years ago presenting c/o generalized weakness, fatigue, and slightly increased confusion since Sunday.  States that she walks with walker at baseline, has been able to ambulate but feels exhausted even with few steps. Pt had similar sx, was dx with UTI. s/p 3 day course of augmentin 11/29-12/3. Sx resolved for several days however recurred 3 days ago. Pt daughter at bedside states that she has hx of UTIs not fully treated on PO abx which have then led to sepsis. No recent falls, fevers, chills, SOB, chest pain, palpitations, NVD, abd pain, dysuria, urinary frequency. 89F w/ PMHx of HTN, DM, HLD, A-fib (not on AC), hypothyroidism, CKD 3b, chronic anemia, heart murmur, breast cancer 30 years ago presenting c/o generalized weakness, fatigue, and slightly increased confusion since Sunday.  States that she walks with walker at baseline, has been able to ambulate but feels exhausted even with few steps. Pt had similar sx, was dx with UTI. s/p 3 day course of augmentin 11/29-12/3. Sx resolved for several days however recurred 3 days ago. Pt daughter at bedside states that she has hx of UTIs not fully treated on PO abx which have then led to sepsis. No recent falls, fevers, chills, SOB, chest pain, palpitations, NVD, abd pain, dysuria, urinary frequency.

## 2024-12-10 NOTE — ED ADULT NURSE NOTE - OBJECTIVE STATEMENT
89 y/o F, pmh HTN, afib, GIB, presents to the ED from home brought in by family for mild confusion at home and urinary frequency. pt family at bed side states pt was dx with UTI on 11/29, and finished course of abx. states since Sunday, pt is disoriented and forgetful. reports aide at home changing pt diaper more frequently. pt family denies cp, sob, NVD, bloody stool, bloody urine. pt is A&Ox3 forgetful, speech is clear, follows commands. abd soft, non distended, non tender.

## 2024-12-10 NOTE — ED PROVIDER NOTE - PROGRESS NOTE DETAILS
Livier Escobar D.O.  EM PGY-1: Urine + for UTI. Will order zosyn and admit given worsening weakness and confusion. Patient and daughter at bedside aware and agree with plan.

## 2024-12-11 ENCOUNTER — RESULT REVIEW (OUTPATIENT)
Age: 88
End: 2024-12-11

## 2024-12-11 DIAGNOSIS — N30.90 CYSTITIS, UNSPECIFIED WITHOUT HEMATURIA: ICD-10-CM

## 2024-12-11 DIAGNOSIS — E11.9 TYPE 2 DIABETES MELLITUS WITHOUT COMPLICATIONS: ICD-10-CM

## 2024-12-11 DIAGNOSIS — R63.4 ABNORMAL WEIGHT LOSS: ICD-10-CM

## 2024-12-11 DIAGNOSIS — R41.0 DISORIENTATION, UNSPECIFIED: ICD-10-CM

## 2024-12-11 DIAGNOSIS — E03.9 HYPOTHYROIDISM, UNSPECIFIED: ICD-10-CM

## 2024-12-11 DIAGNOSIS — Z75.8 OTHER PROBLEMS RELATED TO MEDICAL FACILITIES AND OTHER HEALTH CARE: ICD-10-CM

## 2024-12-11 DIAGNOSIS — Z29.9 ENCOUNTER FOR PROPHYLACTIC MEASURES, UNSPECIFIED: ICD-10-CM

## 2024-12-11 DIAGNOSIS — I10 ESSENTIAL (PRIMARY) HYPERTENSION: ICD-10-CM

## 2024-12-11 DIAGNOSIS — R53.1 WEAKNESS: ICD-10-CM

## 2024-12-11 DIAGNOSIS — N18.30 CHRONIC KIDNEY DISEASE, STAGE 3 UNSPECIFIED: ICD-10-CM

## 2024-12-11 DIAGNOSIS — I35.0 NONRHEUMATIC AORTIC (VALVE) STENOSIS: ICD-10-CM

## 2024-12-11 LAB
ANION GAP SERPL CALC-SCNC: 16 MMOL/L — SIGNIFICANT CHANGE UP (ref 5–17)
BASOPHILS # BLD AUTO: 0.04 K/UL — SIGNIFICANT CHANGE UP (ref 0–0.2)
BASOPHILS NFR BLD AUTO: 0.5 % — SIGNIFICANT CHANGE UP (ref 0–2)
BUN SERPL-MCNC: 32 MG/DL — HIGH (ref 7–23)
CALCIUM SERPL-MCNC: 10.4 MG/DL — SIGNIFICANT CHANGE UP (ref 8.4–10.5)
CHLORIDE SERPL-SCNC: 98 MMOL/L — SIGNIFICANT CHANGE UP (ref 96–108)
CO2 SERPL-SCNC: 24 MMOL/L — SIGNIFICANT CHANGE UP (ref 22–31)
CREAT SERPL-MCNC: 1.54 MG/DL — HIGH (ref 0.5–1.3)
EGFR: 31 ML/MIN/1.73M2 — LOW
EOSINOPHIL # BLD AUTO: 0.1 K/UL — SIGNIFICANT CHANGE UP (ref 0–0.5)
EOSINOPHIL NFR BLD AUTO: 1.2 % — SIGNIFICANT CHANGE UP (ref 0–6)
FLUAV AG NPH QL: SIGNIFICANT CHANGE UP
FLUBV AG NPH QL: SIGNIFICANT CHANGE UP
GLUCOSE BLDC GLUCOMTR-MCNC: 136 MG/DL — HIGH (ref 70–99)
GLUCOSE BLDC GLUCOMTR-MCNC: 151 MG/DL — HIGH (ref 70–99)
GLUCOSE BLDC GLUCOMTR-MCNC: 175 MG/DL — HIGH (ref 70–99)
GLUCOSE BLDC GLUCOMTR-MCNC: 232 MG/DL — HIGH (ref 70–99)
GLUCOSE SERPL-MCNC: 158 MG/DL — HIGH (ref 70–99)
HCT VFR BLD CALC: 42.1 % — SIGNIFICANT CHANGE UP (ref 34.5–45)
HGB BLD-MCNC: 13.3 G/DL — SIGNIFICANT CHANGE UP (ref 11.5–15.5)
IMM GRANULOCYTES NFR BLD AUTO: 0.5 % — SIGNIFICANT CHANGE UP (ref 0–0.9)
LYMPHOCYTES # BLD AUTO: 1.42 K/UL — SIGNIFICANT CHANGE UP (ref 1–3.3)
LYMPHOCYTES # BLD AUTO: 16.9 % — SIGNIFICANT CHANGE UP (ref 13–44)
MCHC RBC-ENTMCNC: 31.2 PG — SIGNIFICANT CHANGE UP (ref 27–34)
MCHC RBC-ENTMCNC: 31.6 G/DL — LOW (ref 32–36)
MCV RBC AUTO: 98.8 FL — SIGNIFICANT CHANGE UP (ref 80–100)
MONOCYTES # BLD AUTO: 0.69 K/UL — SIGNIFICANT CHANGE UP (ref 0–0.9)
MONOCYTES NFR BLD AUTO: 8.2 % — SIGNIFICANT CHANGE UP (ref 2–14)
NEUTROPHILS # BLD AUTO: 6.1 K/UL — SIGNIFICANT CHANGE UP (ref 1.8–7.4)
NEUTROPHILS NFR BLD AUTO: 72.7 % — SIGNIFICANT CHANGE UP (ref 43–77)
NRBC # BLD: 0 /100 WBCS — SIGNIFICANT CHANGE UP (ref 0–0)
PLATELET # BLD AUTO: 165 K/UL — SIGNIFICANT CHANGE UP (ref 150–400)
POTASSIUM SERPL-MCNC: 3.6 MMOL/L — SIGNIFICANT CHANGE UP (ref 3.5–5.3)
POTASSIUM SERPL-SCNC: 3.6 MMOL/L — SIGNIFICANT CHANGE UP (ref 3.5–5.3)
RBC # BLD: 4.26 M/UL — SIGNIFICANT CHANGE UP (ref 3.8–5.2)
RBC # FLD: 13.2 % — SIGNIFICANT CHANGE UP (ref 10.3–14.5)
RSV RNA NPH QL NAA+NON-PROBE: SIGNIFICANT CHANGE UP
SARS-COV-2 RNA SPEC QL NAA+PROBE: SIGNIFICANT CHANGE UP
SODIUM SERPL-SCNC: 138 MMOL/L — SIGNIFICANT CHANGE UP (ref 135–145)
TSH SERPL-MCNC: 3.21 UIU/ML — SIGNIFICANT CHANGE UP (ref 0.27–4.2)
WBC # BLD: 8.39 K/UL — SIGNIFICANT CHANGE UP (ref 3.8–10.5)
WBC # FLD AUTO: 8.39 K/UL — SIGNIFICANT CHANGE UP (ref 3.8–10.5)

## 2024-12-11 PROCEDURE — 99497 ADVNCD CARE PLAN 30 MIN: CPT | Mod: 25

## 2024-12-11 PROCEDURE — 71045 X-RAY EXAM CHEST 1 VIEW: CPT | Mod: 26

## 2024-12-11 PROCEDURE — 99223 1ST HOSP IP/OBS HIGH 75: CPT

## 2024-12-11 PROCEDURE — 93306 TTE W/DOPPLER COMPLETE: CPT | Mod: 26

## 2024-12-11 PROCEDURE — 76770 US EXAM ABDO BACK WALL COMP: CPT | Mod: 26

## 2024-12-11 RX ORDER — POVIDONE, POLYVINYL ALCOHOL 20; 27 G/1000ML; G/1000ML
1 SOLUTION OPHTHALMIC
Refills: 0 | Status: DISCONTINUED | OUTPATIENT
Start: 2024-12-11 | End: 2024-12-13

## 2024-12-11 RX ORDER — FUROSEMIDE 40 MG/1
40 TABLET ORAL DAILY
Refills: 0 | Status: DISCONTINUED | OUTPATIENT
Start: 2024-12-12 | End: 2024-12-12

## 2024-12-11 RX ORDER — VENLAFAXINE HYDROCHLORIDE 75 MG/1
75 CAPSULE, EXTENDED RELEASE ORAL DAILY
Refills: 0 | Status: DISCONTINUED | OUTPATIENT
Start: 2024-12-11 | End: 2024-12-13

## 2024-12-11 RX ORDER — METOPROLOL TARTRATE 100 MG/1
25 TABLET, FILM COATED ORAL
Refills: 0 | Status: DISCONTINUED | OUTPATIENT
Start: 2024-12-11 | End: 2024-12-13

## 2024-12-11 RX ORDER — AMLODIPINE BESYLATE 10 MG/1
2.5 TABLET ORAL DAILY
Refills: 0 | Status: DISCONTINUED | OUTPATIENT
Start: 2024-12-11 | End: 2024-12-13

## 2024-12-11 RX ORDER — PANTOPRAZOLE SODIUM 40 MG/1
40 TABLET, DELAYED RELEASE ORAL
Refills: 0 | Status: DISCONTINUED | OUTPATIENT
Start: 2024-12-11 | End: 2024-12-13

## 2024-12-11 RX ORDER — ROSUVASTATIN CALCIUM 5 MG/1
10 TABLET, FILM COATED ORAL AT BEDTIME
Refills: 0 | Status: DISCONTINUED | OUTPATIENT
Start: 2024-12-11 | End: 2024-12-13

## 2024-12-11 RX ORDER — ACETAMINOPHEN 500MG 500 MG/1
650 TABLET, COATED ORAL EVERY 6 HOURS
Refills: 0 | Status: DISCONTINUED | OUTPATIENT
Start: 2024-12-11 | End: 2024-12-13

## 2024-12-11 RX ORDER — 0.9 % SODIUM CHLORIDE 0.9 %
1000 INTRAVENOUS SOLUTION INTRAVENOUS
Refills: 0 | Status: DISCONTINUED | OUTPATIENT
Start: 2024-12-11 | End: 2024-12-13

## 2024-12-11 RX ORDER — CYANOCOBALAMIN/FOLIC AC/VIT B6 1-2.2-25MG
1 TABLET ORAL DAILY
Refills: 0 | Status: DISCONTINUED | OUTPATIENT
Start: 2024-12-11 | End: 2024-12-13

## 2024-12-11 RX ORDER — PIPERACILLIN SODIUM AND TAZOBACTAM SODIUM 4; .5 G/20ML; G/20ML
3.38 INJECTION, POWDER, LYOPHILIZED, FOR SOLUTION INTRAVENOUS EVERY 12 HOURS
Refills: 0 | Status: DISCONTINUED | OUTPATIENT
Start: 2024-12-11 | End: 2024-12-12

## 2024-12-11 RX ORDER — LEVOTHYROXINE SODIUM 150 MCG
25 TABLET ORAL DAILY
Refills: 0 | Status: DISCONTINUED | OUTPATIENT
Start: 2024-12-11 | End: 2024-12-13

## 2024-12-11 RX ORDER — GLUCAGON INJECTION, SOLUTION 0.5 MG/.1ML
1 INJECTION, SOLUTION SUBCUTANEOUS ONCE
Refills: 0 | Status: DISCONTINUED | OUTPATIENT
Start: 2024-12-11 | End: 2024-12-13

## 2024-12-11 RX ADMIN — METOPROLOL TARTRATE 25 MILLIGRAM(S): 100 TABLET, FILM COATED ORAL at 17:17

## 2024-12-11 RX ADMIN — PIPERACILLIN SODIUM AND TAZOBACTAM SODIUM 25 GRAM(S): 4; .5 INJECTION, POWDER, LYOPHILIZED, FOR SOLUTION INTRAVENOUS at 17:17

## 2024-12-11 RX ADMIN — Medication 2: at 12:20

## 2024-12-11 RX ADMIN — VENLAFAXINE HYDROCHLORIDE 75 MILLIGRAM(S): 75 CAPSULE, EXTENDED RELEASE ORAL at 18:23

## 2024-12-11 RX ADMIN — PIPERACILLIN SODIUM AND TAZOBACTAM SODIUM 25 GRAM(S): 4; .5 INJECTION, POWDER, LYOPHILIZED, FOR SOLUTION INTRAVENOUS at 06:53

## 2024-12-11 RX ADMIN — Medication 1: at 08:20

## 2024-12-11 RX ADMIN — POVIDONE, POLYVINYL ALCOHOL 1 DROP(S): 20; 27 SOLUTION OPHTHALMIC at 18:23

## 2024-12-11 RX ADMIN — ROSUVASTATIN CALCIUM 10 MILLIGRAM(S): 5 TABLET, FILM COATED ORAL at 21:47

## 2024-12-11 RX ADMIN — Medication 25 MICROGRAM(S): at 06:53

## 2024-12-11 RX ADMIN — Medication 1 TABLET(S): at 12:20

## 2024-12-11 RX ADMIN — AMLODIPINE BESYLATE 2.5 MILLIGRAM(S): 10 TABLET ORAL at 06:52

## 2024-12-11 RX ADMIN — PANTOPRAZOLE SODIUM 40 MILLIGRAM(S): 40 TABLET, DELAYED RELEASE ORAL at 06:53

## 2024-12-11 RX ADMIN — Medication 1: at 17:17

## 2024-12-11 NOTE — H&P ADULT - MENTAL STATUS
Alert to self/hospital.   Very [place].   Speech fluent.   Limited cognitive assessment with patient deferring to daughter in attendance during interview.

## 2024-12-11 NOTE — H&P ADULT - NSICDXPASTMEDICALHX_GEN_ALL_CORE_FT
PAST MEDICAL HISTORY:  2019 novel coronavirus disease (COVID-19)     Arthritis     Breast cancer     Diverticulitis     GERD (gastroesophageal reflux disease)     Glaucoma     HTN (hypertension)     Hyperlipidemia     Lower gastrointestinal bleed     Renal calculus or stone     Severe aortic stenosis     Urinary incontinence

## 2024-12-11 NOTE — H&P ADULT - NSHPREVIEWOFSYSTEMS_GEN_ALL_CORE
ROS from daughter in attendance with patient deferring to daughter.    NO HA< no focal weakness.  NO chest pain/pressure.   NO palpitations.  NO cough, no dyspnoea.   Poor exercise tolerance.  NO breast symptoms.  NO abdominal pain, no red blood per rectum or melena.    NO back pain, no tearing back pain.  NO vaginal bleeding.  NO SI/HI>  NO thyroid symptoms.    + weight loss and anorexia.
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
## 2024-12-11 NOTE — GOALS OF CARE CONVERSATION - ADVANCED CARE PLANNING - CONVERSATION DETAILS
Patient is a 89 y/o F with a history of essential HTN, type 2 DM no longer on Rx, hypothyroidism, CKD3b, severe AS not deemed an operative candidate, PAF previously on full AC, complicated past lower GI bleed with multiple blood unit transfusion requirements in 2022, COVID-19 in 2022, with recurrent cystitis and presentation with confusional state.    The Daughter/Health Care Surrogate wishes to maintain FULL Cardiopulmonary support and wishes no limitation in medical interventions.    I have spent a total of 16 minutes reviewing Advance Care planning with the patient's Daughter.

## 2024-12-11 NOTE — CONSULT NOTE ADULT - SUBJECTIVE AND OBJECTIVE BOX
Patient is a 89y old  Female who presents with a chief complaint of Generalized weakness, fatigue and confusional state since Sunday, on Augmentin for cystitis (11 Dec 2024 17:19)      HPI:  NIGHT HOSPITALIST:   Patient UNKNOWN to me previously, assigned to me at this point via the ER to admit this 89 yo F--followed by her office physicians above--daughter in attendance with patient Hard of Hearing and patient deferring to daughter during interview--patient with a history of essential HTN, type 2 DM not currently on Rx, hypothyroidism, CKD3b, apparent moderate to severe AS from echo from 2022, PAF previously on full AC (shared decision making process by patient/daughter and Cardiologist to be off full AC following complicated GI bleed), breast CA S/P tylectomy 30 years ago presumed to be in remission, with last discharge from [facility] in Mar 2024 for a one day admission for UTI, with transient confusional state at the time, with resolution of delirium and discharge following a one day admission, July 2022 hospitalization with a prolonged course with red blood per rectum with apparent 10 unit total transfusion--daughter declined angiogram due to patient's CKD3b--with EGD with gastritis and S/P cautery of 2 bleeding angiodysplasias, noted to be COVID-19 + at the time (patient with COVID-19 vaccines x 4 -last booster jab in 2022), discharge May 2022 for CHF (patient was still on apixaban at the time), discharge Mar 2022 for delirium following a UTI, history of depression maintained on Effexor, with daughter reports patient receiving Augmentin 3 days for apparent cystitis from an urgent care centre but with patient with progressive generalized weakness, fatigue, and daughter notes increased confusional state since this past Sunday.    Patient is alert to self/hospital but is Hard of Hearing and defers interview to daughter in attendance.   NO fever, no chills, no rigors.   NO abdominal pain, no red blood per rectum or melena.   NO HA, no focal weakness.   NO chest pain/pressure.   Poor ADL.   NO back pain, no tearing back pain.   + anorexia and unspecified weight loss. (11 Dec 2024 01:26)      PAST MEDICAL & SURGICAL HISTORY:  HTN (hypertension)      Diverticulitis      Hyperlipidemia      GERD (gastroesophageal reflux disease)      Glaucoma      Breast cancer      Urinary incontinence      Renal calculus or stone      Arthritis      Severe aortic stenosis      Lower gastrointestinal bleed      2019 novel coronavirus disease (COVID-19)      Renal calculi  s/p stone extraction 57 yrs ago      S/P lumpectomy of breast  right breast with node removal      S/P bladder repair  Interstim device placement 2010          MEDICATIONS  (STANDING):  amLODIPine   Tablet 2.5 milliGRAM(s) Oral daily  artificial tears (preservative free) Ophthalmic Solution 1 Drop(s) Both EYES four times a day  dextrose 5%. 1000 milliLiter(s) (50 mL/Hr) IV Continuous <Continuous>  dextrose 5%. 1000 milliLiter(s) (100 mL/Hr) IV Continuous <Continuous>  dextrose 50% Injectable 25 Gram(s) IV Push once  dextrose 50% Injectable 12.5 Gram(s) IV Push once  dextrose 50% Injectable 25 Gram(s) IV Push once  glucagon  Injectable 1 milliGRAM(s) IntraMuscular once  insulin lispro (ADMELOG) corrective regimen sliding scale   SubCutaneous three times a day before meals  insulin lispro (ADMELOG) corrective regimen sliding scale   SubCutaneous at bedtime  levothyroxine 25 MICROGram(s) Oral daily  metoprolol tartrate 25 milliGRAM(s) Oral two times a day  Nephro-iain 1 Tablet(s) Oral daily  pantoprazole    Tablet 40 milliGRAM(s) Oral before breakfast  piperacillin/tazobactam IVPB.. 3.375 Gram(s) IV Intermittent every 12 hours  rosuvastatin 10 milliGRAM(s) Oral at bedtime  venlafaxine XR. 75 milliGRAM(s) Oral daily    MEDICATIONS  (PRN):  acetaminophen     Tablet .. 650 milliGRAM(s) Oral every 6 hours PRN Temp greater or equal to 38C (100.4F), Mild Pain (1 - 3)  dextrose Oral Gel 15 Gram(s) Oral once PRN Blood Glucose LESS THAN 70 milliGRAM(s)/deciliter      Allergies    Keflex (Rash; Hives)    Intolerances        VITALS:    Vital Signs Last 24 Hrs  T(C): 36.7 (11 Dec 2024 20:08), Max: 37.4 (10 Dec 2024 21:50)  T(F): 98.1 (11 Dec 2024 20:08), Max: 99.3 (10 Dec 2024 21:50)  HR: 71 (11 Dec 2024 20:08) (56 - 85)  BP: 127/73 (11 Dec 2024 20:08) (121/78 - 166/80)  BP(mean): --  RR: 18 (11 Dec 2024 20:08) (17 - 20)  SpO2: 95% (11 Dec 2024 20:08) (95% - 99%)    Parameters below as of 11 Dec 2024 20:08  Patient On (Oxygen Delivery Method): room air        LABS:                          13.3   8.39  )-----------( 165      ( 11 Dec 2024 07:11 )             42.1       12-11    138  |  98  |  32[H]  ----------------------------<  158[H]  3.6   |  24  |  1.54[H]    Ca    10.4      11 Dec 2024 07:11  Mg     2.0     12-10    TPro  7.0  /  Alb  3.9  /  TBili  0.4  /  DBili  x   /  AST  25  /  ALT  20  /  AlkPhos  59  12-10      CAPILLARY BLOOD GLUCOSE      POCT Blood Glucose.: 136 mg/dL (11 Dec 2024 21:29)  POCT Blood Glucose.: 151 mg/dL (11 Dec 2024 16:23)  POCT Blood Glucose.: 232 mg/dL (11 Dec 2024 11:25)  POCT Blood Glucose.: 175 mg/dL (11 Dec 2024 07:28)          LOWER EXTREMITY PHYSICAL EXAM:    Vascular: DP/PT 0/4, B/L, CFT <3 seconds B/L, Temperature gradient WNL, B/L.   Neuro: unable to assess  Skin: right foot dorsal lateral ulcer to subQ with no acute signs of infection noted, toenails thickened elongated dystrophic with subungual debris x10

## 2024-12-11 NOTE — CONSULT NOTE ADULT - ASSESSMENT
Patient seen and evaluated   - right foot wound with no acute signs of infection noted  - rec daily dressing changes with mupirocin and allevyn foam   - toenails debrided x10 using sterile nippers  - all offending ingrowing nail boarders excised   - recommend z flow boots at all times while in bed  - follow up as outpatient for routine care  - podiatry signing off at this time, reconsult as needed

## 2024-12-11 NOTE — H&P ADULT - PROBLEM SELECTOR PLAN 2
CKD3b stable, but will temporarily HOLD the Lasix due to the fixed obstruction with patient's AS with patient not in clinical pulmonary oedema.   Chest radiograph ordered.   Would consider formal Renal evaluation, Dr. Escobar, in the AM.

## 2024-12-11 NOTE — H&P ADULT - PROBLEM SELECTOR PLAN 8
Suspect confusion from patient's cystitis, but will temporarily HOLD the Effexor for BEERS risk and will defer resumption to the Daytime Provider in the AM.  Will obtain CTT head no contrast.

## 2024-12-11 NOTE — PHYSICAL THERAPY INITIAL EVALUATION ADULT - ADDITIONAL COMMENTS
Son at bedside providing social history; pt resides in a private house with daughter. Pt remains on 1 floor. Pt ambulates with a rolling walker and baseline and all children assist with ADLs. Pt has [facility]HA 7 days a week 8:30-4:30pm. Pt owns shower chair/grab bars and wheelchair.

## 2024-12-11 NOTE — H&P ADULT - PROBLEM SELECTOR PLAN 1
Presenting cystitis--tolerated Zosyn in the ER>>will continue Zosyn for now.   Urine culture sent.   Will obtain surveillance blood cultures.   Will obtain kidney and bladder US to exclude collection.

## 2024-12-11 NOTE — PHYSICAL THERAPY INITIAL EVALUATION ADULT - PERTINENT HX OF CURRENT PROBLEM, REHAB EVAL
Pt is a 89 yo F--followed by her office physicians above--daughter in attendance with patient Hard of Hearing and patient deferring to daughter during interview--patient with a history of essential HTN, type 2 DM not currently on Rx, hypothyroidism, CKD3b, apparent moderate to severe AS from echo from 2022, PAF previously on full AC (shared decision making process by patient/daughter and Cardiologist to be off full AC following complicated GI bleed), breast CA S/P tylectomy 30 years ago presumed to be in remission, with last discharge from [place] in Mar 2024 for a one day admission for UTI, with transient confusional state at the time, with resolution of delirium and discharge following a one day admission.

## 2024-12-11 NOTE — PHYSICAL THERAPY INITIAL EVALUATION ADULT - IMPAIRMENTS CONTRIBUTING TO GAIT DEVIATIONS, PT EVAL
Decreased endurance/impaired balance/impaired coordination/impaired postural control/decreased strength

## 2024-12-11 NOTE — PHYSICAL THERAPY INITIAL EVALUATION ADULT - GENERAL OBSERVATIONS, REHAB EVAL
Pt received supine in bed, A&0x3, confused, +[place], +purewick, +IV, following 100% simple step commands, son at bedside. Pt presenting with weakness, fatigue and increased confusion, admitted with recurrent cystitis.

## 2024-12-11 NOTE — H&P ADULT - NSHPLABSRESULTS_GEN_ALL_CORE
EKG tracing interpreted by me with sinus 1AV at 67 with LVH and poor anterior R wave progression.    Chest radiograph ordered.    WBC 7.25  63N    Hgb 13.1    Platelets of 158K    Random glucose of 233    K+ 4.2  HCO3 24    Cr 1.61  (e GFR fo 29)  was 1.71 in Mar 2024.    UA with moderate leukocyte esterase and + nitrite.    RVP screen >> nil.    May 2022 echo with moderate to severe AS.  Normal LV function.

## 2024-12-11 NOTE — GOALS OF CARE CONVERSATION - ADVANCED CARE PLANNING - TREATMENT GUIDELINE COMMENT
The Daughter/Health Care Surrogate wishes to maintain FULL Cardiopulmonary support and wishes no limitation in medical interventions.

## 2024-12-11 NOTE — H&P ADULT - NSHPSOURCEINFOTX_GEN_ALL_CORE
Patient hard of hearing, and deferring to daughter, Brittney Hermosillo, [phone] for interview and Medex review.

## 2024-12-11 NOTE — H&P ADULT - PROBLEM SELECTOR PLAN 10
Transitions of Care Status:  1.  Name of PCP:   Santi Coleman MD (Cardiology- PCP) [phone]  2.  PCP Contacted on Admission: [ ] Y    [x ] N    3.  PCP contacted at Discharge: [ ] Y    [ ] N    [ ] N/A  4.  Post-Discharge Appointment Date and Location:  5.  Summary of Handoff given to PCP:

## 2024-12-11 NOTE — H&P ADULT - CONSTITUTIONAL COMMENTS
Elderly, cachectic, nontoxic, chronically ill appearing F with diffuse sarcopenia and bitemporal wasting.

## 2024-12-11 NOTE — H&P ADULT - NSHPADDITIONALINFOADULT_GEN_ALL_CORE
NIGHT HOSPITALIST:    Patient/ daughter in attendance aware of course and agree with plan/care as above.   Given patient's comorbidities, patient's long term prognosis is guarded.   The daughter/Health Care Proxy, wishes to maintain FULL CODE status and wishes no limitation in medical intervention.   Unclear to the Goals of Therapy--would consider Palliative Care evaluation in the AM for Complex Care Management assistance.  Emotional support provided to patient/ daughter in attendance.   Care reviewed with covering NP/PA for endorsement to my physician colleagues in the AM.    Ramin Shahid MD  Available on Microsoft Teams.

## 2024-12-11 NOTE — H&P ADULT - ASSESSMENT
NIGHT HOSPITALIST:   NIGHT HOSPITALIST:    Referral by daughter to the ER in the setting of persistent generalized weakness, fatigue and confusional state with patient on oral Augmentin for apparent cystitis but with a complex history of essential HTN, type 2 DM no longer on Rx, hypothyroidism, CKD3b, apparent severe AS from 2022, PAF previously on full AC but with complicated hospitalization in July 2022 with multiple unit (10 unit) red blood cell transfusion with a lower GI bleed, past COVID-19 in 2022, breast CA S/P remote tylectomy 30 years ago presumed to be in remission, with presenting cystitis--tolerated Zosyn in the ER>>will continue Zosyn for now.   Urine culture sent.   Will obtain surveillance blood cultures.   Will obtain kidney and bladder US to exclude collection.    Suspect confusion from patient's cystitis, but will temporarily HOLD the Effexor for BEERS risk and will defer resumption to the Daytime Provider in the AM.  Will obtain CTT head no contrast.    CKD3b stable, but will temporarily HOLD the Lasix due to the fixed obstruction with patient's AS with patient not in clinical pulmonary oedema.   Chest radiograph ordered.   Would consider formal Renal evaluation, Dr. Escobar, in the AM.    Will HOLD the Lasix.  Echo to reassess AS.    On Lopressor and Norvasc for HTN.    Check TSH on Synthroid.    Will follow FS S/S for patient's type 2 DM.    Daughter agrees only to OMAYRA for prophylaxis.

## 2024-12-11 NOTE — PATIENT PROFILE ADULT - FALL HARM RISK - HARM RISK INTERVENTIONS

## 2024-12-11 NOTE — H&P ADULT - HISTORY OF PRESENT ILLNESS
NIGHT HOSPITALIST:   Patient UNKNOWN to me previously, assigned to me at this point via the ER to admit this 89 yo F--followed by her office physicians above--daughter in attendance with patient Hard of Hearing and patient deferring to daughter during interview--patient with a history of essential HTN, type 2 DM not currently on Rx, hypothyroidism, CKD3b, apparent moderate to severe AS from echo from 2022, PAF previously on full AC (shared decision making process by patient/daughter and Cardiologist to be off full AC following complicated GI bleed), breast CA S/P tylectomy 30 years ago presumed to be in remission, with last discharge from [place] in Mar 2024 for a one day admission for UTI, with transient confusional state at the time, with resolution of delirium and discharge following a one day admission, July 2022 hospitalization with a prolonged course with red blood per rectum with apparent 10 unit total transfusion--daughter declined angiogram due to patient's CKD3b--with EGD with gastritis and S/P cautery of 2 bleeding angiodysplasias, noted to be COVID-19 + at the time (patient with COVID-19 vaccines x 4 -last in 2022), discharge May 2022 for CHF (patient was still on apixaban at the time), discharge Mar 2022 for delirium following a UTI, history of depression maintained on Effexor, with daughter reports patient receiving Augmentin 3 days for apparent cystitis from an urgent care centre but with patient with progressive generalized weakness, fatigue, and daughter notes increased confusional state since this past Sunday.    Patient is alert to self/hospital but is Hard of Hearing and defers interview to daughter in attendance.   NO fever, no chills, no rigors.   NO abdominal pain, no red blood per rectum or melena.   NO HA, no focal weakness.   NO chest pain/pressure.   Poor ADL.   NO back pain, no tearing back pain.   + anorexia and unspecified weight loss. NIGHT HOSPITALIST:   Patient UNKNOWN to me previously, assigned to me at this point via the ER to admit this 89 yo F--followed by her office physicians above--daughter in attendance with patient Hard of Hearing and patient deferring to daughter during interview--patient with a history of essential HTN, type 2 DM not currently on Rx, hypothyroidism, CKD3b, apparent moderate to severe AS from echo from 2022, PAF previously on full AC (shared decision making process by patient/daughter and Cardiologist to be off full AC following complicated GI bleed), breast CA S/P tylectomy 30 years ago presumed to be in remission, with last discharge from [place] in Mar 2024 for a one day admission for UTI, with transient confusional state at the time, with resolution of delirium and discharge following a one day admission, July 2022 hospitalization with a prolonged course with red blood per rectum with apparent 10 unit total transfusion--daughter declined angiogram due to patient's CKD3b--with EGD with gastritis and S/P cautery of 2 bleeding angiodysplasias, noted to be COVID-19 + at the time (patient with COVID-19 vaccines x 4 -last booster jab in 2022), discharge May 2022 for CHF (patient was still on apixaban at the time), discharge Mar 2022 for delirium following a UTI, history of depression maintained on Effexor, with daughter reports patient receiving Augmentin 3 days for apparent cystitis from an urgent care centre but with patient with progressive generalized weakness, fatigue, and daughter notes increased confusional state since this past Sunday.    Patient is alert to self/hospital but is Hard of Hearing and defers interview to daughter in attendance.   NO fever, no chills, no rigors.   NO abdominal pain, no red blood per rectum or melena.   NO HA, no focal weakness.   NO chest pain/pressure.   Poor ADL.   NO back pain, no tearing back pain.   + anorexia and unspecified weight loss.

## 2024-12-11 NOTE — PROGRESS NOTE ADULT - PROBLEM SELECTOR PLAN 10
- DVT ppx: SCD  - Diet: soft and bite size, liberalize diet so it is without restrictions  - Name of PCP:   Santi Coleman MD (Cardiology- PCP) [phone]

## 2024-12-11 NOTE — H&P ADULT - PROBLEM SELECTOR PLAN 9
Suspect related to severe AS, but would consider contacting PCP in the AM to review recent outpatient workup.

## 2024-12-12 LAB
A1C WITH ESTIMATED AVERAGE GLUCOSE RESULT: 9 % — HIGH (ref 4–5.6)
ANION GAP SERPL CALC-SCNC: 19 MMOL/L — HIGH (ref 5–17)
BUN SERPL-MCNC: 35 MG/DL — HIGH (ref 7–23)
CALCIUM SERPL-MCNC: 9.8 MG/DL — SIGNIFICANT CHANGE UP (ref 8.4–10.5)
CHLORIDE SERPL-SCNC: 98 MMOL/L — SIGNIFICANT CHANGE UP (ref 96–108)
CO2 SERPL-SCNC: 23 MMOL/L — SIGNIFICANT CHANGE UP (ref 22–31)
CREAT SERPL-MCNC: 1.94 MG/DL — HIGH (ref 0.5–1.3)
CULTURE RESULTS: SIGNIFICANT CHANGE UP
EGFR: 24 ML/MIN/1.73M2 — LOW
ESTIMATED AVERAGE GLUCOSE: 212 MG/DL — HIGH (ref 68–114)
GLUCOSE BLDC GLUCOMTR-MCNC: 160 MG/DL — HIGH (ref 70–99)
GLUCOSE BLDC GLUCOMTR-MCNC: 201 MG/DL — HIGH (ref 70–99)
GLUCOSE BLDC GLUCOMTR-MCNC: 205 MG/DL — HIGH (ref 70–99)
GLUCOSE BLDC GLUCOMTR-MCNC: 240 MG/DL — HIGH (ref 70–99)
GLUCOSE SERPL-MCNC: 173 MG/DL — HIGH (ref 70–99)
HCT VFR BLD CALC: 41.8 % — SIGNIFICANT CHANGE UP (ref 34.5–45)
HGB BLD-MCNC: 13.1 G/DL — SIGNIFICANT CHANGE UP (ref 11.5–15.5)
MCHC RBC-ENTMCNC: 31.3 G/DL — LOW (ref 32–36)
MCHC RBC-ENTMCNC: 31.3 PG — SIGNIFICANT CHANGE UP (ref 27–34)
MCV RBC AUTO: 99.8 FL — SIGNIFICANT CHANGE UP (ref 80–100)
NRBC # BLD: 0 /100 WBCS — SIGNIFICANT CHANGE UP (ref 0–0)
PLATELET # BLD AUTO: 165 K/UL — SIGNIFICANT CHANGE UP (ref 150–400)
POTASSIUM SERPL-MCNC: 3.6 MMOL/L — SIGNIFICANT CHANGE UP (ref 3.5–5.3)
POTASSIUM SERPL-SCNC: 3.6 MMOL/L — SIGNIFICANT CHANGE UP (ref 3.5–5.3)
RBC # BLD: 4.19 M/UL — SIGNIFICANT CHANGE UP (ref 3.8–5.2)
RBC # FLD: 13.2 % — SIGNIFICANT CHANGE UP (ref 10.3–14.5)
SODIUM SERPL-SCNC: 140 MMOL/L — SIGNIFICANT CHANGE UP (ref 135–145)
SPECIMEN SOURCE: SIGNIFICANT CHANGE UP
WBC # BLD: 10.03 K/UL — SIGNIFICANT CHANGE UP (ref 3.8–10.5)
WBC # FLD AUTO: 10.03 K/UL — SIGNIFICANT CHANGE UP (ref 3.8–10.5)

## 2024-12-12 PROCEDURE — 99232 SBSQ HOSP IP/OBS MODERATE 35: CPT

## 2024-12-12 RX ORDER — CEFPODOXIME PROXETIL 100 MG/5ML
100 GRANULE, FOR SUSPENSION ORAL EVERY 24 HOURS
Refills: 0 | Status: DISCONTINUED | OUTPATIENT
Start: 2024-12-12 | End: 2024-12-13

## 2024-12-12 RX ORDER — CEFPODOXIME PROXETIL 100 MG/5ML
100 GRANULE, FOR SUSPENSION ORAL EVERY 12 HOURS
Refills: 0 | Status: DISCONTINUED | OUTPATIENT
Start: 2024-12-12 | End: 2024-12-12

## 2024-12-12 RX ORDER — 0.9 % SODIUM CHLORIDE 0.9 %
1000 INTRAVENOUS SOLUTION INTRAVENOUS
Refills: 0 | Status: DISCONTINUED | OUTPATIENT
Start: 2024-12-12 | End: 2024-12-12

## 2024-12-12 RX ORDER — 0.9 % SODIUM CHLORIDE 0.9 %
1000 INTRAVENOUS SOLUTION INTRAVENOUS
Refills: 0 | Status: DISCONTINUED | OUTPATIENT
Start: 2024-12-12 | End: 2024-12-13

## 2024-12-12 RX ADMIN — FUROSEMIDE 40 MILLIGRAM(S): 40 TABLET ORAL at 06:45

## 2024-12-12 RX ADMIN — Medication 1 APPLICATION(S): at 08:57

## 2024-12-12 RX ADMIN — Medication 1 TABLET(S): at 12:11

## 2024-12-12 RX ADMIN — ROSUVASTATIN CALCIUM 10 MILLIGRAM(S): 5 TABLET, FILM COATED ORAL at 21:50

## 2024-12-12 RX ADMIN — Medication 25 MICROGRAM(S): at 06:45

## 2024-12-12 RX ADMIN — CEFPODOXIME PROXETIL 100 MILLIGRAM(S): 100 GRANULE, FOR SUSPENSION ORAL at 13:32

## 2024-12-12 RX ADMIN — PANTOPRAZOLE SODIUM 40 MILLIGRAM(S): 40 TABLET, DELAYED RELEASE ORAL at 06:45

## 2024-12-12 RX ADMIN — POVIDONE, POLYVINYL ALCOHOL 1 DROP(S): 20; 27 SOLUTION OPHTHALMIC at 00:19

## 2024-12-12 RX ADMIN — AMLODIPINE BESYLATE 2.5 MILLIGRAM(S): 10 TABLET ORAL at 06:45

## 2024-12-12 RX ADMIN — POVIDONE, POLYVINYL ALCOHOL 1 DROP(S): 20; 27 SOLUTION OPHTHALMIC at 06:45

## 2024-12-12 RX ADMIN — METOPROLOL TARTRATE 25 MILLIGRAM(S): 100 TABLET, FILM COATED ORAL at 17:57

## 2024-12-12 RX ADMIN — POVIDONE, POLYVINYL ALCOHOL 1 DROP(S): 20; 27 SOLUTION OPHTHALMIC at 19:35

## 2024-12-12 RX ADMIN — Medication 2: at 12:10

## 2024-12-12 RX ADMIN — Medication 2: at 17:57

## 2024-12-12 RX ADMIN — VENLAFAXINE HYDROCHLORIDE 75 MILLIGRAM(S): 75 CAPSULE, EXTENDED RELEASE ORAL at 12:11

## 2024-12-12 RX ADMIN — POVIDONE, POLYVINYL ALCOHOL 1 DROP(S): 20; 27 SOLUTION OPHTHALMIC at 13:33

## 2024-12-12 RX ADMIN — METOPROLOL TARTRATE 25 MILLIGRAM(S): 100 TABLET, FILM COATED ORAL at 06:45

## 2024-12-12 RX ADMIN — Medication 30 MILLILITER(S): at 16:19

## 2024-12-12 RX ADMIN — PIPERACILLIN SODIUM AND TAZOBACTAM SODIUM 25 GRAM(S): 4; .5 INJECTION, POWDER, LYOPHILIZED, FOR SOLUTION INTRAVENOUS at 06:46

## 2024-12-12 RX ADMIN — Medication 2: at 08:30

## 2024-12-12 NOTE — PROGRESS NOTE ADULT - PROBLEM SELECTOR PLAN 5
- TSH wnl at 3.2  - c/w home synthroid
patient takes amlodipine 5 mg BID, clonidine 0.1 mg TID; nadolol 20 mg BID  -- will continue with hold parameters
- TSH wnl at 3.2  - c/w home synthroid

## 2024-12-12 NOTE — PROGRESS NOTE ADULT - REASON FOR ADMISSION
Generalized weakness, fatigue and confusional state since Sunday, on Augmentin for cystitis
Generalized weakness, fatigue and confusional state since Sunday, on Augmentin for cystitis

## 2024-12-12 NOTE — PROGRESS NOTE ADULT - PROBLEM SELECTOR PLAN 2
- CKD at baseline. Per HIE review last Cr in 9/2024 was 1.7  - resuming lasix tomorrow  - follows Dr. Dewitt. Can hold off on renal consult as CKD is at baseline
- CKD at baseline. Per HIE review last Cr in 9/2024 was 1.7  - poor PO intake, starting fluids at 30 cc/hr  - continue to hold off on lasix  - follows Dr. Dewitt. Can hold off on renal consult as CKD is at baseline

## 2024-12-12 NOTE — PROGRESS NOTE ADULT - PROBLEM SELECTOR PLAN 1
Hx of recurrent UTIs, had 4 UTIs this year  - was on zosyn but IV line went out this morning, difficult to establish peripheral  - this evening-- new peripheral line established  - s/p zosyn (12/10 - 12/11)  - start renally-dosed cefpodoxime   - of note, pt has keflex allergy but this is remote history and no one knew the reaction. She previously got ceftriaxone without adverse reaction  - f/u urine cultures
Hx of recurrent UTIs, had 4 UTIs this year  - continue zosyn  - f/u urine cultures

## 2024-12-12 NOTE — PROGRESS NOTE ADULT - SUBJECTIVE AND OBJECTIVE BOX
Frances Valladares MD  Hospitalist  Available on MS Teams      PROGRESS NOTE:     Patient is a 89y old  Female who presents with a chief complaint of Generalized weakness, fatigue and confusional state since Sunday, on Augmentin for cystitis (11 Dec 2024 17:19)      SUBJECTIVE / OVERNIGHT EVENTS:  This morning, pt's peripheral IV went out. Difficult to establish new IV access.    The patient was assessed with her son and daughter-in-law at bedside. She was AAOx3, no acute complaints. Feels improved but appetite is still low.    MEDICATIONS  (STANDING):  amLODIPine   Tablet 2.5 milliGRAM(s) Oral daily  artificial tears (preservative free) Ophthalmic Solution 1 Drop(s) Both EYES four times a day  cefpodoxime 100 milliGRAM(s) Oral every 24 hours  dextrose 5%. 1000 milliLiter(s) (100 mL/Hr) IV Continuous <Continuous>  dextrose 5%. 1000 milliLiter(s) (50 mL/Hr) IV Continuous <Continuous>  dextrose 50% Injectable 25 Gram(s) IV Push once  dextrose 50% Injectable 12.5 Gram(s) IV Push once  dextrose 50% Injectable 25 Gram(s) IV Push once  glucagon  Injectable 1 milliGRAM(s) IntraMuscular once  insulin lispro (ADMELOG) corrective regimen sliding scale   SubCutaneous three times a day before meals  insulin lispro (ADMELOG) corrective regimen sliding scale   SubCutaneous at bedtime  lactated ringers. 1000 milliLiter(s) (30 mL/Hr) IV Continuous <Continuous>  levothyroxine 25 MICROGram(s) Oral daily  metoprolol tartrate 25 milliGRAM(s) Oral two times a day  mupirocin 2% Ointment 1 Application(s) Topical <User Schedule>  Nephro-iain 1 Tablet(s) Oral daily  pantoprazole    Tablet 40 milliGRAM(s) Oral before breakfast  rosuvastatin 10 milliGRAM(s) Oral at bedtime  venlafaxine XR. 75 milliGRAM(s) Oral daily    MEDICATIONS  (PRN):  acetaminophen     Tablet .. 650 milliGRAM(s) Oral every 6 hours PRN Temp greater or equal to 38C (100.4F), Mild Pain (1 - 3)  dextrose Oral Gel 15 Gram(s) Oral once PRN Blood Glucose LESS THAN 70 milliGRAM(s)/deciliter      CAPILLARY BLOOD GLUCOSE      POCT Blood Glucose.: 201 mg/dL (12 Dec 2024 17:01)  POCT Blood Glucose.: 205 mg/dL (12 Dec 2024 11:35)  POCT Blood Glucose.: 240 mg/dL (12 Dec 2024 07:32)  POCT Blood Glucose.: 136 mg/dL (11 Dec 2024 21:29)    I&O's Summary    11 Dec 2024 07:01  -  12 Dec 2024 07:00  --------------------------------------------------------  IN: 0 mL / OUT: 500 mL / NET: -500 mL    12 Dec 2024 07:01  -  12 Dec 2024 18:40  --------------------------------------------------------  IN: 0 mL / OUT: 600 mL / NET: -600 mL        PHYSICAL EXAM:  Vital Signs Last 24 Hrs  T(C): 36.5 (12 Dec 2024 16:27), Max: 36.8 (12 Dec 2024 08:47)  T(F): 97.7 (12 Dec 2024 16:27), Max: 98.3 (12 Dec 2024 08:47)  HR: 68 (12 Dec 2024 16:27) (65 - 83)  BP: 149/73 (12 Dec 2024 16:27) (100/63 - 149/73)  BP(mean): --  RR: 18 (12 Dec 2024 16:27) (18 - 18)  SpO2: 95% (12 Dec 2024 16:27) (93% - 99%)    Parameters below as of 12 Dec 2024 16:27  Patient On (Oxygen Delivery Method): room air    CONSTITUTIONAL: Well-developed, well-groomed, in no apparent distress  EYES: mildly retracted lower eyelid, bilaterally.   RESPIRATORY: Breathing comfortably; lungs CTA without wheeze/rhonchi/rales  CARDIOVASCULAR: +S1S2, +systolic murmur, no lower extremity edema  GASTROINTESTINAL: soft, nontender, nondistended  NEUROLOGIC: CN II-XII intact; normal reflexes in upper and lower extremities; sensation intact in LEs b/l to light touch  PSYCHIATRIC: A+O x 3; mood and affect appropriate; appropriate insight and judgment      LABS:                        13.1   10.03 )-----------( 165      ( 12 Dec 2024 07:18 )             41.8     12-12    140  |  98  |  35[H]  ----------------------------<  173[H]  3.6   |  23  |  1.94[H]    Ca    9.8      12 Dec 2024 07:21  Mg     2.0     12-10    TPro  7.0  /  Alb  3.9  /  TBili  0.4  /  DBili  x   /  AST  25  /  ALT  20  /  AlkPhos  59  12-10          Urinalysis Basic - ( 12 Dec 2024 07:21 )    Color: x / Appearance: x / SG: x / pH: x  Gluc: 173 mg/dL / Ketone: x  / Bili: x / Urobili: x   Blood: x / Protein: x / Nitrite: x   Leuk Esterase: x / RBC: x / WBC x   Sq Epi: x / Non Sq Epi: x / Bacteria: x        Culture - Blood (collected 11 Dec 2024 07:11)  Source: .Blood BLOOD  Preliminary Report (12 Dec 2024 09:01):    No growth at 24 hours    Culture - Urine (collected 10 Dec 2024 22:39)  Source: Clean Catch Clean Catch (Midstream)  Final Report (12 Dec 2024 18:10):    >=3 organisms. Probable collection contamination.    
Frances Valladares MD  Hospitalist  Available on MS Teams      PROGRESS NOTE:     Patient is a 89y old  Female who presents with a chief complaint of Generalized weakness, fatigue and confusional state since Sunday, on Augmentin for cystitis (11 Dec 2024 01:26)      SUBJECTIVE / OVERNIGHT EVENTS:  Pt seen with her son at bedside. She offered no acute complaints of subjective fevers/chills, chest pain or palpitations. Per son, pt's mental status near normal.    MEDICATIONS  (STANDING):  amLODIPine   Tablet 2.5 milliGRAM(s) Oral daily  artificial tears (preservative free) Ophthalmic Solution 1 Drop(s) Both EYES four times a day  dextrose 5%. 1000 milliLiter(s) (50 mL/Hr) IV Continuous <Continuous>  dextrose 5%. 1000 milliLiter(s) (100 mL/Hr) IV Continuous <Continuous>  dextrose 50% Injectable 25 Gram(s) IV Push once  dextrose 50% Injectable 12.5 Gram(s) IV Push once  dextrose 50% Injectable 25 Gram(s) IV Push once  glucagon  Injectable 1 milliGRAM(s) IntraMuscular once  insulin lispro (ADMELOG) corrective regimen sliding scale   SubCutaneous three times a day before meals  insulin lispro (ADMELOG) corrective regimen sliding scale   SubCutaneous at bedtime  levothyroxine 25 MICROGram(s) Oral daily  metoprolol tartrate 25 milliGRAM(s) Oral two times a day  Nephro-iain 1 Tablet(s) Oral daily  pantoprazole    Tablet 40 milliGRAM(s) Oral before breakfast  piperacillin/tazobactam IVPB.. 3.375 Gram(s) IV Intermittent every 12 hours  rosuvastatin 10 milliGRAM(s) Oral at bedtime    MEDICATIONS  (PRN):  acetaminophen     Tablet .. 650 milliGRAM(s) Oral every 6 hours PRN Temp greater or equal to 38C (100.4F), Mild Pain (1 - 3)  dextrose Oral Gel 15 Gram(s) Oral once PRN Blood Glucose LESS THAN 70 milliGRAM(s)/deciliter      CAPILLARY BLOOD GLUCOSE      POCT Blood Glucose.: 151 mg/dL (11 Dec 2024 16:23)  POCT Blood Glucose.: 232 mg/dL (11 Dec 2024 11:25)  POCT Blood Glucose.: 175 mg/dL (11 Dec 2024 07:28)    I&O's Summary      PHYSICAL EXAM:  Vital Signs Last 24 Hrs  T(C): 36.8 (11 Dec 2024 16:09), Max: 37.4 (10 Dec 2024 21:50)  T(F): 98.2 (11 Dec 2024 16:09), Max: 99.3 (10 Dec 2024 21:50)  HR: 72 (11 Dec 2024 16:09) (56 - 85)  BP: 146/77 (11 Dec 2024 16:09) (111/73 - 166/80)  BP(mean): --  RR: 18 (11 Dec 2024 16:09) (17 - 20)  SpO2: 96% (11 Dec 2024 16:09) (95% - 99%)    Parameters below as of 11 Dec 2024 16:09  Patient On (Oxygen Delivery Method): room air    CONSTITUTIONAL: Well-developed, well-groomed, in no apparent distress  EYES: retracted lower eyelid, bilaterally.   RESPIRATORY: Breathing comfortably; lungs CTA without wheeze/rhonchi/rales  CARDIOVASCULAR: +S1S2, +systolic murmur, no lower extremity edema  GASTROINTESTINAL: soft, nontender, nondistended  NEUROLOGIC: CN II-XII intact; normal reflexes in upper and lower extremities; sensation intact in LEs b/l to light touch  PSYCHIATRIC: A+O x 2 (oriented to self and time; knew that she was in the hospital but did not know which one); mood and affect appropriate; appropriate insight and judgment    LABS:                        13.3   8.39  )-----------( 165      ( 11 Dec 2024 07:11 )             42.1     12-11    138  |  98  |  32[H]  ----------------------------<  158[H]  3.6   |  24  |  1.54[H]    Ca    10.4      11 Dec 2024 07:11  Mg     2.0     12-10    TPro  7.0  /  Alb  3.9  /  TBili  0.4  /  DBili  x   /  AST  25  /  ALT  20  /  AlkPhos  59  12-10          Urinalysis Basic - ( 11 Dec 2024 07:11 )    Color: x / Appearance: x / SG: x / pH: x  Gluc: 158 mg/dL / Ketone: x  / Bili: x / Urobili: x   Blood: x / Protein: x / Nitrite: x   Leuk Esterase: x / RBC: x / WBC x   Sq Epi: x / Non Sq Epi: x / Bacteria: x

## 2024-12-12 NOTE — PROGRESS NOTE ADULT - PROBLEM SELECTOR PLAN 3
Repeat TTE 12/11/24 shows moderate-severe AS, somewhat improved from prior TTE  - CXR: clear lungs  - will resume home lasix tomorrow
Repeat TTE 12/11/24 shows moderate-severe AS, somewhat improved from prior TTE  - CXR: clear lungs  - hold home lasix  - caution with fluid resucitation. Will stop IV fluid once PO intake picks up

## 2024-12-12 NOTE — PROGRESS NOTE ADULT - PROBLEM SELECTOR PLAN 8
- DVT ppx: SCD  - Diet: soft and bite size  - Name of PCP:   Santi Coleman MD (Cardiology- PCP) [phone]
IN the setting of UTI  - mental status near baseline  - resume home effexor

## 2024-12-12 NOTE — PROGRESS NOTE ADULT - ASSESSMENT
89 year old female with a PMHx of HTN, hypothyroidism, CKD3b, moderate to severe AS, PAF (no longer on AC d/t GI bleed), remote history of breast cancer s/p surgery who presents with lethargy, found to have recurrent UTI.
89 year old female with a PMHx of HTN, hypothyroidism, CKD3b, moderate to severe AS, PAF (no longer on AC d/t GI bleed), remote history of breast cancer s/p surgery who presents with lethargy, found to have recurrent UTI.

## 2024-12-12 NOTE — PROGRESS NOTE ADULT - PROBLEM/PLAN-3
Called pt to schedule physical 12/19/24     Received fax from pharmacy requesting refill on pts medication(s). Pt was last seen in office on 3/16/2023  and has a follow up scheduled for Visit date not found. Will send request to  Giovana Dash  for patient.     Requested Prescriptions     Pending Prescriptions Disp Refills    allopurinol (ZYLOPRIM) 100 MG tablet [Pharmacy Med Name: Allopurinol 100 MG Oral Tablet] 90 tablet 0     Sig: TAKE 1 TABLET BY MOUTH ONCE DAILY **APPT  REQUIRED  FOR  FURTHER  REFILLS**       
DISPLAY PLAN FREE TEXT
DISPLAY PLAN FREE TEXT

## 2024-12-13 ENCOUNTER — TRANSCRIPTION ENCOUNTER (OUTPATIENT)
Age: 88
End: 2024-12-13

## 2024-12-13 VITALS
RESPIRATION RATE: 18 BRPM | HEART RATE: 65 BPM | OXYGEN SATURATION: 97 % | DIASTOLIC BLOOD PRESSURE: 62 MMHG | SYSTOLIC BLOOD PRESSURE: 137 MMHG | TEMPERATURE: 98 F

## 2024-12-13 LAB
ANION GAP SERPL CALC-SCNC: 14 MMOL/L — SIGNIFICANT CHANGE UP (ref 5–17)
BUN SERPL-MCNC: 31 MG/DL — HIGH (ref 7–23)
CALCIUM SERPL-MCNC: 9.7 MG/DL — SIGNIFICANT CHANGE UP (ref 8.4–10.5)
CHLORIDE SERPL-SCNC: 103 MMOL/L — SIGNIFICANT CHANGE UP (ref 96–108)
CO2 SERPL-SCNC: 26 MMOL/L — SIGNIFICANT CHANGE UP (ref 22–31)
CREAT SERPL-MCNC: 1.6 MG/DL — HIGH (ref 0.5–1.3)
EGFR: 30 ML/MIN/1.73M2 — LOW
GLUCOSE BLDC GLUCOMTR-MCNC: 144 MG/DL — HIGH (ref 70–99)
GLUCOSE BLDC GLUCOMTR-MCNC: 149 MG/DL — HIGH (ref 70–99)
GLUCOSE BLDC GLUCOMTR-MCNC: 199 MG/DL — HIGH (ref 70–99)
GLUCOSE SERPL-MCNC: 135 MG/DL — HIGH (ref 70–99)
HCT VFR BLD CALC: 41 % — SIGNIFICANT CHANGE UP (ref 34.5–45)
HGB BLD-MCNC: 13 G/DL — SIGNIFICANT CHANGE UP (ref 11.5–15.5)
MCHC RBC-ENTMCNC: 31 PG — SIGNIFICANT CHANGE UP (ref 27–34)
MCHC RBC-ENTMCNC: 31.7 G/DL — LOW (ref 32–36)
MCV RBC AUTO: 97.9 FL — SIGNIFICANT CHANGE UP (ref 80–100)
NRBC # BLD: 0 /100 WBCS — SIGNIFICANT CHANGE UP (ref 0–0)
PLATELET # BLD AUTO: 162 K/UL — SIGNIFICANT CHANGE UP (ref 150–400)
POTASSIUM SERPL-MCNC: 3.7 MMOL/L — SIGNIFICANT CHANGE UP (ref 3.5–5.3)
POTASSIUM SERPL-SCNC: 3.7 MMOL/L — SIGNIFICANT CHANGE UP (ref 3.5–5.3)
RBC # BLD: 4.19 M/UL — SIGNIFICANT CHANGE UP (ref 3.8–5.2)
RBC # FLD: 13.2 % — SIGNIFICANT CHANGE UP (ref 10.3–14.5)
SODIUM SERPL-SCNC: 143 MMOL/L — SIGNIFICANT CHANGE UP (ref 135–145)
WBC # BLD: 7.51 K/UL — SIGNIFICANT CHANGE UP (ref 3.8–10.5)
WBC # FLD AUTO: 7.51 K/UL — SIGNIFICANT CHANGE UP (ref 3.8–10.5)

## 2024-12-13 PROCEDURE — 87040 BLOOD CULTURE FOR BACTERIA: CPT

## 2024-12-13 PROCEDURE — 97161 PT EVAL LOW COMPLEX 20 MIN: CPT

## 2024-12-13 PROCEDURE — 82435 ASSAY OF BLOOD CHLORIDE: CPT

## 2024-12-13 PROCEDURE — 93306 TTE W/DOPPLER COMPLETE: CPT

## 2024-12-13 PROCEDURE — 87086 URINE CULTURE/COLONY COUNT: CPT

## 2024-12-13 PROCEDURE — 80048 BASIC METABOLIC PNL TOTAL CA: CPT

## 2024-12-13 PROCEDURE — 99239 HOSP IP/OBS DSCHRG MGMT >30: CPT

## 2024-12-13 PROCEDURE — 97116 GAIT TRAINING THERAPY: CPT

## 2024-12-13 PROCEDURE — 84295 ASSAY OF SERUM SODIUM: CPT

## 2024-12-13 PROCEDURE — 82947 ASSAY GLUCOSE BLOOD QUANT: CPT

## 2024-12-13 PROCEDURE — 99285 EMERGENCY DEPT VISIT HI MDM: CPT

## 2024-12-13 PROCEDURE — 84443 ASSAY THYROID STIM HORMONE: CPT

## 2024-12-13 PROCEDURE — 36415 COLL VENOUS BLD VENIPUNCTURE: CPT

## 2024-12-13 PROCEDURE — 82962 GLUCOSE BLOOD TEST: CPT

## 2024-12-13 PROCEDURE — 85025 COMPLETE CBC W/AUTO DIFF WBC: CPT

## 2024-12-13 PROCEDURE — 85014 HEMATOCRIT: CPT

## 2024-12-13 PROCEDURE — 80053 COMPREHEN METABOLIC PANEL: CPT

## 2024-12-13 PROCEDURE — 83735 ASSAY OF MAGNESIUM: CPT

## 2024-12-13 PROCEDURE — 71045 X-RAY EXAM CHEST 1 VIEW: CPT

## 2024-12-13 PROCEDURE — 84132 ASSAY OF SERUM POTASSIUM: CPT

## 2024-12-13 PROCEDURE — 81001 URINALYSIS AUTO W/SCOPE: CPT

## 2024-12-13 PROCEDURE — 82330 ASSAY OF CALCIUM: CPT

## 2024-12-13 PROCEDURE — 83036 HEMOGLOBIN GLYCOSYLATED A1C: CPT

## 2024-12-13 PROCEDURE — 0241U: CPT

## 2024-12-13 PROCEDURE — 85018 HEMOGLOBIN: CPT

## 2024-12-13 PROCEDURE — 76770 US EXAM ABDO BACK WALL COMP: CPT

## 2024-12-13 PROCEDURE — 83605 ASSAY OF LACTIC ACID: CPT

## 2024-12-13 PROCEDURE — 97530 THERAPEUTIC ACTIVITIES: CPT

## 2024-12-13 PROCEDURE — 82803 BLOOD GASES ANY COMBINATION: CPT

## 2024-12-13 PROCEDURE — 85027 COMPLETE CBC AUTOMATED: CPT

## 2024-12-13 PROCEDURE — 87637 SARSCOV2&INF A&B&RSV AMP PRB: CPT

## 2024-12-13 RX ORDER — POVIDONE, POLYVINYL ALCOHOL 20; 27 G/1000ML; G/1000ML
1 SOLUTION OPHTHALMIC
Qty: 0 | Refills: 0 | DISCHARGE
Start: 2024-12-13

## 2024-12-13 RX ORDER — CEFPODOXIME PROXETIL 100 MG/5ML
1 GRANULE, FOR SUSPENSION ORAL
Qty: 3 | Refills: 0
Start: 2024-12-13 | End: 2024-12-15

## 2024-12-13 RX ADMIN — POVIDONE, POLYVINYL ALCOHOL 1 DROP(S): 20; 27 SOLUTION OPHTHALMIC at 05:01

## 2024-12-13 RX ADMIN — VENLAFAXINE HYDROCHLORIDE 75 MILLIGRAM(S): 75 CAPSULE, EXTENDED RELEASE ORAL at 12:33

## 2024-12-13 RX ADMIN — METOPROLOL TARTRATE 25 MILLIGRAM(S): 100 TABLET, FILM COATED ORAL at 18:07

## 2024-12-13 RX ADMIN — Medication 1 TABLET(S): at 12:33

## 2024-12-13 RX ADMIN — PANTOPRAZOLE SODIUM 40 MILLIGRAM(S): 40 TABLET, DELAYED RELEASE ORAL at 05:02

## 2024-12-13 RX ADMIN — Medication 25 MICROGRAM(S): at 05:01

## 2024-12-13 RX ADMIN — Medication 1: at 12:19

## 2024-12-13 RX ADMIN — POVIDONE, POLYVINYL ALCOHOL 1 DROP(S): 20; 27 SOLUTION OPHTHALMIC at 12:22

## 2024-12-13 RX ADMIN — POVIDONE, POLYVINYL ALCOHOL 1 DROP(S): 20; 27 SOLUTION OPHTHALMIC at 18:07

## 2024-12-13 RX ADMIN — AMLODIPINE BESYLATE 2.5 MILLIGRAM(S): 10 TABLET ORAL at 05:02

## 2024-12-13 RX ADMIN — METOPROLOL TARTRATE 25 MILLIGRAM(S): 100 TABLET, FILM COATED ORAL at 05:01

## 2024-12-13 RX ADMIN — POVIDONE, POLYVINYL ALCOHOL 1 DROP(S): 20; 27 SOLUTION OPHTHALMIC at 00:41

## 2024-12-13 RX ADMIN — CEFPODOXIME PROXETIL 100 MILLIGRAM(S): 100 GRANULE, FOR SUSPENSION ORAL at 12:33

## 2024-12-13 RX ADMIN — Medication 1 APPLICATION(S): at 12:39

## 2024-12-13 NOTE — DISCHARGE NOTE NURSING/CASE MANAGEMENT/SOCIAL WORK - PATIENT PORTAL LINK FT
You can access the FollowMyHealth Patient Portal offered by [facility] by registering at the following website: [email or web address]. By joining [employer]’s FollowMyHealth portal, you will also be able to view your health information using other applications (apps) compatible with our system.

## 2024-12-13 NOTE — DISCHARGE NOTE PROVIDER - NSDCFUADDAPPT_GEN_ALL_CORE_FT
ambulatory APPTS ARE READY TO BE MADE: [x ] YES    Best Family or Patient Contact (if needed):    Additional Information about above appointments (if needed):    1: cardiology  2:   3:     Other comments or requests:  APPTS ARE READY TO BE MADE: [X] YES    Best Family or Patient Contact (if needed):    Additional Information about above appointments (if needed):    1: cardiology (PCP)  2: nephrology  3: podiatry    Other comments or requests:

## 2024-12-13 NOTE — DISCHARGE NOTE PROVIDER - HOSPITAL COURSE
HPI:  NIGHT HOSPITALIST:   Patient UNKNOWN to me previously, assigned to me at this point via the ER to admit this 89 yo F--followed by her office physicians above--daughter in attendance with patient Hard of Hearing and patient deferring to daughter during interview--patient with a history of essential HTN, type 2 DM not currently on Rx, hypothyroidism, CKD3b, apparent moderate to severe AS from echo from 2022, PAF previously on full AC (shared decision making process by patient/daughter and Cardiologist to be off full AC following complicated GI bleed), breast CA S/P tylectomy 30 years ago presumed to be in remission, with last discharge from [facility] in Mar 2024 for a one day admission for UTI, with transient confusional state at the time, with resolution of delirium and discharge following a one day admission, July 2022 hospitalization with a prolonged course with red blood per rectum with apparent 10 unit total transfusion--daughter declined angiogram due to patient's CKD3b--with EGD with gastritis and S/P cautery of 2 bleeding angiodysplasias, noted to be COVID-19 + at the time (patient with COVID-19 vaccines x 4 -last booster jab in 2022), discharge May 2022 for CHF (patient was still on apixaban at the time), discharge Mar 2022 for delirium following a UTI, history of depression maintained on Effexor, with daughter reports patient receiving Augmentin 3 days for apparent cystitis from an urgent care centre but with patient with progressive generalized weakness, fatigue, and daughter notes increased confusional state since this past Sunday.    Patient is alert to self/hospital but is Hard of Hearing and defers interview to daughter in attendance.   NO fever, no chills, no rigors.   NO abdominal pain, no red blood per rectum or melena.   NO HA, no focal weakness.   NO chest pain/pressure.   Poor ADL.   NO back pain, no tearing back pain.   + anorexia and unspecified weight loss. (11 Dec 2024 01:26)    Hospital Course: 89 year old female with a PMHx of HTN, hypothyroidism, CKD3b, moderate to severe AS, PAF (no longer on AC d/t GI bleed), remote history of breast cancer s/p surgery who presents with lethargy, found to have recurrent UTI.       Problem/Plan - 1:  ·  Problem: Cystitis.   · s/p zosyn (12/10 - 12/11)  - start renally-dosed cefpodoxime   - of note, pt has keflex allergy but this is remote history and no one knew the reaction. She previously got ceftriaxone without adverse reaction       Problem/Plan - 2:  ·  Problem: Stage 3 chronic kidney disease.   ·  Plan: - CKD at baseline. Per HIE review last Cr in 9/2024 was 1.7  - poor PO intake, starting fluids at 30 cc/hr  - continue to hold off on lasix  - follows Dr. Dewitt. Can hold off on renal consult as CKD is at baseline.     Problem/Plan - 3:  ·  Problem: Severe aortic stenosis.   ·  Plan: Repeat TTE 12/11/24 shows moderate-severe AS, somewhat improved from prior TTE  - CXR: clear lungs  - hold home lasix  - caution with fluid resucitation.      Problem/Plan - 4:  ·  Problem: Essential hypertension.   ·  Plan: On Lopressor and Norvasc for HTN.     Problem/Plan - 5:  ·  Problem: Hypothyroidism.   ·  Plan: - TSH wnl at 3.2  - c/w home synthroid.     Problem/Plan - 6:  ·  Problem: Type 2 diabetes mellitus.   Resume home medication and outpatient follow up      Problem/Plan - 7:  ·  Problem: Delirium.   ·  Plan: IN the setting of UTI  - mental status near baseline  - c/w home effexor.       - Diet: soft and bite size  - Name of PCP:   Santi Coleman MD (Cardiology- PCP) [phone].          Important Medication Changes and Reason: hold off on lasix , outpatient follow up with PCP,  start renally-dosed cefpodoxime  for UTI ( urinary tract infection )    Active or Pending Issues Requiring Follow-up: Dr Coleman    Advanced Directives:   [ ] Full code  [ ] DNR  [ ] Hospice    Discharge Diagnoses:  UTI  Delirium   essential HTN, type 2 DM not currently on Rx, hypothyroidism, CKD3b, apparent moderate to severe AS from echo from 2022, PAF previously on full A       HPI:  NIGHT HOSPITALIST:   Patient UNKNOWN to me previously, assigned to me at this point via the ER to admit this 89 yo F--followed by her office physicians above--daughter in attendance with patient Hard of Hearing and patient deferring to daughter during interview--patient with a history of essential HTN, type 2 DM not currently on Rx, hypothyroidism, CKD3b, apparent moderate to severe AS from echo from 2022, PAF previously on full AC (shared decision making process by patient/daughter and Cardiologist to be off full AC following complicated GI bleed), breast CA S/P tylectomy 30 years ago presumed to be in remission, with last discharge from [facility] in Mar 2024 for a one day admission for UTI, with transient confusional state at the time, with resolution of delirium and discharge following a one day admission, July 2022 hospitalization with a prolonged course with red blood per rectum with apparent 10 unit total transfusion--daughter declined angiogram due to patient's CKD3b--with EGD with gastritis and S/P cautery of 2 bleeding angiodysplasias, noted to be COVID-19 + at the time (patient with COVID-19 vaccines x 4 -last booster jab in 2022), discharge May 2022 for CHF (patient was still on apixaban at the time), discharge Mar 2022 for delirium following a UTI, history of depression maintained on Effexor, with daughter reports patient receiving Augmentin 3 days for apparent cystitis from an urgent care centre but with patient with progressive generalized weakness, fatigue, and daughter notes increased confusional state since this past Sunday.    Patient is alert to self/hospital but is Hard of Hearing and defers interview to daughter in attendance.   NO fever, no chills, no rigors.   NO abdominal pain, no red blood per rectum or melena.   NO HA, no focal weakness.   NO chest pain/pressure.   Poor ADL.   NO back pain, no tearing back pain.   + anorexia and unspecified weight loss. (11 Dec 2024 01:26)    Hospital Course: 89 year old female with a PMHx of HTN, hypothyroidism, CKD3b, moderate to severe AS, PAF (no longer on AC d/t GI bleed), remote history of breast cancer s/p surgery who presents with lethargy, found to have recurrent UTI.      Cystitis s/p zosyn (12/10 - 12/11), start renally-dosed cefpodoxime. Of note, pt has keflex allergy but this is remote history and no one knew the reaction. She previously got ceftriaxone without adverse reaction. Tolerated Cefpodoxime without issue in house.     Stage 3 chronic kidney disease. CKD at baseline. Per HIE review last Cr in 9/2024 was 1.7, poor PO intake s/p soft IVF. Lasix on hold during admission, ok to resume on 12/14/24.   Follows Dr. Escobar. Can hold off on renal consult as CKD is at baseline.    Severe aortic stenosis. Repeat TTE 12/11/24 shows moderate-severe AS, somewhat improved from prior TTE. CXR: clear lungs. Hold home lasix, caution with fluid resuscitation. Ok to resume lasix on 12/14/24.     Delirium likely due to UTI. Mental status near baseline, continue home effexor.     Right foot wound seen by Podiatry, with no acute signs of infection noted. Recommend daily dressing changes with mupirocin and allevyn foam. Toenails debrided x10 using sterile nippers. Follow up as outpatient for routine care.      Discharge/dispo/med rec discussed with Dr. Valladares who determined patient stable and medically cleared for discharge home with home PT and outpatient follow up for continued management and care.       Important Medication Changes and Reason:   Start renally-dosed cefpodoxime  for UTI ( urinary tract infection )  Resume lasix 12/14/24    Active or Pending Issues Requiring Follow-up:   PCP Dr Coleman  Podiatry for routine care      Advanced Directives:   [X] Full code  [ ] DNR  [ ] Hospice    Discharge Diagnoses:  UTI  Delirium  Severe Aortic Stenosis  CKD3  Right Foot Wound

## 2024-12-13 NOTE — DISCHARGE NOTE PROVIDER - NSDCMRMEDTOKEN_GEN_ALL_CORE_FT
amLODIPine 2.5 mg oral tablet: 1 tab(s) orally once a day  ferrous sulfate 325 mg (65 mg elemental iron) oral tablet: 1 tab(s) orally once a day  furosemide 40 mg oral tablet: 1 tab(s) orally once a day  Metoprolol Tartrate 25 mg oral tablet: 1 tab(s) orally 2 times a day   Nephro-Ernie oral tablet: 1 tab(s) orally once a day   pantoprazole 40 mg oral delayed release tablet: 1 tab(s) orally once a day (before a meal)  rosuvastatin 10 mg oral tablet: 1 tab(s) orally once a day  Synthroid 25 mcg (0.025 mg) oral tablet: 1 tab(s) orally once a day  venlafaxine 75 mg oral capsule, extended release: 1 cap(s) orally once a day   amLODIPine 2.5 mg oral tablet: 1 tab(s) orally once a day  cefpodoxime 100 mg oral tablet: 1 tab(s) orally every 24 hours  furosemide 40 mg oral tablet: 1 tab(s) orally once a day RESUME ON 12/14/24  Home PT: Evaluate and Treat  Metoprolol Tartrate 25 mg oral tablet: 1 tab(s) orally 2 times a day   mupirocin 2% topical ointment: Apply topically to affected area once a day 1 Apply topically to right foot wound during daily dressing change  Nephro-Ernie oral tablet: 1 tab(s) orally once a day   ocular lubricant preservative-free ophthalmic solution: 1 drop(s) to each affected eye 4 times a day as needed for  dry eyes  pantoprazole 40 mg oral delayed release tablet: 1 tab(s) orally once a day (before a meal)  rosuvastatin 10 mg oral tablet: 1 tab(s) orally once a day (at bedtime)  Synthroid 25 mcg (0.025 mg) oral tablet: 1 tab(s) orally once a day  venlafaxine 75 mg oral capsule, extended release: 1 cap(s) orally once a day

## 2024-12-13 NOTE — DISCHARGE NOTE PROVIDER - CARE PROVIDERS DIRECT ADDRESSES
,[email or web address],DirectAddress_Unknown,DirectAddress_Unknown

## 2024-12-13 NOTE — DISCHARGE NOTE PROVIDER - ATTENDING DISCHARGE PHYSICAL EXAMINATION:
Vital Signs Last 24 Hrs  T(C): 36.7 (13 Dec 2024 12:27), Max: 36.9 (12 Dec 2024 23:57)  T(F): 98 (13 Dec 2024 12:27), Max: 98.4 (12 Dec 2024 23:57)  HR: 70 (13 Dec 2024 12:27) (57 - 70)  BP: 128/76 (13 Dec 2024 12:27) (128/76 - 158/71)  BP(mean): --  RR: 18 (13 Dec 2024 12:27) (18 - 18)  SpO2: 97% (13 Dec 2024 12:27) (95% - 97%)    Parameters below as of 13 Dec 2024 12:27  Patient On (Oxygen Delivery Method): room air    CONSTITUTIONAL: Well-developed, well-groomed, in no apparent distress  EYES: mildly retracted lower eyelid, bilaterally.   RESPIRATORY: Breathing comfortably; lungs CTA without wheeze/rhonchi/rales  CARDIOVASCULAR: +S1S2, +systolic murmur, no lower extremity edema  GASTROINTESTINAL: soft, nontender, nondistended  NEUROLOGIC: CN II-XII intact; normal reflexes in upper and lower extremities; sensation intact in LEs b/l to light touch  PSYCHIATRIC: A+O x 3; mood and affect appropriate; appropriate insight and judgment

## 2024-12-13 NOTE — DISCHARGE NOTE PROVIDER - PROVIDER TOKENS
PROVIDER:[TOKEN:[[id number]],FOLLOWUP:[1-3 days],ESTABLISHEDPATIENT:[T]] PROVIDER:[TOKEN:[[id number]],FOLLOWUP:[1-3 days],ESTABLISHEDPATIENT:[T]],PROVIDER:[TOKEN:[[id number]],FOLLOWUP:[1 week]],PROVIDER:[TOKEN:[[id number]],FOLLOWUP:[Routine]]

## 2024-12-13 NOTE — DISCHARGE NOTE PROVIDER - NSDCFUSCHEDAPPT_GEN_ALL_CORE_FT
Santi Coleman  [facility] Physician [facility]  CARDIOLOGY [address] [facility]   Scheduled Appointment: 01/09/2025

## 2024-12-13 NOTE — DISCHARGE NOTE NURSING/CASE MANAGEMENT/SOCIAL WORK - NSDCVIVACCINE_GEN_ALL_CORE_FT
influenza, injectable, quadrivalent, preservative free; 13-Oct-2020 13:18; Renetta Lyn (RN); [employer]; [id number] (Exp. Date: 30-Jun-2021); IntraMuscular; Deltoid Right.; 0.5 milliLiter(s); VIS (VIS Published: 15-Aug-2019, VIS Presented: 13-Oct-2020);   Tdap; 08-Oct-2020 15:26; Lorna Mcneal (RN); [employer]; [id number] (Exp. Date: 09-Dec-2021); IntraMuscular; Dorsogluteal Left.; 0.5 milliLiter(s); VIS (VIS Published: 09-May-2013, VIS Presented: 08-Oct-2020);   Tdap; 21-Jan-2021 06:53; Yrn Kurtz (RN); [employer]; [id number] (Exp. Date: 21-Jul-2022); IntraMuscular; Deltoid Right.; 0.5 milliLiter(s); VIS (VIS Published: 09-May-2013, VIS Presented: 21-Jan-2021);

## 2024-12-13 NOTE — DISCHARGE NOTE PROVIDER - CARE PROVIDER_API CALL
Santi Coleman  Cardiovascular Disease  [address], [address]  [place] [address]  Phone: [phone]  Fax: [phone]  Established Patient  Follow Up Time: 1-3 days   Santi Coleman  Cardiovascular Disease  [address]  [place] [address]  Phone: [phone]  Fax: [phone]  Established Patient  Follow Up Time: 1-3 days    Soumya Escobar  Nephrology  [address]  [place] [address]  Phone: [phone]  Fax: [phone]  Follow Up Time: 1 week    Mamta Whitt  Foot and Ankle Surgery  [address], [address] [place] [address]  Phone: [phone]  Fax: [phone]  Follow Up Time: Routine

## 2024-12-13 NOTE — DISCHARGE NOTE PROVIDER - NSDCCPCAREPLAN_GEN_ALL_CORE_FT
PRINCIPAL DISCHARGE DIAGNOSIS  Diagnosis: Weakness  Assessment and Plan of Treatment: Improved after treatment of UTI . Outpatient follow up with PCP . HOme Physical therapy  for deconditioning      SECONDARY DISCHARGE DIAGNOSES  Diagnosis: Delirium  Assessment and Plan of Treatment: improved , outpatient follow up with PMD    Diagnosis: Essential hypertension  Assessment and Plan of Treatment: Follow up with your medical doctor to establish long term blood pressure treatment goals.      Diagnosis: Stage 3 chronic kidney disease  Assessment and Plan of Treatment: Avoid taking (NSAIDs) - (ex: Ibuprofen, Advil, Celebrex, Naprosyn)  Avoid taking any nephrotoxic agents (can harm kidneys) - Intravenous contrast for diagnostic testing, combination cold medications.  Have all medications adjusted for your renal function by your Health Care Provider.  Blood pressure control is important.  Take all medication as prescribed.      Diagnosis: Severe aortic stenosis  Assessment and Plan of Treatment: outpatient follow up with cardiology    Diagnosis: Acute UTI  Assessment and Plan of Treatment: HOME CARE INSTRUCTIONS  f you were prescribed antibiotics, take them exactly as your caregiver instructs you. Finish the medication even if you feel better after you have only taken some of the medication.  Drink enough water and fluids to keep your urine clear or pale yellow.  Avoid caffeine, tea, and carbonated beverages. They tend to irritate your bladder.  Empty your bladder often. Avoid holding urine for long periods of time.  Empty your bladder before and after sexual intercourse.  After a bowel movement, women should cleanse from front to back. Use each tissue only once.  SEEK MEDICAL CARE IF:  You have back pain.  You develop a fever.  Your symptoms do not begin to resolve within 3 days.  SEEK IMMEDIATE MEDICAL CARE IF:  You have severe back pain or lower abdominal pain.  You develop chills.  You have nausea or vomiting.  You have continued burning or discomfort with urination.       PRINCIPAL DISCHARGE DIAGNOSIS  Diagnosis: Weakness  Assessment and Plan of Treatment: Improved after treatment of UTI . Outpatient follow up with PCP . Home Physical therapy  for deconditioning      SECONDARY DISCHARGE DIAGNOSES  Diagnosis: Acute UTI  Assessment and Plan of Treatment: HOME CARE INSTRUCTIONS  If you were prescribed antibiotics, take them exactly as your caregiver instructs you. Finish the medication even if you feel better after you have only taken some of the medication.  Drink enough water and fluids to keep your urine clear or pale yellow.  Avoid caffeine, tea, and carbonated beverages. They tend to irritate your bladder.  Empty your bladder often. Avoid holding urine for long periods of time.  Empty your bladder before and after sexual intercourse.  After a bowel movement, women should cleanse from front to back. Use each tissue only once.  SEEK MEDICAL CARE IF:  You have back pain.  You develop a fever.  Your symptoms do not begin to resolve within 3 days.  SEEK IMMEDIATE MEDICAL CARE IF:  You have severe back pain or lower abdominal pain.  You develop chills.  You have nausea or vomiting.  You have continued burning or discomfort with urination.      Diagnosis: Stage 3 chronic kidney disease  Assessment and Plan of Treatment: Avoid taking (NSAIDs) - (ex: Ibuprofen, Advil, Celebrex, Naprosyn)  Avoid taking any nephrotoxic agents (can harm kidneys) - Intravenous contrast for diagnostic testing, combination cold medications.  Have all medications adjusted for your renal function by your Health Care Provider.  Blood pressure control is important.  Take all medication as prescribed.  Please follow up with Dr. Escobar Nephrology for continued management and care.    Diagnosis: Severe aortic stenosis  Assessment and Plan of Treatment: outpatient follow up with cardiology. Cleared to resume home dose lasix on 12/14/24    Diagnosis: Essential hypertension  Assessment and Plan of Treatment: Follow up with your medical doctor to establish long term blood pressure treatment goals.  Low salt diet  Activity as tolerated.  Take all medication as prescribed.  Follow up with your medical doctor for routine blood pressure monitoring at your next visit.  Notify your doctor if you have any of the following symptoms:   Dizziness, Lightheadedness, Blurry vision, Headache, Chest pain, Shortness of breath      Diagnosis: Delirium  Assessment and Plan of Treatment: improved , outpatient follow up with PMD    Diagnosis: Wound of right foot  Assessment and Plan of Treatment: Seen by podiatry. Recommend daily dressing changes with Mupirocin and Allevyn foam. Outpatient follow up for routine care.

## 2024-12-13 NOTE — DISCHARGE NOTE NURSING/CASE MANAGEMENT/SOCIAL WORK - NSDCFUADDAPPT_GEN_ALL_CORE_FT
APPTS ARE READY TO BE MADE: [X] YES    Best Family or Patient Contact (if needed):    Additional Information about above appointments (if needed):    1: cardiology (PCP)  2: nephrology  3: podiatry    Other comments or requests:

## 2024-12-16 ENCOUNTER — NON-APPOINTMENT (OUTPATIENT)
Age: 88
End: 2024-12-16

## 2024-12-16 LAB
CULTURE RESULTS: SIGNIFICANT CHANGE UP
SPECIMEN SOURCE: SIGNIFICANT CHANGE UP

## 2024-12-17 ENCOUNTER — APPOINTMENT (OUTPATIENT)
Dept: CARDIOLOGY | Facility: CLINIC | Age: 88
End: 2024-12-17
Payer: MEDICARE

## 2024-12-17 VITALS
SYSTOLIC BLOOD PRESSURE: 122 MMHG | DIASTOLIC BLOOD PRESSURE: 73 MMHG | HEART RATE: 62 BPM | BODY MASS INDEX: 24.03 KG/M2 | WEIGHT: 140 LBS

## 2024-12-17 DIAGNOSIS — N28.9 DISORDER OF KIDNEY AND URETER, UNSPECIFIED: ICD-10-CM

## 2024-12-17 DIAGNOSIS — I35.0 NONRHEUMATIC AORTIC (VALVE) STENOSIS: ICD-10-CM

## 2024-12-17 DIAGNOSIS — E11.9 TYPE 2 DIABETES MELLITUS W/OUT COMPLICATIONS: ICD-10-CM

## 2024-12-17 PROBLEM — U07.1 COVID-19: Chronic | Status: ACTIVE | Noted: 2024-12-11

## 2024-12-17 PROBLEM — K92.2 GASTROINTESTINAL HEMORRHAGE, UNSPECIFIED: Chronic | Status: ACTIVE | Noted: 2024-12-11

## 2024-12-17 LAB
CULTURE RESULTS: SIGNIFICANT CHANGE UP
SPECIMEN SOURCE: SIGNIFICANT CHANGE UP

## 2024-12-17 PROCEDURE — 99214 OFFICE O/P EST MOD 30 MIN: CPT

## 2024-12-17 PROCEDURE — G2211 COMPLEX E/M VISIT ADD ON: CPT

## 2024-12-18 NOTE — ED ADULT NURSE NOTE - SEPSIS REFERENCE DATA CRITERIA 1
Patient declined medication review with VVF during rooming, patient stating everything is up to date.         Current patient location: [address] [place] [address]    Is the patient currently in the state of [place]? YES    Visit mode:VIDEO    If the visit is dropped, the patient can be reconnected by:VIDEO VISIT: Text to cell phone:   Telephone Information:   Mobile [phone]    and VIDEO VISIT: Send to e-mail at: [email or web address]    Will anyone else be joining the visit? NO  (If patient encounters technical issues they should call [phone])    Are changes needed to the allergy or medication list? Pt declined med review and Pt stated no med changes    Are refills needed on medications prescribed by this physician? NO    Rooming Documentation:  Questionnaire(s) completed    Reason for visit: Consult    Mandi Neal VVF     
Abormal VS: Temp > 100F or < 96.8F; SBP < 90 mmHG; HR > 120bpm; Resp > 24/min

## 2024-12-19 NOTE — ED ADULT NURSE NOTE - CINV DISCH TEACH PARTICIP
Karissa Wang is a 28 y.o. female presents for a new pregnancy visit.    Chief Complaint   Patient presents with    Initial Prenatal Visit    Pregnancy Ultrasound       Patient's last menstrual period was 09/15/2024 (exact date).    Last Pap: 2023 NILM    LMP history:  The date of her LMP is  certain.  Her last menstrual period was normal and lasted for 4 to 5 days.  She was not on the pill at conception.     Based on her LMP, her EDC is 2025 and her EGA is 13 weeks,4 days. Her menstrual cycles are regular and occur approximately every 28 days and range from 3 to 5 days. The last menses lasted  the usual number of days.      Ultrasound data:  She had an ultrasound done by the ultrasound tech today which revealed a viable cobos pregnancy with a gestational age of 9 weeks and 5 days giving an EDC of 2025.    Pregnancy symptoms:    Since her LMP she has experienced urinary frequency, breast tenderness, and nausea.   She has not been vomiting over the last few weeks.  Associated signs and symptoms which she denies: dysuria, discharge, vaginal bleeding.    She states she has gained weight:  Approximately 5 pounds over the last few weeks.    Relevant past pregnancy history:   She has the following pregnancy history: G1    She has no history of  delivery.    Relevant past medical history:(relevant to this pregnancy): noncontributory.      Her occupation is: .     1. Have you been to the ER, urgent care clinic, or hospitalized since your last visit? No    2. Have you seen or consulted any other health care providers outside of the [facility] since your last visit? No       Patient/Family

## 2024-12-23 ENCOUNTER — RX RENEWAL (OUTPATIENT)
Age: 88
End: 2024-12-23

## 2024-12-28 ENCOUNTER — RX RENEWAL (OUTPATIENT)
Age: 88
End: 2024-12-28

## 2025-01-04 ENCOUNTER — NON-APPOINTMENT (OUTPATIENT)
Age: 89
End: 2025-01-04

## 2025-01-04 ENCOUNTER — INPATIENT (INPATIENT)
Facility: HOSPITAL | Age: 89
LOS: 13 days | Discharge: HOME HEALTH | End: 2025-01-18
Attending: INTERNAL MEDICINE | Admitting: INTERNAL MEDICINE
Payer: MEDICARE

## 2025-01-04 VITALS
HEIGHT: 64 IN | OXYGEN SATURATION: 97 % | DIASTOLIC BLOOD PRESSURE: 85 MMHG | RESPIRATION RATE: 18 BRPM | WEIGHT: 121.92 LBS | SYSTOLIC BLOOD PRESSURE: 141 MMHG | TEMPERATURE: 98 F

## 2025-01-04 LAB
ADD ON TEST-SPECIMEN IN LAB: SIGNIFICANT CHANGE UP
ALBUMIN SERPL ELPH-MCNC: 3.4 G/DL — SIGNIFICANT CHANGE UP (ref 3.3–5)
ALP SERPL-CCNC: 74 U/L — SIGNIFICANT CHANGE UP (ref 40–120)
ALT FLD-CCNC: 22 U/L — SIGNIFICANT CHANGE UP (ref 4–33)
ANION GAP SERPL CALC-SCNC: 12 MMOL/L — SIGNIFICANT CHANGE UP (ref 7–14)
AST SERPL-CCNC: 25 U/L — SIGNIFICANT CHANGE UP (ref 4–32)
BASOPHILS # BLD AUTO: 0.02 K/UL — SIGNIFICANT CHANGE UP (ref 0–0.2)
BASOPHILS NFR BLD AUTO: 0.2 % — SIGNIFICANT CHANGE UP (ref 0–2)
BILIRUB SERPL-MCNC: 0.3 MG/DL — SIGNIFICANT CHANGE UP (ref 0.2–1.2)
BUN SERPL-MCNC: 29 MG/DL — HIGH (ref 7–23)
CALCIUM SERPL-MCNC: 9.4 MG/DL — SIGNIFICANT CHANGE UP (ref 8.4–10.5)
CHLORIDE SERPL-SCNC: 107 MMOL/L — SIGNIFICANT CHANGE UP (ref 98–107)
CO2 SERPL-SCNC: 22 MMOL/L — SIGNIFICANT CHANGE UP (ref 22–31)
CREAT SERPL-MCNC: 1.22 MG/DL — SIGNIFICANT CHANGE UP (ref 0.5–1.3)
EGFR: 41 ML/MIN/1.73M2 — LOW
EGFR: 41 ML/MIN/1.73M2 — LOW
EOSINOPHIL # BLD AUTO: 0 K/UL — SIGNIFICANT CHANGE UP (ref 0–0.5)
EOSINOPHIL NFR BLD AUTO: 0 % — SIGNIFICANT CHANGE UP (ref 0–6)
FLUAV AG NPH QL: SIGNIFICANT CHANGE UP
FLUBV AG NPH QL: SIGNIFICANT CHANGE UP
GLUCOSE SERPL-MCNC: 251 MG/DL — HIGH (ref 70–99)
HCT VFR BLD CALC: 37.9 % — SIGNIFICANT CHANGE UP (ref 34.5–45)
HGB BLD-MCNC: 12 G/DL — SIGNIFICANT CHANGE UP (ref 11.5–15.5)
IANC: 11.2 K/UL — HIGH (ref 1.8–7.4)
IMM GRANULOCYTES NFR BLD AUTO: 0.7 % — SIGNIFICANT CHANGE UP (ref 0–0.9)
LYMPHOCYTES # BLD AUTO: 0.76 K/UL — LOW (ref 1–3.3)
LYMPHOCYTES # BLD AUTO: 6 % — LOW (ref 13–44)
MCHC RBC-ENTMCNC: 31.7 G/DL — LOW (ref 32–36)
MCHC RBC-ENTMCNC: 31.7 PG — SIGNIFICANT CHANGE UP (ref 27–34)
MCV RBC AUTO: 100 FL — SIGNIFICANT CHANGE UP (ref 80–100)
MONOCYTES # BLD AUTO: 0.64 K/UL — SIGNIFICANT CHANGE UP (ref 0–0.9)
MONOCYTES NFR BLD AUTO: 5 % — SIGNIFICANT CHANGE UP (ref 2–14)
NEUTROPHILS # BLD AUTO: 11.2 K/UL — HIGH (ref 1.8–7.4)
NEUTROPHILS NFR BLD AUTO: 88.1 % — HIGH (ref 43–77)
NRBC # BLD AUTO: 0 K/UL — SIGNIFICANT CHANGE UP (ref 0–0)
NRBC # BLD: 0 /100 WBCS — SIGNIFICANT CHANGE UP (ref 0–0)
NRBC # FLD: 0 K/UL — SIGNIFICANT CHANGE UP (ref 0–0)
NRBC BLD-RTO: 0 /100 WBCS — SIGNIFICANT CHANGE UP (ref 0–0)
NT-PROBNP SERPL-SCNC: HIGH PG/ML
PLATELET # BLD AUTO: 188 K/UL — SIGNIFICANT CHANGE UP (ref 150–400)
POTASSIUM SERPL-MCNC: 4.9 MMOL/L — SIGNIFICANT CHANGE UP (ref 3.5–5.3)
POTASSIUM SERPL-SCNC: 4.9 MMOL/L — SIGNIFICANT CHANGE UP (ref 3.5–5.3)
PROT SERPL-MCNC: 6.8 G/DL — SIGNIFICANT CHANGE UP (ref 6–8.3)
RBC # BLD: 3.79 M/UL — LOW (ref 3.8–5.2)
RBC # FLD: 13.5 % — SIGNIFICANT CHANGE UP (ref 10.3–14.5)
RSV RNA NPH QL NAA+NON-PROBE: SIGNIFICANT CHANGE UP
SARS-COV-2 RNA SPEC QL NAA+PROBE: SIGNIFICANT CHANGE UP
SODIUM SERPL-SCNC: 141 MMOL/L — SIGNIFICANT CHANGE UP (ref 135–145)
WBC # BLD: 12.71 K/UL — HIGH (ref 3.8–10.5)
WBC # FLD AUTO: 12.71 K/UL — HIGH (ref 3.8–10.5)

## 2025-01-04 PROCEDURE — 99291 CRITICAL CARE FIRST HOUR: CPT | Mod: GC

## 2025-01-04 PROCEDURE — 71045 X-RAY EXAM CHEST 1 VIEW: CPT | Mod: 26

## 2025-01-05 DIAGNOSIS — J96.01 ACUTE RESPIRATORY FAILURE WITH HYPOXIA: ICD-10-CM

## 2025-01-05 DIAGNOSIS — E87.29 OTHER ACIDOSIS: ICD-10-CM

## 2025-01-05 DIAGNOSIS — I48.91 UNSPECIFIED ATRIAL FIBRILLATION: ICD-10-CM

## 2025-01-05 DIAGNOSIS — Z29.9 ENCOUNTER FOR PROPHYLACTIC MEASURES, UNSPECIFIED: ICD-10-CM

## 2025-01-05 DIAGNOSIS — J18.9 PNEUMONIA, UNSPECIFIED ORGANISM: ICD-10-CM

## 2025-01-05 DIAGNOSIS — N18.9 CHRONIC KIDNEY DISEASE, UNSPECIFIED: ICD-10-CM

## 2025-01-05 DIAGNOSIS — I35.0 NONRHEUMATIC AORTIC (VALVE) STENOSIS: ICD-10-CM

## 2025-01-05 DIAGNOSIS — E11.9 TYPE 2 DIABETES MELLITUS WITHOUT COMPLICATIONS: ICD-10-CM

## 2025-01-05 DIAGNOSIS — E78.5 HYPERLIPIDEMIA, UNSPECIFIED: ICD-10-CM

## 2025-01-05 DIAGNOSIS — E03.9 HYPOTHYROIDISM, UNSPECIFIED: ICD-10-CM

## 2025-01-05 DIAGNOSIS — I10 ESSENTIAL (PRIMARY) HYPERTENSION: ICD-10-CM

## 2025-01-05 DIAGNOSIS — I50.33 ACUTE ON CHRONIC DIASTOLIC (CONGESTIVE) HEART FAILURE: ICD-10-CM

## 2025-01-05 LAB
ADD ON TEST-SPECIMEN IN LAB: SIGNIFICANT CHANGE UP
ALBUMIN SERPL ELPH-MCNC: 3.5 G/DL — SIGNIFICANT CHANGE UP (ref 3.3–5)
ALP SERPL-CCNC: 77 U/L — SIGNIFICANT CHANGE UP (ref 40–120)
ALT FLD-CCNC: 20 U/L — SIGNIFICANT CHANGE UP (ref 4–33)
ANION GAP SERPL CALC-SCNC: 14 MMOL/L — SIGNIFICANT CHANGE UP (ref 7–14)
AST SERPL-CCNC: 22 U/L — SIGNIFICANT CHANGE UP (ref 4–32)
BILIRUB SERPL-MCNC: 0.4 MG/DL — SIGNIFICANT CHANGE UP (ref 0.2–1.2)
BLOOD GAS VENOUS COMPREHENSIVE RESULT: SIGNIFICANT CHANGE UP
BUN SERPL-MCNC: 28 MG/DL — HIGH (ref 7–23)
CALCIUM SERPL-MCNC: 9.5 MG/DL — SIGNIFICANT CHANGE UP (ref 8.4–10.5)
CHLORIDE SERPL-SCNC: 105 MMOL/L — SIGNIFICANT CHANGE UP (ref 98–107)
CO2 SERPL-SCNC: 21 MMOL/L — LOW (ref 22–31)
CREAT SERPL-MCNC: 1.27 MG/DL — SIGNIFICANT CHANGE UP (ref 0.5–1.3)
EGFR: 39 ML/MIN/1.73M2 — LOW
EGFR: 39 ML/MIN/1.73M2 — LOW
GAS PNL BLDV: SIGNIFICANT CHANGE UP
GLUCOSE SERPL-MCNC: 310 MG/DL — HIGH (ref 70–99)
MAGNESIUM SERPL-MCNC: 2.1 MG/DL — SIGNIFICANT CHANGE UP (ref 1.6–2.6)
POTASSIUM SERPL-MCNC: 4.3 MMOL/L — SIGNIFICANT CHANGE UP (ref 3.5–5.3)
POTASSIUM SERPL-SCNC: 4.3 MMOL/L — SIGNIFICANT CHANGE UP (ref 3.5–5.3)
PROT SERPL-MCNC: 6.9 G/DL — SIGNIFICANT CHANGE UP (ref 6–8.3)
SODIUM SERPL-SCNC: 140 MMOL/L — SIGNIFICANT CHANGE UP (ref 135–145)
TROPONIN T, HIGH SENSITIVITY RESULT: 51 NG/L — HIGH
TSH SERPL-MCNC: 3.07 UIU/ML — SIGNIFICANT CHANGE UP (ref 0.27–4.2)

## 2025-01-05 PROCEDURE — 71275 CT ANGIOGRAPHY CHEST: CPT | Mod: 26,MC

## 2025-01-05 PROCEDURE — 99223 1ST HOSP IP/OBS HIGH 75: CPT

## 2025-01-05 RX ORDER — IPRATROPIUM BROMIDE AND ALBUTEROL SULFATE .5; 2.5 MG/3ML; MG/3ML
3 SOLUTION RESPIRATORY (INHALATION) ONCE
Refills: 0 | Status: COMPLETED | OUTPATIENT
Start: 2025-01-05 | End: 2025-01-05

## 2025-01-05 RX ORDER — ROSUVASTATIN CALCIUM 20 MG/1
10 TABLET, FILM COATED ORAL AT BEDTIME
Refills: 0 | Status: DISCONTINUED | OUTPATIENT
Start: 2025-01-05 | End: 2025-01-18

## 2025-01-05 RX ORDER — INSULIN LISPRO 100 U/ML
INJECTION, SOLUTION INTRAVENOUS; SUBCUTANEOUS AT BEDTIME
Refills: 0 | Status: DISCONTINUED | OUTPATIENT
Start: 2025-01-05 | End: 2025-01-09

## 2025-01-05 RX ORDER — HEPARIN SODIUM 1000 [USP'U]/ML
5000 INJECTION INTRAVENOUS; SUBCUTANEOUS EVERY 8 HOURS
Refills: 0 | Status: DISCONTINUED | OUTPATIENT
Start: 2025-01-05 | End: 2025-01-18

## 2025-01-05 RX ORDER — SODIUM CHLORIDE 9 G/1000ML
1000 INJECTION, SOLUTION INTRAVENOUS
Refills: 0 | Status: DISCONTINUED | OUTPATIENT
Start: 2025-01-05 | End: 2025-01-09

## 2025-01-05 RX ORDER — HALOPERIDOL 10 MG/1
2.5 TABLET ORAL ONCE
Refills: 0 | Status: DISCONTINUED | OUTPATIENT
Start: 2025-01-05 | End: 2025-01-05

## 2025-01-05 RX ORDER — FUROSEMIDE 40 MG/1
1 TABLET ORAL
Qty: 0 | Refills: 0 | DISCHARGE

## 2025-01-05 RX ORDER — VENLAFAXINE HYDROCHLORIDE 37.5 MG/1
75 CAPSULE, EXTENDED RELEASE ORAL DAILY
Refills: 0 | Status: DISCONTINUED | OUTPATIENT
Start: 2025-01-05 | End: 2025-01-18

## 2025-01-05 RX ORDER — DEXTROSE 50 % IN WATER 50 %
12.5 SYRINGE (ML) INTRAVENOUS ONCE
Refills: 0 | Status: DISCONTINUED | OUTPATIENT
Start: 2025-01-05 | End: 2025-01-09

## 2025-01-05 RX ORDER — ACETAMINOPHEN 500 MG/5ML
650 LIQUID (ML) ORAL EVERY 6 HOURS
Refills: 0 | Status: DISCONTINUED | OUTPATIENT
Start: 2025-01-05 | End: 2025-01-18

## 2025-01-05 RX ORDER — GLUCAGON 3 MG/1
1 POWDER NASAL ONCE
Refills: 0 | Status: DISCONTINUED | OUTPATIENT
Start: 2025-01-05 | End: 2025-01-09

## 2025-01-05 RX ORDER — LEVOTHYROXINE SODIUM 300 MCG
17 TABLET ORAL AT BEDTIME
Refills: 0 | Status: DISCONTINUED | OUTPATIENT
Start: 2025-01-05 | End: 2025-01-06

## 2025-01-05 RX ORDER — MAGNESIUM SULFATE 500 MG/ML
2 SYRINGE (ML) INJECTION ONCE
Refills: 0 | Status: COMPLETED | OUTPATIENT
Start: 2025-01-05 | End: 2025-01-05

## 2025-01-05 RX ORDER — PIPERACILLIN-TAZO-DEXTROSE,ISO 3.375G/5
3.38 IV SOLUTION, PIGGYBACK PREMIX FROZEN(ML) INTRAVENOUS EVERY 8 HOURS
Refills: 0 | Status: DISCONTINUED | OUTPATIENT
Start: 2025-01-05 | End: 2025-01-11

## 2025-01-05 RX ORDER — VANCOMYCIN HCL IN 5 % DEXTROSE 1.5G/250ML
1000 PLASTIC BAG, INJECTION (ML) INTRAVENOUS ONCE
Refills: 0 | Status: COMPLETED | OUTPATIENT
Start: 2025-01-05 | End: 2025-01-05

## 2025-01-05 RX ORDER — DEXTROSE 50 % IN WATER 50 %
15 SYRINGE (ML) INTRAVENOUS ONCE
Refills: 0 | Status: DISCONTINUED | OUTPATIENT
Start: 2025-01-05 | End: 2025-01-09

## 2025-01-05 RX ORDER — METOPROLOL SUCCINATE 50 MG/1
5 TABLET, EXTENDED RELEASE ORAL EVERY 6 HOURS
Refills: 0 | Status: DISCONTINUED | OUTPATIENT
Start: 2025-01-05 | End: 2025-01-06

## 2025-01-05 RX ORDER — DEXTROSE 50 % IN WATER 50 %
25 SYRINGE (ML) INTRAVENOUS ONCE
Refills: 0 | Status: DISCONTINUED | OUTPATIENT
Start: 2025-01-05 | End: 2025-01-09

## 2025-01-05 RX ORDER — INSULIN LISPRO 100 U/ML
INJECTION, SOLUTION INTRAVENOUS; SUBCUTANEOUS
Refills: 0 | Status: DISCONTINUED | OUTPATIENT
Start: 2025-01-05 | End: 2025-01-09

## 2025-01-05 RX ORDER — LEVOTHYROXINE SODIUM 300 MCG
25 TABLET ORAL DAILY
Refills: 0 | Status: DISCONTINUED | OUTPATIENT
Start: 2025-01-05 | End: 2025-01-05

## 2025-01-05 RX ORDER — METHYLPREDNISOLONE ACETATE 80 MG/ML
125 INJECTION, SUSPENSION INTRA-ARTICULAR; INTRALESIONAL; INTRAMUSCULAR; SOFT TISSUE ONCE
Refills: 0 | Status: COMPLETED | OUTPATIENT
Start: 2025-01-05 | End: 2025-01-05

## 2025-01-05 RX ORDER — PIPERACILLIN-TAZO-DEXTROSE,ISO 3.375G/5
3.38 IV SOLUTION, PIGGYBACK PREMIX FROZEN(ML) INTRAVENOUS ONCE
Refills: 0 | Status: COMPLETED | OUTPATIENT
Start: 2025-01-05 | End: 2025-01-05

## 2025-01-05 RX ORDER — VANCOMYCIN HCL IN 5 % DEXTROSE 1.5G/250ML
1000 PLASTIC BAG, INJECTION (ML) INTRAVENOUS EVERY 12 HOURS
Refills: 0 | Status: DISCONTINUED | OUTPATIENT
Start: 2025-01-05 | End: 2025-01-05

## 2025-01-05 RX ADMIN — IPRATROPIUM BROMIDE AND ALBUTEROL SULFATE 3 MILLILITER(S): .5; 2.5 SOLUTION RESPIRATORY (INHALATION) at 00:20

## 2025-01-05 RX ADMIN — INSULIN LISPRO 3: 100 INJECTION, SOLUTION INTRAVENOUS; SUBCUTANEOUS at 18:08

## 2025-01-05 RX ADMIN — METHYLPREDNISOLONE ACETATE 125 MILLIGRAM(S): 80 INJECTION, SUSPENSION INTRA-ARTICULAR; INTRALESIONAL; INTRAMUSCULAR; SOFT TISSUE at 00:20

## 2025-01-05 RX ADMIN — HEPARIN SODIUM 5000 UNIT(S): 1000 INJECTION INTRAVENOUS; SUBCUTANEOUS at 21:33

## 2025-01-05 RX ADMIN — Medication 25 GRAM(S): at 17:38

## 2025-01-05 RX ADMIN — ROSUVASTATIN CALCIUM 10 MILLIGRAM(S): 20 TABLET, FILM COATED ORAL at 21:33

## 2025-01-05 RX ADMIN — Medication 150 GRAM(S): at 00:20

## 2025-01-05 RX ADMIN — Medication 250 MILLIGRAM(S): at 02:48

## 2025-01-05 RX ADMIN — HEPARIN SODIUM 5000 UNIT(S): 1000 INJECTION INTRAVENOUS; SUBCUTANEOUS at 15:50

## 2025-01-05 RX ADMIN — METOPROLOL SUCCINATE 5 MILLIGRAM(S): 50 TABLET, EXTENDED RELEASE ORAL at 17:39

## 2025-01-05 RX ADMIN — Medication 200 GRAM(S): at 01:22

## 2025-01-05 RX ADMIN — Medication 17 MICROGRAM(S): at 22:06

## 2025-01-05 RX ADMIN — Medication 25 GRAM(S): at 09:22

## 2025-01-06 LAB
ANION GAP SERPL CALC-SCNC: 13 MMOL/L — SIGNIFICANT CHANGE UP (ref 7–14)
BUN SERPL-MCNC: 27 MG/DL — HIGH (ref 7–23)
CALCIUM SERPL-MCNC: 9.4 MG/DL — SIGNIFICANT CHANGE UP (ref 8.4–10.5)
CHLORIDE SERPL-SCNC: 109 MMOL/L — HIGH (ref 98–107)
CO2 SERPL-SCNC: 21 MMOL/L — LOW (ref 22–31)
CREAT SERPL-MCNC: 1.38 MG/DL — HIGH (ref 0.5–1.3)
EGFR: 35 ML/MIN/1.73M2 — LOW
EGFR: 35 ML/MIN/1.73M2 — LOW
GAS PNL BLDV: SIGNIFICANT CHANGE UP
GLUCOSE BLDC GLUCOMTR-MCNC: 182 MG/DL — HIGH (ref 70–99)
GLUCOSE BLDC GLUCOMTR-MCNC: 190 MG/DL — HIGH (ref 70–99)
GLUCOSE BLDC GLUCOMTR-MCNC: 228 MG/DL — HIGH (ref 70–99)
GLUCOSE SERPL-MCNC: 204 MG/DL — HIGH (ref 70–99)
HCT VFR BLD CALC: 35.7 % — SIGNIFICANT CHANGE UP (ref 34.5–45)
HGB BLD-MCNC: 11.6 G/DL — SIGNIFICANT CHANGE UP (ref 11.5–15.5)
LACTATE SERPL-SCNC: 1.1 MMOL/L — SIGNIFICANT CHANGE UP (ref 0.5–2)
MAGNESIUM SERPL-MCNC: 2.3 MG/DL — SIGNIFICANT CHANGE UP (ref 1.6–2.6)
MCHC RBC-ENTMCNC: 32.3 PG — SIGNIFICANT CHANGE UP (ref 27–34)
MCHC RBC-ENTMCNC: 32.5 G/DL — SIGNIFICANT CHANGE UP (ref 32–36)
MCV RBC AUTO: 99.4 FL — SIGNIFICANT CHANGE UP (ref 80–100)
MRSA PCR RESULT.: SIGNIFICANT CHANGE UP
NRBC # BLD AUTO: 0 K/UL — SIGNIFICANT CHANGE UP (ref 0–0)
NRBC # BLD: 0 /100 WBCS — SIGNIFICANT CHANGE UP (ref 0–0)
NRBC # FLD: 0 K/UL — SIGNIFICANT CHANGE UP (ref 0–0)
NRBC BLD-RTO: 0 /100 WBCS — SIGNIFICANT CHANGE UP (ref 0–0)
PHOSPHATE SERPL-MCNC: 2.9 MG/DL — SIGNIFICANT CHANGE UP (ref 2.5–4.5)
PLATELET # BLD AUTO: 203 K/UL — SIGNIFICANT CHANGE UP (ref 150–400)
POTASSIUM SERPL-MCNC: 4.8 MMOL/L — SIGNIFICANT CHANGE UP (ref 3.5–5.3)
POTASSIUM SERPL-SCNC: 4.8 MMOL/L — SIGNIFICANT CHANGE UP (ref 3.5–5.3)
RBC # BLD: 3.59 M/UL — LOW (ref 3.8–5.2)
RBC # FLD: 13.6 % — SIGNIFICANT CHANGE UP (ref 10.3–14.5)
S AUREUS DNA NOSE QL NAA+PROBE: DETECTED
SODIUM SERPL-SCNC: 143 MMOL/L — SIGNIFICANT CHANGE UP (ref 135–145)
VANCOMYCIN FLD-MCNC: 7.6 UG/ML — SIGNIFICANT CHANGE UP
WBC # BLD: 15.73 K/UL — HIGH (ref 3.8–10.5)
WBC # FLD AUTO: 15.73 K/UL — HIGH (ref 3.8–10.5)

## 2025-01-06 PROCEDURE — 99232 SBSQ HOSP IP/OBS MODERATE 35: CPT | Mod: GC

## 2025-01-06 RX ORDER — AMLODIPINE BESYLATE 10 MG/1
2.5 TABLET ORAL DAILY
Refills: 0 | Status: DISCONTINUED | OUTPATIENT
Start: 2025-01-06 | End: 2025-01-17

## 2025-01-06 RX ORDER — METOPROLOL SUCCINATE 50 MG/1
25 TABLET, EXTENDED RELEASE ORAL
Refills: 0 | Status: DISCONTINUED | OUTPATIENT
Start: 2025-01-07 | End: 2025-01-18

## 2025-01-06 RX ORDER — LEVOTHYROXINE SODIUM 300 MCG
25 TABLET ORAL DAILY
Refills: 0 | Status: DISCONTINUED | OUTPATIENT
Start: 2025-01-06 | End: 2025-01-18

## 2025-01-06 RX ORDER — VANCOMYCIN HCL IN 5 % DEXTROSE 1.5G/250ML
1000 PLASTIC BAG, INJECTION (ML) INTRAVENOUS ONCE
Refills: 0 | Status: COMPLETED | OUTPATIENT
Start: 2025-01-06 | End: 2025-01-06

## 2025-01-06 RX ADMIN — ROSUVASTATIN CALCIUM 10 MILLIGRAM(S): 20 TABLET, FILM COATED ORAL at 22:01

## 2025-01-06 RX ADMIN — HEPARIN SODIUM 5000 UNIT(S): 1000 INJECTION INTRAVENOUS; SUBCUTANEOUS at 05:15

## 2025-01-06 RX ADMIN — METOPROLOL SUCCINATE 5 MILLIGRAM(S): 50 TABLET, EXTENDED RELEASE ORAL at 01:59

## 2025-01-06 RX ADMIN — Medication 25 GRAM(S): at 22:01

## 2025-01-06 RX ADMIN — Medication 40 MILLIGRAM(S): at 15:50

## 2025-01-06 RX ADMIN — INSULIN LISPRO 2: 100 INJECTION, SOLUTION INTRAVENOUS; SUBCUTANEOUS at 06:39

## 2025-01-06 RX ADMIN — HEPARIN SODIUM 5000 UNIT(S): 1000 INJECTION INTRAVENOUS; SUBCUTANEOUS at 15:50

## 2025-01-06 RX ADMIN — HEPARIN SODIUM 5000 UNIT(S): 1000 INJECTION INTRAVENOUS; SUBCUTANEOUS at 22:09

## 2025-01-06 RX ADMIN — INSULIN LISPRO 1: 100 INJECTION, SOLUTION INTRAVENOUS; SUBCUTANEOUS at 15:58

## 2025-01-06 RX ADMIN — Medication 250 MILLIGRAM(S): at 07:16

## 2025-01-06 RX ADMIN — Medication 25 GRAM(S): at 15:49

## 2025-01-06 RX ADMIN — Medication 25 GRAM(S): at 02:51

## 2025-01-07 LAB
ANION GAP SERPL CALC-SCNC: 13 MMOL/L — SIGNIFICANT CHANGE UP (ref 7–14)
BUN SERPL-MCNC: 27 MG/DL — HIGH (ref 7–23)
CALCIUM SERPL-MCNC: 9.2 MG/DL — SIGNIFICANT CHANGE UP (ref 8.4–10.5)
CHLORIDE SERPL-SCNC: 108 MMOL/L — HIGH (ref 98–107)
CO2 SERPL-SCNC: 21 MMOL/L — LOW (ref 22–31)
CREAT SERPL-MCNC: 1.4 MG/DL — HIGH (ref 0.5–1.3)
EGFR: 35 ML/MIN/1.73M2 — LOW
EGFR: 35 ML/MIN/1.73M2 — LOW
GAS PNL BLDV: SIGNIFICANT CHANGE UP
GLUCOSE BLDC GLUCOMTR-MCNC: 180 MG/DL — HIGH (ref 70–99)
GLUCOSE BLDC GLUCOMTR-MCNC: 192 MG/DL — HIGH (ref 70–99)
GLUCOSE BLDC GLUCOMTR-MCNC: 194 MG/DL — HIGH (ref 70–99)
GLUCOSE BLDC GLUCOMTR-MCNC: 213 MG/DL — HIGH (ref 70–99)
GLUCOSE SERPL-MCNC: 176 MG/DL — HIGH (ref 70–99)
HCT VFR BLD CALC: 37 % — SIGNIFICANT CHANGE UP (ref 34.5–45)
HGB BLD-MCNC: 11.7 G/DL — SIGNIFICANT CHANGE UP (ref 11.5–15.5)
MAGNESIUM SERPL-MCNC: 2.1 MG/DL — SIGNIFICANT CHANGE UP (ref 1.6–2.6)
MCHC RBC-ENTMCNC: 31.5 PG — SIGNIFICANT CHANGE UP (ref 27–34)
MCHC RBC-ENTMCNC: 31.6 G/DL — LOW (ref 32–36)
MCV RBC AUTO: 99.7 FL — SIGNIFICANT CHANGE UP (ref 80–100)
MRSA PCR RESULT.: SIGNIFICANT CHANGE UP
NRBC # BLD AUTO: 0 K/UL — SIGNIFICANT CHANGE UP (ref 0–0)
NRBC # BLD: 0 /100 WBCS — SIGNIFICANT CHANGE UP (ref 0–0)
NRBC # FLD: 0 K/UL — SIGNIFICANT CHANGE UP (ref 0–0)
NRBC BLD-RTO: 0 /100 WBCS — SIGNIFICANT CHANGE UP (ref 0–0)
PHOSPHATE SERPL-MCNC: 2.5 MG/DL — SIGNIFICANT CHANGE UP (ref 2.5–4.5)
PLATELET # BLD AUTO: 177 K/UL — SIGNIFICANT CHANGE UP (ref 150–400)
POTASSIUM SERPL-MCNC: 4.1 MMOL/L — SIGNIFICANT CHANGE UP (ref 3.5–5.3)
POTASSIUM SERPL-SCNC: 4.1 MMOL/L — SIGNIFICANT CHANGE UP (ref 3.5–5.3)
RBC # BLD: 3.71 M/UL — LOW (ref 3.8–5.2)
RBC # FLD: 13.4 % — SIGNIFICANT CHANGE UP (ref 10.3–14.5)
S AUREUS DNA NOSE QL NAA+PROBE: DETECTED
SODIUM SERPL-SCNC: 142 MMOL/L — SIGNIFICANT CHANGE UP (ref 135–145)
VANCOMYCIN FLD-MCNC: 12.2 UG/ML — SIGNIFICANT CHANGE UP
WBC # BLD: 7.66 K/UL — SIGNIFICANT CHANGE UP (ref 3.8–10.5)
WBC # FLD AUTO: 7.66 K/UL — SIGNIFICANT CHANGE UP (ref 3.8–10.5)

## 2025-01-07 PROCEDURE — 99232 SBSQ HOSP IP/OBS MODERATE 35: CPT | Mod: GC

## 2025-01-07 RX ADMIN — HEPARIN SODIUM 5000 UNIT(S): 1000 INJECTION INTRAVENOUS; SUBCUTANEOUS at 13:12

## 2025-01-07 RX ADMIN — Medication 25 MICROGRAM(S): at 09:08

## 2025-01-07 RX ADMIN — Medication 25 GRAM(S): at 13:12

## 2025-01-07 RX ADMIN — INSULIN LISPRO 1: 100 INJECTION, SOLUTION INTRAVENOUS; SUBCUTANEOUS at 13:10

## 2025-01-07 RX ADMIN — ROSUVASTATIN CALCIUM 10 MILLIGRAM(S): 20 TABLET, FILM COATED ORAL at 22:58

## 2025-01-07 RX ADMIN — Medication 4 MILLILITER(S): at 22:28

## 2025-01-07 RX ADMIN — INSULIN LISPRO 1: 100 INJECTION, SOLUTION INTRAVENOUS; SUBCUTANEOUS at 09:33

## 2025-01-07 RX ADMIN — METOPROLOL SUCCINATE 25 MILLIGRAM(S): 50 TABLET, EXTENDED RELEASE ORAL at 18:09

## 2025-01-07 RX ADMIN — AMLODIPINE BESYLATE 2.5 MILLIGRAM(S): 10 TABLET ORAL at 07:20

## 2025-01-07 RX ADMIN — INSULIN LISPRO 1: 100 INJECTION, SOLUTION INTRAVENOUS; SUBCUTANEOUS at 18:09

## 2025-01-07 RX ADMIN — VENLAFAXINE HYDROCHLORIDE 75 MILLIGRAM(S): 37.5 CAPSULE, EXTENDED RELEASE ORAL at 07:20

## 2025-01-07 RX ADMIN — Medication 25 GRAM(S): at 07:21

## 2025-01-07 RX ADMIN — Medication 1 APPLICATION(S): at 13:12

## 2025-01-07 RX ADMIN — Medication 40 MILLIGRAM(S): at 13:10

## 2025-01-07 RX ADMIN — VENLAFAXINE HYDROCHLORIDE 75 MILLIGRAM(S): 37.5 CAPSULE, EXTENDED RELEASE ORAL at 13:12

## 2025-01-07 RX ADMIN — Medication 25 GRAM(S): at 23:02

## 2025-01-07 RX ADMIN — HEPARIN SODIUM 5000 UNIT(S): 1000 INJECTION INTRAVENOUS; SUBCUTANEOUS at 07:21

## 2025-01-07 RX ADMIN — METOPROLOL SUCCINATE 25 MILLIGRAM(S): 50 TABLET, EXTENDED RELEASE ORAL at 07:21

## 2025-01-07 RX ADMIN — HEPARIN SODIUM 5000 UNIT(S): 1000 INJECTION INTRAVENOUS; SUBCUTANEOUS at 22:56

## 2025-01-07 NOTE — DISCUSSION/SUMMARY
[FreeTextEntry1] : The patient was examined. Her blood pressure was 147/77 and her pulse was 90. Her oxygen saturation was 98%. Her lungs were clear to auscultation. Cardiac exam was negative for  rubs or gallops.There was a 3/6 holosystolic murmur heard at the apex. Her EKG showed normal sinus rhythm, a LAHB,and poor R-wave progression. No acute changes were seen. She'll return in 4 months , or earlier if needed. She'll continue her current medications.  Yes

## 2025-01-08 LAB
ANION GAP SERPL CALC-SCNC: 12 MMOL/L — SIGNIFICANT CHANGE UP (ref 7–14)
BUN SERPL-MCNC: 28 MG/DL — HIGH (ref 7–23)
CALCIUM SERPL-MCNC: 9.2 MG/DL — SIGNIFICANT CHANGE UP (ref 8.4–10.5)
CHLORIDE SERPL-SCNC: 105 MMOL/L — SIGNIFICANT CHANGE UP (ref 98–107)
CO2 SERPL-SCNC: 21 MMOL/L — LOW (ref 22–31)
CREAT SERPL-MCNC: 1.33 MG/DL — HIGH (ref 0.5–1.3)
EGFR: 37 ML/MIN/1.73M2 — LOW
EGFR: 37 ML/MIN/1.73M2 — LOW
GLUCOSE BLDC GLUCOMTR-MCNC: 196 MG/DL — HIGH (ref 70–99)
GLUCOSE BLDC GLUCOMTR-MCNC: 200 MG/DL — HIGH (ref 70–99)
GLUCOSE BLDC GLUCOMTR-MCNC: 228 MG/DL — HIGH (ref 70–99)
GLUCOSE BLDC GLUCOMTR-MCNC: 255 MG/DL — HIGH (ref 70–99)
GLUCOSE SERPL-MCNC: 217 MG/DL — HIGH (ref 70–99)
HCT VFR BLD CALC: 38.5 % — SIGNIFICANT CHANGE UP (ref 34.5–45)
HGB BLD-MCNC: 12.3 G/DL — SIGNIFICANT CHANGE UP (ref 11.5–15.5)
MAGNESIUM SERPL-MCNC: 2 MG/DL — SIGNIFICANT CHANGE UP (ref 1.6–2.6)
MCHC RBC-ENTMCNC: 31.9 G/DL — LOW (ref 32–36)
MCHC RBC-ENTMCNC: 31.9 PG — SIGNIFICANT CHANGE UP (ref 27–34)
MCV RBC AUTO: 99.7 FL — SIGNIFICANT CHANGE UP (ref 80–100)
NRBC # BLD AUTO: 0 K/UL — SIGNIFICANT CHANGE UP (ref 0–0)
NRBC # BLD: 0 /100 WBCS — SIGNIFICANT CHANGE UP (ref 0–0)
NRBC # FLD: 0 K/UL — SIGNIFICANT CHANGE UP (ref 0–0)
NRBC BLD-RTO: 0 /100 WBCS — SIGNIFICANT CHANGE UP (ref 0–0)
PHOSPHATE SERPL-MCNC: 2.8 MG/DL — SIGNIFICANT CHANGE UP (ref 2.5–4.5)
PLATELET # BLD AUTO: 193 K/UL — SIGNIFICANT CHANGE UP (ref 150–400)
POTASSIUM SERPL-MCNC: 4.3 MMOL/L — SIGNIFICANT CHANGE UP (ref 3.5–5.3)
POTASSIUM SERPL-SCNC: 4.3 MMOL/L — SIGNIFICANT CHANGE UP (ref 3.5–5.3)
RBC # BLD: 3.86 M/UL — SIGNIFICANT CHANGE UP (ref 3.8–5.2)
RBC # FLD: 13.3 % — SIGNIFICANT CHANGE UP (ref 10.3–14.5)
SODIUM SERPL-SCNC: 138 MMOL/L — SIGNIFICANT CHANGE UP (ref 135–145)
WBC # BLD: 10.27 K/UL — SIGNIFICANT CHANGE UP (ref 3.8–10.5)
WBC # FLD AUTO: 10.27 K/UL — SIGNIFICANT CHANGE UP (ref 3.8–10.5)

## 2025-01-08 PROCEDURE — 99232 SBSQ HOSP IP/OBS MODERATE 35: CPT | Mod: GC

## 2025-01-08 RX ORDER — MELATONIN 5 MG
6 TABLET ORAL AT BEDTIME
Refills: 0 | Status: COMPLETED | OUTPATIENT
Start: 2025-01-08 | End: 2025-01-08

## 2025-01-08 RX ORDER — ACETAMINOPHEN 500 MG/5ML
1000 LIQUID (ML) ORAL ONCE
Refills: 0 | Status: COMPLETED | OUTPATIENT
Start: 2025-01-08 | End: 2025-01-08

## 2025-01-08 RX ORDER — DEXTROMETHORPHAN HBR, GUAIFENESIN 200 MG/10ML
600 LIQUID ORAL ONCE
Refills: 0 | Status: COMPLETED | OUTPATIENT
Start: 2025-01-08 | End: 2025-01-08

## 2025-01-08 RX ADMIN — DEXTROMETHORPHAN HBR, GUAIFENESIN 600 MILLIGRAM(S): 200 LIQUID ORAL at 02:26

## 2025-01-08 RX ADMIN — Medication 6 MILLIGRAM(S): at 21:17

## 2025-01-08 RX ADMIN — VENLAFAXINE HYDROCHLORIDE 75 MILLIGRAM(S): 37.5 CAPSULE, EXTENDED RELEASE ORAL at 13:11

## 2025-01-08 RX ADMIN — Medication 25 GRAM(S): at 14:16

## 2025-01-08 RX ADMIN — HEPARIN SODIUM 5000 UNIT(S): 1000 INJECTION INTRAVENOUS; SUBCUTANEOUS at 21:18

## 2025-01-08 RX ADMIN — Medication 4 MILLILITER(S): at 19:29

## 2025-01-08 RX ADMIN — Medication 25 GRAM(S): at 21:18

## 2025-01-08 RX ADMIN — METOPROLOL SUCCINATE 25 MILLIGRAM(S): 50 TABLET, EXTENDED RELEASE ORAL at 06:42

## 2025-01-08 RX ADMIN — ROSUVASTATIN CALCIUM 10 MILLIGRAM(S): 20 TABLET, FILM COATED ORAL at 21:17

## 2025-01-08 RX ADMIN — Medication 40 MILLIGRAM(S): at 13:11

## 2025-01-08 RX ADMIN — HEPARIN SODIUM 5000 UNIT(S): 1000 INJECTION INTRAVENOUS; SUBCUTANEOUS at 06:42

## 2025-01-08 RX ADMIN — Medication 4 MILLILITER(S): at 09:03

## 2025-01-08 RX ADMIN — INSULIN LISPRO 1: 100 INJECTION, SOLUTION INTRAVENOUS; SUBCUTANEOUS at 09:26

## 2025-01-08 RX ADMIN — INSULIN LISPRO 3: 100 INJECTION, SOLUTION INTRAVENOUS; SUBCUTANEOUS at 12:58

## 2025-01-08 RX ADMIN — AMLODIPINE BESYLATE 2.5 MILLIGRAM(S): 10 TABLET ORAL at 06:41

## 2025-01-08 RX ADMIN — Medication 25 MICROGRAM(S): at 06:42

## 2025-01-08 RX ADMIN — Medication 1 APPLICATION(S): at 13:13

## 2025-01-08 RX ADMIN — Medication 25 GRAM(S): at 06:42

## 2025-01-08 RX ADMIN — HEPARIN SODIUM 5000 UNIT(S): 1000 INJECTION INTRAVENOUS; SUBCUTANEOUS at 14:17

## 2025-01-08 RX ADMIN — METOPROLOL SUCCINATE 25 MILLIGRAM(S): 50 TABLET, EXTENDED RELEASE ORAL at 18:36

## 2025-01-09 ENCOUNTER — NON-APPOINTMENT (OUTPATIENT)
Age: 89
End: 2025-01-09

## 2025-01-09 LAB
ANION GAP SERPL CALC-SCNC: 18 MMOL/L — HIGH (ref 7–14)
BLOOD GAS VENOUS COMPREHENSIVE RESULT: SIGNIFICANT CHANGE UP
BUN SERPL-MCNC: 28 MG/DL — HIGH (ref 7–23)
CALCIUM SERPL-MCNC: 9 MG/DL — SIGNIFICANT CHANGE UP (ref 8.4–10.5)
CHLORIDE SERPL-SCNC: 106 MMOL/L — SIGNIFICANT CHANGE UP (ref 98–107)
CO2 SERPL-SCNC: 16 MMOL/L — LOW (ref 22–31)
CREAT SERPL-MCNC: 1.25 MG/DL — SIGNIFICANT CHANGE UP (ref 0.5–1.3)
EGFR: 40 ML/MIN/1.73M2 — LOW
EGFR: 40 ML/MIN/1.73M2 — LOW
GAS PNL BLDV: SIGNIFICANT CHANGE UP
GLUCOSE BLDC GLUCOMTR-MCNC: 190 MG/DL — HIGH (ref 70–99)
GLUCOSE BLDC GLUCOMTR-MCNC: 216 MG/DL — HIGH (ref 70–99)
GLUCOSE BLDC GLUCOMTR-MCNC: 217 MG/DL — HIGH (ref 70–99)
GLUCOSE BLDC GLUCOMTR-MCNC: 251 MG/DL — HIGH (ref 70–99)
GLUCOSE SERPL-MCNC: 173 MG/DL — HIGH (ref 70–99)
HCT VFR BLD CALC: 36.5 % — SIGNIFICANT CHANGE UP (ref 34.5–45)
HGB BLD-MCNC: 11.7 G/DL — SIGNIFICANT CHANGE UP (ref 11.5–15.5)
MAGNESIUM SERPL-MCNC: 1.9 MG/DL — SIGNIFICANT CHANGE UP (ref 1.6–2.6)
MCHC RBC-ENTMCNC: 31.8 PG — SIGNIFICANT CHANGE UP (ref 27–34)
MCHC RBC-ENTMCNC: 32.1 G/DL — SIGNIFICANT CHANGE UP (ref 32–36)
MCV RBC AUTO: 99.2 FL — SIGNIFICANT CHANGE UP (ref 80–100)
NRBC # BLD AUTO: 0 K/UL — SIGNIFICANT CHANGE UP (ref 0–0)
NRBC # BLD: 0 /100 WBCS — SIGNIFICANT CHANGE UP (ref 0–0)
NRBC # FLD: 0 K/UL — SIGNIFICANT CHANGE UP (ref 0–0)
NRBC BLD-RTO: 0 /100 WBCS — SIGNIFICANT CHANGE UP (ref 0–0)
PHOSPHATE SERPL-MCNC: 3.2 MG/DL — SIGNIFICANT CHANGE UP (ref 2.5–4.5)
PLATELET # BLD AUTO: 190 K/UL — SIGNIFICANT CHANGE UP (ref 150–400)
POTASSIUM SERPL-MCNC: 4.7 MMOL/L — SIGNIFICANT CHANGE UP (ref 3.5–5.3)
POTASSIUM SERPL-SCNC: 4.7 MMOL/L — SIGNIFICANT CHANGE UP (ref 3.5–5.3)
RBC # BLD: 3.68 M/UL — LOW (ref 3.8–5.2)
RBC # FLD: 13.1 % — SIGNIFICANT CHANGE UP (ref 10.3–14.5)
SODIUM SERPL-SCNC: 140 MMOL/L — SIGNIFICANT CHANGE UP (ref 135–145)
WBC # BLD: 10.79 K/UL — HIGH (ref 3.8–10.5)
WBC # FLD AUTO: 10.79 K/UL — HIGH (ref 3.8–10.5)

## 2025-01-09 PROCEDURE — 71045 X-RAY EXAM CHEST 1 VIEW: CPT | Mod: 26

## 2025-01-09 PROCEDURE — 99232 SBSQ HOSP IP/OBS MODERATE 35: CPT | Mod: GC

## 2025-01-09 RX ORDER — SODIUM CHLORIDE 9 G/1000ML
1000 INJECTION, SOLUTION INTRAVENOUS
Refills: 0 | Status: DISCONTINUED | OUTPATIENT
Start: 2025-01-09 | End: 2025-01-18

## 2025-01-09 RX ORDER — INSULIN GLARGINE-YFGN 100 [IU]/ML
3 INJECTION, SOLUTION SUBCUTANEOUS AT BEDTIME
Refills: 0 | Status: DISCONTINUED | OUTPATIENT
Start: 2025-01-09 | End: 2025-01-13

## 2025-01-09 RX ORDER — GLUCAGON 3 MG/1
1 POWDER NASAL ONCE
Refills: 0 | Status: DISCONTINUED | OUTPATIENT
Start: 2025-01-09 | End: 2025-01-18

## 2025-01-09 RX ORDER — INSULIN LISPRO 100 U/ML
INJECTION, SOLUTION INTRAVENOUS; SUBCUTANEOUS AT BEDTIME
Refills: 0 | Status: DISCONTINUED | OUTPATIENT
Start: 2025-01-09 | End: 2025-01-18

## 2025-01-09 RX ORDER — IPRATROPIUM BROMIDE AND ALBUTEROL SULFATE .5; 2.5 MG/3ML; MG/3ML
3 SOLUTION RESPIRATORY (INHALATION) ONCE
Refills: 0 | Status: COMPLETED | OUTPATIENT
Start: 2025-01-09 | End: 2025-01-09

## 2025-01-09 RX ORDER — IPRATROPIUM BROMIDE AND ALBUTEROL SULFATE .5; 2.5 MG/3ML; MG/3ML
3 SOLUTION RESPIRATORY (INHALATION) EVERY 6 HOURS
Refills: 0 | Status: DISCONTINUED | OUTPATIENT
Start: 2025-01-09 | End: 2025-01-18

## 2025-01-09 RX ORDER — DEXTROSE 50 % IN WATER 50 %
12.5 SYRINGE (ML) INTRAVENOUS ONCE
Refills: 0 | Status: DISCONTINUED | OUTPATIENT
Start: 2025-01-09 | End: 2025-01-18

## 2025-01-09 RX ORDER — INSULIN LISPRO 100 U/ML
INJECTION, SOLUTION INTRAVENOUS; SUBCUTANEOUS
Refills: 0 | Status: DISCONTINUED | OUTPATIENT
Start: 2025-01-09 | End: 2025-01-18

## 2025-01-09 RX ORDER — DEXTROSE 50 % IN WATER 50 %
25 SYRINGE (ML) INTRAVENOUS ONCE
Refills: 0 | Status: DISCONTINUED | OUTPATIENT
Start: 2025-01-09 | End: 2025-01-18

## 2025-01-09 RX ORDER — DEXTROSE 50 % IN WATER 50 %
15 SYRINGE (ML) INTRAVENOUS ONCE
Refills: 0 | Status: DISCONTINUED | OUTPATIENT
Start: 2025-01-09 | End: 2025-01-18

## 2025-01-09 RX ORDER — FUROSEMIDE 10 MG/ML
40 INJECTION INTRAMUSCULAR; INTRAVENOUS ONCE
Refills: 0 | Status: COMPLETED | OUTPATIENT
Start: 2025-01-09 | End: 2025-01-09

## 2025-01-09 RX ADMIN — INSULIN LISPRO 1: 100 INJECTION, SOLUTION INTRAVENOUS; SUBCUTANEOUS at 18:34

## 2025-01-09 RX ADMIN — AMLODIPINE BESYLATE 2.5 MILLIGRAM(S): 10 TABLET ORAL at 06:35

## 2025-01-09 RX ADMIN — INSULIN LISPRO 2: 100 INJECTION, SOLUTION INTRAVENOUS; SUBCUTANEOUS at 13:24

## 2025-01-09 RX ADMIN — Medication 1 APPLICATION(S): at 12:31

## 2025-01-09 RX ADMIN — HEPARIN SODIUM 5000 UNIT(S): 1000 INJECTION INTRAVENOUS; SUBCUTANEOUS at 21:47

## 2025-01-09 RX ADMIN — HEPARIN SODIUM 5000 UNIT(S): 1000 INJECTION INTRAVENOUS; SUBCUTANEOUS at 06:35

## 2025-01-09 RX ADMIN — Medication 25 GRAM(S): at 06:35

## 2025-01-09 RX ADMIN — IPRATROPIUM BROMIDE AND ALBUTEROL SULFATE 3 MILLILITER(S): .5; 2.5 SOLUTION RESPIRATORY (INHALATION) at 22:37

## 2025-01-09 RX ADMIN — INSULIN LISPRO 1: 100 INJECTION, SOLUTION INTRAVENOUS; SUBCUTANEOUS at 22:05

## 2025-01-09 RX ADMIN — HEPARIN SODIUM 5000 UNIT(S): 1000 INJECTION INTRAVENOUS; SUBCUTANEOUS at 12:30

## 2025-01-09 RX ADMIN — Medication 4 MILLILITER(S): at 08:11

## 2025-01-09 RX ADMIN — INSULIN GLARGINE-YFGN 3 UNIT(S): 100 INJECTION, SOLUTION SUBCUTANEOUS at 22:05

## 2025-01-09 RX ADMIN — Medication 40 MILLIGRAM(S): at 12:30

## 2025-01-09 RX ADMIN — IPRATROPIUM BROMIDE AND ALBUTEROL SULFATE 3 MILLILITER(S): .5; 2.5 SOLUTION RESPIRATORY (INHALATION) at 08:15

## 2025-01-09 RX ADMIN — VENLAFAXINE HYDROCHLORIDE 75 MILLIGRAM(S): 37.5 CAPSULE, EXTENDED RELEASE ORAL at 12:30

## 2025-01-09 RX ADMIN — Medication 25 GRAM(S): at 12:30

## 2025-01-09 RX ADMIN — ROSUVASTATIN CALCIUM 10 MILLIGRAM(S): 20 TABLET, FILM COATED ORAL at 21:46

## 2025-01-09 RX ADMIN — METOPROLOL SUCCINATE 25 MILLIGRAM(S): 50 TABLET, EXTENDED RELEASE ORAL at 17:38

## 2025-01-09 RX ADMIN — IPRATROPIUM BROMIDE AND ALBUTEROL SULFATE 3 MILLILITER(S): .5; 2.5 SOLUTION RESPIRATORY (INHALATION) at 16:41

## 2025-01-09 RX ADMIN — Medication 25 GRAM(S): at 21:46

## 2025-01-09 RX ADMIN — Medication 4 MILLILITER(S): at 22:37

## 2025-01-09 RX ADMIN — Medication 25 MICROGRAM(S): at 06:36

## 2025-01-09 RX ADMIN — METOPROLOL SUCCINATE 25 MILLIGRAM(S): 50 TABLET, EXTENDED RELEASE ORAL at 06:35

## 2025-01-09 RX ADMIN — INSULIN LISPRO 2: 100 INJECTION, SOLUTION INTRAVENOUS; SUBCUTANEOUS at 09:13

## 2025-01-09 RX ADMIN — FUROSEMIDE 40 MILLIGRAM(S): 10 INJECTION INTRAMUSCULAR; INTRAVENOUS at 14:44

## 2025-01-10 LAB
ANION GAP SERPL CALC-SCNC: 16 MMOL/L — HIGH (ref 7–14)
ANISOCYTOSIS BLD QL: SLIGHT — SIGNIFICANT CHANGE UP
BASOPHILS # BLD AUTO: 0 K/UL — SIGNIFICANT CHANGE UP (ref 0–0.2)
BASOPHILS NFR BLD AUTO: 0 % — SIGNIFICANT CHANGE UP (ref 0–2)
BUN SERPL-MCNC: 27 MG/DL — HIGH (ref 7–23)
CALCIUM SERPL-MCNC: 9 MG/DL — SIGNIFICANT CHANGE UP (ref 8.4–10.5)
CHLORIDE SERPL-SCNC: 104 MMOL/L — SIGNIFICANT CHANGE UP (ref 98–107)
CO2 SERPL-SCNC: 18 MMOL/L — LOW (ref 22–31)
CREAT SERPL-MCNC: 1.32 MG/DL — HIGH (ref 0.5–1.3)
CULTURE RESULTS: SIGNIFICANT CHANGE UP
CULTURE RESULTS: SIGNIFICANT CHANGE UP
DACRYOCYTES BLD QL SMEAR: SLIGHT — SIGNIFICANT CHANGE UP
EGFR: 37 ML/MIN/1.73M2 — LOW
EGFR: 37 ML/MIN/1.73M2 — LOW
EOSINOPHIL # BLD AUTO: 0.2 K/UL — SIGNIFICANT CHANGE UP (ref 0–0.5)
EOSINOPHIL NFR BLD AUTO: 1.8 % — SIGNIFICANT CHANGE UP (ref 0–6)
GIANT PLATELETS BLD QL SMEAR: PRESENT — SIGNIFICANT CHANGE UP
GLUCOSE BLDC GLUCOMTR-MCNC: 163 MG/DL — HIGH (ref 70–99)
GLUCOSE BLDC GLUCOMTR-MCNC: 166 MG/DL — HIGH (ref 70–99)
GLUCOSE BLDC GLUCOMTR-MCNC: 168 MG/DL — HIGH (ref 70–99)
GLUCOSE BLDC GLUCOMTR-MCNC: 170 MG/DL — HIGH (ref 70–99)
GLUCOSE SERPL-MCNC: 164 MG/DL — HIGH (ref 70–99)
HCT VFR BLD CALC: 37.5 % — SIGNIFICANT CHANGE UP (ref 34.5–45)
HGB BLD-MCNC: 11.8 G/DL — SIGNIFICANT CHANGE UP (ref 11.5–15.5)
IANC: 9.38 K/UL — HIGH (ref 1.8–7.4)
LYMPHOCYTES # BLD AUTO: 0.52 K/UL — LOW (ref 1–3.3)
LYMPHOCYTES # BLD AUTO: 4.6 % — LOW (ref 13–44)
MACROCYTES BLD QL: SLIGHT — SIGNIFICANT CHANGE UP
MAGNESIUM SERPL-MCNC: 1.8 MG/DL — SIGNIFICANT CHANGE UP (ref 1.6–2.6)
MANUAL SMEAR VERIFICATION: SIGNIFICANT CHANGE UP
MCHC RBC-ENTMCNC: 31.1 PG — SIGNIFICANT CHANGE UP (ref 27–34)
MCHC RBC-ENTMCNC: 31.5 G/DL — LOW (ref 32–36)
MCV RBC AUTO: 98.9 FL — SIGNIFICANT CHANGE UP (ref 80–100)
MONOCYTES # BLD AUTO: 0.63 K/UL — SIGNIFICANT CHANGE UP (ref 0–0.9)
MONOCYTES NFR BLD AUTO: 5.6 % — SIGNIFICANT CHANGE UP (ref 2–14)
MYELOCYTES NFR BLD: 0.9 % — HIGH (ref 0–0)
NEUTROPHILS # BLD AUTO: 9.14 K/UL — HIGH (ref 1.8–7.4)
NEUTROPHILS NFR BLD AUTO: 81.5 % — HIGH (ref 43–77)
OVALOCYTES BLD QL SMEAR: SLIGHT — SIGNIFICANT CHANGE UP
PHOSPHATE SERPL-MCNC: 3 MG/DL — SIGNIFICANT CHANGE UP (ref 2.5–4.5)
PLAT MORPH BLD: NORMAL — SIGNIFICANT CHANGE UP
PLATELET # BLD AUTO: 193 K/UL — SIGNIFICANT CHANGE UP (ref 150–400)
PLATELET COUNT - ESTIMATE: NORMAL — SIGNIFICANT CHANGE UP
POIKILOCYTOSIS BLD QL AUTO: SLIGHT — SIGNIFICANT CHANGE UP
POLYCHROMASIA BLD QL SMEAR: SLIGHT — SIGNIFICANT CHANGE UP
POTASSIUM SERPL-MCNC: 4.2 MMOL/L — SIGNIFICANT CHANGE UP (ref 3.5–5.3)
POTASSIUM SERPL-SCNC: 4.2 MMOL/L — SIGNIFICANT CHANGE UP (ref 3.5–5.3)
RBC # BLD: 3.79 M/UL — LOW (ref 3.8–5.2)
RBC # FLD: 13.4 % — SIGNIFICANT CHANGE UP (ref 10.3–14.5)
RBC BLD AUTO: ABNORMAL
SODIUM SERPL-SCNC: 138 MMOL/L — SIGNIFICANT CHANGE UP (ref 135–145)
SPECIMEN SOURCE: SIGNIFICANT CHANGE UP
SPECIMEN SOURCE: SIGNIFICANT CHANGE UP
VARIANT LYMPHS # BLD: 5.6 % — SIGNIFICANT CHANGE UP (ref 0–6)
VARIANT LYMPHS NFR BLD MANUAL: 5.6 % — SIGNIFICANT CHANGE UP (ref 0–6)
WBC # BLD: 11.21 K/UL — HIGH (ref 3.8–10.5)
WBC # FLD AUTO: 11.21 K/UL — HIGH (ref 3.8–10.5)

## 2025-01-10 PROCEDURE — 99232 SBSQ HOSP IP/OBS MODERATE 35: CPT | Mod: GC

## 2025-01-10 RX ADMIN — IPRATROPIUM BROMIDE AND ALBUTEROL SULFATE 3 MILLILITER(S): .5; 2.5 SOLUTION RESPIRATORY (INHALATION) at 16:31

## 2025-01-10 RX ADMIN — Medication 25 MICROGRAM(S): at 05:13

## 2025-01-10 RX ADMIN — IPRATROPIUM BROMIDE AND ALBUTEROL SULFATE 3 MILLILITER(S): .5; 2.5 SOLUTION RESPIRATORY (INHALATION) at 05:01

## 2025-01-10 RX ADMIN — INSULIN LISPRO 1: 100 INJECTION, SOLUTION INTRAVENOUS; SUBCUTANEOUS at 09:06

## 2025-01-10 RX ADMIN — Medication 4 MILLILITER(S): at 08:17

## 2025-01-10 RX ADMIN — Medication 25 GRAM(S): at 21:59

## 2025-01-10 RX ADMIN — Medication 4 MILLILITER(S): at 22:50

## 2025-01-10 RX ADMIN — VENLAFAXINE HYDROCHLORIDE 75 MILLIGRAM(S): 37.5 CAPSULE, EXTENDED RELEASE ORAL at 12:03

## 2025-01-10 RX ADMIN — IPRATROPIUM BROMIDE AND ALBUTEROL SULFATE 3 MILLILITER(S): .5; 2.5 SOLUTION RESPIRATORY (INHALATION) at 08:18

## 2025-01-10 RX ADMIN — METOPROLOL SUCCINATE 25 MILLIGRAM(S): 50 TABLET, EXTENDED RELEASE ORAL at 06:33

## 2025-01-10 RX ADMIN — HEPARIN SODIUM 5000 UNIT(S): 1000 INJECTION INTRAVENOUS; SUBCUTANEOUS at 06:32

## 2025-01-10 RX ADMIN — INSULIN LISPRO 1: 100 INJECTION, SOLUTION INTRAVENOUS; SUBCUTANEOUS at 18:18

## 2025-01-10 RX ADMIN — INSULIN GLARGINE-YFGN 3 UNIT(S): 100 INJECTION, SOLUTION SUBCUTANEOUS at 21:59

## 2025-01-10 RX ADMIN — AMLODIPINE BESYLATE 2.5 MILLIGRAM(S): 10 TABLET ORAL at 06:33

## 2025-01-10 RX ADMIN — IPRATROPIUM BROMIDE AND ALBUTEROL SULFATE 3 MILLILITER(S): .5; 2.5 SOLUTION RESPIRATORY (INHALATION) at 22:50

## 2025-01-10 RX ADMIN — Medication 40 MILLIGRAM(S): at 12:02

## 2025-01-10 RX ADMIN — Medication 25 GRAM(S): at 13:31

## 2025-01-10 RX ADMIN — ROSUVASTATIN CALCIUM 10 MILLIGRAM(S): 20 TABLET, FILM COATED ORAL at 21:58

## 2025-01-10 RX ADMIN — HEPARIN SODIUM 5000 UNIT(S): 1000 INJECTION INTRAVENOUS; SUBCUTANEOUS at 21:58

## 2025-01-10 RX ADMIN — Medication 1 APPLICATION(S): at 12:03

## 2025-01-10 RX ADMIN — INSULIN LISPRO 1: 100 INJECTION, SOLUTION INTRAVENOUS; SUBCUTANEOUS at 13:29

## 2025-01-10 RX ADMIN — Medication 25 GRAM(S): at 06:39

## 2025-01-10 RX ADMIN — HEPARIN SODIUM 5000 UNIT(S): 1000 INJECTION INTRAVENOUS; SUBCUTANEOUS at 12:02

## 2025-01-11 LAB
ANION GAP SERPL CALC-SCNC: 15 MMOL/L — HIGH (ref 7–14)
BUN SERPL-MCNC: 24 MG/DL — HIGH (ref 7–23)
CALCIUM SERPL-MCNC: 9.2 MG/DL — SIGNIFICANT CHANGE UP (ref 8.4–10.5)
CHLORIDE SERPL-SCNC: 104 MMOL/L — SIGNIFICANT CHANGE UP (ref 98–107)
CO2 SERPL-SCNC: 22 MMOL/L — SIGNIFICANT CHANGE UP (ref 22–31)
CREAT SERPL-MCNC: 1.37 MG/DL — HIGH (ref 0.5–1.3)
EGFR: 36 ML/MIN/1.73M2 — LOW
EGFR: 36 ML/MIN/1.73M2 — LOW
GAS PNL BLDV: SIGNIFICANT CHANGE UP
GLUCOSE BLDC GLUCOMTR-MCNC: 176 MG/DL — HIGH (ref 70–99)
GLUCOSE BLDC GLUCOMTR-MCNC: 181 MG/DL — HIGH (ref 70–99)
GLUCOSE BLDC GLUCOMTR-MCNC: 183 MG/DL — HIGH (ref 70–99)
GLUCOSE BLDC GLUCOMTR-MCNC: 234 MG/DL — HIGH (ref 70–99)
GLUCOSE SERPL-MCNC: 163 MG/DL — HIGH (ref 70–99)
HCT VFR BLD CALC: 34.7 % — SIGNIFICANT CHANGE UP (ref 34.5–45)
HGB BLD-MCNC: 11.2 G/DL — LOW (ref 11.5–15.5)
MAGNESIUM SERPL-MCNC: 1.8 MG/DL — SIGNIFICANT CHANGE UP (ref 1.6–2.6)
MCHC RBC-ENTMCNC: 31.9 PG — SIGNIFICANT CHANGE UP (ref 27–34)
MCHC RBC-ENTMCNC: 32.3 G/DL — SIGNIFICANT CHANGE UP (ref 32–36)
MCV RBC AUTO: 98.9 FL — SIGNIFICANT CHANGE UP (ref 80–100)
NRBC # BLD AUTO: 0 K/UL — SIGNIFICANT CHANGE UP (ref 0–0)
NRBC # BLD: 0 /100 WBCS — SIGNIFICANT CHANGE UP (ref 0–0)
NRBC # FLD: 0 K/UL — SIGNIFICANT CHANGE UP (ref 0–0)
NRBC BLD-RTO: 0 /100 WBCS — SIGNIFICANT CHANGE UP (ref 0–0)
PHOSPHATE SERPL-MCNC: 3.1 MG/DL — SIGNIFICANT CHANGE UP (ref 2.5–4.5)
PLATELET # BLD AUTO: 193 K/UL — SIGNIFICANT CHANGE UP (ref 150–400)
POTASSIUM SERPL-MCNC: 3.7 MMOL/L — SIGNIFICANT CHANGE UP (ref 3.5–5.3)
POTASSIUM SERPL-SCNC: 3.7 MMOL/L — SIGNIFICANT CHANGE UP (ref 3.5–5.3)
RBC # BLD: 3.51 M/UL — LOW (ref 3.8–5.2)
RBC # FLD: 13.3 % — SIGNIFICANT CHANGE UP (ref 10.3–14.5)
SODIUM SERPL-SCNC: 141 MMOL/L — SIGNIFICANT CHANGE UP (ref 135–145)
WBC # BLD: 10.01 K/UL — SIGNIFICANT CHANGE UP (ref 3.8–10.5)
WBC # FLD AUTO: 10.01 K/UL — SIGNIFICANT CHANGE UP (ref 3.8–10.5)

## 2025-01-11 PROCEDURE — 99232 SBSQ HOSP IP/OBS MODERATE 35: CPT | Mod: GC

## 2025-01-11 RX ORDER — PIPERACILLIN-TAZO-DEXTROSE,ISO 3.375G/5
3.38 IV SOLUTION, PIGGYBACK PREMIX FROZEN(ML) INTRAVENOUS EVERY 8 HOURS
Refills: 0 | Status: DISCONTINUED | OUTPATIENT
Start: 2025-01-11 | End: 2025-01-12

## 2025-01-11 RX ADMIN — AMLODIPINE BESYLATE 2.5 MILLIGRAM(S): 10 TABLET ORAL at 06:27

## 2025-01-11 RX ADMIN — IPRATROPIUM BROMIDE AND ALBUTEROL SULFATE 3 MILLILITER(S): .5; 2.5 SOLUTION RESPIRATORY (INHALATION) at 09:22

## 2025-01-11 RX ADMIN — HEPARIN SODIUM 5000 UNIT(S): 1000 INJECTION INTRAVENOUS; SUBCUTANEOUS at 22:16

## 2025-01-11 RX ADMIN — Medication 25 GRAM(S): at 22:16

## 2025-01-11 RX ADMIN — INSULIN LISPRO 1: 100 INJECTION, SOLUTION INTRAVENOUS; SUBCUTANEOUS at 09:12

## 2025-01-11 RX ADMIN — Medication 4 MILLILITER(S): at 19:40

## 2025-01-11 RX ADMIN — Medication 25 MICROGRAM(S): at 05:19

## 2025-01-11 RX ADMIN — IPRATROPIUM BROMIDE AND ALBUTEROL SULFATE 3 MILLILITER(S): .5; 2.5 SOLUTION RESPIRATORY (INHALATION) at 19:40

## 2025-01-11 RX ADMIN — Medication 4 MILLILITER(S): at 09:22

## 2025-01-11 RX ADMIN — METOPROLOL SUCCINATE 25 MILLIGRAM(S): 50 TABLET, EXTENDED RELEASE ORAL at 06:27

## 2025-01-11 RX ADMIN — INSULIN LISPRO 1: 100 INJECTION, SOLUTION INTRAVENOUS; SUBCUTANEOUS at 12:43

## 2025-01-11 RX ADMIN — Medication 25 GRAM(S): at 05:23

## 2025-01-11 RX ADMIN — HEPARIN SODIUM 5000 UNIT(S): 1000 INJECTION INTRAVENOUS; SUBCUTANEOUS at 12:32

## 2025-01-11 RX ADMIN — IPRATROPIUM BROMIDE AND ALBUTEROL SULFATE 3 MILLILITER(S): .5; 2.5 SOLUTION RESPIRATORY (INHALATION) at 15:50

## 2025-01-11 RX ADMIN — METOPROLOL SUCCINATE 25 MILLIGRAM(S): 50 TABLET, EXTENDED RELEASE ORAL at 17:03

## 2025-01-11 RX ADMIN — INSULIN GLARGINE-YFGN 3 UNIT(S): 100 INJECTION, SOLUTION SUBCUTANEOUS at 23:25

## 2025-01-11 RX ADMIN — IPRATROPIUM BROMIDE AND ALBUTEROL SULFATE 3 MILLILITER(S): .5; 2.5 SOLUTION RESPIRATORY (INHALATION) at 04:12

## 2025-01-11 RX ADMIN — Medication 1 APPLICATION(S): at 12:33

## 2025-01-11 RX ADMIN — INSULIN LISPRO 2: 100 INJECTION, SOLUTION INTRAVENOUS; SUBCUTANEOUS at 18:33

## 2025-01-11 RX ADMIN — VENLAFAXINE HYDROCHLORIDE 75 MILLIGRAM(S): 37.5 CAPSULE, EXTENDED RELEASE ORAL at 12:32

## 2025-01-11 RX ADMIN — HEPARIN SODIUM 5000 UNIT(S): 1000 INJECTION INTRAVENOUS; SUBCUTANEOUS at 06:27

## 2025-01-11 RX ADMIN — Medication 40 MILLIGRAM(S): at 12:32

## 2025-01-11 RX ADMIN — Medication 25 GRAM(S): at 13:29

## 2025-01-11 RX ADMIN — ROSUVASTATIN CALCIUM 10 MILLIGRAM(S): 20 TABLET, FILM COATED ORAL at 22:16

## 2025-01-12 LAB
ANION GAP SERPL CALC-SCNC: 15 MMOL/L — HIGH (ref 7–14)
BUN SERPL-MCNC: 24 MG/DL — HIGH (ref 7–23)
CALCIUM SERPL-MCNC: 8.8 MG/DL — SIGNIFICANT CHANGE UP (ref 8.4–10.5)
CHLORIDE SERPL-SCNC: 101 MMOL/L — SIGNIFICANT CHANGE UP (ref 98–107)
CO2 SERPL-SCNC: 17 MMOL/L — LOW (ref 22–31)
CREAT SERPL-MCNC: 1.37 MG/DL — HIGH (ref 0.5–1.3)
EGFR: 36 ML/MIN/1.73M2 — LOW
EGFR: 36 ML/MIN/1.73M2 — LOW
GLUCOSE BLDC GLUCOMTR-MCNC: 112 MG/DL — HIGH (ref 70–99)
GLUCOSE BLDC GLUCOMTR-MCNC: 127 MG/DL — HIGH (ref 70–99)
GLUCOSE BLDC GLUCOMTR-MCNC: 128 MG/DL — HIGH (ref 70–99)
GLUCOSE BLDC GLUCOMTR-MCNC: 168 MG/DL — HIGH (ref 70–99)
GLUCOSE SERPL-MCNC: 148 MG/DL — HIGH (ref 70–99)
HCT VFR BLD CALC: 35.9 % — SIGNIFICANT CHANGE UP (ref 34.5–45)
HGB BLD-MCNC: 11.6 G/DL — SIGNIFICANT CHANGE UP (ref 11.5–15.5)
MAGNESIUM SERPL-MCNC: 1.8 MG/DL — SIGNIFICANT CHANGE UP (ref 1.6–2.6)
MCHC RBC-ENTMCNC: 32 PG — SIGNIFICANT CHANGE UP (ref 27–34)
MCHC RBC-ENTMCNC: 32.3 G/DL — SIGNIFICANT CHANGE UP (ref 32–36)
MCV RBC AUTO: 98.9 FL — SIGNIFICANT CHANGE UP (ref 80–100)
NRBC # BLD AUTO: 0 K/UL — SIGNIFICANT CHANGE UP (ref 0–0)
NRBC # BLD: 0 /100 WBCS — SIGNIFICANT CHANGE UP (ref 0–0)
NRBC # FLD: 0 K/UL — SIGNIFICANT CHANGE UP (ref 0–0)
NRBC BLD-RTO: 0 /100 WBCS — SIGNIFICANT CHANGE UP (ref 0–0)
PHOSPHATE SERPL-MCNC: 3 MG/DL — SIGNIFICANT CHANGE UP (ref 2.5–4.5)
PLATELET # BLD AUTO: 209 K/UL — SIGNIFICANT CHANGE UP (ref 150–400)
POTASSIUM SERPL-MCNC: 5.3 MMOL/L — SIGNIFICANT CHANGE UP (ref 3.5–5.3)
POTASSIUM SERPL-SCNC: 5.3 MMOL/L — SIGNIFICANT CHANGE UP (ref 3.5–5.3)
RBC # BLD: 3.63 M/UL — LOW (ref 3.8–5.2)
RBC # FLD: 13.3 % — SIGNIFICANT CHANGE UP (ref 10.3–14.5)
SODIUM SERPL-SCNC: 133 MMOL/L — LOW (ref 135–145)
WBC # BLD: 8.39 K/UL — SIGNIFICANT CHANGE UP (ref 3.8–10.5)
WBC # FLD AUTO: 8.39 K/UL — SIGNIFICANT CHANGE UP (ref 3.8–10.5)

## 2025-01-12 PROCEDURE — 99232 SBSQ HOSP IP/OBS MODERATE 35: CPT | Mod: GC

## 2025-01-12 RX ADMIN — Medication 25 MICROGRAM(S): at 04:17

## 2025-01-12 RX ADMIN — IPRATROPIUM BROMIDE AND ALBUTEROL SULFATE 3 MILLILITER(S): .5; 2.5 SOLUTION RESPIRATORY (INHALATION) at 10:28

## 2025-01-12 RX ADMIN — INSULIN GLARGINE-YFGN 3 UNIT(S): 100 INJECTION, SOLUTION SUBCUTANEOUS at 22:20

## 2025-01-12 RX ADMIN — AMLODIPINE BESYLATE 2.5 MILLIGRAM(S): 10 TABLET ORAL at 05:15

## 2025-01-12 RX ADMIN — METOPROLOL SUCCINATE 25 MILLIGRAM(S): 50 TABLET, EXTENDED RELEASE ORAL at 17:27

## 2025-01-12 RX ADMIN — Medication 1 APPLICATION(S): at 14:08

## 2025-01-12 RX ADMIN — ROSUVASTATIN CALCIUM 10 MILLIGRAM(S): 20 TABLET, FILM COATED ORAL at 22:20

## 2025-01-12 RX ADMIN — METOPROLOL SUCCINATE 25 MILLIGRAM(S): 50 TABLET, EXTENDED RELEASE ORAL at 05:14

## 2025-01-12 RX ADMIN — VENLAFAXINE HYDROCHLORIDE 75 MILLIGRAM(S): 37.5 CAPSULE, EXTENDED RELEASE ORAL at 14:09

## 2025-01-12 RX ADMIN — IPRATROPIUM BROMIDE AND ALBUTEROL SULFATE 3 MILLILITER(S): .5; 2.5 SOLUTION RESPIRATORY (INHALATION) at 21:34

## 2025-01-12 RX ADMIN — Medication 4 MILLILITER(S): at 10:28

## 2025-01-12 RX ADMIN — IPRATROPIUM BROMIDE AND ALBUTEROL SULFATE 3 MILLILITER(S): .5; 2.5 SOLUTION RESPIRATORY (INHALATION) at 15:37

## 2025-01-12 RX ADMIN — Medication 4 MILLILITER(S): at 21:37

## 2025-01-12 RX ADMIN — IPRATROPIUM BROMIDE AND ALBUTEROL SULFATE 3 MILLILITER(S): .5; 2.5 SOLUTION RESPIRATORY (INHALATION) at 03:21

## 2025-01-12 RX ADMIN — HEPARIN SODIUM 5000 UNIT(S): 1000 INJECTION INTRAVENOUS; SUBCUTANEOUS at 22:20

## 2025-01-12 RX ADMIN — HEPARIN SODIUM 5000 UNIT(S): 1000 INJECTION INTRAVENOUS; SUBCUTANEOUS at 14:07

## 2025-01-12 RX ADMIN — Medication 25 GRAM(S): at 05:15

## 2025-01-12 RX ADMIN — Medication 40 MILLIGRAM(S): at 17:27

## 2025-01-12 RX ADMIN — HEPARIN SODIUM 5000 UNIT(S): 1000 INJECTION INTRAVENOUS; SUBCUTANEOUS at 05:15

## 2025-01-12 RX ADMIN — INSULIN LISPRO 1: 100 INJECTION, SOLUTION INTRAVENOUS; SUBCUTANEOUS at 09:44

## 2025-01-13 ENCOUNTER — RESULT REVIEW (OUTPATIENT)
Age: 89
End: 2025-01-13

## 2025-01-13 LAB
ADD ON TEST-SPECIMEN IN LAB: SIGNIFICANT CHANGE UP
ANION GAP SERPL CALC-SCNC: 14 MMOL/L — SIGNIFICANT CHANGE UP (ref 7–14)
BUN SERPL-MCNC: 24 MG/DL — HIGH (ref 7–23)
CALCIUM SERPL-MCNC: 8.8 MG/DL — SIGNIFICANT CHANGE UP (ref 8.4–10.5)
CHLORIDE SERPL-SCNC: 103 MMOL/L — SIGNIFICANT CHANGE UP (ref 98–107)
CO2 SERPL-SCNC: 22 MMOL/L — SIGNIFICANT CHANGE UP (ref 22–31)
CREAT SERPL-MCNC: 1.47 MG/DL — HIGH (ref 0.5–1.3)
EGFR: 33 ML/MIN/1.73M2 — LOW
EGFR: 33 ML/MIN/1.73M2 — LOW
GLUCOSE BLDC GLUCOMTR-MCNC: 160 MG/DL — HIGH (ref 70–99)
GLUCOSE BLDC GLUCOMTR-MCNC: 173 MG/DL — HIGH (ref 70–99)
GLUCOSE BLDC GLUCOMTR-MCNC: 83 MG/DL — SIGNIFICANT CHANGE UP (ref 70–99)
GLUCOSE BLDC GLUCOMTR-MCNC: 95 MG/DL — SIGNIFICANT CHANGE UP (ref 70–99)
GLUCOSE SERPL-MCNC: 106 MG/DL — HIGH (ref 70–99)
HCT VFR BLD CALC: 32.9 % — LOW (ref 34.5–45)
HGB BLD-MCNC: 10.5 G/DL — LOW (ref 11.5–15.5)
MAGNESIUM SERPL-MCNC: 1.9 MG/DL — SIGNIFICANT CHANGE UP (ref 1.6–2.6)
MCHC RBC-ENTMCNC: 31.9 G/DL — LOW (ref 32–36)
MCHC RBC-ENTMCNC: 32.1 PG — SIGNIFICANT CHANGE UP (ref 27–34)
MCV RBC AUTO: 100.6 FL — HIGH (ref 80–100)
NRBC # BLD AUTO: 0 K/UL — SIGNIFICANT CHANGE UP (ref 0–0)
NRBC # BLD: 0 /100 WBCS — SIGNIFICANT CHANGE UP (ref 0–0)
NRBC # FLD: 0 K/UL — SIGNIFICANT CHANGE UP (ref 0–0)
NRBC BLD-RTO: 0 /100 WBCS — SIGNIFICANT CHANGE UP (ref 0–0)
NT-PROBNP SERPL-SCNC: HIGH PG/ML
PHOSPHATE SERPL-MCNC: 2.7 MG/DL — SIGNIFICANT CHANGE UP (ref 2.5–4.5)
PLATELET # BLD AUTO: 199 K/UL — SIGNIFICANT CHANGE UP (ref 150–400)
POTASSIUM SERPL-MCNC: 4 MMOL/L — SIGNIFICANT CHANGE UP (ref 3.5–5.3)
POTASSIUM SERPL-SCNC: 4 MMOL/L — SIGNIFICANT CHANGE UP (ref 3.5–5.3)
RBC # BLD: 3.27 M/UL — LOW (ref 3.8–5.2)
RBC # FLD: 13.5 % — SIGNIFICANT CHANGE UP (ref 10.3–14.5)
SODIUM SERPL-SCNC: 139 MMOL/L — SIGNIFICANT CHANGE UP (ref 135–145)
WBC # BLD: 7.2 K/UL — SIGNIFICANT CHANGE UP (ref 3.8–10.5)
WBC # FLD AUTO: 7.2 K/UL — SIGNIFICANT CHANGE UP (ref 3.8–10.5)

## 2025-01-13 PROCEDURE — 93971 EXTREMITY STUDY: CPT | Mod: 26,LT

## 2025-01-13 PROCEDURE — 99233 SBSQ HOSP IP/OBS HIGH 50: CPT | Mod: GC

## 2025-01-13 RX ORDER — FUROSEMIDE 10 MG/ML
40 INJECTION INTRAMUSCULAR; INTRAVENOUS DAILY
Refills: 0 | Status: DISCONTINUED | OUTPATIENT
Start: 2025-01-13 | End: 2025-01-13

## 2025-01-13 RX ORDER — FUROSEMIDE 10 MG/ML
40 INJECTION INTRAMUSCULAR; INTRAVENOUS ONCE
Refills: 0 | Status: COMPLETED | OUTPATIENT
Start: 2025-01-13 | End: 2025-01-13

## 2025-01-13 RX ADMIN — IPRATROPIUM BROMIDE AND ALBUTEROL SULFATE 3 MILLILITER(S): .5; 2.5 SOLUTION RESPIRATORY (INHALATION) at 21:52

## 2025-01-13 RX ADMIN — Medication 4 MILLILITER(S): at 08:08

## 2025-01-13 RX ADMIN — Medication 4 MILLILITER(S): at 21:53

## 2025-01-13 RX ADMIN — HEPARIN SODIUM 5000 UNIT(S): 1000 INJECTION INTRAVENOUS; SUBCUTANEOUS at 13:01

## 2025-01-13 RX ADMIN — FUROSEMIDE 40 MILLIGRAM(S): 10 INJECTION INTRAMUSCULAR; INTRAVENOUS at 12:49

## 2025-01-13 RX ADMIN — ROSUVASTATIN CALCIUM 10 MILLIGRAM(S): 20 TABLET, FILM COATED ORAL at 22:33

## 2025-01-13 RX ADMIN — Medication 40 MILLIGRAM(S): at 13:02

## 2025-01-13 RX ADMIN — METOPROLOL SUCCINATE 25 MILLIGRAM(S): 50 TABLET, EXTENDED RELEASE ORAL at 05:40

## 2025-01-13 RX ADMIN — INSULIN LISPRO 1: 100 INJECTION, SOLUTION INTRAVENOUS; SUBCUTANEOUS at 18:32

## 2025-01-13 RX ADMIN — Medication 1 APPLICATION(S): at 18:36

## 2025-01-13 RX ADMIN — VENLAFAXINE HYDROCHLORIDE 75 MILLIGRAM(S): 37.5 CAPSULE, EXTENDED RELEASE ORAL at 13:01

## 2025-01-13 RX ADMIN — IPRATROPIUM BROMIDE AND ALBUTEROL SULFATE 3 MILLILITER(S): .5; 2.5 SOLUTION RESPIRATORY (INHALATION) at 15:59

## 2025-01-13 RX ADMIN — AMLODIPINE BESYLATE 2.5 MILLIGRAM(S): 10 TABLET ORAL at 05:38

## 2025-01-13 RX ADMIN — HEPARIN SODIUM 5000 UNIT(S): 1000 INJECTION INTRAVENOUS; SUBCUTANEOUS at 05:38

## 2025-01-13 RX ADMIN — HEPARIN SODIUM 5000 UNIT(S): 1000 INJECTION INTRAVENOUS; SUBCUTANEOUS at 22:33

## 2025-01-13 RX ADMIN — IPRATROPIUM BROMIDE AND ALBUTEROL SULFATE 3 MILLILITER(S): .5; 2.5 SOLUTION RESPIRATORY (INHALATION) at 02:29

## 2025-01-13 RX ADMIN — IPRATROPIUM BROMIDE AND ALBUTEROL SULFATE 3 MILLILITER(S): .5; 2.5 SOLUTION RESPIRATORY (INHALATION) at 08:07

## 2025-01-13 RX ADMIN — METOPROLOL SUCCINATE 25 MILLIGRAM(S): 50 TABLET, EXTENDED RELEASE ORAL at 18:32

## 2025-01-13 RX ADMIN — Medication 25 MICROGRAM(S): at 05:39

## 2025-01-14 ENCOUNTER — APPOINTMENT (OUTPATIENT)
Dept: PULMONOLOGY | Facility: CLINIC | Age: 89
End: 2025-01-14

## 2025-01-14 LAB
ALBUMIN SERPL ELPH-MCNC: 2.9 G/DL — LOW (ref 3.3–5)
ALP SERPL-CCNC: 63 U/L — SIGNIFICANT CHANGE UP (ref 40–120)
ALT FLD-CCNC: 28 U/L — SIGNIFICANT CHANGE UP (ref 4–33)
ANION GAP SERPL CALC-SCNC: 15 MMOL/L — HIGH (ref 7–14)
APTT BLD: 49.3 SEC — HIGH (ref 24.5–35.6)
AST SERPL-CCNC: 26 U/L — SIGNIFICANT CHANGE UP (ref 4–32)
BASOPHILS # BLD AUTO: 0.03 K/UL — SIGNIFICANT CHANGE UP (ref 0–0.2)
BASOPHILS NFR BLD AUTO: 0.3 % — SIGNIFICANT CHANGE UP (ref 0–2)
BILIRUB SERPL-MCNC: 0.4 MG/DL — SIGNIFICANT CHANGE UP (ref 0.2–1.2)
BUN SERPL-MCNC: 22 MG/DL — SIGNIFICANT CHANGE UP (ref 7–23)
CALCIUM SERPL-MCNC: 9 MG/DL — SIGNIFICANT CHANGE UP (ref 8.4–10.5)
CHLORIDE SERPL-SCNC: 102 MMOL/L — SIGNIFICANT CHANGE UP (ref 98–107)
CO2 SERPL-SCNC: 22 MMOL/L — SIGNIFICANT CHANGE UP (ref 22–31)
CREAT SERPL-MCNC: 1.35 MG/DL — HIGH (ref 0.5–1.3)
EGFR: 36 ML/MIN/1.73M2 — LOW
EGFR: 36 ML/MIN/1.73M2 — LOW
EOSINOPHIL # BLD AUTO: 0 K/UL — SIGNIFICANT CHANGE UP (ref 0–0.5)
EOSINOPHIL NFR BLD AUTO: 0 % — SIGNIFICANT CHANGE UP (ref 0–6)
GLUCOSE BLDC GLUCOMTR-MCNC: 117 MG/DL — HIGH (ref 70–99)
GLUCOSE BLDC GLUCOMTR-MCNC: 157 MG/DL — HIGH (ref 70–99)
GLUCOSE BLDC GLUCOMTR-MCNC: 176 MG/DL — HIGH (ref 70–99)
GLUCOSE BLDC GLUCOMTR-MCNC: 198 MG/DL — HIGH (ref 70–99)
GLUCOSE SERPL-MCNC: 169 MG/DL — HIGH (ref 70–99)
HCT VFR BLD CALC: 33.9 % — LOW (ref 34.5–45)
HGB BLD-MCNC: 10.8 G/DL — LOW (ref 11.5–15.5)
IANC: 7.38 K/UL — SIGNIFICANT CHANGE UP (ref 1.8–7.4)
IMM GRANULOCYTES NFR BLD AUTO: 0.8 % — SIGNIFICANT CHANGE UP (ref 0–0.9)
INR BLD: 0.98 RATIO — SIGNIFICANT CHANGE UP (ref 0.85–1.16)
LYMPHOCYTES # BLD AUTO: 0.85 K/UL — LOW (ref 1–3.3)
LYMPHOCYTES # BLD AUTO: 9.5 % — LOW (ref 13–44)
MAGNESIUM SERPL-MCNC: 1.8 MG/DL — SIGNIFICANT CHANGE UP (ref 1.6–2.6)
MCHC RBC-ENTMCNC: 31.6 PG — SIGNIFICANT CHANGE UP (ref 27–34)
MCHC RBC-ENTMCNC: 31.9 G/DL — LOW (ref 32–36)
MCV RBC AUTO: 99.1 FL — SIGNIFICANT CHANGE UP (ref 80–100)
MONOCYTES # BLD AUTO: 0.59 K/UL — SIGNIFICANT CHANGE UP (ref 0–0.9)
MONOCYTES NFR BLD AUTO: 6.6 % — SIGNIFICANT CHANGE UP (ref 2–14)
NEUTROPHILS # BLD AUTO: 7.38 K/UL — SIGNIFICANT CHANGE UP (ref 1.8–7.4)
NEUTROPHILS NFR BLD AUTO: 82.8 % — HIGH (ref 43–77)
NRBC # BLD AUTO: 0 K/UL — SIGNIFICANT CHANGE UP (ref 0–0)
NRBC # BLD: 0 /100 WBCS — SIGNIFICANT CHANGE UP (ref 0–0)
NRBC # FLD: 0 K/UL — SIGNIFICANT CHANGE UP (ref 0–0)
NRBC BLD-RTO: 0 /100 WBCS — SIGNIFICANT CHANGE UP (ref 0–0)
PHOSPHATE SERPL-MCNC: 2.8 MG/DL — SIGNIFICANT CHANGE UP (ref 2.5–4.5)
PLATELET # BLD AUTO: 239 K/UL — SIGNIFICANT CHANGE UP (ref 150–400)
POTASSIUM SERPL-MCNC: 4.2 MMOL/L — SIGNIFICANT CHANGE UP (ref 3.5–5.3)
POTASSIUM SERPL-SCNC: 4.2 MMOL/L — SIGNIFICANT CHANGE UP (ref 3.5–5.3)
PROT SERPL-MCNC: 6.1 G/DL — SIGNIFICANT CHANGE UP (ref 6–8.3)
PROTHROM AB SERPL-ACNC: 11.7 SEC — SIGNIFICANT CHANGE UP (ref 9.9–13.4)
RBC # BLD: 3.42 M/UL — LOW (ref 3.8–5.2)
RBC # FLD: 13.7 % — SIGNIFICANT CHANGE UP (ref 10.3–14.5)
SODIUM SERPL-SCNC: 139 MMOL/L — SIGNIFICANT CHANGE UP (ref 135–145)
WBC # BLD: 8.92 K/UL — SIGNIFICANT CHANGE UP (ref 3.8–10.5)
WBC # FLD AUTO: 8.92 K/UL — SIGNIFICANT CHANGE UP (ref 3.8–10.5)

## 2025-01-14 PROCEDURE — 99233 SBSQ HOSP IP/OBS HIGH 50: CPT | Mod: GC

## 2025-01-14 PROCEDURE — 99232 SBSQ HOSP IP/OBS MODERATE 35: CPT

## 2025-01-14 RX ORDER — DEXTROMETHORPHAN HBR, GUAIFENESIN 200 MG/10ML
200 LIQUID ORAL EVERY 6 HOURS
Refills: 0 | Status: DISCONTINUED | OUTPATIENT
Start: 2025-01-14 | End: 2025-01-18

## 2025-01-14 RX ORDER — IPRATROPIUM BROMIDE AND ALBUTEROL SULFATE .5; 2.5 MG/3ML; MG/3ML
3 SOLUTION RESPIRATORY (INHALATION) ONCE
Refills: 0 | Status: DISCONTINUED | OUTPATIENT
Start: 2025-01-14 | End: 2025-01-15

## 2025-01-14 RX ADMIN — HEPARIN SODIUM 5000 UNIT(S): 1000 INJECTION INTRAVENOUS; SUBCUTANEOUS at 05:26

## 2025-01-14 RX ADMIN — INSULIN LISPRO 1: 100 INJECTION, SOLUTION INTRAVENOUS; SUBCUTANEOUS at 09:18

## 2025-01-14 RX ADMIN — ROSUVASTATIN CALCIUM 10 MILLIGRAM(S): 20 TABLET, FILM COATED ORAL at 21:27

## 2025-01-14 RX ADMIN — HEPARIN SODIUM 5000 UNIT(S): 1000 INJECTION INTRAVENOUS; SUBCUTANEOUS at 15:19

## 2025-01-14 RX ADMIN — Medication 40 MILLIGRAM(S): at 13:02

## 2025-01-14 RX ADMIN — Medication 1 APPLICATION(S): at 13:02

## 2025-01-14 RX ADMIN — Medication 25 MICROGRAM(S): at 05:26

## 2025-01-14 RX ADMIN — Medication 4 MILLILITER(S): at 08:29

## 2025-01-14 RX ADMIN — IPRATROPIUM BROMIDE AND ALBUTEROL SULFATE 3 MILLILITER(S): .5; 2.5 SOLUTION RESPIRATORY (INHALATION) at 21:45

## 2025-01-14 RX ADMIN — INSULIN LISPRO 1: 100 INJECTION, SOLUTION INTRAVENOUS; SUBCUTANEOUS at 12:57

## 2025-01-14 RX ADMIN — IPRATROPIUM BROMIDE AND ALBUTEROL SULFATE 3 MILLILITER(S): .5; 2.5 SOLUTION RESPIRATORY (INHALATION) at 14:15

## 2025-01-14 RX ADMIN — METOPROLOL SUCCINATE 25 MILLIGRAM(S): 50 TABLET, EXTENDED RELEASE ORAL at 05:27

## 2025-01-14 RX ADMIN — Medication 4 MILLILITER(S): at 21:46

## 2025-01-14 RX ADMIN — IPRATROPIUM BROMIDE AND ALBUTEROL SULFATE 3 MILLILITER(S): .5; 2.5 SOLUTION RESPIRATORY (INHALATION) at 08:29

## 2025-01-14 RX ADMIN — VENLAFAXINE HYDROCHLORIDE 75 MILLIGRAM(S): 37.5 CAPSULE, EXTENDED RELEASE ORAL at 13:02

## 2025-01-14 RX ADMIN — IPRATROPIUM BROMIDE AND ALBUTEROL SULFATE 3 MILLILITER(S): .5; 2.5 SOLUTION RESPIRATORY (INHALATION) at 03:46

## 2025-01-14 RX ADMIN — AMLODIPINE BESYLATE 2.5 MILLIGRAM(S): 10 TABLET ORAL at 05:26

## 2025-01-14 RX ADMIN — METOPROLOL SUCCINATE 25 MILLIGRAM(S): 50 TABLET, EXTENDED RELEASE ORAL at 19:03

## 2025-01-14 RX ADMIN — DEXTROMETHORPHAN HBR, GUAIFENESIN 200 MILLIGRAM(S): 200 LIQUID ORAL at 19:03

## 2025-01-15 DIAGNOSIS — J90 PLEURAL EFFUSION, NOT ELSEWHERE CLASSIFIED: ICD-10-CM

## 2025-01-15 LAB
ADD ON TEST-SPECIMEN IN LAB: SIGNIFICANT CHANGE UP
ALBUMIN SERPL ELPH-MCNC: 3.1 G/DL — LOW (ref 3.3–5)
ALP SERPL-CCNC: 62 U/L — SIGNIFICANT CHANGE UP (ref 40–120)
ALT FLD-CCNC: 26 U/L — SIGNIFICANT CHANGE UP (ref 4–33)
ANION GAP SERPL CALC-SCNC: 17 MMOL/L — HIGH (ref 7–14)
APTT BLD: 38.5 SEC — HIGH (ref 24.5–35.6)
AST SERPL-CCNC: 25 U/L — SIGNIFICANT CHANGE UP (ref 4–32)
BASE EXCESS BLDA CALC-SCNC: -0.2 MMOL/L — SIGNIFICANT CHANGE UP (ref -2–3)
BASOPHILS # BLD AUTO: 0.01 K/UL — SIGNIFICANT CHANGE UP (ref 0–0.2)
BASOPHILS NFR BLD AUTO: 0.2 % — SIGNIFICANT CHANGE UP (ref 0–2)
BILIRUB SERPL-MCNC: 0.4 MG/DL — SIGNIFICANT CHANGE UP (ref 0.2–1.2)
BUN SERPL-MCNC: 25 MG/DL — HIGH (ref 7–23)
CALCIUM SERPL-MCNC: 9.4 MG/DL — SIGNIFICANT CHANGE UP (ref 8.4–10.5)
CHLORIDE SERPL-SCNC: 101 MMOL/L — SIGNIFICANT CHANGE UP (ref 98–107)
CO2 BLDA-SCNC: 25 MMOL/L — HIGH (ref 19–24)
CO2 SERPL-SCNC: 20 MMOL/L — LOW (ref 22–31)
CREAT SERPL-MCNC: 1.42 MG/DL — HIGH (ref 0.5–1.3)
EGFR: 34 ML/MIN/1.73M2 — LOW
EGFR: 34 ML/MIN/1.73M2 — LOW
EOSINOPHIL # BLD AUTO: 0 K/UL — SIGNIFICANT CHANGE UP (ref 0–0.5)
EOSINOPHIL NFR BLD AUTO: 0 % — SIGNIFICANT CHANGE UP (ref 0–6)
GAS PNL BLDA: SIGNIFICANT CHANGE UP
GLUCOSE BLDC GLUCOMTR-MCNC: 112 MG/DL — HIGH (ref 70–99)
GLUCOSE BLDC GLUCOMTR-MCNC: 143 MG/DL — HIGH (ref 70–99)
GLUCOSE BLDC GLUCOMTR-MCNC: 166 MG/DL — HIGH (ref 70–99)
GLUCOSE BLDC GLUCOMTR-MCNC: 184 MG/DL — HIGH (ref 70–99)
GLUCOSE SERPL-MCNC: 134 MG/DL — HIGH (ref 70–99)
HCO3 BLDA-SCNC: 24 MMOL/L — SIGNIFICANT CHANGE UP (ref 21–28)
HCT VFR BLD CALC: 35.2 % — SIGNIFICANT CHANGE UP (ref 34.5–45)
HGB BLD-MCNC: 11.4 G/DL — LOW (ref 11.5–15.5)
HOROWITZ INDEX BLDA+IHG-RTO: 50 — SIGNIFICANT CHANGE UP
IANC: 4.65 K/UL — SIGNIFICANT CHANGE UP (ref 1.8–7.4)
IMM GRANULOCYTES NFR BLD AUTO: 0.5 % — SIGNIFICANT CHANGE UP (ref 0–0.9)
INR BLD: 0.96 RATIO — SIGNIFICANT CHANGE UP (ref 0.85–1.16)
LYMPHOCYTES # BLD AUTO: 0.77 K/UL — LOW (ref 1–3.3)
LYMPHOCYTES # BLD AUTO: 13.1 % — SIGNIFICANT CHANGE UP (ref 13–44)
MCHC RBC-ENTMCNC: 31.9 PG — SIGNIFICANT CHANGE UP (ref 27–34)
MCHC RBC-ENTMCNC: 32.4 G/DL — SIGNIFICANT CHANGE UP (ref 32–36)
MCV RBC AUTO: 98.6 FL — SIGNIFICANT CHANGE UP (ref 80–100)
MONOCYTES # BLD AUTO: 0.41 K/UL — SIGNIFICANT CHANGE UP (ref 0–0.9)
MONOCYTES NFR BLD AUTO: 7 % — SIGNIFICANT CHANGE UP (ref 2–14)
NEUTROPHILS # BLD AUTO: 4.65 K/UL — SIGNIFICANT CHANGE UP (ref 1.8–7.4)
NEUTROPHILS NFR BLD AUTO: 79.2 % — HIGH (ref 43–77)
NRBC # BLD AUTO: 0 K/UL — SIGNIFICANT CHANGE UP (ref 0–0)
NRBC # BLD: 0 /100 WBCS — SIGNIFICANT CHANGE UP (ref 0–0)
NRBC # FLD: 0 K/UL — SIGNIFICANT CHANGE UP (ref 0–0)
NRBC BLD-RTO: 0 /100 WBCS — SIGNIFICANT CHANGE UP (ref 0–0)
NT-PROBNP SERPL-SCNC: HIGH PG/ML
PCO2 BLDA: 37 MMHG — SIGNIFICANT CHANGE UP (ref 32–45)
PH BLDA: 7.42 — SIGNIFICANT CHANGE UP (ref 7.35–7.45)
PHOSPHATE SERPL-MCNC: 3.2 MG/DL — SIGNIFICANT CHANGE UP (ref 2.5–4.5)
PLATELET # BLD AUTO: 225 K/UL — SIGNIFICANT CHANGE UP (ref 150–400)
PO2 BLDA: 179 MMHG — HIGH (ref 83–108)
POTASSIUM SERPL-MCNC: 4.1 MMOL/L — SIGNIFICANT CHANGE UP (ref 3.5–5.3)
POTASSIUM SERPL-SCNC: 4.1 MMOL/L — SIGNIFICANT CHANGE UP (ref 3.5–5.3)
PROT SERPL-MCNC: 6.1 G/DL — SIGNIFICANT CHANGE UP (ref 6–8.3)
PROTHROM AB SERPL-ACNC: 11.1 SEC — SIGNIFICANT CHANGE UP (ref 9.9–13.4)
RBC # BLD: 3.57 M/UL — LOW (ref 3.8–5.2)
RBC # FLD: 13.6 % — SIGNIFICANT CHANGE UP (ref 10.3–14.5)
SAO2 % BLDA: 99.6 % — HIGH (ref 94–98)
SODIUM SERPL-SCNC: 138 MMOL/L — SIGNIFICANT CHANGE UP (ref 135–145)
WBC # BLD: 5.87 K/UL — SIGNIFICANT CHANGE UP (ref 3.8–10.5)
WBC # FLD AUTO: 5.87 K/UL — SIGNIFICANT CHANGE UP (ref 3.8–10.5)

## 2025-01-15 PROCEDURE — 99233 SBSQ HOSP IP/OBS HIGH 50: CPT | Mod: GC

## 2025-01-15 PROCEDURE — 71045 X-RAY EXAM CHEST 1 VIEW: CPT | Mod: 26

## 2025-01-15 RX ORDER — FUROSEMIDE 10 MG/ML
40 INJECTION INTRAMUSCULAR; INTRAVENOUS DAILY
Refills: 0 | Status: DISCONTINUED | OUTPATIENT
Start: 2025-01-16 | End: 2025-01-16

## 2025-01-15 RX ORDER — FUROSEMIDE 10 MG/ML
20 INJECTION INTRAMUSCULAR; INTRAVENOUS ONCE
Refills: 0 | Status: COMPLETED | OUTPATIENT
Start: 2025-01-15 | End: 2025-01-15

## 2025-01-15 RX ORDER — FUROSEMIDE 10 MG/ML
40 INJECTION INTRAMUSCULAR; INTRAVENOUS DAILY
Refills: 0 | Status: DISCONTINUED | OUTPATIENT
Start: 2025-01-15 | End: 2025-01-15

## 2025-01-15 RX ADMIN — METOPROLOL SUCCINATE 25 MILLIGRAM(S): 50 TABLET, EXTENDED RELEASE ORAL at 07:41

## 2025-01-15 RX ADMIN — Medication 1 APPLICATION(S): at 14:08

## 2025-01-15 RX ADMIN — DEXTROMETHORPHAN HBR, GUAIFENESIN 200 MILLIGRAM(S): 200 LIQUID ORAL at 22:54

## 2025-01-15 RX ADMIN — IPRATROPIUM BROMIDE AND ALBUTEROL SULFATE 3 MILLILITER(S): .5; 2.5 SOLUTION RESPIRATORY (INHALATION) at 03:25

## 2025-01-15 RX ADMIN — INSULIN LISPRO 1: 100 INJECTION, SOLUTION INTRAVENOUS; SUBCUTANEOUS at 18:12

## 2025-01-15 RX ADMIN — FUROSEMIDE 40 MILLIGRAM(S): 10 INJECTION INTRAMUSCULAR; INTRAVENOUS at 07:40

## 2025-01-15 RX ADMIN — IPRATROPIUM BROMIDE AND ALBUTEROL SULFATE 3 MILLILITER(S): .5; 2.5 SOLUTION RESPIRATORY (INHALATION) at 07:40

## 2025-01-15 RX ADMIN — Medication 4 MILLILITER(S): at 07:39

## 2025-01-15 RX ADMIN — Medication 40 MILLIGRAM(S): at 18:16

## 2025-01-15 RX ADMIN — DEXTROMETHORPHAN HBR, GUAIFENESIN 200 MILLIGRAM(S): 200 LIQUID ORAL at 18:15

## 2025-01-15 RX ADMIN — DEXTROMETHORPHAN HBR, GUAIFENESIN 200 MILLIGRAM(S): 200 LIQUID ORAL at 13:55

## 2025-01-15 RX ADMIN — Medication 25 MICROGRAM(S): at 06:33

## 2025-01-15 RX ADMIN — DEXTROMETHORPHAN HBR, GUAIFENESIN 200 MILLIGRAM(S): 200 LIQUID ORAL at 00:58

## 2025-01-15 RX ADMIN — HEPARIN SODIUM 5000 UNIT(S): 1000 INJECTION INTRAVENOUS; SUBCUTANEOUS at 22:54

## 2025-01-15 RX ADMIN — HEPARIN SODIUM 5000 UNIT(S): 1000 INJECTION INTRAVENOUS; SUBCUTANEOUS at 07:41

## 2025-01-15 RX ADMIN — VENLAFAXINE HYDROCHLORIDE 75 MILLIGRAM(S): 37.5 CAPSULE, EXTENDED RELEASE ORAL at 18:16

## 2025-01-15 RX ADMIN — IPRATROPIUM BROMIDE AND ALBUTEROL SULFATE 3 MILLILITER(S): .5; 2.5 SOLUTION RESPIRATORY (INHALATION) at 21:19

## 2025-01-15 RX ADMIN — HEPARIN SODIUM 5000 UNIT(S): 1000 INJECTION INTRAVENOUS; SUBCUTANEOUS at 00:53

## 2025-01-15 RX ADMIN — Medication 4 MILLILITER(S): at 21:19

## 2025-01-15 RX ADMIN — METOPROLOL SUCCINATE 25 MILLIGRAM(S): 50 TABLET, EXTENDED RELEASE ORAL at 18:15

## 2025-01-15 RX ADMIN — AMLODIPINE BESYLATE 2.5 MILLIGRAM(S): 10 TABLET ORAL at 07:41

## 2025-01-15 RX ADMIN — IPRATROPIUM BROMIDE AND ALBUTEROL SULFATE 3 MILLILITER(S): .5; 2.5 SOLUTION RESPIRATORY (INHALATION) at 12:42

## 2025-01-15 RX ADMIN — ROSUVASTATIN CALCIUM 10 MILLIGRAM(S): 20 TABLET, FILM COATED ORAL at 22:54

## 2025-01-15 RX ADMIN — FUROSEMIDE 20 MILLIGRAM(S): 10 INJECTION INTRAMUSCULAR; INTRAVENOUS at 02:12

## 2025-01-15 RX ADMIN — INSULIN LISPRO 1: 100 INJECTION, SOLUTION INTRAVENOUS; SUBCUTANEOUS at 13:52

## 2025-01-15 RX ADMIN — HEPARIN SODIUM 5000 UNIT(S): 1000 INJECTION INTRAVENOUS; SUBCUTANEOUS at 13:54

## 2025-01-15 RX ADMIN — DEXTROMETHORPHAN HBR, GUAIFENESIN 200 MILLIGRAM(S): 200 LIQUID ORAL at 07:40

## 2025-01-16 ENCOUNTER — TRANSCRIPTION ENCOUNTER (OUTPATIENT)
Age: 89
End: 2025-01-16

## 2025-01-16 DIAGNOSIS — Z29.9 ENCOUNTER FOR PROPHYLACTIC MEASURES, UNSPECIFIED: ICD-10-CM

## 2025-01-16 LAB
ADD ON TEST-SPECIMEN IN LAB: SIGNIFICANT CHANGE UP
ADD ON TEST-SPECIMEN IN LAB: SIGNIFICANT CHANGE UP
ANION GAP SERPL CALC-SCNC: 11 MMOL/L — SIGNIFICANT CHANGE UP (ref 7–14)
BUN SERPL-MCNC: 27 MG/DL — HIGH (ref 7–23)
CALCIUM SERPL-MCNC: 9.1 MG/DL — SIGNIFICANT CHANGE UP (ref 8.4–10.5)
CHLORIDE SERPL-SCNC: 101 MMOL/L — SIGNIFICANT CHANGE UP (ref 98–107)
CO2 SERPL-SCNC: 27 MMOL/L — SIGNIFICANT CHANGE UP (ref 22–31)
CREAT SERPL-MCNC: 1.43 MG/DL — HIGH (ref 0.5–1.3)
EGFR: 34 ML/MIN/1.73M2 — LOW
EGFR: 34 ML/MIN/1.73M2 — LOW
GLUCOSE BLDC GLUCOMTR-MCNC: 114 MG/DL — HIGH (ref 70–99)
GLUCOSE BLDC GLUCOMTR-MCNC: 134 MG/DL — HIGH (ref 70–99)
GLUCOSE BLDC GLUCOMTR-MCNC: 143 MG/DL — HIGH (ref 70–99)
GLUCOSE BLDC GLUCOMTR-MCNC: 149 MG/DL — HIGH (ref 70–99)
GLUCOSE SERPL-MCNC: 99 MG/DL — SIGNIFICANT CHANGE UP (ref 70–99)
HCT VFR BLD CALC: 35.6 % — SIGNIFICANT CHANGE UP (ref 34.5–45)
HGB BLD-MCNC: 11 G/DL — LOW (ref 11.5–15.5)
MAGNESIUM SERPL-MCNC: 1.7 MG/DL — SIGNIFICANT CHANGE UP (ref 1.6–2.6)
MCHC RBC-ENTMCNC: 30.9 G/DL — LOW (ref 32–36)
MCHC RBC-ENTMCNC: 31.2 PG — SIGNIFICANT CHANGE UP (ref 27–34)
MCV RBC AUTO: 100.8 FL — HIGH (ref 80–100)
NRBC # BLD AUTO: 0 K/UL — SIGNIFICANT CHANGE UP (ref 0–0)
NRBC # BLD: 0 /100 WBCS — SIGNIFICANT CHANGE UP (ref 0–0)
NRBC # FLD: 0 K/UL — SIGNIFICANT CHANGE UP (ref 0–0)
NRBC BLD-RTO: 0 /100 WBCS — SIGNIFICANT CHANGE UP (ref 0–0)
PHOSPHATE SERPL-MCNC: 3.1 MG/DL — SIGNIFICANT CHANGE UP (ref 2.5–4.5)
PLATELET # BLD AUTO: 228 K/UL — SIGNIFICANT CHANGE UP (ref 150–400)
POTASSIUM SERPL-MCNC: 3.9 MMOL/L — SIGNIFICANT CHANGE UP (ref 3.5–5.3)
POTASSIUM SERPL-SCNC: 3.9 MMOL/L — SIGNIFICANT CHANGE UP (ref 3.5–5.3)
RBC # BLD: 3.53 M/UL — LOW (ref 3.8–5.2)
RBC # FLD: 13.8 % — SIGNIFICANT CHANGE UP (ref 10.3–14.5)
SODIUM SERPL-SCNC: 139 MMOL/L — SIGNIFICANT CHANGE UP (ref 135–145)
T4 FREE SERPL-MCNC: 1.2 NG/DL — SIGNIFICANT CHANGE UP (ref 0.9–1.7)
TSH SERPL-MCNC: 7.45 UIU/ML — HIGH (ref 0.27–4.2)
WBC # BLD: 6.98 K/UL — SIGNIFICANT CHANGE UP (ref 3.8–10.5)
WBC # FLD AUTO: 6.98 K/UL — SIGNIFICANT CHANGE UP (ref 3.8–10.5)

## 2025-01-16 PROCEDURE — 99233 SBSQ HOSP IP/OBS HIGH 50: CPT | Mod: GC

## 2025-01-16 RX ORDER — FUROSEMIDE 10 MG/ML
20 INJECTION INTRAMUSCULAR; INTRAVENOUS DAILY
Refills: 0 | Status: DISCONTINUED | OUTPATIENT
Start: 2025-01-17 | End: 2025-01-18

## 2025-01-16 RX ADMIN — FUROSEMIDE 40 MILLIGRAM(S): 10 INJECTION INTRAMUSCULAR; INTRAVENOUS at 07:31

## 2025-01-16 RX ADMIN — IPRATROPIUM BROMIDE AND ALBUTEROL SULFATE 3 MILLILITER(S): .5; 2.5 SOLUTION RESPIRATORY (INHALATION) at 15:13

## 2025-01-16 RX ADMIN — Medication 4 MILLILITER(S): at 15:14

## 2025-01-16 RX ADMIN — Medication 1 APPLICATION(S): at 11:32

## 2025-01-16 RX ADMIN — HEPARIN SODIUM 5000 UNIT(S): 1000 INJECTION INTRAVENOUS; SUBCUTANEOUS at 13:45

## 2025-01-16 RX ADMIN — Medication 4 MILLILITER(S): at 10:23

## 2025-01-16 RX ADMIN — HEPARIN SODIUM 5000 UNIT(S): 1000 INJECTION INTRAVENOUS; SUBCUTANEOUS at 07:30

## 2025-01-16 RX ADMIN — HEPARIN SODIUM 5000 UNIT(S): 1000 INJECTION INTRAVENOUS; SUBCUTANEOUS at 22:28

## 2025-01-16 RX ADMIN — Medication 4 MILLILITER(S): at 20:31

## 2025-01-16 RX ADMIN — ROSUVASTATIN CALCIUM 10 MILLIGRAM(S): 20 TABLET, FILM COATED ORAL at 22:27

## 2025-01-16 RX ADMIN — Medication 4 MILLILITER(S): at 04:07

## 2025-01-16 RX ADMIN — VENLAFAXINE HYDROCHLORIDE 75 MILLIGRAM(S): 37.5 CAPSULE, EXTENDED RELEASE ORAL at 11:24

## 2025-01-16 RX ADMIN — Medication 40 MILLIGRAM(S): at 11:24

## 2025-01-16 RX ADMIN — IPRATROPIUM BROMIDE AND ALBUTEROL SULFATE 3 MILLILITER(S): .5; 2.5 SOLUTION RESPIRATORY (INHALATION) at 04:07

## 2025-01-16 RX ADMIN — AMLODIPINE BESYLATE 2.5 MILLIGRAM(S): 10 TABLET ORAL at 07:31

## 2025-01-16 RX ADMIN — METOPROLOL SUCCINATE 25 MILLIGRAM(S): 50 TABLET, EXTENDED RELEASE ORAL at 18:55

## 2025-01-16 RX ADMIN — Medication 25 MICROGRAM(S): at 07:30

## 2025-01-16 RX ADMIN — DEXTROMETHORPHAN HBR, GUAIFENESIN 200 MILLIGRAM(S): 200 LIQUID ORAL at 11:25

## 2025-01-16 RX ADMIN — IPRATROPIUM BROMIDE AND ALBUTEROL SULFATE 3 MILLILITER(S): .5; 2.5 SOLUTION RESPIRATORY (INHALATION) at 20:31

## 2025-01-16 RX ADMIN — DEXTROMETHORPHAN HBR, GUAIFENESIN 200 MILLIGRAM(S): 200 LIQUID ORAL at 22:26

## 2025-01-16 RX ADMIN — METOPROLOL SUCCINATE 25 MILLIGRAM(S): 50 TABLET, EXTENDED RELEASE ORAL at 07:31

## 2025-01-16 RX ADMIN — DEXTROMETHORPHAN HBR, GUAIFENESIN 200 MILLIGRAM(S): 200 LIQUID ORAL at 07:30

## 2025-01-16 RX ADMIN — DEXTROMETHORPHAN HBR, GUAIFENESIN 200 MILLIGRAM(S): 200 LIQUID ORAL at 18:55

## 2025-01-16 RX ADMIN — IPRATROPIUM BROMIDE AND ALBUTEROL SULFATE 3 MILLILITER(S): .5; 2.5 SOLUTION RESPIRATORY (INHALATION) at 10:23

## 2025-01-17 LAB
ANION GAP SERPL CALC-SCNC: 14 MMOL/L — SIGNIFICANT CHANGE UP (ref 7–14)
BUN SERPL-MCNC: 31 MG/DL — HIGH (ref 7–23)
CALCIUM SERPL-MCNC: 9 MG/DL — SIGNIFICANT CHANGE UP (ref 8.4–10.5)
CHLORIDE SERPL-SCNC: 97 MMOL/L — LOW (ref 98–107)
CO2 SERPL-SCNC: 25 MMOL/L — SIGNIFICANT CHANGE UP (ref 22–31)
CREAT SERPL-MCNC: 1.53 MG/DL — HIGH (ref 0.5–1.3)
EGFR: 31 ML/MIN/1.73M2 — LOW
EGFR: 31 ML/MIN/1.73M2 — LOW
GLUCOSE BLDC GLUCOMTR-MCNC: 139 MG/DL — HIGH (ref 70–99)
GLUCOSE BLDC GLUCOMTR-MCNC: 140 MG/DL — HIGH (ref 70–99)
GLUCOSE BLDC GLUCOMTR-MCNC: 157 MG/DL — HIGH (ref 70–99)
GLUCOSE BLDC GLUCOMTR-MCNC: 170 MG/DL — HIGH (ref 70–99)
GLUCOSE SERPL-MCNC: 103 MG/DL — HIGH (ref 70–99)
MAGNESIUM SERPL-MCNC: 1.6 MG/DL — SIGNIFICANT CHANGE UP (ref 1.6–2.6)
PHOSPHATE SERPL-MCNC: 3.4 MG/DL — SIGNIFICANT CHANGE UP (ref 2.5–4.5)
POTASSIUM SERPL-MCNC: 3.8 MMOL/L — SIGNIFICANT CHANGE UP (ref 3.5–5.3)
POTASSIUM SERPL-SCNC: 3.8 MMOL/L — SIGNIFICANT CHANGE UP (ref 3.5–5.3)
SODIUM SERPL-SCNC: 136 MMOL/L — SIGNIFICANT CHANGE UP (ref 135–145)

## 2025-01-17 PROCEDURE — 99233 SBSQ HOSP IP/OBS HIGH 50: CPT | Mod: GC

## 2025-01-17 RX ADMIN — HEPARIN SODIUM 5000 UNIT(S): 1000 INJECTION INTRAVENOUS; SUBCUTANEOUS at 14:47

## 2025-01-17 RX ADMIN — DEXTROMETHORPHAN HBR, GUAIFENESIN 200 MILLIGRAM(S): 200 LIQUID ORAL at 14:27

## 2025-01-17 RX ADMIN — Medication 4 MILLILITER(S): at 11:30

## 2025-01-17 RX ADMIN — METOPROLOL SUCCINATE 25 MILLIGRAM(S): 50 TABLET, EXTENDED RELEASE ORAL at 06:45

## 2025-01-17 RX ADMIN — Medication 4 MILLILITER(S): at 02:47

## 2025-01-17 RX ADMIN — IPRATROPIUM BROMIDE AND ALBUTEROL SULFATE 3 MILLILITER(S): .5; 2.5 SOLUTION RESPIRATORY (INHALATION) at 22:34

## 2025-01-17 RX ADMIN — Medication 40 MILLIGRAM(S): at 14:28

## 2025-01-17 RX ADMIN — Medication 4 MILLILITER(S): at 22:34

## 2025-01-17 RX ADMIN — IPRATROPIUM BROMIDE AND ALBUTEROL SULFATE 3 MILLILITER(S): .5; 2.5 SOLUTION RESPIRATORY (INHALATION) at 16:32

## 2025-01-17 RX ADMIN — HEPARIN SODIUM 5000 UNIT(S): 1000 INJECTION INTRAVENOUS; SUBCUTANEOUS at 21:39

## 2025-01-17 RX ADMIN — ROSUVASTATIN CALCIUM 10 MILLIGRAM(S): 20 TABLET, FILM COATED ORAL at 21:39

## 2025-01-17 RX ADMIN — Medication 25 MICROGRAM(S): at 06:44

## 2025-01-17 RX ADMIN — Medication 1 APPLICATION(S): at 14:28

## 2025-01-17 RX ADMIN — DEXTROMETHORPHAN HBR, GUAIFENESIN 200 MILLIGRAM(S): 200 LIQUID ORAL at 06:44

## 2025-01-17 RX ADMIN — IPRATROPIUM BROMIDE AND ALBUTEROL SULFATE 3 MILLILITER(S): .5; 2.5 SOLUTION RESPIRATORY (INHALATION) at 02:47

## 2025-01-17 RX ADMIN — IPRATROPIUM BROMIDE AND ALBUTEROL SULFATE 3 MILLILITER(S): .5; 2.5 SOLUTION RESPIRATORY (INHALATION) at 11:30

## 2025-01-17 RX ADMIN — FUROSEMIDE 20 MILLIGRAM(S): 10 INJECTION INTRAMUSCULAR; INTRAVENOUS at 06:43

## 2025-01-17 RX ADMIN — INSULIN LISPRO 1: 100 INJECTION, SOLUTION INTRAVENOUS; SUBCUTANEOUS at 18:10

## 2025-01-17 RX ADMIN — AMLODIPINE BESYLATE 2.5 MILLIGRAM(S): 10 TABLET ORAL at 06:45

## 2025-01-17 RX ADMIN — HEPARIN SODIUM 5000 UNIT(S): 1000 INJECTION INTRAVENOUS; SUBCUTANEOUS at 06:44

## 2025-01-17 RX ADMIN — Medication 4 MILLILITER(S): at 16:37

## 2025-01-17 RX ADMIN — VENLAFAXINE HYDROCHLORIDE 75 MILLIGRAM(S): 37.5 CAPSULE, EXTENDED RELEASE ORAL at 14:28

## 2025-01-18 VITALS
HEART RATE: 73 BPM | OXYGEN SATURATION: 96 % | DIASTOLIC BLOOD PRESSURE: 78 MMHG | RESPIRATION RATE: 18 BRPM | TEMPERATURE: 98 F | SYSTOLIC BLOOD PRESSURE: 119 MMHG

## 2025-01-18 LAB
ANION GAP SERPL CALC-SCNC: 11 MMOL/L — SIGNIFICANT CHANGE UP (ref 7–14)
BUN SERPL-MCNC: 29 MG/DL — HIGH (ref 7–23)
CALCIUM SERPL-MCNC: 8.8 MG/DL — SIGNIFICANT CHANGE UP (ref 8.4–10.5)
CHLORIDE SERPL-SCNC: 103 MMOL/L — SIGNIFICANT CHANGE UP (ref 98–107)
CO2 SERPL-SCNC: 26 MMOL/L — SIGNIFICANT CHANGE UP (ref 22–31)
CREAT SERPL-MCNC: 1.37 MG/DL — HIGH (ref 0.5–1.3)
EGFR: 36 ML/MIN/1.73M2 — LOW
EGFR: 36 ML/MIN/1.73M2 — LOW
GLUCOSE BLDC GLUCOMTR-MCNC: 109 MG/DL — HIGH (ref 70–99)
GLUCOSE BLDC GLUCOMTR-MCNC: 161 MG/DL — HIGH (ref 70–99)
GLUCOSE SERPL-MCNC: 102 MG/DL — HIGH (ref 70–99)
HCT VFR BLD CALC: 32.7 % — LOW (ref 34.5–45)
HGB BLD-MCNC: 10.3 G/DL — LOW (ref 11.5–15.5)
MAGNESIUM SERPL-MCNC: 1.7 MG/DL — SIGNIFICANT CHANGE UP (ref 1.6–2.6)
MCHC RBC-ENTMCNC: 31 PG — SIGNIFICANT CHANGE UP (ref 27–34)
MCHC RBC-ENTMCNC: 31.5 G/DL — LOW (ref 32–36)
MCV RBC AUTO: 98.5 FL — SIGNIFICANT CHANGE UP (ref 80–100)
NRBC # BLD AUTO: 0 K/UL — SIGNIFICANT CHANGE UP (ref 0–0)
NRBC # BLD: 0 /100 WBCS — SIGNIFICANT CHANGE UP (ref 0–0)
NRBC # FLD: 0 K/UL — SIGNIFICANT CHANGE UP (ref 0–0)
NRBC BLD-RTO: 0 /100 WBCS — SIGNIFICANT CHANGE UP (ref 0–0)
PHOSPHATE SERPL-MCNC: 3.1 MG/DL — SIGNIFICANT CHANGE UP (ref 2.5–4.5)
PLATELET # BLD AUTO: 221 K/UL — SIGNIFICANT CHANGE UP (ref 150–400)
POTASSIUM SERPL-MCNC: 3.8 MMOL/L — SIGNIFICANT CHANGE UP (ref 3.5–5.3)
POTASSIUM SERPL-SCNC: 3.8 MMOL/L — SIGNIFICANT CHANGE UP (ref 3.5–5.3)
RBC # BLD: 3.32 M/UL — LOW (ref 3.8–5.2)
RBC # FLD: 13.6 % — SIGNIFICANT CHANGE UP (ref 10.3–14.5)
SODIUM SERPL-SCNC: 140 MMOL/L — SIGNIFICANT CHANGE UP (ref 135–145)
WBC # BLD: 5.69 K/UL — SIGNIFICANT CHANGE UP (ref 3.8–10.5)
WBC # FLD AUTO: 5.69 K/UL — SIGNIFICANT CHANGE UP (ref 3.8–10.5)

## 2025-01-18 PROCEDURE — 99239 HOSP IP/OBS DSCHRG MGMT >30: CPT

## 2025-01-18 RX ORDER — FUROSEMIDE 10 MG/ML
1 INJECTION INTRAMUSCULAR; INTRAVENOUS
Qty: 30 | Refills: 0
Start: 2025-01-18 | End: 2025-02-16

## 2025-01-18 RX ORDER — ALBUTEROL SULFATE 2.5 MG/3ML
2 VIAL, NEBULIZER (ML) INHALATION
Qty: 1 | Refills: 0
Start: 2025-01-18 | End: 2025-02-16

## 2025-01-18 RX ORDER — ALBUTEROL SULFATE 2.5 MG/3ML
2 VIAL, NEBULIZER (ML) INHALATION
Qty: 1 | Refills: 0
Start: 2025-01-18

## 2025-01-18 RX ADMIN — HEPARIN SODIUM 5000 UNIT(S): 1000 INJECTION INTRAVENOUS; SUBCUTANEOUS at 14:52

## 2025-01-18 RX ADMIN — FUROSEMIDE 20 MILLIGRAM(S): 10 INJECTION INTRAMUSCULAR; INTRAVENOUS at 07:29

## 2025-01-18 RX ADMIN — IPRATROPIUM BROMIDE AND ALBUTEROL SULFATE 3 MILLILITER(S): .5; 2.5 SOLUTION RESPIRATORY (INHALATION) at 11:43

## 2025-01-18 RX ADMIN — VENLAFAXINE HYDROCHLORIDE 75 MILLIGRAM(S): 37.5 CAPSULE, EXTENDED RELEASE ORAL at 12:40

## 2025-01-18 RX ADMIN — Medication 1 APPLICATION(S): at 12:56

## 2025-01-18 RX ADMIN — IPRATROPIUM BROMIDE AND ALBUTEROL SULFATE 3 MILLILITER(S): .5; 2.5 SOLUTION RESPIRATORY (INHALATION) at 03:32

## 2025-01-18 RX ADMIN — Medication 25 MICROGRAM(S): at 06:24

## 2025-01-18 RX ADMIN — HEPARIN SODIUM 5000 UNIT(S): 1000 INJECTION INTRAVENOUS; SUBCUTANEOUS at 06:24

## 2025-01-18 RX ADMIN — Medication 40 MILLIGRAM(S): at 12:41

## 2025-01-18 RX ADMIN — INSULIN LISPRO 1: 100 INJECTION, SOLUTION INTRAVENOUS; SUBCUTANEOUS at 12:41

## 2025-01-18 RX ADMIN — METOPROLOL SUCCINATE 25 MILLIGRAM(S): 50 TABLET, EXTENDED RELEASE ORAL at 07:29

## 2025-01-18 RX ADMIN — Medication 4 MILLILITER(S): at 03:35

## 2025-01-18 RX ADMIN — Medication 4 MILLILITER(S): at 11:43

## 2025-01-20 ENCOUNTER — NON-APPOINTMENT (OUTPATIENT)
Age: 89
End: 2025-01-20

## 2025-01-20 ENCOUNTER — APPOINTMENT (OUTPATIENT)
Dept: PULMONOLOGY | Facility: CLINIC | Age: 89
End: 2025-01-20
Payer: MEDICARE

## 2025-01-20 ENCOUNTER — APPOINTMENT (OUTPATIENT)
Dept: CARDIOLOGY | Facility: CLINIC | Age: 89
End: 2025-01-20
Payer: MEDICARE

## 2025-01-20 DIAGNOSIS — B34.8 OTHER VIRAL INFECTIONS OF UNSPECIFIED SITE: ICD-10-CM

## 2025-01-20 DIAGNOSIS — I35.0 NONRHEUMATIC AORTIC (VALVE) STENOSIS: ICD-10-CM

## 2025-01-20 DIAGNOSIS — J45.909 UNSPECIFIED ASTHMA, UNCOMPLICATED: ICD-10-CM

## 2025-01-20 DIAGNOSIS — J96.01 ACUTE RESPIRATORY FAILURE WITH HYPOXIA: ICD-10-CM

## 2025-01-20 DIAGNOSIS — E11.9 TYPE 2 DIABETES MELLITUS W/OUT COMPLICATIONS: ICD-10-CM

## 2025-01-20 DIAGNOSIS — J96.02 ACUTE RESPIRATORY FAILURE WITH HYPERCAPNIA: ICD-10-CM

## 2025-01-20 DIAGNOSIS — R73.9 HYPERGLYCEMIA, UNSPECIFIED: ICD-10-CM

## 2025-01-20 DIAGNOSIS — I50.9 HEART FAILURE, UNSPECIFIED: ICD-10-CM

## 2025-01-20 PROCEDURE — 99496 TRANSJ CARE MGMT HIGH F2F 7D: CPT | Mod: 2W

## 2025-01-20 PROCEDURE — 99214 OFFICE O/P EST MOD 30 MIN: CPT | Mod: 2W

## 2025-01-21 ENCOUNTER — TRANSCRIPTION ENCOUNTER (OUTPATIENT)
Age: 89
End: 2025-01-21

## 2025-01-27 NOTE — DISCHARGE NOTE NURSING/CASE MANAGEMENT/SOCIAL WORK - NSTOBACCONEVERSMOKERY/N_GEN_A
----- Message from Vielka sent at 1/27/2025 10:49 AM CST -----  Regarding: HFU  Patient will be discharging from [facility] and a HFU was requested with Pulmonary by DC provider.  Please schedule and message me back so DC can relay appointment information to patient prior to discharge.     Discharge has requested PFT's as well.  I have requested referral from discharge for both. Discharge requested sooner appt's please.    DX: COPD    ThanksRadha  Physician Referral Specialist/Discharge   No

## 2025-01-29 ENCOUNTER — TRANSCRIPTION ENCOUNTER (OUTPATIENT)
Age: 89
End: 2025-01-29

## 2025-02-03 ENCOUNTER — NON-APPOINTMENT (OUTPATIENT)
Age: 89
End: 2025-02-03

## 2025-02-05 ENCOUNTER — APPOINTMENT (OUTPATIENT)
Dept: PULMONOLOGY | Facility: CLINIC | Age: 89
End: 2025-02-05
Payer: MEDICARE

## 2025-02-05 ENCOUNTER — TRANSCRIPTION ENCOUNTER (OUTPATIENT)
Age: 89
End: 2025-02-05

## 2025-02-05 DIAGNOSIS — L89.90 PRESSURE ULCER OF UNSPECIFIED SITE, UNSPECIFIED STAGE: ICD-10-CM

## 2025-02-05 DIAGNOSIS — J18.9 PNEUMONIA, UNSPECIFIED ORGANISM: ICD-10-CM

## 2025-02-05 DIAGNOSIS — I50.9 HEART FAILURE, UNSPECIFIED: ICD-10-CM

## 2025-02-05 PROCEDURE — 99205 OFFICE O/P NEW HI 60 MIN: CPT | Mod: 2W

## 2025-02-05 PROCEDURE — G2211 COMPLEX E/M VISIT ADD ON: CPT | Mod: 2W

## 2025-02-10 ENCOUNTER — TRANSCRIPTION ENCOUNTER (OUTPATIENT)
Age: 89
End: 2025-02-10

## 2025-02-10 DIAGNOSIS — F41.9 ANXIETY DISORDER, UNSPECIFIED: ICD-10-CM

## 2025-02-10 RX ORDER — CLONAZEPAM 0.5 MG/1
0.5 TABLET ORAL TWICE DAILY
Qty: 30 | Refills: 0 | Status: ACTIVE | COMMUNITY
Start: 2025-02-10 | End: 1900-01-01

## 2025-02-11 ENCOUNTER — TRANSCRIPTION ENCOUNTER (OUTPATIENT)
Age: 89
End: 2025-02-11

## 2025-02-18 ENCOUNTER — NON-APPOINTMENT (OUTPATIENT)
Age: 89
End: 2025-02-18

## 2025-02-18 ENCOUNTER — TRANSCRIPTION ENCOUNTER (OUTPATIENT)
Age: 89
End: 2025-02-18

## 2025-02-19 ENCOUNTER — TRANSCRIPTION ENCOUNTER (OUTPATIENT)
Age: 89
End: 2025-02-19

## 2025-02-20 ENCOUNTER — TRANSCRIPTION ENCOUNTER (OUTPATIENT)
Age: 89
End: 2025-02-20

## 2025-02-28 ENCOUNTER — NON-APPOINTMENT (OUTPATIENT)
Age: 89
End: 2025-02-28

## 2025-03-10 ENCOUNTER — RX RENEWAL (OUTPATIENT)
Age: 89
End: 2025-03-10

## 2025-04-17 ENCOUNTER — NON-APPOINTMENT (OUTPATIENT)
Age: 89
End: 2025-04-17

## 2025-04-17 ENCOUNTER — APPOINTMENT (OUTPATIENT)
Dept: CARDIOLOGY | Facility: CLINIC | Age: 89
End: 2025-04-17
Payer: MEDICARE

## 2025-04-17 VITALS
HEIGHT: 64 IN | HEART RATE: 68 BPM | OXYGEN SATURATION: 98 % | DIASTOLIC BLOOD PRESSURE: 70 MMHG | WEIGHT: 140 LBS | BODY MASS INDEX: 23.9 KG/M2 | SYSTOLIC BLOOD PRESSURE: 118 MMHG

## 2025-04-17 DIAGNOSIS — I35.1 NONRHEUMATIC AORTIC (VALVE) INSUFFICIENCY: ICD-10-CM

## 2025-04-17 DIAGNOSIS — R73.9 HYPERGLYCEMIA, UNSPECIFIED: ICD-10-CM

## 2025-04-17 DIAGNOSIS — N28.9 DISORDER OF KIDNEY AND URETER, UNSPECIFIED: ICD-10-CM

## 2025-04-17 DIAGNOSIS — E11.9 TYPE 2 DIABETES MELLITUS W/OUT COMPLICATIONS: ICD-10-CM

## 2025-04-17 DIAGNOSIS — I10 ESSENTIAL (PRIMARY) HYPERTENSION: ICD-10-CM

## 2025-04-17 DIAGNOSIS — Z86.39 PERSONAL HISTORY OF OTHER ENDOCRINE, NUTRITIONAL AND METABOLIC DISEASE: ICD-10-CM

## 2025-04-17 DIAGNOSIS — Z00.00 ENCOUNTER FOR GENERAL ADULT MEDICAL EXAMINATION W/OUT ABNORMAL FINDINGS: ICD-10-CM

## 2025-04-17 PROCEDURE — 93000 ELECTROCARDIOGRAM COMPLETE: CPT

## 2025-04-17 PROCEDURE — G2211 COMPLEX E/M VISIT ADD ON: CPT

## 2025-04-17 PROCEDURE — 99214 OFFICE O/P EST MOD 30 MIN: CPT

## 2025-04-20 LAB
25(OH)D3 SERPL-MCNC: 112 NG/ML
ALBUMIN SERPL ELPH-MCNC: 4 G/DL
ALP BLD-CCNC: 70 U/L
ALT SERPL-CCNC: 40 U/L
ANION GAP SERPL CALC-SCNC: 15 MMOL/L
AST SERPL-CCNC: 38 U/L
BASOPHILS # BLD AUTO: 0.03 K/UL
BASOPHILS NFR BLD AUTO: 0.4 %
BILIRUB SERPL-MCNC: 0.4 MG/DL
BUN SERPL-MCNC: 24 MG/DL
CALCIUM SERPL-MCNC: 10 MG/DL
CHLORIDE SERPL-SCNC: 106 MMOL/L
CHOLEST SERPL-MCNC: 121 MG/DL
CO2 SERPL-SCNC: 23 MMOL/L
CREAT SERPL-MCNC: 1.38 MG/DL
EGFRCR SERPLBLD CKD-EPI 2021: 35 ML/MIN/1.73M2
EOSINOPHIL # BLD AUTO: 0.18 K/UL
EOSINOPHIL NFR BLD AUTO: 2.6 %
ESTIMATED AVERAGE GLUCOSE: 171 MG/DL
GLUCOSE SERPL-MCNC: 243 MG/DL
HBA1C MFR BLD HPLC: 7.6 %
HCT VFR BLD CALC: 43.8 %
HDLC SERPL-MCNC: 54 MG/DL
HGB BLD-MCNC: 13.7 G/DL
IMM GRANULOCYTES NFR BLD AUTO: 0.1 %
LDLC SERPL-MCNC: 47 MG/DL
LYMPHOCYTES # BLD AUTO: 1.67 K/UL
LYMPHOCYTES NFR BLD AUTO: 24.4 %
MAN DIFF?: NORMAL
MCHC RBC-ENTMCNC: 29.5 PG
MCHC RBC-ENTMCNC: 31.3 G/DL
MCV RBC AUTO: 94.2 FL
MONOCYTES # BLD AUTO: 0.49 K/UL
MONOCYTES NFR BLD AUTO: 7.2 %
NEUTROPHILS # BLD AUTO: 4.46 K/UL
NEUTROPHILS NFR BLD AUTO: 65.3 %
NONHDLC SERPL-MCNC: 67 MG/DL
PLATELET # BLD AUTO: 175 K/UL
POTASSIUM SERPL-SCNC: 4.2 MMOL/L
PROT SERPL-MCNC: 6.9 G/DL
RBC # BLD: 4.65 M/UL
RBC # FLD: 15.3 %
SODIUM SERPL-SCNC: 144 MMOL/L
T4 FREE SERPL-MCNC: 1.6 NG/DL
TRIGL SERPL-MCNC: 111 MG/DL
TSH SERPL-ACNC: 3.74 UIU/ML
WBC # FLD AUTO: 6.84 K/UL

## 2025-05-06 NOTE — PROGRESS NOTE ADULT - ASSESSMENT
She gained weight between visits  BMI 22.12.  .  Fasting glucose 100.  A1c 4.7.  She declines beginning a statin.  I recommended low-fat low-cholesterol diet and 30 minutes of exercise 5 days a week.  I also recommended rechecking fasting labs in 6 months.   contributes ot diuretic Patient is a 89yo F w/ hx HTN, Afib, CHF, HLD, nonsmoker, remote hx of asthma presenting with loose stools, lethargy, fatigue, low blood pressure and blood bowel movement found to have a hemoglobin of 4.8 most likely 2/2 to GI bleed from eliquis.  DDx includes Gastric ulcer vs. Duodenal ulcer vs. angiodysplasia (?hades syndrome) vs. diverticulosis vs. hemorrhoids vs. watermelon stomach.  89yo F w/ hx HTN, Afib, CHF, HLD, nonsmoker, remote hx of asthma presenting with loose stools, lethargy, fatigue, low blood pressure and blood bowel movement found to have a hemoglobin of 4.8 most likely 2/2 to GI bleed from eliquis.  DDx includes Gastric ulcer vs. Duodenal ulcer vs. angiodysplasia (?hades syndrome) vs. diverticulosis vs. hemorrhoids vs. watermelon stomach.

## 2025-05-21 ENCOUNTER — RX RENEWAL (OUTPATIENT)
Age: 89
End: 2025-05-21

## 2025-06-24 NOTE — ED ADULT TRIAGE NOTE - TEMPERATURE IN CELSIUS (DEGREES C)
03/23/2020    Visit Date: 3/23/2020    Lupe Paredes was seen in the Emergency Department on 3/23/2020, she must continue isolation until:   At least 3 days (72 hours) have passed since recovery defined as resolution of fever without the use of fever-reducing medications and improvement in respiratory symptoms (cough, shortness of breath, etc), and   At least 7 days have passed since symptoms first appeared.   After the appropriate isolation has passed, he/she may return to normal activities and work.     Thank you,  ANA M Morales, FNP-C                     [facility] [address] [facility].  ?  [place] [id number]  ?  phone [phone] ?   ?  fax [phone]  ?  [email or web address]       
Update History & Physical    The patient's History and Physical of June 16, 2025 was reviewed with the patient and I examined the patient. There was no change. The surgical site was confirmed by the patient and me.     Plan: The risks, benefits, expected outcome, and alternative to the recommended procedure have been discussed with the patient. Patient understands and wants to proceed with the procedure.     Moderate sedation  ASA III, Mallampati 3    Electronically signed by Madina Steiner DO on 6/24/2025 at 9:45 AM      
36.3

## 2025-09-01 ENCOUNTER — RX RENEWAL (OUTPATIENT)
Age: 89
End: 2025-09-01

## (undated) DEVICE — BRUSH COLONOSCOPY CYTOLOGY

## (undated) DEVICE — PACK IV START WITH CHG

## (undated) DEVICE — TUBING IV SET GRAVITY 3Y 100" MACRO

## (undated) DEVICE — IRRIGATOR BIO SHIELD

## (undated) DEVICE — SYR ALLIANCE II INFLATION 60ML

## (undated) DEVICE — SENSOR O2 FINGER ADULT

## (undated) DEVICE — CATH ELECROHEM GLD PROBE 7FR

## (undated) DEVICE — ELCTR GROUNDING PAD ADULT COVIDIEN

## (undated) DEVICE — BIOPSY FORCEP RADIAL JAW 4 STANDARD WITH NEEDLE

## (undated) DEVICE — POLY TRAP ETRAP

## (undated) DEVICE — FOLEY HOLDER STATLOCK 2 WAY ADULT

## (undated) DEVICE — TUBING CAP SET ENDO 24HR USE GI

## (undated) DEVICE — TUBING SUCTION 20FT

## (undated) DEVICE — CATH IV SAFE BC 20G X 1.16" (PINK)

## (undated) DEVICE — TUBING SUCTION CONN 6FT STERILE

## (undated) DEVICE — Device

## (undated) DEVICE — CATH IV SAFE BC 22G X 1" (BLUE)

## (undated) DEVICE — CLAMP BX HOT RAD JAW 3

## (undated) DEVICE — SOL INJ NS 0.9% 500ML 2 PORT

## (undated) DEVICE — BITE BLOCK ADULT 20 X 27MM (GREEN)

## (undated) DEVICE — SYR LUER LOK 50CC

## (undated) DEVICE — FORCEP RADIAL JAW 4 JUMBO 2.8MM 3.2MM 240CM ORANGE DISP

## (undated) DEVICE — SUCTION YANKAUER NO CONTROL VENT

## (undated) DEVICE — BALLOON US ENDO